# Patient Record
Sex: FEMALE | Race: WHITE | NOT HISPANIC OR LATINO
[De-identification: names, ages, dates, MRNs, and addresses within clinical notes are randomized per-mention and may not be internally consistent; named-entity substitution may affect disease eponyms.]

---

## 2017-04-11 ENCOUNTER — APPOINTMENT (OUTPATIENT)
Dept: FAMILY MEDICINE | Facility: CLINIC | Age: 52
End: 2017-04-11

## 2017-07-12 ENCOUNTER — APPOINTMENT (OUTPATIENT)
Dept: FAMILY MEDICINE | Facility: CLINIC | Age: 52
End: 2017-07-12

## 2017-07-12 VITALS
HEART RATE: 78 BPM | HEIGHT: 62 IN | RESPIRATION RATE: 15 BRPM | DIASTOLIC BLOOD PRESSURE: 80 MMHG | SYSTOLIC BLOOD PRESSURE: 120 MMHG | OXYGEN SATURATION: 98 % | WEIGHT: 118 LBS | BODY MASS INDEX: 21.71 KG/M2 | TEMPERATURE: 97.8 F

## 2017-07-12 DIAGNOSIS — S22.20XA UNSPECIFIED FRACTURE OF STERNUM, INITIAL ENCOUNTER FOR CLOSED FRACTURE: ICD-10-CM

## 2017-07-12 DIAGNOSIS — Z72.0 TOBACCO USE: ICD-10-CM

## 2017-07-12 DIAGNOSIS — Z87.81 PERSONAL HISTORY OF (HEALED) TRAUMATIC FRACTURE: ICD-10-CM

## 2017-07-12 DIAGNOSIS — G89.29 PAIN IN LEFT KNEE: ICD-10-CM

## 2017-07-12 DIAGNOSIS — M25.562 PAIN IN LEFT KNEE: ICD-10-CM

## 2017-07-12 DIAGNOSIS — Z87.898 PERSONAL HISTORY OF OTHER SPECIFIED CONDITIONS: ICD-10-CM

## 2017-07-12 DIAGNOSIS — Z87.828 PERSONAL HISTORY OF OTHER (HEALED) PHYSICAL INJURY AND TRAUMA: ICD-10-CM

## 2017-07-13 LAB
25(OH)D3 SERPL-MCNC: 14.9 NG/ML
ALBUMIN SERPL ELPH-MCNC: 4.4 G/DL
ALP BLD-CCNC: 61 U/L
ALT SERPL-CCNC: 13 U/L
ANION GAP SERPL CALC-SCNC: 12 MMOL/L
APPEARANCE: ABNORMAL
AST SERPL-CCNC: 12 U/L
BACTERIA: ABNORMAL
BASOPHILS # BLD AUTO: 0.02 K/UL
BASOPHILS NFR BLD AUTO: 0.2 %
BILIRUB DIRECT SERPL-MCNC: 0.2 MG/DL
BILIRUB INDIRECT SERPL-MCNC: 0.8 MG/DL
BILIRUB SERPL-MCNC: 1 MG/DL
BILIRUBIN URINE: NEGATIVE
BLOOD URINE: ABNORMAL
BUN SERPL-MCNC: 12 MG/DL
CALCIUM OXALATE CRYSTALS: ABNORMAL
CALCIUM SERPL-MCNC: 9.6 MG/DL
CHLORIDE SERPL-SCNC: 103 MMOL/L
CHOLEST SERPL-MCNC: 233 MG/DL
CHOLEST/HDLC SERPL: 3.3 RATIO
CO2 SERPL-SCNC: 21 MMOL/L
COLOR: YELLOW
CREAT SERPL-MCNC: 0.68 MG/DL
EOSINOPHIL # BLD AUTO: 0.06 K/UL
EOSINOPHIL NFR BLD AUTO: 0.7 %
FOLATE SERPL-MCNC: 18 NG/ML
GLUCOSE QUALITATIVE U: NORMAL MG/DL
GLUCOSE SERPL-MCNC: 100 MG/DL
HAV IGM SER QL: NONREACTIVE
HBA1C MFR BLD HPLC: 5.3 %
HBV CORE IGM SER QL: NONREACTIVE
HBV SURFACE AG SER QL: NONREACTIVE
HCT VFR BLD CALC: 39.7 %
HCV AB SER QL: NONREACTIVE
HCV S/CO RATIO: 0.12 S/CO
HDLC SERPL-MCNC: 71 MG/DL
HGB BLD-MCNC: 13 G/DL
HIV1+2 AB SPEC QL IA.RAPID: NONREACTIVE
HYALINE CASTS: 0 /LPF
IMM GRANULOCYTES NFR BLD AUTO: 0.1 %
KETONES URINE: NEGATIVE
LDLC SERPL CALC-MCNC: 131 MG/DL
LEUKOCYTE ESTERASE URINE: ABNORMAL
LYMPHOCYTES # BLD AUTO: 2.56 K/UL
LYMPHOCYTES NFR BLD AUTO: 29.7 %
MAN DIFF?: NORMAL
MCHC RBC-ENTMCNC: 29.3 PG
MCHC RBC-ENTMCNC: 32.7 GM/DL
MCV RBC AUTO: 89.4 FL
MICROSCOPIC-UA: NORMAL
MONOCYTES # BLD AUTO: 0.54 K/UL
MONOCYTES NFR BLD AUTO: 6.3 %
NEUTROPHILS # BLD AUTO: 5.42 K/UL
NEUTROPHILS NFR BLD AUTO: 63 %
NITRITE URINE: NEGATIVE
PH URINE: 5.5
PLATELET # BLD AUTO: 281 K/UL
POTASSIUM SERPL-SCNC: 3.8 MMOL/L
PROT SERPL-MCNC: 7.1 G/DL
PROTEIN URINE: NEGATIVE MG/DL
RBC # BLD: 4.44 M/UL
RBC # FLD: 13.5 %
RED BLOOD CELLS URINE: 3 /HPF
SODIUM SERPL-SCNC: 136 MMOL/L
SPECIFIC GRAVITY URINE: 1.01
SQUAMOUS EPITHELIAL CELLS: 9 /HPF
TRIGL SERPL-MCNC: 154 MG/DL
TSH SERPL-ACNC: 1.38 UIU/ML
URATE SERPL-MCNC: 5 MG/DL
URIC ACID CRYSTALS: ABNORMAL
URINE COMMENTS: NORMAL
UROBILINOGEN URINE: NORMAL MG/DL
VIT B12 SERPL-MCNC: 329 PG/ML
WBC # FLD AUTO: 8.61 K/UL
WHITE BLOOD CELLS URINE: 16 /HPF

## 2017-07-20 ENCOUNTER — FORM ENCOUNTER (OUTPATIENT)
Age: 52
End: 2017-07-20

## 2017-07-21 ENCOUNTER — APPOINTMENT (OUTPATIENT)
Dept: RADIOLOGY | Facility: HOSPITAL | Age: 52
End: 2017-07-21

## 2017-07-21 ENCOUNTER — OUTPATIENT (OUTPATIENT)
Dept: OUTPATIENT SERVICES | Facility: HOSPITAL | Age: 52
LOS: 1 days | End: 2017-07-21
Payer: COMMERCIAL

## 2017-07-21 PROCEDURE — 73562 X-RAY EXAM OF KNEE 3: CPT

## 2017-08-10 ENCOUNTER — FORM ENCOUNTER (OUTPATIENT)
Age: 52
End: 2017-08-10

## 2017-08-11 ENCOUNTER — RESULT REVIEW (OUTPATIENT)
Age: 52
End: 2017-08-11

## 2017-08-11 ENCOUNTER — APPOINTMENT (OUTPATIENT)
Dept: RADIOLOGY | Facility: HOSPITAL | Age: 52
End: 2017-08-11
Payer: COMMERCIAL

## 2017-08-11 ENCOUNTER — APPOINTMENT (OUTPATIENT)
Dept: MAMMOGRAPHY | Facility: HOSPITAL | Age: 52
End: 2017-08-11
Payer: COMMERCIAL

## 2017-08-11 ENCOUNTER — OUTPATIENT (OUTPATIENT)
Dept: OUTPATIENT SERVICES | Facility: HOSPITAL | Age: 52
LOS: 1 days | End: 2017-08-11
Payer: COMMERCIAL

## 2017-08-11 PROCEDURE — 77080 DXA BONE DENSITY AXIAL: CPT | Mod: 26

## 2017-08-11 PROCEDURE — G0202: CPT | Mod: 26

## 2017-08-11 PROCEDURE — 77063 BREAST TOMOSYNTHESIS BI: CPT

## 2017-08-11 PROCEDURE — 77063 BREAST TOMOSYNTHESIS BI: CPT | Mod: 26

## 2017-08-11 PROCEDURE — 77067 SCR MAMMO BI INCL CAD: CPT

## 2017-08-11 PROCEDURE — 77080 DXA BONE DENSITY AXIAL: CPT

## 2017-08-17 ENCOUNTER — FORM ENCOUNTER (OUTPATIENT)
Age: 52
End: 2017-08-17

## 2017-08-18 ENCOUNTER — APPOINTMENT (OUTPATIENT)
Dept: ULTRASOUND IMAGING | Facility: HOSPITAL | Age: 52
End: 2017-08-18
Payer: COMMERCIAL

## 2017-08-18 ENCOUNTER — APPOINTMENT (OUTPATIENT)
Dept: MAMMOGRAPHY | Facility: HOSPITAL | Age: 52
End: 2017-08-18
Payer: COMMERCIAL

## 2017-08-18 ENCOUNTER — OUTPATIENT (OUTPATIENT)
Dept: OUTPATIENT SERVICES | Facility: HOSPITAL | Age: 52
LOS: 1 days | End: 2017-08-18
Payer: COMMERCIAL

## 2017-08-18 PROCEDURE — 77065 DX MAMMO INCL CAD UNI: CPT

## 2017-08-18 PROCEDURE — G0279: CPT | Mod: 26

## 2017-08-18 PROCEDURE — 93971 EXTREMITY STUDY: CPT | Mod: 26,LT

## 2017-08-18 PROCEDURE — 76856 US EXAM PELVIC COMPLETE: CPT | Mod: 26

## 2017-08-18 PROCEDURE — 76642 ULTRASOUND BREAST LIMITED: CPT | Mod: 26,LT

## 2017-08-18 PROCEDURE — 76856 US EXAM PELVIC COMPLETE: CPT

## 2017-08-18 PROCEDURE — G0206: CPT | Mod: 26,LT

## 2017-08-18 PROCEDURE — 93971 EXTREMITY STUDY: CPT

## 2017-08-18 PROCEDURE — 76830 TRANSVAGINAL US NON-OB: CPT

## 2017-08-18 PROCEDURE — 76830 TRANSVAGINAL US NON-OB: CPT | Mod: 26

## 2017-08-18 PROCEDURE — 76642 ULTRASOUND BREAST LIMITED: CPT

## 2017-08-18 PROCEDURE — G0279: CPT

## 2018-05-08 ENCOUNTER — RX RENEWAL (OUTPATIENT)
Age: 53
End: 2018-05-08

## 2018-06-04 ENCOUNTER — FORM ENCOUNTER (OUTPATIENT)
Age: 53
End: 2018-06-04

## 2018-06-05 ENCOUNTER — APPOINTMENT (OUTPATIENT)
Dept: ULTRASOUND IMAGING | Facility: HOSPITAL | Age: 53
End: 2018-06-05
Payer: COMMERCIAL

## 2018-06-05 ENCOUNTER — APPOINTMENT (OUTPATIENT)
Dept: MAMMOGRAPHY | Facility: HOSPITAL | Age: 53
End: 2018-06-05
Payer: COMMERCIAL

## 2018-06-05 ENCOUNTER — OUTPATIENT (OUTPATIENT)
Dept: OUTPATIENT SERVICES | Facility: HOSPITAL | Age: 53
LOS: 1 days | End: 2018-06-05
Payer: COMMERCIAL

## 2018-06-05 DIAGNOSIS — Z00.8 ENCOUNTER FOR OTHER GENERAL EXAMINATION: ICD-10-CM

## 2018-06-05 PROCEDURE — 77066 DX MAMMO INCL CAD BI: CPT

## 2018-06-05 PROCEDURE — 76641 ULTRASOUND BREAST COMPLETE: CPT

## 2018-06-05 PROCEDURE — G0279: CPT | Mod: 26

## 2018-06-05 PROCEDURE — 76641 ULTRASOUND BREAST COMPLETE: CPT | Mod: 26

## 2018-06-05 PROCEDURE — 77066 DX MAMMO INCL CAD BI: CPT | Mod: 26

## 2018-06-05 PROCEDURE — G0279: CPT

## 2018-07-30 ENCOUNTER — APPOINTMENT (OUTPATIENT)
Dept: FAMILY MEDICINE | Facility: CLINIC | Age: 53
End: 2018-07-30
Payer: COMMERCIAL

## 2018-07-30 VITALS
OXYGEN SATURATION: 94 % | WEIGHT: 124 LBS | HEART RATE: 66 BPM | BODY MASS INDEX: 22.82 KG/M2 | HEIGHT: 62 IN | TEMPERATURE: 98.5 F | RESPIRATION RATE: 16 BRPM | DIASTOLIC BLOOD PRESSURE: 82 MMHG | SYSTOLIC BLOOD PRESSURE: 122 MMHG

## 2018-07-30 DIAGNOSIS — G47.00 INSOMNIA, UNSPECIFIED: ICD-10-CM

## 2018-07-30 PROCEDURE — 99214 OFFICE O/P EST MOD 30 MIN: CPT | Mod: 25

## 2018-07-30 PROCEDURE — 36415 COLL VENOUS BLD VENIPUNCTURE: CPT

## 2018-08-01 LAB
25(OH)D3 SERPL-MCNC: 18.8 NG/ML
ALBUMIN SERPL ELPH-MCNC: 4.7 G/DL
ALP BLD-CCNC: 64 U/L
ALT SERPL-CCNC: 16 U/L
ANION GAP SERPL CALC-SCNC: 15 MMOL/L
APPEARANCE: CLEAR
AST SERPL-CCNC: 17 U/L
BACTERIA: ABNORMAL
BASOPHILS # BLD AUTO: 0.02 K/UL
BASOPHILS NFR BLD AUTO: 0.2 %
BILIRUB SERPL-MCNC: 0.3 MG/DL
BILIRUBIN URINE: NEGATIVE
BLOOD URINE: NEGATIVE
BUN SERPL-MCNC: 13 MG/DL
CALCIUM SERPL-MCNC: 9.6 MG/DL
CHLORIDE SERPL-SCNC: 101 MMOL/L
CHOLEST SERPL-MCNC: 271 MG/DL
CHOLEST/HDLC SERPL: 5.4 RATIO
CO2 SERPL-SCNC: 24 MMOL/L
COLOR: YELLOW
CREAT SERPL-MCNC: 0.71 MG/DL
CREAT SPEC-SCNC: 150 MG/DL
EOSINOPHIL # BLD AUTO: 0.07 K/UL
EOSINOPHIL NFR BLD AUTO: 0.7 %
FOLATE SERPL-MCNC: 16.8 NG/ML
GLUCOSE QUALITATIVE U: NEGATIVE MG/DL
GLUCOSE SERPL-MCNC: 92 MG/DL
HBA1C MFR BLD HPLC: 5.5 %
HCT VFR BLD CALC: 40.5 %
HCV AB SER QL: NONREACTIVE
HCV S/CO RATIO: 0.26 S/CO
HDLC SERPL-MCNC: 50 MG/DL
HGB BLD-MCNC: 13.4 G/DL
HIV1+2 AB SPEC QL IA.RAPID: NONREACTIVE
HYALINE CASTS: 0 /LPF
IMM GRANULOCYTES NFR BLD AUTO: 0.3 %
KETONES URINE: NEGATIVE
LDLC SERPL CALC-MCNC: NORMAL
LEUKOCYTE ESTERASE URINE: NEGATIVE
LYMPHOCYTES # BLD AUTO: 2.47 K/UL
LYMPHOCYTES NFR BLD AUTO: 24.4 %
MAN DIFF?: NORMAL
MCHC RBC-ENTMCNC: 29.8 PG
MCHC RBC-ENTMCNC: 33.1 GM/DL
MCV RBC AUTO: 90 FL
MICROALBUMIN 24H UR DL<=1MG/L-MCNC: <1.2 MG/DL
MICROALBUMIN/CREAT 24H UR-RTO: NORMAL
MICROSCOPIC-UA: NORMAL
MONOCYTES # BLD AUTO: 0.78 K/UL
MONOCYTES NFR BLD AUTO: 7.7 %
NEUTROPHILS # BLD AUTO: 6.76 K/UL
NEUTROPHILS NFR BLD AUTO: 66.7 %
NITRITE URINE: NEGATIVE
PH URINE: 5
PLATELET # BLD AUTO: 264 K/UL
POTASSIUM SERPL-SCNC: 4 MMOL/L
PROT SERPL-MCNC: 7.1 G/DL
PROTEIN URINE: NEGATIVE MG/DL
RBC # BLD: 4.5 M/UL
RBC # FLD: 13.6 %
RED BLOOD CELLS URINE: 2 /HPF
SODIUM SERPL-SCNC: 140 MMOL/L
SPECIFIC GRAVITY URINE: 1.02
SQUAMOUS EPITHELIAL CELLS: 7 /HPF
TRIGL SERPL-MCNC: 537 MG/DL
TSH SERPL-ACNC: 2.66 UIU/ML
URATE SERPL-MCNC: 6.4 MG/DL
URIC ACID CRYSTALS: ABNORMAL
UROBILINOGEN URINE: NEGATIVE MG/DL
VIT B12 SERPL-MCNC: 297 PG/ML
WBC # FLD AUTO: 10.13 K/UL
WHITE BLOOD CELLS URINE: 5 /HPF

## 2018-08-12 NOTE — PHYSICAL EXAM
[No Acute Distress] : no acute distress [No Respiratory Distress] : no respiratory distress  [Clear to Auscultation] : lungs were clear to auscultation bilaterally [No Accessory Muscle Use] : no accessory muscle use [Normal Rate] : normal rate  [Regular Rhythm] : with a regular rhythm [Normal S1, S2] : normal S1 and S2 [No Edema] : there was no peripheral edema [Soft] : abdomen soft [Non-distended] : non-distended [No Masses] : no abdominal mass palpated [No HSM] : no HSM [Alert and Oriented x3] : oriented to person, place, and time

## 2018-08-12 NOTE — ASSESSMENT
[FreeTextEntry1] : Patient was advised to f/u low fat and sugar diet, Insomnia stable on current management , cont the same meds, Rx given. CPE recommended, f/u blood work

## 2018-09-07 ENCOUNTER — APPOINTMENT (OUTPATIENT)
Dept: FAMILY MEDICINE | Facility: CLINIC | Age: 53
End: 2018-09-07

## 2019-01-11 ENCOUNTER — APPOINTMENT (OUTPATIENT)
Dept: FAMILY MEDICINE | Facility: CLINIC | Age: 54
End: 2019-01-11
Payer: COMMERCIAL

## 2019-01-11 VITALS
HEIGHT: 62 IN | WEIGHT: 124 LBS | OXYGEN SATURATION: 98 % | DIASTOLIC BLOOD PRESSURE: 82 MMHG | HEART RATE: 69 BPM | TEMPERATURE: 98.5 F | SYSTOLIC BLOOD PRESSURE: 120 MMHG | BODY MASS INDEX: 22.82 KG/M2 | RESPIRATION RATE: 15 BRPM

## 2019-01-11 PROCEDURE — 99214 OFFICE O/P EST MOD 30 MIN: CPT

## 2019-01-11 RX ORDER — PANTOPRAZOLE 40 MG/1
40 TABLET, DELAYED RELEASE ORAL
Refills: 0 | Status: COMPLETED | COMMUNITY
End: 2019-01-11
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
## 2019-01-11 NOTE — HEALTH RISK ASSESSMENT
[No falls in past year] : Patient reported no falls in the past year [0] : 2) Feeling down, depressed, or hopeless: Not at all (0) [[id number]] : 0 [Independent] : managing finances [Discussed at today's visit] : Advance Directives Discussed at today's visit [Aggressive treatment] : aggressive treatment

## 2019-01-11 NOTE — PHYSICAL EXAM
[No Acute Distress] : no acute distress [No Respiratory Distress] : no respiratory distress  [Clear to Auscultation] : lungs were clear to auscultation bilaterally [No Accessory Muscle Use] : no accessory muscle use [Normal Rate] : normal rate  [Regular Rhythm] : with a regular rhythm [Normal S1, S2] : normal S1 and S2 [No Edema] : there was no peripheral edema [Soft] : abdomen soft [Non-distended] : non-distended [No Masses] : no abdominal mass palpated [No HSM] : no HSM [Alert and Oriented x3] : oriented to person, place, and time [de-identified] : right wrist + edema, no erythema, non tender to touch , full ROM ,  2nf right finger decrease Rdistal phalynx + edema, tender to touch

## 2019-01-11 NOTE — HISTORY OF PRESENT ILLNESS
[FreeTextEntry1] : cc: Insomnia , ulcer  [de-identified] : Patient came to f/u on her Insomnia - doing well with medication, need refills. Pt. denies CP,SOB,abd pain. SHe take PPI for her gastric ulcer. [Musculoskeletal Symptoms Arms] :  arm [___ Weeks ago] : [unfilled] weeks ago [Paroxysmal] : paroxysmal [Moderate] : moderate [Activity] : with activity [Stable] : stable

## 2019-01-15 ENCOUNTER — FORM ENCOUNTER (OUTPATIENT)
Age: 54
End: 2019-01-15

## 2019-01-16 ENCOUNTER — OUTPATIENT (OUTPATIENT)
Dept: OUTPATIENT SERVICES | Facility: HOSPITAL | Age: 54
LOS: 1 days | End: 2019-01-16
Payer: COMMERCIAL

## 2019-01-16 ENCOUNTER — APPOINTMENT (OUTPATIENT)
Dept: RADIOLOGY | Facility: HOSPITAL | Age: 54
End: 2019-01-16
Payer: COMMERCIAL

## 2019-01-16 DIAGNOSIS — M79.644 PAIN IN RIGHT FINGER(S): ICD-10-CM

## 2019-01-16 PROCEDURE — 73120 X-RAY EXAM OF HAND: CPT

## 2019-01-16 PROCEDURE — 73120 X-RAY EXAM OF HAND: CPT | Mod: 26,RT

## 2019-01-16 PROCEDURE — 73110 X-RAY EXAM OF WRIST: CPT | Mod: 26,RT

## 2019-01-16 PROCEDURE — 73110 X-RAY EXAM OF WRIST: CPT

## 2019-02-22 ENCOUNTER — APPOINTMENT (OUTPATIENT)
Dept: FAMILY MEDICINE | Facility: CLINIC | Age: 54
End: 2019-02-22
Payer: COMMERCIAL

## 2019-02-22 VITALS
BODY MASS INDEX: 22.82 KG/M2 | SYSTOLIC BLOOD PRESSURE: 120 MMHG | TEMPERATURE: 101 F | HEIGHT: 62 IN | OXYGEN SATURATION: 99 % | HEART RATE: 96 BPM | DIASTOLIC BLOOD PRESSURE: 80 MMHG | WEIGHT: 124 LBS

## 2019-02-22 DIAGNOSIS — Z87.09 PERSONAL HISTORY OF OTHER DISEASES OF THE RESPIRATORY SYSTEM: ICD-10-CM

## 2019-02-22 PROCEDURE — 99212 OFFICE O/P EST SF 10 MIN: CPT

## 2019-02-23 NOTE — PHYSICAL EXAM

## 2019-02-23 NOTE — ASSESSMENT
[FreeTextEntry1] : Due to her persistent fever and length of illness I will treat with Doxycycline. Possible double sickening syndrome. Discussed with patient that it could possibly be the flu but Tamiflu would not be indicated since she has had persistent symptoms for at least 4 days.

## 2019-02-23 NOTE — HISTORY OF PRESENT ILLNESS
[FreeTextEntry8] : Presents with productive cough with white discharge and muscle aches  and fever since the 11th.  Symptoms improved and then worsened about 5 days ago. She denies nasal congestion,  or sore throat. Symptoms are worse during the day and reports about one to two coughing spells a day where she coughs for a couple of minutes.\par \par \par \par Did not receive flu this year. No family sick contacts noted \par \par \par

## 2019-02-26 ENCOUNTER — OUTPATIENT (OUTPATIENT)
Dept: OUTPATIENT SERVICES | Facility: HOSPITAL | Age: 54
LOS: 1 days | End: 2019-02-26
Payer: COMMERCIAL

## 2019-02-26 ENCOUNTER — APPOINTMENT (OUTPATIENT)
Dept: RADIOLOGY | Facility: HOSPITAL | Age: 54
End: 2019-02-26
Payer: COMMERCIAL

## 2019-02-26 DIAGNOSIS — J20.9 ACUTE BRONCHITIS, UNSPECIFIED: ICD-10-CM

## 2019-02-26 PROCEDURE — 71046 X-RAY EXAM CHEST 2 VIEWS: CPT

## 2019-02-26 PROCEDURE — 71046 X-RAY EXAM CHEST 2 VIEWS: CPT | Mod: 26

## 2019-03-18 ENCOUNTER — RX RENEWAL (OUTPATIENT)
Age: 54
End: 2019-03-18

## 2019-10-31 ENCOUNTER — RESULT REVIEW (OUTPATIENT)
Age: 54
End: 2019-10-31

## 2020-01-31 ENCOUNTER — APPOINTMENT (OUTPATIENT)
Dept: ULTRASOUND IMAGING | Facility: HOSPITAL | Age: 55
End: 2020-01-31
Payer: COMMERCIAL

## 2020-01-31 ENCOUNTER — OUTPATIENT (OUTPATIENT)
Dept: OUTPATIENT SERVICES | Facility: HOSPITAL | Age: 55
LOS: 1 days | End: 2020-01-31
Payer: COMMERCIAL

## 2020-01-31 ENCOUNTER — APPOINTMENT (OUTPATIENT)
Dept: MAMMOGRAPHY | Facility: HOSPITAL | Age: 55
End: 2020-01-31
Payer: COMMERCIAL

## 2020-01-31 ENCOUNTER — APPOINTMENT (OUTPATIENT)
Dept: RADIOLOGY | Facility: HOSPITAL | Age: 55
End: 2020-01-31
Payer: COMMERCIAL

## 2020-01-31 DIAGNOSIS — Z00.8 ENCOUNTER FOR OTHER GENERAL EXAMINATION: ICD-10-CM

## 2020-01-31 PROCEDURE — 77063 BREAST TOMOSYNTHESIS BI: CPT

## 2020-01-31 PROCEDURE — 77067 SCR MAMMO BI INCL CAD: CPT | Mod: 26

## 2020-01-31 PROCEDURE — 76830 TRANSVAGINAL US NON-OB: CPT | Mod: 26

## 2020-01-31 PROCEDURE — 77063 BREAST TOMOSYNTHESIS BI: CPT | Mod: 26

## 2020-01-31 PROCEDURE — 77080 DXA BONE DENSITY AXIAL: CPT | Mod: 26

## 2020-01-31 PROCEDURE — 76641 ULTRASOUND BREAST COMPLETE: CPT | Mod: 26,50

## 2020-01-31 PROCEDURE — 76856 US EXAM PELVIC COMPLETE: CPT | Mod: 26

## 2020-01-31 PROCEDURE — 77067 SCR MAMMO BI INCL CAD: CPT

## 2020-01-31 PROCEDURE — 77080 DXA BONE DENSITY AXIAL: CPT

## 2020-01-31 PROCEDURE — 76641 ULTRASOUND BREAST COMPLETE: CPT

## 2020-01-31 PROCEDURE — 76830 TRANSVAGINAL US NON-OB: CPT

## 2020-01-31 PROCEDURE — 76856 US EXAM PELVIC COMPLETE: CPT

## 2020-12-21 PROBLEM — Z87.09 HISTORY OF ACUTE BRONCHITIS: Status: RESOLVED | Noted: 2019-02-22 | Resolved: 2020-12-21

## 2021-01-21 ENCOUNTER — APPOINTMENT (OUTPATIENT)
Dept: FAMILY MEDICINE | Facility: CLINIC | Age: 56
End: 2021-01-21
Payer: COMMERCIAL

## 2021-01-21 ENCOUNTER — NON-APPOINTMENT (OUTPATIENT)
Age: 56
End: 2021-01-21

## 2021-01-21 VITALS
OXYGEN SATURATION: 98 % | HEART RATE: 63 BPM | WEIGHT: 122 LBS | DIASTOLIC BLOOD PRESSURE: 80 MMHG | RESPIRATION RATE: 15 BRPM | HEIGHT: 62 IN | TEMPERATURE: 97.8 F | SYSTOLIC BLOOD PRESSURE: 140 MMHG | BODY MASS INDEX: 22.45 KG/M2

## 2021-01-21 DIAGNOSIS — R73.01 IMPAIRED FASTING GLUCOSE: ICD-10-CM

## 2021-01-21 PROCEDURE — 99406 BEHAV CHNG SMOKING 3-10 MIN: CPT

## 2021-01-21 PROCEDURE — 93000 ELECTROCARDIOGRAM COMPLETE: CPT

## 2021-01-21 PROCEDURE — 99396 PREV VISIT EST AGE 40-64: CPT | Mod: 25

## 2021-01-21 PROCEDURE — 99072 ADDL SUPL MATRL&STAF TM PHE: CPT

## 2021-01-21 PROCEDURE — 36415 COLL VENOUS BLD VENIPUNCTURE: CPT

## 2021-01-21 RX ORDER — MELOXICAM 15 MG/1
15 TABLET ORAL
Qty: 30 | Refills: 0 | Status: COMPLETED | COMMUNITY
Start: 2019-01-11 | End: 2021-01-21

## 2021-01-21 RX ORDER — BENZONATATE 100 MG/1
100 CAPSULE ORAL 3 TIMES DAILY
Qty: 15 | Refills: 0 | Status: COMPLETED | COMMUNITY
Start: 2019-02-22 | End: 2021-01-21

## 2021-01-21 RX ORDER — ATORVASTATIN CALCIUM 20 MG/1
20 TABLET, FILM COATED ORAL
Qty: 30 | Refills: 3 | Status: COMPLETED | COMMUNITY
Start: 2018-08-23 | End: 2021-01-21

## 2021-01-21 RX ORDER — ZOLPIDEM TARTRATE SUBLINGUAL 3.5 MG/1
3.5 TABLET SUBLINGUAL
Qty: 30 | Refills: 0 | Status: COMPLETED | COMMUNITY
Start: 2018-07-30 | End: 2021-01-21

## 2021-01-21 RX ORDER — DOXYCYCLINE HYCLATE 100 MG/1
100 TABLET ORAL
Qty: 20 | Refills: 0 | Status: COMPLETED | COMMUNITY
Start: 2019-02-22 | End: 2021-01-21

## 2021-01-21 NOTE — HEALTH RISK ASSESSMENT
[Very Good] : ~his/her~  mood as very good [] : Yes [21-25] : 21-25 [Yes] : Yes [2 - 4 times a month (2 pts)] : 2-4 times a month (2 points) [1 or 2 (0 pts)] : 1 or 2 (0 points) [Never (0 pts)] : Never (0 points) [0] : 2) Feeling down, depressed, or hopeless: Not at all (0) [Patient reported mammogram was normal] : Patient reported mammogram was normal [Patient reported PAP Smear was normal] : Patient reported PAP Smear was normal [Patient reported bone density results were abnormal] : Patient reported bone density results were abnormal [Patient reported colonoscopy was normal] : Patient reported colonoscopy was normal [HIV Test offered] : HIV Test offered [Hepatitis C test offered] : Hepatitis C test offered [None] : None [With Family] : lives with family [Employed] : employed [High School] : high school [] :  [# Of Children ___] : has [unfilled] children [Sexually Active] : sexually active [Smoke Detector] : smoke detector [Carbon Monoxide Detector] : carbon monoxide detector [Seat Belt] :  uses seat belt [With Patient/Caregiver] : With Patient/Caregiver [Aggressive treatment] : aggressive treatment [FreeTextEntry1] : none [de-identified] : No [de-identified] : none [de-identified] : 6 cig a day  [Audit-CScore] : 2 [de-identified] : marijuana  [de-identified] : walking  [de-identified] : healthy  [[id number]] : 0 [Change in mental status noted] : No change in mental status noted [Language] : denies difficulty with language [Reports changes in hearing] : Reports no changes in hearing [Reports changes in vision] : Reports no changes in vision [Reports changes in dental health] : Reports no changes in dental health [Sunscreen] : does not use sunscreen [MammogramDate] : 01/20 [PapSmearDate] : 01/20 [BoneDensityDate] : 01/20 [BoneDensityComments] : osteopenia [ColonoscopyDate] : 04/15 [AdvancecareDate] : 01/21

## 2021-01-21 NOTE — HISTORY OF PRESENT ILLNESS
[FreeTextEntry1] : cc: physical  [de-identified] : Patient came  for physical. Denies CP,SOB,Abd pain, no N,V,C,D.Pt smokes 25 years 6 cig a day, sister dgn with [facility]. smoking cessation completed, 5 min not ready yet. \par

## 2021-01-21 NOTE — HEALTH RISK ASSESSMENT
[Very Good] : ~his/her~  mood as very good [] : Yes [Yes] : Yes [21-25] : 21-25 [2 - 4 times a month (2 pts)] : 2-4 times a month (2 points) [1 or 2 (0 pts)] : 1 or 2 (0 points) [Never (0 pts)] : Never (0 points) [0] : 2) Feeling down, depressed, or hopeless: Not at all (0) [Patient reported mammogram was normal] : Patient reported mammogram was normal [Patient reported PAP Smear was normal] : Patient reported PAP Smear was normal [Patient reported bone density results were abnormal] : Patient reported bone density results were abnormal [Patient reported colonoscopy was normal] : Patient reported colonoscopy was normal [HIV Test offered] : HIV Test offered [Hepatitis C test offered] : Hepatitis C test offered [None] : None [With Family] : lives with family [Employed] : employed [High School] : high school [] :  [# Of Children ___] : has [unfilled] children [Sexually Active] : sexually active [Smoke Detector] : smoke detector [Carbon Monoxide Detector] : carbon monoxide detector [Seat Belt] :  uses seat belt [With Patient/Caregiver] : With Patient/Caregiver [Aggressive treatment] : aggressive treatment [FreeTextEntry1] : none [de-identified] : No [de-identified] : none [de-identified] : 6 cig a day  [Audit-CScore] : 2 [de-identified] : marijuana  [de-identified] : walking  [de-identified] : healthy  [[id number]] : 0 [Change in mental status noted] : No change in mental status noted [Language] : denies difficulty with language [Reports changes in hearing] : Reports no changes in hearing [Reports changes in vision] : Reports no changes in vision [Reports changes in dental health] : Reports no changes in dental health [Sunscreen] : does not use sunscreen [MammogramDate] : 01/20 [BoneDensityDate] : 01/20 [PapSmearDate] : 01/20 [BoneDensityComments] : osteopenia [ColonoscopyDate] : 04/15 [AdvancecareDate] : 01/21

## 2021-01-21 NOTE — COUNSELING
[Cessation strategies including cessation program discussed] : Cessation strategies including cessation program discussed [Use of nicotine replacement therapies and other medications discussed] : Use of nicotine replacement therapies and other medications discussed [Encouraged to pick a quit date and identify support needed to quit] : Encouraged to pick a quit date and identify support needed to quit [Willing to Quit Smoking] : Not willing to quit smoking [Tobacco Use Cessation Intermediate Greater Than 3 Minutes Up to 10 Minutes] : Tobacco Use Cessation Intermediate Greater Than 3 Minutes Up to 10 Minutes [FreeTextEntry1] : 5 [Good understanding] : Patient has a good understanding of lifestyle changes and steps needed to achieve self management goal

## 2021-01-21 NOTE — HISTORY OF PRESENT ILLNESS
[FreeTextEntry1] : cc: physical  [de-identified] : Patient came  for physical. Denies CP,SOB,Abd pain, no N,V,C,D.Pt smokes 25 years 6 cig a day, sister dgn with [facility]. smoking cessation completed, 5 min not ready yet. \par

## 2021-01-21 NOTE — PHYSICAL EXAM
[No Varicosities] : no varicosities [Normal Appearance] : normal in appearance [No Edema] : there was no peripheral edema [No Masses] : no palpable masses [No Nipple Discharge] : no nipple discharge [No Axillary Lymphadenopathy] : no axillary lymphadenopathy [Normal] : affect was normal and insight and judgment were intact

## 2021-01-21 NOTE — DATA REVIEWED
[FreeTextEntry1] : last blood work d/w the pt at length   \par EKG - NSR at 65 bpm, no acute changes

## 2021-01-21 NOTE — COUNSELING
[Cessation strategies including cessation program discussed] : Cessation strategies including cessation program discussed [Encouraged to pick a quit date and identify support needed to quit] : Encouraged to pick a quit date and identify support needed to quit [Use of nicotine replacement therapies and other medications discussed] : Use of nicotine replacement therapies and other medications discussed [Willing to Quit Smoking] : Not willing to quit smoking [Tobacco Use Cessation Intermediate Greater Than 3 Minutes Up to 10 Minutes] : Tobacco Use Cessation Intermediate Greater Than 3 Minutes Up to 10 Minutes [FreeTextEntry1] : 5 [Good understanding] : Patient has a good understanding of lifestyle changes and steps needed to achieve self management goal

## 2021-01-21 NOTE — PHYSICAL EXAM
[No Varicosities] : no varicosities [No Edema] : there was no peripheral edema [Normal Appearance] : normal in appearance [No Masses] : no palpable masses [No Nipple Discharge] : no nipple discharge [No Axillary Lymphadenopathy] : no axillary lymphadenopathy [Normal] : affect was normal and insight and judgment were intact

## 2021-01-24 LAB
25(OH)D3 SERPL-MCNC: 14.5 NG/ML
ALBUMIN SERPL ELPH-MCNC: 4.6 G/DL
ALP BLD-CCNC: 63 U/L
ALT SERPL-CCNC: 16 U/L
ANION GAP SERPL CALC-SCNC: 13 MMOL/L
APPEARANCE: ABNORMAL
AST SERPL-CCNC: 17 U/L
BASOPHILS # BLD AUTO: 0.04 K/UL
BASOPHILS NFR BLD AUTO: 0.4 %
BILIRUB SERPL-MCNC: 0.8 MG/DL
BILIRUBIN URINE: NEGATIVE
BLOOD URINE: NEGATIVE
BUN SERPL-MCNC: 16 MG/DL
CALCIUM SERPL-MCNC: 9.7 MG/DL
CHLORIDE SERPL-SCNC: 101 MMOL/L
CHOLEST SERPL-MCNC: 281 MG/DL
CO2 SERPL-SCNC: 24 MMOL/L
COLOR: YELLOW
CREAT SERPL-MCNC: 0.72 MG/DL
CREAT SPEC-SCNC: 171 MG/DL
EOSINOPHIL # BLD AUTO: 0.06 K/UL
EOSINOPHIL NFR BLD AUTO: 0.7 %
ESTIMATED AVERAGE GLUCOSE: 114 MG/DL
FOLATE SERPL-MCNC: 16.9 NG/ML
GLUCOSE QUALITATIVE U: NEGATIVE
GLUCOSE SERPL-MCNC: 93 MG/DL
HBA1C MFR BLD HPLC: 5.6 %
HCT VFR BLD CALC: 39.6 %
HDLC SERPL-MCNC: 64 MG/DL
HGB BLD-MCNC: 13 G/DL
IMM GRANULOCYTES NFR BLD AUTO: 0.2 %
KETONES URINE: NEGATIVE
LDLC SERPL CALC-MCNC: 161 MG/DL
LEUKOCYTE ESTERASE URINE: NEGATIVE
LYMPHOCYTES # BLD AUTO: 2.6 K/UL
LYMPHOCYTES NFR BLD AUTO: 29 %
MAN DIFF?: NORMAL
MCHC RBC-ENTMCNC: 30.1 PG
MCHC RBC-ENTMCNC: 32.8 GM/DL
MCV RBC AUTO: 91.7 FL
MICROALBUMIN 24H UR DL<=1MG/L-MCNC: <1.2 MG/DL
MICROALBUMIN/CREAT 24H UR-RTO: NORMAL MG/G
MONOCYTES # BLD AUTO: 0.64 K/UL
MONOCYTES NFR BLD AUTO: 7.1 %
NEUTROPHILS # BLD AUTO: 5.6 K/UL
NEUTROPHILS NFR BLD AUTO: 62.6 %
NITRITE URINE: NEGATIVE
NONHDLC SERPL-MCNC: 217 MG/DL
PH URINE: 5.5
PLATELET # BLD AUTO: 238 K/UL
POTASSIUM SERPL-SCNC: 4.1 MMOL/L
PROT SERPL-MCNC: 7.1 G/DL
PROTEIN URINE: NEGATIVE
RBC # BLD: 4.32 M/UL
RBC # FLD: 13.2 %
SARS-COV-2 IGG SERPL IA-ACNC: 0.07 INDEX
SARS-COV-2 IGG SERPL QL IA: NEGATIVE
SODIUM SERPL-SCNC: 139 MMOL/L
SPECIFIC GRAVITY URINE: 1.03
TRIGL SERPL-MCNC: 279 MG/DL
TSH SERPL-ACNC: 2.16 UIU/ML
URATE SERPL-MCNC: 5 MG/DL
UROBILINOGEN URINE: NORMAL
VIT B12 SERPL-MCNC: 390 PG/ML
WBC # FLD AUTO: 8.96 K/UL

## 2021-01-27 ENCOUNTER — TRANSCRIPTION ENCOUNTER (OUTPATIENT)
Age: 56
End: 2021-01-27

## 2021-04-27 ENCOUNTER — RESULT REVIEW (OUTPATIENT)
Age: 56
End: 2021-04-27

## 2021-07-06 ENCOUNTER — OUTPATIENT (OUTPATIENT)
Dept: OUTPATIENT SERVICES | Facility: HOSPITAL | Age: 56
LOS: 1 days | End: 2021-07-06
Payer: COMMERCIAL

## 2021-07-06 ENCOUNTER — APPOINTMENT (OUTPATIENT)
Dept: ULTRASOUND IMAGING | Facility: HOSPITAL | Age: 56
End: 2021-07-06

## 2021-07-06 ENCOUNTER — APPOINTMENT (OUTPATIENT)
Dept: MAMMOGRAPHY | Facility: HOSPITAL | Age: 56
End: 2021-07-06

## 2021-07-06 DIAGNOSIS — Z00.8 ENCOUNTER FOR OTHER GENERAL EXAMINATION: ICD-10-CM

## 2021-07-06 PROCEDURE — 77063 BREAST TOMOSYNTHESIS BI: CPT | Mod: 26

## 2021-07-06 PROCEDURE — 76641 ULTRASOUND BREAST COMPLETE: CPT

## 2021-07-06 PROCEDURE — 77067 SCR MAMMO BI INCL CAD: CPT

## 2021-07-06 PROCEDURE — 77063 BREAST TOMOSYNTHESIS BI: CPT

## 2021-07-06 PROCEDURE — 76641 ULTRASOUND BREAST COMPLETE: CPT | Mod: 26,50

## 2021-07-06 PROCEDURE — 77067 SCR MAMMO BI INCL CAD: CPT | Mod: 26

## 2021-07-13 ENCOUNTER — RX RENEWAL (OUTPATIENT)
Age: 56
End: 2021-07-13

## 2021-12-21 LAB — SARS-COV-2 N GENE NPH QL NAA+PROBE: NOT DETECTED

## 2022-01-27 ENCOUNTER — APPOINTMENT (OUTPATIENT)
Dept: FAMILY MEDICINE | Facility: CLINIC | Age: 57
End: 2022-01-27
Payer: COMMERCIAL

## 2022-01-27 VITALS
TEMPERATURE: 98.3 F | RESPIRATION RATE: 15 BRPM | HEART RATE: 60 BPM | WEIGHT: 122 LBS | DIASTOLIC BLOOD PRESSURE: 76 MMHG | HEIGHT: 62 IN | BODY MASS INDEX: 22.45 KG/M2 | SYSTOLIC BLOOD PRESSURE: 137 MMHG | OXYGEN SATURATION: 98 %

## 2022-01-27 PROCEDURE — 99213 OFFICE O/P EST LOW 20 MIN: CPT

## 2022-01-27 NOTE — PHYSICAL EXAM
[No Acute Distress] : no acute distress [Well Nourished] : well nourished [Well Developed] : well developed [Well-Appearing] : well-appearing [Normal Sclera/Conjunctiva] : normal sclera/conjunctiva [PERRL] : pupils equal round and reactive to light [EOMI] : extraocular movements intact [Normal Outer Ear/Nose] : the outer ears and nose were normal in appearance [Normal Oropharynx] : the oropharynx was normal [No JVD] : no jugular venous distention [No Lymphadenopathy] : no lymphadenopathy [Supple] : supple [Thyroid Normal, No Nodules] : the thyroid was normal and there were no nodules present [No Respiratory Distress] : no respiratory distress  [No Accessory Muscle Use] : no accessory muscle use [Clear to Auscultation] : lungs were clear to auscultation bilaterally [Normal Rate] : normal rate  [Regular Rhythm] : with a regular rhythm [Normal S1, S2] : normal S1 and S2 [No Murmur] : no murmur heard [No Carotid Bruits] : no carotid bruits [No Abdominal Bruit] : a ~M bruit was not heard ~T in the abdomen [No Varicosities] : no varicosities [Pedal Pulses Present] : the pedal pulses are present [No Edema] : there was no peripheral edema [No Palpable Aorta] : no palpable aorta [No Extremity Clubbing/Cyanosis] : no extremity clubbing/cyanosis [Soft] : abdomen soft [Non Tender] : non-tender [Non-distended] : non-distended [No Masses] : no abdominal mass palpated [No HSM] : no HSM [Normal Bowel Sounds] : normal bowel sounds [Normal Posterior Cervical Nodes] : no posterior cervical lymphadenopathy [Normal Anterior Cervical Nodes] : no anterior cervical lymphadenopathy [No CVA Tenderness] : no CVA  tenderness [No Spinal Tenderness] : no spinal tenderness [No Joint Swelling] : no joint swelling [Grossly Normal Strength/Tone] : grossly normal strength/tone [No Rash] : no rash [Coordination Grossly Intact] : coordination grossly intact [No Focal Deficits] : no focal deficits [Normal Gait] : normal gait [Deep Tendon Reflexes (DTR)] : deep tendon reflexes were 2+ and symmetric [Normal Affect] : the affect was normal [Normal Insight/Judgement] : insight and judgment were intact [de-identified] : tenderness over lateral aspect of left ribs.

## 2022-01-27 NOTE — HISTORY OF PRESENT ILLNESS
[FreeTextEntry8] : Constant localized left sided rib pain that has been has persistent since Monday. Worse with taking deep breath and sleeping on left side. \par Has taken naproxen and Motrin with minimal relief. Denies cough, sob, marie, fever, chills or wheezing. NO recent respiratory illness or trauma noted. NO rash noted either

## 2022-01-31 ENCOUNTER — OUTPATIENT (OUTPATIENT)
Dept: OUTPATIENT SERVICES | Facility: HOSPITAL | Age: 57
LOS: 1 days | End: 2022-01-31
Payer: COMMERCIAL

## 2022-01-31 ENCOUNTER — RESULT REVIEW (OUTPATIENT)
Age: 57
End: 2022-01-31

## 2022-01-31 ENCOUNTER — APPOINTMENT (OUTPATIENT)
Dept: RADIOLOGY | Facility: HOSPITAL | Age: 57
End: 2022-01-31
Payer: COMMERCIAL

## 2022-01-31 DIAGNOSIS — R07.9 CHEST PAIN, UNSPECIFIED: ICD-10-CM

## 2022-01-31 PROCEDURE — 71100 X-RAY EXAM RIBS UNI 2 VIEWS: CPT | Mod: 26,LT

## 2022-01-31 PROCEDURE — 71046 X-RAY EXAM CHEST 2 VIEWS: CPT

## 2022-01-31 PROCEDURE — 71046 X-RAY EXAM CHEST 2 VIEWS: CPT | Mod: 26

## 2022-01-31 PROCEDURE — 71100 X-RAY EXAM RIBS UNI 2 VIEWS: CPT

## 2022-02-03 ENCOUNTER — EMERGENCY (EMERGENCY)
Facility: HOSPITAL | Age: 57
LOS: 1 days | Discharge: HOME | End: 2022-02-03
Attending: EMERGENCY MEDICINE | Admitting: EMERGENCY MEDICINE
Payer: COMMERCIAL

## 2022-02-03 ENCOUNTER — APPOINTMENT (OUTPATIENT)
Dept: FAMILY MEDICINE | Facility: CLINIC | Age: 57
End: 2022-02-03
Payer: COMMERCIAL

## 2022-02-03 VITALS
HEART RATE: 60 BPM | RESPIRATION RATE: 15 BRPM | OXYGEN SATURATION: 99 % | BODY MASS INDEX: 22.26 KG/M2 | TEMPERATURE: 97.6 F | HEIGHT: 62 IN | DIASTOLIC BLOOD PRESSURE: 80 MMHG | SYSTOLIC BLOOD PRESSURE: 110 MMHG | WEIGHT: 121 LBS

## 2022-02-03 VITALS
SYSTOLIC BLOOD PRESSURE: 128 MMHG | DIASTOLIC BLOOD PRESSURE: 82 MMHG | HEIGHT: 61 IN | OXYGEN SATURATION: 100 % | WEIGHT: 123.02 LBS | RESPIRATION RATE: 18 BRPM | HEART RATE: 97 BPM | TEMPERATURE: 97 F

## 2022-02-03 VITALS — TEMPERATURE: 98 F

## 2022-02-03 DIAGNOSIS — G89.4 CHRONIC PAIN SYNDROME: ICD-10-CM

## 2022-02-03 LAB
ALBUMIN SERPL ELPH-MCNC: 3.8 G/DL — SIGNIFICANT CHANGE UP (ref 3.3–5)
ALP SERPL-CCNC: 68 U/L — SIGNIFICANT CHANGE UP (ref 40–120)
ALT FLD-CCNC: 25 U/L — SIGNIFICANT CHANGE UP (ref 10–45)
ANION GAP SERPL CALC-SCNC: 10 MMOL/L — SIGNIFICANT CHANGE UP (ref 5–17)
AST SERPL-CCNC: 15 U/L — SIGNIFICANT CHANGE UP (ref 10–40)
BASOPHILS # BLD AUTO: 0.06 K/UL — SIGNIFICANT CHANGE UP (ref 0–0.2)
BASOPHILS NFR BLD AUTO: 0.7 % — SIGNIFICANT CHANGE UP (ref 0–2)
BILIRUB SERPL-MCNC: 0.5 MG/DL — SIGNIFICANT CHANGE UP (ref 0.2–1.2)
BUN SERPL-MCNC: 16 MG/DL — SIGNIFICANT CHANGE UP (ref 7–23)
CALCIUM SERPL-MCNC: 9.3 MG/DL — SIGNIFICANT CHANGE UP (ref 8.4–10.5)
CHLORIDE SERPL-SCNC: 105 MMOL/L — SIGNIFICANT CHANGE UP (ref 96–108)
CO2 SERPL-SCNC: 27 MMOL/L — SIGNIFICANT CHANGE UP (ref 22–31)
CREAT SERPL-MCNC: 0.77 MG/DL — SIGNIFICANT CHANGE UP (ref 0.5–1.3)
EOSINOPHIL # BLD AUTO: 0.19 K/UL — SIGNIFICANT CHANGE UP (ref 0–0.5)
EOSINOPHIL NFR BLD AUTO: 2.2 % — SIGNIFICANT CHANGE UP (ref 0–6)
GLUCOSE SERPL-MCNC: 100 MG/DL — HIGH (ref 70–99)
HCT VFR BLD CALC: 42.8 % — SIGNIFICANT CHANGE UP (ref 34.5–45)
HGB BLD-MCNC: 14.1 G/DL — SIGNIFICANT CHANGE UP (ref 11.5–15.5)
IMM GRANULOCYTES NFR BLD AUTO: 0.2 % — SIGNIFICANT CHANGE UP (ref 0–1.5)
LYMPHOCYTES # BLD AUTO: 2.52 K/UL — SIGNIFICANT CHANGE UP (ref 1–3.3)
LYMPHOCYTES # BLD AUTO: 29.7 % — SIGNIFICANT CHANGE UP (ref 13–44)
MCHC RBC-ENTMCNC: 29.6 PG — SIGNIFICANT CHANGE UP (ref 27–34)
MCHC RBC-ENTMCNC: 32.9 GM/DL — SIGNIFICANT CHANGE UP (ref 32–36)
MCV RBC AUTO: 89.7 FL — SIGNIFICANT CHANGE UP (ref 80–100)
MONOCYTES # BLD AUTO: 0.58 K/UL — SIGNIFICANT CHANGE UP (ref 0–0.9)
MONOCYTES NFR BLD AUTO: 6.8 % — SIGNIFICANT CHANGE UP (ref 2–14)
NEUTROPHILS # BLD AUTO: 5.11 K/UL — SIGNIFICANT CHANGE UP (ref 1.8–7.4)
NEUTROPHILS NFR BLD AUTO: 60.4 % — SIGNIFICANT CHANGE UP (ref 43–77)
NRBC # BLD: 0 /100 WBCS — SIGNIFICANT CHANGE UP (ref 0–0)
PLATELET # BLD AUTO: 287 K/UL — SIGNIFICANT CHANGE UP (ref 150–400)
POTASSIUM SERPL-MCNC: 3.7 MMOL/L — SIGNIFICANT CHANGE UP (ref 3.5–5.3)
POTASSIUM SERPL-SCNC: 3.7 MMOL/L — SIGNIFICANT CHANGE UP (ref 3.5–5.3)
PROT SERPL-MCNC: 7 G/DL — SIGNIFICANT CHANGE UP (ref 6–8.3)
RBC # BLD: 4.77 M/UL — SIGNIFICANT CHANGE UP (ref 3.8–5.2)
RBC # FLD: 12.8 % — SIGNIFICANT CHANGE UP (ref 10.3–14.5)
SODIUM SERPL-SCNC: 142 MMOL/L — SIGNIFICANT CHANGE UP (ref 135–145)
TROPONIN I, HIGH SENSITIVITY RESULT: 4.2 NG/L — SIGNIFICANT CHANGE UP
WBC # BLD: 8.48 K/UL — SIGNIFICANT CHANGE UP (ref 3.8–10.5)
WBC # FLD AUTO: 8.48 K/UL — SIGNIFICANT CHANGE UP (ref 3.8–10.5)

## 2022-02-03 PROCEDURE — 93010 ELECTROCARDIOGRAM REPORT: CPT

## 2022-02-03 PROCEDURE — 80053 COMPREHEN METABOLIC PANEL: CPT

## 2022-02-03 PROCEDURE — 99214 OFFICE O/P EST MOD 30 MIN: CPT

## 2022-02-03 PROCEDURE — 71250 CT THORAX DX C-: CPT | Mod: 26,MA

## 2022-02-03 PROCEDURE — 96374 THER/PROPH/DIAG INJ IV PUSH: CPT

## 2022-02-03 PROCEDURE — 36415 COLL VENOUS BLD VENIPUNCTURE: CPT

## 2022-02-03 PROCEDURE — 71250 CT THORAX DX C-: CPT | Mod: MA

## 2022-02-03 PROCEDURE — 99285 EMERGENCY DEPT VISIT HI MDM: CPT

## 2022-02-03 PROCEDURE — 99285 EMERGENCY DEPT VISIT HI MDM: CPT | Mod: 25

## 2022-02-03 PROCEDURE — 93005 ELECTROCARDIOGRAM TRACING: CPT

## 2022-02-03 PROCEDURE — 96375 TX/PRO/DX INJ NEW DRUG ADDON: CPT

## 2022-02-03 PROCEDURE — 85025 COMPLETE CBC W/AUTO DIFF WBC: CPT

## 2022-02-03 PROCEDURE — 84484 ASSAY OF TROPONIN QUANT: CPT

## 2022-02-03 RX ORDER — OXYCODONE AND ACETAMINOPHEN 5; 325 MG/1; MG/1
1 TABLET ORAL ONCE
Refills: 0 | Status: DISCONTINUED | OUTPATIENT
Start: 2022-02-03 | End: 2022-02-03

## 2022-02-03 RX ORDER — MORPHINE SULFATE 50 MG/1
2 CAPSULE, EXTENDED RELEASE ORAL ONCE
Refills: 0 | Status: DISCONTINUED | OUTPATIENT
Start: 2022-02-03 | End: 2022-02-03

## 2022-02-03 RX ORDER — OXYCODONE AND ACETAMINOPHEN 5; 325 MG/1; MG/1
1 TABLET ORAL
Qty: 16 | Refills: 0
Start: 2022-02-03 | End: 2022-02-06

## 2022-02-03 RX ORDER — KETOROLAC TROMETHAMINE 30 MG/ML
15 SYRINGE (ML) INJECTION ONCE
Refills: 0 | Status: DISCONTINUED | OUTPATIENT
Start: 2022-02-03 | End: 2022-02-03

## 2022-02-03 RX ADMIN — MORPHINE SULFATE 2 MILLIGRAM(S): 50 CAPSULE, EXTENDED RELEASE ORAL at 05:14

## 2022-02-03 RX ADMIN — Medication 15 MILLIGRAM(S): at 05:00

## 2022-02-03 RX ADMIN — OXYCODONE AND ACETAMINOPHEN 1 TABLET(S): 5; 325 TABLET ORAL at 04:14

## 2022-02-03 RX ADMIN — OXYCODONE AND ACETAMINOPHEN 1 TABLET(S): 5; 325 TABLET ORAL at 04:37

## 2022-02-03 RX ADMIN — Medication 15 MILLIGRAM(S): at 04:45

## 2022-02-03 NOTE — ED PROVIDER NOTE - PATIENT PORTAL LINK FT
You can access the FollowMyHealth Patient Portal offered by [facility] by registering at the following website: [email or web address]. By joining [employer] [place]’s FollowMyHealth portal, you will also be able to view your health information using other applications (apps) compatible with our system.

## 2022-02-03 NOTE — ED ADULT NURSE REASSESSMENT NOTE - NS ED NURSE REASSESS COMMENT FT1
patient with worsening left sided rib pain since CT scan. EKG completed with unremarkable results. Plan is to medicate patient with toradol and wait to see pain change. Patient will then be medicated with morphine if IV toradol does not result in less pain.

## 2022-02-03 NOTE — ED ADULT NURSE NOTE - NSIMPLEMENTINTERV_GEN_ALL_ED
Implemented All Universal Safety Interventions:  [place] to call system. Call bell, personal items and telephone within reach. Instruct patient to call for assistance. Room bathroom lighting operational. Non-slip footwear when patient is off stretcher. Physically safe environment: no spills, clutter or unnecessary equipment. Stretcher in lowest position, wheels locked, appropriate side rails in place.
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
## 2022-02-03 NOTE — ED ADULT NURSE NOTE - OBJECTIVE STATEMENT
Patient ambulatory into the ED co left sided rib pain, onset ten days ago. Patient denies history of trauma and injury, states that the pain came upon her suddenly. Pain is not relieved by lidocaine patches or naproxen. Left side is tender upon palpation. No crepitus noted upon palpation. No bruising or erythema noted. Patient's breathing is even and unlabored. She denies chest pain however the patient does endorse worsening pain with inspiration. Patient was seen by PCP and had chest xray was done with no significant findings

## 2022-02-03 NOTE — ED PROVIDER NOTE - NSFOLLOWUPINSTRUCTIONS_ED_ALL_ED_FT
take percocet for pain as prescribed     follow up with your PMD with Ct scan report given to you and have pulmonary consult

## 2022-02-03 NOTE — ED PROVIDER NOTE - OBJECTIVE STATEMENT
57 y/o  F with no sig PMHx presents to ED c/o right  side post rib pain for past few days, pain is dull, constant and severe, no cough, no SOB , fever, no trauma , was taking OTC pain meds , but no relief,
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
## 2022-02-09 ENCOUNTER — APPOINTMENT (OUTPATIENT)
Dept: PULMONOLOGY | Facility: CLINIC | Age: 57
End: 2022-02-09
Payer: COMMERCIAL

## 2022-02-09 VITALS
OXYGEN SATURATION: 99 % | DIASTOLIC BLOOD PRESSURE: 68 MMHG | WEIGHT: 121 LBS | HEIGHT: 62 IN | HEART RATE: 64 BPM | SYSTOLIC BLOOD PRESSURE: 102 MMHG | BODY MASS INDEX: 22.26 KG/M2 | TEMPERATURE: 97.8 F

## 2022-02-09 DIAGNOSIS — F17.200 NICOTINE DEPENDENCE, UNSPECIFIED, UNCOMPLICATED: ICD-10-CM

## 2022-02-09 PROCEDURE — 99406 BEHAV CHNG SMOKING 3-10 MIN: CPT

## 2022-02-09 PROCEDURE — 99243 OFF/OP CNSLTJ NEW/EST LOW 30: CPT

## 2022-02-09 RX ORDER — ATORVASTATIN CALCIUM 20 MG/1
20 TABLET, FILM COATED ORAL
Qty: 90 | Refills: 0 | Status: DISCONTINUED | COMMUNITY
Start: 2021-02-26 | End: 2022-02-09

## 2022-02-09 RX ORDER — ERGOCALCIFEROL 1.25 MG/1
1.25 MG CAPSULE, LIQUID FILLED ORAL
Qty: 12 | Refills: 1 | Status: DISCONTINUED | COMMUNITY
Start: 2021-02-26 | End: 2022-02-09

## 2022-02-09 RX ORDER — ERGOCALCIFEROL 1.25 MG/1
1.25 MG CAPSULE, LIQUID FILLED ORAL
Qty: 12 | Refills: 0 | Status: DISCONTINUED | COMMUNITY
Start: 2018-08-23 | End: 2022-02-09

## 2022-02-09 NOTE — REASON FOR VISIT
[Initial] : an initial visit [Abnormal CXR/ Chest CT] : an abnormal CXR/ chest CT [Chest Pain] : chest pain

## 2022-02-09 NOTE — REASON FOR VISIT
negative... [Initial] : an initial visit [Abnormal CXR/ Chest CT] : an abnormal CXR/ chest CT [Chest Pain] : chest pain

## 2022-02-09 NOTE — COUNSELING

## 2022-02-09 NOTE — PHYSICAL EXAM
[No Acute Distress] : no acute distress [Normal Oropharynx] : normal oropharynx [Normal Appearance] : normal appearance [No Neck Mass] : no neck mass [Normal Rate/Rhythm] : normal rate/rhythm [Normal S1, S2] : normal s1, s2 [No Murmurs] : no murmurs [No Resp Distress] : no resp distress [Clear to Auscultation Bilaterally] : clear to auscultation bilaterally [No Abnormalities] : no abnormalities [Benign] : benign [Normal Gait] : normal gait [No Clubbing] : no clubbing [No Cyanosis] : no cyanosis [No Edema] : no edema [FROM] : FROM [Normal Color/ Pigmentation] : normal color/ pigmentation [No Focal Deficits] : no focal deficits [Oriented x3] : oriented x3 [Normal Affect] : normal affect [TextBox_68] : Some tenderness on palpation of right lateral chest wall

## 2022-02-09 NOTE — ASSESSMENT
[FreeTextEntry1] : Patient is a smoker with abnormal CT of chest which includes lung nodules.  Where is 1 nodule is approximately 1 cm will try and get PET/CT to better evaluate underlying lung parenchyma.  No obvious etiology of chest pain at this point in time.

## 2022-02-09 NOTE — COUNSELING
[Yes] : Risk of tobacco use and health benefits of smoking cessation discussed: Yes [Cessation strategies including cessation program discussed] : Cessation strategies including cessation program discussed [Use of nicotine replacement therapies and other medications discussed] : Use of nicotine replacement therapies and other medications discussed [Encouraged to pick a quit date and identify support needed to quit] : Encouraged to pick a quit date and identify support needed to quit [No] : Not willing to quit smoking [FreeTextEntry1] : 8

## 2022-02-09 NOTE — PHYSICAL EXAM
[Well Nourished] : well nourished [EOMI] : extraocular movements intact [Supple] : supple [Clear to Auscultation] : lungs were clear to auscultation bilaterally [Non Tender] : non-tender [de-identified] : left sided rib area tenderness on palpation and pain on deep inhalation [No Acute Distress] : no acute distress [Normal Oropharynx] : normal oropharynx [Normal Appearance] : normal appearance [No Neck Mass] : no neck mass [Normal Rate/Rhythm] : normal rate/rhythm [Normal S1, S2] : normal s1, s2 [No Murmurs] : no murmurs [No Resp Distress] : no resp distress [Clear to Auscultation Bilaterally] : clear to auscultation bilaterally [No Abnormalities] : no abnormalities [Normal Gait] : normal gait [No Clubbing] : no clubbing [No Cyanosis] : no cyanosis [No Edema] : no edema [FROM] : FROM [Normal Color/ Pigmentation] : normal color/ pigmentation [No Focal Deficits] : no focal deficits [Oriented x3] : oriented x3 [Normal Affect] : normal affect

## 2022-02-09 NOTE — REVIEW OF SYSTEMS
[Joint Pain] : joint pain [Muscle Pain] : muscle pain [Chest Tightness] : chest tightness [Negative] : Endocrine

## 2022-02-09 NOTE — PHYSICAL EXAM
[Well Nourished] : well nourished [EOMI] : extraocular movements intact [Supple] : supple [Clear to Auscultation] : lungs were clear to auscultation bilaterally [Non Tender] : non-tender [de-identified] : left sided rib area tenderness on palpation and pain on deep inhalation [No Acute Distress] : no acute distress [Normal Oropharynx] : normal oropharynx [Normal Appearance] : normal appearance [No Neck Mass] : no neck mass [Normal Rate/Rhythm] : normal rate/rhythm [Normal S1, S2] : normal s1, s2 [No Murmurs] : no murmurs [No Resp Distress] : no resp distress [Clear to Auscultation Bilaterally] : clear to auscultation bilaterally [No Abnormalities] : no abnormalities [Normal Gait] : normal gait [No Clubbing] : no clubbing [No Cyanosis] : no cyanosis [No Edema] : no edema [FROM] : FROM [Normal Color/ Pigmentation] : normal color/ pigmentation [No Focal Deficits] : no focal deficits [Oriented x3] : oriented x3 [Normal Affect] : normal affect

## 2022-02-15 ENCOUNTER — APPOINTMENT (OUTPATIENT)
Dept: FAMILY MEDICINE | Facility: CLINIC | Age: 57
End: 2022-02-15
Payer: COMMERCIAL

## 2022-02-15 VITALS
SYSTOLIC BLOOD PRESSURE: 132 MMHG | DIASTOLIC BLOOD PRESSURE: 84 MMHG | HEART RATE: 77 BPM | HEIGHT: 60.5 IN | RESPIRATION RATE: 17 BRPM | BODY MASS INDEX: 22.95 KG/M2 | WEIGHT: 120 LBS | TEMPERATURE: 100 F | OXYGEN SATURATION: 98 %

## 2022-02-15 DIAGNOSIS — F17.210 NICOTINE DEPENDENCE, CIGARETTES, UNCOMPLICATED: ICD-10-CM

## 2022-02-15 DIAGNOSIS — R31.9 HEMATURIA, UNSPECIFIED: ICD-10-CM

## 2022-02-15 LAB
25(OH)D3 SERPL-MCNC: 17.6 NG/ML
ALBUMIN SERPL ELPH-MCNC: 4.6 G/DL
ALP BLD-CCNC: 69 U/L
ALT SERPL-CCNC: 15 U/L
ANION GAP SERPL CALC-SCNC: 13 MMOL/L
APPEARANCE: CLEAR
AST SERPL-CCNC: 17 U/L
BASOPHILS # BLD AUTO: 0.04 K/UL
BASOPHILS NFR BLD AUTO: 0.5 %
BILIRUB SERPL-MCNC: 0.4 MG/DL
BILIRUBIN URINE: NEGATIVE
BLOOD URINE: ABNORMAL
BUN SERPL-MCNC: 15 MG/DL
CALCIUM SERPL-MCNC: 9.5 MG/DL
CHLORIDE SERPL-SCNC: 105 MMOL/L
CHOLEST SERPL-MCNC: 239 MG/DL
CO2 SERPL-SCNC: 23 MMOL/L
COLOR: NORMAL
CREAT SERPL-MCNC: 0.64 MG/DL
EOSINOPHIL # BLD AUTO: 0.07 K/UL
EOSINOPHIL NFR BLD AUTO: 0.9 %
GLUCOSE QUALITATIVE U: NEGATIVE
GLUCOSE SERPL-MCNC: 112 MG/DL
HCT VFR BLD CALC: 44 %
HDLC SERPL-MCNC: 61 MG/DL
HGB BLD-MCNC: 14.4 G/DL
IMM GRANULOCYTES NFR BLD AUTO: 0.4 %
KETONES URINE: NEGATIVE
LDLC SERPL CALC-MCNC: 145 MG/DL
LEUKOCYTE ESTERASE URINE: ABNORMAL
LYMPHOCYTES # BLD AUTO: 1.9 K/UL
LYMPHOCYTES NFR BLD AUTO: 23.5 %
MAN DIFF?: NORMAL
MCHC RBC-ENTMCNC: 29.3 PG
MCHC RBC-ENTMCNC: 32.7 GM/DL
MCV RBC AUTO: 89.6 FL
MONOCYTES # BLD AUTO: 0.44 K/UL
MONOCYTES NFR BLD AUTO: 5.4 %
NEUTROPHILS # BLD AUTO: 5.6 K/UL
NEUTROPHILS NFR BLD AUTO: 69.3 %
NITRITE URINE: NEGATIVE
NONHDLC SERPL-MCNC: 178 MG/DL
PH URINE: 6
PLATELET # BLD AUTO: 296 K/UL
POTASSIUM SERPL-SCNC: 4.1 MMOL/L
PROT SERPL-MCNC: 6.9 G/DL
PROTEIN URINE: NEGATIVE
RBC # BLD: 4.91 M/UL
RBC # FLD: 13.2 %
SODIUM SERPL-SCNC: 141 MMOL/L
SPECIFIC GRAVITY URINE: 1.03
TRIGL SERPL-MCNC: 166 MG/DL
TSH SERPL-ACNC: 0.65 UIU/ML
URATE SERPL-MCNC: 4.9 MG/DL
UROBILINOGEN URINE: NORMAL
WBC # FLD AUTO: 8.08 K/UL

## 2022-02-15 PROCEDURE — 99396 PREV VISIT EST AGE 40-64: CPT | Mod: 25

## 2022-02-15 PROCEDURE — 99406 BEHAV CHNG SMOKING 3-10 MIN: CPT

## 2022-02-15 PROCEDURE — G0444 DEPRESSION SCREEN ANNUAL: CPT | Mod: 59

## 2022-02-15 NOTE — HEALTH RISK ASSESSMENT
[Very Good] : ~his/her~  mood as very good [Yes] : Yes [2 - 4 times a month (2 pts)] : 2-4 times a month (2 points) [1 or 2 (0 pts)] : 1 or 2 (0 points) [Never (0 pts)] : Never (0 points) [0] : 2) Feeling down, depressed, or hopeless: Not at all (0) [Patient reported mammogram was normal] : Patient reported mammogram was normal [Patient reported PAP Smear was normal] : Patient reported PAP Smear was normal [Patient reported bone density results were abnormal] : Patient reported bone density results were abnormal [Patient reported colonoscopy was normal] : Patient reported colonoscopy was normal [None] : None [With Family] : lives with family [Employed] : employed [High School] : high school [] :  [# Of Children ___] : has [unfilled] children [Sexually Active] : sexually active [Smoke Detector] : smoke detector [Carbon Monoxide Detector] : carbon monoxide detector [Seat Belt] :  uses seat belt [With Patient/Caregiver] : , with patient/caregiver [Aggressive treatment] : aggressive treatment [PHQ-2 Negative - No further assessment needed] : PHQ-2 Negative - No further assessment needed [FreeTextEntry1] : none [de-identified] : No [de-identified] : Pulmon  [de-identified] : 6 cig a day  [Audit-CScore] : 2 [de-identified] : marijuana  [de-identified] : walking  [de-identified] : healthy  [HTO3Sooqd] : 0 [Change in mental status noted] : No change in mental status noted [Language] : denies difficulty with language [Reports changes in hearing] : Reports no changes in hearing [Reports changes in vision] : Reports no changes in vision [Reports changes in dental health] : Reports no changes in dental health [Sunscreen] : does not use sunscreen [MammogramDate] : 07/21 [PapSmearDate] : 07/21 [BoneDensityDate] : 01/20 [BoneDensityComments] : osteopenia [ColonoscopyDate] : 04/15 [HIVDate] : 07/18 [HepatitisCDate] : 07/18 [AdvancecareDate] : 02/22

## 2022-02-15 NOTE — COUNSELING
[Good understanding] : Patient has a good understanding of lifestyle changes and steps needed to achieve self management goal [Yes] : Risk of tobacco use and health benefits of smoking cessation discussed: Yes [Cessation strategies including cessation program discussed] : Cessation strategies including cessation program discussed [Use of nicotine replacement therapies and other medications discussed] : Use of nicotine replacement therapies and other medications discussed [Encouraged to pick a quit date and identify support needed to quit] : Encouraged to pick a quit date and identify support needed to quit [No] : Not willing to quit smoking [FreeTextEntry1] : 3

## 2022-02-15 NOTE — HISTORY OF PRESENT ILLNESS
[FreeTextEntry1] : cc: physical  [de-identified] : Patient presented  for physical. She denies CP,SOB,Abd pain, no N,V,C,D.Pt smokes 25 years 6 cig a day.smoking cessation completed. Pt recently developed spontaneous  left side rib cage pain - seen in ED, + rib Fx age undetermined, + lung nodule, seen by the Pulmon - PET scan pending, pt with claustrophobia ,needs sedation.

## 2022-02-15 NOTE — REVIEW OF SYSTEMS
[Negative] : Heme/Lymph [Joint Pain] : no joint pain [Joint Stiffness] : no joint stiffness [Joint Swelling] : no joint swelling [Muscle Weakness] : no muscle weakness [Muscle Pain] : no muscle pain [Back Pain] : no back pain [FreeTextEntry9] : left rib cage pain

## 2022-02-22 LAB
APPEARANCE: ABNORMAL
BACTERIA UR CULT: NORMAL
BACTERIA: ABNORMAL
BILIRUBIN URINE: NEGATIVE
BLOOD URINE: NORMAL
COLOR: ABNORMAL
GLUCOSE QUALITATIVE U: NEGATIVE
HYALINE CASTS: 0 /LPF
KETONES URINE: NEGATIVE
LEUKOCYTE ESTERASE URINE: NEGATIVE
MICROSCOPIC-UA: NORMAL
NITRITE URINE: NEGATIVE
PH URINE: 5.5
PROTEIN URINE: NEGATIVE
RED BLOOD CELLS URINE: 0 /HPF
SPECIFIC GRAVITY URINE: 1.02
SQUAMOUS EPITHELIAL CELLS: 2 /HPF
URINE COMMENTS: NORMAL
UROBILINOGEN URINE: NORMAL
WHITE BLOOD CELLS URINE: 1 /HPF

## 2022-03-25 VITALS — OXYGEN SATURATION: 98 %

## 2022-03-28 PROBLEM — Z23 ENCOUNTER FOR IMMUNIZATION: Status: RESOLVED | Noted: 2021-01-21 | Resolved: 2022-03-28

## 2022-03-28 PROBLEM — Z11.59 ENCOUNTER FOR SCREENING FOR OTHER VIRAL DISEASES: Status: RESOLVED | Noted: 2021-01-21 | Resolved: 2022-03-28

## 2022-03-28 PROBLEM — M79.644 PAIN OF FINGER OF RIGHT HAND: Status: RESOLVED | Noted: 2019-01-11 | Resolved: 2022-03-28

## 2022-03-28 PROBLEM — M79.605 PAIN OF LEFT LOWER EXTREMITY: Status: RESOLVED | Noted: 2017-07-12 | Resolved: 2022-03-28

## 2022-03-28 PROBLEM — Z00.00 ANNUAL PHYSICAL EXAM: Status: RESOLVED | Noted: 2021-01-21 | Resolved: 2022-03-28

## 2022-03-28 PROBLEM — Z87.898 HISTORY OF ABNORMAL MAMMOGRAM: Status: RESOLVED | Noted: 2019-01-11 | Resolved: 2022-03-28

## 2022-03-28 PROBLEM — Z87.11 HISTORY OF GASTRIC ULCER: Status: RESOLVED | Noted: 2018-07-30 | Resolved: 2022-03-28

## 2022-03-28 PROBLEM — M25.531 RIGHT WRIST PAIN: Status: RESOLVED | Noted: 2019-01-11 | Resolved: 2022-03-28

## 2022-03-29 ENCOUNTER — APPOINTMENT (OUTPATIENT)
Age: 57
End: 2022-03-29
Payer: COMMERCIAL

## 2022-03-29 DIAGNOSIS — Z87.11 PERSONAL HISTORY OF PEPTIC ULCER DISEASE: ICD-10-CM

## 2022-03-29 DIAGNOSIS — M25.531 PAIN IN RIGHT WRIST: ICD-10-CM

## 2022-03-29 DIAGNOSIS — Z23 ENCOUNTER FOR IMMUNIZATION: ICD-10-CM

## 2022-03-29 DIAGNOSIS — Z00.00 ENCOUNTER FOR GENERAL ADULT MEDICAL EXAMINATION W/OUT ABNORMAL FINDINGS: ICD-10-CM

## 2022-03-29 DIAGNOSIS — M79.644 PAIN IN RIGHT FINGER(S): ICD-10-CM

## 2022-03-29 DIAGNOSIS — M79.605 PAIN IN LEFT LEG: ICD-10-CM

## 2022-03-29 DIAGNOSIS — Z11.59 ENCOUNTER FOR SCREENING FOR OTHER VIRAL DISEASES: ICD-10-CM

## 2022-03-29 DIAGNOSIS — Z87.898 PERSONAL HISTORY OF OTHER SPECIFIED CONDITIONS: ICD-10-CM

## 2022-03-29 PROCEDURE — 99204 OFFICE O/P NEW MOD 45 MIN: CPT | Mod: 95

## 2022-05-09 ENCOUNTER — NON-APPOINTMENT (OUTPATIENT)
Age: 57
End: 2022-05-09

## 2022-05-10 ENCOUNTER — TRANSCRIPTION ENCOUNTER (OUTPATIENT)
Age: 57
End: 2022-05-10

## 2022-10-29 ENCOUNTER — NON-APPOINTMENT (OUTPATIENT)
Age: 57
End: 2022-10-29

## 2022-11-01 ENCOUNTER — APPOINTMENT (OUTPATIENT)
Dept: ORTHOPEDIC SURGERY | Facility: CLINIC | Age: 57
End: 2022-11-01
Payer: COMMERCIAL

## 2022-11-01 VITALS — BODY MASS INDEX: 22.66 KG/M2 | HEIGHT: 61 IN | WEIGHT: 120 LBS

## 2022-11-01 PROCEDURE — 99203 OFFICE O/P NEW LOW 30 MIN: CPT

## 2022-11-01 NOTE — END OF VISIT
[FreeTextEntry3] : All medical record entries made by the Scribe were at my,  Dr. Horace Becerra MD., direction and personally dictated by me on 11/01/2022. I have personally reviewed the chart and agree that the record accurately reflects my personal performance of the history, physical exam, assessment and plan.

## 2022-11-01 NOTE — ADDENDUM
[FreeTextEntry1] : I, Aurelia Reeves wrote this note acting as a scribe for Dr. Horace Becerra on Nov 01, 2022.

## 2022-11-01 NOTE — HISTORY OF PRESENT ILLNESS
[de-identified] : Patient is a 57 year old female who presents with complaints of left foot pain secondary to a fifth metatarsal fracture. She states that she slipped off the curb and inverted her left ankle when doing so. She felt pain immediately. She presented to the urgent care on 10/30/22 where xrays were obtained and revealed the fracture. She was placed in a splint and ace bandage. She was given crutches. She has been elevating and icing. The crutches were difficult to walk with so she has been using a knee scooter.  \par \par She smokes about a pack of cigarettes a day.

## 2022-11-01 NOTE — PHYSICAL EXAM
[de-identified] : Patient is WDWN, alert, and in no acute distress. Breathing is unlabored. She is grossly oriented to person, place, and time.\par \par Left Foot: edema and ecchymosis dorsally\par Tender over 5th metatarsal shft\par ROM 0-30\par Sensation is normal\par  [de-identified] : EXAM: FOOT MIN 3 VIEWS LEFT\par PROCEDURE DATE: 10/30/2022\par IMPRESSION: \par Oblique fracture of the midshaft of the fifth metacarpal with 3 mm of medial displacement.\par \par ROSALBA DAVILA DO; Attending Radiologist\par This document has been electronically signed. Oct 30 2022 2:32PM

## 2022-11-01 NOTE — DISCUSSION/SUMMARY
[de-identified] : The underlying pathophysiology was reviewed with the patient. XR films were reviewed with the patient. Discussed at length the nature of the patient’s condition. The left foot symptoms appear secondary to midshaft fifth metatarsal fracture.\par \par The patient is advised left leg NWB for at least 6 weeks.\par She was given the option of a short leg cast vs a cam boot.\par She prefers a boot because she is claustrophobic.\par \par The patient was strongly advised to stop smoking because smoking hinders bone healing .This increases the risk of a nonunion significantly.\par \par All questions answered, understanding verbalized. Patient in agreement with plan of care. Patient was placed in a CAM boot and   600mg Ibuprofen was ordered for her.\par \par Follow up in 2 weeks for repeat xrays.

## 2022-11-15 ENCOUNTER — APPOINTMENT (OUTPATIENT)
Dept: ORTHOPEDIC SURGERY | Facility: CLINIC | Age: 57
End: 2022-11-15
Payer: COMMERCIAL

## 2022-11-15 VITALS — WEIGHT: 120 LBS | BODY MASS INDEX: 22.66 KG/M2 | HEIGHT: 61 IN

## 2022-11-15 PROCEDURE — 99213 OFFICE O/P EST LOW 20 MIN: CPT

## 2022-11-15 PROCEDURE — 73630 X-RAY EXAM OF FOOT: CPT | Mod: LT

## 2022-11-15 NOTE — ADDENDUM
[FreeTextEntry1] : I, Rajat Perales, wrote this note acting as a scribe for Dr. Horace Becerra on Nov 15, 2022.

## 2022-11-15 NOTE — PHYSICAL EXAM
[de-identified] : Patient is WDWN, alert, and in no acute distress. Breathing is unlabored. She is grossly oriented to person, place, and time.\par \par She presents to the office immobilized in a CAM boot and ambulating NWB with a knee scooter.\par \par Left Foot: \par Improved edema and ecchymosis dorsally\par Tender over 5th metatarsal shft\par ROM 0-30\par Sensation is normal [de-identified] : AP, lateral and oblique views of the LEFT foot were obtained today and revealed an obliquely oriented fracture at the midshaft of the fifth metatarsal. The fracture is healing.\par \par ------------------------------------------------------------------------------------------------------------------------------------------------------------------------------------\par \par EXAM: FOOT MIN 3 VIEWS LEFT\par PROCEDURE DATE: 10/30/2022\par IMPRESSION: \par Oblique fracture of the midshaft of the fifth metatarsal with 3 mm of medial displacement.\par \par ROSALBA DAVILA DO; Attending Radiologist\par This document has been electronically signed. Oct 30 2022 2:32PM

## 2022-11-15 NOTE — DISCUSSION/SUMMARY
[de-identified] : The underlying pathophysiology was reviewed with the patient. XR films were reviewed with the patient. Discussed at length the nature of the patient’s condition. The left foot symptoms appear secondary to midshaft fifth metatarsal fracture.\par \par At this time, I recommend we continue with our present treatment, NWB in the CAM boot for another 4 weeks.\par We discussed that surgery would be required if the fracture displaces. \par Disability paperwork was filled out. The patient may return to work on Tuesday, Dec 13th, 2022.\par \par All questions answered, understanding verbalized. Patient in agreement with plan of care. Follow up in 4 weeks for repeat left foot x-rays.

## 2022-11-15 NOTE — END OF VISIT
[FreeTextEntry3] : All medical record entries made by the Scribe were at my, Dr. Horace Becerra MD., direction and personally dictated by me on 11/15/2022. I have personally reviewed the chart and agree that the record accurately reflects my personal performance of the history, physical exam, assessment and plan.

## 2022-11-15 NOTE — HISTORY OF PRESENT ILLNESS
[de-identified] : Patient is a 57 year old female who presents with complaints of left foot pain secondary to a fifth metatarsal fracture. She states that she slipped off the curb and inverted her left ankle when doing so. She felt pain immediately. She presented to the urgent care on 10/30/22 where xrays were obtained and revealed the fracture. She was placed in a splint and ace bandage. She was given crutches. She has been elevating and icing. The crutches were difficult to walk with so she has been using a knee scooter.  She was initially treated in the office on 11/1/22 at which time she opted for immobilization in a CAM boot. She returns for repeat xrays on 11/15/22 and reports improvement in her symptoms. She is sleeping better. She has not returned to work since the DOI 10/29/22.\par \par She smokes about a pack of cigarettes a day.

## 2022-12-13 ENCOUNTER — APPOINTMENT (OUTPATIENT)
Dept: FAMILY MEDICINE | Facility: CLINIC | Age: 57
End: 2022-12-13
Payer: COMMERCIAL

## 2022-12-13 ENCOUNTER — APPOINTMENT (OUTPATIENT)
Dept: ORTHOPEDIC SURGERY | Facility: CLINIC | Age: 57
End: 2022-12-13
Payer: COMMERCIAL

## 2022-12-13 VITALS
RESPIRATION RATE: 16 BRPM | DIASTOLIC BLOOD PRESSURE: 70 MMHG | HEART RATE: 80 BPM | TEMPERATURE: 98 F | SYSTOLIC BLOOD PRESSURE: 110 MMHG

## 2022-12-13 DIAGNOSIS — H66.92 OTITIS MEDIA, UNSPECIFIED, LEFT EAR: ICD-10-CM

## 2022-12-13 PROCEDURE — 99213 OFFICE O/P EST LOW 20 MIN: CPT

## 2022-12-13 PROCEDURE — 73630 X-RAY EXAM OF FOOT: CPT | Mod: LT

## 2022-12-13 NOTE — PHYSICAL EXAM
[de-identified] : Patient is WDWN, alert, and in no acute distress. Breathing is unlabored. She is grossly oriented to person, place, and time.\par \par She is accompanied by her daughter today.\par \par She presents to the office immobilized in a CAM boot and ambulating NWB with a knee scooter.\par \par Left Foot: \par Improved edema and ecchymosis dorsally\par Tender over 5th metatarsal shaft\par ROM 0-30\par Sensation is normal [de-identified] : AP, lateral and oblique views of the LEFT foot were obtained today and revealed an obliquely oriented fracture at the midshaft of the fifth metatarsal. The fracture is healing, there is evidence of callous formation.\par \par ------------------------------------------------------------------------------------------------------------------------------------------------------------------------------------\par \par EXAM: FOOT MIN 3 VIEWS LEFT\par PROCEDURE DATE: 10/30/2022\par IMPRESSION: \par Oblique fracture of the midshaft of the fifth metatarsal with 3 mm of medial displacement.\par \par ROSALBA DAVILA DO; Attending Radiologist\par This document has been electronically signed. Oct 30 2022 2:32PM

## 2022-12-13 NOTE — HISTORY OF PRESENT ILLNESS
[de-identified] : Patient is a 57 year old female who presents with complaints of left foot pain secondary to a fifth metatarsal fracture. She states that she slipped off the curb and inverted her left ankle when doing so. She felt pain immediately. She presented to the urgent care on 10/30/22 where xrays were obtained and revealed the fracture. She was placed in a splint and ace bandage. She was given crutches. She has been elevating and icing. The crutches were difficult to walk with so she has been using a knee scooter.  She was initially treated in the office on 11/1/22 at which time she opted for immobilization in a CAM boot. She returned for repeat xrays on 11/15/22 and reported improvement in her symptoms. She presents for repeat xrays on 12/13/22 and is improving overall. \par \par She smokes about a pack of cigarettes a day.

## 2022-12-13 NOTE — ADDENDUM
[FreeTextEntry1] : I, Aurelia Reeves wrote this note acting as a scribe for Dr. Horace Becerra on Dec 13, 2022.

## 2022-12-13 NOTE — END OF VISIT
[FreeTextEntry3] : All medical record entries made by the Scribe were at my,  Dr. Horace Becerra MD., direction and personally dictated by me on 12/13/2022. I have personally reviewed the chart and agree that the record accurately reflects my personal performance of the history, physical exam, assessment and plan.

## 2022-12-13 NOTE — DISCUSSION/SUMMARY
[de-identified] : The underlying pathophysiology was reviewed with the patient. XR films were reviewed with the patient. Discussed at length the nature of the patient’s condition. The left foot symptoms appear secondary to midshaft fifth metatarsal fracture.\par \par At this time, I told her that she may begin to WBAT but with use of the CAM boot. I advised her to let her symptoms guide her and did tell her to use some caution as the fracture is not yet fully healed. She may utilize a regular sneaker when in her home. She deferred a referral for physical therapy. I encouraged low impact activities such was walking, stationary bike and elliptical.\par \par All questions answered, understanding verbalized. Patient in agreement with plan of care. Follow up in 6 weeks for repeat xrays.

## 2022-12-14 PROBLEM — H66.92 OTITIS MEDIA, LEFT: Status: RESOLVED | Noted: 2022-12-13 | Resolved: 2023-01-12

## 2022-12-14 NOTE — PHYSICAL EXAM
[No Acute Distress] : no acute distress [Well Nourished] : well nourished [Well Developed] : well developed [Well-Appearing] : well-appearing [Normal Sclera/Conjunctiva] : normal sclera/conjunctiva [PERRL] : pupils equal round and reactive to light [EOMI] : extraocular movements intact [Normal Outer Ear/Nose] : the outer ears and nose were normal in appearance [Normal Oropharynx] : the oropharynx was normal [No JVD] : no jugular venous distention [No Lymphadenopathy] : no lymphadenopathy [Supple] : supple [Thyroid Normal, No Nodules] : the thyroid was normal and there were no nodules present [No Respiratory Distress] : no respiratory distress  [No Accessory Muscle Use] : no accessory muscle use [Clear to Auscultation] : lungs were clear to auscultation bilaterally [Normal Rate] : normal rate  [Regular Rhythm] : with a regular rhythm [Normal S1, S2] : normal S1 and S2 [No Murmur] : no murmur heard [No Carotid Bruits] : no carotid bruits [No Abdominal Bruit] : a ~M bruit was not heard ~T in the abdomen [No Varicosities] : no varicosities [Pedal Pulses Present] : the pedal pulses are present [No Edema] : there was no peripheral edema [No Palpable Aorta] : no palpable aorta [No Extremity Clubbing/Cyanosis] : no extremity clubbing/cyanosis [Soft] : abdomen soft [Non Tender] : non-tender [Non-distended] : non-distended [No Masses] : no abdominal mass palpated [No HSM] : no HSM [Normal Bowel Sounds] : normal bowel sounds [Normal Posterior Cervical Nodes] : no posterior cervical lymphadenopathy [Normal Anterior Cervical Nodes] : no anterior cervical lymphadenopathy [No CVA Tenderness] : no CVA  tenderness [No Spinal Tenderness] : no spinal tenderness [No Joint Swelling] : no joint swelling [Grossly Normal Strength/Tone] : grossly normal strength/tone [No Rash] : no rash [Coordination Grossly Intact] : coordination grossly intact [No Focal Deficits] : no focal deficits [Normal Gait] : normal gait [Deep Tendon Reflexes (DTR)] : deep tendon reflexes were 2+ and symmetric [Normal Affect] : the affect was normal [Normal Insight/Judgement] : insight and judgment were intact [de-identified] : Erythematous left TM.  Injected nasal turbinates

## 2022-12-14 NOTE — HISTORY OF PRESENT ILLNESS
[de-identified] : Worsening ear pressure, nasal congestion, runny nose, PND, and cough that has been worsening since this past Monday. \par Attempted ear drops with minimal relief. \par Does noted muffled hearing from left ear. \par \par \par

## 2022-12-23 ENCOUNTER — NON-APPOINTMENT (OUTPATIENT)
Age: 57
End: 2022-12-23

## 2022-12-23 DIAGNOSIS — Z98.890 OTHER SPECIFIED POSTPROCEDURAL STATES: ICD-10-CM

## 2022-12-23 DIAGNOSIS — Z92.89 PERSONAL HISTORY OF OTHER MEDICAL TREATMENT: ICD-10-CM

## 2022-12-23 DIAGNOSIS — R92.2 INCONCLUSIVE MAMMOGRAM: ICD-10-CM

## 2023-01-01 ENCOUNTER — APPOINTMENT (OUTPATIENT)
Dept: FAMILY MEDICINE | Facility: CLINIC | Age: 58
End: 2023-01-01

## 2023-01-01 ENCOUNTER — APPOINTMENT (OUTPATIENT)
Dept: FAMILY MEDICINE | Facility: CLINIC | Age: 58
End: 2023-01-01
Payer: COMMERCIAL

## 2023-01-01 ENCOUNTER — RESULT REVIEW (OUTPATIENT)
Age: 58
End: 2023-01-01

## 2023-01-01 ENCOUNTER — NON-APPOINTMENT (OUTPATIENT)
Age: 58
End: 2023-01-01

## 2023-01-01 ENCOUNTER — APPOINTMENT (OUTPATIENT)
Dept: PHYSICAL MEDICINE AND REHAB | Facility: CLINIC | Age: 58
End: 2023-01-01
Payer: COMMERCIAL

## 2023-01-01 ENCOUNTER — OUTPATIENT (OUTPATIENT)
Dept: OUTPATIENT SERVICES | Facility: HOSPITAL | Age: 58
LOS: 1 days | Discharge: HOME | End: 2023-01-01

## 2023-01-01 ENCOUNTER — EMERGENCY (EMERGENCY)
Facility: HOSPITAL | Age: 58
LOS: 1 days | Discharge: HOME | End: 2023-01-01
Attending: INTERNAL MEDICINE | Admitting: INTERNAL MEDICINE
Payer: COMMERCIAL

## 2023-01-01 ENCOUNTER — OUTPATIENT (OUTPATIENT)
Dept: OUTPATIENT SERVICES | Facility: HOSPITAL | Age: 58
LOS: 1 days | End: 2023-01-01
Payer: COMMERCIAL

## 2023-01-01 ENCOUNTER — APPOINTMENT (OUTPATIENT)
Dept: MRI IMAGING | Facility: HOSPITAL | Age: 58
End: 2023-01-01

## 2023-01-01 ENCOUNTER — APPOINTMENT (OUTPATIENT)
Dept: HEMATOLOGY ONCOLOGY | Facility: CLINIC | Age: 58
End: 2023-01-01
Payer: COMMERCIAL

## 2023-01-01 ENCOUNTER — APPOINTMENT (OUTPATIENT)
Dept: THORACIC SURGERY | Facility: CLINIC | Age: 58
End: 2023-01-01
Payer: COMMERCIAL

## 2023-01-01 ENCOUNTER — APPOINTMENT (OUTPATIENT)
Dept: ORTHOPEDIC SURGERY | Facility: CLINIC | Age: 58
End: 2023-01-01
Payer: COMMERCIAL

## 2023-01-01 ENCOUNTER — APPOINTMENT (OUTPATIENT)
Dept: PAIN MANAGEMENT | Facility: CLINIC | Age: 58
End: 2023-01-01
Payer: COMMERCIAL

## 2023-01-01 ENCOUNTER — OUTPATIENT (OUTPATIENT)
Dept: OUTPATIENT SERVICES | Facility: HOSPITAL | Age: 58
LOS: 1 days | End: 2023-01-01

## 2023-01-01 ENCOUNTER — APPOINTMENT (OUTPATIENT)
Dept: PULMONOLOGY | Facility: CLINIC | Age: 58
End: 2023-01-01
Payer: COMMERCIAL

## 2023-01-01 ENCOUNTER — EMERGENCY (EMERGENCY)
Facility: HOSPITAL | Age: 58
LOS: 1 days | Discharge: HOME | End: 2023-01-01
Attending: EMERGENCY MEDICINE | Admitting: EMERGENCY MEDICINE
Payer: COMMERCIAL

## 2023-01-01 ENCOUNTER — APPOINTMENT (OUTPATIENT)
Dept: INTERVENTIONAL RADIOLOGY/VASCULAR | Facility: CLINIC | Age: 58
End: 2023-01-01
Payer: COMMERCIAL

## 2023-01-01 VITALS — HEIGHT: 62 IN | WEIGHT: 111 LBS | BODY MASS INDEX: 20.43 KG/M2

## 2023-01-01 VITALS
DIASTOLIC BLOOD PRESSURE: 81 MMHG | WEIGHT: 114 LBS | OXYGEN SATURATION: 94 % | HEART RATE: 82 BPM | BODY MASS INDEX: 21.52 KG/M2 | SYSTOLIC BLOOD PRESSURE: 138 MMHG | HEIGHT: 61 IN | RESPIRATION RATE: 16 BRPM

## 2023-01-01 VITALS
RESPIRATION RATE: 16 BRPM | HEART RATE: 77 BPM | TEMPERATURE: 98 F | DIASTOLIC BLOOD PRESSURE: 80 MMHG | OXYGEN SATURATION: 99 % | BODY MASS INDEX: 20.23 KG/M2 | TEMPERATURE: 97.2 F | HEART RATE: 56 BPM | WEIGHT: 111.33 LBS | SYSTOLIC BLOOD PRESSURE: 125 MMHG | SYSTOLIC BLOOD PRESSURE: 124 MMHG | HEIGHT: 62.2 IN | WEIGHT: 110.01 LBS | HEIGHT: 61.5 IN | RESPIRATION RATE: 16 BRPM | OXYGEN SATURATION: 99 % | DIASTOLIC BLOOD PRESSURE: 74 MMHG

## 2023-01-01 VITALS
WEIGHT: 119 LBS | RESPIRATION RATE: 16 BRPM | HEART RATE: 86 BPM | TEMPERATURE: 97.2 F | DIASTOLIC BLOOD PRESSURE: 72 MMHG | HEIGHT: 61 IN | OXYGEN SATURATION: 95 % | BODY MASS INDEX: 22.47 KG/M2 | SYSTOLIC BLOOD PRESSURE: 102 MMHG

## 2023-01-01 VITALS
HEART RATE: 62 BPM | OXYGEN SATURATION: 100 % | DIASTOLIC BLOOD PRESSURE: 58 MMHG | RESPIRATION RATE: 16 BRPM | SYSTOLIC BLOOD PRESSURE: 106 MMHG

## 2023-01-01 VITALS
WEIGHT: 119.05 LBS | HEIGHT: 61 IN | HEART RATE: 100 BPM | TEMPERATURE: 98 F | DIASTOLIC BLOOD PRESSURE: 57 MMHG | RESPIRATION RATE: 18 BRPM | OXYGEN SATURATION: 98 % | SYSTOLIC BLOOD PRESSURE: 85 MMHG

## 2023-01-01 VITALS
DIASTOLIC BLOOD PRESSURE: 75 MMHG | RESPIRATION RATE: 16 BRPM | HEIGHT: 61 IN | WEIGHT: 120 LBS | TEMPERATURE: 98.7 F | BODY MASS INDEX: 22.66 KG/M2 | HEART RATE: 76 BPM | OXYGEN SATURATION: 95 % | SYSTOLIC BLOOD PRESSURE: 114 MMHG

## 2023-01-01 VITALS
RESPIRATION RATE: 17 BRPM | HEIGHT: 62 IN | DIASTOLIC BLOOD PRESSURE: 82 MMHG | HEART RATE: 83 BPM | WEIGHT: 111 LBS | TEMPERATURE: 98.8 F | BODY MASS INDEX: 20.43 KG/M2 | SYSTOLIC BLOOD PRESSURE: 136 MMHG | OXYGEN SATURATION: 98 %

## 2023-01-01 VITALS
WEIGHT: 119 LBS | HEART RATE: 72 BPM | SYSTOLIC BLOOD PRESSURE: 118 MMHG | HEIGHT: 61 IN | DIASTOLIC BLOOD PRESSURE: 86 MMHG | BODY MASS INDEX: 22.47 KG/M2 | OXYGEN SATURATION: 96 %

## 2023-01-01 VITALS
RESPIRATION RATE: 16 BRPM | OXYGEN SATURATION: 96 % | TEMPERATURE: 97.3 F | BODY MASS INDEX: 21.52 KG/M2 | HEART RATE: 98 BPM | WEIGHT: 114 LBS | SYSTOLIC BLOOD PRESSURE: 142 MMHG | HEIGHT: 61 IN | DIASTOLIC BLOOD PRESSURE: 80 MMHG

## 2023-01-01 VITALS
HEIGHT: 62.2 IN | SYSTOLIC BLOOD PRESSURE: 126 MMHG | BODY MASS INDEX: 20.17 KG/M2 | WEIGHT: 111 LBS | OXYGEN SATURATION: 99 % | TEMPERATURE: 98.2 F | HEART RATE: 76 BPM | DIASTOLIC BLOOD PRESSURE: 64 MMHG

## 2023-01-01 VITALS
RESPIRATION RATE: 16 BRPM | HEART RATE: 65 BPM | OXYGEN SATURATION: 99 % | SYSTOLIC BLOOD PRESSURE: 107 MMHG | TEMPERATURE: 99 F | DIASTOLIC BLOOD PRESSURE: 62 MMHG

## 2023-01-01 VITALS
WEIGHT: 119.93 LBS | HEART RATE: 78 BPM | OXYGEN SATURATION: 98 % | HEIGHT: 61 IN | DIASTOLIC BLOOD PRESSURE: 90 MMHG | TEMPERATURE: 98 F | SYSTOLIC BLOOD PRESSURE: 132 MMHG | RESPIRATION RATE: 16 BRPM

## 2023-01-01 VITALS
HEIGHT: 61 IN | BODY MASS INDEX: 22.66 KG/M2 | OXYGEN SATURATION: 99 % | DIASTOLIC BLOOD PRESSURE: 84 MMHG | RESPIRATION RATE: 16 BRPM | SYSTOLIC BLOOD PRESSURE: 122 MMHG | WEIGHT: 120 LBS | HEART RATE: 85 BPM | TEMPERATURE: 97.2 F

## 2023-01-01 VITALS
HEART RATE: 77 BPM | DIASTOLIC BLOOD PRESSURE: 89 MMHG | SYSTOLIC BLOOD PRESSURE: 131 MMHG | RESPIRATION RATE: 17 BRPM | OXYGEN SATURATION: 97 %

## 2023-01-01 VITALS
SYSTOLIC BLOOD PRESSURE: 105 MMHG | RESPIRATION RATE: 18 BRPM | DIASTOLIC BLOOD PRESSURE: 67 MMHG | HEART RATE: 89 BPM | OXYGEN SATURATION: 98 %

## 2023-01-01 DIAGNOSIS — F40.240 CLAUSTROPHOBIA: ICD-10-CM

## 2023-01-01 DIAGNOSIS — Z87.81 PERSONAL HISTORY OF (HEALED) TRAUMATIC FRACTURE: Chronic | ICD-10-CM

## 2023-01-01 DIAGNOSIS — R91.8 OTHER NONSPECIFIC ABNORMAL FINDING OF LUNG FIELD: ICD-10-CM

## 2023-01-01 DIAGNOSIS — R93.89 ABNORMAL FINDINGS ON DIAGNOSTIC IMAGING OF OTHER SPECIFIED BODY STRUCTURES: ICD-10-CM

## 2023-01-01 DIAGNOSIS — J06.9 ACUTE UPPER RESPIRATORY INFECTION, UNSPECIFIED: ICD-10-CM

## 2023-01-01 DIAGNOSIS — C34.90 MALIGNANT NEOPLASM OF UNSPECIFIED PART OF UNSPECIFIED BRONCHUS OR LUNG: ICD-10-CM

## 2023-01-01 DIAGNOSIS — Z98.890 OTHER SPECIFIED POSTPROCEDURAL STATES: Chronic | ICD-10-CM

## 2023-01-01 DIAGNOSIS — S81.012A LACERATION W/OUT FOREIGN BODY, LEFT KNEE, INITIAL ENCOUNTER: ICD-10-CM

## 2023-01-01 DIAGNOSIS — R91.1 SOLITARY PULMONARY NODULE: ICD-10-CM

## 2023-01-01 DIAGNOSIS — S81.019A LACERATION W/OUT FOREIGN BODY, UNSPECIFIED KNEE, INITIAL ENCOUNTER: ICD-10-CM

## 2023-01-01 DIAGNOSIS — F17.200 NICOTINE DEPENDENCE, UNSPECIFIED, UNCOMPLICATED: ICD-10-CM

## 2023-01-01 DIAGNOSIS — M25.562 PAIN IN LEFT KNEE: ICD-10-CM

## 2023-01-01 LAB
ALBUMIN SERPL ELPH-MCNC: 3.2 G/DL — LOW (ref 3.3–5)
ALBUMIN SERPL ELPH-MCNC: 3.2 G/DL — LOW (ref 3.3–5)
ALBUMIN SERPL ELPH-MCNC: 4.5 G/DL
ALP BLD-CCNC: 127 U/L
ALP SERPL-CCNC: 122 U/L — HIGH (ref 40–120)
ALP SERPL-CCNC: 122 U/L — HIGH (ref 40–120)
ALT FLD-CCNC: 15 U/L — SIGNIFICANT CHANGE UP (ref 10–45)
ALT FLD-CCNC: 15 U/L — SIGNIFICANT CHANGE UP (ref 10–45)
ALT SERPL-CCNC: 11 U/L
ANION GAP SERPL CALC-SCNC: 10 MMOL/L — SIGNIFICANT CHANGE UP (ref 5–17)
ANION GAP SERPL CALC-SCNC: 10 MMOL/L — SIGNIFICANT CHANGE UP (ref 5–17)
ANION GAP SERPL CALC-SCNC: 13 MMOL/L
APTT BLD: 33.5 SEC
AST SERPL-CCNC: 12 U/L
AST SERPL-CCNC: 15 U/L — SIGNIFICANT CHANGE UP (ref 10–40)
AST SERPL-CCNC: 15 U/L — SIGNIFICANT CHANGE UP (ref 10–40)
BASOPHILS # BLD AUTO: 0.03 K/UL — SIGNIFICANT CHANGE UP (ref 0–0.2)
BASOPHILS NFR BLD AUTO: 0.3 % — SIGNIFICANT CHANGE UP (ref 0–2)
BASOPHILS NFR BLD AUTO: 0.3 % — SIGNIFICANT CHANGE UP (ref 0–2)
BASOPHILS NFR BLD AUTO: 0.4 % — SIGNIFICANT CHANGE UP (ref 0–2)
BASOPHILS NFR BLD AUTO: 0.4 % — SIGNIFICANT CHANGE UP (ref 0–2)
BILIRUB SERPL-MCNC: 0.3 MG/DL
BILIRUB SERPL-MCNC: 0.4 MG/DL — SIGNIFICANT CHANGE UP (ref 0.2–1.2)
BILIRUB SERPL-MCNC: 0.4 MG/DL — SIGNIFICANT CHANGE UP (ref 0.2–1.2)
BUN SERPL-MCNC: 10 MG/DL — SIGNIFICANT CHANGE UP (ref 7–23)
BUN SERPL-MCNC: 10 MG/DL — SIGNIFICANT CHANGE UP (ref 7–23)
BUN SERPL-MCNC: 11 MG/DL
CALCIUM SERPL-MCNC: 10.4 MG/DL
CALCIUM SERPL-MCNC: 9.6 MG/DL — SIGNIFICANT CHANGE UP (ref 8.4–10.5)
CALCIUM SERPL-MCNC: 9.6 MG/DL — SIGNIFICANT CHANGE UP (ref 8.4–10.5)
CHLORIDE SERPL-SCNC: 102 MMOL/L — SIGNIFICANT CHANGE UP (ref 96–108)
CHLORIDE SERPL-SCNC: 102 MMOL/L — SIGNIFICANT CHANGE UP (ref 96–108)
CHLORIDE SERPL-SCNC: 99 MMOL/L
CO2 SERPL-SCNC: 25 MMOL/L
CO2 SERPL-SCNC: 27 MMOL/L — SIGNIFICANT CHANGE UP (ref 22–31)
CO2 SERPL-SCNC: 27 MMOL/L — SIGNIFICANT CHANGE UP (ref 22–31)
CREAT SERPL-MCNC: 0.58 MG/DL
CREAT SERPL-MCNC: 0.63 MG/DL — SIGNIFICANT CHANGE UP (ref 0.5–1.3)
CREAT SERPL-MCNC: 0.63 MG/DL — SIGNIFICANT CHANGE UP (ref 0.5–1.3)
EGFR: 103 ML/MIN/1.73M2 — SIGNIFICANT CHANGE UP
EGFR: 103 ML/MIN/1.73M2 — SIGNIFICANT CHANGE UP
EGFR: 105 ML/MIN/1.73M2
EOSINOPHIL # BLD AUTO: 0.03 K/UL — SIGNIFICANT CHANGE UP (ref 0–0.5)
EOSINOPHIL # BLD AUTO: 0.03 K/UL — SIGNIFICANT CHANGE UP (ref 0–0.5)
EOSINOPHIL # BLD AUTO: 0.04 K/UL — SIGNIFICANT CHANGE UP (ref 0–0.5)
EOSINOPHIL # BLD AUTO: 0.04 K/UL — SIGNIFICANT CHANGE UP (ref 0–0.5)
EOSINOPHIL NFR BLD AUTO: 0.3 % — SIGNIFICANT CHANGE UP (ref 0–6)
EOSINOPHIL NFR BLD AUTO: 0.3 % — SIGNIFICANT CHANGE UP (ref 0–6)
EOSINOPHIL NFR BLD AUTO: 0.5 % — SIGNIFICANT CHANGE UP (ref 0–6)
EOSINOPHIL NFR BLD AUTO: 0.5 % — SIGNIFICANT CHANGE UP (ref 0–6)
FERRITIN SERPL-MCNC: 293 NG/ML
GLUCOSE SERPL-MCNC: 102 MG/DL
GLUCOSE SERPL-MCNC: 112 MG/DL — HIGH (ref 70–99)
GLUCOSE SERPL-MCNC: 112 MG/DL — HIGH (ref 70–99)
HBV CORE IGG+IGM SER QL: NONREACTIVE
HBV SURFACE AB SER QL: NONREACTIVE
HBV SURFACE AG SER QL: NONREACTIVE
HCT VFR BLD CALC: 39.8 % — SIGNIFICANT CHANGE UP (ref 34.5–45)
HCT VFR BLD CALC: 39.8 % — SIGNIFICANT CHANGE UP (ref 34.5–45)
HCT VFR BLD CALC: 40.5 % — SIGNIFICANT CHANGE UP (ref 34.5–45)
HCT VFR BLD CALC: 40.5 % — SIGNIFICANT CHANGE UP (ref 34.5–45)
HCV AB SER QL: NONREACTIVE
HCV S/CO RATIO: 0.08 S/CO
HGB BLD-MCNC: 13 G/DL — SIGNIFICANT CHANGE UP (ref 11.5–15.5)
HGB BLD-MCNC: 13 G/DL — SIGNIFICANT CHANGE UP (ref 11.5–15.5)
HGB BLD-MCNC: 13.2 G/DL — SIGNIFICANT CHANGE UP (ref 11.5–15.5)
HGB BLD-MCNC: 13.2 G/DL — SIGNIFICANT CHANGE UP (ref 11.5–15.5)
IMM GRANULOCYTES NFR BLD AUTO: 0.3 % — SIGNIFICANT CHANGE UP (ref 0–0.9)
IMM GRANULOCYTES NFR BLD AUTO: 0.3 % — SIGNIFICANT CHANGE UP (ref 0–0.9)
IMM GRANULOCYTES NFR BLD AUTO: 0.4 % — SIGNIFICANT CHANGE UP (ref 0–0.9)
IMM GRANULOCYTES NFR BLD AUTO: 0.4 % — SIGNIFICANT CHANGE UP (ref 0–0.9)
INR PPP: 0.99 RATIO
LYMPHOCYTES # BLD AUTO: 1.58 K/UL — SIGNIFICANT CHANGE UP (ref 1–3.3)
LYMPHOCYTES # BLD AUTO: 1.58 K/UL — SIGNIFICANT CHANGE UP (ref 1–3.3)
LYMPHOCYTES # BLD AUTO: 1.83 K/UL — SIGNIFICANT CHANGE UP (ref 1–3.3)
LYMPHOCYTES # BLD AUTO: 1.83 K/UL — SIGNIFICANT CHANGE UP (ref 1–3.3)
LYMPHOCYTES # BLD AUTO: 17.3 % — SIGNIFICANT CHANGE UP (ref 13–44)
LYMPHOCYTES # BLD AUTO: 17.3 % — SIGNIFICANT CHANGE UP (ref 13–44)
LYMPHOCYTES # BLD AUTO: 22.1 % — SIGNIFICANT CHANGE UP (ref 13–44)
LYMPHOCYTES # BLD AUTO: 22.1 % — SIGNIFICANT CHANGE UP (ref 13–44)
MCHC RBC-ENTMCNC: 28 PG — SIGNIFICANT CHANGE UP (ref 27–34)
MCHC RBC-ENTMCNC: 28 PG — SIGNIFICANT CHANGE UP (ref 27–34)
MCHC RBC-ENTMCNC: 28.4 PG — SIGNIFICANT CHANGE UP (ref 27–34)
MCHC RBC-ENTMCNC: 28.4 PG — SIGNIFICANT CHANGE UP (ref 27–34)
MCHC RBC-ENTMCNC: 32.6 G/DL — SIGNIFICANT CHANGE UP (ref 32–36)
MCHC RBC-ENTMCNC: 32.6 G/DL — SIGNIFICANT CHANGE UP (ref 32–36)
MCHC RBC-ENTMCNC: 32.7 GM/DL — SIGNIFICANT CHANGE UP (ref 32–36)
MCHC RBC-ENTMCNC: 32.7 GM/DL — SIGNIFICANT CHANGE UP (ref 32–36)
MCV RBC AUTO: 85.8 FL — SIGNIFICANT CHANGE UP (ref 80–100)
MCV RBC AUTO: 85.8 FL — SIGNIFICANT CHANGE UP (ref 80–100)
MCV RBC AUTO: 86.9 FL — SIGNIFICANT CHANGE UP (ref 80–100)
MCV RBC AUTO: 86.9 FL — SIGNIFICANT CHANGE UP (ref 80–100)
MONOCYTES # BLD AUTO: 0.71 K/UL — SIGNIFICANT CHANGE UP (ref 0–0.9)
MONOCYTES # BLD AUTO: 0.71 K/UL — SIGNIFICANT CHANGE UP (ref 0–0.9)
MONOCYTES # BLD AUTO: 0.86 K/UL — SIGNIFICANT CHANGE UP (ref 0–0.9)
MONOCYTES # BLD AUTO: 0.86 K/UL — SIGNIFICANT CHANGE UP (ref 0–0.9)
MONOCYTES NFR BLD AUTO: 10.4 % — SIGNIFICANT CHANGE UP (ref 2–14)
MONOCYTES NFR BLD AUTO: 10.4 % — SIGNIFICANT CHANGE UP (ref 2–14)
MONOCYTES NFR BLD AUTO: 7.8 % — SIGNIFICANT CHANGE UP (ref 2–14)
MONOCYTES NFR BLD AUTO: 7.8 % — SIGNIFICANT CHANGE UP (ref 2–14)
NEUTROPHILS # BLD AUTO: 5.5 K/UL — SIGNIFICANT CHANGE UP (ref 1.8–7.4)
NEUTROPHILS # BLD AUTO: 5.5 K/UL — SIGNIFICANT CHANGE UP (ref 1.8–7.4)
NEUTROPHILS # BLD AUTO: 6.73 K/UL — SIGNIFICANT CHANGE UP (ref 1.8–7.4)
NEUTROPHILS # BLD AUTO: 6.73 K/UL — SIGNIFICANT CHANGE UP (ref 1.8–7.4)
NEUTROPHILS NFR BLD AUTO: 66.2 % — SIGNIFICANT CHANGE UP (ref 43–77)
NEUTROPHILS NFR BLD AUTO: 66.2 % — SIGNIFICANT CHANGE UP (ref 43–77)
NEUTROPHILS NFR BLD AUTO: 74 % — SIGNIFICANT CHANGE UP (ref 43–77)
NEUTROPHILS NFR BLD AUTO: 74 % — SIGNIFICANT CHANGE UP (ref 43–77)
NON-GYNECOLOGICAL CYTOLOGY STUDY: SIGNIFICANT CHANGE UP
NON-GYNECOLOGICAL CYTOLOGY STUDY: SIGNIFICANT CHANGE UP
NRBC # BLD: 0 /100 WBCS — SIGNIFICANT CHANGE UP (ref 0–0)
PLATELET # BLD AUTO: 484 K/UL — HIGH (ref 150–400)
PLATELET # BLD AUTO: 484 K/UL — HIGH (ref 150–400)
PLATELET # BLD AUTO: 488 K/UL — HIGH (ref 150–400)
PLATELET # BLD AUTO: 488 K/UL — HIGH (ref 150–400)
POTASSIUM SERPL-MCNC: 4.6 MMOL/L — SIGNIFICANT CHANGE UP (ref 3.5–5.3)
POTASSIUM SERPL-MCNC: 4.6 MMOL/L — SIGNIFICANT CHANGE UP (ref 3.5–5.3)
POTASSIUM SERPL-SCNC: 4.4 MMOL/L
POTASSIUM SERPL-SCNC: 4.6 MMOL/L — SIGNIFICANT CHANGE UP (ref 3.5–5.3)
POTASSIUM SERPL-SCNC: 4.6 MMOL/L — SIGNIFICANT CHANGE UP (ref 3.5–5.3)
PROT SERPL-MCNC: 7.6 G/DL — SIGNIFICANT CHANGE UP (ref 6–8.3)
PROT SERPL-MCNC: 7.6 G/DL — SIGNIFICANT CHANGE UP (ref 6–8.3)
PROT SERPL-MCNC: 8 G/DL
PT BLD: 11.3 SEC
RBC # BLD: 4.58 M/UL — SIGNIFICANT CHANGE UP (ref 3.8–5.2)
RBC # BLD: 4.58 M/UL — SIGNIFICANT CHANGE UP (ref 3.8–5.2)
RBC # BLD: 4.72 M/UL — SIGNIFICANT CHANGE UP (ref 3.8–5.2)
RBC # BLD: 4.72 M/UL — SIGNIFICANT CHANGE UP (ref 3.8–5.2)
RBC # FLD: 12.5 % — SIGNIFICANT CHANGE UP (ref 10.3–14.5)
RBC # FLD: 12.5 % — SIGNIFICANT CHANGE UP (ref 10.3–14.5)
RBC # FLD: 12.6 % — SIGNIFICANT CHANGE UP (ref 10.3–14.5)
RBC # FLD: 12.6 % — SIGNIFICANT CHANGE UP (ref 10.3–14.5)
SODIUM SERPL-SCNC: 137 MMOL/L
SODIUM SERPL-SCNC: 139 MMOL/L — SIGNIFICANT CHANGE UP (ref 135–145)
SODIUM SERPL-SCNC: 139 MMOL/L — SIGNIFICANT CHANGE UP (ref 135–145)
TROPONIN I, HIGH SENSITIVITY RESULT: <4 NG/L — SIGNIFICANT CHANGE UP
TROPONIN I, HIGH SENSITIVITY RESULT: <4 NG/L — SIGNIFICANT CHANGE UP
WBC # BLD: 8.29 K/UL — SIGNIFICANT CHANGE UP (ref 3.8–10.5)
WBC # BLD: 8.29 K/UL — SIGNIFICANT CHANGE UP (ref 3.8–10.5)
WBC # BLD: 9.11 K/UL — SIGNIFICANT CHANGE UP (ref 3.8–10.5)
WBC # BLD: 9.11 K/UL — SIGNIFICANT CHANGE UP (ref 3.8–10.5)
WBC # FLD AUTO: 8.29 K/UL — SIGNIFICANT CHANGE UP (ref 3.8–10.5)
WBC # FLD AUTO: 8.29 K/UL — SIGNIFICANT CHANGE UP (ref 3.8–10.5)
WBC # FLD AUTO: 9.11 K/UL — SIGNIFICANT CHANGE UP (ref 3.8–10.5)
WBC # FLD AUTO: 9.11 K/UL — SIGNIFICANT CHANGE UP (ref 3.8–10.5)

## 2023-01-01 PROCEDURE — 90471 IMMUNIZATION ADMIN: CPT

## 2023-01-01 PROCEDURE — 32408 CORE NDL BX LNG/MED PERQ: CPT

## 2023-01-01 PROCEDURE — 99215 OFFICE O/P EST HI 40 MIN: CPT

## 2023-01-01 PROCEDURE — 96374 THER/PROPH/DIAG INJ IV PUSH: CPT | Mod: XU

## 2023-01-01 PROCEDURE — 99214 OFFICE O/P EST MOD 30 MIN: CPT

## 2023-01-01 PROCEDURE — 85025 COMPLETE CBC W/AUTO DIFF WBC: CPT

## 2023-01-01 PROCEDURE — 99214 OFFICE O/P EST MOD 30 MIN: CPT | Mod: 25

## 2023-01-01 PROCEDURE — 71045 X-RAY EXAM CHEST 1 VIEW: CPT | Mod: 26

## 2023-01-01 PROCEDURE — 36415 COLL VENOUS BLD VENIPUNCTURE: CPT

## 2023-01-01 PROCEDURE — 99203 OFFICE O/P NEW LOW 30 MIN: CPT | Mod: 95

## 2023-01-01 PROCEDURE — 99284 EMERGENCY DEPT VISIT MOD MDM: CPT | Mod: 25

## 2023-01-01 PROCEDURE — 99204 OFFICE O/P NEW MOD 45 MIN: CPT

## 2023-01-01 PROCEDURE — 84484 ASSAY OF TROPONIN QUANT: CPT

## 2023-01-01 PROCEDURE — 99205 OFFICE O/P NEW HI 60 MIN: CPT

## 2023-01-01 PROCEDURE — 93005 ELECTROCARDIOGRAM TRACING: CPT

## 2023-01-01 PROCEDURE — 99203 OFFICE O/P NEW LOW 30 MIN: CPT

## 2023-01-01 PROCEDURE — 99285 EMERGENCY DEPT VISIT HI MDM: CPT | Mod: 25

## 2023-01-01 PROCEDURE — 99244 OFF/OP CNSLTJ NEW/EST MOD 40: CPT

## 2023-01-01 PROCEDURE — 99443: CPT

## 2023-01-01 PROCEDURE — 80053 COMPREHEN METABOLIC PANEL: CPT

## 2023-01-01 PROCEDURE — 99213 OFFICE O/P EST LOW 20 MIN: CPT

## 2023-01-01 PROCEDURE — 73562 X-RAY EXAM OF KNEE 3: CPT | Mod: 26,LT

## 2023-01-01 PROCEDURE — 99285 EMERGENCY DEPT VISIT HI MDM: CPT

## 2023-01-01 PROCEDURE — 93010 ELECTROCARDIOGRAM REPORT: CPT

## 2023-01-01 PROCEDURE — 99283 EMERGENCY DEPT VISIT LOW MDM: CPT | Mod: 25

## 2023-01-01 PROCEDURE — 73562 X-RAY EXAM OF KNEE 3: CPT

## 2023-01-01 PROCEDURE — 90715 TDAP VACCINE 7 YRS/> IM: CPT

## 2023-01-01 PROCEDURE — 71275 CT ANGIOGRAPHY CHEST: CPT | Mod: MA

## 2023-01-01 PROCEDURE — 71275 CT ANGIOGRAPHY CHEST: CPT | Mod: 26,MA

## 2023-01-01 PROCEDURE — 12001 RPR S/N/AX/GEN/TRNK 2.5CM/<: CPT

## 2023-01-01 PROCEDURE — 99406 BEHAV CHNG SMOKING 3-10 MIN: CPT

## 2023-01-01 RX ORDER — HYDROMORPHONE HYDROCHLORIDE 4 MG/1
4 TABLET ORAL
Qty: 42 | Refills: 0 | Status: DISCONTINUED | COMMUNITY
Start: 2023-01-01 | End: 2023-01-01

## 2023-01-01 RX ORDER — ACETAMINOPHEN 500 MG
975 TABLET ORAL ONCE
Refills: 0 | Status: COMPLETED | OUTPATIENT
Start: 2023-01-01 | End: 2023-01-01

## 2023-01-01 RX ORDER — NALOXONE HYDROCHLORIDE 4 MG/.1ML
4 SPRAY NASAL
Qty: 1 | Refills: 0 | Status: ACTIVE | COMMUNITY
Start: 2023-01-01 | End: 1900-01-01

## 2023-01-01 RX ORDER — OXYCODONE AND ACETAMINOPHEN 7.5; 325 MG/1; MG/1
7.5-325 TABLET ORAL
Qty: 9 | Refills: 0 | Status: DISCONTINUED | COMMUNITY
Start: 2023-01-01 | End: 2023-01-01

## 2023-01-01 RX ORDER — KETOROLAC TROMETHAMINE 30 MG/ML
15 SYRINGE (ML) INJECTION ONCE
Refills: 0 | Status: DISCONTINUED | OUTPATIENT
Start: 2023-01-01 | End: 2023-01-01

## 2023-01-01 RX ORDER — OXYCODONE AND ACETAMINOPHEN 5; 325 MG/1; MG/1
5-325 TABLET ORAL
Qty: 21 | Refills: 0 | Status: DISCONTINUED | COMMUNITY
Start: 2023-01-01 | End: 2023-01-01

## 2023-01-01 RX ORDER — LORATADINE 10 MG
17 TABLET,DISINTEGRATING ORAL
Qty: 1 | Refills: 3 | Status: ACTIVE | COMMUNITY
Start: 2023-01-01 | End: 1900-01-01

## 2023-01-01 RX ORDER — TETANUS TOXOID, REDUCED DIPHTHERIA TOXOID AND ACELLULAR PERTUSSIS VACCINE, ADSORBED 5; 2.5; 8; 8; 2.5 [IU]/.5ML; [IU]/.5ML; UG/.5ML; UG/.5ML; UG/.5ML
0.5 SUSPENSION INTRAMUSCULAR ONCE
Refills: 0 | Status: COMPLETED | OUTPATIENT
Start: 2023-01-01 | End: 2023-01-01

## 2023-01-01 RX ORDER — CHROMIUM 200 MCG
TABLET ORAL
Refills: 0 | Status: DISCONTINUED | COMMUNITY
End: 2023-01-01

## 2023-01-01 RX ORDER — OXYCODONE AND ACETAMINOPHEN 5; 325 MG/1; MG/1
5-325 TABLET ORAL
Refills: 0 | Status: DISCONTINUED | COMMUNITY
End: 2023-01-01

## 2023-01-01 RX ORDER — ERGOCALCIFEROL 1.25 MG/1
1.25 MG CAPSULE, LIQUID FILLED ORAL
Qty: 12 | Refills: 2 | Status: DISCONTINUED | COMMUNITY
Start: 2022-02-15 | End: 2023-01-01

## 2023-01-01 RX ORDER — CYCLOBENZAPRINE HYDROCHLORIDE 5 MG/1
5 TABLET, FILM COATED ORAL
Qty: 21 | Refills: 0 | Status: COMPLETED | COMMUNITY
Start: 2023-01-01 | End: 2023-01-01

## 2023-01-01 RX ORDER — AMOXICILLIN AND CLAVULANATE POTASSIUM 875; 125 MG/1; MG/1
875-125 TABLET, COATED ORAL
Qty: 14 | Refills: 0 | Status: DISCONTINUED | COMMUNITY
Start: 2022-12-13 | End: 2023-01-01

## 2023-01-01 RX ORDER — PROMETHAZINE HYDROCHLORIDE AND DEXTROMETHORPHAN HYDROBROMIDE ORAL SOLUTION 15; 6.25 MG/5ML; MG/5ML
6.25-15 SOLUTION ORAL
Qty: 1 | Refills: 1 | Status: DISCONTINUED | COMMUNITY
Start: 2023-01-01 | End: 2023-01-01

## 2023-01-01 RX ORDER — OXYCODONE AND ACETAMINOPHEN 5; 325 MG/1; MG/1
1 TABLET ORAL
Qty: 9 | Refills: 0
Start: 2023-01-01 | End: 2023-01-01

## 2023-01-01 RX ORDER — GABAPENTIN 100 MG/1
100 CAPSULE ORAL AT BEDTIME
Qty: 90 | Refills: 0 | Status: DISCONTINUED | COMMUNITY
Start: 2022-02-15 | End: 2023-01-01

## 2023-01-01 RX ORDER — IBUPROFEN 600 MG/1
600 TABLET, FILM COATED ORAL
Qty: 60 | Refills: 0 | Status: DISCONTINUED | COMMUNITY
Start: 2022-11-01 | End: 2023-01-01

## 2023-01-01 RX ORDER — ATORVASTATIN CALCIUM 20 MG/1
20 TABLET, FILM COATED ORAL DAILY
Refills: 0 | Status: DISCONTINUED | COMMUNITY
End: 2023-01-01

## 2023-01-01 RX ORDER — NAPROXEN 500 MG/1
500 TABLET ORAL
Qty: 20 | Refills: 0 | Status: COMPLETED | COMMUNITY
Start: 2023-01-01 | End: 2023-01-01

## 2023-01-01 RX ADMIN — Medication 975 MILLIGRAM(S): at 20:18

## 2023-01-01 RX ADMIN — Medication 975 MILLIGRAM(S): at 20:48

## 2023-01-01 RX ADMIN — TETANUS TOXOID, REDUCED DIPHTHERIA TOXOID AND ACELLULAR PERTUSSIS VACCINE, ADSORBED 0.5 MILLILITER(S): 5; 2.5; 8; 8; 2.5 SUSPENSION INTRAMUSCULAR at 21:06

## 2023-01-01 RX ADMIN — Medication 15 MILLIGRAM(S): at 06:46

## 2023-01-01 RX ADMIN — Medication 1 TABLET(S): at 21:54

## 2023-01-09 ENCOUNTER — NON-APPOINTMENT (OUTPATIENT)
Age: 58
End: 2023-01-09

## 2023-01-24 ENCOUNTER — APPOINTMENT (OUTPATIENT)
Dept: ORTHOPEDIC SURGERY | Facility: CLINIC | Age: 58
End: 2023-01-24
Payer: COMMERCIAL

## 2023-01-24 DIAGNOSIS — S92.352D DISPLACED FRACTURE OF FIFTH METATARSAL BONE, LEFT FOOT, SUBSEQUENT ENCOUNTER FOR FRACTURE WITH ROUTINE HEALING: ICD-10-CM

## 2023-01-24 PROCEDURE — 99213 OFFICE O/P EST LOW 20 MIN: CPT

## 2023-01-24 PROCEDURE — 73630 X-RAY EXAM OF FOOT: CPT | Mod: LT

## 2023-01-24 NOTE — END OF VISIT
[FreeTextEntry3] : All medical record entries made by the Scribe were at my,  Dr. Horace Becerra MD., direction and personally dictated by me on 01/24/2023. I have personally reviewed the chart and agree that the record accurately reflects my personal performance of the history, physical exam, assessment and plan.

## 2023-01-24 NOTE — PHYSICAL EXAM
[de-identified] : Patient is WDWN, alert, and in no acute distress. Breathing is unlabored. She is grossly oriented to person, place, and time.\par \par She is accompanied by her daughter today.\par \par She presents to the office with normal shoe wear\par \par Left Foot: \par Improved edema \par No tender over 5th metatarsal shaft\par ROM 0-30\par Sensation is normal [de-identified] : AP, lateral and oblique views of the LEFT foot were obtained today and revealed an obliquely oriented fracture at the midshaft of the fifth metatarsal. The fracture is healed.\par \par ------------------------------------------------------------------------------------------------------------------------------------------------------------------------------------\par \par EXAM: FOOT MIN 3 VIEWS LEFT\par PROCEDURE DATE: 10/30/2022\par IMPRESSION: \par Oblique fracture of the midshaft of the fifth metatarsal with 3 mm of medial displacement.\par \par ROSALBA DAVILA DO; Attending Radiologist\par This document has been electronically signed. Oct 30 2022 2:32PM

## 2023-01-24 NOTE — HISTORY OF PRESENT ILLNESS
[de-identified] : Patient is a 57 year old female who presents with complaints of left foot pain secondary to a fifth metatarsal fracture. She states that she slipped off the curb and inverted her left ankle when doing so. She felt pain immediately. She presented to the urgent care on 10/30/22 where xrays were obtained and revealed the fracture. She was placed in a splint and ace bandage. She was given crutches. She has been elevating and icing. The crutches were difficult to walk with so she has been using a knee scooter.  She was initially treated in the office on 11/1/22 at which time she opted for immobilization in a CAM boot. She returned for repeat xrays on 11/15/22 and reported improvement in her symptoms. She presents for repeat xrays on 12/13/22 and is improving overall. She returns once again for repeat xrays on 1/24/23. She reports improvement in her pain level.\par \par She smokes about a pack of cigarettes a day.

## 2023-01-24 NOTE — ADDENDUM
[FreeTextEntry1] : I, Aurelia Reeves wrote this note acting as a scribe for Dr. Horace Becerra on Jan 24, 2023.

## 2023-01-24 NOTE — DISCUSSION/SUMMARY
[de-identified] : The underlying pathophysiology was reviewed with the patient. XR films were reviewed with the patient. Discussed at length the nature of the patient’s condition. The left foot symptoms appear secondary to midshaft fifth metatarsal fracture.\par \par At this time, I told her that she may begin to WBAT but with use of the CAM boot. I advised her to let her symptoms guide her and did tell her to use some caution as the fracture is not yet fully healed. She may utilize a regular sneaker when in her home. She deferred a referral for physical therapy. I encouraged low impact activities such was walking, stationary bike and elliptical.\par \par All questions answered, understanding verbalized. Patient in agreement with plan of care. Follow up in

## 2023-05-10 NOTE — ED ADULT TRIAGE NOTE - ESI TRIAGE ACUITY LEVEL, MLM
Addended by: Chloe Hopkins on: 5/9/2023 12:19 PM     Modules accepted: Orders
Addended by: Sweta Caemjo on: 5/10/2023 04:13 PM     Modules accepted: Orders
3

## 2023-08-23 NOTE — PHYSICAL EXAM
[No Acute Distress] : no acute distress [Well Nourished] : well nourished [Well Developed] : well developed [Well-Appearing] : well-appearing [Normal Sclera/Conjunctiva] : normal sclera/conjunctiva [PERRL] : pupils equal round and reactive to light [EOMI] : extraocular movements intact [Normal Outer Ear/Nose] : the outer ears and nose were normal in appearance [Normal Oropharynx] : the oropharynx was normal [No JVD] : no jugular venous distention [No Lymphadenopathy] : no lymphadenopathy [Supple] : supple [Thyroid Normal, No Nodules] : the thyroid was normal and there were no nodules present [No Respiratory Distress] : no respiratory distress  [No Accessory Muscle Use] : no accessory muscle use [Clear to Auscultation] : lungs were clear to auscultation bilaterally [Normal Rate] : normal rate  [Regular Rhythm] : with a regular rhythm [Normal S1, S2] : normal S1 and S2 [No Murmur] : no murmur heard [No Carotid Bruits] : no carotid bruits [No Abdominal Bruit] : a ~M bruit was not heard ~T in the abdomen [No Varicosities] : no varicosities [Pedal Pulses Present] : the pedal pulses are present [No Edema] : there was no peripheral edema [No Palpable Aorta] : no palpable aorta [No Extremity Clubbing/Cyanosis] : no extremity clubbing/cyanosis [Soft] : abdomen soft [Non Tender] : non-tender [Non-distended] : non-distended [No Masses] : no abdominal mass palpated [No HSM] : no HSM [Normal Bowel Sounds] : normal bowel sounds [Normal Posterior Cervical Nodes] : no posterior cervical lymphadenopathy [Normal Anterior Cervical Nodes] : no anterior cervical lymphadenopathy [No CVA Tenderness] : no CVA  tenderness [No Spinal Tenderness] : no spinal tenderness [No Joint Swelling] : no joint swelling [Grossly Normal Strength/Tone] : grossly normal strength/tone [No Rash] : no rash [Coordination Grossly Intact] : coordination grossly intact [No Focal Deficits] : no focal deficits [Normal Gait] : normal gait [Deep Tendon Reflexes (DTR)] : deep tendon reflexes were 2+ and symmetric [Normal Affect] : the affect was normal [Normal Insight/Judgement] : insight and judgment were intact [de-identified] : Injected nasal turbinates and posterior pharynx

## 2023-08-23 NOTE — HISTORY OF PRESENT ILLNESS
[FreeTextEntry8] : Intermittent productive cough, runny nose, postnasal drip, sore throat, low grade fever and fatigue that has been persistent since Saturday. No sick contacts. Has not tested for covid.  Has taken taken tyleniol with mild reliedf.

## 2023-08-23 NOTE — PLAN
[FreeTextEntry1] : Reassurance given. Likely self limiting URI Discussed symptomatic relief for URI INcrease liquids warm liquids tea and honey Discussed COVID testing if symptoms persist or wrsen  Flonase, Claritin, Mucinex DM RTO if symptoms worsen or persist.

## 2023-09-02 NOTE — ED ADULT TRIAGE NOTE - CHIEF COMPLAINT QUOTE
Pt c/o left knee pain with abrasion s/p trip and fall. Pt also has abrasion to left elbow and nose. Denies LOC or blood thinners.

## 2023-09-02 NOTE — ED ADULT NURSE NOTE - NSFALLRISKINTERV_ED_ALL_ED

## 2023-09-02 NOTE — ED PROVIDER NOTE - OBJECTIVE STATEMENT
58-year-old woman with no PMH, no aspirin or blood thinner use, presenting post a trip and fall at around 7 PM.  Patient states that she was running after her grandson when she lost her footing and fell forwards mostly onto her left side.  Has a mild abrasion over the nasal bridge but denies cranial impact or LOC, denies preceding chest pain, dizziness, shortness of breath, syncope.  Most of her pain localized over the left knee. Daughter and  at bedside mostly concerned about knee laceration and patient appearing pale post fall. Able to ambulate on her own post fall.

## 2023-09-02 NOTE — ED PROVIDER NOTE - NS ED ROS FT
GENERAL: No fever, no chills  EYES: No change in vision  HEENT: No trouble swallowing or speaking  CARDIAC: No chest pain  PULMONARY: No cough, no SOB  GI: No abdominal pain, no nausea, no vomiting, no diarrhea, no constipation  : No changes in urination  SKIN: No rashes  NEURO: No headache, no numbness  MSK: +left knee pain  Otherwise as HPI or negative.

## 2023-09-02 NOTE — ED PROVIDER NOTE - ATTENDING CONTRIBUTION TO CARE
58-year-old woman with no PMH presenting post trip and fall with left knee pain and laceration, abrasion over the left elbow.  Patient denies head trauma, has very mild abrasion over the nasal bridge with no tenderness to palpation, history and exam not consistent with nasal fracture.  Patient denies cranial trauma or LOC, no blood thinner use, due to daughter and 's concern patient was offered a CT head but refused.  On triage patient BP 85/57, however MAP 66.  Will encourage fluid intake, patient has no symptoms of dehydration or hypovolemia.  X-ray of knee, lack repair, reassess.  Foreseeable discharge with follow-up with PCP. *The above represents an initial assessment/impression. Please refer to progress notes for potential changes in patient clinical course*  Dr. Jacob:  I have reviewed and discussed with the PA/ resident the case specifics, including the history, physical assessment, evaluation, conclusion, laboratory results, and medical plan. I agree with the contents, and conclusions. I have personally examined, and interviewed the patient.

## 2023-09-02 NOTE — ED PROVIDER NOTE - NSPTACCESSSVCSAPPT_ED_ALL_ED
CM NOTE: Per QFR---failed swallow eval & family undecided about PEG placement. Refusing tele monitor & O2. Given Haldol last pm for combative behavior. NP, CM & SW will meet with family today regarding further TX. Specialty Care (Routine)...

## 2023-09-02 NOTE — ED PROVIDER NOTE - CARE PROVIDER_API CALL
Franko Ma  Orthopaedic Surgery  [address], [address]  [place] [address]  Phone: [phone]  Fax: [phone]  Follow Up Time:

## 2023-09-02 NOTE — ED ADULT NURSE NOTE - OBJECTIVE STATEMENT
pt s/p trip & fall in driveway. presents with abrasions on L knee, L elbow, and nose. c/o limited mobility and pain in L knee. denies LOC or blood thinners.

## 2023-09-02 NOTE — ED PROVIDER NOTE - PROGRESS NOTE DETAILS
Sixto, PGY3 - lac repaired with 3 stitches, understands she needs to get them removed in 10-14 days. Patient stable for discharge. Understands the Emergency Room work-up and discharge precautions. Will follow-up with pcp and ortho for avulsion on xray

## 2023-09-02 NOTE — ED PROVIDER NOTE - PHYSICAL EXAMINATION
Gen: NAD, AOx3, able to make needs known, non-toxic  HEENT: EOMI, oral mucosa moist, normal conjunctiva. No cranial trauma visualized, no cervical spine tenderness. very mild and superficial abrasion over the nasal bridge, nontender with palpation, not bleeding.   CV: RRR, pulses bilaterally, no sternal tenderness, no clavicle tenderness   Lung: CTAB, no respiratory distress, no wheezes/rhonchi/rales B/L, speaking in full sentences  Abd: soft, NTND, no guarding, no CVA tenderness   MSK: no visible bony deformities, no spinal tenderness, no tenderness with palpation of the bilateral UE, no tenderness over the RLE, only tenderness of the LLE over the left knee. no hip or pelvis tenderness.  Neuro: No focal sensory or motor deficits  Skin: Warm, well perfused, no rash. 1.5cm diameter abrasion over the left elbow, 2.5cm laceration over the left knee with surrounding abrasion   Psych: normal affect Gen: NAD, AOx3, able to make needs known, non-toxic  HEENT: EOMI, oral mucosa moist, normal conjunctiva. No cranial trauma visualized, no cervical spine tenderness. very mild and superficial abrasion over the nasal bridge, nontender with palpation, not bleeding.   CV: RRR, pulses bilaterally, no sternal tenderness, no clavicle tenderness   Lung: CTAB, no respiratory distress, no wheezes/rhonchi/rales B/L, speaking in full sentences  Abd: soft, NTND, no guarding, no CVA tenderness   MSK: no visible bony deformities, no spinal tenderness, no tenderness with palpation of the bilateral UE, no tenderness over the RLE, only tenderness of the LLE over the left knee. no hip or pelvis tenderness.  Neuro: No focal sensory or motor deficits  Skin: Warm, well perfused, no rash. 1cm diameter superficial abrasion over the left elbow. 2.5cm laceration, 2mm deep, over the left knee with surrounding abrasion   Psych: normal affect

## 2023-09-02 NOTE — ED PROVIDER NOTE - CLINICAL SUMMARY MEDICAL DECISION MAKING FREE TEXT BOX
Sixto, PGY3 - 58-year-old woman with no PMH presenting post trip and fall with left knee pain and laceration, abrasion over the left elbow.  Patient denies head trauma, has very mild abrasion over the nasal bridge with no tenderness to palpation, history and exam not consistent with nasal fracture.  Patient denies cranial trauma or LOC, no blood thinner use, due to daughter and 's concern patient was offered a CT head but refused.  On triage patient BP 85/57, however MAP 66.  Will encourage fluid intake, patient has no symptoms of dehydration or hypovolemia.  X-ray of knee, lack repair, reassess.  Foreseeable discharge with follow-up with PCP. *The above represents an initial assessment/impression. Please refer to progress notes for potential changes in patient clinical course*

## 2023-09-02 NOTE — ED PROVIDER NOTE - NS ED ATTENDING STATEMENT MOD
I have seen and examined this patient and fully participated in the care of this patient as the teaching attending.  The service was shared with the DEVEN.  I reviewed and verified the documentation and independently performed the documented:

## 2023-09-02 NOTE — ED PROVIDER NOTE - NSFOLLOWUPINSTRUCTIONS_ED_ALL_ED_FT
You were seen in the Emergency Room today because of a fall resulting in a left knee laceration.     3 stitches were placed on your left knee. Get them removed in 10-14 days.     You can take Tylenol and/or Motrin as directed for pain.   Please follow-up with your Primary Care Physician within the week.     We also recommend you following up with an Orthopedist for further management and workup. The contact information for Dr. Ma is included in your discharge paperwork. Please make an appointment within 5 days.     We have let the [facility] Team know of your case. They will be in contact with you to try and set up an appointment. You can also call [phone] and ask for an appointment at a convenient location.     We have sent medication to your Pharmacy. It is called Augmentin. This is an antibiotic.     WHAT YOU NEED TO KNOW:  Stitches, or sutures, are used to close cuts and wounds on the skin. Stitches need to be removed after your wound has healed.    DISCHARGE INSTRUCTIONS:  Seek care immediately if:   •Your stitches come apart.  •Blood soaks through your bandages.  •You suddenly cannot move your injured joint.  •You have sudden numbness around your wound.  •You see red streaks coming from your wound.    Contact your healthcare provider if:   •You have a fever and chills.  •Your wound is red, warm, swollen, or leaking pus.  •There is a bad smell coming from your wound.  •You have increased pain in the wound area.  •You have questions or concerns about your condition or care.    Care for your stitches:   •Protect the stitches. You may need to cover your stitches with a bandage for 24 to 48 hours, or as directed. Do not bump or hit the suture area. This could open the wound. Do not trim or shorten the ends of your stitches. If they rub on your clothing, put a gauze bandage between the stitches and your clothes.  •Clean the area as directed. Carefully wash your wound with soap and water. For mouth and lip wounds, rinse your mouth after meals and at bedtime. Ask your healthcare provider what to use to rinse your mouth. If you have a scalp wound, you may gently wash your hair every 2 days with mild shampoo. Do not use hair products, such as hair spray. Check your wound for signs of infection when you clean it. Signs include redness, swelling, and pus.  •Keep the area dry as directed. Wait 12 to 24 hours after you receive your stitches before you take a shower. Take showers instead of baths. Do not take a bath or swim. Your healthcare provider will give you instructions for bathing with your stitches.    Help your wound heal:   •Elevate your wound. Keep your wound above the level of your heart as often as you can. This will help decrease swelling and pain. Prop the area on pillows or blankets, if possible, to keep it elevated comfortably.  •Limit activity. Do not stretch the skin around your wound. This will help prevent bleeding and swelling.

## 2023-09-02 NOTE — ED PROVIDER NOTE - PATIENT PORTAL LINK FT
You can access the FollowMyHealth Patient Portal offered by [facility] by registering at the following website: [email or web address]. By joining [employer]’s FollowMyHealth portal, you will also be able to view your health information using other applications (apps) compatible with our system.

## 2023-09-11 PROBLEM — S81.012A KNEE LACERATION, LEFT, INITIAL ENCOUNTER: Status: ACTIVE | Noted: 2023-01-01

## 2023-09-15 PROBLEM — S81.019A KNEE LACERATION: Status: ACTIVE | Noted: 2023-01-01

## 2023-11-08 PROBLEM — F17.200 TOBACCO DEPENDENCE: Status: ACTIVE | Noted: 2021-01-21

## 2023-11-08 PROBLEM — F40.240 CLAUSTROPHOBIA: Status: ACTIVE | Noted: 2022-02-15

## 2023-11-09 NOTE — ED PROVIDER NOTE - TOBACCO USE
Patient requesting refill of buspar.  RX in chart, was not sent to pharmacy and no quantity listed.  
Never smoker

## 2023-11-28 NOTE — ED PROVIDER NOTE - CLINICAL SUMMARY MEDICAL DECISION MAKING FREE TEXT BOX
pt with R upper back pain pt with R upper back pain  CT angio angio of the chest consistent with a 7 cm lung mass likely bronchogenic carcinoma patient's primary care doctor Dr. Ferro has been informed

## 2023-11-28 NOTE — ED ADULT TRIAGE NOTE - CHIEF COMPLAINT QUOTE
Pt arrives with back pain x30 days; referred for CT scan and unable to get approval with insurance. XR reveals "nodule." No relief with prescription oxycodone or tylenol. Ambulatory in triage.

## 2023-11-28 NOTE — ED PROVIDER NOTE - OBJECTIVE STATEMENT
pt states that for the last 30 days has been having R upper back pain, initially was episodic and now it has been fairly constant for the past 1 week, worse with taking a deep breath, radiates around to the anterior chest. pt states that she went to urgent care when this first started and had an xray that showed a nodule in the R upper lung and then went to her primary to get a CT scan ordered but still has not been approved through the insurance. pt denies any fevers or chills. is everyday smoker.

## 2023-11-28 NOTE — ED PROVIDER NOTE - CARE PLAN
Principal Discharge DX:	Upper back pain   1 Principal Discharge DX:	Upper back pain  Secondary Diagnosis:	Mass of lung

## 2023-11-28 NOTE — ED PROVIDER NOTE - NSFOLLOWUPINSTRUCTIONS_ED_ALL_ED_FT
Follow up with your PMD within 1-2 days.  Rest, increase your fluids, advance your activity as tolerated.   Take all of your other medications as previously prescribed.   Worsening, continued or ANY new concerning symptoms return to the emergency department.  Follow-up with Dr. Ferro, follow-up with Dr. Travis guerrier pulmonary

## 2023-11-28 NOTE — ED PROVIDER NOTE - PATIENT PORTAL LINK FT
You can access the FollowMyHealth Patient Portal offered by [facility] by registering at the following website: [email or web address]. By joining [employer]’s FollowMyHealth portal, you will also be able to view your health information using other applications (apps) compatible with our system.

## 2023-11-28 NOTE — ED PROVIDER NOTE - PHYSICAL EXAMINATION
Gen: alert, NAD  HEENT:  NC/AT, PERR  CV:  well perfused  Pulm:  normal RR, breathing comfortably  Abd: s/nt/nd  MSK: moving all extremities, focal point tenderness to the R upper back just medial to the R scapula.  Neuro:  non-focal  Skin:  visualized areas intact  Psych: AOx3

## 2023-12-05 PROBLEM — R91.8 MASS OF RIGHT LUNG: Status: ACTIVE | Noted: 2022-03-29

## 2023-12-07 NOTE — H&P PST ADULT - NSICDXPASTMEDICALHX_GEN_ALL_CORE_FT
PAST MEDICAL HISTORY:  Chronic pain syndrome     COPD (chronic obstructive pulmonary disease)     HLD (hyperlipidemia)     Mass of right lung     Melanoma in situ     Smoker

## 2023-12-07 NOTE — H&P PST ADULT - ASSESSMENT
57 y/o female presents to [facility] preop for CT guided right lung mass biopsy. Pt presented to [facility] in late November reporting right upper back pain. A CTA revealed RUL lung mass.  57 y/o female presents to [facility] preop for CT guided right lung mass biopsy. Pt presented to [facility] in late November reporting right upper back pain. A CTA revealed RUL lung mass.

## 2023-12-07 NOTE — H&P PST ADULT - NSANTHOSAYNRD_GEN_A_CORE
No. JOURDAN screening performed.  STOP BANG Legend: 0-2 = LOW Risk; 3-4 = INTERMEDIATE Risk; 5-8 = HIGH Risk

## 2023-12-07 NOTE — H&P PST ADULT - PROBLEM SELECTOR PLAN 1
preop for CT guided right lung mass biopsy on 12/13/23  preop instructions given, pt verbalized understanding  GI prophylaxis and chlorhexidine wash provided  all lab results in chart

## 2023-12-07 NOTE — H&P PST ADULT - FUNCTIONAL STATUS
Activity: independent with ADLS, can walks 1-2 blocks , can climb a flight of stairs   Symptoms: None  Mets: 7.37 using DASI calculator Activity: independent with ADLs, can walk 1-2 blocks, can climb a flight of stairs   Symptoms: None  Mets: 7.37 using DASI calculator

## 2023-12-12 PROBLEM — J44.9 CHRONIC OBSTRUCTIVE PULMONARY DISEASE, UNSPECIFIED: Chronic | Status: ACTIVE | Noted: 2023-01-01

## 2023-12-12 PROBLEM — F17.200 NICOTINE DEPENDENCE, UNSPECIFIED, UNCOMPLICATED: Chronic | Status: ACTIVE | Noted: 2023-01-01

## 2023-12-12 PROBLEM — D03.9 MELANOMA IN SITU, UNSPECIFIED: Chronic | Status: ACTIVE | Noted: 2023-01-01

## 2023-12-12 PROBLEM — E78.5 HYPERLIPIDEMIA, UNSPECIFIED: Chronic | Status: ACTIVE | Noted: 2023-01-01

## 2023-12-12 PROBLEM — G89.4 CHRONIC PAIN SYNDROME: Chronic | Status: ACTIVE | Noted: 2023-01-01

## 2023-12-12 PROBLEM — R91.8 OTHER NONSPECIFIC ABNORMAL FINDING OF LUNG FIELD: Chronic | Status: ACTIVE | Noted: 2023-01-01

## 2023-12-12 PROBLEM — R91.1 PULMONARY NODULE: Status: ACTIVE | Noted: 2022-02-03

## 2023-12-13 NOTE — PRE PROCEDURE NOTE - PRE PROCEDURE EVALUATION
------------------------------------------------------------  Interventional Radiology Pre-Procedure Note  ------------------------------------------------------------  Procedure:     Indication: 58y Female with right lung mass concerning for malignancy    Past Medical History:  No pertinent past medical history    Mass of right lung    COPD (chronic obstructive pulmonary disease)    Smoker    Chronic pain syndrome    HLD (hyperlipidemia)    Melanoma in situ    Allergies: No Known Allergies      Medications: reviewed    Labs: reviewed    Imaging: reviewed    Consent: Risks, benefits, and alternatives were discussed and informed consent obtained.    Procedure Plan:   Biopsy of right lung mass  The patient is a candidate for the procedure.      Anil Houser MD  Interventional Radiology    -Available on Microsoft TEAMS for all non-urgent questions  -Emergent issues: [facility]-p.[phone]; [facility]-p.[phone] ([phone])  -Non-emergent consults: Please place a Poteet order "IR Consult" with an appropriate callback number  -Scheduling questions: [facility]: [phone]; [facility]: [phone]  -Clinic/Outpatient booking: [facility]: [phone]; [facility]: [phone] ------------------------------------------------------------  Interventional Radiology Pre-Procedure Note  ------------------------------------------------------------  Procedure:     Indication: 58y Female with right lung mass concerning for malignancy    Past Medical History:  No pertinent past medical history    Mass of right lung    COPD (chronic obstructive pulmonary disease)    Smoker    Chronic pain syndrome    HLD (hyperlipidemia)    Melanoma in situ    Allergies: No Known Allergies      Medications: reviewed    Labs: reviewed    Imaging: reviewed    Consent: Risks, benefits, and alternatives were discussed and informed consent obtained.    Procedure Plan:   Biopsy of right lung mass  The patient is a candidate for the procedure.      Anil Houser MD  Interventional Radiology    -Available on Microsoft TEAMS for all non-urgent questions  -Emergent issues: [facility]-p.[phone]; [facility]-p.[phone] ([phone])  -Non-emergent consults: Please place a Nina order "IR Consult" with an appropriate callback number  -Scheduling questions: [facility]: [phone]; [facility]: [phone]  -Clinic/Outpatient booking: [facility]: [phone]; [facility]: [phone]

## 2023-12-19 PROBLEM — R93.89 ABNORMAL FINDING ON IMAGING: Status: ACTIVE | Noted: 2022-03-28

## 2023-12-20 NOTE — REASON FOR VISIT
[Initial Visit] : an initial pain management visit [Family Member] : family member [FreeTextEntry2] : Initial visit for evaluation of right upper back pain

## 2023-12-20 NOTE — ASSESSMENT
[FreeTextEntry1] : 58 year old female with history of right shoulder pain that has progressed to right upper back pain that radiates to right anterior chest wall. Recent CT scan revealed a mass in the RUL of the lung. She experiences partial relief of a 5/10 with her current pain regimen. Plan:  1. Given that patient was prescribed gabapentin 100 mg at HS and did not appreciate relief we recommend Gabapentin 100 mg TID. 2. Thoracic epidural injection discussed, and patient was interested. Patient is pending a thoracic spine MRI on 12/26. Pending results, we will move forward in scheduling the procedure.  3. Patient will continue Dilaudid and Meloxicam as previously ordered. IStop ref #: [id number].

## 2023-12-20 NOTE — DATA REVIEWED
[FreeTextEntry1] : CT Scan 11/28 revealed a new lobulated irregular mass in the posterior RUL measuring 7.7x4.8x6.5 cm.  CTA on 11/28/2023:There is a lobulated irregular mass in the posterior right upper lobe measuring 7.7 x 4.8 x 6.5 cm corresponding to 8 mm pulmonary nodule seen on CT of February 3, 2023.There is lysis of the right posterior third rib. Redemonstration of mild to moderate centrilobular emphysema. Redemonstration of a 5 mm spiculated ground glass nodule in the lateral left upper lobe. In the right lower outer breast, there is a 9 mm nodule for which correlation with breast examination and/or recent mammography is recommended.

## 2023-12-20 NOTE — HISTORY OF PRESENT ILLNESS
[FreeTextEntry1] : Slime Moreno is a 58 year old female her today for an initial evaluation of pain at her right upper back that radiates to her right anterior chest wall. Patient reports she went to urgent care for right shoulder pain in November which she believed to be musculoskeletal.  At that time an Xray was done, and urgent care advised patient to see PCP for follow-up scans. Patient was awaiting insurance approval and pain became so excruciating that she went to [facility] where she had a CT scan done. This CT scan revealed a right upper lobe lung mass. PMH includes, HLD, smoker, COPD (recently diagnosed), right upper lobe nodule.   Pain starts in the right posterior thoracic area, T3-T5 respectively, and radiates to her right anterior chest wall and breastbone. Patient reports the pain sometimes radiates to her right ribs and right arm but does not go past her elbow. Denies UE weakness or numbness and tingling. The pain is described as constant, sharp and shooting.  At worst the pain is a 10/10, at best it is a 5/10. Patient's current pain level is a 10/10. Patient experiences partial relief with current pain regimen: Dilaudid 4 mg 1-2 pills every four hours as needed and Meloxicam 15 mg daily, denies significant side effects. Patient was using Gabapentin 100 mg but did not appreciate any relief and stopped the medication. The pain interferes with patient's ADLs and her sleep. Patient also reports she has decreased appetite related to the pain. She is interested in obtaining better pain relief.   Patient is following with Dr. Lira (CT surgery) and Dr. Seymour (oncology) in [facility]. She is currently pending a PET scan for staging and an MRI of the brain and thoracic spine.

## 2023-12-20 NOTE — ADDENDUM
[FreeTextEntry1] : I interviewed and examined the patient with the NP.  I agree with the findings and the plan of care as in the note above. Francisco J Billings MD, MA

## 2023-12-20 NOTE — REVIEW OF SYSTEMS
[Back Pain] : back pain [Radiating Pain] : radiating pain [Sleep Disturbances] : ~T sleep disturbances [Negative] : Constitutional [Discharge] : no discharge [Nasal Discharge] : no nasal discharge [SOB at rest] : no shortness of breath at rest [Cough] : no cough [Lower Ext Edema] : no lower extremity edema [Abdominal Pain] : no abdominal pain [Dizziness] : no dizziness

## 2023-12-20 NOTE — PHYSICAL EXAM
[Normal muscle bulk without asymmetry] : normal muscle bulk without asymmetry [Normal Spine curvature] : normal spine curvature [] : Motor: [UE] : 5/5 motor strength in bilateral upper extremities [Normal] : Normal affect [de-identified] : No evidence of jaundice. [de-identified] : no b/l lowe extremity edema or cyanosis [de-identified] : Right upper back pain radiating to anterior chest wall [de-identified] : Cranial nerves grossly intact

## 2024-01-01 ENCOUNTER — APPOINTMENT (OUTPATIENT)
Dept: INFUSION THERAPY | Facility: HOSPITAL | Age: 59
End: 2024-01-01

## 2024-01-01 ENCOUNTER — TRANSCRIPTION ENCOUNTER (OUTPATIENT)
Age: 59
End: 2024-01-01

## 2024-01-01 ENCOUNTER — APPOINTMENT (OUTPATIENT)
Dept: HEMATOLOGY ONCOLOGY | Facility: CLINIC | Age: 59
End: 2024-01-01
Payer: COMMERCIAL

## 2024-01-01 ENCOUNTER — APPOINTMENT (OUTPATIENT)
Dept: MRI IMAGING | Facility: CLINIC | Age: 59
End: 2024-01-01
Payer: COMMERCIAL

## 2024-01-01 ENCOUNTER — APPOINTMENT (OUTPATIENT)
Dept: THORACIC SURGERY | Facility: CLINIC | Age: 59
End: 2024-01-01
Payer: COMMERCIAL

## 2024-01-01 ENCOUNTER — APPOINTMENT (OUTPATIENT)
Dept: HEMATOLOGY ONCOLOGY | Facility: CLINIC | Age: 59
End: 2024-01-01

## 2024-01-01 ENCOUNTER — APPOINTMENT (OUTPATIENT)
Dept: PAIN MANAGEMENT | Facility: CLINIC | Age: 59
End: 2024-01-01

## 2024-01-01 ENCOUNTER — EMERGENCY (EMERGENCY)
Facility: HOSPITAL | Age: 59
LOS: 1 days | Discharge: ANOTHER HOSPITAL | End: 2024-01-01
Attending: EMERGENCY MEDICINE | Admitting: EMERGENCY MEDICINE
Payer: COMMERCIAL

## 2024-01-01 ENCOUNTER — RESULT REVIEW (OUTPATIENT)
Age: 59
End: 2024-01-01

## 2024-01-01 ENCOUNTER — OUTPATIENT (OUTPATIENT)
Dept: OUTPATIENT SERVICES | Facility: HOSPITAL | Age: 59
LOS: 1 days | Discharge: HOME | End: 2024-01-01
Payer: COMMERCIAL

## 2024-01-01 ENCOUNTER — APPOINTMENT (OUTPATIENT)
Dept: NEUROSURGERY | Facility: CLINIC | Age: 59
End: 2024-01-01
Payer: COMMERCIAL

## 2024-01-01 ENCOUNTER — APPOINTMENT (OUTPATIENT)
Dept: RADIATION ONCOLOGY | Facility: CLINIC | Age: 59
End: 2024-01-01

## 2024-01-01 ENCOUNTER — NON-APPOINTMENT (OUTPATIENT)
Age: 59
End: 2024-01-01

## 2024-01-01 ENCOUNTER — LABORATORY RESULT (OUTPATIENT)
Age: 59
End: 2024-01-01

## 2024-01-01 ENCOUNTER — APPOINTMENT (OUTPATIENT)
Dept: MRI IMAGING | Facility: HOSPITAL | Age: 59
End: 2024-01-01

## 2024-01-01 ENCOUNTER — RX RENEWAL (OUTPATIENT)
Age: 59
End: 2024-01-01

## 2024-01-01 ENCOUNTER — APPOINTMENT (OUTPATIENT)
Dept: NEUROSURGERY | Facility: CLINIC | Age: 59
End: 2024-01-01

## 2024-01-01 ENCOUNTER — EMERGENCY (EMERGENCY)
Facility: HOSPITAL | Age: 59
LOS: 1 days | Discharge: HOME | End: 2024-01-01
Attending: EMERGENCY MEDICINE | Admitting: EMERGENCY MEDICINE
Payer: COMMERCIAL

## 2024-01-01 ENCOUNTER — APPOINTMENT (OUTPATIENT)
Dept: PAIN MANAGEMENT | Facility: AMBULATORY SURGERY CENTER | Age: 59
End: 2024-01-01

## 2024-01-01 ENCOUNTER — APPOINTMENT (OUTPATIENT)
Dept: FAMILY MEDICINE | Facility: CLINIC | Age: 59
End: 2024-01-01
Payer: COMMERCIAL

## 2024-01-01 ENCOUNTER — APPOINTMENT (OUTPATIENT)
Dept: RADIATION ONCOLOGY | Facility: CLINIC | Age: 59
End: 2024-01-01
Payer: COMMERCIAL

## 2024-01-01 ENCOUNTER — APPOINTMENT (OUTPATIENT)
Dept: FAMILY MEDICINE | Facility: CLINIC | Age: 59
End: 2024-01-01

## 2024-01-01 ENCOUNTER — APPOINTMENT (OUTPATIENT)
Dept: MRI IMAGING | Facility: IMAGING CENTER | Age: 59
End: 2024-01-01

## 2024-01-01 ENCOUNTER — OUTPATIENT (OUTPATIENT)
Dept: OUTPATIENT SERVICES | Facility: HOSPITAL | Age: 59
LOS: 1 days | End: 2024-01-01
Payer: COMMERCIAL

## 2024-01-01 ENCOUNTER — APPOINTMENT (OUTPATIENT)
Dept: PAIN MANAGEMENT | Facility: CLINIC | Age: 59
End: 2024-01-01
Payer: COMMERCIAL

## 2024-01-01 ENCOUNTER — APPOINTMENT (OUTPATIENT)
Dept: THORACIC SURGERY | Facility: CLINIC | Age: 59
End: 2024-01-01

## 2024-01-01 ENCOUNTER — APPOINTMENT (OUTPATIENT)
Dept: NUCLEAR MEDICINE | Facility: CLINIC | Age: 59
End: 2024-01-01
Payer: COMMERCIAL

## 2024-01-01 ENCOUNTER — EMERGENCY (EMERGENCY)
Facility: HOSPITAL | Age: 59
LOS: 1 days | Discharge: HOME | End: 2024-01-01
Attending: STUDENT IN AN ORGANIZED HEALTH CARE EDUCATION/TRAINING PROGRAM | Admitting: EMERGENCY MEDICINE
Payer: COMMERCIAL

## 2024-01-01 ENCOUNTER — OUTPATIENT (OUTPATIENT)
Dept: OUTPATIENT SERVICES | Facility: HOSPITAL | Age: 59
LOS: 1 days | End: 2024-01-01

## 2024-01-01 ENCOUNTER — APPOINTMENT (OUTPATIENT)
Dept: RADIATION ONCOLOGY | Facility: CLINIC | Age: 59
End: 2024-01-01
Payer: SELF-PAY

## 2024-01-01 ENCOUNTER — OUTPATIENT (OUTPATIENT)
Dept: OUTPATIENT SERVICES | Facility: HOSPITAL | Age: 59
LOS: 1 days | Discharge: HOME | End: 2024-01-01

## 2024-01-01 ENCOUNTER — INPATIENT (INPATIENT)
Facility: HOSPITAL | Age: 59
LOS: 7 days | End: 2024-05-17
Attending: HOSPITALIST | Admitting: HOSPITALIST
Payer: COMMERCIAL

## 2024-01-01 ENCOUNTER — INPATIENT (INPATIENT)
Facility: HOSPITAL | Age: 59
LOS: 2 days | Discharge: HOME | DRG: 175 | End: 2024-03-19
Attending: INTERNAL MEDICINE | Admitting: INTERNAL MEDICINE
Payer: COMMERCIAL

## 2024-01-01 ENCOUNTER — INPATIENT (INPATIENT)
Facility: HOSPITAL | Age: 59
LOS: 6 days | Discharge: HOME | DRG: 948 | End: 2024-04-20
Attending: NEUROLOGICAL SURGERY | Admitting: NEUROLOGICAL SURGERY
Payer: COMMERCIAL

## 2024-01-01 ENCOUNTER — INPATIENT (INPATIENT)
Facility: HOSPITAL | Age: 59
LOS: 3 days | Discharge: HOME | DRG: 70 | End: 2024-01-26
Attending: FAMILY MEDICINE | Admitting: INTERNAL MEDICINE
Payer: COMMERCIAL

## 2024-01-01 VITALS
RESPIRATION RATE: 18 BRPM | HEIGHT: 61.5 IN | DIASTOLIC BLOOD PRESSURE: 74 MMHG | TEMPERATURE: 98 F | WEIGHT: 110.01 LBS | OXYGEN SATURATION: 98 % | HEART RATE: 89 BPM | SYSTOLIC BLOOD PRESSURE: 108 MMHG

## 2024-01-01 VITALS
HEART RATE: 66 BPM | DIASTOLIC BLOOD PRESSURE: 78 MMHG | RESPIRATION RATE: 18 BRPM | SYSTOLIC BLOOD PRESSURE: 113 MMHG | TEMPERATURE: 98 F | OXYGEN SATURATION: 100 %

## 2024-01-01 VITALS
SYSTOLIC BLOOD PRESSURE: 99 MMHG | SYSTOLIC BLOOD PRESSURE: 111 MMHG | RESPIRATION RATE: 16 BRPM | RESPIRATION RATE: 16 BRPM | HEART RATE: 76 BPM | HEART RATE: 77 BPM | TEMPERATURE: 99 F | OXYGEN SATURATION: 98 % | DIASTOLIC BLOOD PRESSURE: 78 MMHG | WEIGHT: 91.05 LBS | HEIGHT: 61 IN | DIASTOLIC BLOOD PRESSURE: 68 MMHG | HEIGHT: 61 IN | WEIGHT: 105 LBS | BODY MASS INDEX: 19.83 KG/M2 | OXYGEN SATURATION: 99 %

## 2024-01-01 VITALS
TEMPERATURE: 97.4 F | HEIGHT: 62 IN | RESPIRATION RATE: 16 BRPM | WEIGHT: 97 LBS | HEART RATE: 70 BPM | OXYGEN SATURATION: 97 % | BODY MASS INDEX: 17.85 KG/M2 | DIASTOLIC BLOOD PRESSURE: 57 MMHG | SYSTOLIC BLOOD PRESSURE: 86 MMHG

## 2024-01-01 VITALS
HEART RATE: 66 BPM | SYSTOLIC BLOOD PRESSURE: 103 MMHG | RESPIRATION RATE: 15 BRPM | DIASTOLIC BLOOD PRESSURE: 71 MMHG | OXYGEN SATURATION: 100 %

## 2024-01-01 VITALS
WEIGHT: 97 LBS | RESPIRATION RATE: 17 BRPM | DIASTOLIC BLOOD PRESSURE: 72 MMHG | BODY MASS INDEX: 17.85 KG/M2 | TEMPERATURE: 97.9 F | HEIGHT: 62 IN | OXYGEN SATURATION: 96 % | SYSTOLIC BLOOD PRESSURE: 109 MMHG | HEART RATE: 93 BPM

## 2024-01-01 VITALS
HEIGHT: 62 IN | WEIGHT: 97 LBS | HEART RATE: 83 BPM | SYSTOLIC BLOOD PRESSURE: 100 MMHG | TEMPERATURE: 99.2 F | RESPIRATION RATE: 16 BRPM | OXYGEN SATURATION: 95 % | DIASTOLIC BLOOD PRESSURE: 62 MMHG | BODY MASS INDEX: 17.85 KG/M2

## 2024-01-01 VITALS
DIASTOLIC BLOOD PRESSURE: 95 MMHG | HEART RATE: 105 BPM | OXYGEN SATURATION: 99 % | RESPIRATION RATE: 18 BRPM | WEIGHT: 119.93 LBS | HEIGHT: 61 IN | SYSTOLIC BLOOD PRESSURE: 176 MMHG | TEMPERATURE: 98 F

## 2024-01-01 VITALS
HEIGHT: 61 IN | SYSTOLIC BLOOD PRESSURE: 103 MMHG | TEMPERATURE: 98.5 F | DIASTOLIC BLOOD PRESSURE: 70 MMHG | HEART RATE: 58 BPM | WEIGHT: 92 LBS | OXYGEN SATURATION: 99 % | RESPIRATION RATE: 16 BRPM | BODY MASS INDEX: 17.37 KG/M2

## 2024-01-01 VITALS
HEIGHT: 61 IN | HEART RATE: 128 BPM | DIASTOLIC BLOOD PRESSURE: 95 MMHG | RESPIRATION RATE: 26 BRPM | OXYGEN SATURATION: 99 % | TEMPERATURE: 104 F | SYSTOLIC BLOOD PRESSURE: 165 MMHG | WEIGHT: 100.09 LBS

## 2024-01-01 VITALS
TEMPERATURE: 98 F | HEART RATE: 85 BPM | DIASTOLIC BLOOD PRESSURE: 80 MMHG | OXYGEN SATURATION: 100 % | SYSTOLIC BLOOD PRESSURE: 129 MMHG | RESPIRATION RATE: 15 BRPM

## 2024-01-01 VITALS
RESPIRATION RATE: 60 BRPM | DIASTOLIC BLOOD PRESSURE: 95 MMHG | TEMPERATURE: 99 F | OXYGEN SATURATION: 100 % | SYSTOLIC BLOOD PRESSURE: 155 MMHG | HEART RATE: 145 BPM

## 2024-01-01 VITALS — WEIGHT: 109.79 LBS

## 2024-01-01 VITALS
HEIGHT: 62 IN | WEIGHT: 96.12 LBS | TEMPERATURE: 99 F | HEART RATE: 91 BPM | DIASTOLIC BLOOD PRESSURE: 62 MMHG | OXYGEN SATURATION: 98 % | BODY MASS INDEX: 17.69 KG/M2 | RESPIRATION RATE: 16 BRPM | SYSTOLIC BLOOD PRESSURE: 109 MMHG

## 2024-01-01 VITALS
SYSTOLIC BLOOD PRESSURE: 143 MMHG | HEART RATE: 77 BPM | RESPIRATION RATE: 18 BRPM | HEIGHT: 61 IN | DIASTOLIC BLOOD PRESSURE: 88 MMHG | OXYGEN SATURATION: 99 % | TEMPERATURE: 98 F

## 2024-01-01 VITALS
SYSTOLIC BLOOD PRESSURE: 98 MMHG | BODY MASS INDEX: 20.56 KG/M2 | TEMPERATURE: 98.4 F | WEIGHT: 112.41 LBS | OXYGEN SATURATION: 96 % | HEART RATE: 74 BPM | RESPIRATION RATE: 16 BRPM | DIASTOLIC BLOOD PRESSURE: 64 MMHG

## 2024-01-01 VITALS
DIASTOLIC BLOOD PRESSURE: 65 MMHG | TEMPERATURE: 98 F | OXYGEN SATURATION: 98 % | HEART RATE: 89 BPM | SYSTOLIC BLOOD PRESSURE: 98 MMHG | RESPIRATION RATE: 18 BRPM

## 2024-01-01 VITALS
TEMPERATURE: 98 F | HEART RATE: 72 BPM | DIASTOLIC BLOOD PRESSURE: 73 MMHG | WEIGHT: 97 LBS | OXYGEN SATURATION: 100 % | RESPIRATION RATE: 18 BRPM | SYSTOLIC BLOOD PRESSURE: 108 MMHG | HEIGHT: 61 IN

## 2024-01-01 VITALS
DIASTOLIC BLOOD PRESSURE: 76 MMHG | RESPIRATION RATE: 16 BRPM | BODY MASS INDEX: 19.07 KG/M2 | WEIGHT: 101 LBS | TEMPERATURE: 97.5 F | HEART RATE: 82 BPM | HEIGHT: 61 IN | OXYGEN SATURATION: 96 % | SYSTOLIC BLOOD PRESSURE: 126 MMHG

## 2024-01-01 VITALS
WEIGHT: 111.99 LBS | TEMPERATURE: 97 F | HEIGHT: 61.5 IN | HEART RATE: 93 BPM | DIASTOLIC BLOOD PRESSURE: 70 MMHG | SYSTOLIC BLOOD PRESSURE: 131 MMHG | RESPIRATION RATE: 18 BRPM | OXYGEN SATURATION: 100 %

## 2024-01-01 VITALS
HEIGHT: 61 IN | TEMPERATURE: 98 F | DIASTOLIC BLOOD PRESSURE: 86 MMHG | OXYGEN SATURATION: 100 % | RESPIRATION RATE: 16 BRPM | HEART RATE: 181 BPM | WEIGHT: 100.09 LBS | SYSTOLIC BLOOD PRESSURE: 107 MMHG

## 2024-01-01 VITALS
OXYGEN SATURATION: 95 % | BODY MASS INDEX: 17.85 KG/M2 | WEIGHT: 97 LBS | HEART RATE: 83 BPM | RESPIRATION RATE: 15 BRPM | DIASTOLIC BLOOD PRESSURE: 56 MMHG | TEMPERATURE: 97.3 F | SYSTOLIC BLOOD PRESSURE: 86 MMHG | HEIGHT: 62 IN

## 2024-01-01 VITALS
TEMPERATURE: 99 F | DIASTOLIC BLOOD PRESSURE: 75 MMHG | WEIGHT: 93.92 LBS | OXYGEN SATURATION: 100 % | SYSTOLIC BLOOD PRESSURE: 178 MMHG | HEIGHT: 61 IN | HEART RATE: 51 BPM | RESPIRATION RATE: 18 BRPM

## 2024-01-01 VITALS
RESPIRATION RATE: 18 BRPM | HEART RATE: 86 BPM | DIASTOLIC BLOOD PRESSURE: 84 MMHG | OXYGEN SATURATION: 99 % | SYSTOLIC BLOOD PRESSURE: 122 MMHG

## 2024-01-01 VITALS — RESPIRATION RATE: 16 BRPM

## 2024-01-01 DIAGNOSIS — R11.2 NAUSEA WITH VOMITING, UNSPECIFIED: ICD-10-CM

## 2024-01-01 DIAGNOSIS — E87.1 HYPO-OSMOLALITY AND HYPONATREMIA: ICD-10-CM

## 2024-01-01 DIAGNOSIS — J44.9 CHRONIC OBSTRUCTIVE PULMONARY DISEASE, UNSPECIFIED: ICD-10-CM

## 2024-01-01 DIAGNOSIS — Z87.81 PERSONAL HISTORY OF (HEALED) TRAUMATIC FRACTURE: Chronic | ICD-10-CM

## 2024-01-01 DIAGNOSIS — Z51.11 ENCOUNTER FOR ANTINEOPLASTIC CHEMOTHERAPY: ICD-10-CM

## 2024-01-01 DIAGNOSIS — F41.8 OTHER SPECIFIED ANXIETY DISORDERS: ICD-10-CM

## 2024-01-01 DIAGNOSIS — Z98.890 OTHER SPECIFIED POSTPROCEDURAL STATES: Chronic | ICD-10-CM

## 2024-01-01 DIAGNOSIS — I26.99 OTHER PULMONARY EMBOLISM WITHOUT ACUTE COR PULMONALE: ICD-10-CM

## 2024-01-01 DIAGNOSIS — G93.5 COMPRESSION OF BRAIN: ICD-10-CM

## 2024-01-01 DIAGNOSIS — R91.8 OTHER NONSPECIFIC ABNORMAL FINDING OF LUNG FIELD: ICD-10-CM

## 2024-01-01 DIAGNOSIS — C34.12 MALIGNANT NEOPLASM OF UPPER LOBE, LEFT BRONCHUS OR LUNG: ICD-10-CM

## 2024-01-01 DIAGNOSIS — G93.6 CEREBRAL EDEMA: ICD-10-CM

## 2024-01-01 DIAGNOSIS — C34.90 MALIGNANT NEOPLASM OF UNSPECIFIED PART OF UNSPECIFIED BRONCHUS OR LUNG: ICD-10-CM

## 2024-01-01 DIAGNOSIS — D69.6 THROMBOCYTOPENIA, UNSPECIFIED: ICD-10-CM

## 2024-01-01 DIAGNOSIS — R41.82 ALTERED MENTAL STATUS, UNSPECIFIED: ICD-10-CM

## 2024-01-01 DIAGNOSIS — C34.91 MALIGNANT NEOPLASM OF UNSPECIFIED PART OF RIGHT BRONCHUS OR LUNG: ICD-10-CM

## 2024-01-01 DIAGNOSIS — Z29.9 ENCOUNTER FOR PROPHYLACTIC MEASURES, UNSPECIFIED: ICD-10-CM

## 2024-01-01 DIAGNOSIS — C79.31 SECONDARY MALIGNANT NEOPLASM OF BRAIN: ICD-10-CM

## 2024-01-01 DIAGNOSIS — Z09 ENCOUNTER FOR FOLLOW-UP EXAMINATION AFTER COMPLETED TREATMENT FOR CONDITIONS OTHER THAN MALIGNANT NEOPLASM: ICD-10-CM

## 2024-01-01 DIAGNOSIS — Z87.898 PERSONAL HISTORY OF OTHER SPECIFIED CONDITIONS: ICD-10-CM

## 2024-01-01 DIAGNOSIS — G47.00 INSOMNIA, UNSPECIFIED: ICD-10-CM

## 2024-01-01 DIAGNOSIS — M54.6 PAIN IN THORACIC SPINE: ICD-10-CM

## 2024-01-01 DIAGNOSIS — G89.3 NEOPLASM RELATED PAIN (ACUTE) (CHRONIC): ICD-10-CM

## 2024-01-01 DIAGNOSIS — D72.829 ELEVATED WHITE BLOOD CELL COUNT, UNSPECIFIED: ICD-10-CM

## 2024-01-01 DIAGNOSIS — S22.029A: ICD-10-CM

## 2024-01-01 DIAGNOSIS — D03.9 MELANOMA IN SITU, UNSPECIFIED: ICD-10-CM

## 2024-01-01 DIAGNOSIS — Z00.00 ENCOUNTER FOR GENERAL ADULT MEDICAL EXAMINATION W/OUT ABNORMAL FINDINGS: ICD-10-CM

## 2024-01-01 DIAGNOSIS — G93.40 ENCEPHALOPATHY, UNSPECIFIED: ICD-10-CM

## 2024-01-01 DIAGNOSIS — R07.9 CHEST PAIN, UNSPECIFIED: ICD-10-CM

## 2024-01-01 DIAGNOSIS — I10 ESSENTIAL (PRIMARY) HYPERTENSION: ICD-10-CM

## 2024-01-01 DIAGNOSIS — Z51.5 ENCOUNTER FOR PALLIATIVE CARE: ICD-10-CM

## 2024-01-01 DIAGNOSIS — A41.9 SEPSIS, UNSPECIFIED ORGANISM: ICD-10-CM

## 2024-01-01 DIAGNOSIS — R33.8 OTHER RETENTION OF URINE: ICD-10-CM

## 2024-01-01 DIAGNOSIS — Z00.8 ENCOUNTER FOR OTHER GENERAL EXAMINATION: ICD-10-CM

## 2024-01-01 DIAGNOSIS — R50.9 FEVER, UNSPECIFIED: ICD-10-CM

## 2024-01-01 DIAGNOSIS — I26.99 OTHER PULMONARY EMBOLISM W/OUT ACUTE COR PULMONALE: ICD-10-CM

## 2024-01-01 DIAGNOSIS — G93.41 METABOLIC ENCEPHALOPATHY: ICD-10-CM

## 2024-01-01 DIAGNOSIS — R91.1 SOLITARY PULMONARY NODULE: ICD-10-CM

## 2024-01-01 DIAGNOSIS — G56.92 UNSPECIFIED MONONEUROPATHY OF LEFT UPPER LIMB: ICD-10-CM

## 2024-01-01 DIAGNOSIS — Z51.89 ENCOUNTER FOR OTHER SPECIFIED AFTERCARE: ICD-10-CM

## 2024-01-01 DIAGNOSIS — R93.89 ABNORMAL FINDINGS ON DIAGNOSTIC IMAGING OF OTHER SPECIFIED BODY STRUCTURES: ICD-10-CM

## 2024-01-01 DIAGNOSIS — G93.89 OTHER SPECIFIED DISORDERS OF BRAIN: ICD-10-CM

## 2024-01-01 DIAGNOSIS — R07.81 PLEURODYNIA: ICD-10-CM

## 2024-01-01 DIAGNOSIS — G40.909 EPILEPSY, UNSPECIFIED, NOT INTRACTABLE, W/OUT STATUS EPILEPTICUS: ICD-10-CM

## 2024-01-01 DIAGNOSIS — D49.6 NEOPLASM OF UNSPECIFIED BEHAVIOR OF BRAIN: ICD-10-CM

## 2024-01-01 DIAGNOSIS — K21.9 GASTRO-ESOPHAGEAL REFLUX DISEASE WITHOUT ESOPHAGITIS: ICD-10-CM

## 2024-01-01 DIAGNOSIS — D50.9 IRON DEFICIENCY ANEMIA, UNSPECIFIED: ICD-10-CM

## 2024-01-01 DIAGNOSIS — M25.511 PAIN IN RIGHT SHOULDER: ICD-10-CM

## 2024-01-01 DIAGNOSIS — E55.9 VITAMIN D DEFICIENCY, UNSPECIFIED: ICD-10-CM

## 2024-01-01 DIAGNOSIS — E78.5 HYPERLIPIDEMIA, UNSPECIFIED: ICD-10-CM

## 2024-01-01 DIAGNOSIS — R53.1 WEAKNESS: ICD-10-CM

## 2024-01-01 LAB
24R-OH-CALCIDIOL SERPL-MCNC: 16.6 NG/ML — LOW (ref 30–80)
24R-OH-CALCIDIOL SERPL-MCNC: 25.3 NG/ML — LOW (ref 30–80)
A1C WITH ESTIMATED AVERAGE GLUCOSE RESULT: 5.4 % — SIGNIFICANT CHANGE UP (ref 4–5.6)
A1C WITH ESTIMATED AVERAGE GLUCOSE RESULT: 5.8 % — HIGH (ref 4–5.6)
ADD ON TEST-SPECIMEN IN LAB: SIGNIFICANT CHANGE UP
ALBUMIN SERPL ELPH-MCNC: 2.1 G/DL — LOW (ref 3.3–5)
ALBUMIN SERPL ELPH-MCNC: 2.3 G/DL — LOW (ref 3.3–5)
ALBUMIN SERPL ELPH-MCNC: 2.3 G/DL — LOW (ref 3.3–5)
ALBUMIN SERPL ELPH-MCNC: 2.4 G/DL — LOW (ref 3.3–5)
ALBUMIN SERPL ELPH-MCNC: 3 G/DL — LOW (ref 3.3–5)
ALBUMIN SERPL ELPH-MCNC: 3.1 G/DL — LOW (ref 3.3–5)
ALBUMIN SERPL ELPH-MCNC: 3.2 G/DL — LOW (ref 3.3–5)
ALBUMIN SERPL ELPH-MCNC: 3.3 G/DL — SIGNIFICANT CHANGE UP (ref 3.3–5)
ALBUMIN SERPL ELPH-MCNC: 3.3 G/DL — SIGNIFICANT CHANGE UP (ref 3.3–5)
ALBUMIN SERPL ELPH-MCNC: 3.5 G/DL — SIGNIFICANT CHANGE UP (ref 3.3–5)
ALBUMIN SERPL ELPH-MCNC: 3.5 G/DL — SIGNIFICANT CHANGE UP (ref 3.3–5)
ALBUMIN SERPL ELPH-MCNC: 3.6 G/DL — SIGNIFICANT CHANGE UP (ref 3.3–5)
ALBUMIN SERPL ELPH-MCNC: 3.6 G/DL — SIGNIFICANT CHANGE UP (ref 3.3–5)
ALBUMIN SERPL ELPH-MCNC: 3.7 G/DL — SIGNIFICANT CHANGE UP (ref 3.3–5)
ALBUMIN SERPL ELPH-MCNC: 3.9 G/DL — SIGNIFICANT CHANGE UP (ref 3.3–5)
ALBUMIN SERPL ELPH-MCNC: 4 G/DL — SIGNIFICANT CHANGE UP (ref 3.3–5)
ALBUMIN SERPL ELPH-MCNC: 4 G/DL — SIGNIFICANT CHANGE UP (ref 3.3–5)
ALBUMIN SERPL ELPH-MCNC: 4.2 G/DL — SIGNIFICANT CHANGE UP (ref 3.3–5)
ALBUMIN SERPL ELPH-MCNC: 4.3 G/DL — SIGNIFICANT CHANGE UP (ref 3.3–5)
ALBUMIN SERPL ELPH-MCNC: 4.6 G/DL — SIGNIFICANT CHANGE UP (ref 3.3–5)
ALP SERPL-CCNC: 100 U/L — SIGNIFICANT CHANGE UP (ref 40–120)
ALP SERPL-CCNC: 110 U/L — SIGNIFICANT CHANGE UP (ref 40–120)
ALP SERPL-CCNC: 110 U/L — SIGNIFICANT CHANGE UP (ref 40–120)
ALP SERPL-CCNC: 114 U/L — SIGNIFICANT CHANGE UP (ref 40–120)
ALP SERPL-CCNC: 116 U/L — SIGNIFICANT CHANGE UP (ref 40–120)
ALP SERPL-CCNC: 119 U/L — SIGNIFICANT CHANGE UP (ref 40–120)
ALP SERPL-CCNC: 143 U/L — HIGH (ref 40–120)
ALP SERPL-CCNC: 144 U/L — HIGH (ref 40–120)
ALP SERPL-CCNC: 166 U/L — HIGH (ref 40–120)
ALP SERPL-CCNC: 290 U/L — HIGH (ref 40–120)
ALP SERPL-CCNC: 74 U/L — SIGNIFICANT CHANGE UP (ref 40–120)
ALP SERPL-CCNC: 76 U/L — SIGNIFICANT CHANGE UP (ref 40–120)
ALP SERPL-CCNC: 79 U/L — SIGNIFICANT CHANGE UP (ref 40–120)
ALP SERPL-CCNC: 81 U/L — SIGNIFICANT CHANGE UP (ref 40–120)
ALP SERPL-CCNC: 83 U/L — SIGNIFICANT CHANGE UP (ref 40–120)
ALP SERPL-CCNC: 88 U/L — SIGNIFICANT CHANGE UP (ref 40–120)
ALP SERPL-CCNC: 88 U/L — SIGNIFICANT CHANGE UP (ref 40–120)
ALP SERPL-CCNC: 91 U/L — SIGNIFICANT CHANGE UP (ref 40–120)
ALP SERPL-CCNC: 96 U/L — SIGNIFICANT CHANGE UP (ref 40–120)
ALP SERPL-CCNC: 97 U/L — SIGNIFICANT CHANGE UP (ref 40–120)
ALT FLD-CCNC: 12 U/L — SIGNIFICANT CHANGE UP (ref 10–45)
ALT FLD-CCNC: 13 U/L — SIGNIFICANT CHANGE UP (ref 10–45)
ALT FLD-CCNC: 13 U/L — SIGNIFICANT CHANGE UP (ref 10–45)
ALT FLD-CCNC: 15 U/L — SIGNIFICANT CHANGE UP (ref 10–45)
ALT FLD-CCNC: 15 U/L — SIGNIFICANT CHANGE UP (ref 10–45)
ALT FLD-CCNC: 16 U/L — SIGNIFICANT CHANGE UP (ref 10–45)
ALT FLD-CCNC: 17 U/L — SIGNIFICANT CHANGE UP (ref 10–45)
ALT FLD-CCNC: 20 U/L — SIGNIFICANT CHANGE UP (ref 4–33)
ALT FLD-CCNC: 21 U/L — SIGNIFICANT CHANGE UP (ref 10–45)
ALT FLD-CCNC: 21 U/L — SIGNIFICANT CHANGE UP (ref 10–45)
ALT FLD-CCNC: 24 U/L — SIGNIFICANT CHANGE UP (ref 4–33)
ALT FLD-CCNC: 26 U/L — SIGNIFICANT CHANGE UP (ref 10–45)
ALT FLD-CCNC: 26 U/L — SIGNIFICANT CHANGE UP (ref 4–33)
ALT FLD-CCNC: 27 U/L — SIGNIFICANT CHANGE UP (ref 10–45)
ALT FLD-CCNC: 27 U/L — SIGNIFICANT CHANGE UP (ref 4–33)
ALT FLD-CCNC: 27 U/L — SIGNIFICANT CHANGE UP (ref 4–33)
ALT FLD-CCNC: 30 U/L — SIGNIFICANT CHANGE UP (ref 4–33)
ALT FLD-CCNC: 37 U/L — SIGNIFICANT CHANGE UP (ref 10–45)
ALT FLD-CCNC: 8 U/L — LOW (ref 10–45)
ALT FLD-CCNC: <5 U/L — LOW (ref 10–45)
AMMONIA BLD-MCNC: 17 UMOL/L — SIGNIFICANT CHANGE UP (ref 11–55)
AMMONIA BLD-MCNC: <10 UMOL/L — LOW (ref 11–55)
AMPHET UR-MCNC: NEGATIVE — SIGNIFICANT CHANGE UP
AMYLASE P1 CFR SERPL: 16 U/L — LOW (ref 25–125)
ANION GAP SERPL CALC-SCNC: 10 MMOL/L — SIGNIFICANT CHANGE UP (ref 5–17)
ANION GAP SERPL CALC-SCNC: 11 MMOL/L — SIGNIFICANT CHANGE UP (ref 5–17)
ANION GAP SERPL CALC-SCNC: 11 MMOL/L — SIGNIFICANT CHANGE UP (ref 5–17)
ANION GAP SERPL CALC-SCNC: 11 MMOL/L — SIGNIFICANT CHANGE UP (ref 7–14)
ANION GAP SERPL CALC-SCNC: 12 MMOL/L — SIGNIFICANT CHANGE UP (ref 5–17)
ANION GAP SERPL CALC-SCNC: 13 MMOL/L — SIGNIFICANT CHANGE UP (ref 5–17)
ANION GAP SERPL CALC-SCNC: 14 MMOL/L — SIGNIFICANT CHANGE UP (ref 5–17)
ANION GAP SERPL CALC-SCNC: 14 MMOL/L — SIGNIFICANT CHANGE UP (ref 7–14)
ANION GAP SERPL CALC-SCNC: 14 MMOL/L — SIGNIFICANT CHANGE UP (ref 7–14)
ANION GAP SERPL CALC-SCNC: 15 MMOL/L — HIGH (ref 7–14)
ANION GAP SERPL CALC-SCNC: 15 MMOL/L — HIGH (ref 7–14)
ANION GAP SERPL CALC-SCNC: 15 MMOL/L — SIGNIFICANT CHANGE UP (ref 5–17)
ANION GAP SERPL CALC-SCNC: 16 MMOL/L — HIGH (ref 7–14)
ANION GAP SERPL CALC-SCNC: 16 MMOL/L — SIGNIFICANT CHANGE UP (ref 5–17)
ANION GAP SERPL CALC-SCNC: 16 MMOL/L — SIGNIFICANT CHANGE UP (ref 5–17)
ANION GAP SERPL CALC-SCNC: 17 MMOL/L — HIGH (ref 7–14)
ANION GAP SERPL CALC-SCNC: 17 MMOL/L — SIGNIFICANT CHANGE UP (ref 5–17)
ANION GAP SERPL CALC-SCNC: 18 MMOL/L — HIGH (ref 5–17)
ANION GAP SERPL CALC-SCNC: 18 MMOL/L — HIGH (ref 5–17)
ANION GAP SERPL CALC-SCNC: 18 MMOL/L — HIGH (ref 7–14)
ANION GAP SERPL CALC-SCNC: 18 MMOL/L — HIGH (ref 7–14)
ANION GAP SERPL CALC-SCNC: 19 MMOL/L — HIGH (ref 5–17)
ANION GAP SERPL CALC-SCNC: 22 MMOL/L — HIGH (ref 7–14)
ANION GAP SERPL CALC-SCNC: 24 MMOL/L — HIGH (ref 5–17)
ANION GAP SERPL CALC-SCNC: 33 MMOL/L — HIGH (ref 7–14)
ANION GAP SERPL CALC-SCNC: 9 MMOL/L — SIGNIFICANT CHANGE UP (ref 5–17)
ANISOCYTOSIS BLD QL: SLIGHT — SIGNIFICANT CHANGE UP
APPEARANCE CSF: CLEAR — SIGNIFICANT CHANGE UP
APPEARANCE UR: CLEAR — SIGNIFICANT CHANGE UP
APTT BLD: 22.4 SEC — LOW (ref 24.5–35.6)
APTT BLD: 22.6 SEC — LOW (ref 24.5–35.6)
APTT BLD: 22.8 SEC — LOW (ref 24.5–35.6)
APTT BLD: 23.4 SEC — LOW (ref 24.5–35.6)
APTT BLD: 25 SEC — SIGNIFICANT CHANGE UP (ref 24.5–35.6)
APTT BLD: 25.7 SEC — SIGNIFICANT CHANGE UP (ref 24.5–35.6)
APTT BLD: 26.1 SEC — SIGNIFICANT CHANGE UP (ref 24.5–35.6)
APTT BLD: 27.2 SEC — SIGNIFICANT CHANGE UP (ref 24.5–35.6)
APTT BLD: 30.9 SEC — SIGNIFICANT CHANGE UP (ref 24.5–35.6)
APTT BLD: 32.9 SEC — SIGNIFICANT CHANGE UP (ref 24.5–35.6)
APTT BLD: 38.8 SEC — HIGH (ref 24.5–35.6)
AST SERPL-CCNC: 14 U/L — SIGNIFICANT CHANGE UP (ref 10–40)
AST SERPL-CCNC: 15 U/L — SIGNIFICANT CHANGE UP (ref 4–32)
AST SERPL-CCNC: 17 U/L — SIGNIFICANT CHANGE UP (ref 10–40)
AST SERPL-CCNC: 18 U/L — SIGNIFICANT CHANGE UP (ref 10–40)
AST SERPL-CCNC: 18 U/L — SIGNIFICANT CHANGE UP (ref 10–40)
AST SERPL-CCNC: 20 U/L — SIGNIFICANT CHANGE UP (ref 10–40)
AST SERPL-CCNC: 20 U/L — SIGNIFICANT CHANGE UP (ref 4–32)
AST SERPL-CCNC: 22 U/L — SIGNIFICANT CHANGE UP (ref 10–40)
AST SERPL-CCNC: 22 U/L — SIGNIFICANT CHANGE UP (ref 10–40)
AST SERPL-CCNC: 22 U/L — SIGNIFICANT CHANGE UP (ref 4–32)
AST SERPL-CCNC: 24 U/L — SIGNIFICANT CHANGE UP (ref 10–40)
AST SERPL-CCNC: 24 U/L — SIGNIFICANT CHANGE UP (ref 4–32)
AST SERPL-CCNC: 26 U/L — SIGNIFICANT CHANGE UP (ref 10–40)
AST SERPL-CCNC: 27 U/L — SIGNIFICANT CHANGE UP (ref 10–40)
AST SERPL-CCNC: 27 U/L — SIGNIFICANT CHANGE UP (ref 4–32)
AST SERPL-CCNC: 29 U/L — SIGNIFICANT CHANGE UP (ref 10–40)
AST SERPL-CCNC: 30 U/L — SIGNIFICANT CHANGE UP (ref 4–32)
AST SERPL-CCNC: 39 U/L — SIGNIFICANT CHANGE UP (ref 10–40)
B PERT DNA SPEC QL NAA+PROBE: SIGNIFICANT CHANGE UP
B-OH-BUTYR SERPL-SCNC: 0.1 MMOL/L — SIGNIFICANT CHANGE UP
B-OH-BUTYR SERPL-SCNC: 0.9 MMOL/L — HIGH
B-OH-BUTYR SERPL-SCNC: 1 MMOL/L — HIGH
B2 MICROGLOB SERPL-MCNC: 1.7 MG/L — SIGNIFICANT CHANGE UP (ref 0.8–2.2)
BACTERIA # UR AUTO: ABNORMAL /HPF
BACTERIA # UR AUTO: NEGATIVE /HPF — SIGNIFICANT CHANGE UP
BARBITURATES UR SCN-MCNC: NEGATIVE — SIGNIFICANT CHANGE UP
BASE EXCESS BLDV CALC-SCNC: -3.5 MMOL/L — LOW (ref -2–3)
BASE EXCESS BLDV CALC-SCNC: -3.5 MMOL/L — LOW (ref -2–3)
BASE EXCESS BLDV CALC-SCNC: 6.4 MMOL/L — HIGH (ref -2–3)
BASOPHILS # BLD AUTO: 0 K/UL — SIGNIFICANT CHANGE UP (ref 0–0.2)
BASOPHILS # BLD AUTO: 0.01 K/UL — SIGNIFICANT CHANGE UP (ref 0–0.2)
BASOPHILS # BLD AUTO: 0.02 K/UL — SIGNIFICANT CHANGE UP (ref 0–0.2)
BASOPHILS # BLD AUTO: 0.03 K/UL — SIGNIFICANT CHANGE UP (ref 0–0.2)
BASOPHILS # BLD AUTO: 0.04 K/UL — SIGNIFICANT CHANGE UP (ref 0–0.2)
BASOPHILS NFR BLD AUTO: 0 % — SIGNIFICANT CHANGE UP (ref 0–2)
BASOPHILS NFR BLD AUTO: 0.1 % — SIGNIFICANT CHANGE UP (ref 0–2)
BASOPHILS NFR BLD AUTO: 0.2 % — SIGNIFICANT CHANGE UP (ref 0–2)
BASOPHILS NFR BLD AUTO: 0.3 % — SIGNIFICANT CHANGE UP (ref 0–2)
BASOPHILS NFR BLD AUTO: 0.4 % — SIGNIFICANT CHANGE UP (ref 0–2)
BASOPHILS NFR BLD AUTO: 0.4 % — SIGNIFICANT CHANGE UP (ref 0–2)
BASOPHILS NFR BLD AUTO: 0.5 % — SIGNIFICANT CHANGE UP (ref 0–2)
BASOPHILS NFR BLD AUTO: 0.6 % — SIGNIFICANT CHANGE UP (ref 0–2)
BENZODIAZ UR-MCNC: NEGATIVE — SIGNIFICANT CHANGE UP
BILIRUB DIRECT SERPL-MCNC: 0.2 MG/DL — SIGNIFICANT CHANGE UP (ref 0–0.3)
BILIRUB DIRECT SERPL-MCNC: 0.2 MG/DL — SIGNIFICANT CHANGE UP (ref 0–0.3)
BILIRUB INDIRECT FLD-MCNC: 0.5 MG/DL — SIGNIFICANT CHANGE UP (ref 0.2–1)
BILIRUB INDIRECT FLD-MCNC: 0.7 MG/DL — SIGNIFICANT CHANGE UP (ref 0.2–1)
BILIRUB SERPL-MCNC: 0.3 MG/DL — SIGNIFICANT CHANGE UP (ref 0.2–1.2)
BILIRUB SERPL-MCNC: 0.4 MG/DL — SIGNIFICANT CHANGE UP (ref 0.2–1.2)
BILIRUB SERPL-MCNC: 0.4 MG/DL — SIGNIFICANT CHANGE UP (ref 0.2–1.2)
BILIRUB SERPL-MCNC: 0.6 MG/DL — SIGNIFICANT CHANGE UP (ref 0.2–1.2)
BILIRUB SERPL-MCNC: 0.6 MG/DL — SIGNIFICANT CHANGE UP (ref 0.2–1.2)
BILIRUB SERPL-MCNC: 0.7 MG/DL — SIGNIFICANT CHANGE UP (ref 0.2–1.2)
BILIRUB SERPL-MCNC: 0.7 MG/DL — SIGNIFICANT CHANGE UP (ref 0.2–1.2)
BILIRUB SERPL-MCNC: 0.8 MG/DL — SIGNIFICANT CHANGE UP (ref 0.2–1.2)
BILIRUB SERPL-MCNC: 0.9 MG/DL — SIGNIFICANT CHANGE UP (ref 0.2–1.2)
BILIRUB SERPL-MCNC: 0.9 MG/DL — SIGNIFICANT CHANGE UP (ref 0.2–1.2)
BILIRUB SERPL-MCNC: 1 MG/DL — SIGNIFICANT CHANGE UP (ref 0.2–1.2)
BILIRUB SERPL-MCNC: 1 MG/DL — SIGNIFICANT CHANGE UP (ref 0.2–1.2)
BILIRUB SERPL-MCNC: 1.1 MG/DL — SIGNIFICANT CHANGE UP (ref 0.2–1.2)
BILIRUB SERPL-MCNC: 1.1 MG/DL — SIGNIFICANT CHANGE UP (ref 0.2–1.2)
BILIRUB SERPL-MCNC: 1.2 MG/DL — SIGNIFICANT CHANGE UP (ref 0.2–1.2)
BILIRUB SERPL-MCNC: 1.3 MG/DL — HIGH (ref 0.2–1.2)
BILIRUB UR-MCNC: NEGATIVE — SIGNIFICANT CHANGE UP
BLD GP AB SCN SERPL QL: NEGATIVE — SIGNIFICANT CHANGE UP
BLOOD GAS VENOUS COMPREHENSIVE RESULT: SIGNIFICANT CHANGE UP
BUN SERPL-MCNC: 10 MG/DL — SIGNIFICANT CHANGE UP (ref 7–23)
BUN SERPL-MCNC: 11 MG/DL — SIGNIFICANT CHANGE UP (ref 7–23)
BUN SERPL-MCNC: 12 MG/DL — SIGNIFICANT CHANGE UP (ref 7–23)
BUN SERPL-MCNC: 12 MG/DL — SIGNIFICANT CHANGE UP (ref 7–23)
BUN SERPL-MCNC: 13 MG/DL — SIGNIFICANT CHANGE UP (ref 7–23)
BUN SERPL-MCNC: 13 MG/DL — SIGNIFICANT CHANGE UP (ref 7–23)
BUN SERPL-MCNC: 14 MG/DL — SIGNIFICANT CHANGE UP (ref 7–23)
BUN SERPL-MCNC: 15 MG/DL — SIGNIFICANT CHANGE UP (ref 7–23)
BUN SERPL-MCNC: 16 MG/DL — SIGNIFICANT CHANGE UP (ref 7–23)
BUN SERPL-MCNC: 17 MG/DL — SIGNIFICANT CHANGE UP (ref 7–23)
BUN SERPL-MCNC: 18 MG/DL — SIGNIFICANT CHANGE UP (ref 7–23)
BUN SERPL-MCNC: 20 MG/DL — SIGNIFICANT CHANGE UP (ref 7–23)
BUN SERPL-MCNC: 21 MG/DL — SIGNIFICANT CHANGE UP (ref 7–23)
BUN SERPL-MCNC: 21 MG/DL — SIGNIFICANT CHANGE UP (ref 7–23)
BUN SERPL-MCNC: 22 MG/DL — SIGNIFICANT CHANGE UP (ref 7–23)
BUN SERPL-MCNC: 29 MG/DL — HIGH (ref 7–23)
BUN SERPL-MCNC: 7 MG/DL — SIGNIFICANT CHANGE UP (ref 7–23)
BUN SERPL-MCNC: 8 MG/DL — SIGNIFICANT CHANGE UP (ref 7–23)
BUN SERPL-MCNC: 9 MG/DL — SIGNIFICANT CHANGE UP (ref 7–23)
BUN SERPL-MCNC: 9 MG/DL — SIGNIFICANT CHANGE UP (ref 7–23)
C NEOFORM RRNA SPEC NAA+PROBE-ACNC: SIGNIFICANT CHANGE UP
C PNEUM DNA SPEC QL NAA+PROBE: SIGNIFICANT CHANGE UP
CA-I SERPL-SCNC: 1.11 MMOL/L — LOW (ref 1.15–1.33)
CA-I SERPL-SCNC: 1.12 MMOL/L — LOW (ref 1.15–1.33)
CALCIUM SERPL-MCNC: 10.2 MG/DL — SIGNIFICANT CHANGE UP (ref 8.4–10.5)
CALCIUM SERPL-MCNC: 10.4 MG/DL — SIGNIFICANT CHANGE UP (ref 8.4–10.5)
CALCIUM SERPL-MCNC: 10.6 MG/DL — HIGH (ref 8.4–10.5)
CALCIUM SERPL-MCNC: 11.2 MG/DL — HIGH (ref 8.4–10.5)
CALCIUM SERPL-MCNC: 8 MG/DL — LOW (ref 8.4–10.5)
CALCIUM SERPL-MCNC: 8.2 MG/DL — LOW (ref 8.4–10.5)
CALCIUM SERPL-MCNC: 8.2 MG/DL — LOW (ref 8.4–10.5)
CALCIUM SERPL-MCNC: 8.3 MG/DL — LOW (ref 8.4–10.5)
CALCIUM SERPL-MCNC: 8.4 MG/DL — SIGNIFICANT CHANGE UP (ref 8.4–10.5)
CALCIUM SERPL-MCNC: 8.5 MG/DL — SIGNIFICANT CHANGE UP (ref 8.4–10.5)
CALCIUM SERPL-MCNC: 8.7 MG/DL — SIGNIFICANT CHANGE UP (ref 8.4–10.5)
CALCIUM SERPL-MCNC: 8.9 MG/DL — SIGNIFICANT CHANGE UP (ref 8.4–10.5)
CALCIUM SERPL-MCNC: 9 MG/DL — SIGNIFICANT CHANGE UP (ref 8.4–10.5)
CALCIUM SERPL-MCNC: 9.1 MG/DL — SIGNIFICANT CHANGE UP (ref 8.4–10.5)
CALCIUM SERPL-MCNC: 9.2 MG/DL — SIGNIFICANT CHANGE UP (ref 8.4–10.5)
CALCIUM SERPL-MCNC: 9.3 MG/DL — SIGNIFICANT CHANGE UP (ref 8.4–10.5)
CALCIUM SERPL-MCNC: 9.4 MG/DL — SIGNIFICANT CHANGE UP (ref 8.4–10.5)
CALCIUM SERPL-MCNC: 9.5 MG/DL — SIGNIFICANT CHANGE UP (ref 8.4–10.5)
CALCIUM SERPL-MCNC: 9.6 MG/DL — SIGNIFICANT CHANGE UP (ref 8.4–10.5)
CALCIUM SERPL-MCNC: 9.7 MG/DL — SIGNIFICANT CHANGE UP (ref 8.4–10.5)
CALCIUM SERPL-MCNC: 9.7 MG/DL — SIGNIFICANT CHANGE UP (ref 8.4–10.5)
CALCIUM SERPL-MCNC: 9.8 MG/DL — SIGNIFICANT CHANGE UP (ref 8.4–10.5)
CALCIUM SERPL-MCNC: 9.9 MG/DL — SIGNIFICANT CHANGE UP (ref 8.4–10.5)
CALCIUM SERPL-MCNC: 9.9 MG/DL — SIGNIFICANT CHANGE UP (ref 8.4–10.5)
CAST: 0 /LPF — SIGNIFICANT CHANGE UP (ref 0–4)
CAST: 0 /LPF — SIGNIFICANT CHANGE UP (ref 0–4)
CHLORIDE BLDV-SCNC: 100 MMOL/L — SIGNIFICANT CHANGE UP (ref 96–108)
CHLORIDE BLDV-SCNC: 90 MMOL/L — LOW (ref 96–108)
CHLORIDE BLDV-SCNC: 98 MMOL/L — SIGNIFICANT CHANGE UP (ref 96–108)
CHLORIDE SERPL-SCNC: 101 MMOL/L — SIGNIFICANT CHANGE UP (ref 96–108)
CHLORIDE SERPL-SCNC: 101 MMOL/L — SIGNIFICANT CHANGE UP (ref 96–108)
CHLORIDE SERPL-SCNC: 103 MMOL/L — SIGNIFICANT CHANGE UP (ref 96–108)
CHLORIDE SERPL-SCNC: 104 MMOL/L — SIGNIFICANT CHANGE UP (ref 96–108)
CHLORIDE SERPL-SCNC: 109 MMOL/L — HIGH (ref 96–108)
CHLORIDE SERPL-SCNC: 110 MMOL/L — HIGH (ref 96–108)
CHLORIDE SERPL-SCNC: 112 MMOL/L — HIGH (ref 96–108)
CHLORIDE SERPL-SCNC: 74 MMOL/L — LOW (ref 98–107)
CHLORIDE SERPL-SCNC: 87 MMOL/L — LOW (ref 98–107)
CHLORIDE SERPL-SCNC: 88 MMOL/L — LOW (ref 98–107)
CHLORIDE SERPL-SCNC: 89 MMOL/L — LOW (ref 98–107)
CHLORIDE SERPL-SCNC: 92 MMOL/L — LOW (ref 96–108)
CHLORIDE SERPL-SCNC: 92 MMOL/L — LOW (ref 98–107)
CHLORIDE SERPL-SCNC: 92 MMOL/L — LOW (ref 98–107)
CHLORIDE SERPL-SCNC: 93 MMOL/L — LOW (ref 96–108)
CHLORIDE SERPL-SCNC: 93 MMOL/L — LOW (ref 98–107)
CHLORIDE SERPL-SCNC: 94 MMOL/L — LOW (ref 96–108)
CHLORIDE SERPL-SCNC: 94 MMOL/L — LOW (ref 96–108)
CHLORIDE SERPL-SCNC: 94 MMOL/L — LOW (ref 98–107)
CHLORIDE SERPL-SCNC: 94 MMOL/L — LOW (ref 98–107)
CHLORIDE SERPL-SCNC: 95 MMOL/L — LOW (ref 96–108)
CHLORIDE SERPL-SCNC: 95 MMOL/L — LOW (ref 96–108)
CHLORIDE SERPL-SCNC: 95 MMOL/L — LOW (ref 98–107)
CHLORIDE SERPL-SCNC: 96 MMOL/L — LOW (ref 98–107)
CHLORIDE SERPL-SCNC: 96 MMOL/L — SIGNIFICANT CHANGE UP (ref 96–108)
CHLORIDE SERPL-SCNC: 97 MMOL/L — LOW (ref 98–107)
CHLORIDE SERPL-SCNC: 97 MMOL/L — LOW (ref 98–107)
CHLORIDE SERPL-SCNC: 97 MMOL/L — SIGNIFICANT CHANGE UP (ref 96–108)
CHLORIDE SERPL-SCNC: 98 MMOL/L — SIGNIFICANT CHANGE UP (ref 96–108)
CHLORIDE SERPL-SCNC: 99 MMOL/L — SIGNIFICANT CHANGE UP (ref 96–108)
CHOLEST SERPL-MCNC: 211 MG/DL — HIGH
CK MB BLD-MCNC: 16.2 % — HIGH (ref 0–2.5)
CK MB BLD-MCNC: 17.8 % — HIGH (ref 0–2.5)
CK MB CFR SERPL CALC: 11.9 NG/ML — HIGH
CK MB CFR SERPL CALC: 12.6 NG/ML — HIGH
CK SERPL-CCNC: 67 U/L — SIGNIFICANT CHANGE UP (ref 25–170)
CK SERPL-CCNC: 78 U/L — SIGNIFICANT CHANGE UP (ref 25–170)
CLOSURE TME COLL+EPINEP BLD: 103 K/UL — LOW (ref 150–400)
CLOSURE TME COLL+EPINEP BLD: 120 K/UL — LOW (ref 150–400)
CLOSURE TME COLL+EPINEP BLD: 15 K/UL — CRITICAL LOW (ref 150–400)
CLOSURE TME COLL+EPINEP BLD: 40 K/UL — LOW (ref 150–400)
CLOSURE TME COLL+EPINEP BLD: 96 K/UL — LOW (ref 150–400)
CMV DNA CSF QL NAA+PROBE: SIGNIFICANT CHANGE UP
CO2 BLDV-SCNC: 20 MMOL/L — LOW (ref 22–26)
CO2 BLDV-SCNC: 21 MMOL/L — LOW (ref 22–26)
CO2 BLDV-SCNC: 31.8 MMOL/L — HIGH (ref 22–26)
CO2 SERPL-SCNC: 14 MMOL/L — LOW (ref 22–31)
CO2 SERPL-SCNC: 15 MMOL/L — LOW (ref 22–31)
CO2 SERPL-SCNC: 16 MMOL/L — LOW (ref 22–31)
CO2 SERPL-SCNC: 17 MMOL/L — LOW (ref 22–31)
CO2 SERPL-SCNC: 18 MMOL/L — LOW (ref 22–31)
CO2 SERPL-SCNC: 19 MMOL/L — LOW (ref 22–31)
CO2 SERPL-SCNC: 19 MMOL/L — LOW (ref 22–31)
CO2 SERPL-SCNC: 20 MMOL/L — LOW (ref 22–31)
CO2 SERPL-SCNC: 21 MMOL/L — LOW (ref 22–31)
CO2 SERPL-SCNC: 22 MMOL/L — SIGNIFICANT CHANGE UP (ref 22–31)
CO2 SERPL-SCNC: 23 MMOL/L — SIGNIFICANT CHANGE UP (ref 22–31)
CO2 SERPL-SCNC: 24 MMOL/L — SIGNIFICANT CHANGE UP (ref 22–31)
CO2 SERPL-SCNC: 25 MMOL/L — SIGNIFICANT CHANGE UP (ref 22–31)
CO2 SERPL-SCNC: 25 MMOL/L — SIGNIFICANT CHANGE UP (ref 22–31)
CO2 SERPL-SCNC: 26 MMOL/L — SIGNIFICANT CHANGE UP (ref 22–31)
CO2 SERPL-SCNC: 26 MMOL/L — SIGNIFICANT CHANGE UP (ref 22–31)
CO2 SERPL-SCNC: 27 MMOL/L — SIGNIFICANT CHANGE UP (ref 22–31)
CO2 SERPL-SCNC: 28 MMOL/L — SIGNIFICANT CHANGE UP (ref 22–31)
CO2 SERPL-SCNC: 28 MMOL/L — SIGNIFICANT CHANGE UP (ref 22–31)
CO2 SERPL-SCNC: 29 MMOL/L — SIGNIFICANT CHANGE UP (ref 22–31)
CO2 SERPL-SCNC: 29 MMOL/L — SIGNIFICANT CHANGE UP (ref 22–31)
COCAINE METAB.OTHER UR-MCNC: NEGATIVE — SIGNIFICANT CHANGE UP
COLOR CSF: SIGNIFICANT CHANGE UP
COLOR SPEC: YELLOW — SIGNIFICANT CHANGE UP
CREAT ?TM UR-MCNC: 51 MG/DL — SIGNIFICANT CHANGE UP
CREAT ?TM UR-MCNC: 77 MG/DL — SIGNIFICANT CHANGE UP
CREAT SERPL-MCNC: 0.25 MG/DL — LOW (ref 0.5–1.3)
CREAT SERPL-MCNC: 0.25 MG/DL — LOW (ref 0.5–1.3)
CREAT SERPL-MCNC: 0.26 MG/DL — LOW (ref 0.5–1.3)
CREAT SERPL-MCNC: 0.27 MG/DL — LOW (ref 0.5–1.3)
CREAT SERPL-MCNC: 0.27 MG/DL — LOW (ref 0.5–1.3)
CREAT SERPL-MCNC: 0.31 MG/DL — LOW (ref 0.5–1.3)
CREAT SERPL-MCNC: 0.32 MG/DL — LOW (ref 0.5–1.3)
CREAT SERPL-MCNC: 0.32 MG/DL — LOW (ref 0.5–1.3)
CREAT SERPL-MCNC: 0.33 MG/DL — LOW (ref 0.5–1.3)
CREAT SERPL-MCNC: 0.34 MG/DL — LOW (ref 0.5–1.3)
CREAT SERPL-MCNC: 0.34 MG/DL — LOW (ref 0.5–1.3)
CREAT SERPL-MCNC: 0.35 MG/DL — LOW (ref 0.5–1.3)
CREAT SERPL-MCNC: 0.35 MG/DL — LOW (ref 0.5–1.3)
CREAT SERPL-MCNC: 0.36 MG/DL — LOW (ref 0.5–1.3)
CREAT SERPL-MCNC: 0.37 MG/DL — LOW (ref 0.5–1.3)
CREAT SERPL-MCNC: 0.39 MG/DL — LOW (ref 0.5–1.3)
CREAT SERPL-MCNC: 0.42 MG/DL — LOW (ref 0.5–1.3)
CREAT SERPL-MCNC: 0.44 MG/DL — LOW (ref 0.5–1.3)
CREAT SERPL-MCNC: 0.44 MG/DL — LOW (ref 0.5–1.3)
CREAT SERPL-MCNC: 0.45 MG/DL — LOW (ref 0.5–1.3)
CREAT SERPL-MCNC: 0.45 MG/DL — LOW (ref 0.5–1.3)
CREAT SERPL-MCNC: 0.46 MG/DL — LOW (ref 0.5–1.3)
CREAT SERPL-MCNC: 0.48 MG/DL — LOW (ref 0.5–1.3)
CREAT SERPL-MCNC: 0.48 MG/DL — LOW (ref 0.5–1.3)
CREAT SERPL-MCNC: 0.49 MG/DL — LOW (ref 0.5–1.3)
CREAT SERPL-MCNC: 0.5 MG/DL — SIGNIFICANT CHANGE UP (ref 0.5–1.3)
CREAT SERPL-MCNC: 0.51 MG/DL — SIGNIFICANT CHANGE UP (ref 0.5–1.3)
CREAT SERPL-MCNC: 0.51 MG/DL — SIGNIFICANT CHANGE UP (ref 0.5–1.3)
CREAT SERPL-MCNC: 0.52 MG/DL — SIGNIFICANT CHANGE UP (ref 0.5–1.3)
CREAT SERPL-MCNC: 0.57 MG/DL — SIGNIFICANT CHANGE UP (ref 0.5–1.3)
CREAT SERPL-MCNC: 0.58 MG/DL — SIGNIFICANT CHANGE UP (ref 0.5–1.3)
CREAT SERPL-MCNC: 0.59 MG/DL — SIGNIFICANT CHANGE UP (ref 0.5–1.3)
CREAT SERPL-MCNC: 0.6 MG/DL — SIGNIFICANT CHANGE UP (ref 0.5–1.3)
CREAT SERPL-MCNC: 0.61 MG/DL — SIGNIFICANT CHANGE UP (ref 0.5–1.3)
CREAT SERPL-MCNC: 0.65 MG/DL — SIGNIFICANT CHANGE UP (ref 0.5–1.3)
CREAT SERPL-MCNC: 0.68 MG/DL — SIGNIFICANT CHANGE UP (ref 0.5–1.3)
CREAT SERPL-MCNC: 0.76 MG/DL — SIGNIFICANT CHANGE UP (ref 0.5–1.3)
CREAT SERPL-MCNC: 0.78 MG/DL — SIGNIFICANT CHANGE UP (ref 0.5–1.3)
CREAT SERPL-MCNC: 0.9 MG/DL — SIGNIFICANT CHANGE UP (ref 0.5–1.3)
CRP SERPL-MCNC: 140 MG/L — HIGH
CSF COMMENTS: SIGNIFICANT CHANGE UP
CSF PCR RESULT: SIGNIFICANT CHANGE UP
CULTURE RESULTS: NO GROWTH — SIGNIFICANT CHANGE UP
CULTURE RESULTS: SIGNIFICANT CHANGE UP
D DIMER BLD IA.RAPID-MCNC: 820 NG/ML DDU — HIGH
DIFF PNL FLD: ABNORMAL
DIFF PNL FLD: ABNORMAL
DIFF PNL FLD: NEGATIVE — SIGNIFICANT CHANGE UP
E COLI K1 DNA CSF QL NAA+NON-PROBE: SIGNIFICANT CHANGE UP
EGFR: 101 ML/MIN/1.73M2 — SIGNIFICANT CHANGE UP
EGFR: 102 ML/MIN/1.73M2 — SIGNIFICANT CHANGE UP
EGFR: 104 ML/MIN/1.73M2 — SIGNIFICANT CHANGE UP
EGFR: 105 ML/MIN/1.73M2 — SIGNIFICANT CHANGE UP
EGFR: 105 ML/MIN/1.73M2 — SIGNIFICANT CHANGE UP
EGFR: 108 ML/MIN/1.73M2 — SIGNIFICANT CHANGE UP
EGFR: 109 ML/MIN/1.73M2 — SIGNIFICANT CHANGE UP
EGFR: 110 ML/MIN/1.73M2 — SIGNIFICANT CHANGE UP
EGFR: 110 ML/MIN/1.73M2 — SIGNIFICANT CHANGE UP
EGFR: 111 ML/MIN/1.73M2 — SIGNIFICANT CHANGE UP
EGFR: 112 ML/MIN/1.73M2 — SIGNIFICANT CHANGE UP
EGFR: 113 ML/MIN/1.73M2 — SIGNIFICANT CHANGE UP
EGFR: 115 ML/MIN/1.73M2 — SIGNIFICANT CHANGE UP
EGFR: 117 ML/MIN/1.73M2 — SIGNIFICANT CHANGE UP
EGFR: 117 ML/MIN/1.73M2 — SIGNIFICANT CHANGE UP
EGFR: 118 ML/MIN/1.73M2 — SIGNIFICANT CHANGE UP
EGFR: 119 ML/MIN/1.73M2 — SIGNIFICANT CHANGE UP
EGFR: 120 ML/MIN/1.73M2 — SIGNIFICANT CHANGE UP
EGFR: 120 ML/MIN/1.73M2 — SIGNIFICANT CHANGE UP
EGFR: 121 ML/MIN/1.73M2 — SIGNIFICANT CHANGE UP
EGFR: 125 ML/MIN/1.73M2 — SIGNIFICANT CHANGE UP
EGFR: 125 ML/MIN/1.73M2 — SIGNIFICANT CHANGE UP
EGFR: 126 ML/MIN/1.73M2 — SIGNIFICANT CHANGE UP
EGFR: 128 ML/MIN/1.73M2 — SIGNIFICANT CHANGE UP
EGFR: 128 ML/MIN/1.73M2 — SIGNIFICANT CHANGE UP
EGFR: 74 ML/MIN/1.73M2 — SIGNIFICANT CHANGE UP
EGFR: 88 ML/MIN/1.73M2 — SIGNIFICANT CHANGE UP
EGFR: 91 ML/MIN/1.73M2 — SIGNIFICANT CHANGE UP
EOSINOPHIL # BLD AUTO: 0 K/UL — SIGNIFICANT CHANGE UP (ref 0–0.5)
EOSINOPHIL # BLD AUTO: 0.01 K/UL — SIGNIFICANT CHANGE UP (ref 0–0.5)
EOSINOPHIL # BLD AUTO: 0.03 K/UL — SIGNIFICANT CHANGE UP (ref 0–0.5)
EOSINOPHIL # BLD AUTO: 0.03 K/UL — SIGNIFICANT CHANGE UP (ref 0–0.5)
EOSINOPHIL # BLD AUTO: 0.04 K/UL — SIGNIFICANT CHANGE UP (ref 0–0.5)
EOSINOPHIL # BLD AUTO: 0.07 K/UL — SIGNIFICANT CHANGE UP (ref 0–0.5)
EOSINOPHIL # BLD AUTO: 0.08 K/UL — SIGNIFICANT CHANGE UP (ref 0–0.5)
EOSINOPHIL # BLD AUTO: 0.09 K/UL — SIGNIFICANT CHANGE UP (ref 0–0.5)
EOSINOPHIL # BLD AUTO: 0.1 K/UL — SIGNIFICANT CHANGE UP (ref 0–0.5)
EOSINOPHIL # BLD AUTO: 0.15 K/UL — SIGNIFICANT CHANGE UP (ref 0–0.5)
EOSINOPHIL NFR BLD AUTO: 0 % — SIGNIFICANT CHANGE UP (ref 0–6)
EOSINOPHIL NFR BLD AUTO: 0.1 % — SIGNIFICANT CHANGE UP (ref 0–6)
EOSINOPHIL NFR BLD AUTO: 0.3 % — SIGNIFICANT CHANGE UP (ref 0–6)
EOSINOPHIL NFR BLD AUTO: 0.3 % — SIGNIFICANT CHANGE UP (ref 0–6)
EOSINOPHIL NFR BLD AUTO: 0.6 % — SIGNIFICANT CHANGE UP (ref 0–6)
EOSINOPHIL NFR BLD AUTO: 0.7 % — SIGNIFICANT CHANGE UP (ref 0–6)
EOSINOPHIL NFR BLD AUTO: 0.8 % — SIGNIFICANT CHANGE UP (ref 0–6)
EOSINOPHIL NFR BLD AUTO: 1 % — SIGNIFICANT CHANGE UP (ref 0–6)
EOSINOPHIL NFR BLD AUTO: 2.1 % — SIGNIFICANT CHANGE UP (ref 0–6)
EOSINOPHIL NFR BLD AUTO: 4.4 % — SIGNIFICANT CHANGE UP (ref 0–6)
EPI CELLS # UR: 1 — SIGNIFICANT CHANGE UP
ESCHERICHIA COLI K1: SIGNIFICANT CHANGE UP
ESTIMATED AVERAGE GLUCOSE: 108 MG/DL — SIGNIFICANT CHANGE UP (ref 68–114)
ESTIMATED AVERAGE GLUCOSE: 120 MG/DL — HIGH (ref 68–114)
ETHANOL SERPL-MCNC: <3 MG/DL — SIGNIFICANT CHANGE UP (ref 0–3)
EV RNA CSF QL NAA+PROBE: SIGNIFICANT CHANGE UP
FIBRINOGEN PPP-MCNC: 163 MG/DL — LOW (ref 200–445)
FLUAV AG NPH QL: SIGNIFICANT CHANGE UP
FLUAV AG NPH QL: SIGNIFICANT CHANGE UP
FLUAV H1 2009 PAND RNA SPEC QL NAA+PROBE: SIGNIFICANT CHANGE UP
FLUAV H1 RNA SPEC QL NAA+PROBE: SIGNIFICANT CHANGE UP
FLUAV H3 RNA SPEC QL NAA+PROBE: SIGNIFICANT CHANGE UP
FLUAV SUBTYP SPEC NAA+PROBE: SIGNIFICANT CHANGE UP
FLUBV AG NPH QL: SIGNIFICANT CHANGE UP
FLUBV AG NPH QL: SIGNIFICANT CHANGE UP
FLUBV RNA SPEC QL NAA+PROBE: SIGNIFICANT CHANGE UP
FOLATE SERPL-MCNC: >20 NG/ML — HIGH (ref 3.1–17.5)
GAS PNL BLDA: SIGNIFICANT CHANGE UP
GAS PNL BLDV: 132 MMOL/L — LOW (ref 136–145)
GAS PNL BLDV: 132 MMOL/L — LOW (ref 136–145)
GAS PNL BLDV: 136 MMOL/L — SIGNIFICANT CHANGE UP (ref 136–145)
GAS PNL BLDV: SIGNIFICANT CHANGE UP
GLUCOSE BLDC GLUCOMTR-MCNC: 105 MG/DL — HIGH (ref 70–99)
GLUCOSE BLDC GLUCOMTR-MCNC: 105 MG/DL — HIGH (ref 70–99)
GLUCOSE BLDC GLUCOMTR-MCNC: 108 MG/DL — HIGH (ref 70–99)
GLUCOSE BLDC GLUCOMTR-MCNC: 108 MG/DL — HIGH (ref 70–99)
GLUCOSE BLDC GLUCOMTR-MCNC: 110 MG/DL — HIGH (ref 70–99)
GLUCOSE BLDC GLUCOMTR-MCNC: 116 MG/DL — HIGH (ref 70–99)
GLUCOSE BLDC GLUCOMTR-MCNC: 120 MG/DL — HIGH (ref 70–99)
GLUCOSE BLDC GLUCOMTR-MCNC: 124 MG/DL — HIGH (ref 70–99)
GLUCOSE BLDC GLUCOMTR-MCNC: 125 MG/DL — HIGH (ref 70–99)
GLUCOSE BLDC GLUCOMTR-MCNC: 130 MG/DL — HIGH (ref 70–99)
GLUCOSE BLDC GLUCOMTR-MCNC: 138 MG/DL — HIGH (ref 70–99)
GLUCOSE BLDC GLUCOMTR-MCNC: 144 MG/DL — HIGH (ref 70–99)
GLUCOSE BLDC GLUCOMTR-MCNC: 151 MG/DL — HIGH (ref 70–99)
GLUCOSE BLDC GLUCOMTR-MCNC: 155 MG/DL — HIGH (ref 70–99)
GLUCOSE BLDC GLUCOMTR-MCNC: 162 MG/DL — HIGH (ref 70–99)
GLUCOSE BLDC GLUCOMTR-MCNC: 170 MG/DL — HIGH (ref 70–99)
GLUCOSE BLDC GLUCOMTR-MCNC: 93 MG/DL — SIGNIFICANT CHANGE UP (ref 70–99)
GLUCOSE BLDC GLUCOMTR-MCNC: 95 MG/DL — SIGNIFICANT CHANGE UP (ref 70–99)
GLUCOSE BLDV-MCNC: 194 MG/DL — HIGH (ref 70–99)
GLUCOSE BLDV-MCNC: 244 MG/DL — HIGH (ref 70–99)
GLUCOSE BLDV-MCNC: 291 MG/DL — HIGH (ref 70–99)
GLUCOSE CSF-MCNC: 72 MG/DL — HIGH (ref 40–70)
GLUCOSE SERPL-MCNC: 100 MG/DL — HIGH (ref 70–99)
GLUCOSE SERPL-MCNC: 101 MG/DL — HIGH (ref 70–99)
GLUCOSE SERPL-MCNC: 103 MG/DL — HIGH (ref 70–99)
GLUCOSE SERPL-MCNC: 103 MG/DL — HIGH (ref 70–99)
GLUCOSE SERPL-MCNC: 107 MG/DL — HIGH (ref 70–99)
GLUCOSE SERPL-MCNC: 107 MG/DL — HIGH (ref 70–99)
GLUCOSE SERPL-MCNC: 109 MG/DL — HIGH (ref 70–99)
GLUCOSE SERPL-MCNC: 110 MG/DL — HIGH (ref 70–99)
GLUCOSE SERPL-MCNC: 110 MG/DL — HIGH (ref 70–99)
GLUCOSE SERPL-MCNC: 112 MG/DL — HIGH (ref 70–99)
GLUCOSE SERPL-MCNC: 112 MG/DL — HIGH (ref 70–99)
GLUCOSE SERPL-MCNC: 113 MG/DL — HIGH (ref 70–99)
GLUCOSE SERPL-MCNC: 113 MG/DL — HIGH (ref 70–99)
GLUCOSE SERPL-MCNC: 114 MG/DL — HIGH (ref 70–99)
GLUCOSE SERPL-MCNC: 115 MG/DL — HIGH (ref 70–99)
GLUCOSE SERPL-MCNC: 116 MG/DL — HIGH (ref 70–99)
GLUCOSE SERPL-MCNC: 116 MG/DL — HIGH (ref 70–99)
GLUCOSE SERPL-MCNC: 117 MG/DL — HIGH (ref 70–99)
GLUCOSE SERPL-MCNC: 118 MG/DL — HIGH (ref 70–99)
GLUCOSE SERPL-MCNC: 120 MG/DL — HIGH (ref 70–99)
GLUCOSE SERPL-MCNC: 122 MG/DL — HIGH (ref 70–99)
GLUCOSE SERPL-MCNC: 124 MG/DL — HIGH (ref 70–99)
GLUCOSE SERPL-MCNC: 126 MG/DL — HIGH (ref 70–99)
GLUCOSE SERPL-MCNC: 128 MG/DL — HIGH (ref 70–99)
GLUCOSE SERPL-MCNC: 129 MG/DL — HIGH (ref 70–99)
GLUCOSE SERPL-MCNC: 129 MG/DL — HIGH (ref 70–99)
GLUCOSE SERPL-MCNC: 136 MG/DL — HIGH (ref 70–99)
GLUCOSE SERPL-MCNC: 138 MG/DL — HIGH (ref 70–99)
GLUCOSE SERPL-MCNC: 138 MG/DL — HIGH (ref 70–99)
GLUCOSE SERPL-MCNC: 144 MG/DL — HIGH (ref 70–99)
GLUCOSE SERPL-MCNC: 148 MG/DL — HIGH (ref 70–99)
GLUCOSE SERPL-MCNC: 159 MG/DL — HIGH (ref 70–99)
GLUCOSE SERPL-MCNC: 161 MG/DL — HIGH (ref 70–99)
GLUCOSE SERPL-MCNC: 170 MG/DL — HIGH (ref 70–99)
GLUCOSE SERPL-MCNC: 179 MG/DL — HIGH (ref 70–99)
GLUCOSE SERPL-MCNC: 185 MG/DL — HIGH (ref 70–99)
GLUCOSE SERPL-MCNC: 189 MG/DL — HIGH (ref 70–99)
GLUCOSE SERPL-MCNC: 209 MG/DL — HIGH (ref 70–99)
GLUCOSE SERPL-MCNC: 238 MG/DL — HIGH (ref 70–99)
GLUCOSE SERPL-MCNC: 264 MG/DL — HIGH (ref 70–99)
GLUCOSE SERPL-MCNC: 270 MG/DL — HIGH (ref 70–99)
GLUCOSE SERPL-MCNC: 733 MG/DL — CRITICAL HIGH (ref 70–99)
GLUCOSE SERPL-MCNC: 85 MG/DL — SIGNIFICANT CHANGE UP (ref 70–99)
GLUCOSE SERPL-MCNC: 87 MG/DL — SIGNIFICANT CHANGE UP (ref 70–99)
GLUCOSE SERPL-MCNC: 89 MG/DL — SIGNIFICANT CHANGE UP (ref 70–99)
GLUCOSE SERPL-MCNC: 95 MG/DL — SIGNIFICANT CHANGE UP (ref 70–99)
GLUCOSE SERPL-MCNC: 98 MG/DL — SIGNIFICANT CHANGE UP (ref 70–99)
GLUCOSE UR QL: 100 MG/DL
GLUCOSE UR QL: 500 MG/DL
GLUCOSE UR QL: NEGATIVE MG/DL — SIGNIFICANT CHANGE UP
GP B STREP DNA SPEC QL NAA+PROBE: SIGNIFICANT CHANGE UP
GRAM STN FLD: SIGNIFICANT CHANGE UP
GRAM STN FLD: SIGNIFICANT CHANGE UP
HADV DNA SPEC QL NAA+PROBE: SIGNIFICANT CHANGE UP
HAEM INFLU DNA SPEC QL NAA+PROBE: SIGNIFICANT CHANGE UP
HAPTOGLOB SERPL-MCNC: 198 MG/DL — SIGNIFICANT CHANGE UP (ref 34–200)
HCG SERPL-ACNC: 1.2 MIU/ML — SIGNIFICANT CHANGE UP
HCO3 BLDV-SCNC: 19 MMOL/L — LOW (ref 22–29)
HCO3 BLDV-SCNC: 20 MMOL/L — LOW (ref 22–29)
HCO3 BLDV-SCNC: 30 MMOL/L — HIGH (ref 22–29)
HCOV PNL SPEC NAA+PROBE: SIGNIFICANT CHANGE UP
HCT VFR BLD CALC: 22.1 % — LOW (ref 34.5–45)
HCT VFR BLD CALC: 26.2 % — LOW (ref 34.5–45)
HCT VFR BLD CALC: 26.4 % — LOW (ref 34.5–45)
HCT VFR BLD CALC: 27.8 % — LOW (ref 34.5–45)
HCT VFR BLD CALC: 29.2 % — LOW (ref 34.5–45)
HCT VFR BLD CALC: 29.2 % — LOW (ref 34.5–45)
HCT VFR BLD CALC: 29.3 % — LOW (ref 34.5–45)
HCT VFR BLD CALC: 29.7 % — LOW (ref 34.5–45)
HCT VFR BLD CALC: 30 % — LOW (ref 34.5–45)
HCT VFR BLD CALC: 30.3 % — LOW (ref 34.5–45)
HCT VFR BLD CALC: 31.1 % — LOW (ref 34.5–45)
HCT VFR BLD CALC: 31.1 % — LOW (ref 34.5–45)
HCT VFR BLD CALC: 31.8 % — LOW (ref 34.5–45)
HCT VFR BLD CALC: 32.1 % — LOW (ref 34.5–45)
HCT VFR BLD CALC: 32.5 % — LOW (ref 34.5–45)
HCT VFR BLD CALC: 33.4 % — LOW (ref 34.5–45)
HCT VFR BLD CALC: 33.6 % — LOW (ref 34.5–45)
HCT VFR BLD CALC: 33.7 % — LOW (ref 34.5–45)
HCT VFR BLD CALC: 33.8 % — LOW (ref 34.5–45)
HCT VFR BLD CALC: 34 % — LOW (ref 34.5–45)
HCT VFR BLD CALC: 34 % — LOW (ref 34.5–45)
HCT VFR BLD CALC: 36.1 % — SIGNIFICANT CHANGE UP (ref 34.5–45)
HCT VFR BLD CALC: 36.1 % — SIGNIFICANT CHANGE UP (ref 34.5–45)
HCT VFR BLD CALC: 36.3 % — SIGNIFICANT CHANGE UP (ref 34.5–45)
HCT VFR BLD CALC: 36.4 % — SIGNIFICANT CHANGE UP (ref 34.5–45)
HCT VFR BLD CALC: 36.9 % — SIGNIFICANT CHANGE UP (ref 34.5–45)
HCT VFR BLD CALC: 36.9 % — SIGNIFICANT CHANGE UP (ref 34.5–45)
HCT VFR BLD CALC: 37.6 % — SIGNIFICANT CHANGE UP (ref 34.5–45)
HCT VFR BLD CALC: 37.9 % — SIGNIFICANT CHANGE UP (ref 34.5–45)
HCT VFR BLD CALC: 38.8 % — SIGNIFICANT CHANGE UP (ref 34.5–45)
HCT VFR BLD CALC: 38.9 % — SIGNIFICANT CHANGE UP (ref 34.5–45)
HCT VFR BLD CALC: 38.9 % — SIGNIFICANT CHANGE UP (ref 34.5–45)
HCT VFR BLD CALC: 39.3 % — SIGNIFICANT CHANGE UP (ref 34.5–45)
HCT VFR BLD CALC: 39.5 % — SIGNIFICANT CHANGE UP (ref 34.5–45)
HCT VFR BLD CALC: 39.7 % — SIGNIFICANT CHANGE UP (ref 34.5–45)
HCT VFR BLD CALC: 47.3 % — HIGH (ref 34.5–45)
HCT VFR BLDA CALC: 34 % — LOW (ref 34.5–46.5)
HCT VFR BLDA CALC: 35 % — SIGNIFICANT CHANGE UP (ref 34.5–46.5)
HCT VFR BLDA CALC: 40 % — SIGNIFICANT CHANGE UP (ref 34.5–46.5)
HCV AB S/CO SERPL IA: 0.11 S/CO — SIGNIFICANT CHANGE UP (ref 0–0.99)
HCV AB SERPL-IMP: SIGNIFICANT CHANGE UP
HDLC SERPL-MCNC: 52 MG/DL — SIGNIFICANT CHANGE UP
HGB BLD CALC-MCNC: 11.4 G/DL — LOW (ref 11.7–16.1)
HGB BLD CALC-MCNC: 11.6 G/DL — LOW (ref 11.7–16.1)
HGB BLD CALC-MCNC: 13.3 G/DL — SIGNIFICANT CHANGE UP (ref 11.7–16.1)
HGB BLD-MCNC: 10.1 G/DL — LOW (ref 11.5–15.5)
HGB BLD-MCNC: 10.2 G/DL — LOW (ref 11.5–15.5)
HGB BLD-MCNC: 10.5 G/DL — LOW (ref 11.5–15.5)
HGB BLD-MCNC: 10.6 G/DL — LOW (ref 11.5–15.5)
HGB BLD-MCNC: 10.7 G/DL — LOW (ref 11.5–15.5)
HGB BLD-MCNC: 10.8 G/DL — LOW (ref 11.5–15.5)
HGB BLD-MCNC: 10.9 G/DL — LOW (ref 11.5–15.5)
HGB BLD-MCNC: 11.1 G/DL — LOW (ref 11.5–15.5)
HGB BLD-MCNC: 11.3 G/DL — LOW (ref 11.5–15.5)
HGB BLD-MCNC: 11.4 G/DL — LOW (ref 11.5–15.5)
HGB BLD-MCNC: 11.5 G/DL — SIGNIFICANT CHANGE UP (ref 11.5–15.5)
HGB BLD-MCNC: 11.6 G/DL — SIGNIFICANT CHANGE UP (ref 11.5–15.5)
HGB BLD-MCNC: 12.2 G/DL — SIGNIFICANT CHANGE UP (ref 11.5–15.5)
HGB BLD-MCNC: 12.4 G/DL — SIGNIFICANT CHANGE UP (ref 11.5–15.5)
HGB BLD-MCNC: 12.4 G/DL — SIGNIFICANT CHANGE UP (ref 11.5–15.5)
HGB BLD-MCNC: 12.8 G/DL — SIGNIFICANT CHANGE UP (ref 11.5–15.5)
HGB BLD-MCNC: 13 G/DL — SIGNIFICANT CHANGE UP (ref 11.5–15.5)
HGB BLD-MCNC: 13.1 G/DL — SIGNIFICANT CHANGE UP (ref 11.5–15.5)
HGB BLD-MCNC: 13.1 G/DL — SIGNIFICANT CHANGE UP (ref 11.5–15.5)
HGB BLD-MCNC: 13.2 G/DL — SIGNIFICANT CHANGE UP (ref 11.5–15.5)
HGB BLD-MCNC: 13.2 G/DL — SIGNIFICANT CHANGE UP (ref 11.5–15.5)
HGB BLD-MCNC: 13.3 G/DL — SIGNIFICANT CHANGE UP (ref 11.5–15.5)
HGB BLD-MCNC: 13.3 G/DL — SIGNIFICANT CHANGE UP (ref 11.5–15.5)
HGB BLD-MCNC: 13.4 G/DL — SIGNIFICANT CHANGE UP (ref 11.5–15.5)
HGB BLD-MCNC: 13.6 G/DL — SIGNIFICANT CHANGE UP (ref 11.5–15.5)
HGB BLD-MCNC: 13.9 G/DL — SIGNIFICANT CHANGE UP (ref 11.5–15.5)
HGB BLD-MCNC: 15.5 G/DL — SIGNIFICANT CHANGE UP (ref 11.5–15.5)
HGB BLD-MCNC: 6.9 G/DL — CRITICAL LOW (ref 11.5–15.5)
HGB BLD-MCNC: 8.3 G/DL — LOW (ref 11.5–15.5)
HGB BLD-MCNC: 8.6 G/DL — LOW (ref 11.5–15.5)
HGB BLD-MCNC: 8.9 G/DL — LOW (ref 11.5–15.5)
HGB BLD-MCNC: 9.2 G/DL — LOW (ref 11.5–15.5)
HGB BLD-MCNC: 9.4 G/DL — LOW (ref 11.5–15.5)
HGB BLD-MCNC: 9.4 G/DL — LOW (ref 11.5–15.5)
HGB BLD-MCNC: 9.5 G/DL — LOW (ref 11.5–15.5)
HGB BLD-MCNC: 9.6 G/DL — LOW (ref 11.5–15.5)
HGB BLD-MCNC: 9.8 G/DL — LOW (ref 11.5–15.5)
HGB BLD-MCNC: 9.8 G/DL — LOW (ref 11.5–15.5)
HHV6 DNA CSF QL NAA+PROBE: SIGNIFICANT CHANGE UP
HMPV RNA SPEC QL NAA+PROBE: SIGNIFICANT CHANGE UP
HPIV1 RNA SPEC QL NAA+PROBE: SIGNIFICANT CHANGE UP
HPIV2 RNA SPEC QL NAA+PROBE: SIGNIFICANT CHANGE UP
HPIV3 RNA SPEC QL NAA+PROBE: SIGNIFICANT CHANGE UP
HPIV4 RNA SPEC QL NAA+PROBE: SIGNIFICANT CHANGE UP
HSV1 DNA CSF QL NAA+PROBE: SIGNIFICANT CHANGE UP
HSV2 DNA CSF QL NAA+PROBE: SIGNIFICANT CHANGE UP
HYPOCHROMIA BLD QL: SLIGHT — SIGNIFICANT CHANGE UP
IANC: 10.12 K/UL — HIGH (ref 1.8–7.4)
IANC: 11.26 K/UL — HIGH (ref 1.8–7.4)
IANC: 7.06 K/UL — SIGNIFICANT CHANGE UP (ref 1.8–7.4)
IANC: 8.4 K/UL — HIGH (ref 1.8–7.4)
IANC: 8.57 K/UL — HIGH (ref 1.8–7.4)
IANC: 8.9 K/UL — HIGH (ref 1.8–7.4)
IANC: 9.14 K/UL — HIGH (ref 1.8–7.4)
IMM GRANULOCYTES NFR BLD AUTO: 0.3 % — SIGNIFICANT CHANGE UP (ref 0–0.9)
IMM GRANULOCYTES NFR BLD AUTO: 0.5 % — SIGNIFICANT CHANGE UP (ref 0–0.9)
IMM GRANULOCYTES NFR BLD AUTO: 0.6 % — SIGNIFICANT CHANGE UP (ref 0–0.9)
IMM GRANULOCYTES NFR BLD AUTO: 0.7 % — SIGNIFICANT CHANGE UP (ref 0–0.9)
IMM GRANULOCYTES NFR BLD AUTO: 0.7 % — SIGNIFICANT CHANGE UP (ref 0–0.9)
IMM GRANULOCYTES NFR BLD AUTO: 0.9 % — SIGNIFICANT CHANGE UP (ref 0–0.9)
IMM GRANULOCYTES NFR BLD AUTO: 1 % — HIGH (ref 0–0.9)
IMM GRANULOCYTES NFR BLD AUTO: 1.1 % — HIGH (ref 0–0.9)
IMM GRANULOCYTES NFR BLD AUTO: 1.4 % — HIGH (ref 0–0.9)
IMM GRANULOCYTES NFR BLD AUTO: 1.4 % — HIGH (ref 0–0.9)
IMM GRANULOCYTES NFR BLD AUTO: 1.6 % — HIGH (ref 0–0.9)
INR BLD: 1.01 RATIO — SIGNIFICANT CHANGE UP (ref 0.85–1.18)
INR BLD: 1.02 RATIO — SIGNIFICANT CHANGE UP (ref 0.85–1.18)
INR BLD: 1.03 RATIO — SIGNIFICANT CHANGE UP (ref 0.85–1.18)
INR BLD: 1.04 RATIO — SIGNIFICANT CHANGE UP (ref 0.85–1.18)
INR BLD: 1.12 RATIO — SIGNIFICANT CHANGE UP (ref 0.85–1.18)
INR BLD: 1.13 RATIO — SIGNIFICANT CHANGE UP (ref 0.85–1.18)
INR BLD: 1.17 RATIO — SIGNIFICANT CHANGE UP (ref 0.85–1.18)
INR BLD: 1.4 RATIO — HIGH (ref 0.85–1.18)
INR BLD: 1.42 RATIO — HIGH (ref 0.85–1.18)
INR BLD: 1.45 RATIO — HIGH (ref 0.85–1.18)
KETONES UR-MCNC: 40 MG/DL
KETONES UR-MCNC: 40 MG/DL
KETONES UR-MCNC: NEGATIVE MG/DL — SIGNIFICANT CHANGE UP
L MONOCYTOG DNA SPEC QL NAA+PROBE: SIGNIFICANT CHANGE UP
LACTATE BLDV-MCNC: 1.2 MMOL/L — SIGNIFICANT CHANGE UP (ref 0.5–2)
LACTATE BLDV-MCNC: 2.1 MMOL/L — HIGH (ref 0.5–2)
LACTATE BLDV-MCNC: 4.8 MMOL/L — CRITICAL HIGH (ref 0.5–2)
LACTATE BLDV-MCNC: 5.7 MMOL/L — CRITICAL HIGH (ref 0.5–2)
LACTATE CSF-MCNC: 4 MMOL/L — HIGH (ref 1.1–2.4)
LACTATE SERPL-SCNC: 1.3 MMOL/L — SIGNIFICANT CHANGE UP (ref 0.7–2)
LACTATE SERPL-SCNC: 2.9 MMOL/L — HIGH (ref 0.5–2)
LACTATE SERPL-SCNC: 3.7 MMOL/L — HIGH (ref 0.5–2)
LACTATE SERPL-SCNC: 5.1 MMOL/L — CRITICAL HIGH (ref 0.7–2)
LDH CSF L TO P-CCNC: 93 U/L — SIGNIFICANT CHANGE UP
LDH FLD-CCNC: 93 U/L — SIGNIFICANT CHANGE UP
LDH SERPL L TO P-CCNC: 187 U/L — SIGNIFICANT CHANGE UP (ref 50–242)
LDH SERPL L TO P-CCNC: 346 U/L — HIGH (ref 50–242)
LEUKOCYTE ESTERASE UR-ACNC: NEGATIVE — SIGNIFICANT CHANGE UP
LIDOCAIN IGE QN: 27 U/L — SIGNIFICANT CHANGE UP (ref 16–77)
LIDOCAIN IGE QN: 42 U/L — SIGNIFICANT CHANGE UP (ref 7–60)
LIPID PNL WITH DIRECT LDL SERPL: 137 MG/DL — HIGH
LYMPHOCYTES # BLD AUTO: 0.23 K/UL — LOW (ref 1–3.3)
LYMPHOCYTES # BLD AUTO: 0.36 K/UL — LOW (ref 1–3.3)
LYMPHOCYTES # BLD AUTO: 0.47 K/UL — LOW (ref 1–3.3)
LYMPHOCYTES # BLD AUTO: 0.5 K/UL — LOW (ref 1–3.3)
LYMPHOCYTES # BLD AUTO: 0.53 K/UL — LOW (ref 1–3.3)
LYMPHOCYTES # BLD AUTO: 0.58 K/UL — LOW (ref 1–3.3)
LYMPHOCYTES # BLD AUTO: 0.59 K/UL — LOW (ref 1–3.3)
LYMPHOCYTES # BLD AUTO: 0.61 K/UL — LOW (ref 1–3.3)
LYMPHOCYTES # BLD AUTO: 0.68 K/UL — LOW (ref 1–3.3)
LYMPHOCYTES # BLD AUTO: 0.69 K/UL — LOW (ref 1–3.3)
LYMPHOCYTES # BLD AUTO: 0.72 K/UL — LOW (ref 1–3.3)
LYMPHOCYTES # BLD AUTO: 0.76 K/UL — LOW (ref 1–3.3)
LYMPHOCYTES # BLD AUTO: 0.83 K/UL — LOW (ref 1–3.3)
LYMPHOCYTES # BLD AUTO: 0.85 K/UL — LOW (ref 1–3.3)
LYMPHOCYTES # BLD AUTO: 0.91 K/UL — LOW (ref 1–3.3)
LYMPHOCYTES # BLD AUTO: 0.97 K/UL — LOW (ref 1–3.3)
LYMPHOCYTES # BLD AUTO: 0.99 K/UL — LOW (ref 1–3.3)
LYMPHOCYTES # BLD AUTO: 1.04 K/UL — SIGNIFICANT CHANGE UP (ref 1–3.3)
LYMPHOCYTES # BLD AUTO: 1.11 K/UL — SIGNIFICANT CHANGE UP (ref 1–3.3)
LYMPHOCYTES # BLD AUTO: 1.16 K/UL — SIGNIFICANT CHANGE UP (ref 1–3.3)
LYMPHOCYTES # BLD AUTO: 10 % — LOW (ref 13–44)
LYMPHOCYTES # BLD AUTO: 10.6 % — LOW (ref 13–44)
LYMPHOCYTES # BLD AUTO: 11.8 % — LOW (ref 13–44)
LYMPHOCYTES # BLD AUTO: 20.1 % — SIGNIFICANT CHANGE UP (ref 13–44)
LYMPHOCYTES # BLD AUTO: 22.2 % — SIGNIFICANT CHANGE UP (ref 13–44)
LYMPHOCYTES # BLD AUTO: 26.5 % — SIGNIFICANT CHANGE UP (ref 13–44)
LYMPHOCYTES # BLD AUTO: 3 % — LOW (ref 13–44)
LYMPHOCYTES # BLD AUTO: 5 % — LOW (ref 13–44)
LYMPHOCYTES # BLD AUTO: 5.1 % — LOW (ref 13–44)
LYMPHOCYTES # BLD AUTO: 5.8 % — LOW (ref 13–44)
LYMPHOCYTES # BLD AUTO: 6 % — LOW (ref 13–44)
LYMPHOCYTES # BLD AUTO: 6 % — LOW (ref 13–44)
LYMPHOCYTES # BLD AUTO: 6.6 % — LOW (ref 13–44)
LYMPHOCYTES # BLD AUTO: 6.9 % — LOW (ref 13–44)
LYMPHOCYTES # BLD AUTO: 7 % — LOW (ref 13–44)
LYMPHOCYTES # BLD AUTO: 7 % — LOW (ref 13–44)
LYMPHOCYTES # BLD AUTO: 7.3 % — LOW (ref 13–44)
LYMPHOCYTES # BLD AUTO: 7.8 % — LOW (ref 13–44)
LYMPHOCYTES # BLD AUTO: 8.2 % — LOW (ref 13–44)
LYMPHOCYTES # BLD AUTO: 8.3 % — LOW (ref 13–44)
LYMPHOCYTES # BLD AUTO: 8.6 % — LOW (ref 13–44)
LYMPHOCYTES # BLD AUTO: 9.7 % — LOW (ref 13–44)
LYMPHOCYTES # CSF: 82 % — HIGH (ref 40–80)
MACROCYTES BLD QL: SLIGHT — SIGNIFICANT CHANGE UP
MACROCYTES BLD QL: SLIGHT — SIGNIFICANT CHANGE UP
MAGNESIUM SERPL-MCNC: 1.2 MG/DL — LOW (ref 1.6–2.6)
MAGNESIUM SERPL-MCNC: 1.3 MG/DL — LOW (ref 1.6–2.6)
MAGNESIUM SERPL-MCNC: 1.5 MG/DL — LOW (ref 1.6–2.6)
MAGNESIUM SERPL-MCNC: 1.6 MG/DL — SIGNIFICANT CHANGE UP (ref 1.6–2.6)
MAGNESIUM SERPL-MCNC: 1.7 MG/DL — SIGNIFICANT CHANGE UP (ref 1.6–2.6)
MAGNESIUM SERPL-MCNC: 1.8 MG/DL — SIGNIFICANT CHANGE UP (ref 1.6–2.6)
MAGNESIUM SERPL-MCNC: 1.9 MG/DL — SIGNIFICANT CHANGE UP (ref 1.6–2.6)
MAGNESIUM SERPL-MCNC: 2 MG/DL — SIGNIFICANT CHANGE UP (ref 1.6–2.6)
MAGNESIUM SERPL-MCNC: 2.1 MG/DL — SIGNIFICANT CHANGE UP (ref 1.6–2.6)
MAGNESIUM SERPL-MCNC: 2.2 MG/DL — SIGNIFICANT CHANGE UP (ref 1.6–2.6)
MAGNESIUM SERPL-MCNC: 2.2 MG/DL — SIGNIFICANT CHANGE UP (ref 1.6–2.6)
MAGNESIUM SERPL-MCNC: 2.3 MG/DL — SIGNIFICANT CHANGE UP (ref 1.6–2.6)
MAGNESIUM SERPL-MCNC: 2.4 MG/DL — SIGNIFICANT CHANGE UP (ref 1.6–2.6)
MAGNESIUM SERPL-MCNC: 3.4 MG/DL — HIGH (ref 1.6–2.6)
MANUAL SMEAR VERIFICATION: SIGNIFICANT CHANGE UP
MCHC RBC-ENTMCNC: 25 PG — LOW (ref 27–34)
MCHC RBC-ENTMCNC: 25 PG — LOW (ref 27–34)
MCHC RBC-ENTMCNC: 25.2 PG — LOW (ref 27–34)
MCHC RBC-ENTMCNC: 25.2 PG — LOW (ref 27–34)
MCHC RBC-ENTMCNC: 25.3 PG — LOW (ref 27–34)
MCHC RBC-ENTMCNC: 25.3 PG — LOW (ref 27–34)
MCHC RBC-ENTMCNC: 25.4 PG — LOW (ref 27–34)
MCHC RBC-ENTMCNC: 25.6 PG — LOW (ref 27–34)
MCHC RBC-ENTMCNC: 26.1 PG — LOW (ref 27–34)
MCHC RBC-ENTMCNC: 26.3 PG — LOW (ref 27–34)
MCHC RBC-ENTMCNC: 26.3 PG — LOW (ref 27–34)
MCHC RBC-ENTMCNC: 26.4 PG — LOW (ref 27–34)
MCHC RBC-ENTMCNC: 28 PG — SIGNIFICANT CHANGE UP (ref 27–34)
MCHC RBC-ENTMCNC: 28.7 PG — SIGNIFICANT CHANGE UP (ref 27–34)
MCHC RBC-ENTMCNC: 28.9 PG — SIGNIFICANT CHANGE UP (ref 27–34)
MCHC RBC-ENTMCNC: 28.9 PG — SIGNIFICANT CHANGE UP (ref 27–34)
MCHC RBC-ENTMCNC: 29 PG — SIGNIFICANT CHANGE UP (ref 27–34)
MCHC RBC-ENTMCNC: 29.2 PG — SIGNIFICANT CHANGE UP (ref 27–34)
MCHC RBC-ENTMCNC: 29.4 PG — SIGNIFICANT CHANGE UP (ref 27–34)
MCHC RBC-ENTMCNC: 29.4 PG — SIGNIFICANT CHANGE UP (ref 27–34)
MCHC RBC-ENTMCNC: 29.8 PG — SIGNIFICANT CHANGE UP (ref 27–34)
MCHC RBC-ENTMCNC: 29.8 PG — SIGNIFICANT CHANGE UP (ref 27–34)
MCHC RBC-ENTMCNC: 30.5 GM/DL — LOW (ref 32–36)
MCHC RBC-ENTMCNC: 30.8 PG — SIGNIFICANT CHANGE UP (ref 27–34)
MCHC RBC-ENTMCNC: 31.1 GM/DL — LOW (ref 32–36)
MCHC RBC-ENTMCNC: 31.2 G/DL — LOW (ref 32–36)
MCHC RBC-ENTMCNC: 31.4 G/DL — LOW (ref 32–36)
MCHC RBC-ENTMCNC: 31.5 GM/DL — LOW (ref 32–36)
MCHC RBC-ENTMCNC: 31.6 GM/DL — LOW (ref 32–36)
MCHC RBC-ENTMCNC: 31.6 PG — SIGNIFICANT CHANGE UP (ref 27–34)
MCHC RBC-ENTMCNC: 31.6 PG — SIGNIFICANT CHANGE UP (ref 27–34)
MCHC RBC-ENTMCNC: 31.7 GM/DL — LOW (ref 32–36)
MCHC RBC-ENTMCNC: 31.8 PG — SIGNIFICANT CHANGE UP (ref 27–34)
MCHC RBC-ENTMCNC: 31.9 PG — SIGNIFICANT CHANGE UP (ref 27–34)
MCHC RBC-ENTMCNC: 31.9 PG — SIGNIFICANT CHANGE UP (ref 27–34)
MCHC RBC-ENTMCNC: 32 G/DL — SIGNIFICANT CHANGE UP (ref 32–36)
MCHC RBC-ENTMCNC: 32 GM/DL — SIGNIFICANT CHANGE UP (ref 32–36)
MCHC RBC-ENTMCNC: 32 GM/DL — SIGNIFICANT CHANGE UP (ref 32–36)
MCHC RBC-ENTMCNC: 32.1 PG — SIGNIFICANT CHANGE UP (ref 27–34)
MCHC RBC-ENTMCNC: 32.2 GM/DL — SIGNIFICANT CHANGE UP (ref 32–36)
MCHC RBC-ENTMCNC: 32.2 PG — SIGNIFICANT CHANGE UP (ref 27–34)
MCHC RBC-ENTMCNC: 32.3 PG — SIGNIFICANT CHANGE UP (ref 27–34)
MCHC RBC-ENTMCNC: 32.6 PG — SIGNIFICANT CHANGE UP (ref 27–34)
MCHC RBC-ENTMCNC: 32.7 PG — SIGNIFICANT CHANGE UP (ref 27–34)
MCHC RBC-ENTMCNC: 32.8 GM/DL — SIGNIFICANT CHANGE UP (ref 32–36)
MCHC RBC-ENTMCNC: 33 GM/DL — SIGNIFICANT CHANGE UP (ref 32–36)
MCHC RBC-ENTMCNC: 33.2 GM/DL — SIGNIFICANT CHANGE UP (ref 32–36)
MCHC RBC-ENTMCNC: 33.5 GM/DL — SIGNIFICANT CHANGE UP (ref 32–36)
MCHC RBC-ENTMCNC: 33.5 GM/DL — SIGNIFICANT CHANGE UP (ref 32–36)
MCHC RBC-ENTMCNC: 33.6 GM/DL — SIGNIFICANT CHANGE UP (ref 32–36)
MCHC RBC-ENTMCNC: 33.7 GM/DL — SIGNIFICANT CHANGE UP (ref 32–36)
MCHC RBC-ENTMCNC: 33.8 GM/DL — SIGNIFICANT CHANGE UP (ref 32–36)
MCHC RBC-ENTMCNC: 33.9 GM/DL — SIGNIFICANT CHANGE UP (ref 32–36)
MCHC RBC-ENTMCNC: 34.1 GM/DL — SIGNIFICANT CHANGE UP (ref 32–36)
MCHC RBC-ENTMCNC: 34.3 GM/DL — SIGNIFICANT CHANGE UP (ref 32–36)
MCHC RBC-ENTMCNC: 34.3 GM/DL — SIGNIFICANT CHANGE UP (ref 32–36)
MCHC RBC-ENTMCNC: 34.4 GM/DL — SIGNIFICANT CHANGE UP (ref 32–36)
MCHC RBC-ENTMCNC: 34.5 GM/DL — SIGNIFICANT CHANGE UP (ref 32–36)
MCHC RBC-ENTMCNC: 34.7 GM/DL — SIGNIFICANT CHANGE UP (ref 32–36)
MCHC RBC-ENTMCNC: 35 GM/DL — SIGNIFICANT CHANGE UP (ref 32–36)
MCHC RBC-ENTMCNC: 35.2 GM/DL — SIGNIFICANT CHANGE UP (ref 32–36)
MCHC RBC-ENTMCNC: 35.5 G/DL — SIGNIFICANT CHANGE UP (ref 32–36)
MCHC RBC-ENTMCNC: 36.1 GM/DL — HIGH (ref 32–36)
MCHC RBC-ENTMCNC: 36.6 GM/DL — HIGH (ref 32–36)
MCV RBC AUTO: 78.3 FL — LOW (ref 80–100)
MCV RBC AUTO: 78.7 FL — LOW (ref 80–100)
MCV RBC AUTO: 79.2 FL — LOW (ref 80–100)
MCV RBC AUTO: 79.7 FL — LOW (ref 80–100)
MCV RBC AUTO: 80.2 FL — SIGNIFICANT CHANGE UP (ref 80–100)
MCV RBC AUTO: 80.4 FL — SIGNIFICANT CHANGE UP (ref 80–100)
MCV RBC AUTO: 80.6 FL — SIGNIFICANT CHANGE UP (ref 80–100)
MCV RBC AUTO: 81 FL — SIGNIFICANT CHANGE UP (ref 80–100)
MCV RBC AUTO: 81.6 FL — SIGNIFICANT CHANGE UP (ref 80–100)
MCV RBC AUTO: 81.9 FL — SIGNIFICANT CHANGE UP (ref 80–100)
MCV RBC AUTO: 83.2 FL — SIGNIFICANT CHANGE UP (ref 80–100)
MCV RBC AUTO: 83.5 FL — SIGNIFICANT CHANGE UP (ref 80–100)
MCV RBC AUTO: 83.7 FL — SIGNIFICANT CHANGE UP (ref 80–100)
MCV RBC AUTO: 84 FL — SIGNIFICANT CHANGE UP (ref 80–100)
MCV RBC AUTO: 85.4 FL — SIGNIFICANT CHANGE UP (ref 80–100)
MCV RBC AUTO: 85.5 FL — SIGNIFICANT CHANGE UP (ref 80–100)
MCV RBC AUTO: 85.7 FL — SIGNIFICANT CHANGE UP (ref 80–100)
MCV RBC AUTO: 87.4 FL — SIGNIFICANT CHANGE UP (ref 80–100)
MCV RBC AUTO: 87.8 FL — SIGNIFICANT CHANGE UP (ref 80–100)
MCV RBC AUTO: 88.8 FL — SIGNIFICANT CHANGE UP (ref 80–100)
MCV RBC AUTO: 89.1 FL — SIGNIFICANT CHANGE UP (ref 80–100)
MCV RBC AUTO: 89.3 FL — SIGNIFICANT CHANGE UP (ref 80–100)
MCV RBC AUTO: 89.4 FL — SIGNIFICANT CHANGE UP (ref 80–100)
MCV RBC AUTO: 89.7 FL — SIGNIFICANT CHANGE UP (ref 80–100)
MCV RBC AUTO: 89.7 FL — SIGNIFICANT CHANGE UP (ref 80–100)
MCV RBC AUTO: 89.9 FL — SIGNIFICANT CHANGE UP (ref 80–100)
MCV RBC AUTO: 90.5 FL — SIGNIFICANT CHANGE UP (ref 80–100)
MCV RBC AUTO: 90.8 FL — SIGNIFICANT CHANGE UP (ref 80–100)
MCV RBC AUTO: 91.3 FL — SIGNIFICANT CHANGE UP (ref 80–100)
MCV RBC AUTO: 91.7 FL — SIGNIFICANT CHANGE UP (ref 80–100)
MCV RBC AUTO: 91.8 FL — SIGNIFICANT CHANGE UP (ref 80–100)
MCV RBC AUTO: 92.1 FL — SIGNIFICANT CHANGE UP (ref 80–100)
MCV RBC AUTO: 92.2 FL — SIGNIFICANT CHANGE UP (ref 80–100)
MCV RBC AUTO: 92.5 FL — SIGNIFICANT CHANGE UP (ref 80–100)
MCV RBC AUTO: 93.4 FL — SIGNIFICANT CHANGE UP (ref 80–100)
MCV RBC AUTO: 93.7 FL — SIGNIFICANT CHANGE UP (ref 80–100)
MCV RBC AUTO: 95 FL — SIGNIFICANT CHANGE UP (ref 80–100)
MCV RBC AUTO: 99.4 FL — SIGNIFICANT CHANGE UP (ref 80–100)
METAMYELOCYTES # FLD: 1 % — HIGH (ref 0–0)
METHADONE UR-MCNC: NEGATIVE — SIGNIFICANT CHANGE UP
MICROCYTES BLD QL: SLIGHT — SIGNIFICANT CHANGE UP
MONOCYTES # BLD AUTO: 0 K/UL — SIGNIFICANT CHANGE UP (ref 0–0.9)
MONOCYTES # BLD AUTO: 0.11 K/UL — SIGNIFICANT CHANGE UP (ref 0–0.9)
MONOCYTES # BLD AUTO: 0.14 K/UL — SIGNIFICANT CHANGE UP (ref 0–0.9)
MONOCYTES # BLD AUTO: 0.51 K/UL — SIGNIFICANT CHANGE UP (ref 0–0.9)
MONOCYTES # BLD AUTO: 0.54 K/UL — SIGNIFICANT CHANGE UP (ref 0–0.9)
MONOCYTES # BLD AUTO: 0.55 K/UL — SIGNIFICANT CHANGE UP (ref 0–0.9)
MONOCYTES # BLD AUTO: 0.59 K/UL — SIGNIFICANT CHANGE UP (ref 0–0.9)
MONOCYTES # BLD AUTO: 0.61 K/UL — SIGNIFICANT CHANGE UP (ref 0–0.9)
MONOCYTES # BLD AUTO: 0.64 K/UL — SIGNIFICANT CHANGE UP (ref 0–0.9)
MONOCYTES # BLD AUTO: 0.68 K/UL — SIGNIFICANT CHANGE UP (ref 0–0.9)
MONOCYTES # BLD AUTO: 0.7 K/UL — SIGNIFICANT CHANGE UP (ref 0–0.9)
MONOCYTES # BLD AUTO: 0.73 K/UL — SIGNIFICANT CHANGE UP (ref 0–0.9)
MONOCYTES # BLD AUTO: 0.79 K/UL — SIGNIFICANT CHANGE UP (ref 0–0.9)
MONOCYTES # BLD AUTO: 0.79 K/UL — SIGNIFICANT CHANGE UP (ref 0–0.9)
MONOCYTES # BLD AUTO: 0.8 K/UL — SIGNIFICANT CHANGE UP (ref 0–0.9)
MONOCYTES # BLD AUTO: 0.86 K/UL — SIGNIFICANT CHANGE UP (ref 0–0.9)
MONOCYTES # BLD AUTO: 0.89 K/UL — SIGNIFICANT CHANGE UP (ref 0–0.9)
MONOCYTES # BLD AUTO: 0.89 K/UL — SIGNIFICANT CHANGE UP (ref 0–0.9)
MONOCYTES # BLD AUTO: 0.97 K/UL — HIGH (ref 0–0.9)
MONOCYTES # BLD AUTO: 1 K/UL — HIGH (ref 0–0.9)
MONOCYTES # BLD AUTO: 1.05 K/UL — HIGH (ref 0–0.9)
MONOCYTES # BLD AUTO: 1.22 K/UL — HIGH (ref 0–0.9)
MONOCYTES NFR BLD AUTO: 0 % — LOW (ref 2–14)
MONOCYTES NFR BLD AUTO: 0.8 % — LOW (ref 2–14)
MONOCYTES NFR BLD AUTO: 1.5 % — LOW (ref 2–14)
MONOCYTES NFR BLD AUTO: 11 % — SIGNIFICANT CHANGE UP (ref 2–14)
MONOCYTES NFR BLD AUTO: 11.2 % — SIGNIFICANT CHANGE UP (ref 2–14)
MONOCYTES NFR BLD AUTO: 11.5 % — SIGNIFICANT CHANGE UP (ref 2–14)
MONOCYTES NFR BLD AUTO: 12.2 % — SIGNIFICANT CHANGE UP (ref 2–14)
MONOCYTES NFR BLD AUTO: 13.1 % — SIGNIFICANT CHANGE UP (ref 2–14)
MONOCYTES NFR BLD AUTO: 13.9 % — SIGNIFICANT CHANGE UP (ref 2–14)
MONOCYTES NFR BLD AUTO: 16 % — HIGH (ref 2–14)
MONOCYTES NFR BLD AUTO: 20.4 % — HIGH (ref 2–14)
MONOCYTES NFR BLD AUTO: 4.9 % — SIGNIFICANT CHANGE UP (ref 2–14)
MONOCYTES NFR BLD AUTO: 5.2 % — SIGNIFICANT CHANGE UP (ref 2–14)
MONOCYTES NFR BLD AUTO: 6 % — SIGNIFICANT CHANGE UP (ref 2–14)
MONOCYTES NFR BLD AUTO: 6.4 % — SIGNIFICANT CHANGE UP (ref 2–14)
MONOCYTES NFR BLD AUTO: 6.7 % — SIGNIFICANT CHANGE UP (ref 2–14)
MONOCYTES NFR BLD AUTO: 6.8 % — SIGNIFICANT CHANGE UP (ref 2–14)
MONOCYTES NFR BLD AUTO: 6.8 % — SIGNIFICANT CHANGE UP (ref 2–14)
MONOCYTES NFR BLD AUTO: 7.1 % — SIGNIFICANT CHANGE UP (ref 2–14)
MONOCYTES NFR BLD AUTO: 8.6 % — SIGNIFICANT CHANGE UP (ref 2–14)
MONOCYTES NFR BLD AUTO: 8.7 % — SIGNIFICANT CHANGE UP (ref 2–14)
MONOCYTES NFR BLD AUTO: 9.5 % — SIGNIFICANT CHANGE UP (ref 2–14)
MONOS+MACROS NFR CSF: 5 % — LOW (ref 15–45)
MRSA PCR RESULT.: SIGNIFICANT CHANGE UP
MRSA PCR RESULT.: SIGNIFICANT CHANGE UP
N MEN DNA SPEC QL NAA+PROBE: SIGNIFICANT CHANGE UP
NEUTROPHILS # BLD AUTO: 1.86 K/UL — SIGNIFICANT CHANGE UP (ref 1.8–7.4)
NEUTROPHILS # BLD AUTO: 10.12 K/UL — HIGH (ref 1.8–7.4)
NEUTROPHILS # BLD AUTO: 10.46 K/UL — HIGH (ref 1.8–7.4)
NEUTROPHILS # BLD AUTO: 11.26 K/UL — HIGH (ref 1.8–7.4)
NEUTROPHILS # BLD AUTO: 12.64 K/UL — HIGH (ref 1.8–7.4)
NEUTROPHILS # BLD AUTO: 2.44 K/UL — SIGNIFICANT CHANGE UP (ref 1.8–7.4)
NEUTROPHILS # BLD AUTO: 2.96 K/UL — SIGNIFICANT CHANGE UP (ref 1.8–7.4)
NEUTROPHILS # BLD AUTO: 4.8 K/UL — SIGNIFICANT CHANGE UP (ref 1.8–7.4)
NEUTROPHILS # BLD AUTO: 5.75 K/UL — SIGNIFICANT CHANGE UP (ref 1.8–7.4)
NEUTROPHILS # BLD AUTO: 5.79 K/UL — SIGNIFICANT CHANGE UP (ref 1.8–7.4)
NEUTROPHILS # BLD AUTO: 6.14 K/UL — SIGNIFICANT CHANGE UP (ref 1.8–7.4)
NEUTROPHILS # BLD AUTO: 6.64 K/UL — SIGNIFICANT CHANGE UP (ref 1.8–7.4)
NEUTROPHILS # BLD AUTO: 6.93 K/UL — SIGNIFICANT CHANGE UP (ref 1.8–7.4)
NEUTROPHILS # BLD AUTO: 7.06 K/UL — SIGNIFICANT CHANGE UP (ref 1.8–7.4)
NEUTROPHILS # BLD AUTO: 7.15 K/UL — SIGNIFICANT CHANGE UP (ref 1.8–7.4)
NEUTROPHILS # BLD AUTO: 7.21 K/UL — SIGNIFICANT CHANGE UP (ref 1.8–7.4)
NEUTROPHILS # BLD AUTO: 8.4 K/UL — HIGH (ref 1.8–7.4)
NEUTROPHILS # BLD AUTO: 8.57 K/UL — HIGH (ref 1.8–7.4)
NEUTROPHILS # BLD AUTO: 8.66 K/UL — HIGH (ref 1.8–7.4)
NEUTROPHILS # BLD AUTO: 8.82 K/UL — HIGH (ref 1.8–7.4)
NEUTROPHILS # BLD AUTO: 8.9 K/UL — HIGH (ref 1.8–7.4)
NEUTROPHILS # BLD AUTO: 9.14 K/UL — HIGH (ref 1.8–7.4)
NEUTROPHILS # CSF: 7 % — HIGH (ref 0–6)
NEUTROPHILS NFR BLD AUTO: 54.2 % — SIGNIFICANT CHANGE UP (ref 43–77)
NEUTROPHILS NFR BLD AUTO: 55.6 % — SIGNIFICANT CHANGE UP (ref 43–77)
NEUTROPHILS NFR BLD AUTO: 59.2 % — SIGNIFICANT CHANGE UP (ref 43–77)
NEUTROPHILS NFR BLD AUTO: 74.9 % — SIGNIFICANT CHANGE UP (ref 43–77)
NEUTROPHILS NFR BLD AUTO: 76.7 % — SIGNIFICANT CHANGE UP (ref 43–77)
NEUTROPHILS NFR BLD AUTO: 78 % — HIGH (ref 43–77)
NEUTROPHILS NFR BLD AUTO: 79.5 % — HIGH (ref 43–77)
NEUTROPHILS NFR BLD AUTO: 82.8 % — HIGH (ref 43–77)
NEUTROPHILS NFR BLD AUTO: 82.9 % — HIGH (ref 43–77)
NEUTROPHILS NFR BLD AUTO: 83.7 % — HIGH (ref 43–77)
NEUTROPHILS NFR BLD AUTO: 83.8 % — HIGH (ref 43–77)
NEUTROPHILS NFR BLD AUTO: 83.9 % — HIGH (ref 43–77)
NEUTROPHILS NFR BLD AUTO: 83.9 % — HIGH (ref 43–77)
NEUTROPHILS NFR BLD AUTO: 84.3 % — HIGH (ref 43–77)
NEUTROPHILS NFR BLD AUTO: 84.3 % — HIGH (ref 43–77)
NEUTROPHILS NFR BLD AUTO: 85 % — HIGH (ref 43–77)
NEUTROPHILS NFR BLD AUTO: 86.2 % — HIGH (ref 43–77)
NEUTROPHILS NFR BLD AUTO: 87 % — HIGH (ref 43–77)
NEUTROPHILS NFR BLD AUTO: 87.7 % — HIGH (ref 43–77)
NEUTROPHILS NFR BLD AUTO: 89.2 % — HIGH (ref 43–77)
NEUTROPHILS NFR BLD AUTO: 89.8 % — HIGH (ref 43–77)
NEUTROPHILS NFR BLD AUTO: 91.4 % — HIGH (ref 43–77)
NEUTS BAND # BLD: 6 % — SIGNIFICANT CHANGE UP (ref 0–8)
NITRITE UR-MCNC: NEGATIVE — SIGNIFICANT CHANGE UP
NON HDL CHOLESTEROL: 159 MG/DL — HIGH
NRBC # BLD: 0 /100 WBCS — SIGNIFICANT CHANGE UP (ref 0–0)
NRBC # FLD: 0 K/UL — SIGNIFICANT CHANGE UP (ref 0–0)
NRBC NFR CSF: 32 /UL — HIGH (ref 0–5)
NT-PROBNP SERPL-SCNC: 219 PG/ML — SIGNIFICANT CHANGE UP (ref 0–300)
NT-PROBNP SERPL-SCNC: 2632 PG/ML — HIGH (ref 0–300)
OPIATES UR-MCNC: POSITIVE
OSMOLALITY SERPL: 271 MOSMOL/KG — LOW (ref 275–300)
OSMOLALITY SERPL: 277 MOSM/KG — SIGNIFICANT CHANGE UP (ref 275–295)
OSMOLALITY SERPL: 280 MOSMOL/KG — SIGNIFICANT CHANGE UP (ref 275–300)
OSMOLALITY SERPL: 282 MOSM/KG — SIGNIFICANT CHANGE UP (ref 275–295)
OSMOLALITY UR: 1098 MOSM/KG — SIGNIFICANT CHANGE UP (ref 50–1200)
OSMOLALITY UR: 369 MOS/KG — SIGNIFICANT CHANGE UP (ref 300–900)
OSMOLALITY UR: 399 MOS/KG — SIGNIFICANT CHANGE UP (ref 300–900)
OSMOLALITY UR: 452 MOSM/KG — SIGNIFICANT CHANGE UP (ref 50–1200)
OTHER CELLS CSF MANUAL: 6 % — HIGH (ref 0–0)
OVALOCYTES BLD QL SMEAR: SLIGHT — SIGNIFICANT CHANGE UP
PARECHOVIRUS A RNA SPEC QL NAA+PROBE: SIGNIFICANT CHANGE UP
PCO2 BLDV: 27 MMHG — LOW (ref 39–42)
PCO2 BLDV: 29 MMHG — LOW (ref 39–42)
PCO2 BLDV: 41 MMHG — SIGNIFICANT CHANGE UP (ref 39–52)
PCP SPEC-MCNC: SIGNIFICANT CHANGE UP
PCP UR-MCNC: NEGATIVE — SIGNIFICANT CHANGE UP
PH BLDV: 7.44 — HIGH (ref 7.32–7.43)
PH BLDV: 7.46 — HIGH (ref 7.32–7.43)
PH BLDV: 7.48 — HIGH (ref 7.32–7.43)
PH UR: 6 — SIGNIFICANT CHANGE UP (ref 5–8)
PH UR: 6 — SIGNIFICANT CHANGE UP (ref 5–8)
PH UR: 6.5 — SIGNIFICANT CHANGE UP (ref 5–8)
PH UR: 8 — SIGNIFICANT CHANGE UP (ref 5–8)
PH UR: 8.5 (ref 5–8)
PHOSPHATE SERPL-MCNC: 0.7 MG/DL — CRITICAL LOW (ref 2.5–4.5)
PHOSPHATE SERPL-MCNC: 2.2 MG/DL — LOW (ref 2.5–4.5)
PHOSPHATE SERPL-MCNC: 2.3 MG/DL — LOW (ref 2.5–4.5)
PHOSPHATE SERPL-MCNC: 2.3 MG/DL — LOW (ref 2.5–4.5)
PHOSPHATE SERPL-MCNC: 2.6 MG/DL — SIGNIFICANT CHANGE UP (ref 2.5–4.5)
PHOSPHATE SERPL-MCNC: 2.7 MG/DL — SIGNIFICANT CHANGE UP (ref 2.5–4.5)
PHOSPHATE SERPL-MCNC: 2.8 MG/DL — SIGNIFICANT CHANGE UP (ref 2.5–4.5)
PHOSPHATE SERPL-MCNC: 2.9 MG/DL — SIGNIFICANT CHANGE UP (ref 2.5–4.5)
PHOSPHATE SERPL-MCNC: 3 MG/DL — SIGNIFICANT CHANGE UP (ref 2.5–4.5)
PHOSPHATE SERPL-MCNC: 3.1 MG/DL — SIGNIFICANT CHANGE UP (ref 2.5–4.5)
PHOSPHATE SERPL-MCNC: 3.4 MG/DL — SIGNIFICANT CHANGE UP (ref 2.5–4.5)
PHOSPHATE SERPL-MCNC: 3.5 MG/DL — SIGNIFICANT CHANGE UP (ref 2.5–4.5)
PHOSPHATE SERPL-MCNC: 3.7 MG/DL — SIGNIFICANT CHANGE UP (ref 2.5–4.5)
PHOSPHATE SERPL-MCNC: 34.6 MG/DL — HIGH (ref 2.5–4.5)
PHOSPHATE SERPL-MCNC: 4 MG/DL — SIGNIFICANT CHANGE UP (ref 2.5–4.5)
PHOSPHATE SERPL-MCNC: 4.8 MG/DL — HIGH (ref 2.5–4.5)
PLAT MORPH BLD: NORMAL — SIGNIFICANT CHANGE UP
PLAT MORPH BLD: NORMAL — SIGNIFICANT CHANGE UP
PLAT MORPH BLD: SIGNIFICANT CHANGE UP
PLATELET # BLD AUTO: 108 K/UL — LOW (ref 150–400)
PLATELET # BLD AUTO: 109 K/UL — LOW (ref 150–400)
PLATELET # BLD AUTO: 111 K/UL — LOW (ref 150–400)
PLATELET # BLD AUTO: 112 K/UL — LOW (ref 150–400)
PLATELET # BLD AUTO: 114 K/UL — LOW (ref 150–400)
PLATELET # BLD AUTO: 119 K/UL — LOW (ref 150–400)
PLATELET # BLD AUTO: 122 K/UL — LOW (ref 150–400)
PLATELET # BLD AUTO: 123 K/UL — LOW (ref 150–400)
PLATELET # BLD AUTO: 151 K/UL — SIGNIFICANT CHANGE UP (ref 150–400)
PLATELET # BLD AUTO: 153 K/UL — SIGNIFICANT CHANGE UP (ref 150–400)
PLATELET # BLD AUTO: 156 K/UL — SIGNIFICANT CHANGE UP (ref 150–400)
PLATELET # BLD AUTO: 174 K/UL — SIGNIFICANT CHANGE UP (ref 150–400)
PLATELET # BLD AUTO: 183 K/UL — SIGNIFICANT CHANGE UP (ref 150–400)
PLATELET # BLD AUTO: 184 K/UL — SIGNIFICANT CHANGE UP (ref 150–400)
PLATELET # BLD AUTO: 196 K/UL — SIGNIFICANT CHANGE UP (ref 150–400)
PLATELET # BLD AUTO: 212 K/UL — SIGNIFICANT CHANGE UP (ref 150–400)
PLATELET # BLD AUTO: 216 K/UL — SIGNIFICANT CHANGE UP (ref 150–400)
PLATELET # BLD AUTO: 250 K/UL — SIGNIFICANT CHANGE UP (ref 150–400)
PLATELET # BLD AUTO: 253 K/UL — SIGNIFICANT CHANGE UP (ref 150–400)
PLATELET # BLD AUTO: 308 K/UL — SIGNIFICANT CHANGE UP (ref 150–400)
PLATELET # BLD AUTO: 324 K/UL — SIGNIFICANT CHANGE UP (ref 150–400)
PLATELET # BLD AUTO: 366 K/UL — SIGNIFICANT CHANGE UP (ref 150–400)
PLATELET # BLD AUTO: 397 K/UL — SIGNIFICANT CHANGE UP (ref 150–400)
PLATELET # BLD AUTO: 435 K/UL — HIGH (ref 150–400)
PLATELET # BLD AUTO: 468 K/UL — HIGH (ref 150–400)
PLATELET # BLD AUTO: 549 K/UL — HIGH (ref 150–400)
PLATELET # BLD AUTO: 68 K/UL — LOW (ref 150–400)
PLATELET # BLD AUTO: 79 K/UL — LOW (ref 150–400)
PLATELET # BLD AUTO: 80 K/UL — LOW (ref 150–400)
PLATELET # BLD AUTO: 81 K/UL — LOW (ref 150–400)
PLATELET # BLD AUTO: 85 K/UL — LOW (ref 150–400)
PLATELET # BLD AUTO: 87 K/UL — LOW (ref 150–400)
PLATELET # BLD AUTO: 87 K/UL — LOW (ref 150–400)
PLATELET # BLD AUTO: 88 K/UL — LOW (ref 150–400)
PLATELET # BLD AUTO: 89 K/UL — LOW (ref 150–400)
PLATELET # BLD AUTO: 92 K/UL — LOW (ref 150–400)
PLATELET # BLD AUTO: 94 K/UL — LOW (ref 150–400)
PLATELET # BLD AUTO: 94 K/UL — LOW (ref 150–400)
PLATELET COUNT - ESTIMATE: ABNORMAL
PO2 BLDV: 42 MMHG — SIGNIFICANT CHANGE UP (ref 25–45)
PO2 BLDV: 45 MMHG — SIGNIFICANT CHANGE UP (ref 25–45)
PO2 BLDV: 48 MMHG — HIGH (ref 25–45)
POTASSIUM BLDV-SCNC: 2.6 MMOL/L — CRITICAL LOW (ref 3.5–5.1)
POTASSIUM BLDV-SCNC: 3 MMOL/L — LOW (ref 3.5–5.1)
POTASSIUM BLDV-SCNC: 3.4 MMOL/L — LOW (ref 3.5–5.1)
POTASSIUM SERPL-MCNC: 2.7 MMOL/L — CRITICAL LOW (ref 3.5–5.3)
POTASSIUM SERPL-MCNC: 2.8 MMOL/L — CRITICAL LOW (ref 3.5–5.3)
POTASSIUM SERPL-MCNC: 2.8 MMOL/L — CRITICAL LOW (ref 3.5–5.3)
POTASSIUM SERPL-MCNC: 2.9 MMOL/L — CRITICAL LOW (ref 3.5–5.3)
POTASSIUM SERPL-MCNC: 2.9 MMOL/L — CRITICAL LOW (ref 3.5–5.3)
POTASSIUM SERPL-MCNC: 3 MMOL/L — LOW (ref 3.5–5.3)
POTASSIUM SERPL-MCNC: 3.2 MMOL/L — LOW (ref 3.5–5.3)
POTASSIUM SERPL-MCNC: 3.2 MMOL/L — LOW (ref 3.5–5.3)
POTASSIUM SERPL-MCNC: 3.3 MMOL/L — LOW (ref 3.5–5.3)
POTASSIUM SERPL-MCNC: 3.4 MMOL/L — LOW (ref 3.5–5.3)
POTASSIUM SERPL-MCNC: 3.4 MMOL/L — LOW (ref 3.5–5.3)
POTASSIUM SERPL-MCNC: 3.5 MMOL/L — SIGNIFICANT CHANGE UP (ref 3.5–5.3)
POTASSIUM SERPL-MCNC: 3.6 MMOL/L — SIGNIFICANT CHANGE UP (ref 3.5–5.3)
POTASSIUM SERPL-MCNC: 3.7 MMOL/L — SIGNIFICANT CHANGE UP (ref 3.5–5.3)
POTASSIUM SERPL-MCNC: 3.8 MMOL/L — SIGNIFICANT CHANGE UP (ref 3.5–5.3)
POTASSIUM SERPL-MCNC: 3.9 MMOL/L — SIGNIFICANT CHANGE UP (ref 3.5–5.3)
POTASSIUM SERPL-MCNC: 4.1 MMOL/L — SIGNIFICANT CHANGE UP (ref 3.5–5.3)
POTASSIUM SERPL-MCNC: 4.2 MMOL/L — SIGNIFICANT CHANGE UP (ref 3.5–5.3)
POTASSIUM SERPL-MCNC: 4.4 MMOL/L — SIGNIFICANT CHANGE UP (ref 3.5–5.3)
POTASSIUM SERPL-MCNC: 4.4 MMOL/L — SIGNIFICANT CHANGE UP (ref 3.5–5.3)
POTASSIUM SERPL-MCNC: 4.5 MMOL/L — SIGNIFICANT CHANGE UP (ref 3.5–5.3)
POTASSIUM SERPL-MCNC: 4.6 MMOL/L — SIGNIFICANT CHANGE UP (ref 3.5–5.3)
POTASSIUM SERPL-MCNC: 4.7 MMOL/L — SIGNIFICANT CHANGE UP (ref 3.5–5.3)
POTASSIUM SERPL-MCNC: 4.8 MMOL/L — SIGNIFICANT CHANGE UP (ref 3.5–5.3)
POTASSIUM SERPL-MCNC: 4.8 MMOL/L — SIGNIFICANT CHANGE UP (ref 3.5–5.3)
POTASSIUM SERPL-SCNC: 2.7 MMOL/L — CRITICAL LOW (ref 3.5–5.3)
POTASSIUM SERPL-SCNC: 2.8 MMOL/L — CRITICAL LOW (ref 3.5–5.3)
POTASSIUM SERPL-SCNC: 2.8 MMOL/L — CRITICAL LOW (ref 3.5–5.3)
POTASSIUM SERPL-SCNC: 2.9 MMOL/L — CRITICAL LOW (ref 3.5–5.3)
POTASSIUM SERPL-SCNC: 2.9 MMOL/L — CRITICAL LOW (ref 3.5–5.3)
POTASSIUM SERPL-SCNC: 3 MMOL/L — LOW (ref 3.5–5.3)
POTASSIUM SERPL-SCNC: 3.2 MMOL/L — LOW (ref 3.5–5.3)
POTASSIUM SERPL-SCNC: 3.2 MMOL/L — LOW (ref 3.5–5.3)
POTASSIUM SERPL-SCNC: 3.3 MMOL/L — LOW (ref 3.5–5.3)
POTASSIUM SERPL-SCNC: 3.4 MMOL/L — LOW (ref 3.5–5.3)
POTASSIUM SERPL-SCNC: 3.4 MMOL/L — LOW (ref 3.5–5.3)
POTASSIUM SERPL-SCNC: 3.5 MMOL/L — SIGNIFICANT CHANGE UP (ref 3.5–5.3)
POTASSIUM SERPL-SCNC: 3.6 MMOL/L — SIGNIFICANT CHANGE UP (ref 3.5–5.3)
POTASSIUM SERPL-SCNC: 3.7 MMOL/L — SIGNIFICANT CHANGE UP (ref 3.5–5.3)
POTASSIUM SERPL-SCNC: 3.8 MMOL/L — SIGNIFICANT CHANGE UP (ref 3.5–5.3)
POTASSIUM SERPL-SCNC: 3.9 MMOL/L — SIGNIFICANT CHANGE UP (ref 3.5–5.3)
POTASSIUM SERPL-SCNC: 4.1 MMOL/L — SIGNIFICANT CHANGE UP (ref 3.5–5.3)
POTASSIUM SERPL-SCNC: 4.2 MMOL/L — SIGNIFICANT CHANGE UP (ref 3.5–5.3)
POTASSIUM SERPL-SCNC: 4.4 MMOL/L — SIGNIFICANT CHANGE UP (ref 3.5–5.3)
POTASSIUM SERPL-SCNC: 4.4 MMOL/L — SIGNIFICANT CHANGE UP (ref 3.5–5.3)
POTASSIUM SERPL-SCNC: 4.5 MMOL/L — SIGNIFICANT CHANGE UP (ref 3.5–5.3)
POTASSIUM SERPL-SCNC: 4.6 MMOL/L — SIGNIFICANT CHANGE UP (ref 3.5–5.3)
POTASSIUM SERPL-SCNC: 4.7 MMOL/L — SIGNIFICANT CHANGE UP (ref 3.5–5.3)
POTASSIUM SERPL-SCNC: 4.8 MMOL/L — SIGNIFICANT CHANGE UP (ref 3.5–5.3)
POTASSIUM SERPL-SCNC: 4.8 MMOL/L — SIGNIFICANT CHANGE UP (ref 3.5–5.3)
POTASSIUM UR-SCNC: 18 MMOL/L — SIGNIFICANT CHANGE UP
PROCALCITONIN SERPL-MCNC: 0.67 NG/ML — HIGH (ref 0.02–0.1)
PROCALCITONIN SERPL-MCNC: 3.22 NG/ML — HIGH
PROT CSF-MCNC: 85 MG/DL — HIGH (ref 15–45)
PROT SERPL-MCNC: 5.9 G/DL — LOW (ref 6–8.3)
PROT SERPL-MCNC: 6.3 G/DL — SIGNIFICANT CHANGE UP (ref 6–8.3)
PROT SERPL-MCNC: 6.4 G/DL — SIGNIFICANT CHANGE UP (ref 6–8.3)
PROT SERPL-MCNC: 6.7 G/DL — SIGNIFICANT CHANGE UP (ref 6–8.3)
PROT SERPL-MCNC: 6.8 G/DL — SIGNIFICANT CHANGE UP (ref 6–8.3)
PROT SERPL-MCNC: 6.8 G/DL — SIGNIFICANT CHANGE UP (ref 6–8.3)
PROT SERPL-MCNC: 6.9 G/DL — SIGNIFICANT CHANGE UP (ref 6–8.3)
PROT SERPL-MCNC: 6.9 G/DL — SIGNIFICANT CHANGE UP (ref 6–8.3)
PROT SERPL-MCNC: 7 G/DL — SIGNIFICANT CHANGE UP (ref 6–8.3)
PROT SERPL-MCNC: 7.1 G/DL — SIGNIFICANT CHANGE UP (ref 6–8.3)
PROT SERPL-MCNC: 7.1 G/DL — SIGNIFICANT CHANGE UP (ref 6–8.3)
PROT SERPL-MCNC: 7.3 G/DL — SIGNIFICANT CHANGE UP (ref 6–8.3)
PROT SERPL-MCNC: 7.3 G/DL — SIGNIFICANT CHANGE UP (ref 6–8.3)
PROT SERPL-MCNC: 7.4 G/DL — SIGNIFICANT CHANGE UP (ref 6–8.3)
PROT SERPL-MCNC: 7.7 G/DL — SIGNIFICANT CHANGE UP (ref 6–8.3)
PROT SERPL-MCNC: 8 G/DL — SIGNIFICANT CHANGE UP (ref 6–8.3)
PROT SERPL-MCNC: 8.1 G/DL — SIGNIFICANT CHANGE UP (ref 6–8.3)
PROT SERPL-MCNC: 9.3 G/DL — HIGH (ref 6–8.3)
PROT UR-MCNC: 30 MG/DL
PROT UR-MCNC: 30 MG/DL
PROT UR-MCNC: NEGATIVE MG/DL — SIGNIFICANT CHANGE UP
PROTHROM AB SERPL-ACNC: 11.1 SEC — SIGNIFICANT CHANGE UP (ref 9.5–13)
PROTHROM AB SERPL-ACNC: 11.4 SEC — SIGNIFICANT CHANGE UP (ref 9.5–13)
PROTHROM AB SERPL-ACNC: 11.6 SEC — SIGNIFICANT CHANGE UP (ref 9.5–13)
PROTHROM AB SERPL-ACNC: 11.6 SEC — SIGNIFICANT CHANGE UP (ref 9.5–13)
PROTHROM AB SERPL-ACNC: 12.4 SEC — SIGNIFICANT CHANGE UP (ref 9.5–13)
PROTHROM AB SERPL-ACNC: 12.5 SEC — SIGNIFICANT CHANGE UP (ref 9.5–13)
PROTHROM AB SERPL-ACNC: 13.6 SEC — HIGH (ref 9.5–13)
PROTHROM AB SERPL-ACNC: 15.3 SEC — HIGH (ref 9.5–13)
PROTHROM AB SERPL-ACNC: 15.5 SEC — HIGH (ref 9.5–13)
PROTHROM AB SERPL-ACNC: 15.8 SEC — HIGH (ref 9.5–13)
PTH RELATED PROT SERPL-MCNC: <2 PMOL/L — SIGNIFICANT CHANGE UP
PTH-INTACT FLD-MCNC: 18 PG/ML — SIGNIFICANT CHANGE UP (ref 15–65)
RAPID RVP RESULT: SIGNIFICANT CHANGE UP
RBC # BLD: 2.73 M/UL — LOW (ref 3.8–5.2)
RBC # BLD: 2.93 M/UL — LOW (ref 3.8–5.2)
RBC # BLD: 3.16 M/UL — LOW (ref 3.8–5.2)
RBC # BLD: 3.18 M/UL — LOW (ref 3.8–5.2)
RBC # BLD: 3.27 M/UL — LOW (ref 3.8–5.2)
RBC # BLD: 3.27 M/UL — LOW (ref 3.8–5.2)
RBC # BLD: 3.29 M/UL — LOW (ref 3.8–5.2)
RBC # BLD: 3.49 M/UL — LOW (ref 3.8–5.2)
RBC # BLD: 3.59 M/UL — LOW (ref 3.8–5.2)
RBC # BLD: 3.61 M/UL — LOW (ref 3.8–5.2)
RBC # BLD: 3.63 M/UL — LOW (ref 3.8–5.2)
RBC # BLD: 3.64 M/UL — LOW (ref 3.8–5.2)
RBC # BLD: 3.77 M/UL — LOW (ref 3.8–5.2)
RBC # BLD: 3.79 M/UL — LOW (ref 3.8–5.2)
RBC # BLD: 3.8 M/UL — SIGNIFICANT CHANGE UP (ref 3.8–5.2)
RBC # BLD: 3.81 M/UL — SIGNIFICANT CHANGE UP (ref 3.8–5.2)
RBC # BLD: 3.83 M/UL — SIGNIFICANT CHANGE UP (ref 3.8–5.2)
RBC # BLD: 3.92 M/UL — SIGNIFICANT CHANGE UP (ref 3.8–5.2)
RBC # BLD: 3.92 M/UL — SIGNIFICANT CHANGE UP (ref 3.8–5.2)
RBC # BLD: 4.01 M/UL — SIGNIFICANT CHANGE UP (ref 3.8–5.2)
RBC # BLD: 4.01 M/UL — SIGNIFICANT CHANGE UP (ref 3.8–5.2)
RBC # BLD: 4.02 M/UL — SIGNIFICANT CHANGE UP (ref 3.8–5.2)
RBC # BLD: 4.04 M/UL — SIGNIFICANT CHANGE UP (ref 3.8–5.2)
RBC # BLD: 4.11 M/UL — SIGNIFICANT CHANGE UP (ref 3.8–5.2)
RBC # BLD: 4.14 M/UL — SIGNIFICANT CHANGE UP (ref 3.8–5.2)
RBC # BLD: 4.15 M/UL — SIGNIFICANT CHANGE UP (ref 3.8–5.2)
RBC # BLD: 4.15 M/UL — SIGNIFICANT CHANGE UP (ref 3.8–5.2)
RBC # BLD: 4.17 M/UL — SIGNIFICANT CHANGE UP (ref 3.8–5.2)
RBC # BLD: 4.21 M/UL — SIGNIFICANT CHANGE UP (ref 3.8–5.2)
RBC # BLD: 4.23 M/UL — SIGNIFICANT CHANGE UP (ref 3.8–5.2)
RBC # BLD: 4.24 M/UL — SIGNIFICANT CHANGE UP (ref 3.8–5.2)
RBC # BLD: 4.27 M/UL — SIGNIFICANT CHANGE UP (ref 3.8–5.2)
RBC # BLD: 4.33 M/UL — SIGNIFICANT CHANGE UP (ref 3.8–5.2)
RBC # BLD: 4.34 M/UL — SIGNIFICANT CHANGE UP (ref 3.8–5.2)
RBC # BLD: 4.55 M/UL — SIGNIFICANT CHANGE UP (ref 3.8–5.2)
RBC # BLD: 4.6 M/UL — SIGNIFICANT CHANGE UP (ref 3.8–5.2)
RBC # BLD: 4.69 M/UL — SIGNIFICANT CHANGE UP (ref 3.8–5.2)
RBC # BLD: 4.76 M/UL — SIGNIFICANT CHANGE UP (ref 3.8–5.2)
RBC # CSF: 80 /UL — HIGH (ref 0–0)
RBC # FLD: 13.6 % — SIGNIFICANT CHANGE UP (ref 10.3–14.5)
RBC # FLD: 13.8 % — SIGNIFICANT CHANGE UP (ref 10.3–14.5)
RBC # FLD: 13.9 % — SIGNIFICANT CHANGE UP (ref 10.3–14.5)
RBC # FLD: 13.9 % — SIGNIFICANT CHANGE UP (ref 10.3–14.5)
RBC # FLD: 14.1 % — SIGNIFICANT CHANGE UP (ref 10.3–14.5)
RBC # FLD: 14.4 % — SIGNIFICANT CHANGE UP (ref 10.3–14.5)
RBC # FLD: 14.5 % — SIGNIFICANT CHANGE UP (ref 10.3–14.5)
RBC # FLD: 15 % — HIGH (ref 10.3–14.5)
RBC # FLD: 15.3 % — HIGH (ref 10.3–14.5)
RBC # FLD: 15.4 % — HIGH (ref 10.3–14.5)
RBC # FLD: 15.5 % — HIGH (ref 10.3–14.5)
RBC # FLD: 15.5 % — HIGH (ref 10.3–14.5)
RBC # FLD: 15.6 % — HIGH (ref 10.3–14.5)
RBC # FLD: 15.7 % — HIGH (ref 10.3–14.5)
RBC # FLD: 15.9 % — HIGH (ref 10.3–14.5)
RBC # FLD: 16.3 % — HIGH (ref 10.3–14.5)
RBC # FLD: 16.5 % — HIGH (ref 10.3–14.5)
RBC # FLD: 16.7 % — HIGH (ref 10.3–14.5)
RBC # FLD: 17 % — HIGH (ref 10.3–14.5)
RBC # FLD: 17.1 % — HIGH (ref 10.3–14.5)
RBC # FLD: 17.1 % — HIGH (ref 10.3–14.5)
RBC # FLD: 17.5 % — HIGH (ref 10.3–14.5)
RBC # FLD: 20.3 % — HIGH (ref 10.3–14.5)
RBC # FLD: 20.3 % — HIGH (ref 10.3–14.5)
RBC # FLD: 20.4 % — HIGH (ref 10.3–14.5)
RBC # FLD: 20.5 % — HIGH (ref 10.3–14.5)
RBC # FLD: 20.7 % — HIGH (ref 10.3–14.5)
RBC # FLD: 20.7 % — HIGH (ref 10.3–14.5)
RBC # FLD: 20.8 % — HIGH (ref 10.3–14.5)
RBC # FLD: 20.8 % — HIGH (ref 10.3–14.5)
RBC # FLD: 21 % — HIGH (ref 10.3–14.5)
RBC # FLD: 21.1 % — HIGH (ref 10.3–14.5)
RBC # FLD: 21.1 % — HIGH (ref 10.3–14.5)
RBC # FLD: 21.2 % — HIGH (ref 10.3–14.5)
RBC # FLD: 21.9 % — HIGH (ref 10.3–14.5)
RBC # FLD: 23.5 % — HIGH (ref 10.3–14.5)
RBC BLD AUTO: ABNORMAL
RBC BLD AUTO: ABNORMAL
RBC BLD AUTO: NORMAL — SIGNIFICANT CHANGE UP
RBC CASTS # UR COMP ASSIST: 0 /HPF — SIGNIFICANT CHANGE UP (ref 0–4)
RBC CASTS # UR COMP ASSIST: 1 /HPF — SIGNIFICANT CHANGE UP (ref 0–4)
RBC CASTS # UR COMP ASSIST: 2 /HPF — SIGNIFICANT CHANGE UP (ref 0–4)
RH IG SCN BLD-IMP: POSITIVE — SIGNIFICANT CHANGE UP
RSV RNA NPH QL NAA+NON-PROBE: SIGNIFICANT CHANGE UP
RSV RNA NPH QL NAA+NON-PROBE: SIGNIFICANT CHANGE UP
RSV RNA SPEC QL NAA+PROBE: SIGNIFICANT CHANGE UP
RV+EV RNA SPEC QL NAA+PROBE: SIGNIFICANT CHANGE UP
S AUREUS DNA NOSE QL NAA+PROBE: SIGNIFICANT CHANGE UP
S AUREUS DNA NOSE QL NAA+PROBE: SIGNIFICANT CHANGE UP
S PNEUM DNA SPEC QL NAA+PROBE: SIGNIFICANT CHANGE UP
SAO2 % BLDV: 69.5 % — SIGNIFICANT CHANGE UP (ref 67–88)
SAO2 % BLDV: 74 % — SIGNIFICANT CHANGE UP (ref 67–88)
SAO2 % BLDV: 79.6 % — SIGNIFICANT CHANGE UP (ref 67–88)
SARS-COV-2 RNA SPEC QL NAA+PROBE: SIGNIFICANT CHANGE UP
SODIUM SERPL-SCNC: 123 MMOL/L — LOW (ref 135–145)
SODIUM SERPL-SCNC: 125 MMOL/L — LOW (ref 135–145)
SODIUM SERPL-SCNC: 126 MMOL/L — LOW (ref 135–145)
SODIUM SERPL-SCNC: 128 MMOL/L — LOW (ref 135–145)
SODIUM SERPL-SCNC: 129 MMOL/L — LOW (ref 135–145)
SODIUM SERPL-SCNC: 129 MMOL/L — LOW (ref 135–145)
SODIUM SERPL-SCNC: 130 MMOL/L — LOW (ref 135–145)
SODIUM SERPL-SCNC: 131 MMOL/L — LOW (ref 135–145)
SODIUM SERPL-SCNC: 132 MMOL/L — LOW (ref 135–145)
SODIUM SERPL-SCNC: 132 MMOL/L — LOW (ref 135–145)
SODIUM SERPL-SCNC: 133 MMOL/L — LOW (ref 135–145)
SODIUM SERPL-SCNC: 134 MMOL/L — LOW (ref 135–145)
SODIUM SERPL-SCNC: 135 MMOL/L — SIGNIFICANT CHANGE UP (ref 135–145)
SODIUM SERPL-SCNC: 136 MMOL/L — SIGNIFICANT CHANGE UP (ref 135–145)
SODIUM SERPL-SCNC: 136 MMOL/L — SIGNIFICANT CHANGE UP (ref 135–145)
SODIUM SERPL-SCNC: 137 MMOL/L — SIGNIFICANT CHANGE UP (ref 135–145)
SODIUM SERPL-SCNC: 138 MMOL/L — SIGNIFICANT CHANGE UP (ref 135–145)
SODIUM SERPL-SCNC: 139 MMOL/L — SIGNIFICANT CHANGE UP (ref 135–145)
SODIUM SERPL-SCNC: 142 MMOL/L — SIGNIFICANT CHANGE UP (ref 135–145)
SODIUM UR-SCNC: 101 MMOL/L — SIGNIFICANT CHANGE UP
SODIUM UR-SCNC: 155 MMOL/L — SIGNIFICANT CHANGE UP
SODIUM UR-SCNC: 176 MMOL/L — SIGNIFICANT CHANGE UP
SP GR SPEC: 1.01 — SIGNIFICANT CHANGE UP (ref 1–1.03)
SP GR SPEC: 1.02 — SIGNIFICANT CHANGE UP (ref 1–1.03)
SP GR SPEC: 1.04 — HIGH (ref 1–1.03)
SPECIMEN SOURCE: SIGNIFICANT CHANGE UP
SQUAMOUS # UR AUTO: 0 /HPF — SIGNIFICANT CHANGE UP (ref 0–5)
SQUAMOUS # UR AUTO: 1 /HPF — SIGNIFICANT CHANGE UP (ref 0–5)
T3FREE SERPL-MCNC: 0.98 PG/ML — LOW (ref 2–4.4)
T4 AB SER-ACNC: 5.52 UG/DL — SIGNIFICANT CHANGE UP (ref 5.1–13)
T4 FREE SERPL-MCNC: 1.1 NG/DL — SIGNIFICANT CHANGE UP (ref 0.9–1.8)
THC UR QL: POSITIVE
TRIGL SERPL-MCNC: 127 MG/DL — SIGNIFICANT CHANGE UP
TROPONIN I, HIGH SENSITIVITY RESULT: 14.7 NG/L — SIGNIFICANT CHANGE UP
TROPONIN I, HIGH SENSITIVITY RESULT: 6 NG/L — SIGNIFICANT CHANGE UP
TROPONIN I, HIGH SENSITIVITY RESULT: 6.7 NG/L — SIGNIFICANT CHANGE UP
TROPONIN T, HIGH SENSITIVITY RESULT: 109 NG/L — CRITICAL HIGH
TROPONIN T, HIGH SENSITIVITY RESULT: 117 NG/L — CRITICAL HIGH
TROPONIN T, HIGH SENSITIVITY RESULT: 144 NG/L — CRITICAL HIGH
TROPONIN T, HIGH SENSITIVITY RESULT: 35 NG/L — SIGNIFICANT CHANGE UP (ref 0–51)
TROPONIN T, HIGH SENSITIVITY RESULT: 37 NG/L — SIGNIFICANT CHANGE UP (ref 0–51)
TSH SERPL-ACNC: 1.97 UIU/ML
TSH SERPL-MCNC: 0.22 UIU/ML — LOW (ref 0.27–4.2)
TSH SERPL-MCNC: 0.53 UIU/ML — SIGNIFICANT CHANGE UP (ref 0.27–4.2)
TSH SERPL-MCNC: 0.72 UIU/ML — SIGNIFICANT CHANGE UP (ref 0.36–3.74)
TSH SERPL-MCNC: 0.92 UIU/ML — SIGNIFICANT CHANGE UP (ref 0.27–4.2)
TSH SERPL-MCNC: 2.11 UIU/ML — SIGNIFICANT CHANGE UP (ref 0.27–4.2)
TSH SERPL-MCNC: 2.43 UIU/ML — SIGNIFICANT CHANGE UP (ref 0.27–4.2)
TUBE TYPE: SIGNIFICANT CHANGE UP
UROBILINOGEN FLD QL: 0.2 MG/DL — SIGNIFICANT CHANGE UP (ref 0.2–1)
UROBILINOGEN FLD QL: 1 MG/DL — SIGNIFICANT CHANGE UP (ref 0.2–1)
VIT B12 SERPL-MCNC: 1205 PG/ML — HIGH (ref 200–900)
VIT D25+D1,25 OH+D1,25 PNL SERPL-MCNC: 36.4 PG/ML — SIGNIFICANT CHANGE UP (ref 19.9–79.3)
VZV DNA CSF QL NAA+PROBE: SIGNIFICANT CHANGE UP
WBC # BLD: 10.01 K/UL — SIGNIFICANT CHANGE UP (ref 3.8–10.5)
WBC # BLD: 10.54 K/UL — HIGH (ref 3.8–10.5)
WBC # BLD: 10.63 K/UL — HIGH (ref 3.8–10.5)
WBC # BLD: 10.64 K/UL — HIGH (ref 3.8–10.5)
WBC # BLD: 10.73 K/UL — HIGH (ref 3.8–10.5)
WBC # BLD: 10.85 K/UL — HIGH (ref 3.8–10.5)
WBC # BLD: 10.98 K/UL — HIGH (ref 3.8–10.5)
WBC # BLD: 11.34 K/UL — HIGH (ref 3.8–10.5)
WBC # BLD: 12.48 K/UL — HIGH (ref 3.8–10.5)
WBC # BLD: 13.06 K/UL — HIGH (ref 3.8–10.5)
WBC # BLD: 13.83 K/UL — HIGH (ref 3.8–10.5)
WBC # BLD: 2.6 K/UL — LOW (ref 3.8–10.5)
WBC # BLD: 3.43 K/UL — LOW (ref 3.8–10.5)
WBC # BLD: 3.6 K/UL — LOW (ref 3.8–10.5)
WBC # BLD: 4.38 K/UL — SIGNIFICANT CHANGE UP (ref 3.8–10.5)
WBC # BLD: 5 K/UL — SIGNIFICANT CHANGE UP (ref 3.8–10.5)
WBC # BLD: 5 K/UL — SIGNIFICANT CHANGE UP (ref 3.8–10.5)
WBC # BLD: 5.1 K/UL — SIGNIFICANT CHANGE UP (ref 3.8–10.5)
WBC # BLD: 5.16 K/UL — SIGNIFICANT CHANGE UP (ref 3.8–10.5)
WBC # BLD: 6.07 K/UL — SIGNIFICANT CHANGE UP (ref 3.8–10.5)
WBC # BLD: 6.85 K/UL — SIGNIFICANT CHANGE UP (ref 3.8–10.5)
WBC # BLD: 7.1 K/UL — SIGNIFICANT CHANGE UP (ref 3.8–10.5)
WBC # BLD: 7.34 K/UL — SIGNIFICANT CHANGE UP (ref 3.8–10.5)
WBC # BLD: 7.42 K/UL — SIGNIFICANT CHANGE UP (ref 3.8–10.5)
WBC # BLD: 7.62 K/UL — SIGNIFICANT CHANGE UP (ref 3.8–10.5)
WBC # BLD: 7.81 K/UL — SIGNIFICANT CHANGE UP (ref 3.8–10.5)
WBC # BLD: 8.2 K/UL — SIGNIFICANT CHANGE UP (ref 3.8–10.5)
WBC # BLD: 8.33 K/UL — SIGNIFICANT CHANGE UP (ref 3.8–10.5)
WBC # BLD: 8.38 K/UL — SIGNIFICANT CHANGE UP (ref 3.8–10.5)
WBC # BLD: 8.65 K/UL — SIGNIFICANT CHANGE UP (ref 3.8–10.5)
WBC # BLD: 9.08 K/UL — SIGNIFICANT CHANGE UP (ref 3.8–10.5)
WBC # BLD: 9.15 K/UL — SIGNIFICANT CHANGE UP (ref 3.8–10.5)
WBC # BLD: 9.21 K/UL — SIGNIFICANT CHANGE UP (ref 3.8–10.5)
WBC # BLD: 9.34 K/UL — SIGNIFICANT CHANGE UP (ref 3.8–10.5)
WBC # BLD: 9.62 K/UL — SIGNIFICANT CHANGE UP (ref 3.8–10.5)
WBC # BLD: 9.65 K/UL — SIGNIFICANT CHANGE UP (ref 3.8–10.5)
WBC # BLD: 9.78 K/UL — SIGNIFICANT CHANGE UP (ref 3.8–10.5)
WBC # BLD: 9.95 K/UL — SIGNIFICANT CHANGE UP (ref 3.8–10.5)
WBC # FLD AUTO: 10.01 K/UL — SIGNIFICANT CHANGE UP (ref 3.8–10.5)
WBC # FLD AUTO: 10.54 K/UL — HIGH (ref 3.8–10.5)
WBC # FLD AUTO: 10.63 K/UL — HIGH (ref 3.8–10.5)
WBC # FLD AUTO: 10.64 K/UL — HIGH (ref 3.8–10.5)
WBC # FLD AUTO: 10.73 K/UL — HIGH (ref 3.8–10.5)
WBC # FLD AUTO: 10.85 K/UL — HIGH (ref 3.8–10.5)
WBC # FLD AUTO: 10.98 K/UL — HIGH (ref 3.8–10.5)
WBC # FLD AUTO: 11.34 K/UL — HIGH (ref 3.8–10.5)
WBC # FLD AUTO: 12.48 K/UL — HIGH (ref 3.8–10.5)
WBC # FLD AUTO: 13.06 K/UL — HIGH (ref 3.8–10.5)
WBC # FLD AUTO: 13.83 K/UL — HIGH (ref 3.8–10.5)
WBC # FLD AUTO: 2.6 K/UL — LOW (ref 3.8–10.5)
WBC # FLD AUTO: 3.43 K/UL — LOW (ref 3.8–10.5)
WBC # FLD AUTO: 3.6 K/UL — LOW (ref 3.8–10.5)
WBC # FLD AUTO: 4.38 K/UL — SIGNIFICANT CHANGE UP (ref 3.8–10.5)
WBC # FLD AUTO: 5 K/UL — SIGNIFICANT CHANGE UP (ref 3.8–10.5)
WBC # FLD AUTO: 5 K/UL — SIGNIFICANT CHANGE UP (ref 3.8–10.5)
WBC # FLD AUTO: 5.1 K/UL — SIGNIFICANT CHANGE UP (ref 3.8–10.5)
WBC # FLD AUTO: 5.16 K/UL — SIGNIFICANT CHANGE UP (ref 3.8–10.5)
WBC # FLD AUTO: 6.07 K/UL — SIGNIFICANT CHANGE UP (ref 3.8–10.5)
WBC # FLD AUTO: 6.85 K/UL — SIGNIFICANT CHANGE UP (ref 3.8–10.5)
WBC # FLD AUTO: 7.1 K/UL — SIGNIFICANT CHANGE UP (ref 3.8–10.5)
WBC # FLD AUTO: 7.34 K/UL — SIGNIFICANT CHANGE UP (ref 3.8–10.5)
WBC # FLD AUTO: 7.42 K/UL — SIGNIFICANT CHANGE UP (ref 3.8–10.5)
WBC # FLD AUTO: 7.62 K/UL — SIGNIFICANT CHANGE UP (ref 3.8–10.5)
WBC # FLD AUTO: 7.81 K/UL — SIGNIFICANT CHANGE UP (ref 3.8–10.5)
WBC # FLD AUTO: 8.2 K/UL — SIGNIFICANT CHANGE UP (ref 3.8–10.5)
WBC # FLD AUTO: 8.33 K/UL — SIGNIFICANT CHANGE UP (ref 3.8–10.5)
WBC # FLD AUTO: 8.38 K/UL — SIGNIFICANT CHANGE UP (ref 3.8–10.5)
WBC # FLD AUTO: 8.65 K/UL — SIGNIFICANT CHANGE UP (ref 3.8–10.5)
WBC # FLD AUTO: 9.08 K/UL — SIGNIFICANT CHANGE UP (ref 3.8–10.5)
WBC # FLD AUTO: 9.15 K/UL — SIGNIFICANT CHANGE UP (ref 3.8–10.5)
WBC # FLD AUTO: 9.21 K/UL — SIGNIFICANT CHANGE UP (ref 3.8–10.5)
WBC # FLD AUTO: 9.34 K/UL — SIGNIFICANT CHANGE UP (ref 3.8–10.5)
WBC # FLD AUTO: 9.62 K/UL — SIGNIFICANT CHANGE UP (ref 3.8–10.5)
WBC # FLD AUTO: 9.65 K/UL — SIGNIFICANT CHANGE UP (ref 3.8–10.5)
WBC # FLD AUTO: 9.78 K/UL — SIGNIFICANT CHANGE UP (ref 3.8–10.5)
WBC # FLD AUTO: 9.95 K/UL — SIGNIFICANT CHANGE UP (ref 3.8–10.5)
WBC UR QL: 0 /HPF — SIGNIFICANT CHANGE UP (ref 0–5)
WBC UR QL: 0 /HPF — SIGNIFICANT CHANGE UP (ref 0–5)
WBC UR QL: 1 /HPF — SIGNIFICANT CHANGE UP (ref 0–5)

## 2024-01-01 PROCEDURE — 82010 KETONE BODYS QUAN: CPT

## 2024-01-01 PROCEDURE — C9254: CPT

## 2024-01-01 PROCEDURE — 99291 CRITICAL CARE FIRST HOUR: CPT

## 2024-01-01 PROCEDURE — 84300 ASSAY OF URINE SODIUM: CPT

## 2024-01-01 PROCEDURE — 71275 CT ANGIOGRAPHY CHEST: CPT | Mod: 26,MA

## 2024-01-01 PROCEDURE — 99233 SBSQ HOSP IP/OBS HIGH 50: CPT

## 2024-01-01 PROCEDURE — 95718 EEG PHYS/QHP 2-12 HR W/VEEG: CPT

## 2024-01-01 PROCEDURE — 83690 ASSAY OF LIPASE: CPT

## 2024-01-01 PROCEDURE — P9037: CPT

## 2024-01-01 PROCEDURE — 80061 LIPID PANEL: CPT

## 2024-01-01 PROCEDURE — 99233 SBSQ HOSP IP/OBS HIGH 50: CPT | Mod: GC

## 2024-01-01 PROCEDURE — 96365 THER/PROPH/DIAG IV INF INIT: CPT

## 2024-01-01 PROCEDURE — 84157 ASSAY OF PROTEIN OTHER: CPT

## 2024-01-01 PROCEDURE — 77427 RADIATION TX MANAGEMENT X5: CPT

## 2024-01-01 PROCEDURE — 36415 COLL VENOUS BLD VENIPUNCTURE: CPT

## 2024-01-01 PROCEDURE — 95720 EEG PHY/QHP EA INCR W/VEEG: CPT

## 2024-01-01 PROCEDURE — 72157 MRI CHEST SPINE W/O & W/DYE: CPT

## 2024-01-01 PROCEDURE — P9073: CPT

## 2024-01-01 PROCEDURE — 93005 ELECTROCARDIOGRAM TRACING: CPT

## 2024-01-01 PROCEDURE — 77290 THER RAD SIMULAJ FIELD CPLX: CPT | Mod: 26

## 2024-01-01 PROCEDURE — G6002: CPT | Mod: 26

## 2024-01-01 PROCEDURE — 82330 ASSAY OF CALCIUM: CPT

## 2024-01-01 PROCEDURE — 70470 CT HEAD/BRAIN W/O & W/DYE: CPT | Mod: MC

## 2024-01-01 PROCEDURE — 84100 ASSAY OF PHOSPHORUS: CPT

## 2024-01-01 PROCEDURE — 99232 SBSQ HOSP IP/OBS MODERATE 35: CPT

## 2024-01-01 PROCEDURE — 82310 ASSAY OF CALCIUM: CPT

## 2024-01-01 PROCEDURE — 88108 CYTOPATH CONCENTRATE TECH: CPT | Mod: 26

## 2024-01-01 PROCEDURE — 85730 THROMBOPLASTIN TIME PARTIAL: CPT

## 2024-01-01 PROCEDURE — 97166 OT EVAL MOD COMPLEX 45 MIN: CPT

## 2024-01-01 PROCEDURE — 86922 COMPATIBILITY TEST ANTIGLOB: CPT

## 2024-01-01 PROCEDURE — 99285 EMERGENCY DEPT VISIT HI MDM: CPT

## 2024-01-01 PROCEDURE — 97130 THER IVNTJ EA ADDL 15 MIN: CPT

## 2024-01-01 PROCEDURE — 74177 CT ABD & PELVIS W/CONTRAST: CPT | Mod: 26

## 2024-01-01 PROCEDURE — 84439 ASSAY OF FREE THYROXINE: CPT

## 2024-01-01 PROCEDURE — 82947 ASSAY GLUCOSE BLOOD QUANT: CPT

## 2024-01-01 PROCEDURE — 86900 BLOOD TYPING SEROLOGIC ABO: CPT

## 2024-01-01 PROCEDURE — 84484 ASSAY OF TROPONIN QUANT: CPT

## 2024-01-01 PROCEDURE — 96374 THER/PROPH/DIAG INJ IV PUSH: CPT | Mod: XU

## 2024-01-01 PROCEDURE — 86870 RBC ANTIBODY IDENTIFICATION: CPT

## 2024-01-01 PROCEDURE — 72157 MRI CHEST SPINE W/O & W/DYE: CPT | Mod: MC

## 2024-01-01 PROCEDURE — 99284 EMERGENCY DEPT VISIT MOD MDM: CPT | Mod: 25

## 2024-01-01 PROCEDURE — 99285 EMERGENCY DEPT VISIT HI MDM: CPT | Mod: 25

## 2024-01-01 PROCEDURE — 85025 COMPLETE CBC W/AUTO DIFF WBC: CPT

## 2024-01-01 PROCEDURE — 80048 BASIC METABOLIC PNL TOTAL CA: CPT

## 2024-01-01 PROCEDURE — 96374 THER/PROPH/DIAG INJ IV PUSH: CPT

## 2024-01-01 PROCEDURE — 99204 OFFICE O/P NEW MOD 45 MIN: CPT | Mod: GC

## 2024-01-01 PROCEDURE — 61797 SRS CRAN LES SIMPLE ADDL: CPT

## 2024-01-01 PROCEDURE — 87070 CULTURE OTHR SPECIMN AEROBIC: CPT

## 2024-01-01 PROCEDURE — 70498 CT ANGIOGRAPHY NECK: CPT | Mod: 26

## 2024-01-01 PROCEDURE — 71275 CT ANGIOGRAPHY CHEST: CPT | Mod: 26,MC

## 2024-01-01 PROCEDURE — 99215 OFFICE O/P EST HI 40 MIN: CPT

## 2024-01-01 PROCEDURE — 93306 TTE W/DOPPLER COMPLETE: CPT

## 2024-01-01 PROCEDURE — 83735 ASSAY OF MAGNESIUM: CPT

## 2024-01-01 PROCEDURE — 61796 SRS CRANIAL LESION SIMPLE: CPT

## 2024-01-01 PROCEDURE — 93306 TTE W/DOPPLER COMPLETE: CPT | Mod: 26

## 2024-01-01 PROCEDURE — 80053 COMPREHEN METABOLIC PANEL: CPT

## 2024-01-01 PROCEDURE — 96375 TX/PRO/DX INJ NEW DRUG ADDON: CPT

## 2024-01-01 PROCEDURE — 99223 1ST HOSP IP/OBS HIGH 75: CPT

## 2024-01-01 PROCEDURE — 96376 TX/PRO/DX INJ SAME DRUG ADON: CPT

## 2024-01-01 PROCEDURE — P9100: CPT

## 2024-01-01 PROCEDURE — 82232 ASSAY OF BETA-2 PROTEIN: CPT

## 2024-01-01 PROCEDURE — 87637 SARSCOV2&INF A&B&RSV AMP PRB: CPT

## 2024-01-01 PROCEDURE — 70450 CT HEAD/BRAIN W/O DYE: CPT | Mod: 26,ME

## 2024-01-01 PROCEDURE — 83519 RIA NONANTIBODY: CPT

## 2024-01-01 PROCEDURE — 70450 CT HEAD/BRAIN W/O DYE: CPT | Mod: MC

## 2024-01-01 PROCEDURE — 71045 X-RAY EXAM CHEST 1 VIEW: CPT | Mod: 26

## 2024-01-01 PROCEDURE — 99024 POSTOP FOLLOW-UP VISIT: CPT

## 2024-01-01 PROCEDURE — 77295 3-D RADIOTHERAPY PLAN: CPT | Mod: 26

## 2024-01-01 PROCEDURE — 83605 ASSAY OF LACTIC ACID: CPT

## 2024-01-01 PROCEDURE — 70496 CT ANGIOGRAPHY HEAD: CPT | Mod: 26

## 2024-01-01 PROCEDURE — 72156 MRI NECK SPINE W/O & W/DYE: CPT | Mod: MC

## 2024-01-01 PROCEDURE — 70450 CT HEAD/BRAIN W/O DYE: CPT | Mod: 26,MC

## 2024-01-01 PROCEDURE — 70470 CT HEAD/BRAIN W/O & W/DYE: CPT | Mod: 26

## 2024-01-01 PROCEDURE — 85018 HEMOGLOBIN: CPT

## 2024-01-01 PROCEDURE — 78815 PET IMAGE W/CT SKULL-THIGH: CPT | Mod: 26,PI

## 2024-01-01 PROCEDURE — 83615 LACTATE (LD) (LDH) ENZYME: CPT

## 2024-01-01 PROCEDURE — 93010 ELECTROCARDIOGRAM REPORT: CPT

## 2024-01-01 PROCEDURE — 62321 NJX INTERLAMINAR CRV/THRC: CPT

## 2024-01-01 PROCEDURE — 99239 HOSP IP/OBS DSCHRG MGMT >30: CPT | Mod: GC

## 2024-01-01 PROCEDURE — 99222 1ST HOSP IP/OBS MODERATE 55: CPT

## 2024-01-01 PROCEDURE — 36430 TRANSFUSION BLD/BLD COMPNT: CPT

## 2024-01-01 PROCEDURE — 85610 PROTHROMBIN TIME: CPT

## 2024-01-01 PROCEDURE — 96361 HYDRATE IV INFUSION ADD-ON: CPT

## 2024-01-01 PROCEDURE — 95700 EEG CONT REC W/VID EEG TECH: CPT

## 2024-01-01 PROCEDURE — 87040 BLOOD CULTURE FOR BACTERIA: CPT

## 2024-01-01 PROCEDURE — 81001 URINALYSIS AUTO W/SCOPE: CPT

## 2024-01-01 PROCEDURE — 77300 RADIATION THERAPY DOSE PLAN: CPT | Mod: 26

## 2024-01-01 PROCEDURE — 99496 TRANSJ CARE MGMT HIGH F2F 7D: CPT

## 2024-01-01 PROCEDURE — 77334 RADIATION TREATMENT AID(S): CPT | Mod: 26

## 2024-01-01 PROCEDURE — 71275 CT ANGIOGRAPHY CHEST: CPT | Mod: MC

## 2024-01-01 PROCEDURE — G2211 COMPLEX E/M VISIT ADD ON: CPT | Mod: NC,1L

## 2024-01-01 PROCEDURE — 84443 ASSAY THYROID STIM HORMONE: CPT

## 2024-01-01 PROCEDURE — 84145 PROCALCITONIN (PCT): CPT

## 2024-01-01 PROCEDURE — 86077 PHYS BLOOD BANK SERV XMATCH: CPT

## 2024-01-01 PROCEDURE — 83880 ASSAY OF NATRIURETIC PEPTIDE: CPT

## 2024-01-01 PROCEDURE — 86803 HEPATITIS C AB TEST: CPT

## 2024-01-01 PROCEDURE — 97110 THERAPEUTIC EXERCISES: CPT

## 2024-01-01 PROCEDURE — 85379 FIBRIN DEGRADATION QUANT: CPT

## 2024-01-01 PROCEDURE — 83970 ASSAY OF PARATHORMONE: CPT

## 2024-01-01 PROCEDURE — 83036 HEMOGLOBIN GLYCOSYLATED A1C: CPT

## 2024-01-01 PROCEDURE — 82248 BILIRUBIN DIRECT: CPT

## 2024-01-01 PROCEDURE — 74177 CT ABD & PELVIS W/CONTRAST: CPT | Mod: MC

## 2024-01-01 PROCEDURE — 83935 ASSAY OF URINE OSMOLALITY: CPT

## 2024-01-01 PROCEDURE — 70450 CT HEAD/BRAIN W/O DYE: CPT | Mod: 26,MC,77

## 2024-01-01 PROCEDURE — 82247 BILIRUBIN TOTAL: CPT

## 2024-01-01 PROCEDURE — 99443: CPT

## 2024-01-01 PROCEDURE — 86850 RBC ANTIBODY SCREEN: CPT

## 2024-01-01 PROCEDURE — 71045 X-RAY EXAM CHEST 1 VIEW: CPT

## 2024-01-01 PROCEDURE — 95714 VEEG EA 12-26 HR UNMNTR: CPT

## 2024-01-01 PROCEDURE — 71275 CT ANGIOGRAPHY CHEST: CPT | Mod: 26

## 2024-01-01 PROCEDURE — 80307 DRUG TEST PRSMV CHEM ANLYZR: CPT

## 2024-01-01 PROCEDURE — 82306 VITAMIN D 25 HYDROXY: CPT

## 2024-01-01 PROCEDURE — 82962 GLUCOSE BLOOD TEST: CPT

## 2024-01-01 PROCEDURE — 84295 ASSAY OF SERUM SODIUM: CPT

## 2024-01-01 PROCEDURE — 70450 CT HEAD/BRAIN W/O DYE: CPT | Mod: 26

## 2024-01-01 PROCEDURE — 82565 ASSAY OF CREATININE: CPT

## 2024-01-01 PROCEDURE — 71275 CT ANGIOGRAPHY CHEST: CPT | Mod: MA

## 2024-01-01 PROCEDURE — 85027 COMPLETE CBC AUTOMATED: CPT

## 2024-01-01 PROCEDURE — 94002 VENT MGMT INPAT INIT DAY: CPT

## 2024-01-01 PROCEDURE — 78815 PET IMAGE W/CT SKULL-THIGH: CPT

## 2024-01-01 PROCEDURE — 89051 BODY FLUID CELL COUNT: CPT

## 2024-01-01 PROCEDURE — 82140 ASSAY OF AMMONIA: CPT

## 2024-01-01 PROCEDURE — 84481 FREE ASSAY (FT-3): CPT

## 2024-01-01 PROCEDURE — 70553 MRI BRAIN STEM W/O & W/DYE: CPT

## 2024-01-01 PROCEDURE — 77263 THER RADIOLOGY TX PLNG CPLX: CPT

## 2024-01-01 PROCEDURE — 82150 ASSAY OF AMYLASE: CPT

## 2024-01-01 PROCEDURE — 96368 THER/DIAG CONCURRENT INF: CPT

## 2024-01-01 PROCEDURE — 72157 MRI CHEST SPINE W/O & W/DYE: CPT | Mod: 26

## 2024-01-01 PROCEDURE — 99214 OFFICE O/P EST MOD 30 MIN: CPT

## 2024-01-01 PROCEDURE — 77280 THER RAD SIMULAJ FIELD SMPL: CPT | Mod: 26

## 2024-01-01 PROCEDURE — 84132 ASSAY OF SERUM POTASSIUM: CPT

## 2024-01-01 PROCEDURE — 72158 MRI LUMBAR SPINE W/O & W/DYE: CPT | Mod: 26

## 2024-01-01 PROCEDURE — 70553 MRI BRAIN STEM W/O & W/DYE: CPT | Mod: 26

## 2024-01-01 PROCEDURE — G1004: CPT

## 2024-01-01 PROCEDURE — 0225U NFCT DS DNA&RNA 21 SARSCOV2: CPT

## 2024-01-01 PROCEDURE — 84133 ASSAY OF URINE POTASSIUM: CPT

## 2024-01-01 PROCEDURE — 62272 THER SPI PNXR DRG CSF: CPT

## 2024-01-01 PROCEDURE — 87205 SMEAR GRAM STAIN: CPT

## 2024-01-01 PROCEDURE — 86140 C-REACTIVE PROTEIN: CPT

## 2024-01-01 PROCEDURE — 85049 AUTOMATED PLATELET COUNT: CPT

## 2024-01-01 PROCEDURE — 97162 PT EVAL MOD COMPLEX 30 MIN: CPT

## 2024-01-01 PROCEDURE — 95711 VEEG 2-12 HR UNMONITORED: CPT

## 2024-01-01 PROCEDURE — 99202 OFFICE O/P NEW SF 15 MIN: CPT

## 2024-01-01 PROCEDURE — 97129 THER IVNTJ 1ST 15 MIN: CPT

## 2024-01-01 PROCEDURE — 83010 ASSAY OF HAPTOGLOBIN QUANT: CPT

## 2024-01-01 PROCEDURE — 93970 EXTREMITY STUDY: CPT | Mod: 26

## 2024-01-01 PROCEDURE — 72158 MRI LUMBAR SPINE W/O & W/DYE: CPT | Mod: MC

## 2024-01-01 PROCEDURE — 97116 GAIT TRAINING THERAPY: CPT

## 2024-01-01 PROCEDURE — 87086 URINE CULTURE/COLONY COUNT: CPT

## 2024-01-01 PROCEDURE — 99292 CRITICAL CARE ADDL 30 MIN: CPT

## 2024-01-01 PROCEDURE — 87483 CNS DNA AMP PROBE TYPE 12-25: CPT

## 2024-01-01 PROCEDURE — 85014 HEMATOCRIT: CPT

## 2024-01-01 PROCEDURE — 87640 STAPH A DNA AMP PROBE: CPT

## 2024-01-01 PROCEDURE — 82803 BLOOD GASES ANY COMBINATION: CPT

## 2024-01-01 PROCEDURE — A9579: CPT

## 2024-01-01 PROCEDURE — 83930 ASSAY OF BLOOD OSMOLALITY: CPT

## 2024-01-01 PROCEDURE — 85384 FIBRINOGEN ACTIVITY: CPT

## 2024-01-01 PROCEDURE — 77432 STEREOTACTIC RADIATION TRMT: CPT

## 2024-01-01 PROCEDURE — 80076 HEPATIC FUNCTION PANEL: CPT

## 2024-01-01 PROCEDURE — 71045 X-RAY EXAM CHEST 1 VIEW: CPT | Mod: 26,77

## 2024-01-01 PROCEDURE — 94003 VENT MGMT INPAT SUBQ DAY: CPT

## 2024-01-01 PROCEDURE — 82945 GLUCOSE OTHER FLUID: CPT

## 2024-01-01 PROCEDURE — 97161 PT EVAL LOW COMPLEX 20 MIN: CPT

## 2024-01-01 PROCEDURE — 70553 MRI BRAIN STEM W/O & W/DYE: CPT | Mod: MC

## 2024-01-01 PROCEDURE — 82435 ASSAY OF BLOOD CHLORIDE: CPT

## 2024-01-01 PROCEDURE — 86901 BLOOD TYPING SEROLOGIC RH(D): CPT

## 2024-01-01 PROCEDURE — 99205 OFFICE O/P NEW HI 60 MIN: CPT | Mod: 25

## 2024-01-01 PROCEDURE — 93970 EXTREMITY STUDY: CPT

## 2024-01-01 PROCEDURE — 70450 CT HEAD/BRAIN W/O DYE: CPT | Mod: ME

## 2024-01-01 PROCEDURE — 86880 COOMBS TEST DIRECT: CPT

## 2024-01-01 PROCEDURE — 93010 ELECTROCARDIOGRAM REPORT: CPT | Mod: 76,77

## 2024-01-01 PROCEDURE — 90792 PSYCH DIAG EVAL W/MED SRVCS: CPT

## 2024-01-01 PROCEDURE — A9552: CPT

## 2024-01-01 PROCEDURE — 72156 MRI NECK SPINE W/O & W/DYE: CPT | Mod: 26

## 2024-01-01 PROCEDURE — 70450 CT HEAD/BRAIN W/O DYE: CPT | Mod: 26,59

## 2024-01-01 PROCEDURE — 82570 ASSAY OF URINE CREATININE: CPT

## 2024-01-01 PROCEDURE — A9585: CPT

## 2024-01-01 PROCEDURE — 99239 HOSP IP/OBS DSCHRG MGMT >30: CPT

## 2024-01-01 PROCEDURE — 87641 MR-STAPH DNA AMP PROBE: CPT

## 2024-01-01 RX ORDER — FAMOTIDINE 10 MG/ML
1 INJECTION INTRAVENOUS
Qty: 30 | Refills: 0
Start: 2024-01-01 | End: 2024-05-19

## 2024-01-01 RX ORDER — SODIUM CHLORIDE 9 MG/ML
1000 INJECTION, SOLUTION INTRAVENOUS
Refills: 0 | Status: DISCONTINUED | OUTPATIENT
Start: 2024-01-01 | End: 2024-01-01

## 2024-01-01 RX ORDER — HYDRALAZINE HCL 50 MG
5 TABLET ORAL ONCE
Refills: 0 | Status: DISCONTINUED | OUTPATIENT
Start: 2024-01-01 | End: 2024-01-01

## 2024-01-01 RX ORDER — DEXTROSE 50 % IN WATER 50 %
15 SYRINGE (ML) INTRAVENOUS ONCE
Refills: 0 | Status: DISCONTINUED | OUTPATIENT
Start: 2024-01-01 | End: 2024-01-01

## 2024-01-01 RX ORDER — OXYCODONE HYDROCHLORIDE 5 MG/1
5 TABLET ORAL EVERY 4 HOURS
Refills: 0 | Status: DISCONTINUED | OUTPATIENT
Start: 2024-01-01 | End: 2024-01-01

## 2024-01-01 RX ORDER — POTASSIUM CHLORIDE 20 MEQ
10 PACKET (EA) ORAL
Refills: 0 | Status: DISCONTINUED | OUTPATIENT
Start: 2024-01-01 | End: 2024-01-01

## 2024-01-01 RX ORDER — LACOSAMIDE 50 MG/1
150 TABLET ORAL
Refills: 0 | Status: DISCONTINUED | OUTPATIENT
Start: 2024-01-01 | End: 2024-01-01

## 2024-01-01 RX ORDER — INSULIN LISPRO 100/ML
VIAL (ML) SUBCUTANEOUS
Refills: 0 | Status: DISCONTINUED | OUTPATIENT
Start: 2024-01-01 | End: 2024-01-01

## 2024-01-01 RX ORDER — MORPHINE SULFATE 15 MG/1
15 TABLET ORAL TWICE DAILY
Refills: 0 | Status: ACTIVE | COMMUNITY
Start: 2024-01-01

## 2024-01-01 RX ORDER — HYDRALAZINE HCL 50 MG
5 TABLET ORAL ONCE
Refills: 0 | Status: COMPLETED | OUTPATIENT
Start: 2024-01-01 | End: 2024-01-01

## 2024-01-01 RX ORDER — NALOXONE HYDROCHLORIDE 4 MG/.1ML
4 SPRAY NASAL
Qty: 1 | Refills: 0
Start: 2024-01-01 | End: 2024-01-01

## 2024-01-01 RX ORDER — INSULIN HUMAN 100 [IU]/ML
2 INJECTION, SOLUTION SUBCUTANEOUS
Qty: 100 | Refills: 0 | Status: DISCONTINUED | OUTPATIENT
Start: 2024-01-01 | End: 2024-01-01

## 2024-01-01 RX ORDER — HYDROMORPHONE HYDROCHLORIDE 2 MG/ML
4 INJECTION INTRAMUSCULAR; INTRAVENOUS; SUBCUTANEOUS EVERY 4 HOURS
Refills: 0 | Status: DISCONTINUED | OUTPATIENT
Start: 2024-01-01 | End: 2024-01-01

## 2024-01-01 RX ORDER — HEPARIN SODIUM 5000 [USP'U]/ML
1500 INJECTION INTRAVENOUS; SUBCUTANEOUS EVERY 6 HOURS
Refills: 0 | Status: DISCONTINUED | OUTPATIENT
Start: 2024-01-01 | End: 2024-01-01

## 2024-01-01 RX ORDER — PROPOFOL 10 MG/ML
10 INJECTION, EMULSION INTRAVENOUS
Qty: 500 | Refills: 0 | Status: DISCONTINUED | OUTPATIENT
Start: 2024-01-01 | End: 2024-01-01

## 2024-01-01 RX ORDER — DEXAMETHASONE 0.5 MG/5ML
2 ELIXIR ORAL EVERY 12 HOURS
Refills: 0 | Status: DISCONTINUED | OUTPATIENT
Start: 2024-01-01 | End: 2024-01-01

## 2024-01-01 RX ORDER — LEVETIRACETAM 250 MG/1
1000 TABLET, FILM COATED ORAL EVERY 12 HOURS
Refills: 0 | Status: DISCONTINUED | OUTPATIENT
Start: 2024-01-01 | End: 2024-01-01

## 2024-01-01 RX ORDER — PROPOFOL 10 MG/ML
50 INJECTION, EMULSION INTRAVENOUS
Qty: 500 | Refills: 0 | Status: DISCONTINUED | OUTPATIENT
Start: 2024-01-01 | End: 2024-01-01

## 2024-01-01 RX ORDER — OLANZAPINE 15 MG/1
2.5 TABLET, FILM COATED ORAL EVERY 8 HOURS
Refills: 0 | Status: DISCONTINUED | OUTPATIENT
Start: 2024-01-01 | End: 2024-01-01

## 2024-01-01 RX ORDER — SODIUM CHLORIDE 9 MG/ML
1000 INJECTION INTRAMUSCULAR; INTRAVENOUS; SUBCUTANEOUS ONCE
Refills: 0 | Status: COMPLETED | OUTPATIENT
Start: 2024-01-01 | End: 2024-01-01

## 2024-01-01 RX ORDER — DEXTROSE 50 % IN WATER 50 %
25 SYRINGE (ML) INTRAVENOUS ONCE
Refills: 0 | Status: DISCONTINUED | OUTPATIENT
Start: 2024-01-01 | End: 2024-01-01

## 2024-01-01 RX ORDER — CHLORHEXIDINE GLUCONATE 4 %
5 LIQUID (ML) TOPICAL AT BEDTIME
Refills: 0 | Status: ACTIVE | COMMUNITY
Start: 2024-01-01

## 2024-01-01 RX ORDER — GABAPENTIN 400 MG/1
1 CAPSULE ORAL
Qty: 90 | Refills: 0
Start: 2024-01-01 | End: 2024-01-01

## 2024-01-01 RX ORDER — GABAPENTIN 400 MG/1
1 CAPSULE ORAL
Qty: 0 | Refills: 0 | DISCHARGE
Start: 2024-01-01

## 2024-01-01 RX ORDER — MELOXICAM 15 MG/1
1 TABLET ORAL
Refills: 0 | DISCHARGE

## 2024-01-01 RX ORDER — OXYCODONE HYDROCHLORIDE 5 MG/1
15 TABLET ORAL EVERY 12 HOURS
Refills: 0 | Status: DISCONTINUED | OUTPATIENT
Start: 2024-01-01 | End: 2024-01-01

## 2024-01-01 RX ORDER — HYDROMORPHONE HYDROCHLORIDE 2 MG/ML
1 INJECTION INTRAMUSCULAR; INTRAVENOUS; SUBCUTANEOUS ONCE
Refills: 0 | Status: DISCONTINUED | OUTPATIENT
Start: 2024-01-01 | End: 2024-01-01

## 2024-01-01 RX ORDER — MAGNESIUM SULFATE 500 MG/ML
2 VIAL (ML) INJECTION ONCE
Refills: 0 | Status: DISCONTINUED | OUTPATIENT
Start: 2024-01-01 | End: 2024-01-01

## 2024-01-01 RX ORDER — GLUCAGON INJECTION, SOLUTION 0.5 MG/.1ML
1 INJECTION, SOLUTION SUBCUTANEOUS ONCE
Refills: 0 | Status: DISCONTINUED | OUTPATIENT
Start: 2024-01-01 | End: 2024-01-01

## 2024-01-01 RX ORDER — VANCOMYCIN HCL 1 G
1000 VIAL (EA) INTRAVENOUS EVERY 12 HOURS
Refills: 0 | Status: DISCONTINUED | OUTPATIENT
Start: 2024-01-01 | End: 2024-01-01

## 2024-01-01 RX ORDER — HYDROMORPHONE HYDROCHLORIDE 2 MG/ML
0.5 INJECTION INTRAMUSCULAR; INTRAVENOUS; SUBCUTANEOUS ONCE
Refills: 0 | Status: DISCONTINUED | OUTPATIENT
Start: 2024-01-01 | End: 2024-01-01

## 2024-01-01 RX ORDER — CLOPIDOGREL BISULFATE 75 MG/1
300 TABLET, FILM COATED ORAL ONCE
Refills: 0 | Status: COMPLETED | OUTPATIENT
Start: 2024-01-01 | End: 2024-01-01

## 2024-01-01 RX ORDER — SENNA PLUS 8.6 MG/1
2 TABLET ORAL AT BEDTIME
Refills: 0 | Status: DISCONTINUED | OUTPATIENT
Start: 2024-01-01 | End: 2024-01-01

## 2024-01-01 RX ORDER — POLYETHYLENE GLYCOL 3350 17 G/17G
17 POWDER, FOR SOLUTION ORAL DAILY
Refills: 0 | Status: DISCONTINUED | OUTPATIENT
Start: 2024-01-01 | End: 2024-01-01

## 2024-01-01 RX ORDER — POTASSIUM CHLORIDE 20 MEQ
20 PACKET (EA) ORAL EVERY 4 HOURS
Refills: 0 | Status: COMPLETED | OUTPATIENT
Start: 2024-01-01 | End: 2024-01-01

## 2024-01-01 RX ORDER — APIXABAN 2.5 MG/1
10 TABLET, FILM COATED ORAL
Qty: 10 | Refills: 0
Start: 2024-01-01

## 2024-01-01 RX ORDER — DIPHENHYDRAMINE HCL 50 MG
25 CAPSULE ORAL ONCE
Refills: 0 | Status: COMPLETED | OUTPATIENT
Start: 2024-01-01 | End: 2024-01-01

## 2024-01-01 RX ORDER — POTASSIUM CHLORIDE 20 MEQ
40 PACKET (EA) ORAL ONCE
Refills: 0 | Status: COMPLETED | OUTPATIENT
Start: 2024-01-01 | End: 2024-01-01

## 2024-01-01 RX ORDER — ACETAMINOPHEN 500 MG
1000 TABLET ORAL ONCE
Refills: 0 | Status: COMPLETED | OUTPATIENT
Start: 2024-01-01 | End: 2024-01-01

## 2024-01-01 RX ORDER — IPRATROPIUM/ALBUTEROL SULFATE 18-103MCG
3 AEROSOL WITH ADAPTER (GRAM) INHALATION EVERY 6 HOURS
Refills: 0 | Status: DISCONTINUED | OUTPATIENT
Start: 2024-01-01 | End: 2024-01-01

## 2024-01-01 RX ORDER — MAGNESIUM SULFATE 500 MG/ML
2 VIAL (ML) INJECTION
Refills: 0 | Status: COMPLETED | OUTPATIENT
Start: 2024-01-01 | End: 2024-01-01

## 2024-01-01 RX ORDER — HYDRALAZINE HCL 50 MG
10 TABLET ORAL ONCE
Refills: 0 | Status: COMPLETED | OUTPATIENT
Start: 2024-01-01 | End: 2024-01-01

## 2024-01-01 RX ORDER — LOSARTAN POTASSIUM 50 MG/1
50 TABLET, FILM COATED ORAL
Qty: 90 | Refills: 3 | Status: ACTIVE | COMMUNITY
Start: 2024-01-01

## 2024-01-01 RX ORDER — ONDANSETRON 8 MG/1
8 TABLET, ORALLY DISINTEGRATING ORAL EVERY 8 HOURS
Qty: 24 | Refills: 2 | Status: ACTIVE | COMMUNITY
Start: 2024-01-01 | End: 1900-01-01

## 2024-01-01 RX ORDER — LANOLIN ALCOHOL/MO/W.PET/CERES
3 CREAM (GRAM) TOPICAL AT BEDTIME
Refills: 0 | Status: DISCONTINUED | OUTPATIENT
Start: 2024-01-01 | End: 2024-01-01

## 2024-01-01 RX ORDER — MAGNESIUM SULFATE 500 MG/ML
2 VIAL (ML) INJECTION ONCE
Refills: 0 | Status: COMPLETED | OUTPATIENT
Start: 2024-01-01 | End: 2024-01-01

## 2024-01-01 RX ORDER — FAMOTIDINE 20 MG/1
20 TABLET, FILM COATED ORAL
Refills: 0 | Status: ACTIVE | COMMUNITY
Start: 2024-01-01

## 2024-01-01 RX ORDER — SENNA PLUS 8.6 MG/1
1 TABLET ORAL AT BEDTIME
Refills: 0 | Status: DISCONTINUED | OUTPATIENT
Start: 2024-01-01 | End: 2024-01-01

## 2024-01-01 RX ORDER — GABAPENTIN 400 MG/1
100 CAPSULE ORAL THREE TIMES A DAY
Refills: 0 | Status: DISCONTINUED | OUTPATIENT
Start: 2024-01-01 | End: 2024-01-01

## 2024-01-01 RX ORDER — ACETAMINOPHEN 500 MG
750 TABLET ORAL ONCE
Refills: 0 | Status: COMPLETED | OUTPATIENT
Start: 2024-01-01 | End: 2024-01-01

## 2024-01-01 RX ORDER — HYDROMORPHONE HYDROCHLORIDE 2 MG/ML
2 INJECTION INTRAMUSCULAR; INTRAVENOUS; SUBCUTANEOUS
Refills: 0 | DISCHARGE

## 2024-01-01 RX ORDER — MORPHINE SULFATE 50 MG/1
15 CAPSULE, EXTENDED RELEASE ORAL
Refills: 0 | Status: DISCONTINUED | OUTPATIENT
Start: 2024-01-01 | End: 2024-01-01

## 2024-01-01 RX ORDER — DEXAMETHASONE 0.5 MG/5ML
10 ELIXIR ORAL ONCE
Refills: 0 | Status: COMPLETED | OUTPATIENT
Start: 2024-01-01 | End: 2024-01-01

## 2024-01-01 RX ORDER — POTASSIUM CHLORIDE 20 MEQ
40 PACKET (EA) ORAL EVERY 4 HOURS
Refills: 0 | Status: COMPLETED | OUTPATIENT
Start: 2024-01-01 | End: 2024-01-01

## 2024-01-01 RX ORDER — MELOXICAM 15 MG/1
1 TABLET ORAL
Qty: 30 | Refills: 0
Start: 2024-01-01 | End: 2024-01-01

## 2024-01-01 RX ORDER — GABAPENTIN 400 MG/1
1 CAPSULE ORAL
Refills: 0 | DISCHARGE

## 2024-01-01 RX ORDER — APIXABAN 2.5 MG/1
10 TABLET, FILM COATED ORAL EVERY 12 HOURS
Refills: 0 | Status: DISCONTINUED | OUTPATIENT
Start: 2024-01-01 | End: 2024-01-01

## 2024-01-01 RX ORDER — OXYCODONE HYDROCHLORIDE 5 MG/1
10 TABLET ORAL EVERY 4 HOURS
Refills: 0 | Status: DISCONTINUED | OUTPATIENT
Start: 2024-01-01 | End: 2024-01-01

## 2024-01-01 RX ORDER — HYDROMORPHONE HYDROCHLORIDE 2 MG/ML
1 INJECTION INTRAMUSCULAR; INTRAVENOUS; SUBCUTANEOUS EVERY 4 HOURS
Refills: 0 | Status: DISCONTINUED | OUTPATIENT
Start: 2024-01-01 | End: 2024-01-01

## 2024-01-01 RX ORDER — DEXAMETHASONE 0.5 MG/5ML
4 ELIXIR ORAL EVERY 12 HOURS
Refills: 0 | Status: DISCONTINUED | OUTPATIENT
Start: 2024-01-01 | End: 2024-01-01

## 2024-01-01 RX ORDER — CALCIUM GLUCONATE 100 MG/ML
2 VIAL (ML) INTRAVENOUS ONCE
Refills: 0 | Status: COMPLETED | OUTPATIENT
Start: 2024-01-01 | End: 2024-01-01

## 2024-01-01 RX ORDER — MORPHINE SULFATE 15 MG/1
15 TABLET, FILM COATED, EXTENDED RELEASE ORAL
Qty: 90 | Refills: 0 | Status: COMPLETED | COMMUNITY
Start: 2024-01-01 | End: 2024-01-01

## 2024-01-01 RX ORDER — CLOPIDOGREL BISULFATE 75 MG/1
300 TABLET, FILM COATED ORAL ONCE
Refills: 0 | Status: DISCONTINUED | OUTPATIENT
Start: 2024-01-01 | End: 2024-01-01

## 2024-01-01 RX ORDER — METOCLOPRAMIDE HCL 10 MG
5 TABLET ORAL ONCE
Refills: 0 | Status: COMPLETED | OUTPATIENT
Start: 2024-01-01 | End: 2024-01-01

## 2024-01-01 RX ORDER — CHLORHEXIDINE GLUCONATE 213 G/1000ML
1 SOLUTION TOPICAL
Refills: 0 | Status: DISCONTINUED | OUTPATIENT
Start: 2024-01-01 | End: 2024-01-01

## 2024-01-01 RX ORDER — PIPERACILLIN AND TAZOBACTAM 4; .5 G/20ML; G/20ML
3.38 INJECTION, POWDER, LYOPHILIZED, FOR SOLUTION INTRAVENOUS ONCE
Refills: 0 | Status: COMPLETED | OUTPATIENT
Start: 2024-01-01 | End: 2024-01-01

## 2024-01-01 RX ORDER — POLYETHYLENE GLYCOL 3350 17 G/17G
17 POWDER, FOR SOLUTION ORAL DAILY
Refills: 0 | Status: COMPLETED | COMMUNITY
Start: 2024-01-01 | End: 2024-01-01

## 2024-01-01 RX ORDER — DIAZEPAM 5 MG
5 TABLET ORAL ONCE
Refills: 0 | Status: DISCONTINUED | OUTPATIENT
Start: 2024-01-01 | End: 2024-01-01

## 2024-01-01 RX ORDER — ACETAMINOPHEN 500 MG
2 TABLET ORAL
Refills: 0 | DISCHARGE

## 2024-01-01 RX ORDER — ALPRAZOLAM 0.25 MG
1 TABLET ORAL
Refills: 0 | DISCHARGE

## 2024-01-01 RX ORDER — HYDROMORPHONE HYDROCHLORIDE 2 MG/ML
1 INJECTION INTRAMUSCULAR; INTRAVENOUS; SUBCUTANEOUS
Refills: 0 | DISCHARGE

## 2024-01-01 RX ORDER — GABAPENTIN 300 MG/1
300 CAPSULE ORAL
Qty: 90 | Refills: 1 | Status: COMPLETED | COMMUNITY
Start: 2024-01-01 | End: 2024-01-01

## 2024-01-01 RX ORDER — MAGNESIUM SULFATE 500 MG/ML
1 VIAL (ML) INJECTION ONCE
Refills: 0 | Status: COMPLETED | OUTPATIENT
Start: 2024-01-01 | End: 2024-01-01

## 2024-01-01 RX ORDER — LANOLIN ALCOHOL/MO/W.PET/CERES
1 CREAM (GRAM) TOPICAL
Qty: 0 | Refills: 0 | DISCHARGE
Start: 2024-01-01

## 2024-01-01 RX ORDER — MEROPENEM 1 G/30ML
1000 INJECTION INTRAVENOUS EVERY 8 HOURS
Refills: 0 | Status: DISCONTINUED | OUTPATIENT
Start: 2024-01-01 | End: 2024-01-01

## 2024-01-01 RX ORDER — HYDROMORPHONE HYDROCHLORIDE 2 MG/ML
0.2 INJECTION INTRAMUSCULAR; INTRAVENOUS; SUBCUTANEOUS EVERY 4 HOURS
Refills: 0 | Status: DISCONTINUED | OUTPATIENT
Start: 2024-01-01 | End: 2024-01-01

## 2024-01-01 RX ORDER — NOREPINEPHRINE BITARTRATE/D5W 8 MG/250ML
0.05 PLASTIC BAG, INJECTION (ML) INTRAVENOUS
Qty: 8 | Refills: 0 | Status: DISCONTINUED | OUTPATIENT
Start: 2024-01-01 | End: 2024-01-01

## 2024-01-01 RX ORDER — LANOLIN ALCOHOL/MO/W.PET/CERES
5 CREAM (GRAM) TOPICAL AT BEDTIME
Refills: 0 | Status: DISCONTINUED | OUTPATIENT
Start: 2024-01-01 | End: 2024-01-01

## 2024-01-01 RX ORDER — SODIUM CHLORIDE 9 MG/ML
10 INJECTION INTRAMUSCULAR; INTRAVENOUS; SUBCUTANEOUS
Refills: 0 | Status: DISCONTINUED | OUTPATIENT
Start: 2024-01-01 | End: 2024-01-01

## 2024-01-01 RX ORDER — CEFTRIAXONE 500 MG/1
1000 INJECTION, POWDER, FOR SOLUTION INTRAMUSCULAR; INTRAVENOUS ONCE
Refills: 0 | Status: COMPLETED | OUTPATIENT
Start: 2024-01-01 | End: 2024-01-01

## 2024-01-01 RX ORDER — LOSARTAN POTASSIUM 100 MG/1
1 TABLET, FILM COATED ORAL
Qty: 0 | Refills: 0 | DISCHARGE
Start: 2024-01-01

## 2024-01-01 RX ORDER — ACETAMINOPHEN 500 MG
650 TABLET ORAL EVERY 6 HOURS
Refills: 0 | Status: DISCONTINUED | OUTPATIENT
Start: 2024-01-01 | End: 2024-01-01

## 2024-01-01 RX ORDER — ACETAMINOPHEN 500 MG
550 TABLET ORAL ONCE
Refills: 0 | Status: COMPLETED | OUTPATIENT
Start: 2024-01-01 | End: 2024-01-01

## 2024-01-01 RX ORDER — ASPIRIN/CALCIUM CARB/MAGNESIUM 324 MG
324 TABLET ORAL ONCE
Refills: 0 | Status: COMPLETED | OUTPATIENT
Start: 2024-01-01 | End: 2024-01-01

## 2024-01-01 RX ORDER — GABAPENTIN 400 MG/1
600 CAPSULE ORAL THREE TIMES A DAY
Refills: 0 | Status: DISCONTINUED | OUTPATIENT
Start: 2024-01-01 | End: 2024-01-01

## 2024-01-01 RX ORDER — PHENYLEPHRINE HYDROCHLORIDE 10 MG/ML
0.1 INJECTION INTRAVENOUS
Qty: 40 | Refills: 0 | Status: DISCONTINUED | OUTPATIENT
Start: 2024-01-01 | End: 2024-01-01

## 2024-01-01 RX ORDER — LACOSAMIDE 150 MG/1
150 TABLET, FILM COATED ORAL
Refills: 0 | Status: ACTIVE | COMMUNITY
Start: 2024-01-01

## 2024-01-01 RX ORDER — ACETAMINOPHEN 500 MG
1 TABLET ORAL
Qty: 0 | Refills: 0 | DISCHARGE

## 2024-01-01 RX ORDER — POLYETHYLENE GLYCOL 3350 17 G/17G
17 POWDER, FOR SOLUTION ORAL
Qty: 0 | Refills: 0 | DISCHARGE
Start: 2024-01-01

## 2024-01-01 RX ORDER — METOCLOPRAMIDE HCL 10 MG
10 TABLET ORAL ONCE
Refills: 0 | Status: COMPLETED | OUTPATIENT
Start: 2024-01-01 | End: 2024-01-01

## 2024-01-01 RX ORDER — OLANZAPINE 15 MG/1
5 TABLET, FILM COATED ORAL EVERY 6 HOURS
Refills: 0 | Status: DISCONTINUED | OUTPATIENT
Start: 2024-01-01 | End: 2024-01-01

## 2024-01-01 RX ORDER — HEPARIN SODIUM 5000 [USP'U]/ML
300 INJECTION INTRAVENOUS; SUBCUTANEOUS
Qty: 25000 | Refills: 0 | Status: DISCONTINUED | OUTPATIENT
Start: 2024-01-01 | End: 2024-01-01

## 2024-01-01 RX ORDER — ACETAMINOPHEN 500 MG
650 TABLET ORAL ONCE
Refills: 0 | Status: COMPLETED | OUTPATIENT
Start: 2024-01-01 | End: 2024-01-01

## 2024-01-01 RX ORDER — SODIUM CHLORIDE 5 G/100ML
1000 INJECTION, SOLUTION INTRAVENOUS
Refills: 0 | Status: DISCONTINUED | OUTPATIENT
Start: 2024-01-01 | End: 2024-01-01

## 2024-01-01 RX ORDER — DEXAMETHASONE 0.5 MG/5ML
1 ELIXIR ORAL
Qty: 0 | Refills: 0 | DISCHARGE
Start: 2024-01-01

## 2024-01-01 RX ORDER — FOLIC ACID 0.8 MG
1 TABLET ORAL
Refills: 0 | DISCHARGE

## 2024-01-01 RX ORDER — DEXAMETHASONE 0.5 MG/5ML
1 ELIXIR ORAL
Qty: 60 | Refills: 0
Start: 2024-01-01 | End: 2024-05-19

## 2024-01-01 RX ORDER — LABETALOL HCL 100 MG
10 TABLET ORAL ONCE
Refills: 0 | Status: COMPLETED | OUTPATIENT
Start: 2024-01-01 | End: 2024-01-01

## 2024-01-01 RX ORDER — HYDRALAZINE HCL 50 MG
10 TABLET ORAL EVERY 6 HOURS
Refills: 0 | Status: DISCONTINUED | OUTPATIENT
Start: 2024-01-01 | End: 2024-01-01

## 2024-01-01 RX ORDER — PROPOFOL 10 MG/ML
10 INJECTION, EMULSION INTRAVENOUS
Qty: 1000 | Refills: 0 | Status: DISCONTINUED | OUTPATIENT
Start: 2024-01-01 | End: 2024-01-01

## 2024-01-01 RX ORDER — ALPRAZOLAM 0.25 MG/1
0.25 TABLET ORAL 3 TIMES DAILY
Qty: 30 | Refills: 0 | Status: COMPLETED | COMMUNITY
Start: 2024-01-01 | End: 2024-01-01

## 2024-01-01 RX ORDER — LEVETIRACETAM 250 MG/1
1000 TABLET, FILM COATED ORAL ONCE
Refills: 0 | Status: COMPLETED | OUTPATIENT
Start: 2024-01-01 | End: 2024-01-01

## 2024-01-01 RX ORDER — FENTANYL CITRATE 50 UG/ML
25 INJECTION INTRAVENOUS
Refills: 0 | Status: DISCONTINUED | OUTPATIENT
Start: 2024-01-01 | End: 2024-01-01

## 2024-01-01 RX ORDER — POLYETHYLENE GLYCOL 3350 17 G/17G
17 POWDER, FOR SOLUTION ORAL
Refills: 0 | Status: DISCONTINUED | OUTPATIENT
Start: 2024-01-01 | End: 2024-01-01

## 2024-01-01 RX ORDER — PROPOFOL 10 MG/ML
70 INJECTION, EMULSION INTRAVENOUS ONCE
Refills: 0 | Status: COMPLETED | OUTPATIENT
Start: 2024-01-01 | End: 2024-01-01

## 2024-01-01 RX ORDER — PHYTONADIONE (VIT K1) 5 MG
10 TABLET ORAL ONCE
Refills: 0 | Status: COMPLETED | OUTPATIENT
Start: 2024-01-01 | End: 2024-01-01

## 2024-01-01 RX ORDER — LACOSAMIDE 50 MG/1
1 TABLET ORAL
Qty: 60 | Refills: 0
Start: 2024-01-01 | End: 2024-05-18

## 2024-01-01 RX ORDER — APIXABAN 5 MG/1
5 TABLET, FILM COATED ORAL
Qty: 180 | Refills: 0 | Status: COMPLETED | COMMUNITY
Start: 2024-01-01 | End: 2024-01-01

## 2024-01-01 RX ORDER — MORPHINE SULFATE 50 MG/1
1 CAPSULE, EXTENDED RELEASE ORAL
Refills: 0 | DISCHARGE

## 2024-01-01 RX ORDER — ALPRAZOLAM 0.5 MG/1
0.5 TABLET ORAL DAILY
Refills: 0 | Status: ACTIVE | COMMUNITY
Start: 2022-02-15

## 2024-01-01 RX ORDER — SODIUM,POTASSIUM PHOSPHATES 278-250MG
2 POWDER IN PACKET (EA) ORAL ONCE
Refills: 0 | Status: COMPLETED | OUTPATIENT
Start: 2024-01-01 | End: 2024-01-01

## 2024-01-01 RX ORDER — ALPRAZOLAM 0.25 MG
0.5 TABLET ORAL DAILY
Refills: 0 | Status: DISCONTINUED | OUTPATIENT
Start: 2024-01-01 | End: 2024-01-01

## 2024-01-01 RX ORDER — SODIUM CHLORIDE 5 G/100ML
500 INJECTION, SOLUTION INTRAVENOUS
Refills: 0 | Status: DISCONTINUED | OUTPATIENT
Start: 2024-01-01 | End: 2024-01-01

## 2024-01-01 RX ORDER — CEFEPIME 1 G/1
INJECTION, POWDER, FOR SOLUTION INTRAMUSCULAR; INTRAVENOUS
Refills: 0 | Status: ACTIVE | OUTPATIENT
Start: 2024-01-01

## 2024-01-01 RX ORDER — NALOXONE HYDROCHLORIDE 4 MG/.1ML
0.4 SPRAY NASAL ONCE
Refills: 0 | Status: COMPLETED | OUTPATIENT
Start: 2024-01-01 | End: 2024-01-01

## 2024-01-01 RX ORDER — SODIUM CHLORIDE 9 MG/ML
3 INJECTION INTRAMUSCULAR; INTRAVENOUS; SUBCUTANEOUS THREE TIMES A DAY
Refills: 0 | Status: DISCONTINUED | OUTPATIENT
Start: 2024-01-01 | End: 2024-01-01

## 2024-01-01 RX ORDER — SODIUM CHLORIDE 9 MG/ML
2 INJECTION INTRAMUSCULAR; INTRAVENOUS; SUBCUTANEOUS EVERY 6 HOURS
Refills: 0 | Status: DISCONTINUED | OUTPATIENT
Start: 2024-01-01 | End: 2024-01-01

## 2024-01-01 RX ORDER — POTASSIUM PHOSPHATE, MONOBASIC POTASSIUM PHOSPHATE, DIBASIC 236; 224 MG/ML; MG/ML
30 INJECTION, SOLUTION INTRAVENOUS ONCE
Refills: 0 | Status: COMPLETED | OUTPATIENT
Start: 2024-01-01 | End: 2024-01-01

## 2024-01-01 RX ORDER — LOSARTAN POTASSIUM 100 MG/1
50 TABLET, FILM COATED ORAL DAILY
Refills: 0 | Status: DISCONTINUED | OUTPATIENT
Start: 2024-01-01 | End: 2024-01-01

## 2024-01-01 RX ORDER — ACETAMINOPHEN 500 MG
975 TABLET ORAL EVERY 6 HOURS
Refills: 0 | Status: DISCONTINUED | OUTPATIENT
Start: 2024-01-01 | End: 2024-01-01

## 2024-01-01 RX ORDER — MORPHINE SULFATE 50 MG/1
1 CAPSULE, EXTENDED RELEASE ORAL
Qty: 0 | Refills: 0 | DISCHARGE
Start: 2024-01-01

## 2024-01-01 RX ORDER — TRAMADOL HYDROCHLORIDE 50 MG/1
25 TABLET ORAL EVERY 6 HOURS
Refills: 0 | Status: DISCONTINUED | OUTPATIENT
Start: 2024-01-01 | End: 2024-01-01

## 2024-01-01 RX ORDER — POTASSIUM CHLORIDE 20 MEQ
10 PACKET (EA) ORAL
Refills: 0 | Status: COMPLETED | OUTPATIENT
Start: 2024-01-01 | End: 2024-01-01

## 2024-01-01 RX ORDER — MIDAZOLAM HYDROCHLORIDE 1 MG/ML
2 INJECTION, SOLUTION INTRAMUSCULAR; INTRAVENOUS ONCE
Refills: 0 | Status: DISCONTINUED | OUTPATIENT
Start: 2024-01-01 | End: 2024-01-01

## 2024-01-01 RX ORDER — DEXTROSE 50 % IN WATER 50 %
12.5 SYRINGE (ML) INTRAVENOUS ONCE
Refills: 0 | Status: DISCONTINUED | OUTPATIENT
Start: 2024-01-01 | End: 2024-01-01

## 2024-01-01 RX ORDER — ONDANSETRON 8 MG/1
4 TABLET, FILM COATED ORAL ONCE
Refills: 0 | Status: COMPLETED | OUTPATIENT
Start: 2024-01-01 | End: 2024-01-01

## 2024-01-01 RX ORDER — APIXABAN 2.5 MG/1
1 TABLET, FILM COATED ORAL
Qty: 60 | Refills: 0
Start: 2024-01-01

## 2024-01-01 RX ORDER — POTASSIUM CHLORIDE 20 MEQ
20 PACKET (EA) ORAL
Refills: 0 | Status: DISCONTINUED | OUTPATIENT
Start: 2024-01-01 | End: 2024-01-01

## 2024-01-01 RX ORDER — LIDOCAINE 4 G/100G
1 CREAM TOPICAL DAILY
Refills: 0 | Status: DISCONTINUED | OUTPATIENT
Start: 2024-01-01 | End: 2024-01-01

## 2024-01-01 RX ORDER — LACOSAMIDE 50 MG/1
150 TABLET ORAL EVERY 12 HOURS
Refills: 0 | Status: DISCONTINUED | OUTPATIENT
Start: 2024-01-01 | End: 2024-01-01

## 2024-01-01 RX ORDER — LOSARTAN POTASSIUM 100 MG/1
50 TABLET, FILM COATED ORAL ONCE
Refills: 0 | Status: COMPLETED | OUTPATIENT
Start: 2024-01-01 | End: 2024-01-01

## 2024-01-01 RX ORDER — MELOXICAM 15 MG/1
15 TABLET ORAL DAILY
Qty: 30 | Refills: 0 | Status: COMPLETED | COMMUNITY
Start: 2023-01-01 | End: 2024-01-01

## 2024-01-01 RX ORDER — SODIUM,POTASSIUM PHOSPHATES 278-250MG
2 POWDER IN PACKET (EA) ORAL EVERY 4 HOURS
Refills: 0 | Status: COMPLETED | OUTPATIENT
Start: 2024-01-01 | End: 2024-01-01

## 2024-01-01 RX ORDER — CLOTRIMAZOLE 10 MG/1
10 LOZENGE ORAL DAILY
Qty: 50 | Refills: 2 | Status: ACTIVE | COMMUNITY
Start: 2024-01-01 | End: 1900-01-01

## 2024-01-01 RX ORDER — DEXAMETHASONE 4 MG/1
4 TABLET ORAL AS DIRECTED
Qty: 45 | Refills: 0 | Status: ACTIVE | COMMUNITY
Start: 2024-01-01 | End: 1900-01-01

## 2024-01-01 RX ORDER — DEXMEDETOMIDINE HYDROCHLORIDE IN 0.9% SODIUM CHLORIDE 4 UG/ML
0.2 INJECTION INTRAVENOUS
Qty: 200 | Refills: 0 | Status: DISCONTINUED | OUTPATIENT
Start: 2024-01-01 | End: 2024-01-01

## 2024-01-01 RX ORDER — FAMOTIDINE 10 MG/ML
20 INJECTION INTRAVENOUS DAILY
Refills: 0 | Status: DISCONTINUED | OUTPATIENT
Start: 2024-01-01 | End: 2024-01-01

## 2024-01-01 RX ORDER — POLYETHYLENE GLYCOL 3350 17 G/17G
17 POWDER, FOR SOLUTION ORAL
Qty: 1 | Refills: 0
Start: 2024-01-01 | End: 2024-01-01

## 2024-01-01 RX ORDER — PHENYLEPHRINE HYDROCHLORIDE 10 MG/ML
0.2 INJECTION INTRAVENOUS
Qty: 40 | Refills: 0 | Status: DISCONTINUED | OUTPATIENT
Start: 2024-01-01 | End: 2024-01-01

## 2024-01-01 RX ORDER — FENTANYL CITRATE 50 UG/ML
25 INJECTION INTRAVENOUS ONCE
Refills: 0 | Status: DISCONTINUED | OUTPATIENT
Start: 2024-01-01 | End: 2024-01-01

## 2024-01-01 RX ORDER — DEXAMETHASONE 0.5 MG/5ML
1 ELIXIR ORAL
Refills: 0 | DISCHARGE
Start: 2024-01-01

## 2024-01-01 RX ORDER — INSULIN LISPRO 100/ML
VIAL (ML) SUBCUTANEOUS EVERY 6 HOURS
Refills: 0 | Status: DISCONTINUED | OUTPATIENT
Start: 2024-01-01 | End: 2024-01-01

## 2024-01-01 RX ORDER — MORPHINE SULFATE 30 MG/1
30 TABLET ORAL
Refills: 0 | Status: COMPLETED | COMMUNITY
Start: 2024-01-01 | End: 2024-01-01

## 2024-01-01 RX ORDER — SODIUM CHLORIDE 9 MG/ML
1 INJECTION INTRAMUSCULAR; INTRAVENOUS; SUBCUTANEOUS THREE TIMES A DAY
Refills: 0 | Status: DISCONTINUED | OUTPATIENT
Start: 2024-01-01 | End: 2024-01-01

## 2024-01-01 RX ORDER — DEXAMETHASONE 0.5 MG/5ML
4 ELIXIR ORAL EVERY 8 HOURS
Refills: 0 | Status: DISCONTINUED | OUTPATIENT
Start: 2024-01-01 | End: 2024-01-01

## 2024-01-01 RX ORDER — MORPHINE SULFATE 15 MG/1
15 TABLET, FILM COATED, EXTENDED RELEASE ORAL
Qty: 14 | Refills: 0 | Status: ACTIVE | COMMUNITY
Start: 2024-01-01 | End: 1900-01-01

## 2024-01-01 RX ORDER — FOLIC ACID 1 MG/1
1 TABLET ORAL DAILY
Qty: 30 | Refills: 5 | Status: ACTIVE | COMMUNITY
Start: 2024-01-01 | End: 1900-01-01

## 2024-01-01 RX ORDER — PANTOPRAZOLE SODIUM 20 MG/1
40 TABLET, DELAYED RELEASE ORAL DAILY
Refills: 0 | Status: DISCONTINUED | OUTPATIENT
Start: 2024-01-01 | End: 2024-01-01

## 2024-01-01 RX ORDER — POTASSIUM CHLORIDE 20 MEQ
40 PACKET (EA) ORAL ONCE
Refills: 0 | Status: DISCONTINUED | OUTPATIENT
Start: 2024-01-01 | End: 2024-01-01

## 2024-01-01 RX ORDER — DEXAMETHASONE 2 MG/1
2 TABLET ORAL
Refills: 0 | Status: ACTIVE | COMMUNITY
Start: 2024-01-01

## 2024-01-01 RX ORDER — LEVETIRACETAM 250 MG/1
750 TABLET, FILM COATED ORAL EVERY 12 HOURS
Refills: 0 | Status: DISCONTINUED | OUTPATIENT
Start: 2024-01-01 | End: 2024-01-01

## 2024-01-01 RX ORDER — HEPARIN SODIUM 5000 [USP'U]/ML
2700 INJECTION INTRAVENOUS; SUBCUTANEOUS EVERY 6 HOURS
Refills: 0 | Status: DISCONTINUED | OUTPATIENT
Start: 2024-01-01 | End: 2024-01-01

## 2024-01-01 RX ORDER — CHLORHEXIDINE GLUCONATE 213 G/1000ML
1 SOLUTION TOPICAL DAILY
Refills: 0 | Status: DISCONTINUED | OUTPATIENT
Start: 2024-01-01 | End: 2024-01-01

## 2024-01-01 RX ORDER — HYDROMORPHONE HYDROCHLORIDE 4 MG/1
4 TABLET ORAL
Qty: 56 | Refills: 0 | Status: ACTIVE | COMMUNITY
Start: 2023-01-01 | End: 1900-01-01

## 2024-01-01 RX ORDER — SODIUM CHLORIDE 9 MG/ML
500 INJECTION INTRAMUSCULAR; INTRAVENOUS; SUBCUTANEOUS ONCE
Refills: 0 | Status: COMPLETED | OUTPATIENT
Start: 2024-01-01 | End: 2024-01-01

## 2024-01-01 RX ORDER — SODIUM CHLORIDE 9 MG/ML
1000 INJECTION INTRAMUSCULAR; INTRAVENOUS; SUBCUTANEOUS
Refills: 0 | Status: DISCONTINUED | OUTPATIENT
Start: 2024-01-01 | End: 2024-01-01

## 2024-01-01 RX ORDER — SODIUM CHLORIDE 9 MG/ML
2 INJECTION INTRAMUSCULAR; INTRAVENOUS; SUBCUTANEOUS THREE TIMES A DAY
Refills: 0 | Status: DISCONTINUED | OUTPATIENT
Start: 2024-01-01 | End: 2024-01-01

## 2024-01-01 RX ORDER — ACETAMINOPHEN 500 MG
1000 TABLET ORAL EVERY 6 HOURS
Refills: 0 | Status: DISCONTINUED | OUTPATIENT
Start: 2024-01-01 | End: 2024-01-01

## 2024-01-01 RX ORDER — FENTANYL CITRATE 50 UG/ML
50 INJECTION INTRAVENOUS ONCE
Refills: 0 | Status: DISCONTINUED | OUTPATIENT
Start: 2024-01-01 | End: 2024-01-01

## 2024-01-01 RX ORDER — CALCIUM GLUCONATE 100 MG/ML
1 VIAL (ML) INTRAVENOUS ONCE
Refills: 0 | Status: COMPLETED | OUTPATIENT
Start: 2024-01-01 | End: 2024-01-01

## 2024-01-01 RX ORDER — ACETAMINOPHEN 500 MG
675 TABLET ORAL ONCE
Refills: 0 | Status: DISCONTINUED | OUTPATIENT
Start: 2024-01-01 | End: 2024-01-01

## 2024-01-01 RX ORDER — IPRATROPIUM BROMIDE AND ALBUTEROL 20; 100 UG/1; UG/1
20-100 SPRAY, METERED RESPIRATORY (INHALATION) 4 TIMES DAILY
Qty: 1 | Refills: 2 | Status: COMPLETED | COMMUNITY
Start: 2023-01-01 | End: 2024-01-01

## 2024-01-01 RX ORDER — POTASSIUM CHLORIDE 20 MEQ
40 PACKET (EA) ORAL EVERY 4 HOURS
Refills: 0 | Status: DISCONTINUED | OUTPATIENT
Start: 2024-01-01 | End: 2024-01-01

## 2024-01-01 RX ORDER — SENNA PLUS 8.6 MG/1
2 TABLET ORAL
Qty: 0 | Refills: 0 | DISCHARGE
Start: 2024-01-01

## 2024-01-01 RX ORDER — HYDROMORPHONE HYDROCHLORIDE 2 MG/ML
0.5 INJECTION INTRAMUSCULAR; INTRAVENOUS; SUBCUTANEOUS EVERY 4 HOURS
Refills: 0 | Status: DISCONTINUED | OUTPATIENT
Start: 2024-01-01 | End: 2024-01-01

## 2024-01-01 RX ORDER — PROTHROMBIN COMPLEX CONCENTRATE (HUMAN) 25.5; 16.5; 24; 22; 22; 26 [IU]/ML; [IU]/ML; [IU]/ML; [IU]/ML; [IU]/ML; [IU]/ML
2500 POWDER, FOR SOLUTION INTRAVENOUS ONCE
Refills: 0 | Status: DISCONTINUED | OUTPATIENT
Start: 2024-01-01 | End: 2024-01-01

## 2024-01-01 RX ORDER — HYDROMORPHONE HYDROCHLORIDE 2 MG/ML
4 INJECTION INTRAMUSCULAR; INTRAVENOUS; SUBCUTANEOUS EVERY 12 HOURS
Refills: 0 | Status: DISCONTINUED | OUTPATIENT
Start: 2024-01-01 | End: 2024-01-01

## 2024-01-01 RX ORDER — MORPHINE SULFATE 50 MG/1
4 CAPSULE, EXTENDED RELEASE ORAL ONCE
Refills: 0 | Status: DISCONTINUED | OUTPATIENT
Start: 2024-01-01 | End: 2024-01-01

## 2024-01-01 RX ORDER — POTASSIUM CHLORIDE 20 MEQ
10 PACKET (EA) ORAL ONCE
Refills: 0 | Status: COMPLETED | OUTPATIENT
Start: 2024-01-01 | End: 2024-01-01

## 2024-01-01 RX ORDER — LEVETIRACETAM 250 MG/1
750 TABLET, FILM COATED ORAL
Refills: 0 | Status: DISCONTINUED | OUTPATIENT
Start: 2024-01-01 | End: 2024-01-01

## 2024-01-01 RX ORDER — CEFEPIME 1 G/1
2000 INJECTION, POWDER, FOR SOLUTION INTRAMUSCULAR; INTRAVENOUS ONCE
Refills: 0 | Status: COMPLETED | OUTPATIENT
Start: 2024-01-01 | End: 2024-01-01

## 2024-01-01 RX ORDER — HEPARIN SODIUM 5000 [USP'U]/ML
INJECTION INTRAVENOUS; SUBCUTANEOUS
Qty: 25000 | Refills: 0 | Status: DISCONTINUED | OUTPATIENT
Start: 2024-01-01 | End: 2024-01-01

## 2024-01-01 RX ORDER — OXYCODONE HYDROCHLORIDE 5 MG/1
5 TABLET ORAL EVERY 6 HOURS
Refills: 0 | Status: DISCONTINUED | OUTPATIENT
Start: 2024-01-01 | End: 2024-01-01

## 2024-01-01 RX ORDER — SODIUM CHLORIDE 9 MG/ML
1700 INJECTION INTRAMUSCULAR; INTRAVENOUS; SUBCUTANEOUS ONCE
Refills: 0 | Status: COMPLETED | OUTPATIENT
Start: 2024-01-01 | End: 2024-01-01

## 2024-01-01 RX ORDER — PANTOPRAZOLE SODIUM 20 MG/1
40 TABLET, DELAYED RELEASE ORAL
Refills: 0 | Status: DISCONTINUED | OUTPATIENT
Start: 2024-01-01 | End: 2024-01-01

## 2024-01-01 RX ORDER — ENOXAPARIN SODIUM 100 MG/ML
30 INJECTION SUBCUTANEOUS EVERY 24 HOURS
Refills: 0 | Status: DISCONTINUED | OUTPATIENT
Start: 2024-01-01 | End: 2024-01-01

## 2024-01-01 RX ORDER — CEFEPIME 1 G/1
2000 INJECTION, POWDER, FOR SOLUTION INTRAMUSCULAR; INTRAVENOUS EVERY 12 HOURS
Refills: 0 | Status: ACTIVE | OUTPATIENT
Start: 2024-01-01

## 2024-01-01 RX ORDER — SENNA PLUS 8.6 MG/1
1 TABLET ORAL
Qty: 30 | Refills: 0
Start: 2024-01-01 | End: 2024-01-01

## 2024-01-01 RX ORDER — CHLORHEXIDINE GLUCONATE 213 G/1000ML
15 SOLUTION TOPICAL EVERY 12 HOURS
Refills: 0 | Status: DISCONTINUED | OUTPATIENT
Start: 2024-01-01 | End: 2024-01-01

## 2024-01-01 RX ORDER — DEXAMETHASONE 0.5 MG/5ML
1 ELIXIR ORAL
Qty: 62 | Refills: 0
Start: 2024-01-01 | End: 2024-05-19

## 2024-01-01 RX ORDER — VASOPRESSIN 20 [USP'U]/ML
0.01 INJECTION INTRAVENOUS
Qty: 40 | Refills: 0 | Status: DISCONTINUED | OUTPATIENT
Start: 2024-01-01 | End: 2024-01-01

## 2024-01-01 RX ORDER — CELECOXIB 200 MG/1
200 CAPSULE ORAL
Refills: 0 | Status: DISCONTINUED | OUTPATIENT
Start: 2024-01-01 | End: 2024-01-01

## 2024-01-01 RX ORDER — LOSARTAN POTASSIUM 100 MG/1
1 TABLET, FILM COATED ORAL
Qty: 30 | Refills: 0
Start: 2024-01-01 | End: 2024-05-20

## 2024-01-01 RX ORDER — PIPERACILLIN AND TAZOBACTAM 4; .5 G/20ML; G/20ML
3.38 INJECTION, POWDER, LYOPHILIZED, FOR SOLUTION INTRAVENOUS EVERY 8 HOURS
Refills: 0 | Status: DISCONTINUED | OUTPATIENT
Start: 2024-01-01 | End: 2024-01-01

## 2024-01-01 RX ORDER — PROCHLORPERAZINE MALEATE 5 MG
10 TABLET ORAL ONCE
Refills: 0 | Status: COMPLETED | OUTPATIENT
Start: 2024-01-01 | End: 2024-01-01

## 2024-01-01 RX ORDER — HYDROMORPHONE HYDROCHLORIDE 8 MG/1
8 TABLET ORAL
Qty: 120 | Refills: 0 | Status: DISCONTINUED | COMMUNITY
Start: 2024-01-01 | End: 2024-01-01

## 2024-01-01 RX ORDER — NALOXONE HYDROCHLORIDE 4 MG/.1ML
4 SPRAY NASAL ONCE
Refills: 0 | Status: DISCONTINUED | OUTPATIENT
Start: 2024-01-01 | End: 2024-01-01

## 2024-01-01 RX ORDER — PROCHLORPERAZINE MALEATE 10 MG/1
10 TABLET ORAL EVERY 6 HOURS
Qty: 24 | Refills: 2 | Status: ACTIVE | COMMUNITY
Start: 2024-01-01 | End: 1900-01-01

## 2024-01-01 RX ORDER — HEPARIN SODIUM 5000 [USP'U]/ML
3000 INJECTION INTRAVENOUS; SUBCUTANEOUS EVERY 6 HOURS
Refills: 0 | Status: DISCONTINUED | OUTPATIENT
Start: 2024-01-01 | End: 2024-01-01

## 2024-01-01 RX ORDER — GABAPENTIN 600 MG/1
600 TABLET, COATED ORAL
Qty: 90 | Refills: 0 | Status: ACTIVE | COMMUNITY
Start: 2024-01-01 | End: 1900-01-01

## 2024-01-01 RX ORDER — MORPHINE SULFATE 15 MG/1
15 TABLET ORAL
Qty: 60 | Refills: 0 | Status: COMPLETED | COMMUNITY
Start: 2024-01-01 | End: 2024-01-01

## 2024-01-01 RX ORDER — ONDANSETRON 8 MG/1
4 TABLET, FILM COATED ORAL EVERY 6 HOURS
Refills: 0 | Status: DISCONTINUED | OUTPATIENT
Start: 2024-01-01 | End: 2024-01-01

## 2024-01-01 RX ORDER — MAGNESIUM HYDROXIDE 400 MG/1
30 TABLET, CHEWABLE ORAL ONCE
Refills: 0 | Status: COMPLETED | OUTPATIENT
Start: 2024-01-01 | End: 2024-01-01

## 2024-01-01 RX ORDER — MELOXICAM 15 MG/1
15 TABLET ORAL
Qty: 14 | Refills: 1 | Status: COMPLETED | COMMUNITY
Start: 2023-01-01 | End: 2024-01-01

## 2024-01-01 RX ORDER — HEPARIN SODIUM 5000 [USP'U]/ML
3000 INJECTION INTRAVENOUS; SUBCUTANEOUS ONCE
Refills: 0 | Status: DISCONTINUED | OUTPATIENT
Start: 2024-01-01 | End: 2024-01-01

## 2024-01-01 RX ORDER — ENOXAPARIN SODIUM 100 MG/ML
40 INJECTION SUBCUTANEOUS EVERY 12 HOURS
Refills: 0 | Status: DISCONTINUED | OUTPATIENT
Start: 2024-01-01 | End: 2024-01-01

## 2024-01-01 RX ORDER — DEXAMETHASONE 0.5 MG/5ML
4 ELIXIR ORAL EVERY 6 HOURS
Refills: 0 | Status: DISCONTINUED | OUTPATIENT
Start: 2024-01-01 | End: 2024-01-01

## 2024-01-01 RX ORDER — ENOXAPARIN SODIUM 100 MG/ML
40 INJECTION SUBCUTANEOUS ONCE
Refills: 0 | Status: COMPLETED | OUTPATIENT
Start: 2024-01-01 | End: 2024-01-01

## 2024-01-01 RX ORDER — ENOXAPARIN SODIUM 100 MG/ML
40 INJECTION SUBCUTANEOUS EVERY 24 HOURS
Refills: 0 | Status: DISCONTINUED | OUTPATIENT
Start: 2024-01-01 | End: 2024-01-01

## 2024-01-01 RX ORDER — NALOXONE HYDROCHLORIDE 4 MG/.1ML
0.4 SPRAY NASAL ONCE
Refills: 0 | Status: DISCONTINUED | OUTPATIENT
Start: 2024-01-01 | End: 2024-01-01

## 2024-01-01 RX ORDER — INSULIN LISPRO 100/ML
VIAL (ML) SUBCUTANEOUS AT BEDTIME
Refills: 0 | Status: DISCONTINUED | OUTPATIENT
Start: 2024-01-01 | End: 2024-01-01

## 2024-01-01 RX ORDER — ACETAMINOPHEN 500 MG
750 TABLET ORAL EVERY 6 HOURS
Refills: 0 | Status: DISCONTINUED | OUTPATIENT
Start: 2024-01-01 | End: 2024-01-01

## 2024-01-01 RX ORDER — LACOSAMIDE 50 MG/1
1 TABLET ORAL
Qty: 0 | Refills: 0 | DISCHARGE
Start: 2024-01-01

## 2024-01-01 RX ORDER — DEXMEDETOMIDINE HYDROCHLORIDE IN 0.9% SODIUM CHLORIDE 4 UG/ML
0.3 INJECTION INTRAVENOUS
Qty: 400 | Refills: 0 | Status: DISCONTINUED | OUTPATIENT
Start: 2024-01-01 | End: 2024-01-01

## 2024-01-01 RX ORDER — TRAMADOL HYDROCHLORIDE 50 MG/1
50 TABLET ORAL EVERY 6 HOURS
Refills: 0 | Status: DISCONTINUED | OUTPATIENT
Start: 2024-01-01 | End: 2024-01-01

## 2024-01-01 RX ORDER — SODIUM CHLORIDE 9 MG/ML
1000 INJECTION, SOLUTION INTRAVENOUS ONCE
Refills: 0 | Status: COMPLETED | OUTPATIENT
Start: 2024-01-01 | End: 2024-01-01

## 2024-01-01 RX ORDER — GABAPENTIN 100 MG/1
100 CAPSULE ORAL 3 TIMES DAILY
Qty: 90 | Refills: 0 | Status: COMPLETED | COMMUNITY
Start: 2023-01-01 | End: 2024-01-01

## 2024-01-01 RX ORDER — DEXAMETHASONE 0.5 MG/5ML
2 ELIXIR ORAL EVERY 8 HOURS
Refills: 0 | Status: DISCONTINUED | OUTPATIENT
Start: 2024-01-01 | End: 2024-01-01

## 2024-01-01 RX ORDER — HYDROMORPHONE HYDROCHLORIDE 2 MG/ML
2 INJECTION INTRAMUSCULAR; INTRAVENOUS; SUBCUTANEOUS EVERY 4 HOURS
Refills: 0 | Status: DISCONTINUED | OUTPATIENT
Start: 2024-01-01 | End: 2024-01-01

## 2024-01-01 RX ORDER — ACETAMINOPHEN 500 MG
2 TABLET ORAL
Qty: 0 | Refills: 0 | DISCHARGE
Start: 2024-01-01

## 2024-01-01 RX ORDER — APIXABAN 2.5 MG/1
10 TABLET, FILM COATED ORAL ONCE
Refills: 0 | Status: DISCONTINUED | OUTPATIENT
Start: 2024-01-01 | End: 2024-01-01

## 2024-01-01 RX ORDER — ONDANSETRON 8 MG/1
4 TABLET, FILM COATED ORAL EVERY 8 HOURS
Refills: 0 | Status: DISCONTINUED | OUTPATIENT
Start: 2024-01-01 | End: 2024-01-01

## 2024-01-01 RX ORDER — MORPHINE SULFATE 50 MG/1
1 CAPSULE, EXTENDED RELEASE ORAL
Qty: 14 | Refills: 0
Start: 2024-01-01 | End: 2024-01-01

## 2024-01-01 RX ORDER — HYDROMORPHONE HYDROCHLORIDE 2 MG/ML
8 INJECTION INTRAMUSCULAR; INTRAVENOUS; SUBCUTANEOUS EVERY 4 HOURS
Refills: 0 | Status: DISCONTINUED | OUTPATIENT
Start: 2024-01-01 | End: 2024-01-01

## 2024-01-01 RX ORDER — POTASSIUM CHLORIDE 20 MEQ
20 PACKET (EA) ORAL
Refills: 0 | Status: COMPLETED | OUTPATIENT
Start: 2024-01-01 | End: 2024-01-01

## 2024-01-01 RX ORDER — OLANZAPINE 15 MG/1
5 TABLET, FILM COATED ORAL ONCE
Refills: 0 | Status: COMPLETED | OUTPATIENT
Start: 2024-01-01 | End: 2024-01-01

## 2024-01-01 RX ORDER — DEXAMETHASONE 0.5 MG/5ML
4 ELIXIR ORAL ONCE
Refills: 0 | Status: COMPLETED | OUTPATIENT
Start: 2024-01-01 | End: 2024-01-01

## 2024-01-01 RX ADMIN — CELECOXIB 200 MILLIGRAM(S): 200 CAPSULE ORAL at 18:44

## 2024-01-01 RX ADMIN — SENNA PLUS 2 TABLET(S): 8.6 TABLET ORAL at 22:13

## 2024-01-01 RX ADMIN — CLOPIDOGREL BISULFATE 300 MILLIGRAM(S): 75 TABLET, FILM COATED ORAL at 00:03

## 2024-01-01 RX ADMIN — CHLORHEXIDINE GLUCONATE 1 APPLICATION(S): 213 SOLUTION TOPICAL at 05:35

## 2024-01-01 RX ADMIN — Medication 2 PACKET(S): at 22:02

## 2024-01-01 RX ADMIN — LEVETIRACETAM 400 MILLIGRAM(S): 250 TABLET, FILM COATED ORAL at 17:42

## 2024-01-01 RX ADMIN — LOSARTAN POTASSIUM 50 MILLIGRAM(S): 100 TABLET, FILM COATED ORAL at 05:43

## 2024-01-01 RX ADMIN — HYDROMORPHONE HYDROCHLORIDE 1 MILLIGRAM(S): 2 INJECTION INTRAMUSCULAR; INTRAVENOUS; SUBCUTANEOUS at 03:58

## 2024-01-01 RX ADMIN — SODIUM CHLORIDE 60 MILLILITER(S): 9 INJECTION, SOLUTION INTRAVENOUS at 16:45

## 2024-01-01 RX ADMIN — MORPHINE SULFATE 15 MILLIGRAM(S): 50 CAPSULE, EXTENDED RELEASE ORAL at 09:49

## 2024-01-01 RX ADMIN — LOSARTAN POTASSIUM 50 MILLIGRAM(S): 100 TABLET, FILM COATED ORAL at 05:19

## 2024-01-01 RX ADMIN — SENNA PLUS 1 TABLET(S): 8.6 TABLET ORAL at 22:15

## 2024-01-01 RX ADMIN — Medication 102 GRAM(S): at 23:34

## 2024-01-01 RX ADMIN — HYDROMORPHONE HYDROCHLORIDE 0.5 MILLIGRAM(S): 2 INJECTION INTRAMUSCULAR; INTRAVENOUS; SUBCUTANEOUS at 05:47

## 2024-01-01 RX ADMIN — GABAPENTIN 600 MILLIGRAM(S): 400 CAPSULE ORAL at 13:08

## 2024-01-01 RX ADMIN — Medication 5 MILLIGRAM(S): at 15:34

## 2024-01-01 RX ADMIN — HYDROMORPHONE HYDROCHLORIDE 0.5 MILLIGRAM(S): 2 INJECTION INTRAMUSCULAR; INTRAVENOUS; SUBCUTANEOUS at 15:44

## 2024-01-01 RX ADMIN — GABAPENTIN 100 MILLIGRAM(S): 400 CAPSULE ORAL at 06:05

## 2024-01-01 RX ADMIN — LIDOCAINE 1 PATCH: 4 CREAM TOPICAL at 20:15

## 2024-01-01 RX ADMIN — Medication 100 MILLIEQUIVALENT(S): at 14:54

## 2024-01-01 RX ADMIN — SODIUM CHLORIDE 2 GRAM(S): 9 INJECTION INTRAMUSCULAR; INTRAVENOUS; SUBCUTANEOUS at 23:33

## 2024-01-01 RX ADMIN — Medication 0.5 MILLIGRAM(S): at 15:07

## 2024-01-01 RX ADMIN — HYDROMORPHONE HYDROCHLORIDE 0.5 MILLIGRAM(S): 2 INJECTION INTRAMUSCULAR; INTRAVENOUS; SUBCUTANEOUS at 03:24

## 2024-01-01 RX ADMIN — HYDROMORPHONE HYDROCHLORIDE 8 MILLIGRAM(S): 2 INJECTION INTRAMUSCULAR; INTRAVENOUS; SUBCUTANEOUS at 22:13

## 2024-01-01 RX ADMIN — LEVETIRACETAM 400 MILLIGRAM(S): 250 TABLET, FILM COATED ORAL at 06:15

## 2024-01-01 RX ADMIN — CHLORHEXIDINE GLUCONATE 1 APPLICATION(S): 213 SOLUTION TOPICAL at 05:50

## 2024-01-01 RX ADMIN — HYDROMORPHONE HYDROCHLORIDE 4 MILLIGRAM(S): 2 INJECTION INTRAMUSCULAR; INTRAVENOUS; SUBCUTANEOUS at 06:54

## 2024-01-01 RX ADMIN — Medication 4 MILLIGRAM(S): at 06:40

## 2024-01-01 RX ADMIN — GABAPENTIN 100 MILLIGRAM(S): 400 CAPSULE ORAL at 13:02

## 2024-01-01 RX ADMIN — GABAPENTIN 600 MILLIGRAM(S): 400 CAPSULE ORAL at 05:17

## 2024-01-01 RX ADMIN — PIPERACILLIN AND TAZOBACTAM 25 GRAM(S): 4; .5 INJECTION, POWDER, LYOPHILIZED, FOR SOLUTION INTRAVENOUS at 20:00

## 2024-01-01 RX ADMIN — Medication 4 MILLIGRAM(S): at 12:33

## 2024-01-01 RX ADMIN — SODIUM CHLORIDE 3 GRAM(S): 9 INJECTION INTRAMUSCULAR; INTRAVENOUS; SUBCUTANEOUS at 23:52

## 2024-01-01 RX ADMIN — Medication 25 GRAM(S): at 16:45

## 2024-01-01 RX ADMIN — PIPERACILLIN AND TAZOBACTAM 25 GRAM(S): 4; .5 INJECTION, POWDER, LYOPHILIZED, FOR SOLUTION INTRAVENOUS at 22:03

## 2024-01-01 RX ADMIN — FAMOTIDINE 20 MILLIGRAM(S): 10 INJECTION INTRAVENOUS at 12:25

## 2024-01-01 RX ADMIN — MORPHINE SULFATE 15 MILLIGRAM(S): 50 CAPSULE, EXTENDED RELEASE ORAL at 10:02

## 2024-01-01 RX ADMIN — Medication 3 MILLIGRAM(S): at 21:45

## 2024-01-01 RX ADMIN — CELECOXIB 200 MILLIGRAM(S): 200 CAPSULE ORAL at 19:40

## 2024-01-01 RX ADMIN — SENNA PLUS 2 TABLET(S): 8.6 TABLET ORAL at 21:29

## 2024-01-01 RX ADMIN — ENOXAPARIN SODIUM 40 MILLIGRAM(S): 100 INJECTION SUBCUTANEOUS at 11:40

## 2024-01-01 RX ADMIN — HYDROMORPHONE HYDROCHLORIDE 0.5 MILLIGRAM(S): 2 INJECTION INTRAMUSCULAR; INTRAVENOUS; SUBCUTANEOUS at 17:30

## 2024-01-01 RX ADMIN — Medication 300 MILLIGRAM(S): at 15:30

## 2024-01-01 RX ADMIN — HYDROMORPHONE HYDROCHLORIDE 1 MILLIGRAM(S): 2 INJECTION INTRAMUSCULAR; INTRAVENOUS; SUBCUTANEOUS at 14:11

## 2024-01-01 RX ADMIN — HYDROMORPHONE HYDROCHLORIDE 0.5 MILLIGRAM(S): 2 INJECTION INTRAMUSCULAR; INTRAVENOUS; SUBCUTANEOUS at 08:30

## 2024-01-01 RX ADMIN — Medication 550 MILLIGRAM(S): at 12:17

## 2024-01-01 RX ADMIN — Medication 100 GRAM(S): at 03:12

## 2024-01-01 RX ADMIN — FENTANYL CITRATE 25 MICROGRAM(S): 50 INJECTION INTRAVENOUS at 16:00

## 2024-01-01 RX ADMIN — Medication 2 MILLIGRAM(S): at 17:24

## 2024-01-01 RX ADMIN — HYDROMORPHONE HYDROCHLORIDE 1 MILLIGRAM(S): 2 INJECTION INTRAMUSCULAR; INTRAVENOUS; SUBCUTANEOUS at 05:55

## 2024-01-01 RX ADMIN — FENTANYL CITRATE 25 MICROGRAM(S): 50 INJECTION INTRAVENOUS at 16:30

## 2024-01-01 RX ADMIN — SENNA PLUS 2 TABLET(S): 8.6 TABLET ORAL at 21:03

## 2024-01-01 RX ADMIN — Medication 250 MILLIGRAM(S): at 15:31

## 2024-01-01 RX ADMIN — ENOXAPARIN SODIUM 40 MILLIGRAM(S): 100 INJECTION SUBCUTANEOUS at 05:27

## 2024-01-01 RX ADMIN — LOSARTAN POTASSIUM 50 MILLIGRAM(S): 100 TABLET, FILM COATED ORAL at 05:31

## 2024-01-01 RX ADMIN — Medication 100 MILLIEQUIVALENT(S): at 18:14

## 2024-01-01 RX ADMIN — SODIUM CHLORIDE 2 GRAM(S): 9 INJECTION INTRAMUSCULAR; INTRAVENOUS; SUBCUTANEOUS at 21:03

## 2024-01-01 RX ADMIN — CHLORHEXIDINE GLUCONATE 1 APPLICATION(S): 213 SOLUTION TOPICAL at 06:04

## 2024-01-01 RX ADMIN — HYDROMORPHONE HYDROCHLORIDE 1 MILLIGRAM(S): 2 INJECTION INTRAMUSCULAR; INTRAVENOUS; SUBCUTANEOUS at 11:18

## 2024-01-01 RX ADMIN — ONDANSETRON 4 MILLIGRAM(S): 8 TABLET, FILM COATED ORAL at 07:00

## 2024-01-01 RX ADMIN — HYDROMORPHONE HYDROCHLORIDE 1 MILLIGRAM(S): 2 INJECTION INTRAMUSCULAR; INTRAVENOUS; SUBCUTANEOUS at 09:37

## 2024-01-01 RX ADMIN — Medication 25 MILLIGRAM(S): at 11:18

## 2024-01-01 RX ADMIN — SENNA PLUS 2 TABLET(S): 8.6 TABLET ORAL at 22:03

## 2024-01-01 RX ADMIN — HYDROMORPHONE HYDROCHLORIDE 1 MILLIGRAM(S): 2 INJECTION INTRAMUSCULAR; INTRAVENOUS; SUBCUTANEOUS at 06:05

## 2024-01-01 RX ADMIN — Medication 260 MILLIGRAM(S): at 16:30

## 2024-01-01 RX ADMIN — MORPHINE SULFATE 15 MILLIGRAM(S): 50 CAPSULE, EXTENDED RELEASE ORAL at 10:06

## 2024-01-01 RX ADMIN — MORPHINE SULFATE 15 MILLIGRAM(S): 50 CAPSULE, EXTENDED RELEASE ORAL at 17:22

## 2024-01-01 RX ADMIN — SODIUM CHLORIDE 2 GRAM(S): 9 INJECTION INTRAMUSCULAR; INTRAVENOUS; SUBCUTANEOUS at 12:34

## 2024-01-01 RX ADMIN — MORPHINE SULFATE 15 MILLIGRAM(S): 50 CAPSULE, EXTENDED RELEASE ORAL at 10:36

## 2024-01-01 RX ADMIN — Medication 2: at 19:37

## 2024-01-01 RX ADMIN — FAMOTIDINE 20 MILLIGRAM(S): 10 INJECTION INTRAVENOUS at 11:18

## 2024-01-01 RX ADMIN — HYDROMORPHONE HYDROCHLORIDE 0.5 MILLIGRAM(S): 2 INJECTION INTRAMUSCULAR; INTRAVENOUS; SUBCUTANEOUS at 08:05

## 2024-01-01 RX ADMIN — PANTOPRAZOLE SODIUM 40 MILLIGRAM(S): 20 TABLET, DELAYED RELEASE ORAL at 12:50

## 2024-01-01 RX ADMIN — SODIUM CHLORIDE 2 GRAM(S): 9 INJECTION INTRAMUSCULAR; INTRAVENOUS; SUBCUTANEOUS at 05:37

## 2024-01-01 RX ADMIN — LACOSAMIDE 150 MILLIGRAM(S): 50 TABLET ORAL at 17:07

## 2024-01-01 RX ADMIN — Medication 100 MILLIEQUIVALENT(S): at 18:49

## 2024-01-01 RX ADMIN — LACOSAMIDE 150 MILLIGRAM(S): 50 TABLET ORAL at 18:39

## 2024-01-01 RX ADMIN — CHLORHEXIDINE GLUCONATE 1 APPLICATION(S): 213 SOLUTION TOPICAL at 06:31

## 2024-01-01 RX ADMIN — Medication 4 MILLIGRAM(S): at 00:55

## 2024-01-01 RX ADMIN — SODIUM CHLORIDE 60 MILLILITER(S): 9 INJECTION, SOLUTION INTRAVENOUS at 21:32

## 2024-01-01 RX ADMIN — SODIUM CHLORIDE 3 GRAM(S): 9 INJECTION INTRAMUSCULAR; INTRAVENOUS; SUBCUTANEOUS at 05:30

## 2024-01-01 RX ADMIN — HYDROMORPHONE HYDROCHLORIDE 4 MILLIGRAM(S): 2 INJECTION INTRAMUSCULAR; INTRAVENOUS; SUBCUTANEOUS at 18:30

## 2024-01-01 RX ADMIN — ENOXAPARIN SODIUM 40 MILLIGRAM(S): 100 INJECTION SUBCUTANEOUS at 06:20

## 2024-01-01 RX ADMIN — SODIUM CHLORIDE 2 GRAM(S): 9 INJECTION INTRAMUSCULAR; INTRAVENOUS; SUBCUTANEOUS at 13:11

## 2024-01-01 RX ADMIN — PROPOFOL 12.8 MICROGRAM(S)/KG/MIN: 10 INJECTION, EMULSION INTRAVENOUS at 23:55

## 2024-01-01 RX ADMIN — PANTOPRAZOLE SODIUM 40 MILLIGRAM(S): 20 TABLET, DELAYED RELEASE ORAL at 05:28

## 2024-01-01 RX ADMIN — CEFEPIME 100 MILLIGRAM(S): 1 INJECTION, POWDER, FOR SOLUTION INTRAMUSCULAR; INTRAVENOUS at 05:16

## 2024-01-01 RX ADMIN — OXYCODONE HYDROCHLORIDE 15 MILLIGRAM(S): 5 TABLET ORAL at 06:39

## 2024-01-01 RX ADMIN — HYDROMORPHONE HYDROCHLORIDE 4 MILLIGRAM(S): 2 INJECTION INTRAMUSCULAR; INTRAVENOUS; SUBCUTANEOUS at 10:40

## 2024-01-01 RX ADMIN — GABAPENTIN 100 MILLIGRAM(S): 400 CAPSULE ORAL at 21:43

## 2024-01-01 RX ADMIN — POLYETHYLENE GLYCOL 3350 17 GRAM(S): 17 POWDER, FOR SOLUTION ORAL at 11:48

## 2024-01-01 RX ADMIN — HYDROMORPHONE HYDROCHLORIDE 8 MILLIGRAM(S): 2 INJECTION INTRAMUSCULAR; INTRAVENOUS; SUBCUTANEOUS at 14:02

## 2024-01-01 RX ADMIN — Medication 324 MILLIGRAM(S): at 20:58

## 2024-01-01 RX ADMIN — Medication 25 GRAM(S): at 23:15

## 2024-01-01 RX ADMIN — GABAPENTIN 600 MILLIGRAM(S): 400 CAPSULE ORAL at 22:16

## 2024-01-01 RX ADMIN — Medication 100 MILLIEQUIVALENT(S): at 13:44

## 2024-01-01 RX ADMIN — CEFTRIAXONE 100 MILLIGRAM(S): 500 INJECTION, POWDER, FOR SOLUTION INTRAMUSCULAR; INTRAVENOUS at 12:36

## 2024-01-01 RX ADMIN — PROPOFOL 2.55 MICROGRAM(S)/KG/MIN: 10 INJECTION, EMULSION INTRAVENOUS at 18:37

## 2024-01-01 RX ADMIN — Medication 5 MILLIGRAM(S): at 19:58

## 2024-01-01 RX ADMIN — PANTOPRAZOLE SODIUM 40 MILLIGRAM(S): 20 TABLET, DELAYED RELEASE ORAL at 06:57

## 2024-01-01 RX ADMIN — OXYCODONE HYDROCHLORIDE 15 MILLIGRAM(S): 5 TABLET ORAL at 05:05

## 2024-01-01 RX ADMIN — Medication 1000 MILLIGRAM(S): at 11:00

## 2024-01-01 RX ADMIN — HYDROMORPHONE HYDROCHLORIDE 4 MILLIGRAM(S): 2 INJECTION INTRAMUSCULAR; INTRAVENOUS; SUBCUTANEOUS at 23:48

## 2024-01-01 RX ADMIN — MORPHINE SULFATE 15 MILLIGRAM(S): 50 CAPSULE, EXTENDED RELEASE ORAL at 11:38

## 2024-01-01 RX ADMIN — Medication 650 MILLIGRAM(S): at 11:29

## 2024-01-01 RX ADMIN — SODIUM CHLORIDE 500 MILLILITER(S): 9 INJECTION INTRAMUSCULAR; INTRAVENOUS; SUBCUTANEOUS at 20:35

## 2024-01-01 RX ADMIN — OXYCODONE HYDROCHLORIDE 15 MILLIGRAM(S): 5 TABLET ORAL at 17:40

## 2024-01-01 RX ADMIN — Medication 4 MILLIGRAM(S): at 23:34

## 2024-01-01 RX ADMIN — Medication 100 MILLIEQUIVALENT(S): at 15:31

## 2024-01-01 RX ADMIN — SODIUM CHLORIDE 2 GRAM(S): 9 INJECTION INTRAMUSCULAR; INTRAVENOUS; SUBCUTANEOUS at 17:26

## 2024-01-01 RX ADMIN — GABAPENTIN 600 MILLIGRAM(S): 400 CAPSULE ORAL at 05:20

## 2024-01-01 RX ADMIN — HYDROMORPHONE HYDROCHLORIDE 0.5 MILLIGRAM(S): 2 INJECTION INTRAMUSCULAR; INTRAVENOUS; SUBCUTANEOUS at 11:15

## 2024-01-01 RX ADMIN — GABAPENTIN 600 MILLIGRAM(S): 400 CAPSULE ORAL at 21:29

## 2024-01-01 RX ADMIN — MORPHINE SULFATE 15 MILLIGRAM(S): 50 CAPSULE, EXTENDED RELEASE ORAL at 10:38

## 2024-01-01 RX ADMIN — Medication 2 MILLIGRAM(S): at 17:21

## 2024-01-01 RX ADMIN — POLYETHYLENE GLYCOL 3350 17 GRAM(S): 17 POWDER, FOR SOLUTION ORAL at 13:52

## 2024-01-01 RX ADMIN — Medication 102 MILLIGRAM(S): at 13:40

## 2024-01-01 RX ADMIN — HYDROMORPHONE HYDROCHLORIDE 0.5 MILLIGRAM(S): 2 INJECTION INTRAMUSCULAR; INTRAVENOUS; SUBCUTANEOUS at 06:20

## 2024-01-01 RX ADMIN — GABAPENTIN 600 MILLIGRAM(S): 400 CAPSULE ORAL at 13:12

## 2024-01-01 RX ADMIN — DEXMEDETOMIDINE HYDROCHLORIDE IN 0.9% SODIUM CHLORIDE 2.13 MICROGRAM(S)/KG/HR: 4 INJECTION INTRAVENOUS at 15:08

## 2024-01-01 RX ADMIN — Medication 4 MILLIGRAM(S): at 17:08

## 2024-01-01 RX ADMIN — Medication 27 GRAM(S): at 20:46

## 2024-01-01 RX ADMIN — POLYETHYLENE GLYCOL 3350 17 GRAM(S): 17 POWDER, FOR SOLUTION ORAL at 17:21

## 2024-01-01 RX ADMIN — MORPHINE SULFATE 15 MILLIGRAM(S): 50 CAPSULE, EXTENDED RELEASE ORAL at 10:49

## 2024-01-01 RX ADMIN — SODIUM CHLORIDE 3 GRAM(S): 9 INJECTION INTRAMUSCULAR; INTRAVENOUS; SUBCUTANEOUS at 06:04

## 2024-01-01 RX ADMIN — LACOSAMIDE 150 MILLIGRAM(S): 50 TABLET ORAL at 05:24

## 2024-01-01 RX ADMIN — Medication 40 MILLIEQUIVALENT(S): at 23:02

## 2024-01-01 RX ADMIN — GABAPENTIN 100 MILLIGRAM(S): 400 CAPSULE ORAL at 21:03

## 2024-01-01 RX ADMIN — ONDANSETRON 4 MILLIGRAM(S): 8 TABLET, FILM COATED ORAL at 16:30

## 2024-01-01 RX ADMIN — PIPERACILLIN AND TAZOBACTAM 25 GRAM(S): 4; .5 INJECTION, POWDER, LYOPHILIZED, FOR SOLUTION INTRAVENOUS at 05:38

## 2024-01-01 RX ADMIN — Medication 3 MILLIGRAM(S): at 00:03

## 2024-01-01 RX ADMIN — SODIUM CHLORIDE 2 GRAM(S): 9 INJECTION INTRAMUSCULAR; INTRAVENOUS; SUBCUTANEOUS at 05:24

## 2024-01-01 RX ADMIN — FENTANYL CITRATE 25 MICROGRAM(S): 50 INJECTION INTRAVENOUS at 02:47

## 2024-01-01 RX ADMIN — OXYCODONE HYDROCHLORIDE 5 MILLIGRAM(S): 5 TABLET ORAL at 05:38

## 2024-01-01 RX ADMIN — POLYETHYLENE GLYCOL 3350 17 GRAM(S): 17 POWDER, FOR SOLUTION ORAL at 11:39

## 2024-01-01 RX ADMIN — PIPERACILLIN AND TAZOBACTAM 200 GRAM(S): 4; .5 INJECTION, POWDER, LYOPHILIZED, FOR SOLUTION INTRAVENOUS at 15:56

## 2024-01-01 RX ADMIN — Medication 0.5 MILLIGRAM(S): at 10:06

## 2024-01-01 RX ADMIN — Medication 4 MILLIGRAM(S): at 23:03

## 2024-01-01 RX ADMIN — HYDROMORPHONE HYDROCHLORIDE 8 MILLIGRAM(S): 2 INJECTION INTRAMUSCULAR; INTRAVENOUS; SUBCUTANEOUS at 16:33

## 2024-01-01 RX ADMIN — GABAPENTIN 100 MILLIGRAM(S): 400 CAPSULE ORAL at 05:26

## 2024-01-01 RX ADMIN — Medication 5 MILLIGRAM(S): at 12:51

## 2024-01-01 RX ADMIN — CEFTRIAXONE 1000 MILLIGRAM(S): 500 INJECTION, POWDER, FOR SOLUTION INTRAMUSCULAR; INTRAVENOUS at 14:41

## 2024-01-01 RX ADMIN — Medication 3 MILLIGRAM(S): at 21:03

## 2024-01-01 RX ADMIN — PANTOPRAZOLE SODIUM 40 MILLIGRAM(S): 20 TABLET, DELAYED RELEASE ORAL at 05:37

## 2024-01-01 RX ADMIN — FAMOTIDINE 20 MILLIGRAM(S): 10 INJECTION INTRAVENOUS at 13:52

## 2024-01-01 RX ADMIN — SODIUM CHLORIDE 75 MILLILITER(S): 9 INJECTION INTRAMUSCULAR; INTRAVENOUS; SUBCUTANEOUS at 19:15

## 2024-01-01 RX ADMIN — LOSARTAN POTASSIUM 50 MILLIGRAM(S): 100 TABLET, FILM COATED ORAL at 06:40

## 2024-01-01 RX ADMIN — MORPHINE SULFATE 15 MILLIGRAM(S): 50 CAPSULE, EXTENDED RELEASE ORAL at 18:12

## 2024-01-01 RX ADMIN — LACOSAMIDE 150 MILLIGRAM(S): 50 TABLET ORAL at 17:21

## 2024-01-01 RX ADMIN — HYDROMORPHONE HYDROCHLORIDE 1 MILLIGRAM(S): 2 INJECTION INTRAMUSCULAR; INTRAVENOUS; SUBCUTANEOUS at 22:43

## 2024-01-01 RX ADMIN — HYDROMORPHONE HYDROCHLORIDE 0.5 MILLIGRAM(S): 2 INJECTION INTRAMUSCULAR; INTRAVENOUS; SUBCUTANEOUS at 11:45

## 2024-01-01 RX ADMIN — MORPHINE SULFATE 15 MILLIGRAM(S): 50 CAPSULE, EXTENDED RELEASE ORAL at 21:01

## 2024-01-01 RX ADMIN — SODIUM CHLORIDE 30 MILLILITER(S): 5 INJECTION, SOLUTION INTRAVENOUS at 05:00

## 2024-01-01 RX ADMIN — CEFEPIME 100 MILLIGRAM(S): 1 INJECTION, POWDER, FOR SOLUTION INTRAMUSCULAR; INTRAVENOUS at 00:05

## 2024-01-01 RX ADMIN — MORPHINE SULFATE 15 MILLIGRAM(S): 50 CAPSULE, EXTENDED RELEASE ORAL at 21:29

## 2024-01-01 RX ADMIN — HYDROMORPHONE HYDROCHLORIDE 0.5 MILLIGRAM(S): 2 INJECTION INTRAMUSCULAR; INTRAVENOUS; SUBCUTANEOUS at 17:37

## 2024-01-01 RX ADMIN — HYDROMORPHONE HYDROCHLORIDE 0.5 MILLIGRAM(S): 2 INJECTION INTRAMUSCULAR; INTRAVENOUS; SUBCUTANEOUS at 15:30

## 2024-01-01 RX ADMIN — APIXABAN 10 MILLIGRAM(S): 2.5 TABLET, FILM COATED ORAL at 17:10

## 2024-01-01 RX ADMIN — LACOSAMIDE 130 MILLIGRAM(S): 50 TABLET ORAL at 17:21

## 2024-01-01 RX ADMIN — Medication 300 MILLIGRAM(S): at 20:41

## 2024-01-01 RX ADMIN — Medication 4 MILLIGRAM(S): at 11:33

## 2024-01-01 RX ADMIN — Medication 40 MILLIEQUIVALENT(S): at 21:54

## 2024-01-01 RX ADMIN — Medication 650 MILLIGRAM(S): at 16:31

## 2024-01-01 RX ADMIN — POLYETHYLENE GLYCOL 3350 17 GRAM(S): 17 POWDER, FOR SOLUTION ORAL at 10:42

## 2024-01-01 RX ADMIN — HYDROMORPHONE HYDROCHLORIDE 1 MILLIGRAM(S): 2 INJECTION INTRAMUSCULAR; INTRAVENOUS; SUBCUTANEOUS at 06:00

## 2024-01-01 RX ADMIN — HYDROMORPHONE HYDROCHLORIDE 0.5 MILLIGRAM(S): 2 INJECTION INTRAMUSCULAR; INTRAVENOUS; SUBCUTANEOUS at 16:44

## 2024-01-01 RX ADMIN — Medication 85 MILLIMOLE(S): at 00:03

## 2024-01-01 RX ADMIN — POLYETHYLENE GLYCOL 3350 17 GRAM(S): 17 POWDER, FOR SOLUTION ORAL at 12:34

## 2024-01-01 RX ADMIN — ENOXAPARIN SODIUM 40 MILLIGRAM(S): 100 INJECTION SUBCUTANEOUS at 17:12

## 2024-01-01 RX ADMIN — OXYCODONE HYDROCHLORIDE 5 MILLIGRAM(S): 5 TABLET ORAL at 23:35

## 2024-01-01 RX ADMIN — LEVETIRACETAM 750 MILLIGRAM(S): 250 TABLET, FILM COATED ORAL at 05:19

## 2024-01-01 RX ADMIN — GABAPENTIN 600 MILLIGRAM(S): 400 CAPSULE ORAL at 14:17

## 2024-01-01 RX ADMIN — GABAPENTIN 100 MILLIGRAM(S): 400 CAPSULE ORAL at 05:27

## 2024-01-01 RX ADMIN — POTASSIUM PHOSPHATE, MONOBASIC POTASSIUM PHOSPHATE, DIBASIC 62.5 MILLIMOLE(S): 236; 224 INJECTION, SOLUTION INTRAVENOUS at 13:44

## 2024-01-01 RX ADMIN — MORPHINE SULFATE 15 MILLIGRAM(S): 50 CAPSULE, EXTENDED RELEASE ORAL at 22:15

## 2024-01-01 RX ADMIN — Medication 4 MILLIGRAM(S): at 21:36

## 2024-01-01 RX ADMIN — SODIUM CHLORIDE 75 MILLILITER(S): 9 INJECTION INTRAMUSCULAR; INTRAVENOUS; SUBCUTANEOUS at 19:16

## 2024-01-01 RX ADMIN — Medication 4 MILLIGRAM(S): at 06:11

## 2024-01-01 RX ADMIN — OXYCODONE HYDROCHLORIDE 15 MILLIGRAM(S): 5 TABLET ORAL at 17:53

## 2024-01-01 RX ADMIN — Medication 650 MILLIGRAM(S): at 15:31

## 2024-01-01 RX ADMIN — MORPHINE SULFATE 15 MILLIGRAM(S): 50 CAPSULE, EXTENDED RELEASE ORAL at 11:43

## 2024-01-01 RX ADMIN — Medication 40 MILLIEQUIVALENT(S): at 16:26

## 2024-01-01 RX ADMIN — HYDROMORPHONE HYDROCHLORIDE 8 MILLIGRAM(S): 2 INJECTION INTRAMUSCULAR; INTRAVENOUS; SUBCUTANEOUS at 18:44

## 2024-01-01 RX ADMIN — PIPERACILLIN AND TAZOBACTAM 25 GRAM(S): 4; .5 INJECTION, POWDER, LYOPHILIZED, FOR SOLUTION INTRAVENOUS at 14:17

## 2024-01-01 RX ADMIN — SODIUM CHLORIDE 2 GRAM(S): 9 INJECTION INTRAMUSCULAR; INTRAVENOUS; SUBCUTANEOUS at 17:53

## 2024-01-01 RX ADMIN — Medication 4 MILLIGRAM(S): at 05:19

## 2024-01-01 RX ADMIN — Medication 4 MILLIGRAM(S): at 13:06

## 2024-01-01 RX ADMIN — Medication 650 MILLIGRAM(S): at 17:30

## 2024-01-01 RX ADMIN — MORPHINE SULFATE 15 MILLIGRAM(S): 50 CAPSULE, EXTENDED RELEASE ORAL at 21:03

## 2024-01-01 RX ADMIN — FENTANYL CITRATE 50 MICROGRAM(S): 50 INJECTION INTRAVENOUS at 23:55

## 2024-01-01 RX ADMIN — Medication 650 MILLIGRAM(S): at 05:50

## 2024-01-01 RX ADMIN — MORPHINE SULFATE 15 MILLIGRAM(S): 50 CAPSULE, EXTENDED RELEASE ORAL at 11:06

## 2024-01-01 RX ADMIN — POLYETHYLENE GLYCOL 3350 17 GRAM(S): 17 POWDER, FOR SOLUTION ORAL at 17:07

## 2024-01-01 RX ADMIN — Medication 40 MILLIEQUIVALENT(S): at 09:28

## 2024-01-01 RX ADMIN — CELECOXIB 200 MILLIGRAM(S): 200 CAPSULE ORAL at 18:12

## 2024-01-01 RX ADMIN — HYDROMORPHONE HYDROCHLORIDE 0.5 MILLIGRAM(S): 2 INJECTION INTRAMUSCULAR; INTRAVENOUS; SUBCUTANEOUS at 06:38

## 2024-01-01 RX ADMIN — Medication 50 MILLIEQUIVALENT(S): at 05:30

## 2024-01-01 RX ADMIN — APIXABAN 10 MILLIGRAM(S): 2.5 TABLET, FILM COATED ORAL at 17:52

## 2024-01-01 RX ADMIN — ONDANSETRON 4 MILLIGRAM(S): 8 TABLET, FILM COATED ORAL at 09:32

## 2024-01-01 RX ADMIN — FENTANYL CITRATE 25 MICROGRAM(S): 50 INJECTION INTRAVENOUS at 13:34

## 2024-01-01 RX ADMIN — ENOXAPARIN SODIUM 40 MILLIGRAM(S): 100 INJECTION SUBCUTANEOUS at 06:04

## 2024-01-01 RX ADMIN — MORPHINE SULFATE 15 MILLIGRAM(S): 50 CAPSULE, EXTENDED RELEASE ORAL at 22:29

## 2024-01-01 RX ADMIN — Medication 40 MILLIEQUIVALENT(S): at 21:32

## 2024-01-01 RX ADMIN — PIPERACILLIN AND TAZOBACTAM 25 GRAM(S): 4; .5 INJECTION, POWDER, LYOPHILIZED, FOR SOLUTION INTRAVENOUS at 07:00

## 2024-01-01 RX ADMIN — LOSARTAN POTASSIUM 50 MILLIGRAM(S): 100 TABLET, FILM COATED ORAL at 06:02

## 2024-01-01 RX ADMIN — GABAPENTIN 600 MILLIGRAM(S): 400 CAPSULE ORAL at 22:13

## 2024-01-01 RX ADMIN — OXYCODONE HYDROCHLORIDE 5 MILLIGRAM(S): 5 TABLET ORAL at 15:20

## 2024-01-01 RX ADMIN — FENTANYL CITRATE 25 MICROGRAM(S): 50 INJECTION INTRAVENOUS at 11:30

## 2024-01-01 RX ADMIN — FAMOTIDINE 20 MILLIGRAM(S): 10 INJECTION INTRAVENOUS at 11:58

## 2024-01-01 RX ADMIN — HYDROMORPHONE HYDROCHLORIDE 8 MILLIGRAM(S): 2 INJECTION INTRAMUSCULAR; INTRAVENOUS; SUBCUTANEOUS at 09:03

## 2024-01-01 RX ADMIN — FAMOTIDINE 20 MILLIGRAM(S): 10 INJECTION INTRAVENOUS at 11:48

## 2024-01-01 RX ADMIN — HYDROMORPHONE HYDROCHLORIDE 0.5 MILLIGRAM(S): 2 INJECTION INTRAMUSCULAR; INTRAVENOUS; SUBCUTANEOUS at 02:30

## 2024-01-01 RX ADMIN — PIPERACILLIN AND TAZOBACTAM 3.38 GRAM(S): 4; .5 INJECTION, POWDER, LYOPHILIZED, FOR SOLUTION INTRAVENOUS at 00:00

## 2024-01-01 RX ADMIN — SODIUM CHLORIDE 2 GRAM(S): 9 INJECTION INTRAMUSCULAR; INTRAVENOUS; SUBCUTANEOUS at 06:40

## 2024-01-01 RX ADMIN — Medication 100 GRAM(S): at 13:54

## 2024-01-01 RX ADMIN — LACOSAMIDE 130 MILLIGRAM(S): 50 TABLET ORAL at 06:39

## 2024-01-01 RX ADMIN — GABAPENTIN 100 MILLIGRAM(S): 400 CAPSULE ORAL at 05:37

## 2024-01-01 RX ADMIN — Medication 2 MILLIGRAM(S): at 17:46

## 2024-01-01 RX ADMIN — Medication 100 GRAM(S): at 19:35

## 2024-01-01 RX ADMIN — GABAPENTIN 600 MILLIGRAM(S): 400 CAPSULE ORAL at 14:38

## 2024-01-01 RX ADMIN — Medication 200 GRAM(S): at 15:30

## 2024-01-01 RX ADMIN — Medication 650 MILLIGRAM(S): at 14:41

## 2024-01-01 RX ADMIN — Medication 25 GRAM(S): at 23:24

## 2024-01-01 RX ADMIN — Medication 4 MILLIGRAM(S): at 17:40

## 2024-01-01 RX ADMIN — HYDROMORPHONE HYDROCHLORIDE 1 MILLIGRAM(S): 2 INJECTION INTRAMUSCULAR; INTRAVENOUS; SUBCUTANEOUS at 13:11

## 2024-01-01 RX ADMIN — MORPHINE SULFATE 15 MILLIGRAM(S): 50 CAPSULE, EXTENDED RELEASE ORAL at 18:22

## 2024-01-01 RX ADMIN — Medication 50 MILLIEQUIVALENT(S): at 23:00

## 2024-01-01 RX ADMIN — LEVETIRACETAM 400 MILLIGRAM(S): 250 TABLET, FILM COATED ORAL at 05:51

## 2024-01-01 RX ADMIN — HYDROMORPHONE HYDROCHLORIDE 0.5 MILLIGRAM(S): 2 INJECTION INTRAMUSCULAR; INTRAVENOUS; SUBCUTANEOUS at 21:28

## 2024-01-01 RX ADMIN — FENTANYL CITRATE 25 MICROGRAM(S): 50 INJECTION INTRAVENOUS at 03:15

## 2024-01-01 RX ADMIN — GABAPENTIN 600 MILLIGRAM(S): 400 CAPSULE ORAL at 21:41

## 2024-01-01 RX ADMIN — GABAPENTIN 600 MILLIGRAM(S): 400 CAPSULE ORAL at 21:36

## 2024-01-01 RX ADMIN — HYDROMORPHONE HYDROCHLORIDE 1 MILLIGRAM(S): 2 INJECTION INTRAMUSCULAR; INTRAVENOUS; SUBCUTANEOUS at 07:00

## 2024-01-01 RX ADMIN — GABAPENTIN 600 MILLIGRAM(S): 400 CAPSULE ORAL at 05:55

## 2024-01-01 RX ADMIN — Medication 20 MILLIEQUIVALENT(S): at 19:37

## 2024-01-01 RX ADMIN — Medication 40 MILLIEQUIVALENT(S): at 11:47

## 2024-01-01 RX ADMIN — OXYCODONE HYDROCHLORIDE 15 MILLIGRAM(S): 5 TABLET ORAL at 17:54

## 2024-01-01 RX ADMIN — HYDROMORPHONE HYDROCHLORIDE 0.5 MILLIGRAM(S): 2 INJECTION INTRAMUSCULAR; INTRAVENOUS; SUBCUTANEOUS at 00:30

## 2024-01-01 RX ADMIN — HYDROMORPHONE HYDROCHLORIDE 1 MILLIGRAM(S): 2 INJECTION INTRAMUSCULAR; INTRAVENOUS; SUBCUTANEOUS at 01:08

## 2024-01-01 RX ADMIN — Medication 25 GRAM(S): at 13:33

## 2024-01-01 RX ADMIN — GABAPENTIN 600 MILLIGRAM(S): 400 CAPSULE ORAL at 14:23

## 2024-01-01 RX ADMIN — GABAPENTIN 600 MILLIGRAM(S): 400 CAPSULE ORAL at 22:02

## 2024-01-01 RX ADMIN — HYDROMORPHONE HYDROCHLORIDE 1 MILLIGRAM(S): 2 INJECTION INTRAMUSCULAR; INTRAVENOUS; SUBCUTANEOUS at 05:28

## 2024-01-01 RX ADMIN — LOSARTAN POTASSIUM 50 MILLIGRAM(S): 100 TABLET, FILM COATED ORAL at 05:23

## 2024-01-01 RX ADMIN — SODIUM CHLORIDE 1000 MILLILITER(S): 9 INJECTION INTRAMUSCULAR; INTRAVENOUS; SUBCUTANEOUS at 20:15

## 2024-01-01 RX ADMIN — LOSARTAN POTASSIUM 50 MILLIGRAM(S): 100 TABLET, FILM COATED ORAL at 06:27

## 2024-01-01 RX ADMIN — Medication 50 MILLIEQUIVALENT(S): at 01:09

## 2024-01-01 RX ADMIN — Medication 2 MILLIGRAM(S): at 16:06

## 2024-01-01 RX ADMIN — Medication 4 MILLIGRAM(S): at 13:16

## 2024-01-01 RX ADMIN — HYDROMORPHONE HYDROCHLORIDE 1 MILLIGRAM(S): 2 INJECTION INTRAMUSCULAR; INTRAVENOUS; SUBCUTANEOUS at 17:30

## 2024-01-01 RX ADMIN — Medication 0.5 MILLIGRAM(S): at 23:02

## 2024-01-01 RX ADMIN — LOSARTAN POTASSIUM 50 MILLIGRAM(S): 100 TABLET, FILM COATED ORAL at 06:43

## 2024-01-01 RX ADMIN — Medication 5 MILLIGRAM(S): at 21:41

## 2024-01-01 RX ADMIN — FENTANYL CITRATE 50 MICROGRAM(S): 50 INJECTION INTRAVENOUS at 00:10

## 2024-01-01 RX ADMIN — OXYCODONE HYDROCHLORIDE 15 MILLIGRAM(S): 5 TABLET ORAL at 05:22

## 2024-01-01 RX ADMIN — HYDROMORPHONE HYDROCHLORIDE 0.5 MILLIGRAM(S): 2 INJECTION INTRAMUSCULAR; INTRAVENOUS; SUBCUTANEOUS at 00:59

## 2024-01-01 RX ADMIN — HYDROMORPHONE HYDROCHLORIDE 4 MILLIGRAM(S): 2 INJECTION INTRAMUSCULAR; INTRAVENOUS; SUBCUTANEOUS at 18:39

## 2024-01-01 RX ADMIN — OXYCODONE HYDROCHLORIDE 5 MILLIGRAM(S): 5 TABLET ORAL at 21:25

## 2024-01-01 RX ADMIN — Medication 5 MILLIGRAM(S): at 11:18

## 2024-01-01 RX ADMIN — GABAPENTIN 600 MILLIGRAM(S): 400 CAPSULE ORAL at 05:48

## 2024-01-01 RX ADMIN — HYDROMORPHONE HYDROCHLORIDE 4 MILLIGRAM(S): 2 INJECTION INTRAMUSCULAR; INTRAVENOUS; SUBCUTANEOUS at 07:54

## 2024-01-01 RX ADMIN — GABAPENTIN 600 MILLIGRAM(S): 400 CAPSULE ORAL at 22:11

## 2024-01-01 RX ADMIN — Medication 10 MILLIGRAM(S): at 19:25

## 2024-01-01 RX ADMIN — Medication 2 MILLIGRAM(S): at 01:00

## 2024-01-01 RX ADMIN — CELECOXIB 200 MILLIGRAM(S): 200 CAPSULE ORAL at 06:07

## 2024-01-01 RX ADMIN — Medication 10 MILLIGRAM(S): at 11:59

## 2024-01-01 RX ADMIN — MORPHINE SULFATE 15 MILLIGRAM(S): 50 CAPSULE, EXTENDED RELEASE ORAL at 21:28

## 2024-01-01 RX ADMIN — SODIUM CHLORIDE 1 GRAM(S): 9 INJECTION INTRAMUSCULAR; INTRAVENOUS; SUBCUTANEOUS at 11:48

## 2024-01-01 RX ADMIN — GABAPENTIN 600 MILLIGRAM(S): 400 CAPSULE ORAL at 06:12

## 2024-01-01 RX ADMIN — PIPERACILLIN AND TAZOBACTAM 25 GRAM(S): 4; .5 INJECTION, POWDER, LYOPHILIZED, FOR SOLUTION INTRAVENOUS at 05:04

## 2024-01-01 RX ADMIN — CEFEPIME 100 MILLIGRAM(S): 1 INJECTION, POWDER, FOR SOLUTION INTRAMUSCULAR; INTRAVENOUS at 05:27

## 2024-01-01 RX ADMIN — Medication 4 MILLIGRAM(S): at 17:14

## 2024-01-01 RX ADMIN — Medication 4 MILLIGRAM(S): at 05:17

## 2024-01-01 RX ADMIN — Medication 750 MILLIGRAM(S): at 21:38

## 2024-01-01 RX ADMIN — POLYETHYLENE GLYCOL 3350 17 GRAM(S): 17 POWDER, FOR SOLUTION ORAL at 11:38

## 2024-01-01 RX ADMIN — SODIUM CHLORIDE 500 MILLILITER(S): 9 INJECTION INTRAMUSCULAR; INTRAVENOUS; SUBCUTANEOUS at 12:33

## 2024-01-01 RX ADMIN — LACOSAMIDE 150 MILLIGRAM(S): 50 TABLET ORAL at 17:46

## 2024-01-01 RX ADMIN — Medication 40 MILLIEQUIVALENT(S): at 01:09

## 2024-01-01 RX ADMIN — Medication 300 MILLIGRAM(S): at 14:30

## 2024-01-01 RX ADMIN — POLYETHYLENE GLYCOL 3350 17 GRAM(S): 17 POWDER, FOR SOLUTION ORAL at 12:00

## 2024-01-01 RX ADMIN — CELECOXIB 200 MILLIGRAM(S): 200 CAPSULE ORAL at 05:58

## 2024-01-01 RX ADMIN — MORPHINE SULFATE 15 MILLIGRAM(S): 50 CAPSULE, EXTENDED RELEASE ORAL at 11:34

## 2024-01-01 RX ADMIN — SODIUM CHLORIDE 2 GRAM(S): 9 INJECTION INTRAMUSCULAR; INTRAVENOUS; SUBCUTANEOUS at 11:34

## 2024-01-01 RX ADMIN — PANTOPRAZOLE SODIUM 40 MILLIGRAM(S): 20 TABLET, DELAYED RELEASE ORAL at 11:34

## 2024-01-01 RX ADMIN — HYDROMORPHONE HYDROCHLORIDE 1 MILLIGRAM(S): 2 INJECTION INTRAMUSCULAR; INTRAVENOUS; SUBCUTANEOUS at 07:26

## 2024-01-01 RX ADMIN — Medication 4 MILLIGRAM(S): at 22:16

## 2024-01-01 RX ADMIN — Medication 1 MILLIGRAM(S): at 10:37

## 2024-01-01 RX ADMIN — HYDROMORPHONE HYDROCHLORIDE 1 MILLIGRAM(S): 2 INJECTION INTRAMUSCULAR; INTRAVENOUS; SUBCUTANEOUS at 01:50

## 2024-01-01 RX ADMIN — GABAPENTIN 100 MILLIGRAM(S): 400 CAPSULE ORAL at 14:20

## 2024-01-01 RX ADMIN — SODIUM CHLORIDE 3 GRAM(S): 9 INJECTION INTRAMUSCULAR; INTRAVENOUS; SUBCUTANEOUS at 21:03

## 2024-01-01 RX ADMIN — PIPERACILLIN AND TAZOBACTAM 25 GRAM(S): 4; .5 INJECTION, POWDER, LYOPHILIZED, FOR SOLUTION INTRAVENOUS at 05:48

## 2024-01-01 RX ADMIN — Medication 3.98 MICROGRAM(S)/KG/MIN: at 03:45

## 2024-01-01 RX ADMIN — MORPHINE SULFATE 15 MILLIGRAM(S): 50 CAPSULE, EXTENDED RELEASE ORAL at 10:43

## 2024-01-01 RX ADMIN — HYDROMORPHONE HYDROCHLORIDE 0.5 MILLIGRAM(S): 2 INJECTION INTRAMUSCULAR; INTRAVENOUS; SUBCUTANEOUS at 15:45

## 2024-01-01 RX ADMIN — HYDROMORPHONE HYDROCHLORIDE 0.5 MILLIGRAM(S): 2 INJECTION INTRAMUSCULAR; INTRAVENOUS; SUBCUTANEOUS at 16:58

## 2024-01-01 RX ADMIN — OXYCODONE HYDROCHLORIDE 15 MILLIGRAM(S): 5 TABLET ORAL at 06:12

## 2024-01-01 RX ADMIN — GABAPENTIN 600 MILLIGRAM(S): 400 CAPSULE ORAL at 06:19

## 2024-01-01 RX ADMIN — DEXMEDETOMIDINE HYDROCHLORIDE IN 0.9% SODIUM CHLORIDE 2.13 MICROGRAM(S)/KG/HR: 4 INJECTION INTRAVENOUS at 16:00

## 2024-01-01 RX ADMIN — ENOXAPARIN SODIUM 40 MILLIGRAM(S): 100 INJECTION SUBCUTANEOUS at 05:37

## 2024-01-01 RX ADMIN — HYDROMORPHONE HYDROCHLORIDE 1 MILLIGRAM(S): 2 INJECTION INTRAMUSCULAR; INTRAVENOUS; SUBCUTANEOUS at 07:10

## 2024-01-01 RX ADMIN — LACOSAMIDE 130 MILLIGRAM(S): 50 TABLET ORAL at 12:24

## 2024-01-01 RX ADMIN — SODIUM CHLORIDE 2 GRAM(S): 9 INJECTION INTRAMUSCULAR; INTRAVENOUS; SUBCUTANEOUS at 14:40

## 2024-01-01 RX ADMIN — HYDROMORPHONE HYDROCHLORIDE 1 MILLIGRAM(S): 2 INJECTION INTRAMUSCULAR; INTRAVENOUS; SUBCUTANEOUS at 15:03

## 2024-01-01 RX ADMIN — Medication 2: at 12:28

## 2024-01-01 RX ADMIN — PANTOPRAZOLE SODIUM 40 MILLIGRAM(S): 20 TABLET, DELAYED RELEASE ORAL at 06:05

## 2024-01-01 RX ADMIN — PIPERACILLIN AND TAZOBACTAM 25 GRAM(S): 4; .5 INJECTION, POWDER, LYOPHILIZED, FOR SOLUTION INTRAVENOUS at 13:08

## 2024-01-01 RX ADMIN — SODIUM CHLORIDE 1000 MILLILITER(S): 9 INJECTION INTRAMUSCULAR; INTRAVENOUS; SUBCUTANEOUS at 14:42

## 2024-01-01 RX ADMIN — HYDROMORPHONE HYDROCHLORIDE 0.5 MILLIGRAM(S): 2 INJECTION INTRAMUSCULAR; INTRAVENOUS; SUBCUTANEOUS at 16:00

## 2024-01-01 RX ADMIN — MORPHINE SULFATE 15 MILLIGRAM(S): 50 CAPSULE, EXTENDED RELEASE ORAL at 10:34

## 2024-01-01 RX ADMIN — PANTOPRAZOLE SODIUM 40 MILLIGRAM(S): 20 TABLET, DELAYED RELEASE ORAL at 06:25

## 2024-01-01 RX ADMIN — HYDROMORPHONE HYDROCHLORIDE 0.5 MILLIGRAM(S): 2 INJECTION INTRAMUSCULAR; INTRAVENOUS; SUBCUTANEOUS at 21:58

## 2024-01-01 RX ADMIN — Medication 4 MILLIGRAM(S): at 17:30

## 2024-01-01 RX ADMIN — Medication 4 MILLIGRAM(S): at 05:48

## 2024-01-01 RX ADMIN — HYDROMORPHONE HYDROCHLORIDE 1 MILLIGRAM(S): 2 INJECTION INTRAMUSCULAR; INTRAVENOUS; SUBCUTANEOUS at 01:10

## 2024-01-01 RX ADMIN — SODIUM CHLORIDE 100 MILLILITER(S): 9 INJECTION, SOLUTION INTRAVENOUS at 23:07

## 2024-01-01 RX ADMIN — LEVETIRACETAM 1000 MILLIGRAM(S): 250 TABLET, FILM COATED ORAL at 11:05

## 2024-01-01 RX ADMIN — Medication 220 MILLIGRAM(S): at 11:17

## 2024-01-01 RX ADMIN — Medication 750 MILLIGRAM(S): at 19:00

## 2024-01-01 RX ADMIN — HYDROMORPHONE HYDROCHLORIDE 4 MILLIGRAM(S): 2 INJECTION INTRAMUSCULAR; INTRAVENOUS; SUBCUTANEOUS at 00:48

## 2024-01-01 RX ADMIN — POLYETHYLENE GLYCOL 3350 17 GRAM(S): 17 POWDER, FOR SOLUTION ORAL at 06:03

## 2024-01-01 RX ADMIN — SODIUM CHLORIDE 75 MILLILITER(S): 9 INJECTION, SOLUTION INTRAVENOUS at 19:19

## 2024-01-01 RX ADMIN — HYDROMORPHONE HYDROCHLORIDE 1 MILLIGRAM(S): 2 INJECTION INTRAMUSCULAR; INTRAVENOUS; SUBCUTANEOUS at 15:16

## 2024-01-01 RX ADMIN — LACOSAMIDE 150 MILLIGRAM(S): 50 TABLET ORAL at 17:51

## 2024-01-01 RX ADMIN — CHLORHEXIDINE GLUCONATE 1 APPLICATION(S): 213 SOLUTION TOPICAL at 05:49

## 2024-01-01 RX ADMIN — LACOSAMIDE 130 MILLIGRAM(S): 50 TABLET ORAL at 06:12

## 2024-01-01 RX ADMIN — HYDROMORPHONE HYDROCHLORIDE 1 MILLIGRAM(S): 2 INJECTION INTRAMUSCULAR; INTRAVENOUS; SUBCUTANEOUS at 06:10

## 2024-01-01 RX ADMIN — FAMOTIDINE 20 MILLIGRAM(S): 10 INJECTION INTRAVENOUS at 13:47

## 2024-01-01 RX ADMIN — SODIUM CHLORIDE 2 GRAM(S): 9 INJECTION INTRAMUSCULAR; INTRAVENOUS; SUBCUTANEOUS at 18:00

## 2024-01-01 RX ADMIN — HYDROMORPHONE HYDROCHLORIDE 8 MILLIGRAM(S): 2 INJECTION INTRAMUSCULAR; INTRAVENOUS; SUBCUTANEOUS at 12:39

## 2024-01-01 RX ADMIN — Medication 102 MILLIGRAM(S): at 19:30

## 2024-01-01 RX ADMIN — Medication 2 PACKET(S): at 21:04

## 2024-01-01 RX ADMIN — SODIUM CHLORIDE 2 GRAM(S): 9 INJECTION INTRAMUSCULAR; INTRAVENOUS; SUBCUTANEOUS at 13:16

## 2024-01-01 RX ADMIN — SODIUM CHLORIDE 1700 MILLILITER(S): 9 INJECTION INTRAMUSCULAR; INTRAVENOUS; SUBCUTANEOUS at 09:36

## 2024-01-01 RX ADMIN — ENOXAPARIN SODIUM 30 MILLIGRAM(S): 100 INJECTION SUBCUTANEOUS at 19:21

## 2024-01-01 RX ADMIN — HYDROMORPHONE HYDROCHLORIDE 1 MILLIGRAM(S): 2 INJECTION INTRAMUSCULAR; INTRAVENOUS; SUBCUTANEOUS at 00:49

## 2024-01-01 RX ADMIN — Medication 10 MILLIGRAM(S): at 20:45

## 2024-01-01 RX ADMIN — LEVETIRACETAM 750 MILLIGRAM(S): 250 TABLET, FILM COATED ORAL at 17:54

## 2024-01-01 RX ADMIN — LEVETIRACETAM 750 MILLIGRAM(S): 250 TABLET, FILM COATED ORAL at 05:05

## 2024-01-01 RX ADMIN — HYDROMORPHONE HYDROCHLORIDE 0.5 MILLIGRAM(S): 2 INJECTION INTRAMUSCULAR; INTRAVENOUS; SUBCUTANEOUS at 03:52

## 2024-01-01 RX ADMIN — GABAPENTIN 600 MILLIGRAM(S): 400 CAPSULE ORAL at 13:19

## 2024-01-01 RX ADMIN — POLYETHYLENE GLYCOL 3350 17 GRAM(S): 17 POWDER, FOR SOLUTION ORAL at 19:49

## 2024-01-01 RX ADMIN — HYDROMORPHONE HYDROCHLORIDE 1 MILLIGRAM(S): 2 INJECTION INTRAMUSCULAR; INTRAVENOUS; SUBCUTANEOUS at 17:21

## 2024-01-01 RX ADMIN — CELECOXIB 200 MILLIGRAM(S): 200 CAPSULE ORAL at 07:10

## 2024-01-01 RX ADMIN — POLYETHYLENE GLYCOL 3350 17 GRAM(S): 17 POWDER, FOR SOLUTION ORAL at 05:24

## 2024-01-01 RX ADMIN — HYDROMORPHONE HYDROCHLORIDE 1 MILLIGRAM(S): 2 INJECTION INTRAMUSCULAR; INTRAVENOUS; SUBCUTANEOUS at 00:40

## 2024-01-01 RX ADMIN — HYDROMORPHONE HYDROCHLORIDE 1 MILLIGRAM(S): 2 INJECTION INTRAMUSCULAR; INTRAVENOUS; SUBCUTANEOUS at 05:47

## 2024-01-01 RX ADMIN — SODIUM CHLORIDE 2 GRAM(S): 9 INJECTION INTRAMUSCULAR; INTRAVENOUS; SUBCUTANEOUS at 05:55

## 2024-01-01 RX ADMIN — GABAPENTIN 600 MILLIGRAM(S): 400 CAPSULE ORAL at 21:26

## 2024-01-01 RX ADMIN — PANTOPRAZOLE SODIUM 40 MILLIGRAM(S): 20 TABLET, DELAYED RELEASE ORAL at 12:33

## 2024-01-01 RX ADMIN — ENOXAPARIN SODIUM 30 MILLIGRAM(S): 100 INJECTION SUBCUTANEOUS at 19:16

## 2024-01-01 RX ADMIN — GABAPENTIN 600 MILLIGRAM(S): 400 CAPSULE ORAL at 13:07

## 2024-01-01 RX ADMIN — HYDROMORPHONE HYDROCHLORIDE 8 MILLIGRAM(S): 2 INJECTION INTRAMUSCULAR; INTRAVENOUS; SUBCUTANEOUS at 11:39

## 2024-01-01 RX ADMIN — CHLORHEXIDINE GLUCONATE 15 MILLILITER(S): 213 SOLUTION TOPICAL at 06:49

## 2024-01-01 RX ADMIN — Medication 650 MILLIGRAM(S): at 05:19

## 2024-01-01 RX ADMIN — SODIUM CHLORIDE 3 GRAM(S): 9 INJECTION INTRAMUSCULAR; INTRAVENOUS; SUBCUTANEOUS at 14:59

## 2024-01-01 RX ADMIN — Medication 50 MILLIEQUIVALENT(S): at 03:15

## 2024-01-01 RX ADMIN — CHLORHEXIDINE GLUCONATE 1 APPLICATION(S): 213 SOLUTION TOPICAL at 21:00

## 2024-01-01 RX ADMIN — HYDROMORPHONE HYDROCHLORIDE 0.5 MILLIGRAM(S): 2 INJECTION INTRAMUSCULAR; INTRAVENOUS; SUBCUTANEOUS at 19:40

## 2024-01-01 RX ADMIN — Medication 2 MILLIGRAM(S): at 06:03

## 2024-01-01 RX ADMIN — LEVETIRACETAM 400 MILLIGRAM(S): 250 TABLET, FILM COATED ORAL at 18:00

## 2024-01-01 RX ADMIN — Medication 2 MILLIGRAM(S): at 05:31

## 2024-01-01 RX ADMIN — HYDROMORPHONE HYDROCHLORIDE 4 MILLIGRAM(S): 2 INJECTION INTRAMUSCULAR; INTRAVENOUS; SUBCUTANEOUS at 14:00

## 2024-01-01 RX ADMIN — OXYCODONE HYDROCHLORIDE 15 MILLIGRAM(S): 5 TABLET ORAL at 05:48

## 2024-01-01 RX ADMIN — Medication 2 MILLIGRAM(S): at 05:24

## 2024-01-01 RX ADMIN — PIPERACILLIN AND TAZOBACTAM 25 GRAM(S): 4; .5 INJECTION, POWDER, LYOPHILIZED, FOR SOLUTION INTRAVENOUS at 15:19

## 2024-01-01 RX ADMIN — SODIUM CHLORIDE 2 GRAM(S): 9 INJECTION INTRAMUSCULAR; INTRAVENOUS; SUBCUTANEOUS at 13:06

## 2024-01-01 RX ADMIN — HYDROMORPHONE HYDROCHLORIDE 0.5 MILLIGRAM(S): 2 INJECTION INTRAMUSCULAR; INTRAVENOUS; SUBCUTANEOUS at 01:55

## 2024-01-01 RX ADMIN — SODIUM CHLORIDE 1000 MILLILITER(S): 9 INJECTION INTRAMUSCULAR; INTRAVENOUS; SUBCUTANEOUS at 06:33

## 2024-01-01 RX ADMIN — SODIUM CHLORIDE 2 GRAM(S): 9 INJECTION INTRAMUSCULAR; INTRAVENOUS; SUBCUTANEOUS at 23:34

## 2024-01-01 RX ADMIN — SODIUM CHLORIDE 3 GRAM(S): 9 INJECTION INTRAMUSCULAR; INTRAVENOUS; SUBCUTANEOUS at 14:28

## 2024-01-01 RX ADMIN — Medication 100 MILLIEQUIVALENT(S): at 16:52

## 2024-01-01 RX ADMIN — HYDROMORPHONE HYDROCHLORIDE 8 MILLIGRAM(S): 2 INJECTION INTRAMUSCULAR; INTRAVENOUS; SUBCUTANEOUS at 15:33

## 2024-01-01 RX ADMIN — Medication 975 MILLIGRAM(S): at 13:03

## 2024-01-01 RX ADMIN — HYDROMORPHONE HYDROCHLORIDE 1 MILLIGRAM(S): 2 INJECTION INTRAMUSCULAR; INTRAVENOUS; SUBCUTANEOUS at 22:15

## 2024-01-01 RX ADMIN — Medication 975 MILLIGRAM(S): at 12:09

## 2024-01-01 RX ADMIN — Medication 40 MILLIEQUIVALENT(S): at 14:50

## 2024-01-01 RX ADMIN — LACOSAMIDE 130 MILLIGRAM(S): 50 TABLET ORAL at 17:25

## 2024-01-01 RX ADMIN — OXYCODONE HYDROCHLORIDE 5 MILLIGRAM(S): 5 TABLET ORAL at 14:39

## 2024-01-01 RX ADMIN — PIPERACILLIN AND TAZOBACTAM 3.38 GRAM(S): 4; .5 INJECTION, POWDER, LYOPHILIZED, FOR SOLUTION INTRAVENOUS at 05:15

## 2024-01-01 RX ADMIN — LACOSAMIDE 130 MILLIGRAM(S): 50 TABLET ORAL at 05:30

## 2024-01-01 RX ADMIN — CHLORHEXIDINE GLUCONATE 1 APPLICATION(S): 213 SOLUTION TOPICAL at 14:41

## 2024-01-01 RX ADMIN — Medication 2 MILLIGRAM(S): at 10:09

## 2024-01-01 RX ADMIN — Medication 250 MILLIGRAM(S): at 05:55

## 2024-01-01 RX ADMIN — SODIUM CHLORIDE 75 MILLILITER(S): 9 INJECTION INTRAMUSCULAR; INTRAVENOUS; SUBCUTANEOUS at 13:53

## 2024-01-01 RX ADMIN — SODIUM CHLORIDE 50 MILLILITER(S): 9 INJECTION, SOLUTION INTRAVENOUS at 16:49

## 2024-01-01 RX ADMIN — CELECOXIB 200 MILLIGRAM(S): 200 CAPSULE ORAL at 17:44

## 2024-01-01 RX ADMIN — Medication 40 MILLIEQUIVALENT(S): at 13:12

## 2024-01-01 RX ADMIN — Medication 10 MILLIGRAM(S): at 17:15

## 2024-01-01 RX ADMIN — CELECOXIB 200 MILLIGRAM(S): 200 CAPSULE ORAL at 05:29

## 2024-01-01 RX ADMIN — SODIUM CHLORIDE 2 GRAM(S): 9 INJECTION INTRAMUSCULAR; INTRAVENOUS; SUBCUTANEOUS at 01:06

## 2024-01-01 RX ADMIN — PIPERACILLIN AND TAZOBACTAM 25 GRAM(S): 4; .5 INJECTION, POWDER, LYOPHILIZED, FOR SOLUTION INTRAVENOUS at 21:25

## 2024-01-01 RX ADMIN — Medication 2 MILLIGRAM(S): at 12:25

## 2024-01-01 RX ADMIN — GABAPENTIN 600 MILLIGRAM(S): 400 CAPSULE ORAL at 13:16

## 2024-01-01 RX ADMIN — Medication 40 MILLIEQUIVALENT(S): at 05:55

## 2024-01-01 RX ADMIN — FAMOTIDINE 20 MILLIGRAM(S): 10 INJECTION INTRAVENOUS at 15:22

## 2024-01-01 RX ADMIN — PIPERACILLIN AND TAZOBACTAM 25 GRAM(S): 4; .5 INJECTION, POWDER, LYOPHILIZED, FOR SOLUTION INTRAVENOUS at 22:13

## 2024-01-01 RX ADMIN — CHLORHEXIDINE GLUCONATE 15 MILLILITER(S): 213 SOLUTION TOPICAL at 17:30

## 2024-01-01 RX ADMIN — ONDANSETRON 4 MILLIGRAM(S): 8 TABLET, FILM COATED ORAL at 22:39

## 2024-01-01 RX ADMIN — Medication 1000 MILLIGRAM(S): at 10:45

## 2024-01-01 RX ADMIN — PROPOFOL 70 MILLIGRAM(S): 10 INJECTION, EMULSION INTRAVENOUS at 00:04

## 2024-01-01 RX ADMIN — SODIUM CHLORIDE 2 GRAM(S): 9 INJECTION INTRAMUSCULAR; INTRAVENOUS; SUBCUTANEOUS at 05:48

## 2024-01-01 RX ADMIN — HYDROMORPHONE HYDROCHLORIDE 4 MILLIGRAM(S): 2 INJECTION INTRAMUSCULAR; INTRAVENOUS; SUBCUTANEOUS at 09:40

## 2024-01-01 RX ADMIN — LACOSAMIDE 150 MILLIGRAM(S): 50 TABLET ORAL at 05:44

## 2024-01-01 RX ADMIN — Medication 650 MILLIGRAM(S): at 21:36

## 2024-01-01 RX ADMIN — ENOXAPARIN SODIUM 40 MILLIGRAM(S): 100 INJECTION SUBCUTANEOUS at 05:17

## 2024-01-01 RX ADMIN — OXYCODONE HYDROCHLORIDE 5 MILLIGRAM(S): 5 TABLET ORAL at 21:55

## 2024-01-01 RX ADMIN — ENOXAPARIN SODIUM 40 MILLIGRAM(S): 100 INJECTION SUBCUTANEOUS at 05:16

## 2024-01-01 RX ADMIN — SODIUM CHLORIDE 75 MILLILITER(S): 9 INJECTION, SOLUTION INTRAVENOUS at 12:45

## 2024-01-01 RX ADMIN — Medication 4 MILLIGRAM(S): at 23:37

## 2024-01-01 RX ADMIN — HEPARIN SODIUM 3 UNIT(S)/HR: 5000 INJECTION INTRAVENOUS; SUBCUTANEOUS at 00:43

## 2024-01-01 RX ADMIN — Medication 4 MILLIGRAM(S): at 05:37

## 2024-01-01 RX ADMIN — Medication 3.39 MICROGRAM(S)/KG/MIN: at 12:45

## 2024-01-01 RX ADMIN — ENOXAPARIN SODIUM 30 MILLIGRAM(S): 100 INJECTION SUBCUTANEOUS at 19:49

## 2024-01-01 RX ADMIN — SENNA PLUS 1 TABLET(S): 8.6 TABLET ORAL at 21:43

## 2024-01-01 RX ADMIN — DEXMEDETOMIDINE HYDROCHLORIDE IN 0.9% SODIUM CHLORIDE 2.13 MICROGRAM(S)/KG/HR: 4 INJECTION INTRAVENOUS at 10:35

## 2024-01-01 RX ADMIN — FENTANYL CITRATE 25 MICROGRAM(S): 50 INJECTION INTRAVENOUS at 10:34

## 2024-01-01 RX ADMIN — Medication 5 MILLIGRAM(S): at 13:11

## 2024-01-01 RX ADMIN — GABAPENTIN 100 MILLIGRAM(S): 400 CAPSULE ORAL at 22:15

## 2024-01-01 RX ADMIN — Medication 975 MILLIGRAM(S): at 11:09

## 2024-01-01 RX ADMIN — SODIUM CHLORIDE 2000 MILLILITER(S): 9 INJECTION INTRAMUSCULAR; INTRAVENOUS; SUBCUTANEOUS at 09:37

## 2024-01-01 RX ADMIN — HYDROMORPHONE HYDROCHLORIDE 1 MILLIGRAM(S): 2 INJECTION INTRAMUSCULAR; INTRAVENOUS; SUBCUTANEOUS at 01:17

## 2024-01-01 RX ADMIN — CHLORHEXIDINE GLUCONATE 1 APPLICATION(S): 213 SOLUTION TOPICAL at 05:56

## 2024-01-01 RX ADMIN — SODIUM CHLORIDE 1000 MILLILITER(S): 9 INJECTION INTRAMUSCULAR; INTRAVENOUS; SUBCUTANEOUS at 19:16

## 2024-01-01 RX ADMIN — HYDROMORPHONE HYDROCHLORIDE 1 MILLIGRAM(S): 2 INJECTION INTRAMUSCULAR; INTRAVENOUS; SUBCUTANEOUS at 00:18

## 2024-01-01 RX ADMIN — VASOPRESSIN 1.5 UNIT(S)/MIN: 20 INJECTION INTRAVENOUS at 12:45

## 2024-01-01 RX ADMIN — Medication 650 MILLIGRAM(S): at 12:30

## 2024-01-01 RX ADMIN — SODIUM CHLORIDE 50 MILLILITER(S): 9 INJECTION, SOLUTION INTRAVENOUS at 20:41

## 2024-01-01 RX ADMIN — SODIUM CHLORIDE 2 GRAM(S): 9 INJECTION INTRAMUSCULAR; INTRAVENOUS; SUBCUTANEOUS at 05:39

## 2024-01-01 RX ADMIN — LACOSAMIDE 130 MILLIGRAM(S): 50 TABLET ORAL at 06:27

## 2024-01-01 RX ADMIN — Medication 4 MILLIGRAM(S): at 14:59

## 2024-01-01 RX ADMIN — GABAPENTIN 600 MILLIGRAM(S): 400 CAPSULE ORAL at 15:07

## 2024-01-01 RX ADMIN — DEXMEDETOMIDINE HYDROCHLORIDE IN 0.9% SODIUM CHLORIDE 2.13 MICROGRAM(S)/KG/HR: 4 INJECTION INTRAVENOUS at 20:35

## 2024-01-01 RX ADMIN — SODIUM CHLORIDE 2 GRAM(S): 9 INJECTION INTRAMUSCULAR; INTRAVENOUS; SUBCUTANEOUS at 06:11

## 2024-01-01 RX ADMIN — MIDAZOLAM HYDROCHLORIDE 2 MILLIGRAM(S): 1 INJECTION, SOLUTION INTRAMUSCULAR; INTRAVENOUS at 03:30

## 2024-01-01 RX ADMIN — SODIUM CHLORIDE 1000 MILLILITER(S): 9 INJECTION INTRAMUSCULAR; INTRAVENOUS; SUBCUTANEOUS at 12:36

## 2024-01-01 RX ADMIN — HYDROMORPHONE HYDROCHLORIDE 1 MILLIGRAM(S): 2 INJECTION INTRAMUSCULAR; INTRAVENOUS; SUBCUTANEOUS at 06:55

## 2024-01-01 RX ADMIN — Medication 40 MILLIEQUIVALENT(S): at 18:21

## 2024-01-01 RX ADMIN — Medication 100 MILLIEQUIVALENT(S): at 19:45

## 2024-01-01 RX ADMIN — CEFEPIME 100 MILLIGRAM(S): 1 INJECTION, POWDER, FOR SOLUTION INTRAMUSCULAR; INTRAVENOUS at 17:44

## 2024-01-01 RX ADMIN — PANTOPRAZOLE SODIUM 40 MILLIGRAM(S): 20 TABLET, DELAYED RELEASE ORAL at 14:40

## 2024-01-01 RX ADMIN — PANTOPRAZOLE SODIUM 40 MILLIGRAM(S): 20 TABLET, DELAYED RELEASE ORAL at 13:26

## 2024-01-01 RX ADMIN — HYDROMORPHONE HYDROCHLORIDE 1 MILLIGRAM(S): 2 INJECTION INTRAMUSCULAR; INTRAVENOUS; SUBCUTANEOUS at 08:26

## 2024-01-01 RX ADMIN — HYDROMORPHONE HYDROCHLORIDE 1 MILLIGRAM(S): 2 INJECTION INTRAMUSCULAR; INTRAVENOUS; SUBCUTANEOUS at 18:21

## 2024-01-01 RX ADMIN — SODIUM CHLORIDE 2 GRAM(S): 9 INJECTION INTRAMUSCULAR; INTRAVENOUS; SUBCUTANEOUS at 12:02

## 2024-01-01 RX ADMIN — HYDROMORPHONE HYDROCHLORIDE 0.5 MILLIGRAM(S): 2 INJECTION INTRAMUSCULAR; INTRAVENOUS; SUBCUTANEOUS at 06:05

## 2024-01-01 RX ADMIN — HYDROMORPHONE HYDROCHLORIDE 4 MILLIGRAM(S): 2 INJECTION INTRAMUSCULAR; INTRAVENOUS; SUBCUTANEOUS at 18:00

## 2024-01-01 RX ADMIN — MORPHINE SULFATE 15 MILLIGRAM(S): 50 CAPSULE, EXTENDED RELEASE ORAL at 11:02

## 2024-01-01 RX ADMIN — Medication 2: at 17:25

## 2024-01-01 RX ADMIN — CELECOXIB 200 MILLIGRAM(S): 200 CAPSULE ORAL at 05:27

## 2024-01-01 RX ADMIN — Medication 2 MILLIGRAM(S): at 06:30

## 2024-01-01 RX ADMIN — MORPHINE SULFATE 15 MILLIGRAM(S): 50 CAPSULE, EXTENDED RELEASE ORAL at 18:42

## 2024-01-01 RX ADMIN — SODIUM CHLORIDE 2 GRAM(S): 9 INJECTION INTRAMUSCULAR; INTRAVENOUS; SUBCUTANEOUS at 00:04

## 2024-01-01 RX ADMIN — SODIUM CHLORIDE 1000 MILLILITER(S): 9 INJECTION INTRAMUSCULAR; INTRAVENOUS; SUBCUTANEOUS at 13:06

## 2024-01-01 RX ADMIN — LEVETIRACETAM 750 MILLIGRAM(S): 250 TABLET, FILM COATED ORAL at 05:48

## 2024-01-01 RX ADMIN — GABAPENTIN 600 MILLIGRAM(S): 400 CAPSULE ORAL at 06:39

## 2024-01-01 RX ADMIN — HYDROMORPHONE HYDROCHLORIDE 8 MILLIGRAM(S): 2 INJECTION INTRAMUSCULAR; INTRAVENOUS; SUBCUTANEOUS at 13:03

## 2024-01-01 RX ADMIN — SODIUM CHLORIDE 2 GRAM(S): 9 INJECTION INTRAMUSCULAR; INTRAVENOUS; SUBCUTANEOUS at 00:55

## 2024-01-01 RX ADMIN — SODIUM CHLORIDE 30 MILLILITER(S): 5 INJECTION, SOLUTION INTRAVENOUS at 09:40

## 2024-01-01 RX ADMIN — CELECOXIB 200 MILLIGRAM(S): 200 CAPSULE ORAL at 18:42

## 2024-01-01 RX ADMIN — SODIUM CHLORIDE 2000 MILLILITER(S): 9 INJECTION INTRAMUSCULAR; INTRAVENOUS; SUBCUTANEOUS at 12:37

## 2024-01-01 RX ADMIN — LEVETIRACETAM 400 MILLIGRAM(S): 250 TABLET, FILM COATED ORAL at 17:08

## 2024-01-01 RX ADMIN — HYDROMORPHONE HYDROCHLORIDE 1 MILLIGRAM(S): 2 INJECTION INTRAMUSCULAR; INTRAVENOUS; SUBCUTANEOUS at 23:00

## 2024-01-01 RX ADMIN — SODIUM CHLORIDE 75 MILLILITER(S): 9 INJECTION, SOLUTION INTRAVENOUS at 07:10

## 2024-01-01 RX ADMIN — HYDROMORPHONE HYDROCHLORIDE 0.5 MILLIGRAM(S): 2 INJECTION INTRAMUSCULAR; INTRAVENOUS; SUBCUTANEOUS at 06:54

## 2024-01-01 RX ADMIN — Medication 40 MILLIEQUIVALENT(S): at 01:49

## 2024-01-01 RX ADMIN — HYDROMORPHONE HYDROCHLORIDE 0.5 MILLIGRAM(S): 2 INJECTION INTRAMUSCULAR; INTRAVENOUS; SUBCUTANEOUS at 01:58

## 2024-01-01 RX ADMIN — Medication 400 MILLIGRAM(S): at 19:25

## 2024-01-01 RX ADMIN — Medication 2 PACKET(S): at 18:39

## 2024-01-01 RX ADMIN — ENOXAPARIN SODIUM 40 MILLIGRAM(S): 100 INJECTION SUBCUTANEOUS at 18:20

## 2024-01-01 RX ADMIN — APIXABAN 10 MILLIGRAM(S): 2.5 TABLET, FILM COATED ORAL at 06:24

## 2024-01-01 RX ADMIN — FAMOTIDINE 20 MILLIGRAM(S): 10 INJECTION INTRAVENOUS at 13:13

## 2024-01-01 RX ADMIN — SODIUM CHLORIDE 3 GRAM(S): 9 INJECTION INTRAMUSCULAR; INTRAVENOUS; SUBCUTANEOUS at 14:47

## 2024-01-01 RX ADMIN — SENNA PLUS 2 TABLET(S): 8.6 TABLET ORAL at 21:41

## 2024-01-01 RX ADMIN — CELECOXIB 200 MILLIGRAM(S): 200 CAPSULE ORAL at 06:04

## 2024-01-01 RX ADMIN — MAGNESIUM HYDROXIDE 30 MILLILITER(S): 400 TABLET, CHEWABLE ORAL at 11:47

## 2024-01-01 RX ADMIN — HEPARIN SODIUM 3 UNIT(S)/HR: 5000 INJECTION INTRAVENOUS; SUBCUTANEOUS at 12:46

## 2024-01-01 RX ADMIN — GABAPENTIN 600 MILLIGRAM(S): 400 CAPSULE ORAL at 21:32

## 2024-01-01 RX ADMIN — POLYETHYLENE GLYCOL 3350 17 GRAM(S): 17 POWDER, FOR SOLUTION ORAL at 21:25

## 2024-01-01 RX ADMIN — Medication 2 MILLIGRAM(S): at 17:07

## 2024-01-01 RX ADMIN — POLYETHYLENE GLYCOL 3350 17 GRAM(S): 17 POWDER, FOR SOLUTION ORAL at 16:40

## 2024-01-01 RX ADMIN — LACOSAMIDE 130 MILLIGRAM(S): 50 TABLET ORAL at 17:24

## 2024-01-01 RX ADMIN — Medication 975 MILLIGRAM(S): at 14:03

## 2024-01-01 RX ADMIN — Medication 40 MILLIEQUIVALENT(S): at 13:46

## 2024-01-01 RX ADMIN — SODIUM CHLORIDE 2 GRAM(S): 9 INJECTION INTRAMUSCULAR; INTRAVENOUS; SUBCUTANEOUS at 00:05

## 2024-01-01 RX ADMIN — Medication 25 GRAM(S): at 18:20

## 2024-01-01 RX ADMIN — POLYETHYLENE GLYCOL 3350 17 GRAM(S): 17 POWDER, FOR SOLUTION ORAL at 11:08

## 2024-01-01 RX ADMIN — HYDROMORPHONE HYDROCHLORIDE 1 MILLIGRAM(S): 2 INJECTION INTRAMUSCULAR; INTRAVENOUS; SUBCUTANEOUS at 10:07

## 2024-01-01 RX ADMIN — HYDROMORPHONE HYDROCHLORIDE 4 MILLIGRAM(S): 2 INJECTION INTRAMUSCULAR; INTRAVENOUS; SUBCUTANEOUS at 14:30

## 2024-01-01 RX ADMIN — ONDANSETRON 4 MILLIGRAM(S): 8 TABLET, FILM COATED ORAL at 11:17

## 2024-01-01 RX ADMIN — HYDROMORPHONE HYDROCHLORIDE 8 MILLIGRAM(S): 2 INJECTION INTRAMUSCULAR; INTRAVENOUS; SUBCUTANEOUS at 17:44

## 2024-01-01 RX ADMIN — HYDROMORPHONE HYDROCHLORIDE 1 MILLIGRAM(S): 2 INJECTION INTRAMUSCULAR; INTRAVENOUS; SUBCUTANEOUS at 05:32

## 2024-01-01 RX ADMIN — MORPHINE SULFATE 15 MILLIGRAM(S): 50 CAPSULE, EXTENDED RELEASE ORAL at 21:06

## 2024-01-01 RX ADMIN — CHLORHEXIDINE GLUCONATE 1 APPLICATION(S): 213 SOLUTION TOPICAL at 05:27

## 2024-01-01 RX ADMIN — Medication 650 MILLIGRAM(S): at 15:12

## 2024-01-01 RX ADMIN — SODIUM CHLORIDE 1000 MILLILITER(S): 9 INJECTION INTRAMUSCULAR; INTRAVENOUS; SUBCUTANEOUS at 11:16

## 2024-01-01 RX ADMIN — Medication 650 MILLIGRAM(S): at 12:20

## 2024-01-01 RX ADMIN — HYDROMORPHONE HYDROCHLORIDE 1 MILLIGRAM(S): 2 INJECTION INTRAMUSCULAR; INTRAVENOUS; SUBCUTANEOUS at 03:40

## 2024-01-01 RX ADMIN — PHENYLEPHRINE HYDROCHLORIDE 3.19 MICROGRAM(S)/KG/MIN: 10 INJECTION INTRAVENOUS at 22:00

## 2024-01-01 RX ADMIN — LEVETIRACETAM 750 MILLIGRAM(S): 250 TABLET, FILM COATED ORAL at 17:40

## 2024-01-01 RX ADMIN — HYDROMORPHONE HYDROCHLORIDE 8 MILLIGRAM(S): 2 INJECTION INTRAMUSCULAR; INTRAVENOUS; SUBCUTANEOUS at 10:03

## 2024-01-01 RX ADMIN — Medication 40 MILLIEQUIVALENT(S): at 17:50

## 2024-01-01 RX ADMIN — SENNA PLUS 2 TABLET(S): 8.6 TABLET ORAL at 21:45

## 2024-01-01 RX ADMIN — HYDROMORPHONE HYDROCHLORIDE 1 MILLIGRAM(S): 2 INJECTION INTRAMUSCULAR; INTRAVENOUS; SUBCUTANEOUS at 12:56

## 2024-01-01 RX ADMIN — FENTANYL CITRATE 25 MICROGRAM(S): 50 INJECTION INTRAVENOUS at 13:49

## 2024-01-01 RX ADMIN — Medication 4 MILLIGRAM(S): at 14:40

## 2024-01-01 RX ADMIN — Medication 5 MILLIGRAM(S): at 06:30

## 2024-01-01 RX ADMIN — Medication 2.5 MILLIGRAM(S): at 09:08

## 2024-01-01 RX ADMIN — CELECOXIB 200 MILLIGRAM(S): 200 CAPSULE ORAL at 06:52

## 2024-01-01 RX ADMIN — Medication 100 MILLIEQUIVALENT(S): at 15:50

## 2024-01-01 RX ADMIN — FAMOTIDINE 20 MILLIGRAM(S): 10 INJECTION INTRAVENOUS at 11:43

## 2024-01-01 RX ADMIN — LACOSAMIDE 150 MILLIGRAM(S): 50 TABLET ORAL at 06:06

## 2024-01-01 RX ADMIN — CEFEPIME 100 MILLIGRAM(S): 1 INJECTION, POWDER, FOR SOLUTION INTRAMUSCULAR; INTRAVENOUS at 18:58

## 2024-01-01 RX ADMIN — HYDROMORPHONE HYDROCHLORIDE 0.5 MILLIGRAM(S): 2 INJECTION INTRAMUSCULAR; INTRAVENOUS; SUBCUTANEOUS at 07:16

## 2024-01-01 RX ADMIN — GABAPENTIN 600 MILLIGRAM(S): 400 CAPSULE ORAL at 06:52

## 2024-01-01 RX ADMIN — HYDROMORPHONE HYDROCHLORIDE 1 MILLIGRAM(S): 2 INJECTION INTRAMUSCULAR; INTRAVENOUS; SUBCUTANEOUS at 21:51

## 2024-01-01 RX ADMIN — Medication 0.5 MILLIGRAM(S): at 10:08

## 2024-01-01 RX ADMIN — SODIUM CHLORIDE 75 MILLILITER(S): 9 INJECTION, SOLUTION INTRAVENOUS at 09:51

## 2024-01-01 RX ADMIN — OLANZAPINE 5 MILLIGRAM(S): 15 TABLET, FILM COATED ORAL at 14:41

## 2024-01-01 RX ADMIN — Medication 400 MILLIGRAM(S): at 10:18

## 2024-01-01 RX ADMIN — HYDROMORPHONE HYDROCHLORIDE 0.5 MILLIGRAM(S): 2 INJECTION INTRAMUSCULAR; INTRAVENOUS; SUBCUTANEOUS at 19:39

## 2024-01-01 RX ADMIN — FENTANYL CITRATE 25 MICROGRAM(S): 50 INJECTION INTRAVENOUS at 11:45

## 2024-01-01 RX ADMIN — Medication 20 MILLIEQUIVALENT(S): at 13:52

## 2024-01-01 RX ADMIN — FENTANYL CITRATE 50 MICROGRAM(S): 50 INJECTION INTRAVENOUS at 03:50

## 2024-01-01 RX ADMIN — FAMOTIDINE 20 MILLIGRAM(S): 10 INJECTION INTRAVENOUS at 13:07

## 2024-01-01 RX ADMIN — LIDOCAINE 1 PATCH: 4 CREAM TOPICAL at 11:48

## 2024-01-01 RX ADMIN — SODIUM CHLORIDE 1700 MILLILITER(S): 9 INJECTION INTRAMUSCULAR; INTRAVENOUS; SUBCUTANEOUS at 07:00

## 2024-01-01 RX ADMIN — Medication 100 MILLIEQUIVALENT(S): at 13:52

## 2024-01-01 RX ADMIN — GABAPENTIN 100 MILLIGRAM(S): 400 CAPSULE ORAL at 13:52

## 2024-01-01 RX ADMIN — CELECOXIB 200 MILLIGRAM(S): 200 CAPSULE ORAL at 05:37

## 2024-01-01 RX ADMIN — PIPERACILLIN AND TAZOBACTAM 25 GRAM(S): 4; .5 INJECTION, POWDER, LYOPHILIZED, FOR SOLUTION INTRAVENOUS at 01:11

## 2024-01-01 RX ADMIN — SODIUM CHLORIDE 1000 MILLILITER(S): 9 INJECTION INTRAMUSCULAR; INTRAVENOUS; SUBCUTANEOUS at 15:31

## 2024-01-01 RX ADMIN — CHLORHEXIDINE GLUCONATE 1 APPLICATION(S): 213 SOLUTION TOPICAL at 05:45

## 2024-01-01 RX ADMIN — LEVETIRACETAM 400 MILLIGRAM(S): 250 TABLET, FILM COATED ORAL at 10:09

## 2024-01-01 RX ADMIN — Medication 650 MILLIGRAM(S): at 07:27

## 2024-01-01 RX ADMIN — SODIUM CHLORIDE 75 MILLILITER(S): 9 INJECTION, SOLUTION INTRAVENOUS at 15:08

## 2024-01-01 RX ADMIN — POLYETHYLENE GLYCOL 3350 17 GRAM(S): 17 POWDER, FOR SOLUTION ORAL at 20:55

## 2024-01-01 RX ADMIN — CEFEPIME 100 MILLIGRAM(S): 1 INJECTION, POWDER, FOR SOLUTION INTRAMUSCULAR; INTRAVENOUS at 06:05

## 2024-01-01 RX ADMIN — Medication 4 MILLIGRAM(S): at 05:55

## 2024-01-01 RX ADMIN — SODIUM CHLORIDE 500 MILLILITER(S): 9 INJECTION, SOLUTION INTRAVENOUS at 17:14

## 2024-01-01 RX ADMIN — LOSARTAN POTASSIUM 50 MILLIGRAM(S): 100 TABLET, FILM COATED ORAL at 06:11

## 2024-01-01 RX ADMIN — CELECOXIB 200 MILLIGRAM(S): 200 CAPSULE ORAL at 18:58

## 2024-01-01 RX ADMIN — CHLORHEXIDINE GLUCONATE 15 MILLILITER(S): 213 SOLUTION TOPICAL at 05:55

## 2024-01-01 RX ADMIN — OXYCODONE HYDROCHLORIDE 15 MILLIGRAM(S): 5 TABLET ORAL at 17:24

## 2024-01-01 RX ADMIN — MORPHINE SULFATE 15 MILLIGRAM(S): 50 CAPSULE, EXTENDED RELEASE ORAL at 21:45

## 2024-01-01 RX ADMIN — OLANZAPINE 5 MILLIGRAM(S): 15 TABLET, FILM COATED ORAL at 18:37

## 2024-01-01 RX ADMIN — Medication 4 MILLIGRAM(S): at 13:17

## 2024-01-01 RX ADMIN — GABAPENTIN 600 MILLIGRAM(S): 400 CAPSULE ORAL at 06:24

## 2024-01-01 RX ADMIN — Medication 2 MILLIGRAM(S): at 18:39

## 2024-01-01 RX ADMIN — SODIUM CHLORIDE 2 GRAM(S): 9 INJECTION INTRAMUSCULAR; INTRAVENOUS; SUBCUTANEOUS at 17:40

## 2024-01-01 RX ADMIN — Medication 100 GRAM(S): at 12:00

## 2024-01-01 RX ADMIN — Medication 2: at 23:34

## 2024-01-01 RX ADMIN — HYDROMORPHONE HYDROCHLORIDE 8 MILLIGRAM(S): 2 INJECTION INTRAMUSCULAR; INTRAVENOUS; SUBCUTANEOUS at 21:43

## 2024-01-01 RX ADMIN — CHLORHEXIDINE GLUCONATE 1 APPLICATION(S): 213 SOLUTION TOPICAL at 12:34

## 2024-01-01 RX ADMIN — FENTANYL CITRATE 25 MICROGRAM(S): 50 INJECTION INTRAVENOUS at 10:42

## 2024-01-01 RX ADMIN — GABAPENTIN 600 MILLIGRAM(S): 400 CAPSULE ORAL at 14:31

## 2024-01-01 RX ADMIN — HYDROMORPHONE HYDROCHLORIDE 0.5 MILLIGRAM(S): 2 INJECTION INTRAMUSCULAR; INTRAVENOUS; SUBCUTANEOUS at 18:57

## 2024-01-01 RX ADMIN — SODIUM CHLORIDE 2 GRAM(S): 9 INJECTION INTRAMUSCULAR; INTRAVENOUS; SUBCUTANEOUS at 05:06

## 2024-01-01 RX ADMIN — FENTANYL CITRATE 25 MICROGRAM(S): 50 INJECTION INTRAVENOUS at 03:00

## 2024-01-01 RX ADMIN — Medication 2 MILLIGRAM(S): at 07:21

## 2024-01-01 RX ADMIN — PROPOFOL 2.55 MICROGRAM(S)/KG/MIN: 10 INJECTION, EMULSION INTRAVENOUS at 08:35

## 2024-01-01 RX ADMIN — Medication 100 MILLIEQUIVALENT(S): at 11:40

## 2024-01-06 NOTE — ASSESSMENT
[FreeTextEntry1] : Ms. VALENTE 's questions were answered to her satisfaction.  She  expressed her  understanding and willingness to comply with the above recommendations, and  will return to the office in  2 weeks.

## 2024-01-06 NOTE — HISTORY OF PRESENT ILLNESS
[de-identified] : 58F, postmenopausal, current smoker, PMHx HLD, melanoma, COPD, referred for medical oncology consultation of right lung cancer.  CASE SYNOPSIS: 2/03/2022 CT chest  : - 9 mm right upper lobe nodule (2, 22). 4 mm right upper lobe nodule (2, 51). 3 mm left upper lobe nodule (2, 29). 2 mm left lower lobe nodule (2, 87). Scattered small groundglass nodules, measuring up to 1.1 cm in the right upper lobe (2, 22). - No displaced fracture. No suspicious osseous lesion. Minimal angulation at the anterior aspects of the left second and third ribs, could reflect age-indeterminate nondisplaced fractures (example 2, 34 and 2, 45). 3/23/2022 PET/CT ([facility]): There is FDG avid right apical pulmonary nodule.  Findings may be inflammatory vs malignant.   CT follow-up versus biopsy recommended as clinically indicated.  There is mildly FDG avid left rib fracture.  03/23/2022 PET/CT  ([facility]) : FDG avid posterior aspect right upper lobe pulmonary nodule (SUV 1.5, 1.0 cm, series 3 image 62). Malignancy cannot be excluded. Left posterior medial eighth rib fracture (SUV 2.9, series 3 image 105). Consulted by IR (Dr. Campos) for CT guided biopsy. "When compared to 2/2015 chest CT the right apical nodule is unchanged in size & shape &  therefore is benign, biopsy is not indicated at this time. Given emphysematous changes identified on 3/2022 PET/CT, I have recommended consistent follow ups & serial imaging surveillance with Dr Robles".  11/28/2023-presents to ED at [facility] with a right upper back pain for right upper back pain 11/28/2023: CT angiogram There is a lobulated irregular mass in the posterior right upper lobe measuring 7.7 x 4.8 x 6.5 cm corresponding to 8 cm pulmonary nodule seen on CT of February 3, 2022. There is lysis of the right posterior third rib. Redemonstration of mild to moderate centrilobular emphysema. Redemonstration of a 5 mm spiculated groundglass nodule in the lateral left upper lobe. Right lower outer breast 9 mm nodule for which correlation with breast examination and/or recent mammography is recommended.  12/13/2023 [facility] right upper lobe lung CT-guided core biopsy: Poorly differentiated adenocarcinoma, PD-L1 TPS 20%  [FreeTextEntry1] : pending histology [de-identified] : Last seen on December 5th, prior to an established diagnosis of lung cancer.  Her pain is under better control since she was evaluated by pain management; present pain regimen includes gabapentin, hydromorphone and meloxicam.  On 12/13/2023 patient underwent RUL lung biopsy; pathology consistent with poorly differentiated adenocarcinoma, PD-L1 TPS 20%.  Patient made aware today of diagnosis of malignancy and the need to complete staging workup, currently delayed due to change in insurance.  Patient is also contemplating temporary disability.  Here accompanied by her .

## 2024-01-08 NOTE — ASSESSMENT
[FreeTextEntry1] : Ms. KAELYN VALENTE, 58 year old female, former smoker (quit in 11/2023), w/ hx of COPD, Chronic pain syndrome, HLD, Melanoma in situ, who presented to  ED on 11/28/2023 for right upper back pain, CTA showed 7cm RUL mass and lysis of the right posterior third rib. Referred by Dr. Mathieu Hodge (PCP)  CT chest on 02/03/2022: - 9 mm right upper lobe nodule (2, 22). 4 mm right upper lobe nodule (2, 51). 3 mm left upper lobe nodule (2, 29). 2 mm left lower lobe nodule (2, 87). Scattered small groundglass nodules, measuring up to 1.1 cm in the right upper lobe (2, 22). - No displaced fracture. No suspicious osseous lesion. Minimal angulation at the anterior aspects of the left second and third ribs, could reflect age-indeterminate nondisplaced fractures (example 2, 34 and 2, 45).   PET/CT on 03/23/2022: ([facility]) Ordered by Dr. Chema Robles (Pulm) - There is an FDG avid posterior aspect right upper lobe pulmonary nodule (SUV 1.5, 1.0 cm, series 3 image 62). Malignancy cannot be excluded. - There is a left posterior medial eighth rib fracture (SUV 2.9, series 3 image 105).  Patient was consulted with Dr. Campos (IR) for CT guided biopsy. (When compared to 2/2015 chest ct, the right apical nodule is unchanged in size & shape & is therefore is benign, biopsy is not indicated at this time). Given emphysematous changes identified on 3/2022 PET/CT, I have recommended consistent follow ups & serial imaging surveillance with Dr Robles  CTA on 11/28/2023:  - There is a lobulated irregular mass in the posterior right upper lobe measuring 7.7 x 4.8 x 6.5 cm corresponding to 8 mm pulmonary nodule seen on CT of February 3, 2023.  - There is lysis of the right posterior third rib. Redemonstration of mild to moderate centrilobular emphysema. Redemonstration of a 5 mm spiculated groundglass nodule in the lateral left upper lobe. - In the right lower outer breast, there is a 9 mm nodule for which correlation with breast examination and/or recent mammography is recommended.  Patient is s/p CT guided biopsy of RUL nodule on 12/13/2023. Path revealed positive for malignant cell. Poorly differentiated adenocarcinoma. Cytology slides reveal crowded 3-dimensional groups and single lying malignant cells with enlarged nuclei and prominent nucleoli, in a necrotic background. Core biopsies contains fragments of adenocarcinoma and necrosis. P40 immunostudy is negative.   TTF1 immunostain is inconclusive (rare cells, weak reactivity). Molecular studies in progress; addendum to follow. PD-L1 20%.   Patient f/u with Dr. Seymour on 01/05/2024, pending MRI brain and PET/CT on 01/16/2024.   I have reviewed the patient's medical records and diagnostic images at time of this office consultation and have made the following recommendation: 1. Path reviewed and explained to patient, lung adenocarcinoma, pending staging workup, discussed with patient to RTC via TEB after PET/CT to discuss plan of care.  2. F/u with Dr. Seymour.   I, JJ Reaves, personally performed the evaluation and management (E/M) services for this established patient who follow up today with an existing condition.  That E/M includes conducting the examination, assessing all new/exacerbated/existing conditions, and establishing a plan of care.  Today, my ACP, JOANA Spencer, was here to observe my evaluation and management services for this existing condition to be followed going forward.

## 2024-01-08 NOTE — HISTORY OF PRESENT ILLNESS
[FreeTextEntry1] : Ms. KAELYN VALENTE, 58 year old female, former smoker (quit in 11/2023), w/ hx of COPD, Chronic pain syndrome, HLD, Melanoma in situ, who presented to  ED on 11/28/2023 for right upper back pain, CTA showed 7cm RUL mass and lysis of the right posterior third rib. Referred by Dr. Mathieu Hodge (PCP)  CT chest on 02/03/2022: - 9 mm right upper lobe nodule (2, 22). 4 mm right upper lobe nodule (2, 51). 3 mm left upper lobe nodule (2, 29). 2 mm left lower lobe nodule (2, 87). Scattered small groundglass nodules, measuring up to 1.1 cm in the right upper lobe (2, 22). - No displaced fracture. No suspicious osseous lesion. Minimal angulation at the anterior aspects of the left second and third ribs, could reflect age-indeterminate nondisplaced fractures (example 2, 34 and 2, 45).   PET/CT on 03/23/2022: (ZP) Ordered by Dr. Chema Robles (Pulm) - There is an FDG avid posterior aspect right upper lobe pulmonary nodule (SUV 1.5, 1.0 cm, series 3 image 62). Malignancy cannot be excluded. - There is a left posterior medial eighth rib fracture (SUV 2.9, series 3 image 105).  Patient was consulted with Dr. Campos (IR) for CT guided biopsy. (When compared to 2/2015 chest ct, the right apical nodule is unchanged in size & shape & is therefore is benign, biopsy is not indicated at this time). Given emphysematous changes identified on 3/2022 PET/CT, I have recommended consistent follow ups & serial imaging surveillance with Dr Robles  CTA on 11/28/2023:  - There is a lobulated irregular mass in the posterior right upper lobe measuring 7.7 x 4.8 x 6.5 cm corresponding to 8 mm pulmonary nodule seen on CT of February 3, 2023.  - There is lysis of the right posterior third rib. Redemonstration of mild to moderate centrilobular emphysema. Redemonstration of a 5 mm spiculated groundglass nodule in the lateral left upper lobe. - In the right lower outer breast, there is a 9 mm nodule for which correlation with breast examination and/or recent mammography is recommended.  Patient is s/p CT guided biopsy of RUL nodule on 12/13/2023. Path revealed positive for malignant cell. Poorly differentiated adenocarcinoma. Cytology slides reveal crowded 3-dimensional groups and single lying malignant cells with enlarged nuclei and prominent nucleoli, in a necrotic background. Core biopsies contains fragments of adenocarcinoma and necrosis. P40 immunostudy is negative.   TTF1 immunostain is inconclusive (rare cells, weak reactivity). Molecular studies in progress; addendum to follow. PD-L1 20%.   Patient f/u with Dr. Seymour on 01/05/2024, pending MRI brain and PET/CT on 01/16/2024.   Patient is followed today via Telehealth visit.

## 2024-01-08 NOTE — REASON FOR VISIT
[Home] : at home, [unfilled] , at the time of the visit. [Medical Office: ([facility])___] : at the medical office located in  [This encounter was initiated by telehealth (audio with video) and converted to telephone (audio only) due to technical difficulties.] : This encounter was initiated by telehealth (audio with video) and converted to telephone (audio only) due to technical difficulties.

## 2024-01-22 NOTE — PATIENT PROFILE ADULT - FALL HARM RISK - HARM RISK INTERVENTIONS
Assistance with ambulation/Assistance OOB with selected safe patient handling equipment/Communicate Risk of Fall with Harm to all staff/Discuss with provider need for PT consult/Monitor gait and stability/Provide patient with walking aids - walker, cane, crutches/Reinforce activity limits and safety measures with patient and family/Tailored Fall Risk Interventions/Use of alarms - bed, chair and/or voice tab/Visual Cue: Yellow wristband and red socks/Bed in lowest position, wheels locked, appropriate side rails in place/Call bell, personal items and telephone in reach/Instruct patient to call for assistance before getting out of bed or chair/Non-slip footwear when patient is out of bed/[place] to call system/Physically safe environment - no spills, clutter or unnecessary equipment/Purposeful Proactive Rounding/Room/bathroom lighting operational, light cord in reach

## 2024-01-22 NOTE — ED PROVIDER NOTE - COVID-19 RESULT DATE/TIME
January 26, 2023      [facility]  [address] [facility] [address]  Phone: [phone]  Fax: [phone]       Patient: Blaise Bai   YOB: 2019  Date of Visit: 01/26/2023    To Whom It May Concern:    Rashawn Bai  was at [facility] on 01/26/2023. The mother, Shannan Rodrigez, may return to work on 1/31/2023 with no restrictions. If you have any questions or concerns, or if I can be of further assistance, please do not hesitate to contact me.      Sincerely,      Anton Ahuja MD      29-Dec-2023 15:59

## 2024-01-22 NOTE — ED PROVIDER NOTE - PATIENT PORTAL LINK FT
You can access the FollowMyHealth Patient Portal offered by [facility] by registering at the following website: [email or web address]. By joining [employer]’s FollowMyHealth portal, you will also be able to view your health information using other applications (apps) compatible with our system.

## 2024-01-22 NOTE — ED ADULT NURSE NOTE - CHIEF COMPLAINT
Partially impaired: cannot see medication labels or newsprint, but can see obstacles in path, and the surrounding layout; can count fingers at arm's length
The patient is a 58y Female complaining of pain, shoulder.

## 2024-01-22 NOTE — H&P ADULT - NSHPPHYSICALEXAM_GEN_ALL_CORE
Vital Signs Last 24 Hrs  T(C): 37 (22 Jan 2024 20:40), Max: 38.9 (22 Jan 2024 12:21)  T(F): 98.6 (22 Jan 2024 20:40), Max: 102.1 (22 Jan 2024 12:21)  HR: 85 (22 Jan 2024 20:40) (85 - 93)  BP: 119/74 (22 Jan 2024 20:40) (90/47 - 131/70)  BP(mean): --  RR: 16 (22 Jan 2024 20:40) (15 - 18)  SpO2: 98% (22 Jan 2024 20:40) (94% - 100%)    Parameters below as of 22 Jan 2024 20:40  Patient On (Oxygen Delivery Method): room air    GENERAL: Not in distress. drowsy, with intermittent agitation and restlessness, looks like trying to find comfortable position.   HEENT: AT/NC. clear conjuctiva, MM dry.   no pallor or icterus  CARDIOVASCULAR: RRR S1, S2. No murmur/rubs/gallop  LUNGS: BLAE+, no rales, no wheezing, no rhonchi.    ABDOMEN: ND. Soft,  NT, no guarding / rebound / rigidity. BS normoactive. No CVA tenderness.    BACK: No spine tenderness.  EXTREMITIES: no edema. no leg or calf TP.  SKIN: no rash. .  NEUROLOGIC: non communicating, not following commands, moving limbs. sensation intact,   PSYCHIATRIC: intermittent restlessness, agitation with lethargy once medicated.

## 2024-01-22 NOTE — ED PROVIDER NOTE - OBJECTIVE STATEMENT
58-year-old female past medical history of recently diagnosed right-sided small cell lung carcinoma with first chemo this past Thursday presenting to the emergency department with severe right upper chest pain.  It is worse with any kind of movement.  She has had similar pains the entire duration of her cancer course however it is much more severe tonight.  It is associated with some mild shortness of breath.  Her home hydromorphone did not help alleviate her pain.  Per her report there is demonstration of erosion of her third right-sided rib.

## 2024-01-22 NOTE — H&P ADULT - ASSESSMENT
Patient is a 58-year-old female with past medical history of recently diagnosed right-sided small cell lung carcinoma underwent first chemo and radiation this past Thursday.  She was recently seen in the ED last night for right-sided chest pain attributed to malignancy.  Cardiac workup was negative.  As per daughter at bedside, patient has become confused, disoriented, not making any sense since she received 1 mg of IV morphine last night.  Patient takes Dilaudid at home without issues.  Patient is agitated on exam, unable to communicate. Daughter reports there was some blood in patient's bed, unclear if from fall or scratching. in ER, initial VS was normal. however later temp 102. wbc 13. RVP neg. CXR, UA not done. CT head negative. CT angio shoed no PE. lung ca+. medical admission was called for further management,     Acute metabolic encephalopathy  Fever  - DDX: drug induced, infection. malignancy  - admit to tele  - CTA: No pulmonary embolism. Locally invasive right apical mass into the adjacent bone and mediastinum with pleural metastasis unchanged from the recent PET scan 1/16/2024 but again progressed since 11/28/2023. Stable small right pleural effusion. Emphysema. Stable 4 mm left upper lobe pulmonary nodule.  - CT head: No acute intracranial hemorrhage, midline shift or mass effect.  - s/p Rocephin before sending BC, UA , UC  - c/w wit cefepime empirically  - f/u UA, UC, BC  - monitor cbc, temp. check procal, crp, LDH  - pulm eval [ informed Dr. Teresa],   - hemeonc eval [ informed Kwabena Marie]    for other comorbidities;   c/w home meds      DVT-P: lovenox  GI: protonix           Patient is a 58-year-old female, diagnosed right-sided small cell lung carcinoma on november, underwent first chemo and radiation this past Thursday.  She was recently seen in the ED last night for right-sided chest pain attributed to malignancy.  Cardiac workup was negative.  As per daughter at bedside, patient has become confused, disoriented, not making any sense since she received 1 mg of IV morphine last night.  Patient takes Dilaudid at home without issues.  Patient was agitated on exam, unable to communicate. Daughter reports there was some blood in patient's bed, reported could be related ot scratching. not aware of fall as she on on bed all the time. in ER, initial VS was normal in am. however later temp 102. wbc 13. RVP neg. CXR, UA not done. CT head negative. CT angio showed no PE. lung ca+. medical admission was called for further management, daughters and  at bedside. patient was trying to get out of bed. confused. as per family, patient was seen by pain management, plan is to do MRI of head and spine and nerve block to control pain if needed.       Acute metabolic encephalopathy  Fever  - DDX: cancer pain, drug induced, infection. malignancy  - admit to tele with continuous pulse ox  - CTA: No pulmonary embolism. Locally invasive right apical mass into the adjacent bone and mediastinum with pleural metastasis unchanged from the recent PET scan 1/16/2024 but again progressed since 11/28/2023. Stable small right pleural effusion. Emphysema. Stable 4 mm left upper lobe pulmonary nodule.  - CT head: No acute intracranial hemorrhage, midline shift or mass effect.  - s/p Rocephin before sending BC, UA , UC  - c/w wit cefepime empirically  - c/w home  hydromorphone 4 mg Q4hrs RN for moderate pain, tylenol for mild pain and fever and Inj dilaudid for severe pain, mike TID with holding for lethargy  - f/u UA, UC, BC  - monitor cbc, temp. check procal, crp, LDH  - dysphagia screen. full liquid if passed. IVF. advance diet when MS is better  - supervised meals.   - pulm eval [ informed Dr. Teresa],   - hemeonc eval [ informed Kwabena Marie]  - psych eval  - palliative eval. will help with pain control as well.     Agitation  - s/p ativan and zyprexa  - c/w zyprexa PRN  - 1:1 for safety  - psych eval      DVT-P: lovenox  GI: protonix    updated  johana, daughter Araceli at bedside           Patient is a 58-year-old female, diagnosed right-sided small cell lung carcinoma on november, underwent first chemo and radiation this past Thursday.  She was recently seen in the ED last night for right-sided chest pain attributed to malignancy.  Cardiac workup was negative.  As per daughter at bedside, patient has become confused, disoriented, not making any sense since she received 1 mg of IV morphine last night.  Patient takes Dilaudid at home without issues.  Patient was agitated on exam, unable to communicate. Daughter reports there was some blood in patient's bed, reported could be related ot scratching. not aware of fall as she on on bed all the time. in ER, initial VS was normal in am. however later temp 102. wbc 13. RVP neg. CXR, UA not done. CT head negative. CT angio showed no PE. lung ca+. medical admission was called for further management, daughters and  at bedside. patient was trying to get out of bed. confused. as per family, patient was seen by pain management, plan is to do MRI of head and spine and nerve block to control pain if needed.       Acute metabolic encephalopathy/delirium  Fever with leucocytosis  - DDX: cancer pain, drug induced, infection. malignancy  - per family is confused at home post- dc from ER this am. she got IV morphine one dose in ER at around 5 am today.. unclear whether confusion is related to pain or IV morphine  - no clear source of infection.  - admit to tele with continuous pulse ox  - CTA: No pulmonary embolism. Locally invasive right apical mass into the adjacent bone and mediastinum with pleural metastasis unchanged from the recent PET scan 1/16/2024 but again progressed since 11/28/2023. Stable small right pleural effusion. Emphysema. Stable 4 mm left upper lobe pulmonary nodule.  - CT head: No acute intracranial hemorrhage, midline shift or mass effect.  - s/p Rocephin before sending BC, UA , UC  - c/w with cefepime empirically  - c/w home  hydromorphone 4 mg Q4hrs RN for moderate pain, tylenol for mild pain and fever and Inj dilaudid for severe pain, mike TID with holding for lethargy  - neg UA,   - f/u UC, BC  - monitor cbc, temp. check procal, crp, LDH  - dysphagia screen. full liquid if passed. IVF. advance diet when MS is better  - supervised meals.   - pulm eval [ informed Dr. Teresa],   - hemeonc eval [ informed Kwabena Marie]  - palliative eval. will help with pain control as well.     Agitation  - s/p ativan and zyprexa  - c/w zyprexa PRN  - 1:1 for safety  - psych eval      DVT-P: lovenox  GI: protonix    updated  johana, daughter Araceli at bedside           Patient is a 58-year-old female, diagnosed right-sided small cell lung carcinoma on november, underwent first chemo and radiation this past Thursday.  She was recently seen in the ED last night for right-sided chest pain attributed to malignancy.  Cardiac workup was negative.  As per daughter at bedside, patient has become confused, disoriented, not making any sense since she received 1 mg of IV morphine last night.  Patient takes Dilaudid at home without issues.  Patient was agitated on exam, unable to communicate. Daughter reports there was some blood in patient's bed, reported could be related ot scratching. not aware of fall as she on on bed all the time. in ER, initial VS was normal in am. however later temp 102. wbc 13. RVP neg. CXR, UA not done. CT head negative. CT angio showed no PE. lung ca+. medical admission was called for further management, daughters and  at bedside. patient was trying to get out of bed. confused. as per family, patient was seen by pain management, plan is to do MRI of head and spine and nerve block to control pain if needed.       Acute metabolic encephalopathy/delirium  Fever with leucocytosis  - DDX: cancer pain, drug induced, infection. malignancy  - per family is confused at home post- dc from ER this am. she got IV morphine one dose in ER at around 5 am today.. unclear whether confusion is related to pain or IV morphine  - no clear source of infection.  - admit to tele with continuous pulse ox  - CTA: No pulmonary embolism. Locally invasive right apical mass into the adjacent bone and mediastinum with pleural metastasis unchanged from the recent PET scan 1/16/2024 but again progressed since 11/28/2023. Stable small right pleural effusion. Emphysema. Stable 4 mm left upper lobe pulmonary nodule.  - CT head: No acute intracranial hemorrhage, midline shift or mass effect.  - s/p Rocephin before sending BC, UA , UC  - c/w with cefepime empirically  - pain control with tylenol, Inj. hydromorphone. on hydromorphone 4mg 1-2 tab Q4hrs PRN at home. per  mostly take 2 tab Q4hrs. may need fentanyl patch for pain control  - neg UA,   - f/u UC, BC  - monitor cbc, temp. check procal, crp, LDH  - dysphagia screen. full liquid if passed. IVF. advance diet when MS is better  - supervised meals.   - pulm eval [ informed Dr. Teresa],   - hemeonc eval [ informed Kwabena Marie]  - palliative eval. will help with pain control as well.     Agitation  - s/p ativan and zyprexa  - c/w zyprexa PRN  - 1:1 for safety  - psych eval      DVT-P: lovenox  GI: protonix    updated  johana, daughter Araceli at bedside

## 2024-01-22 NOTE — ED ADULT NURSE NOTE - OBJECTIVE STATEMENT
Patient A&Ox4 came in c/o chest pain radiating to the back and SOB. Pt started chemo on Thursday. Denies dizziness/ N/V. Hx Lung CA/ HLD.

## 2024-01-22 NOTE — ED PROVIDER NOTE - PHYSICAL EXAMINATION
Vitals: I have reviewed the patients vital signs  General: nontoxic appearing  HEENT: Atraumatic, normocephalic, airway patent  Eyes: EOMI, tracking appropriately  Neck: no tracheal deviation  Chest/Lungs: no trauma, symmetric chest rise, speaking in complete sentences,  no resp distress lungs Bilaterally  Heart: skin and extremities well perfused, regular rate and rhythm  Neuro: A+Ox3, appears non focal  MSK: no deformities  Skin: no cyanosis, no jaundice   Psych:  Normal mood and affect

## 2024-01-22 NOTE — ED ADULT TRIAGE NOTE - CHIEF COMPLAINT QUOTE
Patient with new diagnosis of NSCLC, first chemo Thursday. Now with pain to R scapula radiating to chest with associated SOB.

## 2024-01-22 NOTE — ED PROVIDER NOTE - NS ED ATTENDING STATEMENT MOD
I have seen and examined this patient and fully participated in the care of this patient as the teaching attending.  The service was shared with the DEVEN.  I reviewed and verified the documentation.

## 2024-01-22 NOTE — H&P ADULT - NSHPLABSRESULTS_GEN_ALL_CORE
10.8   13.83 )-----------( 549      ( 22 Jan 2024 05:15 )             33.8       01-22    134<L>  |  93<L>  |  8   ----------------------------<  110<H>  3.5   |  28  |  0.50    Ca    9.9      22 Jan 2024 05:15    TPro  8.0  /  Alb  2.4<L>  /  TBili  0.8  /  DBili  x   /  AST  17  /  ALT  16  /  AlkPhos  119  01-22    < from: CT Head No Cont (01.22.24 @ 10:53) >    IMPRESSION:    No acute intracranial hemorrhage, midline shift or mass effect.    < end of copied text >    < from: CT Angio Chest PE Protocol w/ IV Cont (01.22.24 @ 06:34) >    IMPRESSION:  No pulmonary embolism.    Locally invasive right apical mass into the adjacent bone and mediastinum   with pleural metastasis unchanged from the recent PET scan 1/16/2024 but   again progressed since 11/28/2023. Stable small right pleural effusion.   Emphysema.    Stable 4 mm left upper lobe pulmonary nodule.    < end of copied text >

## 2024-01-22 NOTE — ED ADULT NURSE NOTE - NSFALLUNIVINTERV_ED_ALL_ED
Bed/Stretcher in lowest position, wheels locked, appropriate side rails in place/Call bell, personal items and telephone in reach/Instruct patient to call for assistance before getting out of bed/chair/stretcher/Non-slip footwear applied when patient is off stretcher/[place] to call system/Physically safe environment - no spills, clutter or unnecessary equipment/Purposeful proactive rounding/Room/bathroom lighting operational, light cord in reach

## 2024-01-22 NOTE — ED PROVIDER NOTE - PROGRESS NOTE DETAILS
CT of the head with no evidence of acute findings.  Patient has a rectal temperature of 102.4 with a white count of 13.  Will order a dose of ceftriaxone, IV fluids per sepsis protocol, and admit.  Discussed with Dr. Rodriguez  for admission.

## 2024-01-22 NOTE — ED PROVIDER NOTE - PROGRESS NOTE DETAILS
Patient did get significant pain relief after receiving IV Dilaudid.  The CAT scan demonstrated no acute new abnormalities.  It redemonstrated the mass with lytic lesions as well as the pleural effusion.  Long counseling was held with the patient with regards to her options from here.  She is going to reach out to her nurse cancer coordinator to work on getting into a pain management clinic to explore other options.  She remains hemodynamic dynamically stable here in the emergency departments and can be managed outpatient at this time

## 2024-01-22 NOTE — ED ADULT NURSE NOTE - PAIN RATING/NUMBER SCALE (0-10): ACTIVITY
Pt was called and given lab result, reports that she did not hold coumadin-2/01, reports usual bruising, verified coumadin: 2.5mg daily except 5mg-Tues., no other changes reported   0 (no pain/absence of nonverbal indicators of pain)

## 2024-01-22 NOTE — ED PROVIDER NOTE - ATTENDING CONTRIBUTION TO CARE
AMS and fever, unlikely bacterial meningitis, concern for sepsis, protocol initiated.  Patient presents with agitation,. Patient maintained their airway. Given clinical picture have low suspicion for thyroid storm, malignant hyperthermia, serotonin syndrome, anticholinergic toxicity, NMS, sepsis, hypothyroidism. Symptoms treated with ativan. Sepsis - undifferentiated  1) Early goal directed therapy  2) IV Access/IVF per protocol/LABS/UA/Cultures/ Antibiotics  3) CXR  4) Admit

## 2024-01-22 NOTE — ED PROVIDER NOTE - CLINICAL SUMMARY MEDICAL DECISION MAKING FREE TEXT BOX
.58-year-old female with recently diagnosed right-sided lung mass present with acute altered mental status after being discharged from the ED last night. Will order CAT scan of the head.

## 2024-01-22 NOTE — ED ADULT NURSE REASSESSMENT NOTE - NS ED NURSE REASSESS COMMENT FT1
pt combative since arrival, pulling at iv and taking off gown. not able to orient in verbal conversation and patient not making any sense verbally

## 2024-01-22 NOTE — ED ADULT TRIAGE NOTE - CHIEF COMPLAINT QUOTE
Pt BIB EMS for confusion ever since she had morphine 1mg at 5am.  Pt was discharged and went home. Pt also has abrasion on right side of face, unclear of origin,.  Daughter is providing history.

## 2024-01-22 NOTE — ED PROVIDER NOTE - CLINICAL SUMMARY MEDICAL DECISION MAKING FREE TEXT BOX
58-year-old female past medical history of right-sided small cell lung carcinoma, only being treated with chemotherapy presenting with severe right-sided chest pain.  Differential includes but is not limited to pulmonary embolism, cancer related pain, erosion of the rib, rib fracture.  Will get laboratory studies including a CTA to help assess.  Aggressive pain control.  Will reassess once workup is complete

## 2024-01-22 NOTE — H&P ADULT - HISTORY OF PRESENT ILLNESS
Patient is a 58-year-old female with past medical history of recently diagnosed right-sided small cell lung carcinoma underwent first chemo and radiation this past Thursday.  She was recently seen in the ED last night for right-sided chest pain attributed to malignancy.  Cardiac workup was negative.  As per daughter at bedside, patient has become confused, disoriented, not making any sense since she received 1 mg of IV morphine last night.  Patient takes Dilaudid at home without issues.  Patient is agitated on exam, unable to communicate. Daughter reports there was some blood in patient's bed, unclear if from fall or scratching. in ER, initial VS was normal. however later temp 102. wbc 13. RVP neg. CXR, UA not done. CT head negative. CT angio shoed no PE. lung ca+. medical admission was called for further management,  Patient is a 58-year-old female, diagnosed right-sided small cell lung carcinoma on november, underwent first chemo and radiation this past Thursday.  She was recently seen in the ED last night for right-sided chest pain attributed to malignancy.  Cardiac workup was negative.  As per daughter at bedside, patient has become confused, disoriented, not making any sense since she received 1 mg of IV morphine last night.  Patient takes Dilaudid at home without issues.  Patient was agitated on exam, unable to communicate. Daughter reports there was some blood in patient's bed, reported could be related ot scratching. not aware of fall as she on on bed all the time. in ER, initial VS was normal in am. however later temp 102. wbc 13. RVP neg. CXR, UA not done. CT head negative. CT angio showed no PE. lung ca+. medical admission was called for further management, daughters and  at bedside. patient was trying to get out of bed. confused. as per family, patient was seen by pain management, plan is to do MRI of head and spine and nerve block to control pain if needed.

## 2024-01-22 NOTE — ED ADULT NURSE NOTE - OBJECTIVE STATEMENT
pt returns to ED with change in mental status-seen earlier for pain related to lung tumor as per daughters. VS noted. pt remains agitated when touched or moved, pulls at ED equipment, iv line.  in no apparent distress at present time

## 2024-01-22 NOTE — ED ADULT TRIAGE NOTE - HEIGHT IN INCHES
1.5 Niacinamide Counseling: I recommended taking niacin or niacinamide, also know as vitamin B3, twice daily. Recent evidence suggests that taking vitamin B3 (500 mg twice daily) can reduce the risk of actinic keratoses and non-melanoma skin cancers. Side effects of vitamin B3 include flushing and headache.

## 2024-01-22 NOTE — ED ADULT NURSE NOTE - NSFALLHARMRISKINTERV_ED_ALL_ED

## 2024-01-22 NOTE — ED PROVIDER NOTE - OBJECTIVE STATEMENT
Patient is a 58-year-old female with past medical history of recently diagnosed right-sided small cell lung carcinoma underwent first chemo and radiation this past Thursday.  She was recently seen in the ED last night for right-sided chest pain attributed to malignancy.  Cardiac workup was negative.  As per daughter at bedside, patient has become confused, disoriented, not making any sense since she received 1 mg of IV morphine last night.  Patient takes Dilaudid at home without issues.  Patient is agitated on exam, unable to communicate. Daughter reports there was some blood in patient's bed, unclear if from fall or scratching.

## 2024-01-22 NOTE — ED PROVIDER NOTE - PHYSICAL EXAMINATION
ATTENDING PHYSICAL EXAM DR. KANG ***GEN - NAD; disheveled, appearing; responds to name but not following commands, incomprehensible mumbling ***HEAD - NC/AT ***EYES/NOSE - PERRL, EOMI, mucous membranes dry, no discharge ***THROAT: Oral cavity and pharynx normal. No inflammation, swelling, exudate, or lesions.  ***NECK: Neck supple, non-tender without lymphadenopathy, no masses, no thyromegaly.   ***PULMONARY - CTA b/l, symmetric breath sounds. ***CARDIAC -s1s2, RRR, no M,G,R  ***ABDOMEN - +BS, ND, NT, soft, no guarding, no rebound, no masses   ***BACK - no CVA tenderness, Normal  spine ***EXTREMITIES - symmetric pulses, 2+ dp, capillary refill < 2 seconds, no clubbing, no cyanosis, no edema ***SKIN - no rash or bruising   ***NEUROLOGIC - alert, moving all ext ***PSYCH - not able to assess

## 2024-01-23 NOTE — BH CONSULTATION LIAISON ASSESSMENT NOTE - NSBHCONSULTFOLLOWAFTERCARE_PSY_A_CORE FT
No outpatient psychiatric care warranted at this time, will continue to follow patient during hospitalization.

## 2024-01-23 NOTE — BH CONSULTATION LIAISON ASSESSMENT NOTE - NSBHCHARTREVIEWVS_PSY_A_CORE FT
Vital Signs Last 24 Hrs  T(C): 36.7 (23 Jan 2024 12:35), Max: 38.7 (22 Jan 2024 16:38)  T(F): 98 (23 Jan 2024 12:35), Max: 101.6 (22 Jan 2024 16:38)  HR: 80 (23 Jan 2024 12:35) (80 - 87)  BP: 118/72 (23 Jan 2024 12:35) (110/66 - 119/74)  BP(mean): --  RR: 15 (23 Jan 2024 12:35) (15 - 16)  SpO2: 96% (23 Jan 2024 12:35) (94% - 98%)    Parameters below as of 23 Jan 2024 12:35  Patient On (Oxygen Delivery Method): room air

## 2024-01-23 NOTE — BH CONSULTATION LIAISON ASSESSMENT NOTE - NSBHCONSULTRECOMMENDOTHER_PSY_A_CORE FT
- Patient with significantly improved delirium. Can consider one time dose of Seroquel if patient experiences sundowning and becomes agitated.   - Patient believes mood symptoms are correlated to level of pain control; recommend consulting with pain management.  - Patient not interested in any psychopharmacological agents at this time; is open to talking with therapist. Recommend neuropsychology consult.

## 2024-01-23 NOTE — BH CONSULTATION LIAISON ASSESSMENT NOTE - HPI (INCLUDE ILLNESS QUALITY, SEVERITY, DURATION, TIMING, CONTEXT, MODIFYING FACTORS, ASSOCIATED SIGNS AND SYMPTOMS)
HPI:  Patient is a 58-year-old female, diagnosed right-sided small cell lung carcinoma on november, underwent first chemo and radiation this past Thursday.  She was recently seen in the ED last night for right-sided chest pain attributed to malignancy.  Cardiac workup was negative.  As per daughter at bedside, patient has become confused, disoriented, not making any sense since she received 1 mg of IV morphine last night.  Patient takes Dilaudid at home without issues.  Patient was agitated on exam, unable to communicate. Daughter reports there was some blood in patient's bed, reported could be related ot scratching. not aware of fall as she on on bed all the time. in ER, initial VS was normal in am. however later temp 102. wbc 13. RVP neg. CXR, UA not done. CT head negative. CT angio showed no PE. lung ca+. medical admission was called for further management, daughters and  at bedside. patient was trying to get out of bed. confused. as per family, patient was seen by pain management, plan is to do MRI of head and spine and nerve block to control pain if needed.     C/L Psychiatry:  Chart reviewed and pt evaluated. Upon evaluation,  Patient is a 58-year-old female, diagnosed right-sided small cell lung carcinoma on november, underwent first chemo and radiation this past Thursday.  She was recently seen in the ED last night for right-sided chest pain attributed to malignancy.  Cardiac workup was negative.  As per daughter at bedside, patient has become confused, disoriented, not making any sense since she received 1 mg of IV morphine last night.  Patient takes Dilaudid at home without issues.  Patient was agitated on exam, unable to communicate. Daughter reports there was some blood in patient's bed, reported could be related ot scratching. not aware of fall as she on on bed all the time. Temp 102. wbc 13. RVP neg. CXR, UA not done. CT head negative. CT angio showed no PE. lung ca+. Medical admission was called for further management. Patient was trying to get out of bed and confused. Psychiatry consulted for AMS.     C/L Psychiatry:  Chart reviewed and pt evaluated. Per nursing, patient with significantly improved mental status when compared to yesterday. Initially, patient presented with profound disorientation, confusion, and intermittent agitation. This was corroborated by family outside of the room. On evaluation, patient presents AAOx4 with no observable waxing/waning changes to sensorium during interview. Patient states that she last remembers events prior to hospitalization where she was "yelling at people" but otherwise has no other recollection of events. Patient denies ever experiencing this period of altered mental status in the past. Reports that she is on chemotherapy, has chronic pain due to location of mass near nerves, and is on frequent opioid medications. Patient states that due to the ongoing pain, she has some periods of low mood. Reports that half of the days are good days and the other half are bad days. Endorses that her energy and concentration are okay. States that due to the pain, her appetite and sleep are poor. Reports weight loss of about 12 lbs over the last 3 months and sleeps about 3-4 hours a night. Patient does endorse some anxiety but feels it is well controlled. Denies any physical symptoms or panic attacks. Denies any delusions, paranoia, or A/V hallucinations. Denies any suicidal/homicidal ideation, intent, or plan.

## 2024-01-23 NOTE — CONSULT NOTE ADULT - SUBJECTIVE AND OBJECTIVE BOX
HPI:   Patient is a 58y female with PMH significant for HLD, melanoma, chronic pain syndrome, COPD, quite lifelong smoking on 11/28/23, after a new dx of right sided lung CA. She has had right sided chest pain that led to the dx of cancer. Started on chemo & RT on 1/18/24. Brought in for evaluation of ongoing chest & worsening bodyaches. She was given 1 mg of Morphine but became confused & disoriented.     In the ER, she developed a fever to Tmax of 102.1F with a WBC of 13.83K. Procal elevated to 3.22. CCRP 140. UA neg. CXR without PNA. Urine tox + for THC & opiates. CT revealed locally invasive right apical mass into the adjacent bone and mediastinum with pleural metastasis unchanged from the recent PET scan 1/16/2024 but again progressed since 11/28/2023. Stable small right pleural effusion. Emphysema.     REVIEW OF SYSTEMS:  All other review of systems negative (Comprehensive ROS)    PAST MEDICAL & SURGICAL HISTORY:  Mass of right lung  COPD (chronic obstructive pulmonary disease)  Smoker  Chronic pain syndrome  HLD (hyperlipidemia)  Melanoma in situ  H/O reduction of orbital fracture  S/P wrist surgery    Allergies  No Known Allergies      Antimicrobials Day #  : 2  cefepime   IVPB 2000 milliGRAM(s) IV Intermittent every 12 hours  cefepime   IVPB        Other Medications:  acetaminophen     Tablet .. 650 milliGRAM(s) Oral every 6 hours PRN  albuterol/ipratropium for Nebulization 3 milliLiter(s) Nebulizer every 6 hours  celecoxib 200 milliGRAM(s) Oral two times a day  enoxaparin Injectable 40 milliGRAM(s) SubCutaneous every 24 hours  gabapentin 100 milliGRAM(s) Oral three times a day  HYDROmorphone  Injectable 0.5 milliGRAM(s) IV Push every 4 hours PRN  HYDROmorphone  Injectable 0.5 milliGRAM(s) IV Push once  HYDROmorphone  Injectable 1 milliGRAM(s) IV Push every 4 hours PRN  lactated ringers. 1000 milliLiter(s) IV Continuous <Continuous>  pantoprazole    Tablet 40 milliGRAM(s) Oral before breakfast  potassium chloride    Tablet ER 20 milliEquivalent(s) Oral every 4 hours  potassium chloride  10 mEq/100 mL IVPB 10 milliEquivalent(s) IV Intermittent every 1 hour      FAMILY HISTORY:  FH: heart attack (Father, Mother)      SOCIAL HISTORY:  Smoking:     ETOH:     Drug Use:     T(F): 98 (01-23-24 @ 12:35), Max: 101.6 (01-22-24 @ 16:38)  HR: 80 (01-23-24 @ 12:35)  BP: 118/72 (01-23-24 @ 12:35)  RR: 15 (01-23-24 @ 12:35)  SpO2: 96% (01-23-24 @ 12:35)  Wt(kg): --    PHYSICAL EXAM:  General: alert, no acute distress, frail but non toxic  Eyes:  anicteric, no conjunctival injection, no discharge  Oropharynx: no lesions or injection 	  Neck: supple, without adenopathy  Lungs: clear to auscultation  Heart: regular rate and rhythm; no murmurs  Abdomen: soft, nondistended, nontender, without mass or organomegaly  Skin: no lesions  Extremities: + early clubbing. No cyanosis, or edema  Neurologic: alert, oriented, moves all extremities    LAB RESULTS:                        9.6    9.65  )-----------( 435      ( 23 Jan 2024 06:30 )             30.0     01-23    133<L>  |  95<L>  |  7   ----------------------------<  109<H>  3.0<L>   |  23  |  0.52    Ca    9.4      23 Jan 2024 06:30  Mg     1.6     01-23    TPro  7.3  /  Alb  2.1<L>  /  TBili  0.6  /  DBili  x   /  AST  17  /  ALT  15  /  AlkPhos  100  01-23    LIVER FUNCTIONS - ( 23 Jan 2024 06:30 )  Alb: 2.1 g/dL / Pro: 7.3 g/dL / ALK PHOS: 100 U/L / ALT: 15 U/L / AST: 17 U/L / GGT: x           Urinalysis Basic - ( 23 Jan 2024 06:30 )    Color: x / Appearance: x / SG: x / pH: x  Gluc: 109 mg/dL / Ketone: x  / Bili: x / Urobili: x   Blood: x / Protein: x / Nitrite: x   Leuk Esterase: x / RBC: x / WBC x   Sq Epi: x / Non Sq Epi: x / Bacteria: x        MICROBIOLOGY:  RECENT CULTURES:        RADIOLOGY REVIEWED:

## 2024-01-23 NOTE — BH CONSULTATION LIAISON ASSESSMENT NOTE - RISK ASSESSMENT
Carefully weighing risk vs protective factors, patient is a low risk for all dangerous behaviors at this time.

## 2024-01-23 NOTE — PROGRESS NOTE ADULT - ASSESSMENT
59yo female with history of right-sided small cell lung carcinoma (11/23, s/p first chemo last thursday). She was recently seen in the ED last night for right-sided chest pain attributed to malignancy and confusion. Admitted for acute metabolic encephalopathy and agitation.    #acute metabolic encephalopathy   #sepsis with unclear source  -Likely multifactorial pain induced vs medication induced vs malignancy vs infectious  -Dx small cell lung carcinoma   -Brain and thoracic spine MRI (r/o metastasis)  -F/up sepsis work up  -Elevated CRP and procalc  -Continuos pulse ox  -Cont Abx      #Small cell lung carcinoma  #neuropathic pain  -Dr Seymour outpatient primary oncologist  -s/p first cycle Alimta/ Carboplatin/Keytruda systemic therapy 1/18/24  -CT: stable 4mm BARI nodule  -Cont pain control w/dilaudid   -Nerve block to control pain if needed (r/o infection)  -Heme-Onc reccs appreciated  -Palliative consult  -F/up hypoalbuminemia  -F/up microcytic anemia  -CT stable small right pleural effusion, stable 4mm nodule   -Repeat CXR:    #Microcytic anemia  -s/p recent malignancy dx NSCLC  -F/up HH, MCV    #Delirium  -Setting of malignancy s/p first chemo last thursday  -s/p ativan and zyprexa  -Cont zyprexa PRN    #dvt ppx  -Lovenox 40mg 124gh

## 2024-01-23 NOTE — CONSULT NOTE ADULT - SUBJECTIVE AND OBJECTIVE BOX
KAELYN VALENTE  58y  Female  Admitting: BRIAN Rodriguez    HPI:  Patient is a 58-year-old female, diagnosed right-sided non-small cell lung carcinoma 11/2023, underwent first chemo/immunotherapy 1/18/24.  She was recently seen in the ED for right-sided chest pain attributed to malignancy.  Cardiac workup was negative.  Patient became confused post IV morphine, and was admitted with fever. CT head negative. CT angio showed no PE. Patient was seen by pain management, plan is to do MRI of head and spine and nerve block to control pain if needed.    Oncology consulted for lung cancer history.    PAST MEDICAL & SURGICAL HISTORY:  Mass of right lung      COPD (chronic obstructive pulmonary disease)      Smoker      Chronic pain syndrome      HLD (hyperlipidemia)      Melanoma in situ      H/O reduction of orbital fracture      S/P wrist surgery      MEDICATIONS  (STANDING):  albuterol/ipratropium for Nebulization 3 milliLiter(s) Nebulizer every 6 hours  cefepime   IVPB      cefepime   IVPB 2000 milliGRAM(s) IV Intermittent every 12 hours  celecoxib 200 milliGRAM(s) Oral two times a day  enoxaparin Injectable 40 milliGRAM(s) SubCutaneous every 24 hours  gabapentin 100 milliGRAM(s) Oral three times a day  HYDROmorphone  Injectable 0.5 milliGRAM(s) IV Push once  lactated ringers. 1000 milliLiter(s) (50 mL/Hr) IV Continuous <Continuous>  pantoprazole    Tablet 40 milliGRAM(s) Oral before breakfast    MEDICATIONS  (PRN):  acetaminophen     Tablet .. 650 milliGRAM(s) Oral every 6 hours PRN Temp greater or equal to 38C (100.4F), Mild Pain (1 - 3)  HYDROmorphone  Injectable 0.5 milliGRAM(s) IV Push every 4 hours PRN Moderate Pain (4 - 6)  HYDROmorphone  Injectable 1 milliGRAM(s) IV Push every 4 hours PRN Severe Pain (7 - 10)    Allergies    No Known Allergies    FAMILY HISTORY:  FH: heart attack (Father, Mother)    SOCIAL HISTORY: No EtOH, former tobacco    REVIEW OF SYSTEMS:    CONSTITUTIONAL: No chills  EYES/ENT: No visual changes;  No vertigo or throat pain   NECK: No pain or stiffness  RESPIRATORY: No hemoptysis; No shortness of breath  CARDIOVASCULAR: No palpitations  GASTROINTESTINAL: No hematemesis; No melena or hematochezia.  GENITOURINARY: No hematuria  NEUROLOGICAL: No numbness   SKIN: No itching   All other review of systems is negative unless indicated above.    Height (cm): 156.2 (01-22 @ 09:51)  Weight (kg): 50.8 (01-22 @ 09:51)  BMI (kg/m2): 20.8 (01-22 @ 09:51)  BSA (m2): 1.49 (01-22 @ 09:51)    T(F): 98.4 (01-23-24 @ 05:26), Max: 102.1 (01-22-24 @ 12:21)  HR: 82 (01-23-24 @ 05:26)  BP: 114/72 (01-23-24 @ 05:26)  RR: 15 (01-23-24 @ 05:26)  SpO2: 96% (01-23-24 @ 05:26)      GENERAL: NAD, well-developed  HEAD:  Atraumatic, Normocephalic  EYES: EOMI, PERRLA, conjunctiva and sclera clear  NECK: Supple, No JVD  CHEST/LUNG: decreased BS ant.  HEART: Regular rate and rhythm; No murmurs, rubs, or gallops  ABDOMEN: Soft, Nontender, Nondistended; Bowel sounds present  EXTREMITIES:  no calf tenderness  NEUROLOGY: awake, alert  SKIN: No rashes       Labs:             9.6    9.65  )-----------( 435      ( 01-23 @ 06:30 )             30.0                10.8   13.83 )-----------( 549      ( 01-22 @ 05:15 )             33.8       01-23    133<L>  |  95<L>  |  7   ----------------------------<  109<H>  3.0<L>   |  23  |  0.52    Ca    9.4      23 Jan 2024 06:30  Mg     1.6     01-23    TPro  7.3  /  Alb  2.1<L>  /  TBili  0.6  /  DBili  x   /  AST  17  /  ALT  15  /  AlkPhos  100  01-23    Magnesium: 1.6 mg/dL [1.6 - 2.6] (01-23 @ 06:30)  Magnesium: 1.7 mg/dL [1.6 - 2.6] (01-22 @ 12:12)      Consultant notes reviewed : YES [ x] ; NO [ ]      Radiology and additional tests:  < from: CT Angio Chest PE Protocol w/ IV Cont (01.22.24 @ 06:34) >  IMPRESSION:  No pulmonary embolism.    Locally invasive right apical mass into the adjacent bone and mediastinum   with pleural metastasis unchanged from the recent PET scan 1/16/2024 but   again progressed since 11/28/2023. Stable small right pleural effusion.   Emphysema.  Stable 4 mm left upper lobe pulmonary nodule.    < end of copied text >    < from: CT Head No Cont (01.22.24 @ 10:53) >  IMPRESSION:    No acute intracranial hemorrhage, midline shift or mass effect.    < end of copied text >

## 2024-01-23 NOTE — BH CONSULTATION LIAISON ASSESSMENT NOTE - NSBHCHARTREVIEWINVESTIGATE_PSY_A_CORE FT
< from: 12 Lead ECG (01.22.24 @ 04:51) >    Ventricular Rate 95 BPM    Atrial Rate 95 BPM    P-R Interval 114 ms    QRS Duration 138 ms    Q-T Interval 376 ms    QTC Calculation(Bazett) 472 ms    P Axis 70 degrees    R Axis 65 degrees    T Axis 55 degrees    Diagnosis Line Normal sinus rhythm  Nonspecific intraventricular block  Left ventricular hypertrophy  Abnormal ECG  When compared with ECG of 28-NOV-2023 06:49,  Left ventricular hypertrophy is now present  Nonspecific intraventricular conduction delay is now present  Confirmed by Art Grimes ([id number]) on 1/22/2024 4:55:54 PM    < end of copied text >    < from: CT Head No Cont (01.22.24 @ 10:53) >    ACC: [id number] EXAM:  CT BRAIN   ORDERED BY: IVY MADRID     PROCEDURE DATE:  01/22/2024          INTERPRETATION:  CLINICAL INFORMATION: Delirium    COMPARISON: None.      TECHNIQUE:  Serial axial images were obtained from the skull base to the  vertex using multi-slice helical technique. Sagittal and coronal   reformats were obtained.    FINDINGS:    VENTRICLES AND SULCI: Mild prompt cortical sulci fissures and ventricles   related to involutional changes.  INTRA-AXIAL: No intracranial mass, acute hemorrhage, or midline shift.  EXTRA-AXIAL: No mass or fluid collection. Basal cisterns are normal in   appearance.    VISUALIZED SINUSES:  Mild paranasal sinus mucosal disease. The nasal   septum is deviated leftward bony spurring.  TYMPANOMASTOID CAVITIES:  Clear.  VISUALIZED ORBITS: Normal.  CALVARIUM: Intact.        IMPRESSION:    No acute intracranial hemorrhage, midline shift or mass effect.        --- End of Report ---            KARI MUDROCK MD; Attending Radiologist  This document has been electronically signed. Jan 22 2024 11:20AM    < end of copied text >    < from: CT Angio Chest PE Protocol w/ IV Cont (01.22.24 @ 06:34) >      ACC: [id number] EXAM:  CT ANGIO CHEST PULM ART WAWIC   ORDERED BY: NICOLASA RICK     PROCEDURE DATE:  01/22/2024          INTERPRETATION:  CLINICAL INFORMATION: Right-sided chest pain. Shortness   of breath. Right lung mass.    COMPARISON: CT chest 11/28/2023. PET scan 1/16/2024    CONTRAST/COMPLICATIONS:  IV Contrast: Omnipaque 350  70 cc administered   30 cc discarded  Oral Contrast: NONE  Complications: None reported at time of study completion    PROCEDURE:  CT Angiography of the Chest.  Sagittal and coronal reformats were performed as well as 3D (MIP)   reconstructions.    FINDINGS:    LUNGS AND AIRWAYS: Patent central airways.  Right apical mass with marked   interval growth since the prior chest CT of 11/28/2023 but similar in   appearance to the recent PET scan. Invasion into the posterior third rib   and adjacent T2 and T3 vertebral bodies on the right as well as   advancement into the mediastinum has also advanced since prior chest CT   but similar to the recent PET scan. Tumor in the mediastinum again   deviates the esophagus to the left. End is seen posterior to the trachea   abutting the thoracic arch. There is of low-attenuation in the mass,   particularly posteriorly, suggesting necrosis. Tumor appears to extend   into the right foramen of T2/T3 and T3/T4. Emphysema. Stable 4 mm left   upper lobe pulmonary nodule (2-32). Partial compressive right lower lobe   atelectasis.  PLEURA: Small right pleural effusion. Pleural-based soft tissue mass   along the lateral right upper lobe in between the third and fourth ribs,   unchanged.  MEDIASTINUM AND MATHEW: No lymphadenopathy.  VESSELS: Adequate opacification of the pulmonary vasculature. No filling   defect present to suggest acute pulmonary embolism.  HEART: Heart size is normal. No pericardial effusion.  CHEST WALL AND LOWER NECK: Tumoral extension into the upper back soft   tissues similar. 0.9 cm right breast nodule.  VISUALIZED UPPER ABDOMEN: Within normal limits.  BONES: Within tumor invasion and erosion of the posterior right third   rib, unchanged. Partial erosion/tumor evaluation the posterior lateral   right fourth rib and right-sided T2 and T3 vertebral bodies, unchanged.   No pathologic fracture identified.    IMPRESSION:  No pulmonary embolism.    Locally invasive right apical mass into the adjacent bone and mediastinum   with pleural metastasis unchanged from the recent PET scan 1/16/2024 but   again progressed since 11/28/2023. Stable small right pleural effusion.   Emphysema.    Stable 4 mm left upper lobe pulmonary nodule.    --- End of Report ---            KEARA CLEMONS MD; Attending Radiologist  This document has been electronically signed. Jan 22 2024  6:47AM    < end of copied text >

## 2024-01-23 NOTE — CONSULT NOTE ADULT - ASSESSMENT
58y female with PMH significant for HLD, melanoma, chronic pain syndrome, COPD, quite lifelong smoking on 11/28/23, after a new dx of right sided lung CA. She has had right sided chest pain that led to the dx of cancer. Started on chemo & RT on 1/18/24.   Brought in for evaluation of ongoing chest & worsening bodyaches. She was given 1 mg of Morphine but became confused & disoriented.     In the ER, she developed a fever to Tmax of 102.1F with a WBC of 13.83K. Procal elevated to 3.22. .   UA neg.   CXR without PNA.   Urine tox + for THC & opiates.  CT revealed locally invasive right apical mass into the adjacent bone and mediastinum with pleural metastasis unchanged from the recent PET scan 1/16/2024 but again progressed since 11/28/2023. Stable small right pleural effusion. Emphysema.     She is frail but strikingly non toxic, not confused or agitated - that was likely the effect of pain meds.   She is afebrile now & leukocytosis has resolved, urine & blood cx are in process.    PLAN:  Continue empiric Cefepime - she is subjectively not aware of having a fevers & feels that her only complaint remains the pain.   Follow temp curve - duration of antibiotics to be decided   Follow cx.

## 2024-01-23 NOTE — CONSULT NOTE ADULT - ASSESSMENT
Patient is a 58-year-old female, diagnosed right-sided non-small cell lung carcinoma 11/2023, underwent first chemo/immunotherapy 1/18/24.  She was recently seen in the ED for right-sided chest pain attributed to malignancy.  Cardiac workup was negative.  Patient became confused post IV morphine, and was admitted with fever. CT head negative. CT angio showed no PE. Patient was seen by pain management, plan is to do MRI of head and spine and nerve block to control pain if needed.    Oncology consulted for lung cancer history.

## 2024-01-23 NOTE — BH CONSULTATION LIAISON ASSESSMENT NOTE - DESCRIPTION
Pt born and raised in [place], [place]. Highest level of education is high school; graduated from [employer]. Pt has been happily  to her  for 27 years. Together they raised 3 girls, youngest is 24, and enjoy their only grandchild who is 3 years old. 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

## 2024-01-23 NOTE — CONSULT NOTE ADULT - ASSESSMENT
A/P Patient is a 58-year-old female, diagnosed right-sided small cell lung carcinoma on november, underwent first chemo and radiation this past Thursday.  She was recently seen in the ED last night for right-sided chest pain attributed to malignancy.  Cardiac workup was negative.  As per daughter at bedside, in the ED,  patient has become confused, disoriented, not making any sense since she received 1 mg of IV morphine last night.  Patient takes Dilaudid at home without issues.  Patient was agitated on exam, unable to communicate in ED.     A medical admission was called for further management,  patient was trying to get out of bed. confused. as per family, patient was seen by pain management, at [facility] ,  plan is to do MRI of head and spine and nerve block to control pain if needed.          A/P Patient is a 58-year-old female, diagnosed right-sided small cell lung carcinoma on november, underwent first chemo and radiation this past Thursday.  She was recently seen in the ED last night for right-sided chest pain attributed to malignancy.  Cardiac workup was negative.  As per daughter at bedside, in the ED,  patient has become confused, disoriented, not making any sense since she received 1 mg of IV morphine last night.  Patient takes Dilaudid at home without issues.  Patient was agitated on exam, unable to communicate in ED.     A medical admission was called for acute metabolic encephalopathy and agitation.patient was trying to get out of bed. confused.   As per family, patient was seen by pain management, at [facility] ,  plan is to do MRI of head and spine and nerve block to control pain if needed.         acute metabolic encephalopathy   sepsis with unclear source  -Likely multifactorial pain induced vs medication induced vs malignancy vs infectious  -Dx small cell lung carcinoma   -Brain and thoracic spine MRI (r/o metastasis)  - consider neuro eval     Small cell lung carcinoma  neuropathic pain  -Dr Seymour outpatient primary oncologist  -s/p first cycle Alimta/ Carboplatin/Keytruda systemic therapy 1/18/24  -CT: stable 4mm BARI nodule  -Cont pain control w/ ivp dilaudid  PRN   - also on celebrex and neurontin scheduled    -Nerve block to control pain if needed (r/o infection)  - Heme/Onc consult     Delirium  -Setting of malignancy s/p first chemo last thursday  - s/p IV MS in ED   -s/p ativan and zyprexa  -Cont zyprexa PRN    - on 1:1         Palliative :  asked for support, pain , chart reviewed, 59 yo fairly new dx small cell lung ca , just started treatment last week .  Came to ER w/ R chest pains.  Had episodes confusion at home, s/p being given Ivp MS  for pain.  Per chart pt normally takes dilaudid at home for her pain.  Also being f/b pain management at [facility],   was planning for MRI head/spine, and possible nerve block if needed . D/w Med team today  Dr Blanchard who will be reaching out to that team today , and will consider transfer   to [facility] if needed.          Saw pt bedside this morning, was awake, alert, in bed, conversant, was in no distress,  had no sob , asked to rate pain 1-10, and pt reported no pain at that time .  Pt reports she wants to go home as soon as possible, and she has support of family ,  and children .  Presently,  has only used 2 doses of the prn IVP dilaudid today,  1 AM  and 6 AM . Pt reported this as effective for her at this time.      Cont w/ PRN  Ivp dilaudid as ordered , can  consider putting back on oral dilaudid - home dose   monitor bowel function , can add prn laxative - avoid constipation .  MRI pending , as d/w med team today may need transfer to [facility]   Care as per med  teams.

## 2024-01-23 NOTE — BH CONSULTATION LIAISON ASSESSMENT NOTE - NSSUICPROTFACT_PSY_ALL_CORE
Responsibility to children, family, or others/Identifies reasons for living/Supportive social network of family or friends Responsibility to children, family, or others/Identifies reasons for living/Supportive social network of family or friends/Positive therapeutic relationships

## 2024-01-23 NOTE — BH CONSULTATION LIAISON ASSESSMENT NOTE - NSBHCHARTREVIEWLAB_PSY_A_CORE FT
Basic Metabolic Panel in AM (01.23.24 @ 06:30)   Sodium: 135 mmol/L  Potassium: 2.9 mmol/L  Chloride: 96 mmol/L  Carbon Dioxide: 27 mmol/L  Anion Gap: 12 mmol/L  Blood Urea Nitrogen: 7 mg/dL  Creatinine: 0.52 mg/dL  Glucose: 112 mg/dL  Calcium: 9.5 mg/dL  eGFR: 108    Complete Blood Count + Automated Diff in AM (01.23.24 @ 06:30)   WBC Count: 9.65 K/uL  RBC Count: 3.81 M/uL  Hemoglobin: 9.6 g/dL  Hematocrit: 30.0 %  Mean Cell Volume: 78.7 fl  Mean Cell Hemoglobin: 25.2 pg  Mean Cell Hemoglobin Conc: 32.0 gm/dL  Red Cell Distrib Width: 13.9 %  Platelet Count - Automated: 435 K/uL  Auto Neutrophil #: 8.66 K/uL  Auto Lymphocyte #: 0.68 K/uL  Auto Monocyte #: 0.14 K/uL  Auto Eosinophil #: 0.10 K/uL  Auto Basophil #: 0.02 K/uL  Auto Neutrophil %: 89.8: Differential percentages must be correlated with absolute numbers for   clinical significance. %  Auto Lymphocyte %: 7.0 %  Auto Monocyte %: 1.5 %  Auto Eosinophil %: 1.0 %  Auto Basophil %: 0.2 %  Auto Immature Granulocyte %: 0.5: (Includes meta, myelo and promyelocytes). Mild elevations in immature   granulocytes may be seen with many inflammatory processes and pregnancy;   clinical correlation suggested. %  Nucleated RBC: 0 /100 WBCs

## 2024-01-23 NOTE — BH CONSULTATION LIAISON ASSESSMENT NOTE - SUMMARY
Patient is a 58-year-old female, diagnosed right-sided small cell lung carcinoma on november, underwent first chemo and radiation this past Thursday.  She was recently seen in the ED last night for right-sided chest pain attributed to malignancy.  As per daughter at bedside, patient has become confused, disoriented, not making any sense since she received 1 mg of IV morphine last night.  Patient takes Dilaudid at home without issues.  Patient was agitated on exam, unable to communicate. Temp 102. wbc 13. RVP neg. Medical admission was called for further management. Patient was trying to get out of bed and confused. Psychiatry consulted for AMS.     C/L Psychiatry:  Patient presents with significantly improved mental status, patient is AAOx4 with no observable signs of waxing/waning sensorium. Denies any feelings of confusion but unable to recollect on what transpired over the last 24 hours. Reports some feelings of depression in the context of pain. States that when pain is controlled, her mood significantly improves. Also endorses some anxiety, but clarifies it is well controlled. Endorses poor sleep (3-4 hours a night) and poor appetite (12 lb weight loss over 3 months). Denies any suicidality.

## 2024-01-23 NOTE — CONSULT NOTE ADULT - SUBJECTIVE AND OBJECTIVE BOX
HPI:  58-year-old female, diagnosed right-sided small cell lung carcinoma on november, underwent first chemo and radiation this past Thursday.  She was recently seen in the ED last night for right-sided chest pain attributed to malignancy.  Cardiac workup was negative.  As per daughter , patient has become confused, disoriented, not making any sense since she received 1 mg of IV morphine last night.  Patient takes Dilaudid at home without issues.  Patient was agitated on exam, in ED unable to communicate. Daughter reports there was some blood in patient's bed, reported could be related ot scratching. not aware of fall as she on on bed all the time. in ER, initial VS was normal in am. however later temp 102. wbc 13. RVP neg. CXR, UA not done.     CT head negative. CT angio showed no PE. lung ca+. medical admission was called for further management .  Patient was trying to get out of bed. confused. as per family, patient was seen by pain management, at [facility] plan is to do MRI of head and spine and nerve block to control pain if needed.  (22 Jan 2024 13:03)      PAST MEDICAL & SURGICAL HISTORY:  Mass of right lung       COPD (chronic obstructive pulmonary disease)      Smoker      Chronic pain syndrome      HLD (hyperlipidemia)      Melanoma in situ      H/O reduction of orbital fracture      S/P wrist surgery          SOCIAL HISTORY:    Admitted from:  home    Substance abuse history:              Tobacco hx:                  Alcohol hx:              Home Opioid hx:  Sikhism:                                    Preferred Language:    Surrogate/HCP/:   Francisco J      [phone] or [phone]    daughter Araceli [phone]    FAMILY HISTORY:  FH: heart attack (Father, Mother)      Baseline ADLs (prior to admission):    Allergies    No Known Allergies    Intolerances      ROS/ Present Symptoms:   Dyspnea: no  Nausea/Vomiting: no  Anxiety:  Depressed   Fatigue:  Loss of appetite:  yes  Pain:         not now                          MEDICATIONS  (STANDING):  albuterol/ipratropium for Nebulization 3 milliLiter(s) Nebulizer every 6 hours  cefepime   IVPB      cefepime   IVPB 2000 milliGRAM(s) IV Intermittent every 12 hours  celecoxib 200 milliGRAM(s) Oral two times a day  enoxaparin Injectable 40 milliGRAM(s) SubCutaneous every 24 hours  gabapentin 100 milliGRAM(s) Oral three times a day  HYDROmorphone  Injectable 0.5 milliGRAM(s) IV Push once  lactated ringers. 1000 milliLiter(s) (50 mL/Hr) IV Continuous <Continuous>  pantoprazole    Tablet 40 milliGRAM(s) Oral before breakfast    MEDICATIONS  (PRN):  acetaminophen     Tablet .. 650 milliGRAM(s) Oral every 6 hours PRN Temp greater or equal to 38C (100.4F), Mild Pain (1 - 3)  HYDROmorphone  Injectable 0.5 milliGRAM(s) IV Push every 4 hours PRN Moderate Pain (4 - 6)  HYDROmorphone  Injectable 1 milliGRAM(s) IV Push every 4 hours PRN Severe Pain (7 - 10)      PHYSICAL EXAM:    Vital Signs Last 24 Hrs  T(C): 36.9 (23 Jan 2024 05:26), Max: 38.9 (22 Jan 2024 12:21)  T(F): 98.4 (23 Jan 2024 05:26), Max: 102.1 (22 Jan 2024 12:21)  HR: 82 (23 Jan 2024 05:26) (82 - 92)  BP: 114/72 (23 Jan 2024 05:26) (90/47 - 119/74)  BP(mean): --  RR: 15 (23 Jan 2024 05:26) (15 - 17)  SpO2: 96% (23 Jan 2024 05:26) (94% - 98%)    Parameters below as of 23 Jan 2024 05:26  Patient On (Oxygen Delivery Method): room air        General: alert , oriented to person , family, was verbal and not in any distress    Karnofsky Performance Score/Palliative Performance Status Version2:   50  %  PPSV: 50%  HEENT: normal  dry mouth   Lungs: ess clear dim bases, resp non labored , no cough   CV: normal rate   GI: flat, normal     : normal    Musculoskeletal: normal w/ weakness no  edema , ambulatory  w/ assist   Skin: w/d   Neuro:  awake, alert, verbal, oriented to person, family names ,place  Oral intake ability:  moderate full capability   Diet: full liquids presently         LABS:                        9.6    9.65  )-----------( 435      ( 23 Jan 2024 06:30 )             30.0     01-23    133<L>  |  95<L>  |  7   ----------------------------<  109<H>  3.0<L>   |  23  |  0.52    Ca    9.4      23 Jan 2024 06:30  Mg     1.6     01-23    TPro  7.3  /  Alb  2.1<L>  /  TBili  0.6  /  DBili  x   /  AST  17  /  ALT  15  /  AlkPhos  100  01-23    Urinalysis Basic - ( 23 Jan 2024 06:30 )    Color: x / Appearance: x / SG: x / pH: x  Gluc: 109 mg/dL / Ketone: x  / Bili: x / Urobili: x   Blood: x / Protein: x / Nitrite: x   Leuk Esterase: x / RBC: x / WBC x   Sq Epi: x / Non Sq Epi: x / Bacteria: x        RADIOLOGY & ADDITIONAL STUDIES: < from: CT Head No Cont (01.22.24 @ 10:53) >  ACC: [id number] EXAM:  CT BRAIN   ORDERED BY: IVY MADRID     PROCEDURE DATE:  01/22/2024          INTERPRETATION:  CLINICAL INFORMATION: Delirium    COMPARISON: None.      TECHNIQUE:  Serial axial images were obtained from the skull base to the  vertex using multi-slice helical technique. Sagittal and coronal   reformats were obtained.    FINDINGS:    VENTRICLES AND SULCI: Mild prompt cortical sulci fissures and ventricles   related to involutional changes.  INTRA-AXIAL: No intracranial mass, acute hemorrhage, or midline shift.  EXTRA-AXIAL: No mass or fluid collection. Basal cisterns are normal in   appearance.    VISUALIZED SINUSES:  Mild paranasal sinus mucosal disease. The nasal   septum is deviated leftward bony spurring.  TYMPANOMASTOID CAVITIES:  Clear.  VISUALIZED ORBITS: Normal.  CALVARIUM: Intact.        IMPRESSION:    No acute intracranial hemorrhage, midline shift or mass effect.            < from: CT Angio Chest PE Protocol w/ IV Cont (01.22.24 @ 06:34) >  IMPRESSION:  No pulmonary embolism.    Locally invasive right apical mass into the adjacent bone and mediastinum   with pleural metastasis unchanged from the recent PET scan 1/16/2024 but   again progressed since 11/28/2023. Stable small right pleural effusion.   Emphysema.    Stable 4 mm left upper lobe pulmonary nodule.          < from: Xray Chest 1 View- PORTABLE-Urgent (01.22.24 @ 05:24) >  ACC: [id number] EXAM:  XR CHEST PORTABLE URGENT 1V   ORDERED BY: NICOLASA RICK     PROCEDURE DATE:  01/22/2024          INTERPRETATION:  AP chest on January 22, 2024 at 5:06 AM. Patient has   chest pain and is short of breath. There is history of right lung   squamous cell cancer.    Heart size normal.    Extensive mass in the right apex is again noted similar to December 13, 2023.    Present film shows a mild to moderate right base effusion.    IMPRESSION: Mild to moderate right base effusion is new. Large mass right   apex again noted.          ADVANCE DIRECTIVES: none known   Advanced Care Planning discussion total time spent:   HPI:  58-year-old female, diagnosed right-sided small cell lung carcinoma on november, underwent first chemo and radiation this past Thursday.  She was recently seen in the ED last night for right-sided chest pain attributed to malignancy.  Cardiac workup was negative.  As per daughter , patient has become confused, disoriented, not making any sense since she received 1 mg of IV morphine last night.  Patient takes Dilaudid at home without issues.  Patient was agitated on exam, in ED unable to communicate. Daughter reports there was some blood in patient's bed, reported could be related ot scratching. not aware of fall as she on on bed all the time. in ER, initial VS was normal in am. however later temp 102. wbc 13. RVP neg. CXR, UA not done.     CT head negative. CT angio showed no PE. lung ca+. medical admission was called for further management .  Patient was trying to get out of bed. confused. as per family, patient was seen by pain management, at [facility] plan is to do MRI of head and spine and nerve block to control pain if needed.  (22 Jan 2024 13:03)      PAST MEDICAL & SURGICAL HISTORY:  Mass of right lung       COPD (chronic obstructive pulmonary disease)      Smoker      Chronic pain syndrome      HLD (hyperlipidemia)      Melanoma in situ      H/O reduction of orbital fracture      S/P wrist surgery          SOCIAL HISTORY:    Admitted from:  home    Substance abuse history:              Tobacco hx:                  Alcohol hx:              Home Opioid hx:  Zoroastrian:                                    Preferred Language:    Surrogate/HCP/:   Francisco J      [phone] or [phone]    daughter Araceli [phone]    FAMILY HISTORY:  FH: heart attack (Father, Mother)      Baseline ADLs (prior to admission):    Allergies    No Known Allergies    Intolerances      ROS/ Present Symptoms:   Dyspnea: no  Nausea/Vomiting: no  Anxiety:  Depressed   Fatigue:  Loss of appetite:  yes  Pain:         "not now "                         MEDICATIONS  (STANDING):  albuterol/ipratropium for Nebulization 3 milliLiter(s) Nebulizer every 6 hours  cefepime   IVPB      cefepime   IVPB 2000 milliGRAM(s) IV Intermittent every 12 hours  celecoxib 200 milliGRAM(s) Oral two times a day  enoxaparin Injectable 40 milliGRAM(s) SubCutaneous every 24 hours  gabapentin 100 milliGRAM(s) Oral three times a day  HYDROmorphone  Injectable 0.5 milliGRAM(s) IV Push once  lactated ringers. 1000 milliLiter(s) (50 mL/Hr) IV Continuous <Continuous>  pantoprazole    Tablet 40 milliGRAM(s) Oral before breakfast    MEDICATIONS  (PRN):  acetaminophen     Tablet .. 650 milliGRAM(s) Oral every 6 hours PRN Temp greater or equal to 38C (100.4F), Mild Pain (1 - 3)  HYDROmorphone  Injectable 0.5 milliGRAM(s) IV Push every 4 hours PRN Moderate Pain (4 - 6)  HYDROmorphone  Injectable 1 milliGRAM(s) IV Push every 4 hours PRN Severe Pain (7 - 10)      PHYSICAL EXAM:    Vital Signs Last 24 Hrs  T(C): 36.9 (23 Jan 2024 05:26), Max: 38.9 (22 Jan 2024 12:21)  T(F): 98.4 (23 Jan 2024 05:26), Max: 102.1 (22 Jan 2024 12:21)  HR: 82 (23 Jan 2024 05:26) (82 - 92)  BP: 114/72 (23 Jan 2024 05:26) (90/47 - 119/74)  BP(mean): --  RR: 15 (23 Jan 2024 05:26) (15 - 17)  SpO2: 96% (23 Jan 2024 05:26) (94% - 98%)    Parameters below as of 23 Jan 2024 05:26  Patient On (Oxygen Delivery Method): room air        General: alert , oriented to person , family, was verbal , and not in any distress    Karnofsky Performance Score/Palliative Performance Status Version2:   50  %  PPSV: 50%  HEENT: normal  dry mouth   Lungs: ess clear dim bases, resp non labored , no cough   CV: normal rate   GI: flat, normal     : normal    Musculoskeletal: normal w/ weakness no  edema , ambulatory  w/ assist   Skin: w/d   Neuro:  awake, alert, verbal, oriented to person, family names ,place  Oral intake ability:  moderate full capability   Diet: full liquids presently         LABS:                        9.6    9.65  )-----------( 435      ( 23 Jan 2024 06:30 )             30.0     01-23    133<L>  |  95<L>  |  7   ----------------------------<  109<H>  3.0<L>   |  23  |  0.52    Ca    9.4      23 Jan 2024 06:30  Mg     1.6     01-23    TPro  7.3  /  Alb  2.1<L>  /  TBili  0.6  /  DBili  x   /  AST  17  /  ALT  15  /  AlkPhos  100  01-23    Urinalysis Basic - ( 23 Jan 2024 06:30 )    Color: x / Appearance: x / SG: x / pH: x  Gluc: 109 mg/dL / Ketone: x  / Bili: x / Urobili: x   Blood: x / Protein: x / Nitrite: x   Leuk Esterase: x / RBC: x / WBC x   Sq Epi: x / Non Sq Epi: x / Bacteria: x        RADIOLOGY & ADDITIONAL STUDIES: < from: CT Head No Cont (01.22.24 @ 10:53) >  ACC: [id number] EXAM:  CT BRAIN   ORDERED BY: IVY MADRID     PROCEDURE DATE:  01/22/2024          INTERPRETATION:  CLINICAL INFORMATION: Delirium    COMPARISON: None.      TECHNIQUE:  Serial axial images were obtained from the skull base to the  vertex using multi-slice helical technique. Sagittal and coronal   reformats were obtained.    FINDINGS:    VENTRICLES AND SULCI: Mild prompt cortical sulci fissures and ventricles   related to involutional changes.  INTRA-AXIAL: No intracranial mass, acute hemorrhage, or midline shift.  EXTRA-AXIAL: No mass or fluid collection. Basal cisterns are normal in   appearance.    VISUALIZED SINUSES:  Mild paranasal sinus mucosal disease. The nasal   septum is deviated leftward bony spurring.  TYMPANOMASTOID CAVITIES:  Clear.  VISUALIZED ORBITS: Normal.  CALVARIUM: Intact.        IMPRESSION:    No acute intracranial hemorrhage, midline shift or mass effect.            < from: CT Angio Chest PE Protocol w/ IV Cont (01.22.24 @ 06:34) >  IMPRESSION:  No pulmonary embolism.    Locally invasive right apical mass into the adjacent bone and mediastinum   with pleural metastasis unchanged from the recent PET scan 1/16/2024 but   again progressed since 11/28/2023. Stable small right pleural effusion.   Emphysema.    Stable 4 mm left upper lobe pulmonary nodule.          < from: Xray Chest 1 View- PORTABLE-Urgent (01.22.24 @ 05:24) >  ACC: [id number] EXAM:  XR CHEST PORTABLE URGENT 1V   ORDERED BY: NICOLASA RICK     PROCEDURE DATE:  01/22/2024          INTERPRETATION:  AP chest on January 22, 2024 at 5:06 AM. Patient has   chest pain and is short of breath. There is history of right lung   squamous cell cancer.    Heart size normal.    Extensive mass in the right apex is again noted similar to December 13, 2023.    Present film shows a mild to moderate right base effusion.    IMPRESSION: Mild to moderate right base effusion is new. Large mass right   apex again noted.          ADVANCE DIRECTIVES: none known   Advanced Care Planning discussion total time spent:

## 2024-01-23 NOTE — BH CONSULTATION LIAISON ASSESSMENT NOTE - NSCOMMENTSUICRISKFACT_PSY_ALL_CORE
Pt states that she is chronic pain due to her right-sided small cell lung carcinoma diagnosed in november.

## 2024-01-23 NOTE — CONSULT NOTE ADULT - PROBLEM SELECTOR RECOMMENDATION 9
NSCLC s/p first cycle of Alimta/Carboplatin/Keytruda systemic therapy 1/18/24. Afebrile this am. Not neutropenic with hemoglobin and platelet count adequate at present. Supportive oncology care while in hospital. DVT prophylaxis. Pain management f/u recommended. Dr. Seymour (primary oncologist) has been notified of patient's admission.

## 2024-01-23 NOTE — BH CONSULTATION LIAISON ASSESSMENT NOTE - CURRENT MEDICATION
MEDICATIONS  (STANDING):  albuterol/ipratropium for Nebulization 3 milliLiter(s) Nebulizer every 6 hours  cefepime   IVPB      cefepime   IVPB 2000 milliGRAM(s) IV Intermittent every 12 hours  celecoxib 200 milliGRAM(s) Oral two times a day  enoxaparin Injectable 40 milliGRAM(s) SubCutaneous every 24 hours  gabapentin 100 milliGRAM(s) Oral three times a day  HYDROmorphone  Injectable 0.5 milliGRAM(s) IV Push once  lactated ringers. 1000 milliLiter(s) (50 mL/Hr) IV Continuous <Continuous>  pantoprazole    Tablet 40 milliGRAM(s) Oral before breakfast  potassium chloride    Tablet ER 20 milliEquivalent(s) Oral every 4 hours    MEDICATIONS  (PRN):  acetaminophen     Tablet .. 650 milliGRAM(s) Oral every 6 hours PRN Temp greater or equal to 38C (100.4F), Mild Pain (1 - 3)  HYDROmorphone  Injectable 0.5 milliGRAM(s) IV Push every 4 hours PRN Moderate Pain (4 - 6)  HYDROmorphone  Injectable 1 milliGRAM(s) IV Push every 4 hours PRN Severe Pain (7 - 10)

## 2024-01-23 NOTE — CONSULT NOTE ADULT - SUBJECTIVE AND OBJECTIVE BOX
Time of visit:    CHIEF COMPLAINT: Patient is a 58y old  Female who presents with a chief complaint of AMS, fever (23 Jan 2024 14:07)      HPI: Ms Moreno is a pleasant 58 yr old woman  former smoker , diagnosed right-sided small cell lung carcinoma on november, underwent first chemo and radiation this past Thursday.  She was recently seen in the ED last night for right-sided chest pain attributed to malignancy.  Cardiac workup was negative.  As per daughter at bedside, patient has become confused, disoriented, not making any sense since she received 1 mg of IV morphine last night.  Patient takes Dilaudid at home without issues.  Patient was agitated on exam, unable to communicate. Daughter reports there was some blood in patient's bed, reported could be related ot scratching. not aware of fall as she on on bed all the time. in ER, initial VS was normal in am. however later temp 102. wbc 13. RVP neg. CXR, UA not done. CT head negative. CT angio showed no PE. lung ca+. medical admission was called for further management, daughters and  at bedside. patient was trying to get out of bed. confused. as per family, patient was seen by pain management, plan is to do MRI of head and spine and nerve block to control pain if needed.  (22 Jan 2024 13:03)   Patient seen and examined.     PAST MEDICAL & SURGICAL HISTORY:  Mass of right lung      COPD (chronic obstructive pulmonary disease)      Smoker      Chronic pain syndrome      HLD (hyperlipidemia)      Melanoma in situ      H/O reduction of orbital fracture      S/P wrist surgery          Allergies    No Known Allergies    Intolerances        MEDICATIONS  (STANDING):  albuterol/ipratropium for Nebulization 3 milliLiter(s) Nebulizer every 6 hours  cefepime   IVPB      cefepime   IVPB 2000 milliGRAM(s) IV Intermittent every 12 hours  celecoxib 200 milliGRAM(s) Oral two times a day  enoxaparin Injectable 40 milliGRAM(s) SubCutaneous every 24 hours  gabapentin 100 milliGRAM(s) Oral three times a day  HYDROmorphone  Injectable 0.5 milliGRAM(s) IV Push once  lactated ringers. 1000 milliLiter(s) (50 mL/Hr) IV Continuous <Continuous>  pantoprazole    Tablet 40 milliGRAM(s) Oral before breakfast  potassium chloride    Tablet ER 20 milliEquivalent(s) Oral every 4 hours      MEDICATIONS  (PRN):  acetaminophen     Tablet .. 650 milliGRAM(s) Oral every 6 hours PRN Temp greater or equal to 38C (100.4F), Mild Pain (1 - 3)  HYDROmorphone  Injectable 1 milliGRAM(s) IV Push every 4 hours PRN Severe Pain (7 - 10)  HYDROmorphone  Injectable 0.5 milliGRAM(s) IV Push every 4 hours PRN Moderate Pain (4 - 6)   Medications up to date at time of exam.    Medications up to date at time of exam.    FAMILY HISTORY:  FH: heart attack (Father, Mother)        SOCIAL HISTORY  Smoking History: [   ] smoking/smoke exposure, [ x  ] former smoker  Living Condition: [   ] apartment, [   ] private house  Work History:   for a market   Travel History: denies recent travel  Illicit Substance Use: denies  Alcohol Use: denies    REVIEW OF SYSTEMS: as per EMR     CONSTITUTIONAL:  denies fevers, chills, sweats, weight loss    HEENT:  denies diplopia or blurred vision, sore throat or runny nose.    CARDIOVASCULAR:  denies pressure, squeezing, tightness, or heaviness about the chest; no palpitations.    RESPIRATORY:  denies SOB, cough, ENNIS, wheezing.    GASTROINTESTINAL:  denies abdominal pain, nausea, vomiting or diarrhea.    GENITOURINARY: denies dysuria, frequency or urgency.    NEUROLOGIC:  denies numbness, tingling, seizures or weakness.    PSYCHIATRIC:  denies disorder of thought or mood.    MSK: denies swelling, redness      PHYSICAL EXAMINATION:    GENERAL: The patient is a well-developed, well-nourished, in no apparent distress.     Vital Signs Last 24 Hrs  T(C): 36.7 (23 Jan 2024 12:35), Max: 37 (22 Jan 2024 20:40)  T(F): 98 (23 Jan 2024 12:35), Max: 98.6 (22 Jan 2024 20:40)  HR: 80 (23 Jan 2024 12:35) (80 - 85)  BP: 118/72 (23 Jan 2024 12:35) (114/72 - 119/74)  BP(mean): --  RR: 15 (23 Jan 2024 12:35) (15 - 16)  SpO2: 96% (23 Jan 2024 12:35) (96% - 98%)    Parameters below as of 23 Jan 2024 12:35  Patient On (Oxygen Delivery Method): room air       (if applicable)    Chest Tube (if applicable)    HEENT: Head is normocephalic and atraumatic. Extraocular muscles are intact. Mucous membranes are moist.     NECK: Supple, no palpable adenopathy.    LUNGS: Clear to auscultation, no wheezing, rales, or rhonchi.    HEART: Regular rate and rhythm without murmur.    ABDOMEN: Soft, nontender, and nondistended.  No hepatosplenomegaly is noted.    RENAL: No difficulty voiding, no pelvic pain    EXTREMITIES: Without any cyanosis, clubbing, rash, lesions or edema.    NEUROLOGIC: Awake, alert, oriented, grossly intact    SKIN: Warm, dry, good turgor.      LABS:                        9.6    9.65  )-----------( 435      ( 23 Jan 2024 06:30 )             30.0     01-23    x   |  x   |  x   ----------------------------<  x   3.3<L>   |  x   |  x     Ca    9.4      23 Jan 2024 06:30  Mg     1.6     01-23    TPro  7.3  /  Alb  2.1<L>  /  TBili  0.6  /  DBili  x   /  AST  17  /  ALT  15  /  AlkPhos  100  01-23      Urinalysis Basic - ( 23 Jan 2024 06:30 )    Color: x / Appearance: x / SG: x / pH: x  Gluc: 109 mg/dL / Ketone: x  / Bili: x / Urobili: x   Blood: x / Protein: x / Nitrite: x   Leuk Esterase: x / RBC: x / WBC x   Sq Epi: x / Non Sq Epi: x / Bacteria: x    Procalcitonin, Serum: 3.22 ng/mL (01-22-24 @ 12:12)      MICROBIOLOGY: (if applicable)    RADIOLOGY & ADDITIONAL STUDIES:  EKG:   CXR: CT head   < from: CT Head No Cont (01.22.24 @ 10:53) >    ACC: [id number] EXAM:  CT BRAIN   ORDERED BY: IVY ALVA DATE:  01/22/2024          INTERPRETATION:  CLINICAL INFORMATION: [facility]    COMPARISON: None.      TECHNIQUE:  Serial axial images were obtained from the skull base to the  vertex using multi-slice helical technique. Sagittal and coronal   reformats were obtained.    FINDINGS:    VENTRICLES AND SULCI: Mild prompt cortical sulci fissures and ventricles   related to involutional changes.  INTRA-AXIAL: No intracranial mass, acute hemorrhage, or midline shift.  EXTRA-AXIAL: No mass or fluid collection. Basal cisterns are normal in   appearance.    VISUALIZED SINUSES:  Mild paranasal sinus mucosal disease. The nasal   septum is deviated leftward bony spurring.  TYMPANOMASTOID CAVITIES:  Clear.  VISUALIZED ORBITS: Normal.  CALVARIUM: Intact.        IMPRESSION:    No acute intracranial hemorrhage, midline shift or mass effect.        --- End of Report ---            KARI MURDOCK MD; Attending Radiologist  This document has been electronically signed. Jan 22 2024 11:20AM    < end of copied text >  < from: CT Angio Chest PE Protocol w/ IV Cont (01.22.24 @ 06:34) >    ACC: [id number] EXAM:  CT ANGIO CHEST PULM ART WAWI   ORDERED BY: NICOLASA RICK     PROCEDURE DATE:  01/22/2024          INTERPRETATION:  CLINICAL INFORMATION: Right-sided chest pain. Shortness   of breath. Right lung mass.    COMPARISON: CT chest 11/28/2023. PET scan 1/16/2024    CONTRAST/COMPLICATIONS:  IV Contrast: Omnipaque 350  70 cc administered   30 cc discarded  Oral Contrast: NONE  Complications: None reported at time of study completion    PROCEDURE:  CT Angiography of the Chest.  Sagittal and coronal reformats were performed as well as 3D (MIP)   reconstructions.    FINDINGS:    LUNGS AND AIRWAYS: Patent central airways.  Right apical mass with marked   interval growth since the prior chest CT of 11/28/2023 but similar in   appearance to the recent PET scan. Invasion into the posterior third rib   and adjacent T2 and T3 vertebral bodies on the right as well as   advancement into the mediastinum has also advanced since prior chest CT   but similar to the recent PET scan. Tumor in the mediastinum again   deviates the esophagus to the left. End is seen posterior to the trachea   abutting the thoracic arch. There is of low-attenuation in the mass,   particularly posteriorly, suggesting necrosis. Tumor appears to extend   into the right foramen of T2/T3 and T3/T4. Emphysema. Stable 4 mm left   upper lobe pulmonary nodule (2-32). Partial compressive right lower lobe   atelectasis.  PLEURA: Small right pleural effusion. Pleural-based soft tissue mass   along the lateral right upper lobe in between the third and fourth ribs,   unchanged.  MEDIASTINUM AND MATHEW: No lymphadenopathy.  VESSELS: Adequate opacification of the pulmonary vasculature. No filling   defect present to suggest acute pulmonary embolism.  HEART: Heart size is normal. No pericardial effusion.  CHEST WALL AND LOWER NECK: Tumoral extension into the upper back soft   tissues similar. 0.9 cm right breast nodule.  VISUALIZED UPPER ABDOMEN: Within normal limits.  BONES: Within tumor invasion and erosion of the posterior right third   rib, unchanged. Partial erosion/tumor evaluation the posterior lateral   right fourth rib and right-sided T2 and T3 vertebral bodies, unchanged.   No pathologic fracture identified.    IMPRESSION:  No pulmonary embolism.    Locally invasive right apical mass into the adjacent bone and mediastinum   with pleural metastasis unchanged from the recent PET scan 1/16/2024 but   again progressed since 11/28/2023. Stable small right pleural effusion.   Emphysema.    Stable 4 mm left upper lobe pulmonary nodule.    --- End of Report ---            KEARA CLEMONS MD; Attending Radiologist  This document has been electronically signed. Jan 22 2024  6:47AM    < end of copied text >  ECHO:    IMPRESSION: 58y Female PAST MEDICAL & SURGICAL HISTORY:  Mass of right lung      COPD (chronic obstructive pulmonary disease)      Smoker      Chronic pain syndrome      HLD (hyperlipidemia)      Melanoma in situ      H/O reduction of orbital fracture      S/P wrist surgery       p/w         IMP: Ms Moreno is a pleasant 58 yr old woman  former smoker , diagnosed right-sided small cell lung carcinoma on november, underwent first chemo and radiation this past Thursday.  She was recently seen in the ED last night for right-sided chest pain attributed to malignancy.  Cardiac workup was negative.  As per daughter at bedside, patient has become confused, disoriented, not making any sense since she received 1 mg of IV morphine last night. Mental status is back to normal  .. AAOx 3.    AMS? maybe related to meds?     PLan     - Tolerating ambient air   - Pain control   - Consider EEG   - MRI of brain with contrast to evaluate for mets   - Sepsis work up   - Antibx   - F/U culture  - Mental status is normal   - Fall precaution     spoke with daughter Araceli at bedside

## 2024-01-23 NOTE — PROGRESS NOTE ADULT - SUBJECTIVE AND OBJECTIVE BOX
Patient is a 58y old  Female who presents with a chief complaint of AMS, fever (23 Jan 2024 12:43)      INTERVAL HPI/OVERNIGHT EVENTS: Patient seen and examined at bedside. No overnight events.    MEDICATIONS  (STANDING):  albuterol/ipratropium for Nebulization 3 milliLiter(s) Nebulizer every 6 hours  cefepime   IVPB      cefepime   IVPB 2000 milliGRAM(s) IV Intermittent every 12 hours  celecoxib 200 milliGRAM(s) Oral two times a day  enoxaparin Injectable 40 milliGRAM(s) SubCutaneous every 24 hours  gabapentin 100 milliGRAM(s) Oral three times a day  HYDROmorphone  Injectable 0.5 milliGRAM(s) IV Push once  lactated ringers. 1000 milliLiter(s) (50 mL/Hr) IV Continuous <Continuous>  pantoprazole    Tablet 40 milliGRAM(s) Oral before breakfast  potassium chloride    Tablet ER 20 milliEquivalent(s) Oral every 4 hours  potassium chloride  10 mEq/100 mL IVPB 10 milliEquivalent(s) IV Intermittent every 1 hour    MEDICATIONS  (PRN):  acetaminophen     Tablet .. 650 milliGRAM(s) Oral every 6 hours PRN Temp greater or equal to 38C (100.4F), Mild Pain (1 - 3)  HYDROmorphone  Injectable 1 milliGRAM(s) IV Push every 4 hours PRN Severe Pain (7 - 10)  HYDROmorphone  Injectable 0.5 milliGRAM(s) IV Push every 4 hours PRN Moderate Pain (4 - 6)      Allergies    No Known Allergies    Intolerances        REVIEW OF SYSTEMS:  CONSTITUTIONAL: No fever or chills  HEENT:  No headache, no sore throat  RESPIRATORY: No cough, wheezing, or shortness of breath  CARDIOVASCULAR: No chest pain, palpitations  GASTROINTESTINAL: No abd pain, nausea, vomiting, or diarrhea  GENITOURINARY: No dysuria, frequency, or hematuria  NEUROLOGICAL: no focal weakness or dizziness  MUSCULOSKELETAL: no myalgias     Vital Signs Last 24 Hrs  T(C): 36.7 (23 Jan 2024 12:35), Max: 38.7 (22 Jan 2024 16:38)  T(F): 98 (23 Jan 2024 12:35), Max: 101.6 (22 Jan 2024 16:38)  HR: 80 (23 Jan 2024 12:35) (80 - 92)  BP: 118/72 (23 Jan 2024 12:35) (90/47 - 119/74)  BP(mean): --  RR: 15 (23 Jan 2024 12:35) (15 - 17)  SpO2: 96% (23 Jan 2024 12:35) (94% - 98%)    Parameters below as of 23 Jan 2024 12:35  Patient On (Oxygen Delivery Method): room air      I&O's Summary    22 Jan 2024 07:01  -  23 Jan 2024 07:00  --------------------------------------------------------  IN: 750 mL / OUT: 0 mL / NET: 750 mL      BMI (kg/m2): 20.8 (01-22-24 @ 09:51), 20.5 (01-22-24 @ 04:50)    PHYSICAL EXAM:  GENERAL: NAD  HEENT:  AT/NC, anicteric, moist mucous membranes, EOMI, PERRL, no lid-lag, conjunctiva and sclera clear  CHEST/LUNG:  CTA b/l, no rales, wheezes, or rhonchi,  normal respiratory effort, no intercostal retractions  HEART:  RRR, S1, S2, no murmurs; no pitting edema  ABDOMEN:  BS+, soft, nontender, nondistended; No HSM  MSK/EXTREMITIES: 2+ peripheral pulses, no clubbing or cyanosis  NERVOUS SYSTEM: answers questions and follows commands appropriately, A&Ox3 grossly moves all extremities   PSYCH: Appropriate affect, Alert & Awake; Good judgement      LABS: Personally reviewed  CBC                 9.6    9.65  )-----------( 435      ( 23 Jan 2024 06:30 )             30.0     CMP  01-23  133  |  95  |  7   ----------------------------<  109  3.0   |  23  |  0.52  Ca    9.4      23 Jan 2024 06:30  Mg     1.6     01-23  TPro  7.3  /  Alb  2.1  /  TBili  0.6  /  DBili  x   /  AST  17  /  ALT  15  /  AlkPhos  100  01-23    Lactate, Blood: 1.3 mmol/L (01-22 @ 12:12)  Procalcitonin, Serum: 3.22 ng/mL (01-22-24 @ 12:12)  CARDIAC MARKERS ( 22 Jan 2024 05:15 )  x     / 6.0 ng/L / x     / x     / x        TSH 0.723   TSH with FT4 reflex --  Total T3 --    Urinalysis Basic - ( 23 Jan 2024 06:30 )  Color: x / Appearance: x / SG: x / pH: x  Gluc: 109 mg/dL / Ketone: x  / Bili: x / Urobili: x   Blood: x / Protein: x / Nitrite: x   Leuk Esterase: x / RBC: x / WBC x   Sq Epi: x / Non Sq Epi: x / Bacteria: x        RADIOLOGY & ADDITIONAL TESTS: Personally reviewed.   Consultant(s) Notes Reviewed:  [x] YES  [ ] NO   Discussed with CAIT/FER, RN

## 2024-01-24 NOTE — DISCHARGE NOTE PROVIDER - NSDCCPCAREPLAN_GEN_ALL_CORE_FT
PRINCIPAL DISCHARGE DIAGNOSIS  Diagnosis: Acute metabolic encephalopathy  Assessment and Plan of Treatment: During this admission yur were managed for alterared sensorium. Work up revealed no source of infections, so it could have been due to multiple factors. You were started on antibiotics      SECONDARY DISCHARGE DIAGNOSES  Diagnosis: Non-small cell carcinoma of lung  Assessment and Plan of Treatment:      PRINCIPAL DISCHARGE DIAGNOSIS  Diagnosis: Acute metabolic encephalopathy  Assessment and Plan of Treatment: During this admission yur were managed for alterared sensorium. Work up revealed no source of infections, so it could have been due to multiple factors. You were started on antibiotics, work up to identifiy infection was negative and finished a course of 3 days.   Your baseline mental status was regained and your pain management fully resumed. You need to follow up with your PCP, pain management and oncologist after this admission      SECONDARY DISCHARGE DIAGNOSES  Diagnosis: Non-small cell carcinoma of lung  Assessment and Plan of Treatment: During this admission an MRI of your brain and thoracic spine wer performed. Your Brain MRI revealed lesions that have spread from your lung cancer. Our hematology team followed your throuhgt this hospital stay. It is advised to follow up within the next 1-2 weeks with your oncologist Dr Dawn chen follow up on this results.    Diagnosis: Cancer-related pain  Assessment and Plan of Treatment: During this admission your received pain management for your cancer-associated pain. You resumed your home regimen. It is important to maintain a scheudled regiumen for pain medications to decreased the risk of breakthrough pain. You need to follow up with your PCP and pain management for close follow up and further adjustments if needed.     PRINCIPAL DISCHARGE DIAGNOSIS  Diagnosis: Acute metabolic encephalopathy  Assessment and Plan of Treatment: During this admission yur were managed for alterared sensorium. Work up revealed no source of infections, so it could have been due to multiple factors. You were started on antibiotics, work up to identifiy infection was negative and finished a course of 3 days.   Your baseline mental status was regained and your pain management fully resumed. You need to follow up with your PCP, pain management and oncologist after this admission      SECONDARY DISCHARGE DIAGNOSES  Diagnosis: Non-small cell carcinoma of lung  Assessment and Plan of Treatment: During this admission an MRI of your brain and thoracic spine were performed. Your Brain MRI revealed lesions that have spread from your lung cancer. Our hematology team followed your throuhgt this hospital stay. It is advised to follow up within the next 1-2 weeks with your oncologist Dr Seymour for follow up on this results.    Diagnosis: Cancer-related pain  Assessment and Plan of Treatment: During this admission your received pain management for your cancer-associated pain. You resumed your home regimen. It is important to maintain a scheduled regimen for pain medications to decreased the risk of breakthrough pain. You need to follow up with your PCP and pain management for close follow up and further adjustments if needed.     PRINCIPAL DISCHARGE DIAGNOSIS  Diagnosis: Acute metabolic encephalopathy  Assessment and Plan of Treatment: During this admission yur were managed for alterared sensorium. Work up revealed no source of infections, so it could have been due to multiple factors. You were started on antibiotics, work up to identifiy infection was negative and finished a course of 3 days.   Your baseline mental status was regained and your pain management fully resumed. You need to follow up with your PCP, pain management and oncologist after this admission      SECONDARY DISCHARGE DIAGNOSES  Diagnosis: Non-small cell carcinoma of lung  Assessment and Plan of Treatment: During this admission an MRI of your brain and thoracic spine were performed. Your Brain MRI revealed lesions that have spread from your lung cancer. Our hematology team followed your throuhgt this hospital stay. It is advised to follow up within the next 1-2 weeks with your oncologist Dr Seymour for follow up on this results.    Diagnosis: Cancer-related pain  Assessment and Plan of Treatment: During this admission your received pain management for your cancer-associated pain. You resumed your home regimen. It is important to maintain a scheduled regimen for pain medications to decreased the risk of breakthrough pain. Assure to take your bowel regimen to avoid constipation from the opioids.  You need to follow up with your PCP and pain management for close follow up and further adjustments if needed.

## 2024-01-24 NOTE — PROGRESS NOTE ADULT - ASSESSMENT
58 yr old woman  former smoker , diagnosed right-sided small cell lung carcinoma on november, underwent first chemo and radiation this past Thursday.  She was recently seen in the ED last night for right-sided chest pain attributed to malignancy.  Cardiac workup was negative.      Assessment:  1. Small cell lung cancer     PLan   - Tolerating ambient air   - Pain control   - MRI brain suggestive of mets  - Management per oncology  - Mental status is normal   - Fall precaution   - No active respiratory symptoms

## 2024-01-24 NOTE — DISCHARGE NOTE PROVIDER - NSDCFUSCHEDAPPT_GEN_ALL_CORE_FT
Cooper Prasad  [facility]  [facility] [address]  Scheduled Appointment: 01/29/2024    Celestina Garcia  [facility]  [facility] [address]  Scheduled Appointment: 01/30/2024    [facility]  [facility]  Scheduled Appointment: 02/08/2024    [facility]  Scheduled Appointment: 02/08/2024    Servando Seymour  [facility]  [facility]  Scheduled Appointment: 02/08/2024    [facility]  [facility]  Scheduled Appointment: 02/29/2024    [facility]  [facility]  Scheduled Appointment: 02/29/2024    [facility]  [facility]  Scheduled Appointment: 03/21/2024    [facility]  [facility]  Scheduled Appointment: 03/21/2024    [facility]  [facility]  Scheduled Appointment: 04/11/2024    [facility]  [facility]  Scheduled Appointment: 04/11/2024

## 2024-01-24 NOTE — PROGRESS NOTE ADULT - SUBJECTIVE AND OBJECTIVE BOX
KAELYN VALENTE   58y   Female    Admitting: BRIAN Rodriguez  HPI:  Patient is a 58-year-old female, diagnosed right-sided non-small cell lung carcinoma 11/2023, underwent first chemo/immunotherapy 1/18/24.  She was recently seen in the ED for right-sided chest pain attributed to malignancy.  Cardiac workup was negative.  Patient became confused post IV morphine, and was admitted with fever. CT head negative. CT angio showed no PE. Patient was seen by pain management, plan is to do MRI of head and spine and nerve block to control pain if needed.    Oncology consulted for lung cancer history.      PAST MEDICAL & SURGICAL HISTORY:  Mass of right lung      COPD (chronic obstructive pulmonary disease)      Smoker      Chronic pain syndrome      HLD (hyperlipidemia)      Melanoma in situ      H/O reduction of orbital fracture      S/P wrist surgery        HEALTH ISSUES - PROBLEM Dx:  Non-small cell carcinoma of lung    History of delirium      MEDICATIONS  (STANDING):  albuterol/ipratropium for Nebulization 3 milliLiter(s) Nebulizer every 6 hours  cefepime   IVPB      cefepime   IVPB 2000 milliGRAM(s) IV Intermittent every 12 hours  celecoxib 200 milliGRAM(s) Oral two times a day  enoxaparin Injectable 40 milliGRAM(s) SubCutaneous every 24 hours  gabapentin 100 milliGRAM(s) Oral three times a day  lactated ringers. 1000 milliLiter(s) (50 mL/Hr) IV Continuous <Continuous>  LORazepam   Injectable 2.5 milliGRAM(s) IV Push once  pantoprazole    Tablet 40 milliGRAM(s) Oral before breakfast    MEDICATIONS  (PRN):  acetaminophen     Tablet .. 650 milliGRAM(s) Oral every 6 hours PRN Temp greater or equal to 38C (100.4F), Mild Pain (1 - 3)  HYDROmorphone  Injectable 0.5 milliGRAM(s) IV Push every 4 hours PRN Moderate Pain (4 - 6)  HYDROmorphone  Injectable 1 milliGRAM(s) IV Push every 4 hours PRN Severe Pain (7 - 10)    Allergies    No Known Allergies    INTERVAL HPI/OVERNIGHT EVENTS:  Patient S&E at bedside. Feels pain under better control currently.     VITAL SIGNS:  T(F): 98.2 (01-24-24 @ 05:13)  HR: 88 (01-24-24 @ 05:13)  BP: 115/71 (01-24-24 @ 05:13)  RR: 16 (01-24-24 @ 05:13)  SpO2: 99% (01-24-24 @ 05:13)      PHYSICAL EXAM:  Constitutional: NAD  Eyes: sclera non-icteric  Neck: no JVD  Respiratory: decreased BS ant.  Cardiovascular: RRR, no M/R/G  Gastrointestinal: soft, NTND, no masses palpable  Extremities: no calf tenderness  Neurological: Awake, alert.    Labs:             10.6   5.00  )-----------( 308      ( 01-24 @ 07:02 )             33.6                9.6    9.65  )-----------( 435      ( 01-23 @ 06:30 )             30.0                10.8   13.83 )-----------( 549      ( 01-22 @ 05:15 )             33.8       01-23    Ca    9.4      23 Jan 2024 06:30  Mg     1.6     01-23    TPro  7.3  /  Alb  2.1<L>  /  TBili  0.6  /  DBili  x   /  AST  17  /  ALT  15  /  AlkPhos  100  01-23    Culture - Blood (collected 22 Jan 2024 12:15)  Source: .Blood Blood  Preliminary Report (23 Jan 2024 19:02):    No growth at 24 hours    Culture - Blood (collected 22 Jan 2024 12:15)  Source: .Blood Blood  Preliminary Report (23 Jan 2024 19:02):    No growth at 24 hours      Consultant notes reviewed : YES [x ] ; NO [ ]

## 2024-01-24 NOTE — PROGRESS NOTE ADULT - SUBJECTIVE AND OBJECTIVE BOX
Patient is a 58y old  Female who presents with a chief complaint of AMS, fever (23 Jan 2024 12:43). Admitted for acute metabolic encephalopathy      INTERVAL HPI/OVERNIGHT EVENTS: Patient seen and examined at bedside. No overnight events.    MEDICATIONS  (STANDING):  albuterol/ipratropium for Nebulization 3 milliLiter(s) Nebulizer every 6 hours  cefepime   IVPB      cefepime   IVPB 2000 milliGRAM(s) IV Intermittent every 12 hours  celecoxib 200 milliGRAM(s) Oral two times a day  enoxaparin Injectable 40 milliGRAM(s) SubCutaneous every 24 hours  gabapentin 100 milliGRAM(s) Oral three times a day  HYDROmorphone  Injectable 0.5 milliGRAM(s) IV Push once  lactated ringers. 1000 milliLiter(s) (50 mL/Hr) IV Continuous <Continuous>  pantoprazole    Tablet 40 milliGRAM(s) Oral before breakfast  potassium chloride    Tablet ER 20 milliEquivalent(s) Oral every 4 hours  potassium chloride  10 mEq/100 mL IVPB 10 milliEquivalent(s) IV Intermittent every 1 hour    MEDICATIONS  (PRN):  acetaminophen     Tablet .. 650 milliGRAM(s) Oral every 6 hours PRN Temp greater or equal to 38C (100.4F), Mild Pain (1 - 3)  HYDROmorphone  Injectable 1 milliGRAM(s) IV Push every 4 hours PRN Severe Pain (7 - 10)  HYDROmorphone  Injectable 0.5 milliGRAM(s) IV Push every 4 hours PRN Moderate Pain (4 - 6)      Allergies    No Known Allergies    Intolerances        REVIEW OF SYSTEMS:  CONSTITUTIONAL: No fever or chills  HEENT:  No headache, no sore throat  RESPIRATORY: No cough, wheezing, or shortness of breath  CARDIOVASCULAR: No chest pain, palpitations  GASTROINTESTINAL: No abd pain, nausea, vomiting, or diarrhea  GENITOURINARY: No dysuria, frequency, or hematuria  NEUROLOGICAL: no focal weakness or dizziness  MUSCULOSKELETAL: no myalgias     Vital Signs Last 24 Hrs  T(C): 36.8 (24 Jan 2024 05:13), Max: 36.8 (24 Jan 2024 05:13)  T(F): 98.2 (24 Jan 2024 05:13), Max: 98.2 (24 Jan 2024 05:13)  HR: 91 (24 Jan 2024 08:58) (80 - 91)  BP: 111/71 (24 Jan 2024 08:58) (111/71 - 118/72)  BP(mean): --  RR: 16 (24 Jan 2024 05:13) (15 - 16)  SpO2: 97% (24 Jan 2024 08:58) (96% - 99%)    Parameters below as of 24 Jan 2024 08:58  Patient On (Oxygen Delivery Method): room air    PHYSICAL EXAM:  GENERAL: NAD  HEENT:  AT/NC, anicteric, moist mucous membranes, EOMI, no lid-lag, conjunctiva and sclera clear  CHEST/LUNG:  CTA b/l, no rales, wheezes, or rhonchi,  normal respiratory effort, no intercostal retractions  HEART:  RRR, S1, S2, no murmurs; no pitting edema  ABDOMEN:  BS+, soft, nontender, nondistended; No HSM  MSK/EXTREMITIES: 2+ peripheral pulses, no clubbing or cyanosis  NERVOUS SYSTEM: answers questions and follows commands appropriately, A&Ox3 grossly moves all extremities   PSYCH: Appropriate affect, Alert & Awake; Good judgement      LABS: Personally reviewed                        10.6   5.00  )-----------( 308      ( 24 Jan 2024 07:02 )             33.6       01-24    131<L>  |  94<L>  |  11  ----------------------------<  103<H>  3.8   |  25  |  0.57    Ca    9.8      24 Jan 2024 07:02  Mg     1.7     01-24  TPro  8.0  /  Alb  2.3<L>  /  TBili  0.6  /  DBili  x   /  AST  24  /  ALT  21  /  AlkPhos  110  01-24          Urinalysis Basic - ( 24 Jan 2024 07:02 )  Color: x / Appearance: x / SG: x / pH: x  Gluc: 103 mg/dL / Ketone: x  / Bili: x / Urobili: x   Blood: x / Protein: x / Nitrite: x   Leuk Esterase: x / RBC: x / WBC x   Sq Epi: x / Non Sq Epi: x / Bacteria: x          RADIOLOGY & ADDITIONAL TESTS: Personally reviewed.   Consultant(s) Notes Reviewed:  [x] YES  [ ] NO   Discussed with SW/FER, RN     Patient is a 58y old  Female who presents with a chief complaint of AMS, fever (23 Jan 2024 12:43). Admitted for acute metabolic encephalopathy      INTERVAL HPI/OVERNIGHT EVENTS: Patient seen and examined at bedside. No overnight events.    MEDICATIONS  (STANDING):  albuterol/ipratropium for Nebulization 3 milliLiter(s) Nebulizer every 6 hours  cefepime   IVPB      cefepime   IVPB 2000 milliGRAM(s) IV Intermittent every 12 hours  celecoxib 200 milliGRAM(s) Oral two times a day  enoxaparin Injectable 40 milliGRAM(s) SubCutaneous every 24 hours  gabapentin 100 milliGRAM(s) Oral three times a day  HYDROmorphone  Injectable 0.5 milliGRAM(s) IV Push once  lactated ringers. 1000 milliLiter(s) (50 mL/Hr) IV Continuous <Continuous>  pantoprazole    Tablet 40 milliGRAM(s) Oral before breakfast  potassium chloride    Tablet ER 20 milliEquivalent(s) Oral every 4 hours  potassium chloride  10 mEq/100 mL IVPB 10 milliEquivalent(s) IV Intermittent every 1 hour    MEDICATIONS  (PRN):  acetaminophen     Tablet .. 650 milliGRAM(s) Oral every 6 hours PRN Temp greater or equal to 38C (100.4F), Mild Pain (1 - 3)  HYDROmorphone  Injectable 1 milliGRAM(s) IV Push every 4 hours PRN Severe Pain (7 - 10)  HYDROmorphone  Injectable 0.5 milliGRAM(s) IV Push every 4 hours PRN Moderate Pain (4 - 6)      Allergies    No Known Allergies    Intolerances        REVIEW OF SYSTEMS:  CONSTITUTIONAL: No fever or chills  HEENT:  No headache, no sore throat  RESPIRATORY: No cough, wheezing, or shortness of breath  CARDIOVASCULAR: No chest pain, palpitations  GASTROINTESTINAL: No abd pain, nausea, vomiting, or diarrhea  GENITOURINARY: No dysuria, frequency, or hematuria  NEUROLOGICAL: no focal weakness or dizziness  MUSCULOSKELETAL: no myalgias       Vital Signs Last 24 Hrs  T(C): 36.7 (24 Jan 2024 12:58), Max: 36.8 (24 Jan 2024 05:13)  T(F): 98 (24 Jan 2024 12:58), Max: 98.2 (24 Jan 2024 05:13)  HR: 95 (24 Jan 2024 12:58) (88 - 95)  BP: 119/80 (24 Jan 2024 12:58) (111/71 - 119/80)  BP(mean): --  RR: 16 (24 Jan 2024 12:58) (16 - 16)  SpO2: 97% (24 Jan 2024 12:58) (97% - 99%)    Parameters below as of 24 Jan 2024 12:58  Patient On (Oxygen Delivery Method): room air        PHYSICAL EXAM:  GENERAL: NAD  HEENT:  AT/NC, anicteric, moist mucous membranes, EOMI, no lid-lag, conjunctiva and sclera clear  CHEST/LUNG:  CTA b/l, no rales, wheezes, or rhonchi,  normal respiratory effort, no intercostal retractions  HEART:  RRR, S1, S2, no murmurs; no pitting edema  ABDOMEN:  BS+, soft, nontender, nondistended; No HSM  MSK/EXTREMITIES: 2+ peripheral pulses, no clubbing or cyanosis  NERVOUS SYSTEM: answers questions and follows commands appropriately, A&Ox3 grossly moves all extremities   PSYCH: Appropriate affect, Alert & Awake; Good judgement      LABS: Personally reviewed                         10.6   5.00  )-----------( 308      ( 24 Jan 2024 07:02 )             33.6       01-24    131<L>  |  94<L>  |  11  ----------------------------<  103<H>  3.8   |  25  |  0.57    Ca    9.8      24 Jan 2024 07:02  Mg     1.7     01-24  TPro  8.0  /  Alb  2.3<L>  /  TBili  0.6  /  DBili  x   /  AST  24  /  ALT  21  /  AlkPhos  110  01-24          Urinalysis Basic - ( 24 Jan 2024 07:02 )  Color: x / Appearance: x / SG: x / pH: x  Gluc: 103 mg/dL / Ketone: x  / Bili: x / Urobili: x   Blood: x / Protein: x / Nitrite: x   Leuk Esterase: x / RBC: x / WBC x   Sq Epi: x / Non Sq Epi: x / Bacteria: x            RADIOLOGY & ADDITIONAL TESTS: Personally reviewed.   Consultant(s) Notes Reviewed:  [x] YES  [ ] NO   Discussed with SW/FER, RN

## 2024-01-24 NOTE — PROGRESS NOTE ADULT - ASSESSMENT
58y female with PMH significant for HLD, melanoma, chronic pain syndrome, COPD, quite lifelong smoking on 11/28/23, after a new dx of right sided lung CA. She has had right sided chest pain that led to the dx of cancer. Started on chemo & RT on 1/18/24.   Brought in for evaluation of ongoing chest & worsening bodyaches. She was given 1 mg of Morphine but became confused & disoriented.     In the ER, she developed a fever to Tmax of 102.1F with a WBC of 13.83K. Procal elevated to 3.22. .   UA neg.   CXR without PNA.   Urine tox + for THC & opiates.  CT revealed locally invasive right apical mass into the adjacent bone and mediastinum with pleural metastasis unchanged from the recent PET scan 1/16/2024 but again progressed since 11/28/2023. Stable small right pleural effusion. Emphysema.     She is frail but strikingly non toxic, not confused or agitated - that was likely the effect of pain meds.   She is afebrile now & leukocytosis has resolved, urine & blood cx are negative so far  Hence, while she had parameters to suggest infection, these have rapidly resolved..  No clear unifying infection  She is s/p MRI- resuilts are pending  PLAN:  1 Cefepime for now  2 Follow exam and fever curve,. monitor cultures  3 If all micro remains negative and we do not confirm an infection we can limit cefepime to 3-5 days for clinically suspected but not microbiologically confirmed septic event.

## 2024-01-24 NOTE — DISCHARGE NOTE PROVIDER - ATTENDING DISCHARGE PHYSICAL EXAMINATION:
GENERAL: NAD, lying in bed comfortably, anxious  EYES: EOMI, conjunctiva and sclera clear  ENT: Moist mucous membranes  RESP: Clear to auscultation bilaterally, good air entry bilaterally; No wheezing, rales, or rhonchi. Unlabored respirations  CARDIAC: Regular rate and rhythm. S1 and S2. No murmurs, rubs, or gallops  GI:  Soft, Nontender, Nondistended. Bowel sounds present x4 quadrants; No hepatomegaly. No splenomegaly.  EXTREMITIES:  2+ Peripheral Pulses. Capillary refill <2 seconds. No clubbing, cyanosis, or edema  NERVOUS SYSTEM:  Alert & Oriented X3, speech clear. No deficits   MSK: FROM all 4 extremities, moderate strength  SKIN: No rashes, bruises, or other lesions

## 2024-01-24 NOTE — DISCHARGE NOTE PROVIDER - HOSPITAL COURSE
57yo F with history of right-sided small cell lung carcinoma (on 11/23, underwent first chemo and radiation this past Thursday), recently seen in the ED last night for right-sided chest pain attributed to malignancy.  Cardiac workup was negative.  Patient presents confused, disoriented and “making no sense” since dose of 1mg IV morphine last night. In ED, initial VS was normal in am, however later temp 102, wbc 13, RVP neg. CT head negative. CT angiogram showed no PE. lung ca+. Admitted for acute metabolic encephalopathy, malignancy-induced pain management and sepsis work up for spike of fever. Placed on empiric antibiotics. MRI of head and thoracic spine requested (planned for nerve block to control pain if needed at [facility] with pain management provider). ID evaluation requested, sepsis workup negative, limiting antibiotic use for 3-5 days. Psychiatry evaluation with no contraindications for discharge. Mental status returned to baseline. Brain MRI results revealed brain metastasis.  Thoracic spine MRI showing 9.3 cm right lung apex mass with infiltration of the surrounding structures, slight extension into the spinal canal at T3 level with only minimal thecal sac narrowing. The cord is normal in size and signal.  Patient medically optimized for discharge.  Patient must follow up with PCP, oncologist Dr Seymour, and pain management within 7 days after discharge         59yo F with history of right-sided small cell lung carcinoma (on 11/23, underwent first chemo and radiation this past Thursday), recently seen in the ED last night for right-sided chest pain attributed to malignancy.  Cardiac workup was negative.  Patient presents confused, disoriented and “making no sense” since dose of 1mg IV morphine last night. In ED, initial VS was normal in am, however later temp 102, wbc 13, RVP neg. CT head negative. CT angiogram showed no PE. lung ca+. Admitted for acute metabolic encephalopathy, malignancy-induced pain management and sepsis work up for spike of fever. Placed on empiric antibiotics. MRI of head and thoracic spine requested (planned for nerve block to control pain if needed at [facility] with pain management provider). ID evaluation requested, sepsis workup negative, limiting antibiotic use for 3-5 days. Psychiatry evaluation with no contraindications for discharge. Mental status returned to baseline. Brain MRI results revealed brain metastasis.  Thoracic spine MRI showing 9.3 cm right lung apex mass with infiltration of the surrounding structures, slight extension into the spinal canal at T3 level with only minimal thecal sac narrowing. The cord is normal in size and signal. Pain management titration reviewed. Patient medically optimized for discharge. Patient must follow up with PCP, oncologist Dr Seymour, and pain management within 1-2 weeks after discharge.    Vital Signs Last 24 Hrs  T(C): 37.1 (25 Jan 2024 04:50), Max: 37.1 (25 Jan 2024 04:50)  T(F): 98.7 (25 Jan 2024 04:50), Max: 98.7 (25 Jan 2024 04:50)  HR: 97 (25 Jan 2024 04:50) (95 - 100)  BP: 127/80 (25 Jan 2024 10:18) (102/68 - 127/84)  BP(mean): --  RR: 16 (25 Jan 2024 04:50) (16 - 17)  SpO2: 96% (25 Jan 2024 04:50) (96% - 98%)    Parameters below as of 25 Jan 2024 04:50  Patient On (Oxygen Delivery Method): room air      PHYSICAL EXAM:  Vital Signs Last 24 Hrs  T(C): 37.1 (25 Jan 2024 04:50), Max: 37.1 (25 Jan 2024 04:50)  T(F): 98.7 (25 Jan 2024 04:50), Max: 98.7 (25 Jan 2024 04:50)  HR: 97 (25 Jan 2024 04:50) (95 - 100)  BP: 127/80 (25 Jan 2024 10:18) (102/68 - 127/84)  BP(mean): --  RR: 16 (25 Jan 2024 04:50) (16 - 17)  SpO2: 96% (25 Jan 2024 04:50) (96% - 98%)  Parameters below as of 25 Jan 2024 04:50  Patient On (Oxygen Delivery Method): room air      PHYSICAL EXAM:  GENERAL: NAD, lying in bed comfortably  EYES: EOMI, conjunctiva and sclera clear  ENT: Moist mucous membranes  RESP: Clear to auscultation bilaterally, good air entry bilaterally; No wheezing, rales, or rhonchi. Unlabored respirations  CARDIAC: Regular rate and rhythm. S1 and S2. No murmurs, rubs, or gallops  GI:  Soft, Nontender, Nondistended. Bowel sounds present x4 quadrants; No hepatomegaly. No splenomegaly.  EXTREMITIES:  2+ Peripheral Pulses. Capillary refill <2 seconds. No clubbing, cyanosis, or edema  NERVOUS SYSTEM:  Alert & Oriented X3, speech clear. No deficits   MSK: FROM all 4 extremities, full and equal strength  SKIN: No rashes, bruises, or other lesions     Lab:                   10.7   3.60  )-----------( 324      ( 25 Jan 2024 07:40 )             33.8     01-25  131<L>  |  93<L>  |  17  ----------------------------<  107<H>  4.1   |  26  |  0.68  Ca    10.4      25 Jan 2024 07:40  Mg     1.7     01-24  TPro  8.0  /  Alb  2.3<L>  /  TBili  0.6  /  DBili  x   /  AST  24  /  ALT  21  /  AlkPhos  110  01-24          Urinalysis Basic - ( 25 Jan 2024 07:40 )  Color: x / Appearance: x / SG: x / pH: x  Gluc: 107 mg/dL / Ketone: x  / Bili: x / Urobili: x   Blood: x / Protein: x / Nitrite: x   Leuk Esterase: x / RBC: x / WBC x   Sq Epi: x / Non Sq Epi: x / Bacteria: x                57yo F with history of right-sided small cell lung carcinoma (on 11/23, underwent first chemo and radiation this past Thursday), recently seen in the ED last night for right-sided chest pain attributed to malignancy.  Cardiac workup was negative.  Patient presents confused, disoriented and “making no sense” since dose of 1mg IV morphine last night. In ED, initial VS was normal in am, however later temp 102, wbc 13, RVP neg. CT head negative. CT angiogram showed no PE. lung ca+. Admitted for acute metabolic encephalopathy, malignancy-induced pain management and sepsis work up for spike of fever. Placed on empiric antibiotics. MRI of head and thoracic spine requested (planned for nerve block to control pain if needed at [facility] with pain management provider). ID evaluation requested, sepsis workup negative, limiting antibiotic use for 3-5 days. Psychiatry evaluation with no contraindications for discharge. Mental status returned to baseline. Brain MRI results revealed brain metastasis.  Thoracic spine MRI showing 9.3 cm right lung apex mass with infiltration of the surrounding structures, slight extension into the spinal canal at T3 level with only minimal thecal sac narrowing. The cord is normal in size and signal. Pain management titration reviewed. Patient medically optimized for discharge. Patient must follow up with PCP, oncologist Dr Seymour, and pain management within 1-2 weeks after discharge.      Vital Signs Last 24 Hrs  T(C): 36.9 (26 Jan 2024 05:44), Max: 36.9 (26 Jan 2024 05:44)  T(F): 98.4 (26 Jan 2024 05:44), Max: 98.4 (26 Jan 2024 05:44)  HR: 92 (26 Jan 2024 05:44) (81 - 93)  BP: 102/65 (26 Jan 2024 05:44) (101/68 - 116/80)  BP(mean): --  RR: 16 (26 Jan 2024 05:44) (16 - 16)  SpO2: 98% (26 Jan 2024 05:44) (96% - 98%)  Parameters below as of 26 Jan 2024 05:44  Patient On (Oxygen Delivery Method): room air      PHYSICAL EXAM:  GENERAL: NAD, lying in bed comfortably  EYES: EOMI, conjunctiva and sclera clear  ENT: Moist mucous membranes  RESP: Clear to auscultation bilaterally, good air entry bilaterally; No wheezing, rales, or rhonchi. Unlabored respirations  CARDIAC: Regular rate and rhythm. S1 and S2. No murmurs, rubs, or gallops  GI:  Soft, Nontender, Nondistended. Bowel sounds present x4 quadrants; No hepatomegaly. No splenomegaly.  EXTREMITIES:  2+ Peripheral Pulses. Capillary refill <2 seconds. No clubbing, cyanosis, or edema  NERVOUS SYSTEM:  Alert & Oriented X3, speech clear. No deficits   MSK: FROM all 4 extremities, full and equal strength  SKIN: No rashes, bruises, or other lesions     Lab:                                  10.1   3.43  )-----------( 253      ( 26 Jan 2024 05:59 )             32.5     01-26  134<L>  |  96  |  18  ----------------------------<  95  3.5   |  27  |  0.78  Ca    10.2      26 Jan 2024 05:59  TPro  7.7  /  Alb  2.3<L>  /  TBili  0.4  /  DBili  x   /  AST  26  /  ALT  27  /  AlkPhos  143<H>  01-26            Urinalysis Basic - ( 26 Jan 2024 05:59 )  Color: x / Appearance: x / SG: x / pH: x  Gluc: 95 mg/dL / Ketone: x  / Bili: x / Urobili: x   Blood: x / Protein: x / Nitrite: x   Leuk Esterase: x / RBC: x / WBC x   Sq Epi: x / Non Sq Epi: x / Bacteria: x         59yo F with history of right-sided small cell lung carcinoma (on 11/23, underwent first chemo and radiation this past Thursday), recently seen in the ED last night for right-sided chest pain attributed to malignancy.  Cardiac workup was negative.  Patient presents confused, disoriented and “making no sense” since dose of 1mg IV morphine last night. In ED, initial VS was normal in am, however later temp 102, wbc 13, RVP neg. CT head negative. CT angiogram showed no PE. lung ca+. Admitted for acute metabolic encephalopathy, malignancy-induced pain management and sepsis work up for spike of fever. Placed on empiric antibiotics. MRI of head and thoracic spine requested (planned for nerve block to control pain if needed at [facility] with pain management provider). ID evaluation requested, sepsis workup negative, limiting antibiotic use for 3-5 days. Psychiatry evaluation with no contraindications for discharge. Mental status returned to baseline. Brain MRI results revealed brain metastasis.  Thoracic spine MRI showing 9.3 cm right lung apex mass with infiltration of the surrounding structures, slight extension into the spinal canal at T3 level with only minimal thecal sac narrowing. The cord is normal in size and signal. Pain management titration reviewed and modified with guidance of outpatient med provider with notable relief from admission. Bowel regimen reinforced.  Patient medically optimized for discharge. Patient must follow up with PCP, oncologist Dr Seymour, and pain management within 1-2 weeks after discharge.      Vital Signs Last 24 Hrs  T(C): 36.9 (26 Jan 2024 05:44), Max: 36.9 (26 Jan 2024 05:44)  T(F): 98.4 (26 Jan 2024 05:44), Max: 98.4 (26 Jan 2024 05:44)  HR: 92 (26 Jan 2024 05:44) (81 - 93)  BP: 102/65 (26 Jan 2024 05:44) (101/68 - 116/80)  BP(mean): --  RR: 16 (26 Jan 2024 05:44) (16 - 16)  SpO2: 98% (26 Jan 2024 05:44) (96% - 98%)  Parameters below as of 26 Jan 2024 05:44  Patient On (Oxygen Delivery Method): room air      PHYSICAL EXAM:  GENERAL: NAD, lying in bed comfortably  EYES: EOMI, conjunctiva and sclera clear  ENT: Moist mucous membranes  RESP: Clear to auscultation bilaterally, good air entry bilaterally; No wheezing, rales, or rhonchi. Unlabored respirations  CARDIAC: Regular rate and rhythm. S1 and S2. No murmurs, rubs, or gallops  GI:  Soft, Nontender, Nondistended. Bowel sounds present x4 quadrants; No hepatomegaly. No splenomegaly.  EXTREMITIES:  2+ Peripheral Pulses. Capillary refill <2 seconds. No clubbing, cyanosis, or edema  NERVOUS SYSTEM:  Alert & Oriented X3, speech clear. No deficits   MSK: FROM all 4 extremities, full and equal strength  SKIN: No rashes, bruises, or other lesions     Lab:                                  10.1   3.43  )-----------( 253      ( 26 Jan 2024 05:59 )             32.5     01-26  134<L>  |  96  |  18  ----------------------------<  95  3.5   |  27  |  0.78  Ca    10.2      26 Jan 2024 05:59  TPro  7.7  /  Alb  2.3<L>  /  TBili  0.4  /  DBili  x   /  AST  26  /  ALT  27  /  AlkPhos  143<H>  01-26            Urinalysis Basic - ( 26 Jan 2024 05:59 )  Color: x / Appearance: x / SG: x / pH: x  Gluc: 95 mg/dL / Ketone: x  / Bili: x / Urobili: x   Blood: x / Protein: x / Nitrite: x   Leuk Esterase: x / RBC: x / WBC x   Sq Epi: x / Non Sq Epi: x / Bacteria: x

## 2024-01-24 NOTE — PROGRESS NOTE ADULT - ASSESSMENT
57yo female with history of right-sided small cell lung carcinoma (11/23, s/p first chemo last thursday). She was recently seen in the ED last night for right-sided chest pain attributed to malignancy and confusion. Admitted for acute metabolic encephalopathy and agitation.    #acute metabolic encephalopathy   #sepsis with unclear source  -Likely multifactorial pain induced vs medication induced vs malignancy vs infectious  -Dx small cell lung carcinoma   -Brain and thoracic spine MRI (r/o metastasis)  -F/up sepsis work up  -Elevated CRP and procalc  -Continuos pulse ox  -Cont Abx      #Small cell lung carcinoma  #neuropathic pain  -Dr Seymour outpatient primary oncologist  -s/p first cycle Alimta/ Carboplatin/Keytruda systemic therapy 1/18/24  -CT: stable 4mm BARI nodule  -Cont pain control w/dilaudid   -Nerve block to control pain if needed (r/o infection)  -Heme-Onc reccs appreciated  -Palliative consult  -F/up hypoalbuminemia  -F/up microcytic anemia  -CT stable small right pleural effusion, stable 4mm nodule   -Repeat CXR:    #Microcytic anemia  -s/p recent malignancy dx NSCLC  -F/up HH, MCV    #Delirium  -Setting of malignancy s/p first chemo last thursday  -s/p ativan and zyprexa  -Cont zyprexa PRN    #dvt ppx  -Lovenox 40mg 124gh               57yo female with history of right-sided small cell lung carcinoma (11/23, s/p first chemo last thursday). She was recently seen in the ED last night for right-sided chest pain attributed to malignancy and confusion. Admitted for acute metabolic encephalopathy and agitation.    #acute metabolic encephalopathy   #sepsis with unclear source  -Likely multifactorial pain induced vs medication induced vs malignancy vs infectious  -Dx small cell lung carcinoma   -Elevated CRP and procalc  -Continuos pulse ox  - UCx negative, BCx x 2 NGTD   - ID reccs appreciated, limit abx for 3-5d for clinically suspected septic event (if no microbiological event confirmed)  - f/u MRI brain w/ and w/o contrast and spine MRI (pt is due for a nerve block but this should not be completed if infection present and can be done be done outpatient)          #Small cell lung carcinoma  #neuropathic pain  -Dr Seymour outpatient primary oncologist  -s/p first cycle Alimta/ Carboplatin/Keytruda systemic therapy 1/18/24  -CT: stable 4mm BARI nodule  -Cont pain control w/dilaudid, will be switched to PO  -Nerve block to control pain if needed (r/o infection)  -Heme-Onc reccs appreciated  -Palliative consult  -CT stable small right pleural effusion, stable 4mm nodule       #Microcytic anemia  -s/p recent malignancy dx NSCLC  -F/up HH 10.6/33.6, up trending      #Delirium  -Setting of malignancy s/p first chemo last thursday  -AMS improved, defer neurology evaluation at this time  -Per pulmonary eeg suggested. Can be done outpatient if needed    #dvt ppx  -Lovenox 40mg 124gh

## 2024-01-24 NOTE — PROGRESS NOTE ADULT - PROBLEM SELECTOR PLAN 1
NSCLC s/p first cycle of Alimta/Carboplatin/Keytruda systemic therapy 1/18/24. WBC, hemoglobin and platelet count adequate at present. Supportive oncology care while in hospital. DVT prophylaxis. Pain management f/u recommended. If pain refractory, may consider palliative RT in ambulatory. Case discussed with patient's primary oncologist 1/23/24 (Dr. Seymour).

## 2024-01-24 NOTE — PROGRESS NOTE ADULT - SUBJECTIVE AND OBJECTIVE BOX
CC: f/u for fever and confusion    Patient reports: she is alert, is coherent, able to answer questions, does not recall yesterdays events    REVIEW OF SYSTEMS:  All other review of systems negative (Comprehensive ROS)    Antimicrobials Day #  :day 2  cefepime   IVPB      cefepime   IVPB 2000 milliGRAM(s) IV Intermittent every 12 hours    Other Medications Reviewed  MEDICATIONS  (STANDING):  albuterol/ipratropium for Nebulization 3 milliLiter(s) Nebulizer every 6 hours  cefepime   IVPB      cefepime   IVPB 2000 milliGRAM(s) IV Intermittent every 12 hours  celecoxib 200 milliGRAM(s) Oral two times a day  enoxaparin Injectable 40 milliGRAM(s) SubCutaneous every 24 hours  gabapentin 100 milliGRAM(s) Oral three times a day  lactated ringers. 1000 milliLiter(s) (50 mL/Hr) IV Continuous <Continuous>  pantoprazole    Tablet 40 milliGRAM(s) Oral before breakfast    T(F): 98 (01-24-24 @ 12:58), Max: 98.2 (01-24-24 @ 05:13)  HR: 95 (01-24-24 @ 12:58)  BP: 119/80 (01-24-24 @ 12:58)  RR: 16 (01-24-24 @ 12:58)  SpO2: 97% (01-24-24 @ 12:58)  Wt(kg): --    PHYSICAL EXAM:  General: alert, no acute distress  Eyes:  anicteric, no conjunctival injection, no discharge  Oropharynx: no lesions or injection 	  Neck: supple, without adenopathy  Lungs: clear to auscultation, diminished on rt side  Heart: regular rate and rhythm; no murmur, rubs or gallops  Abdomen: soft, nondistended, nontender, without mass or organomegaly  Skin: no lesions  Extremities: no clubbing, cyanosis, or edema  Neurologic: alert, oriented, moves all extremities    LAB RESULTS:                        10.6   5.00  )-----------( 308      ( 24 Jan 2024 07:02 )             33.6     01-24    131<L>  |  94<L>  |  11  ----------------------------<  103<H>  3.8   |  25  |  0.57    Ca    9.8      24 Jan 2024 07:02  Mg     1.7     01-24    TPro  8.0  /  Alb  2.3<L>  /  TBili  0.6  /  DBili  x   /  AST  24  /  ALT  21  /  AlkPhos  110  01-24    LIVER FUNCTIONS - ( 24 Jan 2024 07:02 )  Alb: 2.3 g/dL / Pro: 8.0 g/dL / ALK PHOS: 110 U/L / ALT: 21 U/L / AST: 24 U/L / GGT: x           Urinalysis Basic - ( 24 Jan 2024 07:02 )    Color: x / Appearance: x / SG: x / pH: x  Gluc: 103 mg/dL / Ketone: x  / Bili: x / Urobili: x   Blood: x / Protein: x / Nitrite: x   Leuk Esterase: x / RBC: x / WBC x   Sq Epi: x / Non Sq Epi: x / Bacteria: x      MICROBIOLOGY:  RECENT CULTURES:  01-22 @ 12:30 Clean Catch Clean Catch (Midstream)     <10,000 CFU/mL Normal Urogenital Sandy      01-22 @ 12:15 .Blood Blood     No growth at 24 hours          RADIOLOGY REVIEWED:    < from: CT Head No Cont (01.22.24 @ 10:53) >    IMPRESSION:    No acute intracranial hemorrhage, midline shift or mass effect.    < end of copied text >  < from: CT Angio Chest PE Protocol w/ IV Cont (01.22.24 @ 06:34) >  IMPRESSION:  No pulmonary embolism.    Locally invasive right apical mass into the adjacent bone and mediastinum   with pleural metastasis unchanged from the recent PET scan 1/16/2024 but   again progressed since 11/28/2023. Stable small right pleural effusion.   Emphysema.    Stable 4 mm left upper lobe pulmonary nodule.    --- End of Report ---        < end of copied text >

## 2024-01-24 NOTE — PROGRESS NOTE ADULT - SUBJECTIVE AND OBJECTIVE BOX
Follow-up Pulmonary Progress Note  Chief Complaint : Sepsis    Chronic shoulder pain, no shortness of breath      Allergies :No Known Allergies      PAST MEDICAL & SURGICAL HISTORY:  Mass of right lung    COPD (chronic obstructive pulmonary disease)    Smoker    Chronic pain syndrome    HLD (hyperlipidemia)    Melanoma in situ    H/O reduction of orbital fracture    S/P wrist surgery        Medications:  MEDICATIONS  (STANDING):  albuterol/ipratropium for Nebulization 3 milliLiter(s) Nebulizer every 6 hours  cefepime   IVPB      cefepime   IVPB 2000 milliGRAM(s) IV Intermittent every 12 hours  celecoxib 200 milliGRAM(s) Oral two times a day  enoxaparin Injectable 40 milliGRAM(s) SubCutaneous every 24 hours  gabapentin 100 milliGRAM(s) Oral three times a day  lactated ringers. 1000 milliLiter(s) (50 mL/Hr) IV Continuous <Continuous>  lidocaine   4% Patch 1 Patch Transdermal daily  pantoprazole    Tablet 40 milliGRAM(s) Oral before breakfast  polyethylene glycol 3350 17 Gram(s) Oral daily  senna 1 Tablet(s) Oral at bedtime    MEDICATIONS  (PRN):  acetaminophen     Tablet .. 975 milliGRAM(s) Oral every 6 hours PRN Moderate Pain (4 - 6)  HYDROmorphone   Tablet 4 milliGRAM(s) Oral every 4 hours PRN Moderate Pain (4 - 6)  HYDROmorphone   Tablet 8 milliGRAM(s) Oral every 4 hours PRN Severe Pain (7 - 10)  HYDROmorphone  Injectable 1 milliGRAM(s) IV Push every 4 hours PRN Severe Pain (7 - 10)      Antibiotics History  cefepime   IVPB 2000 milliGRAM(s) IV Intermittent every 12 hours, 01-23-24 @ 06:00  cefepime   IVPB    , 01-23-24 @ 00:13  cefepime   IVPB 2000 milliGRAM(s) IV Intermittent once, 01-22-24 @ 23:17  cefTRIAXone   IVPB 1000 milliGRAM(s) IV Intermittent once, 01-22-24 @ 11:51, Stop order after: 1 Doses      Heme Medications   enoxaparin Injectable 40 milliGRAM(s) SubCutaneous every 24 hours, 01-22-24 @ 23:04      GI Medications  pantoprazole    Tablet 40 milliGRAM(s) Oral before breakfast, 01-22-24 @ 14:25, Routine  polyethylene glycol 3350 17 Gram(s) Oral daily, 01-24-24 @ 18:55, Routine  senna 1 Tablet(s) Oral at bedtime, 01-24-24 @ 18:55, Routine        LABS:                        10.6   5.00  )-----------( 308      ( 24 Jan 2024 07:02 )             33.6     01-24    131<L>  |  94<L>  |  11  ----------------------------<  103<H>  3.8   |  25  |  0.57    Ca    9.8      24 Jan 2024 07:02  Mg     1.7     01-24    TPro  8.0  /  Alb  2.3<L>  /  TBili  0.6  /  DBili  x   /  AST  24  /  ALT  21  /  AlkPhos  110  01-24              Urinalysis Basic - ( 24 Jan 2024 07:02 )    Color: x / Appearance: x / SG: x / pH: x  Gluc: 103 mg/dL / Ketone: x  / Bili: x / Urobili: x   Blood: x / Protein: x / Nitrite: x   Leuk Esterase: x / RBC: x / WBC x   Sq Epi: x / Non Sq Epi: x / Bacteria: x      Procalcitonin, Serum: 3.22 ng/mL (01-22-24 @ 12:12)          CULTURES: (if applicable)    Culture - Urine (collected 01-22-24 @ 12:30)  Source: Clean Catch Clean Catch (Midstream)  Final Report (01-23-24 @ 14:38):    <10,000 CFU/mL Normal Urogenital Sandy    Culture - Blood (collected 01-22-24 @ 12:15)  Source: .Blood Blood  Preliminary Report (01-24-24 @ 19:01):    No growth at 48 Hours    Culture - Blood (collected 01-22-24 @ 12:15)  Source: .Blood Blood  Preliminary Report (01-24-24 @ 19:01):    No growth at 48 Hours            CAPILLARY BLOOD GLUCOSE          RADIOLOGY  CXR:      CT:    ECHO:      VITALS:  T(C): 36.7 (01-24-24 @ 12:58), Max: 36.8 (01-24-24 @ 05:13)  T(F): 98 (01-24-24 @ 12:58), Max: 98.2 (01-24-24 @ 05:13)  HR: 100 (01-24-24 @ 15:34) (88 - 100)  BP: 127/84 (01-24-24 @ 15:34) (111/71 - 127/84)  BP(mean): --  ABP: --  ABP(mean): --  RR: 16 (01-24-24 @ 12:58) (16 - 16)  SpO2: 98% (01-24-24 @ 15:34) (97% - 99%)  CVP(mm Hg): --  CVP(cm H2O): --    Ins and Outs     01-23-24 @ 07:01  -  01-24-24 @ 07:00  --------------------------------------------------------  IN: 50 mL / OUT: 2 mL / NET: 48 mL    01-24-24 @ 07:01  -  01-24-24 @ 19:02  --------------------------------------------------------  IN: 240 mL / OUT: 0 mL / NET: 240 mL        Height (cm): 156.2 (01-22-24 @ 09:51), 156.2 (01-22-24 @ 04:50)  Weight (kg): 50.8 (01-22-24 @ 09:51), 49.9 (01-22-24 @ 04:50)  BMI (kg/m2): 20.8 (01-22-24 @ 09:51), 20.5 (01-22-24 @ 04:50)        I&O's Detail    23 Jan 2024 07:01  -  24 Jan 2024 07:00  --------------------------------------------------------  IN:    IV PiggyBack: 50 mL  Total IN: 50 mL    OUT:    Voided (mL): 2 mL  Total OUT: 2 mL    Total NET: 48 mL      24 Jan 2024 07:01  -  24 Jan 2024 19:02  --------------------------------------------------------  IN:    Oral Fluid: 240 mL  Total IN: 240 mL    OUT:  Total OUT: 0 mL    Total NET: 240 mL          Physical Examination:  GENERAL:               Alert, Oriented, No acute distress.    HEENT:                    Pupils equal, reactive to light.  Symmetric. No JVD, Moist MM  PULM:                     Bilateral air entry, No Rales, No Rhonchi, No Wheezing  CVS:                         S1, S2,  No Murmur  ABD:                        Soft, nondistended, nontender, normoactive bowel sounds,   EXT:                         No edema, nontender, No Cyanosis or Clubbing   Vascular:                Warm Extremities, Normal Capillary refill, Normal Distal Pulses  SKIN:                       Warm and well perfused, no rashes noted.   NEURO:                  Alert, oriented, interactive, nonfocal, follows commands  PSYC:                      Calm, + Insight.

## 2024-01-24 NOTE — DISCHARGE NOTE PROVIDER - NSDCMRMEDTOKEN_GEN_ALL_CORE_FT
gabapentin 100 mg oral tablet: 1 tab(s) orally 3 times a day  HYDROmorphone 4 mg oral tablet: 2 tab(s) orally every 4 hours as needed for  severe pain  HYDROmorphone 4 mg oral tablet: 1 tab(s) orally every 4 hours as needed for  moderate pain as needed  meloxicam 15 mg oral tablet: 1 tab(s) orally once a day  Tylenol 500 mg oral tablet: 2 tab(s) orally 4 times a day as needed for  moderate pain   gabapentin 100 mg oral capsule: 1 cap(s) orally 3 times a day  HYDROmorphone 4 mg oral tablet: 2 tab(s) orally every 4 hours as needed for  severe pain  HYDROmorphone 4 mg oral tablet: 1 tab(s) orally every 4 hours as needed for  moderate pain as needed  meloxicam 15 mg oral tablet: 1 tab(s) orally once a day  morphine 15 mg/8 to 12 hr oral tablet, extended release: 1 tab(s) orally 2 times a day  Tylenol 500 mg oral tablet: 2 tab(s) orally 4 times a day as needed for  moderate pain   ClearLax oral powder for reconstitution: 17 gram(s) orally once a day  gabapentin 100 mg oral capsule: 1 cap(s) orally 3 times a day  HYDROmorphone 4 mg oral tablet: 2 tab(s) orally every 4 hours as needed for  severe pain  HYDROmorphone 4 mg oral tablet: 1 tab(s) orally every 4 hours as needed for  moderate pain as needed  Innerclean oral tablet: 1 tab(s) orally once a day (at bedtime)  meloxicam 15 mg oral tablet: 1 tab(s) orally once a day  morphine 15 mg/8 to 12 hr oral tablet, extended release: 1 tab(s) orally 2 times a day MDD: 2 tablets  Narcan 4 mg/0.1 mL nasal spray: 4 milligram(s) intranasally once a day  Tylenol 500 mg oral tablet: 2 tab(s) orally 4 times a day as needed for  moderate pain   ClearLax oral powder for reconstitution: 17 gram(s) orally once a day  gabapentin 100 mg oral capsule: 1 cap(s) orally 3 times a day  HYDROmorphone 4 mg oral tablet: 2 tab(s) orally every 4 hours as needed for  severe pain  HYDROmorphone 4 mg oral tablet: 1 tab(s) orally every 4 hours as needed for  moderate pain as needed  Innerclean oral tablet: 1 tab(s) orally once a day (at bedtime)  meloxicam 15 mg oral tablet: 1 tab(s) orally once a day  morphine 15 mg/8 to 12 hr oral tablet, extended release: 1 tab(s) orally 2 times a day MDD: 2 tablets  Narcan 4 mg/0.1 mL nasal spray: 4 milligram(s) intranasally once as needed for overdose  Tylenol 500 mg oral tablet: 2 tab(s) orally 4 times a day as needed for  moderate pain

## 2024-01-25 NOTE — PROGRESS NOTE ADULT - PROBLEM SELECTOR PLAN 2
#acute metabolic encephalopathy   #sepsis with unclear source  -Likely multifactorial pain induced vs medication induced vs malignancy vs infectious  -Dx small cell lung carcinoma   -Elevated CRP and procalc  -Continuos pulse ox  - UCx negative, BCx x 2 NGTD   - ID reccs appreciated, limit abx for 3-5d for clinically suspected septic event (if no microbiological event confirmed)  - f/u MRI brain w/ and w/o contrast and spine MRI (pt is due for a nerve block but this should not be completed if infection present and can be done be done outpatient) -Likely 2/2 uncontrolled pain at home  -Now resolved  -Pain control

## 2024-01-25 NOTE — PROGRESS NOTE ADULT - ASSESSMENT
{\rtf1\[id number]\ansi\ysdlfwu6549\ftnbj\uc1\deff0  {\fonttbl{\f0 \fnil Segoe UI;}{\f1 \fnil \fcharset0 Segoe UI;}{\f2 \fnil Times New Pasha;}}  {\colortbl ;\kje650\crqru826\xhgl270 ;\red0\green0\blue0 ;\red0\green0\blue0 ;}  {\stylesheet{\f0\fs20 Normal;}{\cs1 Default Paragraph Font;}{\cs2\f0\fs16 Line Number;}{\cs3\f2\fs24 Hyperlink;}}  {\*\revtbl{Unknown;}}  \bxyipq40655\sfevgt96034\gysfh6080\jwval1502\tyfht1050\wahhu8069\pjnwdef427\nbmxbtj340\nogrowautofit\odetsl860\formshade\nofeaturethrottle1\dntblnsbdb\fet4\aendnotes\aftnnrlc\pgbrdrhead\pgbrdrfoot  \sectd\mgycta12039\cmtbah61205\guttersxn0\ekelpuvl0656\pzxbqssj2040\sotmefir2337\upozxjpk2310\upqizdu259\fpkwxcd983\sbkpage\pgncont\pgndec  \plain\plain\f0\fs24\ql\plain\f0\fs24\plain\f0\fs20\ykgg9419\hich\f0\dbch\f0\loch\f0\fs20 A/P \plain\f1\fs16\gbib5671\hich\f1\dbch\f1\loch\f1\cf2\fs16  Patient is a 58-year-old female, diagnosed right-sided small cell lung carcinoma on november, underwent   first chemo and radiation this past Thursday.  She was recently seen in the ED last night for right-sided chest pain attributed to malignancy.  Cardiac workup was negative.  As per daughter at bedside, in the ED,  patient has become confused, disoriented,   not making any sense since she received 1 mg of IV morphine last night.  Patient takes Dilaudid at home without issues. A medical admission was called for acute metabolic encephalopathy and agitation.   \par  \par  \par  \plain\f1\fs16\drzf7553\hich\f1\dbch\f1\loch\f1\cf2\fs16\b As per family, patient was seen by pain management, at [facility] ,  plan is to do MRI of head and spine and nerve block to control pain if needed.\par  \par  \par  Assessment/Plans: \par  \par   Small cell lung carcinoma\par  neuropathic pain\plain\f1\fs16\kxff3330\hich\f1\dbch\f1\loch\f1\cf2\fs16\par  -Dr Seymour outpatient primary oncologist\par  -s/p first cycle Alimta/ Carboplatin/Keytruda systemic therapy 1/18/24\par  -CT: stable 4mm BARI nodule\par  - pain control -  switched today to LA MS  Contin today w/ PRN for breakthrough \par  - also on celebrex and neurontin as scheduled  \par  -Nerve block to control pain planed \par  - Heme/Onc consult \par      -Brain MRI with metastasis\plain\f1\fs16\ccjq1072\hich\f1\dbch\f1\loch\f1\cf2\fs16\strike\plain\f1\fs16\vaui2935\hich\f1\dbch\f1\loch\f1\cf2\fs16         patient is aware  \par  \par  \par  {\*\bkmkstart [id number]}{\*\bkmkend [id number]}Acute metabolic encephalopathy. \par  {\*\bkmkstart [id number]}{\*\bkmkend qe38137837185}{\*\bkmkstart [id number]}{\*\bkmkend [id number]}-Likely 2/2 uncontrolled pain at home\par  -Now resolved\par  \par  \par  {\*\bkmkstart [id number]}{\*\bkmkend [id number]} {\*\bkmkstart [id number]}{\*\bkmkend hp95370492931}Cancer-related pain \par   follows w/ {\*\bkmkstart [id number]}{\*\bkmkend [id number]}{\*\bkmkstart [id number]}{\*\bkmkend [id number]}Pain management at [facility]\par  - meds adjusted today  to LA form BID \par  -Nerve block scheduled and pending\par  - pt has f/u appt Tuesday 1/30 w/ her pain management team \par  \par  \par  \plain\f1\fs16\gbed0081\hich\f1\dbch\f1\loch\f1\cf2\fs16\b Palliative\plain\f1\fs16\dmen2235\hich\f1\dbch\f1\loch\f1\cf2\fs16  :   as follow up, case d/w med team today , reviewed pts pain meds with them . They have been in contact w/ pts out pt pain   management team and recs were given . \par  Pt to be switched to a LA form of MS   w/ SA prn for  breakthrough pain. I saw pt bedside this afternoon, she was resting, looked comfortable in bed. Said she was in "some pain" now and has been for most of day today .\par  She is aware of the new regimen for her pain, now in oral form,  she said she d/w med team today, and is in agreement with the change. \par    She tells me she is going home tomorrow and has follow up appts w/ her oncology team, Dr Rao,  and her pain management team next week .\par  \par  \par  \par        \par  She was infor  \plain\f0\fs20\baum3654\hich\f0\dbch\f0\loch\f0\fs20\par  }

## 2024-01-25 NOTE — PROGRESS NOTE ADULT - SUBJECTIVE AND OBJECTIVE BOX
Patient is a 58y old  Female who presents with a chief complaint of AMS, fever (23 Jan 2024 12:43). Admitted for acute metabolic encephalopathy      INTERVAL HPI/OVERNIGHT EVENTS: Patient seen and examined at bedside. No overnight events.    MEDICATIONS  (STANDING):  albuterol/ipratropium for Nebulization 3 milliLiter(s) Nebulizer every 6 hours  cefepime   IVPB      cefepime   IVPB 2000 milliGRAM(s) IV Intermittent every 12 hours  celecoxib 200 milliGRAM(s) Oral two times a day  enoxaparin Injectable 40 milliGRAM(s) SubCutaneous every 24 hours  gabapentin 100 milliGRAM(s) Oral three times a day  HYDROmorphone  Injectable 0.5 milliGRAM(s) IV Push once  lactated ringers. 1000 milliLiter(s) (50 mL/Hr) IV Continuous <Continuous>  pantoprazole    Tablet 40 milliGRAM(s) Oral before breakfast  potassium chloride    Tablet ER 20 milliEquivalent(s) Oral every 4 hours  potassium chloride  10 mEq/100 mL IVPB 10 milliEquivalent(s) IV Intermittent every 1 hour    MEDICATIONS  (PRN):  acetaminophen     Tablet .. 650 milliGRAM(s) Oral every 6 hours PRN Temp greater or equal to 38C (100.4F), Mild Pain (1 - 3)  HYDROmorphone  Injectable 1 milliGRAM(s) IV Push every 4 hours PRN Severe Pain (7 - 10)  HYDROmorphone  Injectable 0.5 milliGRAM(s) IV Push every 4 hours PRN Moderate Pain (4 - 6)      Allergies    No Known Allergies    Intolerances        REVIEW OF SYSTEMS:  CONSTITUTIONAL: No fever or chills  HEENT:  No headache, no sore throat  RESPIRATORY: No cough, wheezing, or shortness of breath  CARDIOVASCULAR: No chest pain, palpitations  GASTROINTESTINAL: No abd pain, nausea, vomiting, or diarrhea  GENITOURINARY: No dysuria, frequency, or hematuria  NEUROLOGICAL: no focal weakness or dizziness  MUSCULOSKELETAL: no myalgias       Vital Signs Last 24 Hrs  Vital Signs Last 24 Hrs  T(C): 37.1 (25 Jan 2024 04:50), Max: 37.1 (25 Jan 2024 04:50)  T(F): 98.7 (25 Jan 2024 04:50), Max: 98.7 (25 Jan 2024 04:50)  HR: 97 (25 Jan 2024 04:50) (95 - 100)  BP: 127/80 (25 Jan 2024 10:18) (102/68 - 127/84)  BP(mean): --  RR: 16 (25 Jan 2024 04:50) (16 - 17)  SpO2: 96% (25 Jan 2024 04:50) (96% - 98%)    Parameters below as of 25 Jan 2024 04:50  Patient On (Oxygen Delivery Method): room air                              10.7   3.60  )-----------( 324      ( 25 Jan 2024 07:40 )             33.8       01-25    131<L>  |  93<L>  |  17  ----------------------------<  107<H>  4.1   |  26  |  0.68    Ca    10.4      25 Jan 2024 07:40  Mg     1.7     01-24    TPro  8.0  /  Alb  2.3<L>  /  TBili  0.6  /  DBili  x   /  AST  24  /  ALT  21  /  AlkPhos  110  01-24              Urinalysis Basic - ( 25 Jan 2024 07:40 )    Color: x / Appearance: x / SG: x / pH: x  Gluc: 107 mg/dL / Ketone: x  / Bili: x / Urobili: x   Blood: x / Protein: x / Nitrite: x   Leuk Esterase: x / RBC: x / WBC x   Sq Epi: x / Non Sq Epi: x / Bacteria: x                  CAPILLARY BLOOD GLUCOSE                  PHYSICAL EXAM:  GENERAL: NAD  HEENT:  AT/NC, anicteric, moist mucous membranes, EOMI, no lid-lag, conjunctiva and sclera clear  CHEST/LUNG:  CTA b/l, no rales, wheezes, or rhonchi,  normal respiratory effort, no intercostal retractions  HEART:  RRR, S1, S2, no murmurs; no pitting edema  ABDOMEN:  BS+, soft, nontender, nondistended; No HSM  MSK/EXTREMITIES: 2+ peripheral pulses, no clubbing or cyanosis  NERVOUS SYSTEM: answers questions and follows commands appropriately, A&Ox3 grossly moves all extremities   PSYCH: Appropriate affect, Alert & Awake; Good judgement      LABS: Personally reviewed                                   RADIOLOGY & ADDITIONAL TESTS: Personally reviewed.   Consultant(s) Notes Reviewed:  [x] YES  [ ] NO   Discussed with SW/FER, RN

## 2024-01-25 NOTE — PROGRESS NOTE ADULT - ASSESSMENT
58y female with PMH significant for HLD, melanoma, chronic pain syndrome, COPD, quite lifelong smoking on 11/28/23, after a new dx of right sided lung CA. She has had right sided chest pain that led to the dx of cancer. Started on chemo & RT on 1/18/24.   Brought in for evaluation of ongoing chest & worsening bodyaches. She was given 1 mg of Morphine but became confused & disoriented.     In the ER, she developed a fever to Tmax of 102.1F with a WBC of 13.83K. Procal elevated to 3.22. .   UA neg.   CXR without PNA.   Urine tox + for THC & opiates.  CT revealed locally invasive right apical mass into the adjacent bone and mediastinum with pleural metastasis unchanged from the recent PET scan 1/16/2024 but again progressed since 11/28/2023. Stable small right pleural effusion. Emphysema.     She is frail but strikingly non toxic, not confused or agitated - that was likely the effect of pain meds.   She is afebrile now & leukocytosis has resolved, urine & blood cx are negative so far  Hence, while she had parameters to suggest infection, these have rapidly resolved..  No clear unifying infection  MRI reports noted  Blood and urine cultures negative  cefepime stopped earlier  PLAN:  1 Monitor off antibiotics  2 Pain control per medicine  3 Chemo/RT as per Oncology  4 With no documented infection and all micro negative I endorse stopping of antibiotics     With no additional ID w/u planned we will stop actively following. Please call if ID issues arise

## 2024-01-25 NOTE — PROGRESS NOTE ADULT - ASSESSMENT
59yo female with history of right-sided small cell lung carcinoma (11/23, s/p first chemo last thursday). She was recently seen in the ED last night for right-sided chest pain attributed to malignancy and confusion. Admitted for acute metabolic encephalopathy and agitation.

## 2024-01-25 NOTE — PROGRESS NOTE ADULT - PROBLEM SELECTOR PLAN 3
-Pain management at [facility]  -Nerve block scheduled and pending  -Pain management discussed, stressed importance of around the clock meds to avoid breakthrough pain  -Back on home regimen -Pain management at [facility]  -Nerve block scheduled and pending  -Switched to home regimen pain control yesterday afternoon without good relief, patient still in significant pain, current dosages of dilaudid po don't help.   -104 MMEs over last 24 hours  -Will reach out to patient's pain management Dr. Billings to discuss possible uptitration  -DC planning pending optimal pain control

## 2024-01-25 NOTE — PROGRESS NOTE ADULT - SUBJECTIVE AND OBJECTIVE BOX
CC: f/u for fever    Patient reports: she c/o baseline back pain. No headaches    REVIEW OF SYSTEMS:  All other review of systems negative (Comprehensive ROS)    Antimicrobials Day #  :    Other Medications Reviewed  MEDICATIONS  (STANDING):  albuterol/ipratropium for Nebulization 3 milliLiter(s) Nebulizer every 6 hours  celecoxib 200 milliGRAM(s) Oral two times a day  enoxaparin Injectable 40 milliGRAM(s) SubCutaneous every 24 hours  gabapentin 100 milliGRAM(s) Oral three times a day  lactated ringers. 1000 milliLiter(s) (50 mL/Hr) IV Continuous <Continuous>  lidocaine   4% Patch 1 Patch Transdermal daily  morphine ER Tablet 15 milliGRAM(s) Oral two times a day  pantoprazole    Tablet 40 milliGRAM(s) Oral before breakfast  polyethylene glycol 3350 17 Gram(s) Oral daily  senna 1 Tablet(s) Oral at bedtime    T(F): 97.9 (01-25-24 @ 12:45), Max: 98.7 (01-25-24 @ 04:50)  HR: 93 (01-25-24 @ 12:45)  BP: 116/80 (01-25-24 @ 12:45)  RR: 16 (01-25-24 @ 12:45)  SpO2: 96% (01-25-24 @ 12:45)  Wt(kg): --    PHYSICAL EXAM:  General: alert, no acute distress  Eyes:  anicteric, no conjunctival injection, no discharge  Oropharynx: no lesions or injection 	  Neck: supple, without adenopathy  Lungs: decreased on rt side  Heart: regular rate and rhythm; no murmur, rubs or gallops  Abdomen: soft, nondistended, nontender, without mass or organomegaly  Skin: no lesions  Extremities: no clubbing, cyanosis, or edema  Neurologic: alert, oriented, moves all extremities    LAB RESULTS:                        10.7   3.60  )-----------( 324      ( 25 Jan 2024 07:40 )             33.8     01-25    131<L>  |  93<L>  |  17  ----------------------------<  107<H>  4.1   |  26  |  0.68    Ca    10.4      25 Jan 2024 07:40  Mg     1.7     01-24    TPro  8.0  /  Alb  2.3<L>  /  TBili  0.6  /  DBili  x   /  AST  24  /  ALT  21  /  AlkPhos  110  01-24    LIVER FUNCTIONS - ( 24 Jan 2024 07:02 )  Alb: 2.3 g/dL / Pro: 8.0 g/dL / ALK PHOS: 110 U/L / ALT: 21 U/L / AST: 24 U/L / GGT: x           Urinalysis Basic - ( 25 Jan 2024 07:40 )    Color: x / Appearance: x / SG: x / pH: x  Gluc: 107 mg/dL / Ketone: x  / Bili: x / Urobili: x   Blood: x / Protein: x / Nitrite: x   Leuk Esterase: x / RBC: x / WBC x   Sq Epi: x / Non Sq Epi: x / Bacteria: x      MICROBIOLOGY:  RECENT CULTURES:  01-22 @ 12:30 Clean Catch Clean Catch (Midstream)     <10,000 CFU/mL Normal Urogenital Sandy      01-22 @ 12:15 .Blood Blood     No growth at 48 Hours          RADIOLOGY REVIEWED:  < from: MR Thoracic Spine w/wo IV Cont (01.24.24 @ 11:14) >  IMPRESSION:    9.3 cm right lung apex mass with infiltration of the surrounding   structures. There is slight extension into the spinal canal at T3 level   with only minimal thecalsac narrowing. The cord is normal in size and   signal.    --- End of Report --    < end of copied text >  < from: MR Head w/wo IV Cont (01.24.24 @ 11:14) >  IMPRESSION:    Numerous enhancing intracranial lesions, largest in the left frontal   periventricular region measuring up to 1.3 cm, compatible with metastatic   disease.    --- End of Report ---    < end of copied text >

## 2024-01-25 NOTE — PROGRESS NOTE ADULT - SUBJECTIVE AND OBJECTIVE BOX
Progress:  as f/u pt was resting in bed , watching tv     Present Symptoms:   Dyspnea: no  Nausea/Vomiting: no  Anxiety:    Depressed Mood:   Fatigue:   Loss of appetite:   Pain:      yes             location: sternum area   MEDICATIONS  (STANDING):  albuterol/ipratropium for Nebulization 3 milliLiter(s) Nebulizer every 6 hours  celecoxib 200 milliGRAM(s) Oral two times a day  enoxaparin Injectable 40 milliGRAM(s) SubCutaneous every 24 hours  gabapentin 100 milliGRAM(s) Oral three times a day  lactated ringers. 1000 milliLiter(s) (50 mL/Hr) IV Continuous <Continuous>  lidocaine   4% Patch 1 Patch Transdermal daily  morphine ER Tablet 15 milliGRAM(s) Oral two times a day  pantoprazole    Tablet 40 milliGRAM(s) Oral before breakfast  polyethylene glycol 3350 17 Gram(s) Oral daily  senna 1 Tablet(s) Oral at bedtime    MEDICATIONS  (PRN):  acetaminophen     Tablet .. 975 milliGRAM(s) Oral every 6 hours PRN Moderate Pain (4 - 6)  HYDROmorphone   Tablet 8 milliGRAM(s) Oral every 4 hours PRN Severe Pain (7 - 10)  HYDROmorphone   Tablet 4 milliGRAM(s) Oral every 4 hours PRN Moderate Pain (4 - 6)      PHYSICAL EXAM:  Vital Signs Last 24 Hrs  T(C): 36.6 (25 Jan 2024 12:45), Max: 37.1 (25 Jan 2024 04:50)  T(F): 97.9 (25 Jan 2024 12:45), Max: 98.7 (25 Jan 2024 04:50)  HR: 93 (25 Jan 2024 12:45) (93 - 98)  BP: 116/80 (25 Jan 2024 12:45) (102/68 - 127/80)  BP(mean): --  RR: 16 (25 Jan 2024 12:45) (16 - 17)  SpO2: 96% (25 Jan 2024 12:45) (96% - 96%)    Parameters below as of 25 Jan 2024 12:45  Patient On (Oxygen Delivery Method): room air      General: alert  oriented x 3, pleasant, conversant       HEENT: n/c, a/t     Lungs: resp non labored ,  comfortable   CV: normal -tachy   GI: abd flat     Musculoskeletal: normal, mcmahon's, thin    Skin: w/d    Neuro: no deficits   Oral intake ability:  oral feeding-yes   Diet: reg as son      LABS:                          10.7   3.60  )-----------( 324      ( 25 Jan 2024 07:40 )             33.8     01-25    131<L>  |  93<L>  |  17  ----------------------------<  107<H>  4.1   |  26  |  0.68    Ca    10.4      25 Jan 2024 07:40  Mg     1.7     01-24    TPro  8.0  /  Alb  2.3<L>  /  TBili  0.6  /  DBili  x   /  AST  24  /  ALT  21  /  AlkPhos  110  01-24    Urinalysis Basic - ( 25 Jan 2024 07:40 )    Color: x / Appearance: x / SG: x / pH: x  Gluc: 107 mg/dL / Ketone: x  / Bili: x / Urobili: x   Blood: x / Protein: x / Nitrite: x   Leuk Esterase: x / RBC: x / WBC x   Sq Epi: x / Non Sq Epi: x / Bacteria: x        RADIOLOGY & ADDITIONAL STUDIES: < from: MR Head w/wo IV Cont (01.24.24 @ 11:14) >  IMPRESSION:    Numerous enhancing intracranial lesions, largest in the left frontal   periventricular region measuring up to 1.3 cm, compatible with metastatic   disease.          ADVANCE DIRECTIVES:  full code   Advanced Care Planning discussion total time spent:

## 2024-01-25 NOTE — PROGRESS NOTE ADULT - PROBLEM SELECTOR PLAN 1
#Small cell lung carcinoma  #neuropathic pain  -Dr Seymour outpatient primary oncologist  -s/p first cycle Alimta/ Carboplatin/Keytruda systemic therapy 1/18/24  -CT: stable 4mm BARI nodule  -Cont pain control w/dilaudid, will be switched to PO  -Nerve block to control pain if needed (r/o infection)  -Heme-Onc reccs appreciated, may need radio-oncology  -Palliative reccs appreciated  -CT stable small right pleural effusion, stable 4mm nodule   - Brain MRI with metastasis. Discussed with patient and family #Small cell lung carcinoma  #neuropathic pain  -Dr Seymour outpatient primary oncologist  -s/p first cycle Alimta/ Carboplatin/Keytruda systemic therapy 1/18/24  -CT: stable 4mm BARI nodule  -Cont pain control w/dilaudid, will be switched to PO  -Nerve block to control pain if needed (r/o infection)  -Heme-Onc reccs appreciated, may need radio-oncology  -Palliative reccs appreciated  -CT stable small right pleural effusion, stable 4mm nodule   -Brain MRI with metastasis. Discussed with patient and family

## 2024-01-25 NOTE — PROGRESS NOTE ADULT - PROBLEM SELECTOR PLAN 1
NSCLC s/p first cycle of Alimta/Carboplatin/Keytruda systemic therapy 1/18/24. WBC, hemoglobin and platelet count adequate at present. Found to have brain mets-patient aware-advise f/u with radiation oncology. Patient plans f/u with medical oncology and pain management team as well.

## 2024-01-25 NOTE — PROGRESS NOTE ADULT - SUBJECTIVE AND OBJECTIVE BOX
KAELYN VALENTE   58y   Female    Admitting: BRIAN Rodriguez  HPI:  Patient is a 58-year-old female, diagnosed right-sided non-small cell lung carcinoma 11/2023, underwent first chemo/immunotherapy 1/18/24.  She was recently seen in the ED for right-sided chest pain attributed to malignancy.  Cardiac workup was negative.  Patient became confused post IV morphine, and was admitted with fever. CT head negative. CT angio showed no PE. Patient was seen by pain management, plan is to do MRI of head and spine and nerve block to control pain if needed.    Oncology consulted for lung cancer history.    PAST MEDICAL & SURGICAL HISTORY:  Mass of right lung      COPD (chronic obstructive pulmonary disease)      Smoker      Chronic pain syndrome      HLD (hyperlipidemia)      Melanoma in situ      H/O reduction of orbital fracture      S/P wrist surgery        HEALTH ISSUES - PROBLEM Dx:  Non-small cell carcinoma of lung    History of delirium      MEDICATIONS  (STANDING):  albuterol/ipratropium for Nebulization 3 milliLiter(s) Nebulizer every 6 hours  cefepime   IVPB      cefepime   IVPB 2000 milliGRAM(s) IV Intermittent every 12 hours  celecoxib 200 milliGRAM(s) Oral two times a day  enoxaparin Injectable 40 milliGRAM(s) SubCutaneous every 24 hours  gabapentin 100 milliGRAM(s) Oral three times a day  lactated ringers. 1000 milliLiter(s) (50 mL/Hr) IV Continuous <Continuous>  lidocaine   4% Patch 1 Patch Transdermal daily  pantoprazole    Tablet 40 milliGRAM(s) Oral before breakfast  polyethylene glycol 3350 17 Gram(s) Oral daily  senna 1 Tablet(s) Oral at bedtime    MEDICATIONS  (PRN):  acetaminophen     Tablet .. 975 milliGRAM(s) Oral every 6 hours PRN Moderate Pain (4 - 6)  HYDROmorphone   Tablet 8 milliGRAM(s) Oral every 4 hours PRN Severe Pain (7 - 10)  HYDROmorphone   Tablet 4 milliGRAM(s) Oral every 4 hours PRN Moderate Pain (4 - 6)  HYDROmorphone  Injectable 1 milliGRAM(s) IV Push every 4 hours PRN Severe Pain (7 - 10)    Allergies    No Known Allergies    INTERVAL HPI/OVERNIGHT EVENTS:  Patient S&E at bedside. No c/o H/A. No c/o SOB. +Upper back pain.    VITAL SIGNS:  T(F): 98.7 (01-25-24 @ 04:50)  HR: 97 (01-25-24 @ 04:50)  BP: 115/74 (01-25-24 @ 04:50)  RR: 16 (01-25-24 @ 04:50)  SpO2: 96% (01-25-24 @ 04:50)      PHYSICAL EXAM:  Constitutional: NAD  Eyes: sclera non-icteric  Neck: no JVD  Respiratory: decreased BS ant.  Cardiovascular: RRR, no M/R/G  Gastrointestinal: soft, NTND, no masses palpable  Extremities: no calf tenderness  Neurological: Awake, alert.    Labs:             10.6   5.00  )-----------( 308      ( 01-24 @ 07:02 )             33.6                9.6    9.65  )-----------( 435      ( 01-23 @ 06:30 )             30.0       01-24    131<L>  |  94<L>  |  11  ----------------------------<  103<H>  3.8   |  25  |  0.57    Ca    9.8      24 Jan 2024 07:02  Mg     1.7     01-24    TPro  8.0  /  Alb  2.3<L>  /  TBili  0.6  /  DBili  x   /  AST  24  /  ALT  21  /  AlkPhos  110  01-24    Culture - Blood (collected 22 Jan 2024 12:15)  Source: .Blood Blood  Preliminary Report (24 Jan 2024 19:01):    No growth at 48 Hours    Culture - Blood (collected 22 Jan 2024 12:15)  Source: .Blood Blood  Preliminary Report (24 Jan 2024 19:01):    No growth at 48 Hours        RADIOLOGY & ADDITIONAL TESTS:  < from: MR Head w/wo IV Cont (01.24.24 @ 11:14) >  IMPRESSION:    Numerous enhancing intracranial lesions, largest in the left frontal   periventricular region measuring up to 1.3 cm, compatible with metastatic   disease.    < end of copied text >  < from: MR Thoracic Spine w/wo IV Cont (01.24.24 @ 11:14) >  IMPRESSION:    9.3 cm right lung apex mass with infiltration of the surrounding   structures. There is slight extension into the spinal canal at T3 level   with only minimal thecalsac narrowing. The cord is normal in size and   signal.    < end of copied text >        Consultant notes reviewed : YES [x ] ; NO [ ]

## 2024-01-25 NOTE — PROGRESS NOTE ADULT - ATTENDING COMMENTS
Agree with resident plan above, please see note for full details of the service      PE: A+Ox2 with waxing and waning confusion, no FND, lungs CTA b/l, no murmurs, abd NT/ND, no lower extremity swelling.      Assessment:   - Acute metabolic encephalopathy and agitation likely multifactorial pain induced vs. Medication induced vs. Infectious vs. Malignancy (r/o brain metastases)   - Sepsis with unclear source possible post-obstructive pneumonia vs. Bacteremia   - Microcytic anemia   - 4 mm BARI nodule  - Hypoalbuminemia   - Hx of right-sided small cell lung carcinoma in November, underwent first chemo and radiation this past Thursday and COPD/emphysema      Plan:   - f/u UCx, BCx x 2   - c/w Cefepime   - Not neutropenic  - c/w pain control  - Repeat dysphagia screen   - On 1:1 for agitation (if improved can d/c), PRN Xyprexa   - Heme/Onc recs appreciated  - Palliative care consult   - Pulmonology consult   - Psych consult   - Replete K+   - Will order MRI brain w/ and w/o contrast and spine MRI (reaching out to outpatient pain management physician to determine which MRI of the spine was to be ordered)   - If AMS does not improve, will consult neurology   - Medically active, anticipate 48-72 hours
Agree with resident plan above, please see note for full details of the service      PE: A+Ox3, no FND, lungs CTA b/l, no murmurs, abd NT/ND, no lower extremity swelling.      Assessment:   - Acute metabolic encephalopathy and agitation likely multifactorial pain induced vs. Medication induced vs. Infectious vs. Malignancy (r/o brain metastases)   - Sepsis with unclear source possible post-obstructive pneumonia vs. Bacteremia   - Microcytic anemia   - 4 mm BARI nodule  - Hypoalbuminemia   - Hx of right-sided small cell lung carcinoma in November, underwent first chemo and radiation this past Thursday and COPD/emphysema      Plan:   - UCx negative, BCx x 2 NGTD   - c/w Cefepime, will discuss with ID how long pt should be on abx   - Pain control - change IV dilaudid to PO   - Pulmonology - consider EEG? -> this can be completed outpatient (pt's AMS is now resolved likely secondary to pain/medication induced)   - f/u MRI brain w/ and w/o contrast and spine MRI (spoke to the pt's pain management physician yesterday who stated that pt is due for a nerve block but this should not be completed if infection present and can be done be done outpatient)    - AMS improved, defer neurology evaluation at this time    - Medically active, anticipate 24 hours until d/c
59yo female with history of right-sided small cell lung carcinoma (11/23, s/p first chemo last thursday). She was recently seen in the ED last night for right-sided chest pain attributed to malignancy and confusion. Admitted for acute metabolic encephalopathy and agitation. Plan: monitor clinical course, pain regimen adjusted to long acting po meds, dc iv meds and trial on po, f/u bm, likely dc tomorrow, apprec outpt pain doc recs

## 2024-01-26 NOTE — PROGRESS NOTE ADULT - PROVIDER SPECIALTY LIST ADULT
Infectious Disease
Pulmonology
Palliative Care
Family Medicine
Family Medicine
Infectious Disease
Family Medicine
Heme/Onc
Family Medicine

## 2024-01-26 NOTE — PROGRESS NOTE ADULT - REASON FOR ADMISSION
AMS, fever

## 2024-01-26 NOTE — PROGRESS NOTE ADULT - ASSESSMENT
57yo female with history of right-sided small cell lung carcinoma (11/23, s/p first chemo last thursday). She was recently seen in the ED last night for right-sided chest pain attributed to malignancy and confusion. Admitted for acute metabolic encephalopathy and agitation.

## 2024-01-26 NOTE — DISCHARGE NOTE NURSING/CASE MANAGEMENT/SOCIAL WORK - PATIENT PORTAL LINK FT
You can access the FollowMyHealth Patient Portal offered by [facility] by registering at the following website: [email or web address]. By joining [facility]’s FollowMyHealth portal, you will also be able to view your health information using other applications (apps) compatible with our system.

## 2024-01-26 NOTE — DIETITIAN INITIAL EVALUATION ADULT - PERTINENT MEDS FT
MEDICATIONS  (STANDING):  albuterol/ipratropium for Nebulization 3 milliLiter(s) Nebulizer every 6 hours  celecoxib 200 milliGRAM(s) Oral two times a day  enoxaparin Injectable 40 milliGRAM(s) SubCutaneous every 24 hours  gabapentin 100 milliGRAM(s) Oral three times a day  lactated ringers. 1000 milliLiter(s) (50 mL/Hr) IV Continuous <Continuous>  lidocaine   4% Patch 1 Patch Transdermal daily  morphine ER Tablet 15 milliGRAM(s) Oral two times a day  pantoprazole    Tablet 40 milliGRAM(s) Oral before breakfast  polyethylene glycol 3350 17 Gram(s) Oral daily  senna 1 Tablet(s) Oral at bedtime    MEDICATIONS  (PRN):  acetaminophen     Tablet .. 975 milliGRAM(s) Oral every 6 hours PRN Moderate Pain (4 - 6)  HYDROmorphone   Tablet 4 milliGRAM(s) Oral every 4 hours PRN Moderate Pain (4 - 6)  HYDROmorphone   Tablet 8 milliGRAM(s) Oral every 4 hours PRN Severe Pain (7 - 10)

## 2024-01-26 NOTE — DIETITIAN INITIAL EVALUATION ADULT - ORAL INTAKE PTA/DIET HISTORY
Pt endorses poor appetite PTA due to severe pain over the last 3 months. States that she has been able to complete </= 50% at most meals. Dislikes oral nutrition supplements. No chewing/swallowing issues. NKFA.

## 2024-01-26 NOTE — PROGRESS NOTE ADULT - PROBLEM SELECTOR PLAN 3
-Pain management at [facility]  -Nerve block scheduled and pending  -Switched to home regimen pain control yesterday afternoon without good relief, patient still in significant pain, current dosages of dilaudid po don't help.   -104 MMEs over last 24 hours  -Will reach out to patient's pain management Dr. Billings to discuss possible uptitration  -Started MS contin 15mg BID  -Cont current management iwht new addition MS contin q12h  -Upcoming pain management appointment 1/30/24

## 2024-01-26 NOTE — DIETITIAN INITIAL EVALUATION ADULT - OTHER INFO
57yo female with history of right-sided small cell lung carcinoma (11/23, s/p first chemo last Thursday). She was recently seen in the ED last night for right-sided chest pain attributed to malignancy and confusion. Admitted for acute metabolic encephalopathy and agitation.  Pt now reporting increased effort to eat more, pain better managed last night which allowed her to sleep better. UBW ~120lbs, indicates 17lbs weight loss x 3 months (14% weight change). NFPE with evidence of moderate muscle wasting/fat loss. Reviewed methods to increase protein-calorie intake in setting of low appetite/early satiety with pt and her daughter at bedside (small, frequent, protein-dense meals). Would also suggest MVI po daily to ensure 100% RDA met. +constipation, +ongoing bowel intervention. Encouraged increased oral hydration.

## 2024-01-26 NOTE — DIETITIAN NUTRITION RISK NOTIFICATION - TREATMENT: THE FOLLOWING DIET HAS BEEN RECOMMENDED
Diet, Regular:   Supplement Feeding Modality:  Oral  Ensure Plus High Protein Cans or Servings Per Day:  1       Frequency:  Daily (01-26-24 @ 14:48) [Pending Verification By Attending]  Diet, Regular (01-23-24 @ 18:36) [Active]

## 2024-01-26 NOTE — PROGRESS NOTE ADULT - PROBLEM SELECTOR PROBLEM 1
Non-small cell carcinoma of lung

## 2024-01-26 NOTE — PROGRESS NOTE ADULT - PROBLEM SELECTOR PLAN 5
-s/p recent malignancy dx NSCLC  -F/up HH 10.6/33.6, up trending
-s/p recent malignancy dx NSCLC  -F/up HH 10.6/33.6, up trending

## 2024-01-26 NOTE — PROGRESS NOTE ADULT - PROBLEM SELECTOR PLAN 1
#Small cell lung carcinoma  #neuropathic pain  -Dr Seymour outpatient primary oncologist  -s/p first cycle Alimta/ Carboplatin/Keytruda systemic therapy 1/18/24  -CT: stable 4mm BARI nodule  -Cont pain control w/dilaudid, will be switched to PO  -Nerve block to control pain if needed (r/o infection)  -Heme-Onc reccs appreciated, may need radio-oncology  -Palliative reccs appreciated  -CT stable small right pleural effusion, stable 4mm nodule   -Brain MRI with metastasis. Discussed with patient and family  -Pt scheduled for radiooncology outpatient

## 2024-01-26 NOTE — PROGRESS NOTE ADULT - PROBLEM SELECTOR PLAN 1
NSCLC s/p first cycle of Alimta/Carboplatin/Keytruda systemic therapy 1/18/24. Found to have brain mets-patient aware-advise f/u with radiation oncology. Patient has appt.'s for f/u with medical oncology, radiation oncology and pain management team post discharge.

## 2024-01-26 NOTE — PROGRESS NOTE ADULT - SUBJECTIVE AND OBJECTIVE BOX
Patient is a 58y old  Female who presents with a chief complaint of AMS, fever (23 Jan 2024 12:43). Admitted for acute metabolic encephalopathy      INTERVAL HPI/OVERNIGHT EVENTS: Patient seen and examined at bedside. No overnight events.    MEDICATIONS  (STANDING):  albuterol/ipratropium for Nebulization 3 milliLiter(s) Nebulizer every 6 hours  cefepime   IVPB      cefepime   IVPB 2000 milliGRAM(s) IV Intermittent every 12 hours  celecoxib 200 milliGRAM(s) Oral two times a day  enoxaparin Injectable 40 milliGRAM(s) SubCutaneous every 24 hours  gabapentin 100 milliGRAM(s) Oral three times a day  HYDROmorphone  Injectable 0.5 milliGRAM(s) IV Push once  lactated ringers. 1000 milliLiter(s) (50 mL/Hr) IV Continuous <Continuous>  pantoprazole    Tablet 40 milliGRAM(s) Oral before breakfast  potassium chloride    Tablet ER 20 milliEquivalent(s) Oral every 4 hours  potassium chloride  10 mEq/100 mL IVPB 10 milliEquivalent(s) IV Intermittent every 1 hour    MEDICATIONS  (PRN):  acetaminophen     Tablet .. 650 milliGRAM(s) Oral every 6 hours PRN Temp greater or equal to 38C (100.4F), Mild Pain (1 - 3)  HYDROmorphone  Injectable 1 milliGRAM(s) IV Push every 4 hours PRN Severe Pain (7 - 10)  HYDROmorphone  Injectable 0.5 milliGRAM(s) IV Push every 4 hours PRN Moderate Pain (4 - 6)      Allergies  No Known Allergies  Intolerances      REVIEW OF SYSTEMS:  CONSTITUTIONAL: No fever or chills  HEENT:  No headache, no sore throat  RESPIRATORY: No cough, wheezing, or shortness of breath  CARDIOVASCULAR: No chest pain, palpitations  GASTROINTESTINAL: No abd pain, nausea, vomiting, or diarrhea  GENITOURINARY: No dysuria, frequency, or hematuria  NEUROLOGICAL: no focal weakness or dizziness  MUSCULOSKELETAL: no myalgias       Vital Signs Last 24 Hrs  T(C): 36.9 (26 Jan 2024 05:44), Max: 36.9 (26 Jan 2024 05:44)  T(F): 98.4 (26 Jan 2024 05:44), Max: 98.4 (26 Jan 2024 05:44)  HR: 92 (26 Jan 2024 05:44) (81 - 93)  BP: 102/65 (26 Jan 2024 05:44) (101/68 - 116/80)  BP(mean): --  RR: 16 (26 Jan 2024 05:44) (16 - 16)  SpO2: 98% (26 Jan 2024 05:44) (96% - 98%)    Parameters below as of 26 Jan 2024 05:44  Patient On (Oxygen Delivery Method): room air        PHYSICAL EXAM:  GENERAL: NAD  HEENT:  AT/NC, anicteric, moist mucous membranes, EOMI, no lid-lag, conjunctiva and sclera clear  CHEST/LUNG:  CTA b/l, no rales, wheezes, or rhonchi,  normal respiratory effort, no intercostal retractions  HEART:  RRR, S1, S2, no murmurs; no pitting edema  ABDOMEN:  BS+, soft, nontender, nondistended; No HSM  MSK/EXTREMITIES: 2+ peripheral pulses, no clubbing or cyanosis  NERVOUS SYSTEM: answers questions and follows commands appropriately, A&Ox3 grossly moves all extremities   PSYCH: Appropriate affect, Alert & Awake; Good judgement      LABS: Personally reviewed  Vital Signs Last 24 Hrs  T(C): 36.9 (26 Jan 2024 05:44), Max: 36.9 (26 Jan 2024 05:44)  T(F): 98.4 (26 Jan 2024 05:44), Max: 98.4 (26 Jan 2024 05:44)  HR: 92 (26 Jan 2024 05:44) (81 - 93)  BP: 102/65 (26 Jan 2024 05:44) (101/68 - 116/80)  BP(mean): --  RR: 16 (26 Jan 2024 05:44) (16 - 16)  SpO2: 98% (26 Jan 2024 05:44) (96% - 98%)  Parameters below as of 26 Jan 2024 05:44  Patient On (Oxygen Delivery Method): room air                            RADIOLOGY & ADDITIONAL TESTS: Personally reviewed.   Consultant(s) Notes Reviewed:  [x] YES  [ ] NO   Discussed with CAIT/FER, RN

## 2024-01-26 NOTE — PROGRESS NOTE ADULT - PROBLEM SELECTOR PLAN 2
-Likely 2/2 uncontrolled pain at home  -Now resolved  -Pain control  -Started on MS contin q12h with good pain management  -F/up pain management 1/30/24 appointment

## 2024-01-26 NOTE — DISCHARGE NOTE NURSING/CASE MANAGEMENT/SOCIAL WORK - NSDCPEFALRISK_GEN_ALL_CORE
For information on Fall & Injury Prevention, visit: [email or web address] OR  [email or web address] OR  https://www.cdc.gov/steadi/patient.html

## 2024-01-26 NOTE — DIETITIAN INITIAL EVALUATION ADULT - PERTINENT LABORATORY DATA
01-26    134<L>  |  96  |  18  ----------------------------<  95  3.5   |  27  |  0.78    Ca    10.2      26 Jan 2024 05:59    TPro  7.7  /  Alb  2.3<L>  /  TBili  0.4  /  DBili  x   /  AST  26  /  ALT  27  /  AlkPhos  143<H>  01-26  A1C with Estimated Average Glucose Result: 5.8 % (01-23-24 @ 06:30)

## 2024-01-26 NOTE — PROGRESS NOTE ADULT - SUBJECTIVE AND OBJECTIVE BOX
KAELYN VALENTE   58y   Female    Admitting: BRIAN Rodriguez  HPI:  Patient is a 58-year-old female, diagnosed right-sided non-small cell lung carcinoma 11/2023, underwent first chemo/immunotherapy 1/18/24.  She was recently seen in the ED for right-sided chest pain attributed to malignancy.  Cardiac workup was negative.  Patient became confused post IV morphine, and was admitted with fever. CT head negative. CT angio showed no PE. Patient was seen by pain management, plan is to do MRI of head and spine and nerve block to control pain if needed.    Oncology consulted for lung cancer history.      PAST MEDICAL & SURGICAL HISTORY:  Mass of right lung      COPD (chronic obstructive pulmonary disease)      Smoker      Chronic pain syndrome      HLD (hyperlipidemia)      Melanoma in situ      H/O reduction of orbital fracture      S/P wrist surgery        HEALTH ISSUES - PROBLEM Dx:  Non-small cell carcinoma of lung    History of delirium    Acute metabolic encephalopathy    Cancer-related pain    Encounter for deep vein thrombosis (DVT) prophylaxis    Microcytic anemia      MEDICATIONS  (STANDING):  albuterol/ipratropium for Nebulization 3 milliLiter(s) Nebulizer every 6 hours  celecoxib 200 milliGRAM(s) Oral two times a day  enoxaparin Injectable 40 milliGRAM(s) SubCutaneous every 24 hours  gabapentin 100 milliGRAM(s) Oral three times a day  lactated ringers. 1000 milliLiter(s) (50 mL/Hr) IV Continuous <Continuous>  lidocaine   4% Patch 1 Patch Transdermal daily  morphine ER Tablet 15 milliGRAM(s) Oral two times a day  pantoprazole    Tablet 40 milliGRAM(s) Oral before breakfast  polyethylene glycol 3350 17 Gram(s) Oral daily  senna 1 Tablet(s) Oral at bedtime    MEDICATIONS  (PRN):  acetaminophen     Tablet .. 975 milliGRAM(s) Oral every 6 hours PRN Moderate Pain (4 - 6)  HYDROmorphone   Tablet 8 milliGRAM(s) Oral every 4 hours PRN Severe Pain (7 - 10)  HYDROmorphone   Tablet 4 milliGRAM(s) Oral every 4 hours PRN Moderate Pain (4 - 6)    Allergies    No Known Allergies    INTERVAL HPI/OVERNIGHT EVENTS:  Patient S&E at bedside. No current c/o CP or SOB, or H/A.    VITAL SIGNS:  T(F): 98.4 (01-26-24 @ 05:44)  HR: 92 (01-26-24 @ 05:44)  BP: 102/65 (01-26-24 @ 05:44)  RR: 16 (01-26-24 @ 05:44)  SpO2: 98% (01-26-24 @ 05:44)      PHYSICAL EXAM:  Constitutional: NAD  Eyes: sclera non-icteric  Neck: no JVD  Respiratory: decreased BS ant.  Cardiovascular: RRR, no M/R/G  Gastrointestinal: soft, NTND, no masses palpable  Extremities: no calf tenderness  Neurological: Awake, alert.    Labs:             10.1   3.43  )-----------( 253      ( 01-26 @ 05:59 )             32.5                10.7   3.60  )-----------( 324      ( 01-25 @ 07:40 )             33.8                10.6   5.00  )-----------( 308      ( 01-24 @ 07:02 )             33.6       01-26    134<L>  |  96  |  18  ----------------------------<  95  3.5   |  27  |  0.78    Ca    10.2      26 Jan 2024 05:59    TPro  7.7  /  Alb  2.3<L>  /  TBili  0.4  /  DBili  x   /  AST  26  /  ALT  27  /  AlkPhos  143<H>  01-26    Consultant notes reviewed : YES [x ] ; NO [ ]

## 2024-01-26 NOTE — DISCHARGE NOTE NURSING/CASE MANAGEMENT/SOCIAL WORK - NSDCVIVACCINE_GEN_ALL_CORE_FT
Tdap; 02-Sep-2023 21:06; Leticia Henry (RN); [employer]; [id number] (Exp. Date: 06-Jun-2025); IntraMuscular; Deltoid Left.; 0.5 milliLiter(s); VIS (VIS Published: 09-May-2013, VIS Presented: 02-Sep-2023);

## 2024-01-30 PROBLEM — R07.9 CHEST PAIN, UNSPECIFIED TYPE: Status: ACTIVE | Noted: 2023-01-01

## 2024-01-30 PROBLEM — R07.9 LEFT-SIDED CHEST PAIN: Status: ACTIVE | Noted: 2022-01-27

## 2024-01-30 PROBLEM — M25.511 RIGHT SHOULDER PAIN: Status: ACTIVE | Noted: 2023-01-01

## 2024-01-30 PROBLEM — R07.81 RIB PAIN ON LEFT SIDE: Status: ACTIVE | Noted: 2022-02-03

## 2024-01-30 PROBLEM — R91.1 APICAL LUNG NODULE: Status: ACTIVE | Noted: 2023-01-01

## 2024-01-30 PROBLEM — E55.9 VITAMIN D DEFICIENCY: Status: ACTIVE | Noted: 2018-08-23

## 2024-02-01 PROBLEM — D03.9 MELANOMA IN SITU: Status: ACTIVE | Noted: 2021-09-29

## 2024-02-01 PROBLEM — C79.31 MALIGNANT NEOPLASM METASTATIC TO BRAIN: Status: ACTIVE | Noted: 2024-01-01

## 2024-02-01 PROBLEM — J44.9 COPD, SEVERE: Status: ACTIVE | Noted: 2022-03-25

## 2024-02-01 PROBLEM — F41.8 SITUATIONAL ANXIETY: Status: ACTIVE | Noted: 2024-01-01

## 2024-02-01 NOTE — PHYSICAL EXAM
[Thin] : thin [Normal] : supple with no thyromegaly or masses appreciated [] : no respiratory distress [No Focal Deficits] : no focal deficits [Oriented To Time, Place, And Person] : oriented to person, place, and time

## 2024-02-01 NOTE — VITALS
[Maximal Pain Intensity: 7/10] : 7/10 [Pain Location: ___] : Pain Location: [unfilled] [80: Normal activity with effort; some signs or symptoms of disease.] : 80: Normal activity with effort; some signs or symptoms of disease.

## 2024-02-01 NOTE — HISTORY OF PRESENT ILLNESS
[Back Pain] : back pain [FreeTextEntry1] : 58F with right posterior thoracic mass causing right thoracic wall pain presenting for follow-up.  Since her last visit she was admitted to the [place] ED for acute metabolic encephalopathy. At the time she received an MRI which revealed T2-3 and T3-4 foraminal compression from her mass.  She will be starting 5 sessions of radiation for her cancer soon. She was to obtain a mapping today but she was claustrophobic and was not able to start her first session and her PCP has since prescribed her Xanax to allow her to proceed with the sessions.  She continues to have right thoracic mid back pain with no associated numbness or tingling. She is currently taking: MS Contin 15mg twice daily - given to her on discharge from the ED Hydromorphone 8mg q4h Meloxicam 15mg  Gabapentin 100mg TID  She is not taking blood thinners. She has been having some constipation but she states her pain has been preventing her from eating.  She is taking Dulcolax and MiraLAX for her constipation. She reports that she has always had issues with her sleep - and she continues to not sleep through the night. She tries not to take her hydromorphone during the night but she does continue to have pain at night for which she takes Tylenol. She does not feel that she is having excessive somnolence.

## 2024-02-01 NOTE — PHYSICAL EXAM
[No Acute Distress] : no acute distress [Normal] : normal gait, coordination grossly intact, no focal deficits and deep tendon reflexes were 2+ and symmetric [07262 - High Complexity requires an extensive number of possible diagnoses and/or the management options, extensive complexity of the medical data (tests, etc.) to be reviewed, and a high risk of significant complications, morbidity, and/or mortality as w] : High Complexity

## 2024-02-01 NOTE — ASSESSMENT
[FreeTextEntry1] : 58F with right posterior thoracic mass causing right thoracic wall pain presenting for followup. Since her last visit she received an MRI which revealed T2-3 and T3-4 spread and foraminal compression from her mass. She is scheduled to start chemo- and radiation therapy. She is not taking blood thinners at this time. She has some trouble sleeping and takes her hydromorphone around the clock.  - will present her case at tumor board to discuss presence of involvement of tumor dorsal to the spine and discuss a potential safe interlaminar approach for thoracic epidural injection  - continue meloxicam 15 mg daily - continue hydromorphone 4-8 mg q4h - increase MS contin to 15mg q8h - increase gabapentin to 300mg q8h - continue dulcolax and miralax for bowel prophylaxis

## 2024-02-01 NOTE — REASON FOR VISIT
[Follow-Up Visit] : a follow-up pain management visit [Family Member] : family member [FreeTextEntry2] : Thoracic pain.

## 2024-02-01 NOTE — REVIEW OF SYSTEMS
[Recent Change In Weight] : ~T recent weight change [Constipation] : constipation [Negative] : Endocrine [FreeTextEntry9] : RIGHT scapular pain

## 2024-02-01 NOTE — PHYSICAL EXAM
[Normal muscle bulk without asymmetry] : normal muscle bulk without asymmetry [Normal] : Normal affect [de-identified] : No evidence of jaundice. [de-identified] : Cranial nerves grossly intact.

## 2024-02-01 NOTE — HISTORY OF PRESENT ILLNESS
[Post-hospitalization from ___ Hospital] : Post-hospitalization from [unfilled] Hospital [Admitted on: ___] : The patient was admitted on [unfilled] [Discharged on ___] : discharged on [unfilled] [Patient Contacted By: ____] : and contacted by [unfilled] [Discharge Summary] : discharge summary [Radiology Findings] : radiology findings [Pertinent Labs] : pertinent labs [Discharge Med List] : discharge medication list [Med Reconciliation] : medication reconciliation has been completed [FreeTextEntry2] : Discharge Date 26-Jan-2024 Admission Date 22-Jan-2024 13:03 Reason for Admission AMS, fever  57yo F presented with her daughter for HFU with history of right-sided small cell lung carcinoma (on 11/23, underwent first chemo and radiation 01/18/24), recently seen in the ED last night for right-sided chest pain attributed to malignancy.  Cardiac workup was negative.  Patient presents confused, disoriented and "making no sense" since dose of 1mg IV morphine last night. In ED, initial VS was normal in am, however later temp 102, wbc 13, RVP neg. CT head negative. CT angiogram showed no PE. lung ca+. Admitted for acute metabolic encephalopathy, malignancy-induced pain management and sepsis work up for spike of fever. Placed on empiric antibiotics. MRI of head and thoracic spine requested (planned for nerve block to control pain if needed at [facility] with pain management provider). ID evaluation requested, sepsis workup negative, limiting antibiotic use for 3-5 days. Psychiatry evaluation with no contraindications for discharge. Mental status returned to baseline. Brain MRI results revealed brain metastasis.  Thoracic spine MRI showing 9.3 cm right lung apex mass with infiltration of the surrounding structures, slight extension into the spinal canal at T3 level with only minimal thecal sac narrowing. The cord is normal in size and signal. Pain management titration reviewed and modified with guidance of outpatient med provider with notable relief from admission. Bowel regimen reinforced. Today pt AAO x3 , pain is better controlled still not perfect - this afternoon Pain Managment eval, She deneis ,SOB,abd pain, She has all appt scheduled reL chemo, radiation, pt  feels very nervous reL her test - Xanax RX probided, Pt has strong Fx supoport

## 2024-02-01 NOTE — HEALTH RISK ASSESSMENT
[No] : No [No falls in past year] : Patient reported no falls in the past year [0] : 1) Little interest or pleasure doing things: Not at all (0) [1] : 2) Feeling down, depressed, or hopeless for several days (1) [PHQ-2 Negative - No further assessment needed] : PHQ-2 Negative - No further assessment needed [de-identified] : none due to pain  [de-identified] : regural [de-identified] : very Anxius  [[id number]] : 0 [Change in mental status noted] : No change in mental status noted [Language] : denies difficulty with language [With Family] : lives with family [] :  [MammogramDate] : 07/21 [ColonoscopyDate] : 02./15 [With Patient/Caregiver] : , with patient/caregiver [AdvancecareDate] : 01/24

## 2024-02-01 NOTE — ADDENDUM
[FreeTextEntry1] : I interviewed and examined the patient with the fellow.  I agree with the findings and the plan of care as in the note above. Francisco J Billings MD, MA

## 2024-02-01 NOTE — HISTORY OF PRESENT ILLNESS
[FreeTextEntry1] : Ms. KAELYN VALENTE, 58 year old female, former smoker (quit in 11/2023), w/ hx of COPD, chronic pain syndrome, HLD, Melanoma in situ, and recently diagnosed metastatic lung cancer with metastasis to the brain and 3rd rib, [id number], Stage IVB, PDL1 20%. Patient presents to discuss radiation therapy. OF note patient is extremely claustrophobic.  Brief Oncological History: There is a history of a CT chest on 02/03/2022 showing a 9 mm right upper lobe nodule (2, 22), 4 mm right upper lobe nodule (2, 51), 3 mm left upper lobe nodule (2, 29), 2 mm left lower lobe nodule (2, 87). Scattered small groundglass nodules, measuring up to 1.1 cm in the right upper lobe (2, 22).  Followup PET/CT on 03/23/2022 showed mild FDG avid posterior aspect right upper lobe pulmonary nodule (SUV 1.5, 1.0 cm, series 3 image 62). Malignancy cannot be excluded. There is a left posterior medial eighth rib fracture (SUV 2.9, series 3 image 105).  Patient was consulted with Dr. Campos (IR) for CT guided biopsy but the right apical nodule was noted to be unchanged in size & shape & is therefore is benign, biopsy is not indicated at this time compared to CT 2/2015. Was suggested followup surveillance.  Patient then presented to ED. CTA on 11/28/2023 showing a lobulated irregular mass in the posterior right upper lobe measuring 7.7 x 4.8 x 6.5 cm corresponding to 8 mm pulmonary nodule seen on CT of February 3, 2023. There is lysis of the right posterior third rib. Redemonstration of mild to moderate centrilobular emphysema. Redemonstration of a 5 mm spiculated groundglass nodule in the lateral left upper lobe. In the right lower outer breast, there is a 9 mm nodule for which correlation with breast examination and/or recent mammography is recommended.  CT guided biopsy of RUL nodule on 12/13/2023. Path revealed positive for malignant cell. Poorly differentiated adenocarcinoma. P40 immunostudy is negative. TTF1 immunostain is inconclusive (rare cells, weak reactivity). Molecular studies in progress; addendum to follow. PD-L1 20%.  Patient then had a PET CT intensely FDG-avid LEFT UPPER lobe lung mass invading centrally to the mediastinum and laterally displacing the esophagus. This is eroding into the posterior RIGHT apical chest wall with destruction of the posterior RIGHT third rib and medial portion of the T4 vertebral body and this is invading the RIGHT lung pleural surface and measures approximately 10.4 x 5.3 cm in largest contiguous axial dimension with SUV 45.0 (image 58). Max 10.6 cm in dimension in craniocaudal extent on coronal images. Lytic destructive RIGHT third rib and T4 vertebral changes.   Established care with Dr. Seymour started systemic therapy on 1/18/2024) chemoradiotherapy (pemetrexed and carboplatin AUC 5) + immunotherapy with pembrolizumab, followed by pembrolizumab maintenance) Reports taken to the ER  for "I was discombobulated" admits to utilizing prescribed opioid medication for pain in the right scapular area and thinks this may have been a possible side effect. MRI Brain showed: Numerous enhancing intracranial lesions, largest in the left frontal periventricular region measuring up to 1.3 cm, compatible with metastatic disease.  2/1/2024 Patient here for consultation to discuss the role of CNS radiation in lieu of recent finding on cranial images. Seen by Dr Prasad with recommendations for 20Gy/5 fractions for palliation of the R Lung and chest wall pain. Reports persistent pain right scapular which is non radiating.  Denies N/V, HA/unilateral extremity weakness/memory changes/gait disturbance/bowel/bladder dysfunction or other neurologic symptoms. No issues with speech or comprehension.

## 2024-02-06 NOTE — HISTORY OF PRESENT ILLNESS
[FreeTextEntry1] : Ms. KAELYN VALENTE, 58 year old female, former smoker (quit in 11/2023), w/ hx of COPD, xhronic pain syndrome, HLD, Melanoma in situ, and recently diagnosed metastatic lung cancer with metastasis to the brain and 3rd rib, [id number], Stage IVB, PDL1 20%. Patient presents to discuss radiation therapy.  Brief Oncological History: There is a history of a CT chest on 02/03/2022 showing a 9 mm right upper lobe nodule (2, 22), 4 mm right upper lobe nodule (2, 51), 3 mm left upper lobe nodule (2, 29), 2 mm left lower lobe nodule (2, 87). Scattered small groundglass nodules, measuring up to 1.1 cm in the right upper lobe (2, 22).  Followup PET/CT on 03/23/2022 showed mild FDG avid posterior aspect right upper lobe pulmonary nodule (SUV 1.5, 1.0 cm, series 3 image 62). Malignancy cannot be excluded. There is a left posterior medial eighth rib fracture (SUV 2.9, series 3 image 105).  Patient was consulted with Dr. Campos (IR) for CT guided biopsy but the right apical nodule was noted to be unchanged in size & shape & is therefore is benign, biopsy is not indicated at this time compared to CT 2/2015. Was suggested followup surveillance.  Patient then presented to ED. CTA on 11/28/2023 showing a lobulated irregular mass in the posterior right upper lobe measuring 7.7 x 4.8 x 6.5 cm corresponding to 8 mm pulmonary nodule seen on CT of February 3, 2023. There is lysis of the right posterior third rib. Redemonstration of mild to moderate centrilobular emphysema. Redemonstration of a 5 mm spiculated groundglass nodule in the lateral left upper lobe. In the right lower outer breast, there is a 9 mm nodule for which correlation with breast examination and/or recent mammography is recommended.  CT guided biopsy of RUL nodule on 12/13/2023. Path revealed positive for malignant cell. Poorly differentiated adenocarcinoma. P40 immunostudy is negative. TTF1 immunostain is inconclusive (rare cells, weak reactivity). Molecular studies in progress; addendum to follow. PD-L1 20%.  Patient then had a PET CT intensely FDG-avid LEFT UPPER lobe lung mass invading centrally to the mediastinum and laterally displacing the esophagus. This is eroding into the posterior RIGHT apical chest wall with destruction of the posterior RIGHT third rib and medial portion of the T4 vertebral body and this is invading the RIGHT lung pleural surface and measures approximately 10.4 x 5.3 cm in largest contiguous axial dimension with SUV 45.0 (image 58). Max 10.6 cm in dimension in craniocaudal extent on coronal images. Lytic destructive RIGHT third rib and T4 vertebral changes.   Patient started systemic therapy on 1/18/2023 with Dr. Seymour. Subequently next day oatient recently hospitalized for AMS, MRI Brain showed: Numerous enhancing intracranial lesions, largest in the left frontal periventricular region measuring up to 1.3 cm, compatible with metastatic disease.  1/29/24 - Patient here for consultation. Very anxious today. She is s/p hospitalization with Brain MRI showing multiple brain mets. She notes pain in the right upper back radiating across the chest.

## 2024-02-06 NOTE — VITALS
[FreeTextEntry7] : High anxiety but did not want social work referral for now.  Spironolactone Counseling: Patient advised regarding risks of diarrhea, abdominal pain, hyperkalemia, birth defects (for female patients), liver toxicity and renal toxicity. The patient may need blood work to monitor liver and kidney function and potassium levels while on therapy. The patient verbalized understanding of the proper use and possible adverse effects of spironolactone.  All of the patient's questions and concerns were addressed.

## 2024-02-07 NOTE — HISTORY OF PRESENT ILLNESS
[de-identified] : 58F, postmenopausal, current smoker, PMHx HLD, melanoma, COPD, referred for medical oncology consultation of right lung cancer.  CASE SYNOPSIS: 2/03/2022 CT chest  : - 9 mm right upper lobe nodule (2, 22). 4 mm right upper lobe nodule (2, 51). 3 mm left upper lobe nodule (2, 29). 2 mm left lower lobe nodule (2, 87). Scattered small groundglass nodules, measuring up to 1.1 cm in the right upper lobe (2, 22). - No displaced fracture. No suspicious osseous lesion. Minimal angulation at the anterior aspects of the left second and third ribs, could reflect age-indeterminate nondisplaced fractures (example 2, 34 and 2, 45). 3/23/2022 PET/CT ([facility]): There is FDG avid right apical pulmonary nodule.  Findings may be inflammatory vs malignant.   CT follow-up versus biopsy recommended as clinically indicated.  There is mildly FDG avid left rib fracture.  03/23/2022 PET/CT  ([facility]) : FDG avid posterior aspect right upper lobe pulmonary nodule (SUV 1.5, 1.0 cm, series 3 image 62). Malignancy cannot be excluded. Left posterior medial eighth rib fracture (SUV 2.9, series 3 image 105). Consulted by IR (Dr. Campos) for CT guided biopsy. "When compared to 2/2015 chest CT the right apical nodule is unchanged in size & shape &  therefore is benign, biopsy is not indicated at this time. Given emphysematous changes identified on 3/2022 PET/CT, I have recommended consistent follow ups & serial imaging surveillance with Dr Robles".  11/28/2023-presents to ED at [facility] with a right upper back pain for right upper back pain 11/28/2023: CT angiogram There is a lobulated irregular mass in the posterior right upper lobe measuring 7.7 x 4.8 x 6.5 cm corresponding to 8 cm pulmonary nodule seen on CT of February 3, 2022. There is lysis of the right posterior third rib. Redemonstration of mild to moderate centrilobular emphysema. Redemonstration of a 5 mm spiculated groundglass nodule in the lateral left upper lobe. Right lower outer breast 9 mm nodule for which correlation with breast examination and/or recent mammography is recommended.  12/13/2023 FNA right upper lobe lung CT-guided core biopsy: Poorly differentiated adenocarcinoma, PD-L1 TPS 20%  [FreeTextEntry1] : pending histology [de-identified] : Last seen on December 5th, prior to an established diagnosis of lung cancer.  Her pain is under better control since she was evaluated by pain management; present pain regimen includes gabapentin, hydromorphone and meloxicam.  On 12/13/2023 patient underwent RUL lung biopsy; pathology consistent with poorly differentiated adenocarcinoma, PD-L1 TPS 20%.  Patient made aware today of diagnosis of malignancy and the need to complete staging workup, currently delayed due to change in insurance.  Patient is also contemplating temporary disability.  Here accompanied by her .

## 2024-02-07 NOTE — ASSESSMENT
[FreeTextEntry1] : Patient with 6 metastatic lesions from NSCLC. Currently on immunotherapy for NSCLC. SCheduled for THoracic Epidural block. Planned for single fraction Gamma Knife radiosurgery next week.  Risk vs benefit of GK SRS discussed. She prefers to have mask based GK with oral sedation during the procedure.

## 2024-02-14 NOTE — REVIEW OF SYSTEMS
[Fatigue] : fatigue [Recent Change In Weight] : ~T recent weight change [Cough] : cough [SOB on Exertion] : shortness of breath during exertion [Diarrhea: Grade 0] : Diarrhea: Grade 0 [Negative] : Psychiatric

## 2024-02-15 NOTE — DISEASE MANAGEMENT
[TTNM] : x [NTNM] : x [MTNM] : 1 [de-identified] : 8473 [de-identified] : 2000 cGy [de-identified] : Right Lung- Palliative

## 2024-02-15 NOTE — HISTORY OF PRESENT ILLNESS
[de-identified] : 58F, postmenopausal, current smoker, PMHx HLD, melanoma, COPD, referred for medical oncology consultation of right lung cancer.  CASE SYNOPSIS: 2/03/2022 CT chest  : - 9 mm right upper lobe nodule (2, 22). 4 mm right upper lobe nodule (2, 51). 3 mm left upper lobe nodule (2, 29). 2 mm left lower lobe nodule (2, 87). Scattered small groundglass nodules, measuring up to 1.1 cm in the right upper lobe (2, 22). - No displaced fracture. No suspicious osseous lesion. Minimal angulation at the anterior aspects of the left second and third ribs, could reflect age-indeterminate nondisplaced fractures (example 2, 34 and 2, 45). 3/23/2022 PET/CT ([facility]): There is FDG avid right apical pulmonary nodule.  Findings may be inflammatory vs malignant.   CT follow-up versus biopsy recommended as clinically indicated.  There is mildly FDG avid left rib fracture.  03/23/2022 PET/CT  ([facility]) : FDG avid posterior aspect right upper lobe pulmonary nodule (SUV 1.5, 1.0 cm, series 3 image 62). Malignancy cannot be excluded. Left posterior medial eighth rib fracture (SUV 2.9, series 3 image 105). Consulted by IR (Dr. Campos) for CT guided biopsy. "When compared to 2/2015 chest CT the right apical nodule is unchanged in size & shape &  therefore is benign, biopsy is not indicated at this time. Given emphysematous changes identified on 3/2022 PET/CT, I have recommended consistent follow ups & serial imaging surveillance with Dr Robles".  11/28/2023-presents to ED at [facility] with a right upper back pain for right upper back pain 11/28/2023: CT angiogram There is a lobulated irregular mass in the posterior right upper lobe measuring 7.7 x 4.8 x 6.5 cm corresponding to 8 cm pulmonary nodule seen on CT of February 3, 2022. There is lysis of the right posterior third rib. Redemonstration of mild to moderate centrilobular emphysema. Redemonstration of a 5 mm spiculated groundglass nodule in the lateral left upper lobe. Right lower outer breast 9 mm nodule for which correlation with breast examination and/or recent mammography is recommended.  12/13/2023 FNA right upper lobe lung CT-guided core biopsy: Poorly differentiated adenocarcinoma, PD-L1 TPS 20%  1/16/2024 PET - IMPRESSION:  1.  Erosive locally invasive RIGHT apical pulmonary mass has enlarged since 11/2023 with increasing osseous destruction and invasion into the central mediastinum. Separate mild-moderate focus at the RIGHT hilum where additional malignancy is not excluded. The subcarinal region and LEFT hilum/lung fields are unremarkable.  2.  Worsening RIGHT lung pleural involvement, with uptake seen along the pleural surface and a pleural based mass posterolaterally.  3.  Increasing RIGHT pleural effusion.  4.  Diffuse, mild marrow uptake in the spine and pelvis. This is nonspecific. Consider MRI if there is suspicion for pathology.  5.  There is displacement of the esophagus to the LEFT; correlate for history of dysphasia localizing to the superior thoracic esophagus.   1/18/2024-pemetrexed/carboplatin/pembrolizumab cycle #1   1/22 - 1/26/2024 hospitalized at [facility] with altered mentation and fever.   1/22/2024 CT chest angiogram: No pulmonary embolism.  Locally invasive right apical mass into the adjacent bone and mediastinum with pleural metastases unchanged from PET 1/16/2024 but progressed since 11/28/2023.  Stable small right pleural effusion.  Emphysema.  Stable 4 mm left upper lobe pulmonary nodule.   1/24/2024 MRI brain: Numerous enhancing intracranial lesions, largest in the left frontal periventricular region measuring up to 1.3 cm compatible with metastatic disease.   1/31/2024 [facility]: No reportable alteration in the 7 disease relevant genes: ALK, BRAF, EGFR, ERBB2, MET, RET, ROS1.  Findings: KRAS G12C, MYC amplification.  [FreeTextEntry1] : pending histology [de-identified] : Patient returning for cycle #2 systemic chemotherapy; had first cycle of pemetrexed/carboplatin/pembrolizumab on 1/18/2024; was subsequently hospitalized at [facility] between 1/22 - 1/26/2024 with altered mentation right scapular pain, and fever. Prior to hospitalization, patient completed metastatic workup with a PET/CT on 1/16/2024 which showed progression of erosive, locally invasive right apical pulmonary mass and large since November 2023, with evident right lung pleural involvement and pleural effusion. CT chest angiogram from 1/22/2024 showed no pulmonary embolism.  Locally invasive right apical mass into the adjacent bone and mediastinum with pleural metastases unchanged from PET 1/16/2024 but progressed since 11/28/2023.  Stable small right pleural effusion.  Emphysema.  Stable 4 mm left upper lobe pulmonary nodule.  In interim patient was evaluated by pain management, who prescribed gabapentin, hydromorphone and morphine sulfate ER 15 mg BID.   On 1/24/2024 an MRI of the brain showed numerous enhancing intracranial lesions, largest in the left frontal periventricular region measuring up to 1.3 cm compatible with metastatic disease. States her pain is under better control. Gained a few pounds and is feeling a lot more energetic. Here accompanied by her daughter, Araceli.

## 2024-02-15 NOTE — HISTORY OF PRESENT ILLNESS
[FreeTextEntry1] : Ms. KAELYN VALENTE, 58 year old female, former smoker (quit in 11/2023), w/ hx of COPD, chronic pain syndrome, HLD, Melanoma in situ, and recently diagnosed metastatic lung cancer with metastasis to the brain and 3rd rib, [id number], Stage IVB, PDL1 20%.  Recommended 20Gy/5 fractions for palliation of the R Lung/T-spine and chest wall pain.  She has severe upper back pain radiating to her right posterior chest.  She started on systemic therapy on 1/18/24. Awaiting Brain mets treatment plan for frame based GK SRS with MRI under sedation.  2/16/2024 She presents for on treatment visit. Completed 4/5 fractions to the Right lung.

## 2024-02-22 NOTE — H&P PST ADULT - CARDIOVASCULAR COMMENTS
right posterior back pain radiates ti right anterior chest from tumor pain, reports occasional ankle swelling

## 2024-02-22 NOTE — H&P PST ADULT - NSICDXPASTMEDICALHX_GEN_ALL_CORE_FT
PAST MEDICAL HISTORY:  Chronic pain syndrome     COPD (chronic obstructive pulmonary disease)     History of blood transfusion     HLD (hyperlipidemia)     Malignant neoplasm of right bronchus     Mass of right lung     Melanoma in situ     Smoker

## 2024-02-22 NOTE — H&P PST ADULT - FUNCTIONAL STATUS
DASI score: 9.89 Denies chest pain, dyspnea, palpitations, syncope, orthopnea or claudication/4-10 METS

## 2024-02-22 NOTE — H&P PST ADULT - PROBLEM SELECTOR PLAN 1
Patient scheduled for surgery on: 2/23/24  Provided with verbal and written presurgical instructions  Verbalized understanding  with teach back on the following: Famotidine for GI prophylaxis and chlorhexidine wash    Lab specimen drawn at PST today: cbc, bmp, pt/ptt/inr (case discussed with Dr Ospina, labs indicated for procedure)

## 2024-02-22 NOTE — H&P PST ADULT - MUSCULOSKELETAL
reduced ROM upper ext due to pain/decreased ROM due to pain/normal gait/strength 5/5 bilateral upper extremities/strength 5/5 bilateral lower extremities details…

## 2024-02-22 NOTE — H&P PST ADULT - NEGATIVE GENERAL SYMPTOMS
no fever/no chills/no sweating Unna Boot Text: An Unna boot was placed to help immobilize the limb and facilitate more rapid healing.

## 2024-02-22 NOTE — H&P PST ADULT - HISTORY OF PRESENT ILLNESS
58 yr old female presents for pst evaluation for scheduled thoracic epidural, patient reports she was diagnosed with lung cancer 11/2023, s/p 2 cycles of chemo (last chemo 2/15/24) and radiation therapy (5 sessions, last session 2/21/24), reports lung tumor resting on rib and nerve, describes pain is between 6-8 , begins upper right back radiates to right anterior chest

## 2024-02-22 NOTE — H&P PST ADULT - NSICDXFAMILYHX_GEN_ALL_CORE_FT
FAMILY HISTORY:  Father  Still living? Unknown  FH: heart attack, Age at diagnosis: Age Unknown    Mother  Still living? Unknown  FH: heart attack, Age at diagnosis: Age Unknown    Sibling  Still living? Yes, Estimated age: Age Unknown  FH: lung cancer, Age at diagnosis: Age Unknown    Grandparent  Still living? No  FH: stomach cancer, Age at diagnosis: Age Unknown

## 2024-02-23 NOTE — PROCEDURE NOTE - ADDITIONAL PROCEDURE DETAILS
The patient was brought to the OR after written, informed consent was obtained.  She was placed prone on the OR table and timeout was completed.  Monitored anesthesia care was provided throughout the procedure by the anesthesia team.  Prior to instillation of several cc of 1% lidocaine for local anesthesia, the T3-4 interspace was identified by fluoroscopy and approached in a right paramedian fashion.  Using a loss of resistance technique, the epidural space accessed without difficulty by a right paramedian approach.  Correct placement of the 18-guage Tuohy needle was confirmed by an injection of Omnipaque dye in both the AP and lateral projections.  The dye was noted to spread several levels cephalad as confirmed by fluoroscopy.  After negative aspiration of any CSF, the patient was then given a total of 2.5 cc of a mixture of 3 cc of 0.25% bupivacaine plus 40 mg of depo-medrol to provide anatomic spread and coverage of the involved area.  The needle was flushed and removed without difficulty.    The patient tolerated the procedure well and was transported to the recovery area in stable condition.  She will follow up with us in clinic in several weeks' time to assess relief subsequent to the procedure.

## 2024-02-23 NOTE — ASU DISCHARGE PLAN (ADULT/PEDIATRIC) - NS MD DC FALL RISK RISK
For information on Fall & Injury Prevention, visit: [email or web address] OR  [email or web address] OR  https://www.cdc.gov/steadi/patient.html

## 2024-02-23 NOTE — PROCEDURE NOTE - NSICDXPROCEDURE_GEN_ALL_CORE_FT
PROCEDURES:  Injection, steroid, spine, thoracic, epidural 23-Feb-2024 13:17:56  Francisco J Billings

## 2024-02-23 NOTE — ASU PREOPERATIVE ASSESSMENT, ADULT ([facility] ONLY) - FALL HARM RISK - UNIVERSAL INTERVENTIONS
Bed in lowest position, wheels locked, appropriate side rails in place/Call bell, personal items and telephone in reach/Instruct patient to call for assistance before getting out of bed or chair/Non-slip footwear when patient is out of bed/[place] to call system/Physically safe environment - no spills, clutter or unnecessary equipment/Purposeful Proactive Rounding/Room/bathroom lighting operational, light cord in reach

## 2024-02-23 NOTE — ASU DISCHARGE PLAN (ADULT/PEDIATRIC) - CALL YOUR DOCTOR IF YOU HAVE ANY OF THE FOLLOWING:
Bleeding that does not stop/Swelling that gets worse/Fever greater than (need to indicate Fahrenheit or Celsius)/Wound/Surgical Site with redness, or foul smelling discharge or pus Bleeding that does not stop/Swelling that gets worse/Fever greater than (need to indicate Fahrenheit or Celsius)/Wound/Surgical Site with redness, or foul smelling discharge or pus/Inability to tolerate liquids or foods

## 2024-02-26 PROBLEM — C34.91 MALIGNANT NEOPLASM OF UNSPECIFIED PART OF RIGHT BRONCHUS OR LUNG: Chronic | Status: ACTIVE | Noted: 2024-01-01

## 2024-02-26 PROBLEM — Z92.89 PERSONAL HISTORY OF OTHER MEDICAL TREATMENT: Chronic | Status: ACTIVE | Noted: 2024-01-01

## 2024-02-29 NOTE — HISTORY OF PRESENT ILLNESS
[Family Member] : family member [FreeTextEntry1] : cc: f/u lung mass, pain management , Insomnia  [de-identified] : Patient came to f/u on her Insomnia , improved, She is Dng with lung Ca, on Rad. Her pain is managed better. She denies CP,SOB,abd pain

## 2024-02-29 NOTE — PHYSICAL EXAM
[No Acute Distress] : no acute distress [Clear to Auscultation] : lungs were clear to auscultation bilaterally [No Respiratory Distress] : no respiratory distress  [No Accessory Muscle Use] : no accessory muscle use [Regular Rhythm] : with a regular rhythm [Normal Rate] : normal rate  [No Edema] : there was no peripheral edema [Normal S1, S2] : normal S1 and S2 [Soft] : abdomen soft [No Masses] : no abdominal mass palpated [Non-distended] : non-distended [No HSM] : no HSM [No Joint Swelling] : no joint swelling [Alert and Oriented x3] : oriented to person, place, and time [Normal Gait] : normal gait [de-identified] : more comfortablr

## 2024-02-29 NOTE — REVIEW OF SYSTEMS
[Wheezing] : no wheezing [Shortness Of Breath] : no shortness of breath [Cough] : no cough [Dyspnea on Exertion] : no dyspnea on exertion [FreeTextEntry6] : chest wall discomfort  [Negative] : Psychiatric

## 2024-02-29 NOTE — HEALTH RISK ASSESSMENT
[No falls in past year] : Patient reported no falls in the past year [0] : 2) Feeling down, depressed, or hopeless: Not at all (0) [[id number]] : 0

## 2024-03-07 NOTE — HISTORY OF PRESENT ILLNESS
[de-identified] : 58F, postmenopausal, current smoker, PMHx HLD, melanoma, COPD, referred for medical oncology consultation of right lung cancer.  CASE SYNOPSIS: 2/03/2022 CT chest  : - 9 mm right upper lobe nodule (2, 22). 4 mm right upper lobe nodule (2, 51). 3 mm left upper lobe nodule (2, 29). 2 mm left lower lobe nodule (2, 87). Scattered small groundglass nodules, measuring up to 1.1 cm in the right upper lobe (2, 22). - No displaced fracture. No suspicious osseous lesion. Minimal angulation at the anterior aspects of the left second and third ribs, could reflect age-indeterminate nondisplaced fractures (example 2, 34 and 2, 45). 3/23/2022 PET/CT ([facility]): There is FDG avid right apical pulmonary nodule.  Findings may be inflammatory vs malignant.   CT follow-up versus biopsy recommended as clinically indicated.  There is mildly FDG avid left rib fracture.  03/23/2022 PET/CT  ([facility]) : FDG avid posterior aspect right upper lobe pulmonary nodule (SUV 1.5, 1.0 cm, series 3 image 62). Malignancy cannot be excluded. Left posterior medial eighth rib fracture (SUV 2.9, series 3 image 105). Consulted by IR (Dr. Campos) for CT guided biopsy. "When compared to 2/2015 chest CT the right apical nodule is unchanged in size & shape &  therefore is benign, biopsy is not indicated at this time. Given emphysematous changes identified on 3/2022 PET/CT, I have recommended consistent follow ups & serial imaging surveillance with Dr Robles".  11/28/2023-presents to ED at [facility] with a right upper back pain for right upper back pain 11/28/2023: CT angiogram There is a lobulated irregular mass in the posterior right upper lobe measuring 7.7 x 4.8 x 6.5 cm corresponding to 8 cm pulmonary nodule seen on CT of February 3, 2022. There is lysis of the right posterior third rib. Redemonstration of mild to moderate centrilobular emphysema. Redemonstration of a 5 mm spiculated groundglass nodule in the lateral left upper lobe. Right lower outer breast 9 mm nodule for which correlation with breast examination and/or recent mammography is recommended.  12/13/2023 [facility] right upper lobe lung CT-guided core biopsy: Poorly differentiated adenocarcinoma, PD-L1 TPS 20%  1/16/2024 PET - IMPRESSION:  1.  Erosive locally invasive RIGHT apical pulmonary mass has enlarged since 11/2023 with increasing osseous destruction and invasion into the central mediastinum. Separate mild-moderate focus at the RIGHT hilum where additional malignancy is not excluded. The subcarinal region and LEFT hilum/lung fields are unremarkable.  2.  Worsening RIGHT lung pleural involvement, with uptake seen along the pleural surface and a pleural based mass posterolaterally.  3.  Increasing RIGHT pleural effusion.  4.  Diffuse, mild marrow uptake in the spine and pelvis. This is nonspecific. Consider MRI if there is suspicion for pathology.  5.  There is displacement of the esophagus to the LEFT; correlate for history of dysphasia localizing to the superior thoracic esophagus.   1/18/2024-pemetrexed/carboplatin/pembrolizumab cycle #1   1/22 - 1/26/2024 hospitalized at [facility] with altered mentation and fever.   1/22/2024 CT chest angiogram: No pulmonary embolism.  Locally invasive right apical mass into the adjacent bone and mediastinum with pleural metastases unchanged from PET 1/16/2024 but progressed since 11/28/2023.  Stable small right pleural effusion.  Emphysema.  Stable 4 mm left upper lobe pulmonary nodule.   1/24/2024 MRI brain: Numerous enhancing intracranial lesions, largest in the left frontal periventricular region measuring up to 1.3 cm compatible with metastatic disease.   1/31/2024 [facility]: No reportable alteration in the 7 disease relevant genes: ALK, BRAF, EGFR, ERBB2, MET, RET, ROS1.  Findings: KRAS G12C, MYC amplification.   [de-identified] : Ms. VALENTE is here for cycle #3 of systemic chemotherapy.  Her pain is much improved; currently receiving PT to right upper extremity.  Denies new hospitalization.  Appetite has improved slightly.  Denies nausea or vomiting.  Has developed minor oral thrush.  No significant hair loss.  Here accompanied by her .  [FreeTextEntry1] : Palliative systemic chemotherapy

## 2024-03-08 NOTE — CONSULT LETTER
[Dear  ___] : Dear  [unfilled], [Consult Letter:] : I had the pleasure of evaluating your patient, [unfilled]. [Please see my note below.] : Please see my note below. [Consult Closing:] : Thank you very much for allowing me to participate in the care of this patient.  If you have any questions, please do not hesitate to contact me. [Sincerely,] : Sincerely, [FreeTextEntry2] : Dr. Mathieu Hodge (PCP/Ref) [FreeTextEntry3] : Nino Lira MD  Attending Surgeon  Division of Thoracic Surgery  , Cardiovascular and Thoracic Surgery  [employer] [facility]

## 2024-03-08 NOTE — HISTORY OF PRESENT ILLNESS
[FreeTextEntry1] : Ms. KAELYN VALENET, 58 year old female, former smoker (quit in 11/2023), w/ hx of COPD, Chronic pain syndrome, HLD, Melanoma in situ, who presented to  ED on 11/28/2023 for right upper back pain, CTA showed 7cm RUL mass and lysis of the right posterior third rib. Referred by Dr. Mathieu Hodge (PCP)  CT chest on 02/03/2022: - 9 mm right upper lobe nodule (2, 22). 4 mm right upper lobe nodule (2, 51). 3 mm left upper lobe nodule (2, 29). 2 mm left lower lobe nodule (2, 87). Scattered small groundglass nodules, measuring up to 1.1 cm in the right upper lobe (2, 22). - No displaced fracture. No suspicious osseous lesion. Minimal angulation at the anterior aspects of the left second and third ribs, could reflect age-indeterminate nondisplaced fractures (example 2, 34 and 2, 45).   PET/CT on 03/23/2022: ([facility]) Ordered by Dr. Chema Robles (Pulm) - There is an FDG avid posterior aspect right upper lobe pulmonary nodule (SUV 1.5, 1.0 cm, series 3 image 62). Malignancy cannot be excluded. - There is a left posterior medial eighth rib fracture (SUV 2.9, series 3 image 105).  Patient was consulted with Dr. Campos (IR) for CT guided biopsy. (When compared to 2/2015 chest ct, the right apical nodule is unchanged in size & shape & is therefore is benign, biopsy is not indicated at this time). Given emphysematous changes identified on 3/2022 PET/CT, I have recommended consistent follow ups & serial imaging surveillance with Dr Robles  CTA on 11/28/2023:  - There is a lobulated irregular mass in the posterior right upper lobe measuring 7.7 x 4.8 x 6.5 cm corresponding to 8 mm pulmonary nodule seen on CT of February 3, 2023.  - There is lysis of the right posterior third rib. Redemonstration of mild to moderate centrilobular emphysema. Redemonstration of a 5 mm spiculated groundglass nodule in the lateral left upper lobe. - In the right lower outer breast, there is a 9 mm nodule for which correlation with breast examination and/or recent mammography is recommended.  Patient is s/p CT guided biopsy of RUL nodule on 12/13/2023. Path revealed positive for malignant cell. Poorly differentiated adenocarcinoma. Cytology slides reveal crowded 3-dimensional groups and single lying malignant cells with enlarged nuclei and prominent nucleoli, in a necrotic background. Core biopsies contains fragments of adenocarcinoma and necrosis. P40 immunostudy is negative.   TTF1 immunostain is inconclusive (rare cells, weak reactivity). Molecular studies in progress; addendum to follow. PD-L1 20%.   Patient f/u with Dr. Seymour on 01/05/2024, pending MRI brain and PET/CT on 01/16/2024.   PET/CT on 01/16/2024: -   MRI on 01/16/2024:  Patient is followed today via Telehealth visit.

## 2024-03-12 PROBLEM — M54.6 RIGHT-SIDED THORACIC BACK PAIN: Status: ACTIVE | Noted: 2023-01-01

## 2024-03-12 PROBLEM — G56.92 NEUROPATHY OF LEFT UPPER EXTREMITY: Status: ACTIVE | Noted: 2024-01-01

## 2024-03-13 NOTE — HISTORY OF PRESENT ILLNESS
[FreeTextEntry1] : Slime Moreno is a 58 year old female with a thoracic tumor who presents two weeks post T3-T4 steroid epidural injection. The patient states she began to feel relief 3-5 days after the procedure. The patient states her right thoracic wall pain is a 2-3/10 at worst and her best and current pain score is a 0/10. The patient reports she was able to decrease the amount of pain medications she is using. She is currently using MS ER 15 mg BID, Dilaudid 8 mg ever 6-8 hours, Meloxicam 7.5 mg daily, Gabapentin 300 mg TID, and Tylenol 650 mg daily PRN. She denies upper extremity weakness. She states she now has decreased the amount of both long- and short-acting pain medications post-procedure. She denies numbness or tingling in her LUE. She competed radiation therapy 3 weeks ago and is currently undergoing chemotherapy every three weeks.

## 2024-03-13 NOTE — ADDENDUM
[FreeTextEntry1] : I evaluated and examined the patient with the NP and the fellow.  I agree with the findings and the plan of care as in the note above. Francisco J Billings MD, MA

## 2024-03-13 NOTE — PHYSICAL EXAM
[Normal muscle bulk without asymmetry] : normal muscle bulk without asymmetry [UE] : Sensory: Intact in bilateral upper extremities [] : Sensory: [Normal] : Normal affect [de-identified] : Lips, mucosa, and tongue normal [de-identified] : No SOB noted [de-identified] : Cranial nerves grossly intact  [de-identified] : No b/l LE edema [de-identified] : Skin color, texture, and turgor normal

## 2024-03-13 NOTE — REASON FOR VISIT
[Follow-Up Visit] : a follow-up pain management visit [Other: _____] : [unfilled] [FreeTextEntry2] : Follow-up visit post procedure for thoracic epidural injection

## 2024-03-13 NOTE — REVIEW OF SYSTEMS
[Negative] : Psychiatric [SOB at rest] : no shortness of breath at rest [Lower Ext Edema] : no lower extremity edema [SOB on Exertion] : no shortness of breath on exertion [Numbness] : no numbness [Constipation] : no constipation [FreeTextEntry9] : Intermittent right thoracic wall pain

## 2024-03-13 NOTE — ASSESSMENT
[FreeTextEntry1] : 58-year-old female who presents 2 weeks post T3-T4 epidural injection. She states she has experienced a great decrease in pain and has been able to use less opioid and OTC pain medications.   Plan: 1. All medications have been renewed and note that the patient is currently using them with decreased dosage. 2. Being that the patient is experiencing new neuropathy in her LUE, the Gabapentin has been increased to 600 mg TID.  3. The patient will follow-up in four weeks.

## 2024-03-16 NOTE — PATIENT PROFILE ADULT - FALL HARM RISK - HARM RISK INTERVENTIONS
Assistance with ambulation/Assistance OOB with selected safe patient handling equipment/Communicate Risk of Fall with Harm to all staff/Discuss with provider need for PT consult/Monitor gait and stability/Provide patient with walking aids - walker, cane, crutches/Reinforce activity limits and safety measures with patient and family/Tailored Fall Risk Interventions/Use of alarms - bed, chair and/or voice tab/Visual Cue: Yellow wristband and red socks/Bed in lowest position, wheels locked, appropriate side rails in place/Call bell, personal items and telephone in reach/Instruct patient to call for assistance before getting out of bed or chair/Non-slip footwear when patient is out of bed/[place] to call system/Physically safe environment - no spills, clutter or unnecessary equipment/Purposeful Proactive Rounding/Room/bathroom lighting operational, light cord in reach

## 2024-03-16 NOTE — ED PROVIDER NOTE - PHYSICAL EXAMINATION
Gen:  alert, awake, no acute distress  Head:  atraumatic, normocephalic  HEENT: PERRLA, EOMI, normal nose, dry oropharynx, no tonsillar edema, erythema, or exudate  CV:  rrr, nl S1, S2, no m/r/g, no chest wall ttp  Pulm:  lungs CTA b/l  Abd: soft, non distended  MSK:  moving all extremities, no back midline ttp, no stepoffs, no cva TTP  Neuro:  grossly intact, no focal deficits  Skin:  clear, dry, intact  Psych: AOx3, normal affect, no apparent risk to self or others

## 2024-03-16 NOTE — CONSULT NOTE ADULT - SUBJECTIVE AND OBJECTIVE BOX
GENERAL SURGERY CONSULT NOTE    Patient is a 58y old  Female who presents with a chief complaint of shortness of breath (16 Mar 2024 14:14) Orthopedic consulted for findings of T2, T3 fracture.      HPI:  Patient with history of lung cancer currently on chemotherapy, last chemotherapy on 3/7 , former smoker, had thoracic nerve block for pain which significantly helped her cancer rib pain.  Patient presents today has been having multiple episodes of nonbloody emesis since 530 this morning.  Also complains that she is having trouble breathing today which is new for her.  Finished radiation 2 weeks ago.  Patient typically tolerates chemo well.  Complains of constipation but had milk of magnesia yesterday and has had bowel movements.  Complains of chronic low back pain.  No chest pain but does note shortness of breath.  Denies leg pain or swelling.    Reviewed above with patient. endorsed same. currently reported feeling unwell with nausea. no further vomiting. SOB same. admits intermittent diffuse CP and epigastric pain. intermittent constipation. denied fever/chills/ palpitation/dizziness/headaches/ leg pain/ dysuria. all other ROS neg. no fall or injury    in ER: VS showed  HTN< bradycardia. temp and sat normal. CTA reported right subsegmental PE, new Lumbar fracture, possible acute pancreatitis. NS bolus 1 liter, Dilaudid, Zofran and Lovenox 40 SQ was given and medical admission was called (16 Mar 2024 12:06)      PAST MEDICAL & SURGICAL HISTORY:  Mass of right lung      COPD (chronic obstructive pulmonary disease)      Smoker      Chronic pain syndrome      HLD (hyperlipidemia)      Melanoma in situ      History of blood transfusion      Malignant neoplasm of right bronchus      H/O reduction of orbital fracture      S/P wrist surgery          Review of Systems:  Contained within HPI.    MEDICATIONS  (STANDING):  acetaminophen   IVPB .. 650 milliGRAM(s) IV Intermittent once  enoxaparin Injectable 40 milliGRAM(s) SubCutaneous every 12 hours  gabapentin 600 milliGRAM(s) Oral three times a day  lactated ringers 1000 milliLiter(s) (60 mL/Hr) IV Continuous <Continuous>  magnesium sulfate  IVPB 2 Gram(s) IV Intermittent every 2 hours  pantoprazole  Injectable 40 milliGRAM(s) IV Push daily  potassium chloride   Solution 40 milliEquivalent(s) Oral every 4 hours  potassium chloride  10 mEq/100 mL IVPB 10 milliEquivalent(s) IV Intermittent every 1 hour    MEDICATIONS  (PRN):  acetaminophen     Tablet .. 650 milliGRAM(s) Oral every 6 hours PRN Temp greater or equal to 38C (100.4F), Mild Pain (1 - 3)  ALPRAZolam 0.5 milliGRAM(s) Oral daily PRN severe anxiety  hydrALAZINE Injectable 10 milliGRAM(s) IV Push every 6 hours PRN BP>160 SBP or >100 DBP  HYDROmorphone  Injectable 0.2 milliGRAM(s) IV Push every 4 hours PRN Moderate Pain (4 - 6)  HYDROmorphone  Injectable 0.5 milliGRAM(s) IV Push every 4 hours PRN Severe Pain (7 - 10)  ondansetron Injectable 4 milliGRAM(s) IV Push every 6 hours PRN Nausea and/or Vomiting      Allergies    No Known Allergies    Intolerances    morphine (Sedation/Somnol)      SOCIAL HISTORY       FAMILY HISTORY:  FH: heart attack (Father, Mother)    FH: stomach cancer (Grandparent)    FH: lung cancer (Sibling)        Vital Signs Last 24 Hrs  T(C): 37.1 (16 Mar 2024 09:06), Max: 37.1 (16 Mar 2024 09:06)  T(F): 98.8 (16 Mar 2024 09:06), Max: 98.8 (16 Mar 2024 09:06)  HR: 52 (16 Mar 2024 10:00) (51 - 52)  BP: 170/79 (16 Mar 2024 10:00) (170/79 - 178/75)  BP(mean): --  RR: 25 (16 Mar 2024 10:00) (18 - 28)  SpO2: 100% (16 Mar 2024 10:00) (91% - 100%)    Parameters below as of 16 Mar 2024 10:00  Patient On (Oxygen Delivery Method): nasal cannula  O2 Flow (L/min): 2      Physical Exam:    GENERAL: Not in distress. Alert, looks unwell. cachectic female  LUNGS: Bilateral chest rise and fall   ABDOMEN: ND. Soft, mild Epigastric TP, no guarding / rebound / rigidity. BS normoactive. No CVA tenderness.    BACK: No spine tenderness.  EXTREMITIES: no edema. no leg or calf TP.  SKIN: no rash. very dry and warm skin  NEUROLOGIC: AAO*3.strength is symmetric, sensation intact, speech fluent.    PSYCHIATRIC: Calm.  No agitation.      LABS:                        11.4   5.16  )-----------( 174      ( 16 Mar 2024 09:35 )             34.0     03-16    133<L>  |  96  |  8   ----------------------------<  179<H>  3.3<L>   |  24  |  0.76    Ca    9.0      16 Mar 2024 14:13  Mg     1.2     03-16    TPro  8.1  /  Alb  3.6  /  TBili  0.7  /  DBili  x   /  AST  22  /  ALT  21  /  AlkPhos  116  03-16      Urinalysis Basic - ( 16 Mar 2024 14:13 )    Color: x / Appearance: x / SG: x / pH: x  Gluc: 179 mg/dL / Ketone: x  / Bili: x / Urobili: x   Blood: x / Protein: x / Nitrite: x   Leuk Esterase: x / RBC: x / WBC x   Sq Epi: x / Non Sq Epi: x / Bacteria: x        RADIOLOGY & ADDITIONAL STUDIES:    58F admitted for suspected acute pancreatitis, new PE ,Orthopedics consulted for New pathologic compression fractures of T2 and T3.    Plan:   GENERAL SURGERY CONSULT NOTE    Patient is a 58y old  Female who presents with a chief complaint of shortness of breath (16 Mar 2024 14:14) Orthopedic consulted for findings of T2, T3 fracture.      HPI:  Patient with history of lung cancer currently on chemotherapy, last chemotherapy on 3/7 , former smoker, had thoracic nerve block for pain which significantly helped her cancer rib pain.  Patient presents today has been having multiple episodes of nonbloody emesis since 530 this morning.  Also complains that she is having trouble breathing today which is new for her.  Finished radiation 2 weeks ago.  Patient typically tolerates chemo well.  Complains of constipation but had milk of magnesia yesterday and has had bowel movements.  Complains of chronic low back pain.  No chest pain but does note shortness of breath.  Denies leg pain or swelling.    Reviewed above with patient. endorsed same. currently reported feeling unwell with nausea. no further vomiting. SOB same. admits intermittent diffuse CP and epigastric pain. intermittent constipation. denied fever/chills/ palpitation/dizziness/headaches/ leg pain/ dysuria. all other ROS neg. no fall or injury    in ER: VS showed  HTN< bradycardia. temp and sat normal. CTA reported right subsegmental PE, new Lumbar fracture, possible acute pancreatitis. NS bolus 1 liter, Dilaudid, Zofran and Lovenox 40 SQ was given and medical admission was called (16 Mar 2024 12:06)      PAST MEDICAL & SURGICAL HISTORY:  Mass of right lung      COPD (chronic obstructive pulmonary disease)      Smoker      Chronic pain syndrome      HLD (hyperlipidemia)      Melanoma in situ      History of blood transfusion      Malignant neoplasm of right bronchus      H/O reduction of orbital fracture      S/P wrist surgery          Review of Systems:  Contained within HPI.    MEDICATIONS  (STANDING):  acetaminophen   IVPB .. 650 milliGRAM(s) IV Intermittent once  enoxaparin Injectable 40 milliGRAM(s) SubCutaneous every 12 hours  gabapentin 600 milliGRAM(s) Oral three times a day  lactated ringers 1000 milliLiter(s) (60 mL/Hr) IV Continuous <Continuous>  magnesium sulfate  IVPB 2 Gram(s) IV Intermittent every 2 hours  pantoprazole  Injectable 40 milliGRAM(s) IV Push daily  potassium chloride   Solution 40 milliEquivalent(s) Oral every 4 hours  potassium chloride  10 mEq/100 mL IVPB 10 milliEquivalent(s) IV Intermittent every 1 hour    MEDICATIONS  (PRN):  acetaminophen     Tablet .. 650 milliGRAM(s) Oral every 6 hours PRN Temp greater or equal to 38C (100.4F), Mild Pain (1 - 3)  ALPRAZolam 0.5 milliGRAM(s) Oral daily PRN severe anxiety  hydrALAZINE Injectable 10 milliGRAM(s) IV Push every 6 hours PRN BP>160 SBP or >100 DBP  HYDROmorphone  Injectable 0.2 milliGRAM(s) IV Push every 4 hours PRN Moderate Pain (4 - 6)  HYDROmorphone  Injectable 0.5 milliGRAM(s) IV Push every 4 hours PRN Severe Pain (7 - 10)  ondansetron Injectable 4 milliGRAM(s) IV Push every 6 hours PRN Nausea and/or Vomiting      Allergies    No Known Allergies    Intolerances    morphine (Sedation/Somnol)      SOCIAL HISTORY       FAMILY HISTORY:  FH: heart attack (Father, Mother)    FH: stomach cancer (Grandparent)    FH: lung cancer (Sibling)        Vital Signs Last 24 Hrs  T(C): 37.1 (16 Mar 2024 09:06), Max: 37.1 (16 Mar 2024 09:06)  T(F): 98.8 (16 Mar 2024 09:06), Max: 98.8 (16 Mar 2024 09:06)  HR: 52 (16 Mar 2024 10:00) (51 - 52)  BP: 170/79 (16 Mar 2024 10:00) (170/79 - 178/75)  BP(mean): --  RR: 25 (16 Mar 2024 10:00) (18 - 28)  SpO2: 100% (16 Mar 2024 10:00) (91% - 100%)    Parameters below as of 16 Mar 2024 10:00  Patient On (Oxygen Delivery Method): nasal cannula  O2 Flow (L/min): 2      Physical Exam:    GENERAL: Not in distress. Alert, looks unwell. cachectic female  LUNGS: Bilateral chest rise and fall   ABDOMEN: ND. Soft, mild Epigastric TP, no guarding / rebound / rigidity. BS normoactive. No CVA tenderness.    BACK: No spine tenderness.  EXTREMITIES: no edema. no leg or calf TP.  SKIN: no rash. very dry and warm skin  NEUROLOGIC: AAO*3.strength is symmetric, sensation intact, speech fluent.    PSYCHIATRIC: Calm.  No agitation.      LABS:                        11.4   5.16  )-----------( 174      ( 16 Mar 2024 09:35 )             34.0     03-16    133<L>  |  96  |  8   ----------------------------<  179<H>  3.3<L>   |  24  |  0.76    Ca    9.0      16 Mar 2024 14:13  Mg     1.2     03-16    TPro  8.1  /  Alb  3.6  /  TBili  0.7  /  DBili  x   /  AST  22  /  ALT  21  /  AlkPhos  116  03-16      Urinalysis Basic - ( 16 Mar 2024 14:13 )    Color: x / Appearance: x / SG: x / pH: x  Gluc: 179 mg/dL / Ketone: x  / Bili: x / Urobili: x   Blood: x / Protein: x / Nitrite: x   Leuk Esterase: x / RBC: x / WBC x   Sq Epi: x / Non Sq Epi: x / Bacteria: x        RADIOLOGY & ADDITIONAL STUDIES:    58F admitted for suspected acute pancreatitis, new PE ,Orthopedics consulted for New pathologic compression fractures of T2 and T3.    Plan:  Patient admitted to medical team  Brace to be ordered and to be used for comfort as needed  Please reconsult Orthopaedics as needed with any further questions or concerns

## 2024-03-16 NOTE — ED ADULT NURSE NOTE - OBJECTIVE STATEMENT
Pt, accompanied by family, presents to ED with c/o generalized weakness and body aches.  Hx of non-small cell lung CA, currently undergoing chemo.  Pt reports nausea and difficulty taking deep breaths. Pt, accompanied by family, presents to ED with c/o generalized weakness and body aches.  Hx of non-small cell lung CA, currently undergoing chemo.  Pt reports nausea with multiple episodes of nonbloody emesis and diarrhea.  Reports that her daughter gave her milk of magnesia yesterday after she was having difficulty having a bowel movement.  Since then, pt reports multiple episodes of diarrhea.  Additionally, pt reports difficulty taking deep breaths.

## 2024-03-16 NOTE — CONSULT NOTE ADULT - PROBLEM SELECTOR RECOMMENDATION 9
pt had CT chest with impression of New partially included peripancreatic fluid, possibly reflecting acute   pancreatitis. Correlate with pancreatic enzymes and/or further evaluation   with contrast-enhanced CT abdomen/pelvis.    Dedicated abdominal CT with contrast of the abdomen/pelvis ordered  Maintain NPO  Pain management  IVF  Monitor LTFs, lipase and amylase pt had CT chest with impression of New partially included peripancreatic fluid,  No sign of active pancreatitis Correlate with pancreatic enzymes and/or further evaluation   with contrast-enhanced CT abdomen/pelvis.    Dedicated abdominal CT with contrast of the abdomen/pelvis ordered  Maintain NPO  Pain management  IVF  Monitor LTFs, lipase and amylase

## 2024-03-16 NOTE — ED ADULT TRIAGE NOTE - CHIEF COMPLAINT QUOTE
Patient presents to ED from home complaining of generalized body aches, nausea, vomiting, and difficulty taking deep breaths since yesterday. Patient undergoing chemo for non-small cell lung CA, completed radiation.

## 2024-03-16 NOTE — ED PROVIDER NOTE - CLINICAL SUMMARY MEDICAL DECISION MAKING FREE TEXT BOX
Patient with history of lung cancer currently on chemotherapy, last chemotherapy on 3/7 , former smoker, had thoracic nerve block for pain which significantly helped her cancer rib pain.  Patient presents today has been having multiple episodes of nonbloody emesis since 530 this morning.  Also complains that she is having trouble breathing today which is new for her.  Finished radiation 2 weeks ago.  Patient typically tolerates chemo well.  Complains of constipation but had milk of magnesia yesterday and has had bowel movements.  Complains of chronic low back pain.  No chest pain but does note shortness of breath.  Denies leg pain or swelling.     CBC, CMP, lipase, CT angio evaluate for PE, normal fluids, antiemetics, analgesia ( patient has allergy to morphine but tolerates Dilaudid without any problems), reassess Patient with history of lung cancer currently on chemotherapy, last chemotherapy on 3/7 , former smoker, had thoracic nerve block for pain which significantly helped her cancer rib pain.  Patient presents today has been having multiple episodes of nonbloody emesis since 530 this morning.  Also complains that she is having trouble breathing today which is new for her.  Finished radiation 2 weeks ago.  Patient typically tolerates chemo well.  Complains of constipation but had milk of magnesia yesterday and has had bowel movements.  Complains of chronic low back pain.  No chest pain but does note shortness of breath.  Denies leg pain or swelling.     CBC, CMP, lipase, CT angio evaluate for PE, normal fluids, antiemetics, analgesia ( patient has allergy to morphine but tolerates Dilaudid without any problems), reassess    Case discussed with admitting hospitalist, Dr. Rodriguez will give Lovenox 1 mg/kg KG, Lovenox 40 mg ordered  Will admit patient, Dr. Rodriguez aware of pancreatic findings and will await lipase results results discussed with family as well

## 2024-03-16 NOTE — CONSULT NOTE ADULT - SUBJECTIVE AND OBJECTIVE BOX
INTERVAL HPI/OVERNIGHT EVENTS:  HPI: Patient with history of lung cancer currently on chemotherapy, last chemotherapy on 3/7 , former smoker, had thoracic nerve block for pain which significantly helped her cancer rib pain.  Patient presents today has been having multiple episodes of nonbloody emesis since 530 this morning.  Also complains that she is having trouble breathing today which is new for her.  Finished radiation 2 weeks ago.  Patient typically tolerates chemo well.  Complains of constipation but had milk of magnesia yesterday and has had bowel movements.  Complains of chronic low back pain.  No chest pain but does note shortness of breath.  Denies leg pain or swelling. Reviewed above with patient. endorsed same. currently reported feeling unwell with nausea. no further vomiting. SOB same. admits intermittent diffuse CP and epigastric pain. intermittent constipation. denied fever/chills/ palpitation/dizziness/headaches/ leg pain/ dysuria. all other ROS neg. no fall or injury in ER: VS showed  HTN< bradycardia. temp and sat normal. CTA reported right subsegmental PE, new Lumbar fracture, possible acute pancreatitis. NS bolus 1 liter, Dilaudid, Zofran and Lovenox 40 SQ was given and medical admission was called (16 Mar 2024 12:06)    GI consult Note: Patient seen and examined at the bedside. Family at the bedside. Pt denies any abdominal pain however complaint of lower back pain. N/V started earlier today around 5AM.  no hematemesis reported. Pt had BM after taking milk of magnesia, no melena reported. Denies BRBPR.   GI History: Pt states that she had colonoscopy in the past which was normal but cannot remember the date. Denies any alcohol use except socially in the past. Denies ever having EGD.       MEDICATIONS  (STANDING):  enoxaparin Injectable 40 milliGRAM(s) SubCutaneous every 12 hours  gabapentin 600 milliGRAM(s) Oral three times a day  lactated ringers. 1000 milliLiter(s) (80 mL/Hr) IV Continuous <Continuous>  pantoprazole  Injectable 40 milliGRAM(s) IV Push daily  potassium chloride  10 mEq/100 mL IVPB 10 milliEquivalent(s) IV Intermittent every 1 hour    MEDICATIONS  (PRN):  acetaminophen     Tablet .. 650 milliGRAM(s) Oral every 6 hours PRN Temp greater or equal to 38C (100.4F), Mild Pain (1 - 3)  ALPRAZolam 0.5 milliGRAM(s) Oral daily PRN severe anxiety  hydrALAZINE Injectable 10 milliGRAM(s) IV Push every 6 hours PRN BP>160 SBP or >100 DBP  HYDROmorphone  Injectable 0.5 milliGRAM(s) IV Push every 4 hours PRN Severe Pain (7 - 10)  HYDROmorphone  Injectable 0.2 milliGRAM(s) IV Push every 4 hours PRN Moderate Pain (4 - 6)  ondansetron Injectable 4 milliGRAM(s) IV Push every 6 hours PRN Nausea and/or Vomiting      Allergies    No Known Allergies    Intolerances    morphine (Sedation/Somnol)      PAST MEDICAL & SURGICAL HISTORY:  Mass of right lung      COPD (chronic obstructive pulmonary disease)      Smoker      Chronic pain syndrome      HLD (hyperlipidemia)      Melanoma in situ      History of blood transfusion      Malignant neoplasm of right bronchus      H/O reduction of orbital fracture      S/P wrist surgery          REVIEW OF SYSTEMS  Negative unless indicated in HPI.     PHYSICAL EXAM:   Vital Signs:  Vital Signs Last 24 Hrs  T(C): 37.1 (16 Mar 2024 09:06), Max: 37.1 (16 Mar 2024 09:06)  T(F): 98.8 (16 Mar 2024 09:06), Max: 98.8 (16 Mar 2024 09:06)  HR: 52 (16 Mar 2024 10:00) (51 - 52)  BP: 170/79 (16 Mar 2024 10:00) (170/79 - 178/75)  BP(mean): --  RR: 25 (16 Mar 2024 10:00) (18 - 28)  SpO2: 100% (16 Mar 2024 10:00) (91% - 100%)    Parameters below as of 16 Mar 2024 10:00  Patient On (Oxygen Delivery Method): nasal cannula  O2 Flow (L/min): 2    Daily Height in cm: 154.94 (16 Mar 2024 09:06)    Daily I&O's Summary      GENERAL:  Appears stated age, well-groomed, well-nourished, no distress  HEENT:  NC/AT,  conjunctivae clear and pink,   CHEST:  Full & symmetric excursion, no increased effort, breath sounds clear  HEART:  Regular rhythm  ABDOMEN:  Soft, non-tender, non-distended, normoactive bowel sounds,  no masses ,no hepato-splenomegaly, no signs of chronic liver disease  EXTEREMITIES:  no cyanosis,clubbing or edema  SKIN: Warm/dry  NEURO:  Alert, oriented, no asterixis, no tremor, no encephalopathy      LABS:                        11.4   5.16  )-----------( 174      ( 16 Mar 2024 09:35 )             34.0         134<L>  |  97  |  10  ----------------------------<  209<H>  2.8<LL>   |  23  |  0.61    Ca    9.7      16 Mar 2024 09:35  Mg     1.3         TPro  8.1  /  Alb  3.6  /  TBili  0.7  /  DBili  x   /  AST  22  /  ALT  21  /  AlkPhos  116        Urinalysis Basic - ( 16 Mar 2024 11:37 )    Color: Yellow / Appearance: Clear / S.021 / pH: x  Gluc: x / Ketone: Negative mg/dL  / Bili: Negative / Urobili: 0.2 mg/dL   Blood: x / Protein: Negative mg/dL / Nitrite: Negative   Leuk Esterase: Negative / RBC: x / WBC x   Sq Epi: x / Non Sq Epi: x / Bacteria: x      amylase   lipaseLipase: 27 U/L ( @ 09:35)    RADIOLOGY & ADDITIONAL TESTS:  < from: CT Angio Chest PE Protocol w/ IV Cont (24 @ 10:51) >  ACC: [id number] EXAM:  CT ANGIO CHEST PULM [facility]   ORDERED BY: CYNDEE BIRMINGHAM     PROCEDURE DATE:  2024          INTERPRETATION:  INDICATION: Shortness of breath    TECHNIQUE: Helical acquisition of the chest after the administration of   70 mL of Omnipaque 350. Maximum intensity projection images were   generated.    COMPARISON: CT chest 2024.    FINDINGS:    HEART/VASCULATURE: New filling defects within subsegmental branches of   the right upper lobe. Normal RV: LV ratio. No pericardial effusion.    LUNGS/AIRWAYS/PLEURA: Patent trachea and bronchi. Unchanged emphysema and   few sub-4 mm solid nodules in both lungs. Unchanged groundglass nodules   up to 1 cm in the superior segment of the right lower lobe (2-41) in the   apical segment of the right upper lobe (2-22). Decreased size of right   apical mass with broadbase along the pleura.    LYMPH NODES/MEDIASTINUM: No lymphadenopathy.    UPPER ABDOMEN: New partially included fluid surrounding the pancreatic   tail.    BONES/SOFT TISSUES: Decreased extension of right apical mass through the   right chest wall, eroding the right third rib and spine at the T2-T3   vertebral bodies. New pathologic compression fractures of T2 and T3.      IMPRESSION:    Right upper lobe subsegmental pulmonary thromboemboli. Findings were   discussed with Dr. Birmingham at 11:17 AM on 3/16/2024.    Decreased size of right apical mass extends through the chest wall and   spine.    New pathologic compression fractures of T2 and T3.    Unchanged small groundglass and solid nodules in both lungs.    New partially included peripancreatic fluid, possibly reflecting acute   pancreatitis. Correlate with pancreatic enzymes and/or further evaluation   with contrast-enhanced CT abdomen/pelvis.    --- End of Report ---            ARMANDO SPRING M.D., ATTENDING RADIOLOGIST  This document has been electronically signed. Mar 16 2024 11:18AM    < end of copied text >

## 2024-03-16 NOTE — ED PROVIDER NOTE - CRITICAL CARE ATTENDING CONTRIBUTION TO CARE
dr paul:  I spent 30 minutes evaluating patient, discussing radiology results with  radiology and discussing results with other physicians, as well as discussion with family and direct bedside care

## 2024-03-16 NOTE — ED PROVIDER NOTE - OBJECTIVE STATEMENT
Patient with history of lung cancer currently on chemotherapy, last chemotherapy on 3/7 , former smoker, had thoracic nerve block for pain which significantly helped her cancer rib pain.  Patient presents today has been having multiple episodes of nonbloody emesis since 530 this morning.  Also complains that she is having trouble breathing today which is new for her.  Finished radiation 2 weeks ago.  Patient typically tolerates chemo well.  Complains of constipation but had milk of magnesia yesterday and has had bowel movements.  Complains of chronic low back pain.  No chest pain but does note shortness of breath.  Denies leg pain or swelling.

## 2024-03-16 NOTE — CHART NOTE - NSCHARTNOTEFT_GEN_A_CORE
This report was requested by: Erica Rodriguez | Reference #: [id number]    Practitioner Count: 4  Pharmacy Count: 2  Current Opioid Prescriptions: 0  Current Benzodiazepine Prescriptions: 1  Current Stimulant Prescriptions: 0      Patient Demographic Information (PDI)       PDI	First Name	Last Name	Birth Date	Gender	Street Address	[facility] Code  MONALISA Moreno	1965	Female	[address]	[place] [facility]	[id number]    Prescription Information      PDI Filter:    PDI	My Rx	Current Rx	Drug Type	Rx Written	Rx Dispensed	Drug	Quantity	Days Supply	Prescriber Name	Prescriber KENY #	Payment Method	Dispenser  A	N	Y	B	02/28/2024	03/01/2024	alprazolam 0.5 mg tablet	30	30	Celestina Garcia)	[id number]	Insurance	[facility] #[id number]  A	N	N	B	02/12/2024	02/12/2024	alprazolam 0.25 mg tablet	30	10	Celestina Garcia)	[id number]	Insurance	[facility] #[id number]  A	N	N	O	01/30/2024	02/03/2024	morphine sulf er 15 mg tablet	90	30	Fitzgerald, Nick	[id number]	Insurance	[facility] #[id number]  A	N	N	O	01/31/2024	01/31/2024	hydromorphone 4 mg tablet	360	30	Fitzgerald, Nick	[id number]	Insurance	[facility] #[id number]  A	N	N	B	01/30/2024	01/30/2024	alprazolam 0.25 mg tablet	30	10	Celestina Garcia)	[id number]	Insurance	[facility] #[id number]  A	N	N	O	01/26/2024	01/26/2024	morphine sulf er 15 mg tablet	14	7	Chas Ibarra	[id number]	Insurance	[facility] #[id number]  A	N	N	B	01/12/2024	01/14/2024	alprazolam 0.5 mg tablet	2	28	Eula Whitehead R, PADontaeC	[id number]	Insurance	[facility] #[id number]  A	N	N	O	12/29/2023	12/29/2023	hydromorphone 4 mg tablet	264	22	Fitzgerald, Nick	[id number]	Insurance	[facility] #[id number]  A	N	N	O	12/19/2023	12/19/2023	hydromorphone 4 mg tablet	102	8	Fitzgerald, Nick	[id number]	Insurance	[facility] #[id number]  A	N	N	O	12/07/2023	12/07/2023	hydromorphone 4 mg tablet	60	8	Ramonita Ramos	[id number]	Insurance	[facility] #[id number]  A	N	N	O	12/01/2023	12/01/2023	hydromorphone 4 mg tablet	42	7	Mathieu Hodge	[id number]	Insurance	[facility] #[id number]  A	N	N	O	11/28/2023	11/28/2023	oxycodone-acetaminophen 7.5-325 mg tablet	9	3	Carlos Jacob K, DO	[id number]	Insurance	[facility] #[id number]  A	N	N	O	11/20/2023	11/20/2023	oxycodone-acetaminophen 5-325 mg tablet	21	7	Mathieu Hodge	[id number]	Insurance	[facility] #[id number]  A	N	N	B	11/08/2023	11/16/2023	alprazolam 0.5 mg tablet	7	7	Tiana Paz MD	[id number]	Insurance	[facility] #[id number]

## 2024-03-16 NOTE — ED ADULT NURSE NOTE - NSFALLHARMRISKINTERV_ED_ALL_ED

## 2024-03-16 NOTE — PATIENT PROFILE ADULT - PATIENT REPRESENTATIVE: ( YOU CAN CHOOSE ANY PERSON THAT CAN ASSIST YOU WITH YOUR HEALTH CARE PREFERENCES, DOES NOT HAVE TO BE A SPOUSE, IMMEDIATE FAMILY OR SIGNIFICANT OTHER/PARTNER)
Physical Therapy Daily Progress Note    Patient: Lluvia Archer   : 1966  Diagnosis/ICD-10 Code:  S/P total right hip arthroplasty [Z96.641]  Referring practitioner: No ref. provider found  Date of Initial Visit: Type: THERAPY  Noted: 2022  Today's Date: 2022  Patient seen for 3 sessions           Subjective Evaluation    History of Present Illness  Mechanism of injury: Pt reporting she is having more pain and swelling, she is concerned she has done something to her hip. She does have a follow up with her physician today.    Pain  Current pain ratin           Objective   See Exercise, Manual, and Modality Logs for complete treatment.       Assessment & Plan     Assessment    Assessment details: Pt tolerated today's session well, mainly performed passive range today. She is positive for calf pain with dorsiflexion and tenderness to palpation of the calf, but no signs of redness and tightness in the calf. Pt and her daughter are understanding to discuss her symptoms at the follow up today. Pt would benefit from continued skilled therapy to address deficits. Progress per plan of care.                 Manual Therapy:    15      mins  47819;  Therapeutic Exercise:    13     mins  81225;     Neuromuscular Fay:    0    mins  67792;    Therapeutic Activity:     0     mins  18868;     Gait Trainin     mins  21861;     Ultrasound:     0     mins  67914;    Electrical Stimulation:    0     mins  97392 ( );  Dry Needling     0     mins self-pay;  Aquatic Therapy    0     mins  80271;  Mechanical Traction    0     mins  73418  Moist Heat     0     mins  No charge    Timed Treatment:   28   mins   Total Treatment:     28   mins    Shane Babin PT  Physical Therapist    Electronically signed 2022    [place] License: PT - [id number]    same name as above

## 2024-03-16 NOTE — PATIENT PROFILE ADULT - FUNCTIONAL ASSESSMENT - BASIC MOBILITY 6.
2-calculated by average/Not able to assess (calculate score using [facility] averaging method)

## 2024-03-16 NOTE — H&P ADULT - NSHPLABSRESULTS_GEN_ALL_CORE
11.4   5.16  )-----------( 174      ( 16 Mar 2024 09:35 )             34.0       03-16    134<L>  |  97  |  10  ----------------------------<  209<H>  2.8<LL>   |  23  |  0.61    Ca    9.7      16 Mar 2024 09:35    TPro  8.1  /  Alb  3.6  /  TBili  0.7  /  DBili  x   /  AST  22  /  ALT  21  /  AlkPhos  116  03-16    < from: CT Angio Chest PE Protocol w/ IV Cont (03.16.24 @ 10:51) >    IMPRESSION:    Right upper lobe subsegmental pulmonary thromboemboli. Findings were   discussed with Dr. Birmingham at 11:17 AM on 3/16/2024.    Decreased size of right apical mass extends through the chest wall and   spine.    New pathologic compression fractures of T2 and T3.    Unchanged small groundglass and solid nodules in both lungs.    New partially included peripancreatic fluid, possibly reflecting acute   pancreatitis. Correlate with pancreatic enzymes and/or further evaluation   with contrast-enhanced CT abdomen/pelvis.    < end of copied text >

## 2024-03-16 NOTE — H&P ADULT - NSHPPHYSICALEXAM_GEN_ALL_CORE
Vital Signs Last 24 Hrs  T(C): 37.1 (16 Mar 2024 09:06), Max: 37.1 (16 Mar 2024 09:06)  T(F): 98.8 (16 Mar 2024 09:06), Max: 98.8 (16 Mar 2024 09:06)  HR: 52 (16 Mar 2024 10:00) (51 - 52)  BP: 170/79 (16 Mar 2024 10:00) (170/79 - 178/75)  BP(mean): --  RR: 25 (16 Mar 2024 10:00) (18 - 28)  SpO2: 100% (16 Mar 2024 10:00) (91% - 100%)    Parameters below as of 16 Mar 2024 10:00  Patient On (Oxygen Delivery Method): nasal cannula  O2 Flow (L/min): 2 Vital Signs Last 24 Hrs  T(C): 37.1 (16 Mar 2024 09:06), Max: 37.1 (16 Mar 2024 09:06)  T(F): 98.8 (16 Mar 2024 09:06), Max: 98.8 (16 Mar 2024 09:06)  HR: 52 (16 Mar 2024 10:00) (51 - 52)  BP: 170/79 (16 Mar 2024 10:00) (170/79 - 178/75)  BP(mean): --  RR: 25 (16 Mar 2024 10:00) (18 - 28)  SpO2: 100% (16 Mar 2024 10:00) (91% - 100%)    Parameters below as of 16 Mar 2024 10:00  Patient On (Oxygen Delivery Method): nasal cannula  O2 Flow (L/min): 2    GENERAL: Not in distress. Alert  . looks unwell. cachectic female  HEENT: AT/NC. clear conjuctiva, MM dry.   no pallor or icterus  CARDIOVASCULAR: RRR S1, S2. No murmur/rubs/gallop  LUNGS: BLAE+, no rales, no wheezing, no rhonchi.    ABDOMEN: ND. Soft, mild Epigastric TP, no guarding / rebound / rigidity. BS normoactive. No CVA tenderness.    BACK: No spine tenderness.  EXTREMITIES: no edema. no leg or calf TP.  SKIN: no rash. very dry and warm skin  NEUROLOGIC: AAO*3.strength is symmetric, sensation intact, speech fluent.    PSYCHIATRIC: Calm.  No agitation.

## 2024-03-16 NOTE — ED PROVIDER NOTE - NS ED ROS FT
Except as otherwise indicated in HPI:  CONSTITUTIONAL: Neg  HEENT: neg  CV: neg  Resp: +sob  GI:  +nausea, emesis  : Neg  MSK: Neg  SKIN: Neg  NEURO: Neg  PSYCHIATRIC: Neg  Heme/Onc: Neg

## 2024-03-16 NOTE — CHART NOTE - NSCHARTNOTEFT_GEN_A_CORE
Repeat BMP (ran by lab x2 w/ Na 128 mmol/L and Mg 3.4 mg/dL (hyporeflexia ~10mg/dL, resp dep/cardiac abnormalities ~14).     Patient seen and examined at bedside. Endorsing nausea but no emesis since arrival to the floor. Has not completed oral potassium due to nausea, couldn't tolerate K riders. Reports no change in dyspnea, continued fatigue.     Physical Exam:  GENERAL: NAD, lying in bed comfortably  HEAD:  Atraumatic, normocephalic  NECK: Supple, trachea midline  HEART: Regular rate and rhythm, no murmurs, rubs, or gallops  LUNGS: Unlabored respirations. CTAB.  ABDOMEN: Soft, nontender, nondistended, +BS  EXTREMITIES: 2+ peripheral pulses bilaterally. No clubbing, cyanosis, or edema  NERVOUS SYSTEM:  A&Ox3, moving all extremities, no focal deficits, triceps, brachioradialis, achilles reflexes 2+ b/l, patellar 1+ b/l.   SKIN: No rashes or lesions Repeat BMP (ran by lab x2 w/ Na 128 mmol/L and Mg 3.4 mg/dL (hyporeflexia ~10mg/dL, resp dep/cardiac abnormalities ~14). Calculated serum osm 268 mOsm/kg. Repeat BMP and urine lytes ordered.     Patient seen and examined at bedside. Endorsing nausea but no emesis since arrival to the floor. Has not completed oral potassium due to nausea, couldn't tolerate K riders. Reports no change in dyspnea, continued fatigue.     Physical Exam:  GENERAL: NAD, lying in bed comfortably  HEAD:  Atraumatic, normocephalic  NECK: Supple, trachea midline  HEART: Regular rate and rhythm, no murmurs, rubs, or gallops  LUNGS: Unlabored respirations. CTAB.  ABDOMEN: Soft, nontender, nondistended, +BS  EXTREMITIES: 2+ peripheral pulses bilaterally. No clubbing, cyanosis, or edema  NERVOUS SYSTEM:  A&Ox3, moving all extremities, no focal deficits, triceps, brachioradialis, achilles reflexes 2+ b/l, patellar 1+ b/l.   SKIN: No rashes or lesions Repeat BMP (ran by lab x2 w/ Na 128 mmol/L and Mg 3.4 mg/dL (hyporeflexia ~10mg/dL, resp dep/cardiac abnormalities ~14). Calculated serum osm 268 mOsm/kg. Repeat BMP and urine lytes ordered, RN Marilyn leyva.    Patient seen and examined at bedside. Endorsing nausea but no emesis since arrival to the floor. Has not completed oral potassium due to nausea, couldn't tolerate K riders. Reports no change in dyspnea, continued fatigue.     Physical Exam:  GENERAL: NAD, lying in bed comfortably  HEAD:  Atraumatic, normocephalic  NECK: Supple, trachea midline  HEART: Regular rate and rhythm, no murmurs, rubs, or gallops  LUNGS: Unlabored respirations. CTAB.  ABDOMEN: Soft, nontender, nondistended, +BS  EXTREMITIES: 2+ peripheral pulses bilaterally. No clubbing, cyanosis, or edema  NERVOUS SYSTEM:  A&Ox3, moving all extremities, no focal deficits, triceps, brachioradialis, achilles reflexes 2+ b/l, patellar 1+ b/l.   SKIN: No rashes or lesions Repeat BMP (ran by lab x2 w/ Na 128 mmol/L(hx of NSCLC, SIADH more common in SCLC > NSCLC) and Mg 3.4 mg/dL (hyporeflexia ~10mg/dL, resp dep/cardiac abnormalities ~14). Calculated serum osm 268 mOsm/kg. Repeat BMP and urine lytes ordered, YOSEF leyva.    Patient seen and examined at bedside. Endorsing nausea but no emesis since arrival to the floor. Has not completed oral potassium due to nausea, couldn't tolerate K riders. Reports no change in dyspnea, continued fatigue.     Physical Exam:  GENERAL: NAD, lying in bed comfortably  HEAD:  Atraumatic, normocephalic  NECK: Supple, trachea midline  HEART: Regular rate and rhythm, no murmurs, rubs, or gallops  LUNGS: Unlabored respirations. CTAB.  ABDOMEN: Soft, nontender, nondistended, +BS  EXTREMITIES: 2+ peripheral pulses bilaterally. No clubbing, cyanosis, or edema  NERVOUS SYSTEM:  A&Ox3, moving all extremities, no focal deficits, triceps, brachioradialis, achilles reflexes 2+ b/l, patellar 1+ b/l.   SKIN: No rashes or lesions

## 2024-03-16 NOTE — CHART NOTE - NSCHARTNOTEFT_GEN_A_CORE
03-16    128<L>  |  92<L>  |  7   ----------------------------<  170<H>  3.2<L>   |  23  |  0.59    Ca    9.1      16 Mar 2024 20:11  Mg     3.4     03-16    TPro  8.1  /  Alb  3.6  /  TBili  0.7  /  DBili  x   /  AST  22  /  ALT  21  /  AlkPhos  116  03-16    k still low. on krider 20 meq with LR@60 mls/hr. sodium is trending down. could be due to vomiting. however risk of SIADH in patient with lung cancer and brain mets. CT head/a/p not done yet.  ordered serum and urine osm, urine lytes, for now increased LR with KCL 20 meq from 60 mls/hr to 100 ml/hr. ordered one dose of KCL 40 meq. patient refusing KCL IV. repeat BMP ordered at 4 am. needs to limit IVF if any sign of SIADH. BS 170s. added diabetes order set. informed medical resident Dr. Frye about above and to discuss with  once all results are back and sodium continued to trending down. mg 3.7. no need further mg.

## 2024-03-16 NOTE — H&P ADULT - HISTORY OF PRESENT ILLNESS
Patient with history of lung cancer currently on chemotherapy, last chemotherapy on 3/7 , former smoker, had thoracic nerve block for pain which significantly helped her cancer rib pain.  Patient presents today has been having multiple episodes of nonbloody emesis since 530 this morning.  Also complains that she is having trouble breathing today which is new for her.  Finished radiation 2 weeks ago.  Patient typically tolerates chemo well.  Complains of constipation but had milk of magnesia yesterday and has had bowel movements.  Complains of chronic low back pain.  No chest pain but does note shortness of breath.  Denies leg pain or swelling. Patient with history of lung cancer currently on chemotherapy, last chemotherapy on 3/7 , former smoker, had thoracic nerve block for pain which significantly helped her cancer rib pain.  Patient presents today has been having multiple episodes of nonbloody emesis since 530 this morning.  Also complains that she is having trouble breathing today which is new for her.  Finished radiation 2 weeks ago.  Patient typically tolerates chemo well.  Complains of constipation but had milk of magnesia yesterday and has had bowel movements.  Complains of chronic low back pain.  No chest pain but does note shortness of breath.  Denies leg pain or swelling.    Reviewed above with patient. endorsed same. currently reported feeling unwell with nausea. no further vomiting. SOB same. admits intermittent diffuse CP and epigastric pain. intermittent constipation. denied fever/chills/ palpitation/dizziness/headaches/ leg pain/ dysuria. all other ROS neg. no fall or injury    in ER: VS showed  HTN< bradycardia. temp and sat normal. CTA reported right subsegmental PE, new Lumbar fracture, possible acute pancreatitis. NS bolus 1 liter, Dilaudid, Zofran and Lovenox 40 SQ was given and medical admission was called

## 2024-03-16 NOTE — H&P ADULT - ASSESSMENT
Patient with history of lung cancer currently on chemotherapy, last chemotherapy on 3/7 , former smoker, had thoracic nerve block for pain which significantly helped her cancer rib pain.  Patient presents today has been having multiple episodes of nonbloody emesis since 530 this morning.  Also complains that she is having trouble breathing today which is new for her.  Finished radiation 2 weeks ago.  Patient typically tolerates chemo well.  Complains of constipation but had milk of magnesia yesterday and has had bowel movements.  Complains of chronic low back pain.  No chest pain but does note shortness of breath.  Denies leg pain or swelling. Reviewed above with patient. endorsed same. currently reported feeling unwell with nausea. no further vomiting. SOB same. admits intermittent diffuse CP and epigastric pain. intermittent constipation. denied fever/chills/ palpitation/dizziness/headaches/ leg pain/ dysuria. all other ROS neg. no fall or injury. in ER: VS showed  HTN< bradycardia. temp and sat normal. CTA reported right subsegmental PE, new Lumbar fracture, possible acute pancreatitis. NS bolus 1 liter, Dilaudid, Zofran and Lovenox 40 SQ was given and medical admission was called    Right subsegmental PE  Chest pain, SOB: likely due to PE  h/o lung ca with Mets  - admit to tele  - tele, continue pulse ox for now. reassess in am  - CTA chest: : Right upper lobe subsegmental pulmonary thromboemboli. Decreased size of right apical mass extends through the chest wall and spine. New pathologic compression fractures of T2 and T3. Unchanged small groundglass and solid nodules in both lungs. New partially included peripancreatic fluid, possibly reflecting acute pancreatitis. Correlate with pancreatic enzymes and/or further evaluation with contrast-enhanced CT abdomen/pelvis.  - EKG: Sinus james@59 bpm, npn-specific conduction defect  - TTE  - s/p lovenox 40 mg stat SQ [ Wt 42 kg]. will c/w  Lovenox 40 mg SQ BID for now. will change to Eliquis in next couple of days. risk/benefit/alternative was discussed with patient and  Francisco J at bedside. verbalized understanding and agreed to continue  - monitor VS, h/h  - watch for bleeding  - Heme cx. [ will call service]    Abd pain/n/v:  suspected pancreatitis on CTA chest  - Lipase normal  - ordered abd usg. would avoid CT a/p for now as just got IC  - NPO except meds  - LR  - pain control: tylenol, dilaudid 02/0.5 IV Q4hrs PRN for now  - Protonix 40 IV daily  - zofran, Reglan PRN   - GI cx [ informed Dr. Juarez]   - monitor volume status and electrolytes    Hyponatremia  Hypokalemia  - likely due to vomiting  - s/p Krider 10 meq*2. will start kcl drip if continued vomiting  - repeat Mg and BMP at 2 pm  -  monitor on Tele    Lung ca with possible bone mets  New pathologic compression fractures of T2 and T3 on CT  - control pain  - home pain meds reconciled from pharmacy  - consider ortho eval    DVT-P: lovenox  GI: Protonix    updated  Francisco J at bedside       Patient with history of lung cancer currently on chemotherapy, last chemotherapy on 3/7 , former smoker, had thoracic nerve block for pain which significantly helped her cancer rib pain.  Patient presents today has been having multiple episodes of nonbloody emesis since 530 this morning.  Also complains that she is having trouble breathing today which is new for her.  Finished radiation 2 weeks ago.  Patient typically tolerates chemo well.  Complains of constipation but had milk of magnesia yesterday and has had bowel movements.  Complains of chronic low back pain.  No chest pain but does note shortness of breath.  Denies leg pain or swelling. Reviewed above with patient. endorsed same. currently reported feeling unwell with nausea. no further vomiting. SOB same. admits intermittent diffuse CP and epigastric pain. intermittent constipation. denied fever/chills/ palpitation/dizziness/headaches/ leg pain/ dysuria. all other ROS neg. no fall or injury. in ER: VS showed  HTN< bradycardia. temp and sat normal. CTA reported right subsegmental PE, new Lumbar fracture, possible acute pancreatitis. NS bolus 1 liter, Dilaudid, Zofran and Lovenox 40 SQ was given and medical admission was called    Right subsegmental PE  Chest pain, SOB: likely due to PE  h/o lung ca with Mets  - admit to tele  - tele, continue pulse ox for now. reassess in am  - CTA chest: : Right upper lobe subsegmental pulmonary thromboemboli. Decreased size of right apical mass extends through the chest wall and spine. New pathologic compression fractures of T2 and T3. Unchanged small groundglass and solid nodules in both lungs. New partially included peripancreatic fluid, possibly reflecting acute pancreatitis. Correlate with pancreatic enzymes and/or further evaluation with contrast-enhanced CT abdomen/pelvis.  - EKG: Sinus james@59 bpm, npn-specific conduction defect  - TTE  - s/p lovenox 40 mg stat SQ [ Wt 42 kg]. will c/w  Lovenox 40 mg SQ BID for now. will change to Eliquis in next couple of days. risk/benefit/alternative was discussed with patient and  Francisco J at bedside. verbalized understanding and agreed to continue  - monitor VS, h/h  - watch for bleeding  - Heme cx. [ will call service]    Abd pain/n/v:  suspected pancreatitis on CTA chest  - Lipase normal  - ordered abd usg. would avoid CT a/p for now as just got IC  - NPO except meds  - LR  - pain control: tylenol, dilaudid 02/0.5 IV Q4hrs PRN for now  - Protonix 40 IV daily  - zofran, Reglan PRN   - GI cx [ informed Dr. Juarez]   - monitor volume status and electrolytes    Hyponatremia  Hypokalemia  - likely due to vomiting  - s/p Krider 10 meq*2. will start kcl drip if continued vomiting  - repeat Mg and BMP at 2 pm  -  monitor on Tele    Elevated BP  - no h/o HTN  - BP elevated likely due to vomiting/pain  - control pain  - hydralazine IV PRN if BP >160/100    Lung ca with possible bone mets  New pathologic compression fractures of T2 and T3 on CT  - control pain  - home pain meds reconciled from pharmacy  - consider ortho eval    DVT-P: lovenox  GI: Protonix    updated  Francisco J at bedside       Patient with history of lung cancer currently on chemotherapy, last chemotherapy on 3/7 , former smoker, had thoracic nerve block for pain which significantly helped her cancer rib pain.  Patient presents today has been having multiple episodes of nonbloody emesis since 530 this morning.  Also complains that she is having trouble breathing today which is new for her.  Finished radiation 2 weeks ago.  Patient typically tolerates chemo well.  Complains of constipation but had milk of magnesia yesterday and has had bowel movements.  Complains of chronic low back pain.  No chest pain but does note shortness of breath.  Denies leg pain or swelling. Reviewed above with patient. endorsed same. currently reported feeling unwell with nausea. no further vomiting. SOB same. admits intermittent diffuse CP and epigastric pain. intermittent constipation. denied fever/chills/ palpitation/dizziness/headaches/ leg pain/ dysuria. all other ROS neg. no fall or injury. in ER: VS showed  HTN< bradycardia. temp and sat normal. CTA reported right subsegmental PE, new Lumbar fracture, possible acute pancreatitis. NS bolus 1 liter, Dilaudid, Zofran and Lovenox 40 SQ was given and medical admission was called    Right subsegmental PE  Chest pain, SOB: likely due to PE  h/o lung ca with Mets  - admit to tele  - tele, continue pulse ox for now. reassess in am  - CTA chest: : Right upper lobe subsegmental pulmonary thromboemboli. Decreased size of right apical mass extends through the chest wall and spine. New pathologic compression fractures of T2 and T3. Unchanged small groundglass and solid nodules in both lungs. New partially included peripancreatic fluid, possibly reflecting acute pancreatitis. Correlate with pancreatic enzymes and/or further evaluation with contrast-enhanced CT abdomen/pelvis.  - EKG: Sinus james@59 bpm, non-specific conduction defect  - TTE  - s/p lovenox 40 mg stat SQ [ Wt 42 kg]. will c/w  Lovenox 40 mg SQ BID for now. will change to Eliquis in next couple of days. risk/benefit/alternative was discussed with patient and  Francisco J at bedside. verbalized understanding and agreed to continue  - monitor VS, h/h  - watch for bleeding  - Heme cx. [ called service]    Abd pain/n/v:  suspected pancreatitis on CTA chest  - Lipase normal  - ordered abd usg. would avoid CT a/p for now as just got IC  - NPO except meds  - LR  - pain control: tylenol, dilaudid 02/0.5 IV Q4hrs PRN for now  - Protonix 40 IV daily  - zofran, Reglan PRN   - GI cx [ informed Dr. Juarez]   - monitor volume status and electrolytes    Hyponatremia  Hypokalemia  - likely due to vomiting  - s/p Krider 10 meq*2. will start kcl drip if continued vomiting  - repeat Mg and BMP at 2 pm  -  monitor on Tele    Elevated BP  - no h/o HTN  - BP elevated likely due to vomiting/pain  - control pain  - hydralazine IV PRN if BP >160/100    Lung ca with possible bone mets  New pathologic compression fractures of T2 and T3 on CTA  - control pain  - home pain meds reconciled from pharmacy  - ortho eval [ Informed , Texted on TEAMS]    DVT-P: lovenox  GI: Protonix    updated  Francisco J at bedside       Patient with history of lung cancer currently on chemotherapy, last chemotherapy on 3/7 , former smoker, had thoracic nerve block for pain which significantly helped her cancer rib pain.  Patient presents today has been having multiple episodes of nonbloody emesis since 530 this morning.  Also complains that she is having trouble breathing today which is new for her.  Finished radiation 2 weeks ago.  Patient typically tolerates chemo well.  Complains of constipation but had milk of magnesia yesterday and has had bowel movements.  Complains of chronic low back pain.  No chest pain but does note shortness of breath.  Denies leg pain or swelling. Reviewed above with patient. endorsed same. currently reported feeling unwell with nausea. no further vomiting. SOB same. admits intermittent diffuse CP and epigastric pain. intermittent constipation. denied fever/chills/ palpitation/dizziness/headaches/ leg pain/ dysuria. all other ROS neg. no fall or injury. in ER: VS showed  HTN< bradycardia. temp and sat normal. CTA reported right subsegmental PE, new Lumbar fracture, possible acute pancreatitis. NS bolus 1 liter, Dilaudid, Zofran and Lovenox 40 SQ was given and medical admission was called    Right subsegmental PE  Chest pain, SOB: likely due to PE  h/o lung ca with Mets  - admit to tele  - tele, continue pulse ox for now. reassess in am  - CTA chest: : Right upper lobe subsegmental pulmonary thromboemboli. Decreased size of right apical mass extends through the chest wall and spine. New pathologic compression fractures of T2 and T3. Unchanged small groundglass and solid nodules in both lungs. New partially included peripancreatic fluid, possibly reflecting acute pancreatitis. Correlate with pancreatic enzymes and/or further evaluation with contrast-enhanced CT abdomen/pelvis.  - EKG: Sinus james@59 bpm, non-specific conduction defect  - TTE  - s/p lovenox 40 mg stat SQ [ Wt 42 kg]. will c/w  Lovenox 40 mg SQ BID for now. will change to Eliquis in next couple of days. risk/benefit/alternative was discussed with patient and  Francisco J at bedside. verbalized understanding and agreed to continue  - monitor VS, h/h  - watch for bleeding  - Heme cx. [ called service, spoke to Dr. Casillas, routine consult. suggested to call the on call heme/onc on Monday. patient follows with Dr. Rao OP. informed her as well]    Abd pain/n/v:  suspected pancreatitis on CTA chest  - Lipase normal  - ordered abd usg. would avoid CT a/p for now as just got IC  - NPO except meds  - LR  - pain control: tylenol, dilaudid 02/0.5 IV Q4hrs PRN for now  - Protonix 40 IV daily  - zofran, Reglan PRN   - GI cx [ informed Dr. Juarez]   - monitor volume status and electrolytes    Hyponatremia  Hypokalemia  - likely due to vomiting  - s/p Krider 10 meq*2. will start kcl drip if continued vomiting  - repeat Mg and BMP at 2 pm  -  monitor on Tele    Elevated BP  - no h/o HTN  - BP elevated likely due to vomiting/pain  - control pain  - hydralazine IV PRN if BP >160/100    Lung ca with possible bone mets  New pathologic compression fractures of T2 and T3 on CTA  - control pain  - home pain meds reconciled from pharmacy  - ortho eval [ Informed , Texted on TEAMS]    DVT-P: lovenox  GI: Protonix    updated  Francisco J at bedside       Patient with history of lung cancer currently on chemotherapy, last chemotherapy on 3/7 , former smoker, had thoracic nerve block for pain which significantly helped her cancer rib pain.  Patient presents today has been having multiple episodes of nonbloody emesis since 530 this morning.  Also complains that she is having trouble breathing today which is new for her.  Finished radiation 2 weeks ago.  Patient typically tolerates chemo well.  Complains of constipation but had milk of magnesia yesterday and has had bowel movements.  Complains of chronic low back pain.  No chest pain but does note shortness of breath.  Denies leg pain or swelling. Reviewed above with patient. endorsed same. currently reported feeling unwell with nausea. no further vomiting. SOB same. admits intermittent diffuse CP and epigastric pain. intermittent constipation. denied fever/chills/ palpitation/dizziness/headaches/ leg pain/ dysuria. all other ROS neg. no fall or injury. in ER: VS showed  HTN< bradycardia. temp and sat normal. CTA reported right subsegmental PE, new Lumbar fracture, possible acute pancreatitis. NS bolus 1 liter, Dilaudid, Zofran and Lovenox 40 SQ was given and medical admission was called    Right subsegmental PE  Chest pain, SOB: likely due to PE  h/o lung ca with Mets  - admit to tele  - tele, continue pulse ox for now. reassess in am  - CTA chest: : Right upper lobe subsegmental pulmonary thromboemboli. Decreased size of right apical mass extends through the chest wall and spine. New pathologic compression fractures of T2 and T3. Unchanged small groundglass and solid nodules in both lungs. New partially included peripancreatic fluid, possibly reflecting acute pancreatitis. Correlate with pancreatic enzymes and/or further evaluation with contrast-enhanced CT abdomen/pelvis.  - EKG: Sinus james@59 bpm, non-specific conduction defect  - TTE  - s/p lovenox 40 mg stat SQ [ Wt 42 kg]. will c/w  Lovenox 40 mg SQ BID for now. will change to Eliquis in next couple of days. risk/benefit/alternative was discussed with patient and  Francisco J at bedside. verbalized understanding and agreed to continue  - monitor VS, h/h  - watch for bleeding  - Heme cx. [ called service, spoke to Dr. Casillas, routine consult. suggested to call the on call heme/onc on Monday. patient follows with Dr. Rao OP. informed her as well]    Abd pain/n/v:  suspected pancreatitis on CTA chest  - Lipase normal  - ordered abd usg. would avoid CT a/p for now as just got IC  - NPO except meds  - LR  - pain control: tylenol, dilaudid 02/0.5 IV Q4hrs PRN for now  - Protonix 40 IV daily  - zofran, Reglan PRN   - GI cx [ informed Dr. Juarez]   - monitor volume status and electrolytes    Hyponatremia  Hypokalemia  - likely due to vomiting  - s/p Krider 10 meq*2. will start kcl drip if continued vomiting  - repeat Mg and BMP at 2 pm  -  monitor on Tele    Elevated BP  - no h/o HTN  - BP elevated likely due to vomiting/pain  - control pain  - hydralazine IV PRN if BP >160/100    Lung ca with possible bone mets  New pathologic compression fractures of T2 and T3 on CTA  - control pain  - home pain meds reconciled from pharmacy  - ortho eval [ Informed , Texted on TEAMS]    Severe Malnutrition  - nutrition eval  - ensure    DVT-P: lovenox  GI: Protonix    updated  Francisco J at bedside       Patient with history of lung cancer currently on chemotherapy, last chemotherapy on 3/7 , former smoker, had thoracic nerve block for pain which significantly helped her cancer rib pain.  Patient presents today has been having multiple episodes of nonbloody emesis since 530 this morning.  Also complains that she is having trouble breathing today which is new for her.  Finished radiation 2 weeks ago.  Patient typically tolerates chemo well.  Complains of constipation but had milk of magnesia yesterday and has had bowel movements.  Complains of chronic low back pain.  No chest pain but does note shortness of breath.  Denies leg pain or swelling. Reviewed above with patient. endorsed same. currently reported feeling unwell with nausea. no further vomiting. SOB same. admits intermittent diffuse CP and epigastric pain. intermittent constipation. denied fever/chills/ palpitation/dizziness/headaches/ leg pain/ dysuria. all other ROS neg. no fall or injury. in ER: VS showed  HTN< bradycardia. temp and sat normal. CTA reported right subsegmental PE, new Lumbar fracture, possible acute pancreatitis. NS bolus 1 liter, Dilaudid, Zofran and Lovenox 40 SQ was given and medical admission was called    Right subsegmental PE  Chest pain, SOB: likely due to PE  h/o lung ca with Mets  - admit to tele  - tele, continue pulse ox for now. reassess in am  - CTA chest: : Right upper lobe subsegmental pulmonary thromboemboli. Decreased size of right apical mass extends through the chest wall and spine. New pathologic compression fractures of T2 and T3. Unchanged small groundglass and solid nodules in both lungs. New partially included peripancreatic fluid, possibly reflecting acute pancreatitis. Correlate with pancreatic enzymes and/or further evaluation with contrast-enhanced CT abdomen/pelvis.  - EKG: Sinus james@59 bpm, non-specific conduction defect  - TTE  - s/p lovenox 40 mg stat SQ [ Wt 42 kg]. will c/w  Lovenox 40 mg SQ BID for now. will change to Eliquis in next couple of days. risk/benefit/alternative was discussed with patient and  Francisco J at bedside. verbalized understanding and agreed to continue  - monitor VS, h/h  - watch for bleeding  - Heme cx. [ called service, spoke to Dr. Casillas, routine consult. suggested to call the on call heme/onc on Monday. patient follows with Dr. Rao OP. informed her as well]    Abd pain/n/v:  suspected pancreatitis on CTA chest  - Lipase normal  - ordered abd usg. would avoid CT a/p for now as just got IC  - NPO except meds  - LR  - pain control: tylenol, dilaudid 02/0.5 IV Q4hrs PRN for now  - Protonix 40 IV daily  - zofran, Reglan PRN   - GI cx [ informed Dr. Juarez]   - monitor volume status and electrolytes    Hyponatremia  Hypokalemia  - likely due to vomiting  - s/p Krider 10 meq*2. will start kcl drip if continued vomiting  - repeat Mg and BMP at 2 pm  -  monitor on Tele    Elevated BP  - no h/o HTN  - BP elevated likely due to vomiting/pain  - control pain  - hydralazine IV PRN if BP >160/100    Lung ca with possible bone mets  New pathologic compression fractures of T2 and T3 on CTA  - control pain  - home pain meds reconciled from pharmacy  - ortho eval [ Informed , Texted on TEAMS]    Severe Malnutrition  - nutrition eval  - ensure when PO is started    DVT-P: lovenox  GI: Protonix    updated  Francisco J at bedside       Patient with history of lung cancer currently on chemotherapy, last chemotherapy on 3/7 , former smoker, had thoracic nerve block for pain which significantly helped her cancer rib pain.  Patient presents today has been having multiple episodes of nonbloody emesis since 530 this morning.  Also complains that she is having trouble breathing today which is new for her.  Finished radiation 2 weeks ago.  Patient typically tolerates chemo well.  Complains of constipation but had milk of magnesia yesterday and has had bowel movements.  Complains of chronic low back pain.  No chest pain but does note shortness of breath.  Denies leg pain or swelling. Reviewed above with patient. endorsed same. currently reported feeling unwell with nausea. no further vomiting. SOB same. admits intermittent diffuse CP and epigastric pain. intermittent constipation. denied fever/chills/ palpitation/dizziness/headaches/ leg pain/ dysuria. all other ROS neg. no fall or injury. in ER: VS showed  HTN< bradycardia. temp and sat normal. CTA reported right subsegmental PE, new Lumbar fracture, possible acute pancreatitis. NS bolus 1 liter, Dilaudid, Zofran and Lovenox 40 SQ was given and medical admission was called    Right subsegmental PE  Chest pain, SOB: likely due to PE  h/o lung ca with Mets  - admit to tele  - tele, continue pulse ox for now. reassess in am  - CTA chest: : Right upper lobe subsegmental pulmonary thromboemboli. Decreased size of right apical mass extends through the chest wall and spine. New pathologic compression fractures of T2 and T3. Unchanged small groundglass and solid nodules in both lungs. New partially included peripancreatic fluid, possibly reflecting acute pancreatitis. Correlate with pancreatic enzymes and/or further evaluation with contrast-enhanced CT abdomen/pelvis.  - EKG: Sinus james@59 bpm, non-specific conduction defect  - TTE  - s/p lovenox 40 mg stat SQ [ Wt 42 kg]. will c/w  Lovenox 40 mg SQ BID for now. will change to Eliquis in next couple of days. risk/benefit/alternative was discussed with patient and  Francisco J at bedside. verbalized understanding and agreed to continue  - monitor VS, h/h  - watch for bleeding  - Heme cx. [ called service, spoke to Dr. Casillas, routine consult. suggested to call the on call heme/onc on Monday. patient follows with Dr. Rao OP. informed her as well]    Abd pain/n/v:  suspected pancreatitis on CTA chest  - Lipase normal  - ordered abd usg. would avoid CT a/p for now as just got IC  - NPO except meds  - LR  - pain control: tylenol, dilaudid 02/0.5 IV Q4hrs PRN for now  - Protonix 40 IV daily  - zofran, Reglan PRN   - GI cx [ informed Dr. Juarez]   - monitor volume status and electrolytes    Hyponatremia  Hypokalemia  - likely due to vomiting  - s/p Krider 10 meq*2. will start kcl drip if continued vomiting  - repeat Mg and BMP at 2 pm  -  monitor on Tele    Elevated BP  - no h/o HTN  - BP elevated likely due to vomiting/pain  - control pain  - hydralazine IV PRN if BP >160/100    Lung ca with possible bone mets  New pathologic compression fractures of T2 and T3 on CTA  - control pain  - home pain meds: MS Contin 15 mg Q12hrs PRN severe pain. [ last refilled2/3/24, 90 tab], Hydromorphone 4 mg : 1-2 tab Q6-8 hrs PRN for moderate pain [ last refilled 1/31, 360 tabs]  - ortho eval [ Informed , Texted on TEAMS]    Severe Malnutrition  - nutrition eval  - ensure when PO is started    DVT-P: lovenox  GI: Protonix    updated  Francisco J at bedside       Patient with history of lung cancer currently on chemotherapy, last chemotherapy on 3/7 , former smoker, had thoracic nerve block for pain which significantly helped her cancer rib pain.  Patient presents today has been having multiple episodes of nonbloody emesis since 530 this morning.  Also complains that she is having trouble breathing today which is new for her.  Finished radiation 2 weeks ago.  Patient typically tolerates chemo well.  Complains of constipation but had milk of magnesia yesterday and has had bowel movements.  Complains of chronic low back pain.  No chest pain but does note shortness of breath.  Denies leg pain or swelling. Reviewed above with patient. endorsed same. currently reported feeling unwell with nausea. no further vomiting. SOB same. admits intermittent diffuse CP and epigastric pain. intermittent constipation. denied fever/chills/ palpitation/dizziness/headaches/ leg pain/ dysuria. all other ROS neg. no fall or injury. in ER: VS showed  HTN< bradycardia. temp and sat normal. CTA reported right subsegmental PE, new Lumbar fracture, possible acute pancreatitis. NS bolus 1 liter, Dilaudid, Zofran and Lovenox 40 SQ was given and medical admission was called    Right subsegmental PE  Chest pain, SOB: likely due to PE  h/o lung ca with Mets  - admit to tele  - tele, continue pulse ox for now. reassess in am  - CTA chest: : Right upper lobe subsegmental pulmonary thromboemboli. Decreased size of right apical mass extends through the chest wall and spine. New pathologic compression fractures of T2 and T3. Unchanged small groundglass and solid nodules in both lungs. New partially included peripancreatic fluid, possibly reflecting acute pancreatitis. Correlate with pancreatic enzymes and/or further evaluation with contrast-enhanced CT abdomen/pelvis.  - EKG: Sinus james@59 bpm, non-specific conduction defect  - TTE  - s/p lovenox 40 mg stat SQ [ Wt 42 kg]. will c/w  Lovenox 40 mg SQ BID for now. will change to Eliquis in next couple of days. risk/benefit/alternative was discussed with patient and  Francisco J at bedside. verbalized understanding and agreed to continue  - monitor VS, h/h  - watch for bleeding  - Heme cx. [ called service, spoke to Dr. Casillas, routine consult. suggested to call the on call heme/onc on Monday. patient follows with Dr. Rao OP. informed her as well]    Abd pain/n/v:  suspected pancreatitis on CTA chest  - Lipase normal  - ordered abd usg. would avoid CT a/p for now as just got IC  - NPO except meds  - LR  - pain control: tylenol, dilaudid 02/0.5 IV Q4hrs PRN for now  - Protonix 40 IV daily  - zofran, Reglan PRN   - GI cx [ informed Dr. Juarez]   - monitor volume status and electrolytes    Hyponatremia  Hypokalemia  - likely due to vomiting  - s/p Krider 10 meq*2. will start kcl drip if continued vomiting  - repeat Mg and BMP at 2 pm  -  monitor on Tele    Elevated BP  - no h/o HTN  - BP elevated likely due to vomiting/pain  - control pain  - hydralazine IV PRN if BP >160/100    Lung ca with possible bone mets  New pathologic compression fractures of T2 and T3 on CTA  - control pain  - home pain meds: MS Contin 15 mg Q12hrs PRN severe pain. [ last refilled2/3/24, 90 tab], Hydromorphone 4 mg : 1-2 tab Q6-8 hrs PRN for moderate pain [ last refilled 1/31, 360 tabs]  - ortho eval [ Informed , Texted on TEAMS]    Anxiety  - home meds: Xanax 0.5 mg daily PRN. last refill 2/28, 30 tab]  - c/w xanax PRN    Severe Malnutrition  - nutrition eval  - ensure when PO is started    DVT-P: lovenox  GI: Protonix    updated  Francisco J at bedside       Patient with history of lung cancer currently on chemotherapy, last chemotherapy on 3/7 , former smoker, had thoracic nerve block for pain which significantly helped her cancer rib pain.  Patient presents today has been having multiple episodes of nonbloody emesis since 530 this morning.  Also complains that she is having trouble breathing today which is new for her.  Finished radiation 2 weeks ago.  Patient typically tolerates chemo well.  Complains of constipation but had milk of magnesia yesterday and has had bowel movements.  Complains of chronic low back pain.  No chest pain but does note shortness of breath.  Denies leg pain or swelling. Reviewed above with patient. endorsed same. currently reported feeling unwell with nausea. no further vomiting. SOB same. admits intermittent diffuse CP and epigastric pain. intermittent constipation. denied fever/chills/ palpitation/dizziness/headaches/ leg pain/ dysuria. all other ROS neg. no fall or injury. in ER: VS showed  HTN< bradycardia. temp and sat normal. CTA reported right subsegmental PE, new Lumbar fracture, possible acute pancreatitis. NS bolus 1 liter, Dilaudid, Zofran and Lovenox 40 SQ was given and medical admission was called    Right subsegmental PE  Chest pain, SOB: likely due to PE  h/o lung ca with Mets  - admit to tele  - tele, continue pulse ox for now. reassess in am  - CTA chest: : Right upper lobe subsegmental pulmonary thromboemboli. Decreased size of right apical mass extends through the chest wall and spine. New pathologic compression fractures of T2 and T3. Unchanged small groundglass and solid nodules in both lungs. New partially included peripancreatic fluid, possibly reflecting acute pancreatitis. Correlate with pancreatic enzymes and/or further evaluation with contrast-enhanced CT abdomen/pelvis.  - EKG: Sinus james@59 bpm, non-specific conduction defect  - TTE  - s/p lovenox 40 mg stat SQ [ Wt 42 kg]. will c/w  Lovenox 40 mg SQ BID for now. will change to Eliquis in next couple of days. risk/benefit/alternative was discussed with patient and  Francisco J at bedside. verbalized understanding and agreed to continue  - monitor VS, h/h  - watch for bleeding  - Heme cx. [ called service, spoke to Dr. Casillas, routine consult. suggested to call the on call heme/onc on Monday. patient follows with Dr. Rao OP. informed her as well]    Abd pain/n/v:  suspected pancreatitis on CTA chest  - Lipase normal  - ordered abd usg. would avoid CT a/p for now as just got IC  - NPO except meds  - LR  - pain control: tylenol, dilaudid 02/0.5 IV Q4hrs PRN for now  - Protonix 40 IV daily  - zofran, Reglan PRN   - GI cx [ informed Dr. Juarez]   - monitor volume status and electrolytes    Hyponatremia  Hypokalemia  - likely due to vomiting  - s/p Krider 10 meq*2. will start kcl drip if continued vomiting  - repeat Mg and BMP at 2 pm  -  monitor on Tele    Elevated BP  - no h/o HTN  - BP elevated likely due to vomiting/pain  - control pain  - hydralazine IV PRN if BP >160/100    Lung ca with possible bone mets  New pathologic compression fractures of T2 and T3 on CTA  - control pain  - home pain meds: MS Contin 15 mg Q12hrs PRN severe pain. [ last refilled2/3/24, 90 tab], Hydromorphone 4 mg : 1-2 tab Q6-8 hrs PRN for moderate pain [ last refilled 1/31, 360 tabs]  - ortho eval. discussed with , ortho will order Brace    Anxiety  - home meds: Xanax 0.5 mg daily PRN. last refill 2/28, 30 tab]  - c/w xanax PRN    Severe Malnutrition  - nutrition eval  - ensure when PO is started    DVT-P: lovenox  GI: Protonix    updated  Francisco J at bedside

## 2024-03-17 NOTE — PROGRESS NOTE ADULT - ASSESSMENT
T2 and T3 pathologic compression fractures.  Clinically asymptomatic at this time.  Advise a TLSO for protection of the spine  Brace has been ordered.  PT OOB as tolerated  F/u with oncology and spine surgery after discharge.

## 2024-03-17 NOTE — PROGRESS NOTE ADULT - SUBJECTIVE AND OBJECTIVE BOX
KAELYN VALENTE      58y Female with history of lung cancer currently on chemotherapy, last chemotherapy on 3/7 , former smoker, had thoracic nerve block for pain which significantly helped her cancer rib pain.  Patient presents today has been having multiple episodes of nonbloody emesis since 530 this morning.  Also complains that she is having trouble breathing today which is new for her.  Finished radiation 2 weeks ago.  Patient typically tolerates chemo well.  Complains of constipation but had milk of magnesia yesterday and has had bowel movements.  Complains of chronic low back pain.  No chest pain but does note shortness of breath.  Denies leg pain or swelling.    Reviewed above with patient. endorsed same. currently reported feeling unwell with nausea. no further vomiting. SOB same. admits intermittent diffuse CP and epigastric pain. intermittent constipation. denied fever/chills/ palpitation/dizziness/headaches/ leg pain/ dysuria. all other ROS neg. no fall or injury    Patient denies back pain but was found to have new pathologic fractures of T2 and T3 on CT scan.                                                                                                                                                         11.4   5.16  )-----------( 174      ( 16 Mar 2024 09:35 )             34.0                     03-17    131<L>  |  97  |  9   ----------------------------<  126<H>  4.2   |  24  |  0.58    Ca    9.0      17 Mar 2024 07:18  Mg     2.2     03-17    TPro  8.1  /  Alb  3.6  /  TBili  0.7  /  DBili  x   /  AST  22  /  ALT  21  /  AlkPhos  116  03-16      Physical Exam :    -   Back with skin C/D/I, no midline or paraspinal tenderness in the thoracic spine, no pain with movement.   -   B/L LE's Neurvascular status intact grossly.   -   Warm well perfused; capillary refill <3 seconds   -   (+)EHL/FHL 5/5  -   (+) Sensation to light touch  -   (-) Calf tenderness Bilaterally    CT reviewed which shows pathologic compression fractures of T2 and T3 Detail Level: None

## 2024-03-17 NOTE — DIETITIAN INITIAL EVALUATION ADULT - PERTINENT MEDS FT
MEDICATIONS  (STANDING):  dextrose 5%. 1000 milliLiter(s) (50 mL/Hr) IV Continuous <Continuous>  dextrose 5%. 1000 milliLiter(s) (100 mL/Hr) IV Continuous <Continuous>  enoxaparin Injectable 40 milliGRAM(s) SubCutaneous every 12 hours  gabapentin 600 milliGRAM(s) Oral three times a day  glucagon  Injectable 1 milliGRAM(s) IntraMuscular once    MEDICATIONS  (PRN):  acetaminophen     Tablet .. 650 milliGRAM(s) Oral every 6 hours PRN Temp greater or equal to 38C (100.4F), Mild Pain (1 - 3)  ALPRAZolam 0.5 milliGRAM(s) Oral daily PRN severe anxiety  hydrALAZINE Injectable 10 milliGRAM(s) IV Push every 6 hours PRN BP>160 SBP or >100 DBP  HYDROmorphone  Injectable 0.2 milliGRAM(s) IV Push every 4 hours PRN Moderate Pain (4 - 6)  HYDROmorphone  Injectable 0.5 milliGRAM(s) IV Push every 4 hours PRN Severe Pain (7 - 10)  ondansetron Injectable 4 milliGRAM(s) IV Push every 6 hours PRN Nausea and/or Vomiting

## 2024-03-17 NOTE — DIETITIAN INITIAL EVALUATION ADULT - PERTINENT LABORATORY DATA
03-17    131<L>  |  97  |  9   ----------------------------<  126<H>  4.2   |  24  |  0.58    Ca    9.0      17 Mar 2024 07:18  Mg     2.2     03-17    TPro  8.1  /  Alb  3.6  /  TBili  0.7  /  DBili  x   /  AST  22  /  ALT  21  /  AlkPhos  116  03-16  POCT Blood Glucose.: 170 mg/dL (03-16-24 @ 23:09)  A1C with Estimated Average Glucose Result: 5.8 % (01-23-24 @ 06:30)

## 2024-03-17 NOTE — DIETITIAN INITIAL EVALUATION ADULT - ORAL INTAKE PTA/DIET HISTORY
Pt endorses decreased appetite in setting of chemo tx with lung CA. Dislikes oral nutrition supplements; states that she drinks milk shakes & tries to eat 3 meals/day. No chewing/swallow issues. NKFA/intolerances.

## 2024-03-17 NOTE — PROGRESS NOTE ADULT - SUBJECTIVE AND OBJECTIVE BOX
Patient is a 58y old  Female who presents with a chief complaint of suspected acute pancreatitis, new PE (16 Mar 2024 16:25)    Reports was anxious overnight and did not sleep well  Denies N/V, abd pain  Tolerating clear liquid diet  Reports breathing is improved, denies chest pain    Patient seen and examined at bedside.    ALLERGIES:  morphine (Sedation/Somnol)  No Known Allergies    MEDICATIONS  (STANDING):  dextrose 5%. 1000 milliLiter(s) (100 mL/Hr) IV Continuous <Continuous>  dextrose 5%. 1000 milliLiter(s) (50 mL/Hr) IV Continuous <Continuous>  dextrose 50% Injectable 25 Gram(s) IV Push once  dextrose 50% Injectable 12.5 Gram(s) IV Push once  dextrose 50% Injectable 25 Gram(s) IV Push once  enoxaparin Injectable 40 milliGRAM(s) SubCutaneous every 12 hours  gabapentin 600 milliGRAM(s) Oral three times a day  glucagon  Injectable 1 milliGRAM(s) IntraMuscular once  insulin lispro (ADMELOG) corrective regimen sliding scale   SubCutaneous at bedtime  insulin lispro (ADMELOG) corrective regimen sliding scale   SubCutaneous three times a day before meals  LORazepam     Tablet 0.5 milliGRAM(s) Oral once  pantoprazole  Injectable 40 milliGRAM(s) IV Push daily    MEDICATIONS  (PRN):  acetaminophen     Tablet .. 650 milliGRAM(s) Oral every 6 hours PRN Temp greater or equal to 38C (100.4F), Mild Pain (1 - 3)  ALPRAZolam 0.5 milliGRAM(s) Oral daily PRN severe anxiety  dextrose Oral Gel 15 Gram(s) Oral once PRN Blood Glucose LESS THAN 70 milliGRAM(s)/deciliter  hydrALAZINE Injectable 10 milliGRAM(s) IV Push every 6 hours PRN BP>160 SBP or >100 DBP  HYDROmorphone  Injectable 0.5 milliGRAM(s) IV Push every 4 hours PRN Severe Pain (7 - 10)  HYDROmorphone  Injectable 0.2 milliGRAM(s) IV Push every 4 hours PRN Moderate Pain (4 - 6)  ondansetron Injectable 4 milliGRAM(s) IV Push every 6 hours PRN Nausea and/or Vomiting    Vital Signs Last 24 Hrs  T(F): 98.5 (17 Mar 2024 05:43), Max: 98.5 (17 Mar 2024 05:43)  HR: 89 (17 Mar 2024 05:43) (89 - 90)  BP: 139/89 (17 Mar 2024 05:43) (139/89 - 164/72)  RR: 18 (17 Mar 2024 05:43) (17 - 18)  SpO2: 98% (17 Mar 2024 05:43) (98% - 100%)  I&O's Summary    BMI (kg/m2): 17.8 (03-16-24 @ 09:06)  PHYSICAL EXAM:  General: NAD, A/O x 3, thin frail female, sleepy but arousable, speaking in full sentences, oxygen via NC  ENT: MMM, no scleral icterus  Neck: Supple, No JVD, no thyroidomegaly  Lungs: Clear to auscultation bilaterally, no wheezes, no rales, no rhonchi, good inspiratory effort  Cardio: RRR, S1/S2, No murmurs  Abdomen: Soft, Nontender, Nondistended; Bowel sounds present  Extremities: No calf tenderness, No pitting edema, no skin changes    LABS:                        11.4   5.16  )-----------( 174      ( 16 Mar 2024 09:35 )             34.0       03-17    131  |  97  |  9   ----------------------------<  126  4.2   |  24  |  0.58    Ca    9.0      17 Mar 2024 07:18  Mg     2.2     03-17    TPro  8.1  /  Alb  3.6  /  TBili  0.7  /  DBili  x   /  AST  22  /  ALT  21  /  AlkPhos  116  03-16     CARDIAC MARKERS ( 16 Mar 2024 09:35 )  x     / 14.7 ng/L / x     / x     / x          01-23 Chol 211 mg/dL LDL -- HDL 52 mg/dL Trig 127 mg/dL    OCT Blood Glucose.: 170 mg/dL (16 Mar 2024 23:09)    Urinalysis Basic - ( 17 Mar 2024 07:18 )    Color: x / Appearance: x / SG: x / pH: x  Gluc: 126 mg/dL / Ketone: x  / Bili: x / Urobili: x   Blood: x / Protein: x / Nitrite: x   Leuk Esterase: x / RBC: x / WBC x   Sq Epi: x / Non Sq Epi: x / Bacteria: x    RADIOLOGY    CT Angio Chest PE Protocol w/ IV Cont (03.16.24 @ 10:51) >    IMPRESSION:    Right upper lobe subsegmental pulmonary thromboemboli. Findings were   discussed with Dr. Birmingham at 11:17 AM on 3/16/2024.    Decreased size of right apical mass extends through the chest wall and   spine.    New pathologic compression fractures of T2 and T3.    Unchanged small groundglass and solid nodules in both lungs.    New partially included peripancreatic fluid, possibly reflecting acute   pancreatitis. Correlate with pancreatic enzymes and/or further evaluation   with contrast-enhanced CT abdomen/pelvis.

## 2024-03-17 NOTE — DIETITIAN INITIAL EVALUATION ADULT - OTHER INFO
Patient with history of lung cancer currently on chemotherapy, last chemotherapy on 3/7 , former smoker, had thoracic nerve block for pain which significantly helped her cancer rib pain.  Patient presents today has been having multiple episodes of nonbloody emesis since 530 this morning.  Also complains that she is having trouble breathing today which is new for her.  Finished radiation 2 weeks ago.  Patient typically tolerates chemo well.  Complains of constipation but had milk of magnesia yesterday and has had bowel movements.  Complains of chronic low back pain.  No chest pain but does note shortness of breath.  Denies leg pain or swelling. Suspected pancreatitis.    Pt tolerating clear liquid diet at present, reports feeling a little hungry. Reports 26lbs weight loss over the last 6 months. UBW was ~120lbs. CBW 94lbs. NFPE with evidence of severe muscle wasting/fat loss. No pressure ulcers or edema. Per hospitalist: CTAP done, waiting read- if no pancreatitis, plan to advance diet. Pt declines oral nutrition supplements. Receptive to Greek yogurt/ice cream for added protein & calories. Recommend regular diet as able + supplement Vitamin D. Reviewed ways to fortify diet at home to increase protein intake and maintain LBM.

## 2024-03-17 NOTE — PROGRESS NOTE ADULT - ASSESSMENT
Patient with history of lung cancer currently on chemotherapy, last chemotherapy on 3/7 , former smoker, had thoracic nerve block for pain which significantly helped her cancer rib pain.  Patient presents today has been having multiple episodes of nonbloody emesis since 530 this morning.  Also complains that she is having trouble breathing today which is new for her.  Finished radiation 2 weeks ago.  Patient typically tolerates chemo well.  Complains of constipation but had milk of magnesia yesterday and has had bowel movements.  Complains of chronic low back pain.  No chest pain but does note shortness of breath.  Denies leg pain or swelling. Reviewed above with patient. endorsed same. currently reported feeling unwell with nausea. no further vomiting. SOB same. admits intermittent diffuse CP and epigastric pain. intermittent constipation. denied fever/chills/ palpitation/dizziness/headaches/ leg pain/ dysuria. all other ROS neg. no fall or injury. in ER: VS showed  HTN< bradycardia. temp and sat normal. CTA reported right subsegmental PE, new Lumbar fracture, possible acute pancreatitis. NS bolus 1 liter, Dilaudid, Zofran and Lovenox 40 SQ was given and medical admission was called    Right subsegmental PE  Chest pain, SOB: likely due to PE  h/o lung ca with Mets  - admit to tele  - tele, continue pulse ox for now. reassess in am  - CTA chest: : Right upper lobe subsegmental pulmonary thromboemboli. Decreased size of right apical mass extends through the chest wall and spine. New pathologic compression fractures of T2 and T3. Unchanged small groundglass and solid nodules in both lungs. New partially included peripancreatic fluid, possibly reflecting acute pancreatitis. Correlate with pancreatic enzymes and/or further evaluation with contrast-enhanced CT abdomen/pelvis.  - EKG: Sinus james@59 bpm, non-specific conduction defect  - TTE  -  c/w  Lovenox 40 mg SQ BID for now. will change to Eliquis in next couple of days.   - Heme cx. [ called service, spoke to Dr. Casillas, routine consult. suggested to call the on call heme/onc on Monday. patient follows with Dr. Rao OP. informed her as well]    Abd pain/n/v:  suspected pancreatitis on CTA chest  - Lipase normal  - ordered abd usg. would avoid CT a/p for now as just got IC  - NPO except meds  - LR  - pain control: tylenol, dilaudid 02/0.5 IV Q4hrs PRN for now  - Protonix 40 IV daily  - zofran, Reglan PRN   - GI cx [ informed Dr. Juarez]   - monitor volume status and electrolytes    Hyponatremia  Hypokalemia  - likely due to vomiting  - s/p Krider 10 meq*2. will start kcl drip if continued vomiting  - repeat Mg and BMP at 2 pm  -  monitor on Tele    Elevated BP  - no h/o HTN  - BP elevated likely due to vomiting/pain  - control pain  - hydralazine IV PRN if BP >160/100    Lung ca with possible bone mets  New pathologic compression fractures of T2 and T3 on CTA  - control pain  - home pain meds: MS Contin 15 mg Q12hrs PRN severe pain. [ last refilled2/3/24, 90 tab], Hydromorphone 4 mg : 1-2 tab Q6-8 hrs PRN for moderate pain [ last refilled 1/31, 360 tabs]  - ortho eval. discussed with , ortho will order Brace    Anxiety  - home meds: Xanax 0.5 mg daily PRN. last refill 2/28, 30 tab]  - c/w xanax PRN    Severe Malnutrition  - nutrition eval  - ensure when PO is started    DVT-P: lovenox  GI: Protonix    updated  Francisco J at bedside     Patient with history of lung cancer currently on chemotherapy, last chemotherapy on 3/7 , former smoker, had thoracic nerve block for pain which significantly helped her cancer rib pain.  Patient presents today has been having multiple episodes of nonbloody emesis since 530 this morning.  Also complains that she is having trouble breathing today which is new for her.  Finished radiation 2 weeks ago.  Patient typically tolerates chemo well.  Complains of constipation but had milk of magnesia yesterday and has had bowel movements.  Complains of chronic low back pain.  No chest pain but does note shortness of breath.  Denies leg pain or swelling. Reviewed above with patient. endorsed same. currently reported feeling unwell with nausea. no further vomiting. SOB same. admits intermittent diffuse CP and epigastric pain. intermittent constipation. denied fever/chills/ palpitation/dizziness/headaches/ leg pain/ dysuria. all other ROS neg. no fall or injury. in ER: VS showed  HTN< bradycardia. temp and sat normal. CTA reported right subsegmental PE, new Lumbar fracture, possible acute pancreatitis. NS bolus 1 liter, Dilaudid, Zofran and Lovenox 40 SQ was given and medical admission was called    Right subsegmental PE  Chest pain, SOB: likely due to PE  h/o lung ca with Mets  - admit to tele  - tele, continue pulse ox for now. reassess in am  - CTA chest: : Right upper lobe subsegmental pulmonary thromboemboli. Decreased size of right apical mass extends through the chest wall and spine. New pathologic compression fractures of T2 and T3. Unchanged small groundglass and solid nodules in both lungs. New partially included peripancreatic fluid, possibly reflecting acute pancreatitis. Correlate with pancreatic enzymes and/or further evaluation with contrast-enhanced CT abdomen/pelvis.  - EKG: Sinus james@59 bpm, non-specific conduction defect  - TTE  -  c/w  Lovenox 40 mg SQ BID for now. will change to Eliquis in next couple of days.   -  Will consult heme/onc in AM    Abd pain/n/v, resolved, possible suspected pancreatitis on CTA chest  - Lipase normal, no abd pain  - CTAP done, waiting read  - C/w clear liquid diet; if no pancreatitis will advance diet  - GI consulted  - pain control: tylenol, dilaudid 02/0.5 IV Q4hrs PRN for now  - Protonix 40 po daily  - zofran, Reglan PRN     Hyponatremia  Hypokalemia  - likely due to vomiting  - Monitor BMP    Elevated BP  - no h/o HTN  - BP elevated likely due to vomiting/pain  - control pain  - hydralazine IV PRN if BP >160/100    Lung ca with possible bone mets  New pathologic compression fractures of T2 and T3 on CTA  - control pain  - home pain meds: MS Contin 15 mg Q12hrs PRN severe pain. [ last refilled2/3/24, 90 tab], Hydromorphone 4 mg : 1-2 tab Q6-8 hrs PRN for moderate pain [ last refilled 1/31, 360 tabs]  - ortho eval. discussed with , ortho will order Brace    Anxiety  - home meds: Xanax 0.5 mg daily PRN. last refill 2/28, 30 tab]  - c/w xanax PRN    Severe Malnutrition  - nutrition eval  - ensure when PO is started    DVT-P: lovenox  GI: Protonix  AM labs

## 2024-03-17 NOTE — DIETITIAN NUTRITION RISK NOTIFICATION - TREATMENT: THE FOLLOWING DIET HAS BEEN RECOMMENDED
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 OD.  Plan: Continue to monitor Diet, Clear Liquid (03-17-24 @ 00:28) [Active]

## 2024-03-18 NOTE — DISCHARGE NOTE PROVIDER - HOSPITAL COURSE
Hospital Course  HPI:  Patient with history of lung cancer currently on chemotherapy, last chemotherapy on 3/7 , former smoker, had thoracic nerve block for pain which significantly helped her cancer rib pain.  Patient presents today has been having multiple episodes of nonbloody emesis since 530 this morning.  Also complains that she is having trouble breathing today which is new for her.  Finished radiation 2 weeks ago.  Patient typically tolerates chemo well.  Complains of constipation but had milk of magnesia yesterday and has had bowel movements.  Complains of chronic low back pain.  No chest pain but does note shortness of breath.  Denies leg pain or swelling.    Reviewed above with patient. endorsed same. currently reported feeling unwell with nausea. no further vomiting. SOB same. admits intermittent diffuse CP and epigastric pain. intermittent constipation. denied fever/chills/ palpitation/dizziness/headaches/ leg pain/ dysuria. all other ROS neg. no fall or injury    in ER: VS showed  HTN< bradycardia. temp and sat normal. CTA reported right subsegmental PE, new Lumbar fracture, possible acute pancreatitis. NS bolus 1 liter, Dilaudid, Zofran and Lovenox 40 SQ was given and medical admission was called (16 Mar 2024 12:06)      Admitted to telemetry  Oncology consulted; started on full dose lovenox  ULT LE was negative for DVT   Patient was titrated off of oxygen  Reports feeling well  Stable for discharge home   F/U PCP, oncology

## 2024-03-18 NOTE — DISCHARGE NOTE PROVIDER - PROVIDER TOKENS
PROVIDER:[TOKEN:[[id number]]],FREE:[LAST:[Sal],PHONE:[(   )    -],FAX:[(   )    -],ADDRESS:[[phone] orthotist; if you have any issues with brace]]

## 2024-03-18 NOTE — DISCHARGE NOTE PROVIDER - NSDCMRMEDTOKEN_GEN_ALL_CORE_FT
Eliquis 5 mg oral tablet: 10 tab(s) orally 2 times a day please take for five more days, 3/19-3/23  Eliquis 5 mg oral tablet: 1 tab(s) orally 2 times a day please start this on 3/24/24  gabapentin 600 mg oral tablet: 1 tab(s) orally 3 times a day  HYDROmorphone 4 mg oral tablet: 1 tab(s) orally every 6 hours as needed for pain 1-2 tab orally every 6-8 hours PRN  meloxicam 15 mg oral tablet: 1 tab(s) orally once a day LD 2/20/24  MS Contin 15 mg oral tablet, extended release: 1 tab(s) orally every 12 hours as needed for  severe pain  Tylenol 500 mg oral tablet: 1 tab(s) orally every 8 hours as needed  Xanax 0.5 mg oral tablet: 1 tab(s) orally once a day as needed for severe anxiety

## 2024-03-18 NOTE — CONSULT NOTE ADULT - SUBJECTIVE AND OBJECTIVE BOX
KAELYN VALENTE  58y  Female  Admitting: BRIAN Rodriguez    HPI:  Patient with history of lung cancer currently on chemotherapy, last chemotherapy on 3/7 , former smoker, had thoracic nerve block for pain which significantly helped her cancer rib pain.  Patient presented having multiple episodes of nonbloody emesis x 1 day.  Also SOB. Finished radiation 2 weeks ago.  Complained of constipation but had milk of magnesia yesterday and has had bowel movements.  Complains of chronic low back pain.  No chest pain. Denied leg pain or swelling.    CTA reported right subsegmental PE, new Lumbar fracture, possible acute pancreatitis. NS bolus 1 liter, Dilaudid, Zofran and Lovenox 40 SQ was given and medical admission was called (16 Mar 2024 12:06)  Oncology consulted for h/o lung cancer/PE.    PAST MEDICAL & SURGICAL HISTORY:  Mass of right lung      COPD (chronic obstructive pulmonary disease)      Smoker      Chronic pain syndrome      HLD (hyperlipidemia)      Melanoma in situ      History of blood transfusion      Malignant neoplasm of right bronchus      H/O reduction of orbital fracture      S/P wrist surgery        HEALTH ISSUES - PROBLEM Dx:  Pulmonary thromboembolism    Abnormal finding on imaging      MEDICATIONS  (STANDING):  dextrose 5%. 1000 milliLiter(s) (50 mL/Hr) IV Continuous <Continuous>  dextrose 5%. 1000 milliLiter(s) (100 mL/Hr) IV Continuous <Continuous>  enoxaparin Injectable 40 milliGRAM(s) SubCutaneous every 12 hours  gabapentin 600 milliGRAM(s) Oral three times a day  glucagon  Injectable 1 milliGRAM(s) IntraMuscular once  pantoprazole    Tablet 40 milliGRAM(s) Oral before breakfast    MEDICATIONS  (PRN):  acetaminophen     Tablet .. 650 milliGRAM(s) Oral every 6 hours PRN Temp greater or equal to 38C (100.4F), Mild Pain (1 - 3)  ALPRAZolam 0.5 milliGRAM(s) Oral daily PRN severe anxiety  hydrALAZINE Injectable 10 milliGRAM(s) IV Push every 6 hours PRN BP>160 SBP or >100 DBP  HYDROmorphone  Injectable 0.2 milliGRAM(s) IV Push every 4 hours PRN Moderate Pain (4 - 6)  HYDROmorphone  Injectable 0.5 milliGRAM(s) IV Push every 4 hours PRN Severe Pain (7 - 10)  ondansetron Injectable 4 milliGRAM(s) IV Push every 6 hours PRN Nausea and/or Vomiting    Allergies    No Known Allergies    Intolerances    morphine (Sedation/Somnol)      FAMILY HISTORY:  FH: heart attack (Father, Mother)    FH: stomach cancer (Grandparent)    FH: lung cancer (Sibling)        SOCIAL HISTORY: No EtOH, no tobacco    REVIEW OF SYSTEMS:    CONSTITUTIONAL: No weakness, fevers or chills  EYES/ENT: No visual changes;  No vertigo or throat pain   NECK: No pain or stiffness  RESPIRATORY: No cough, wheezing, hemoptysis; No shortness of breath  CARDIOVASCULAR: No chest pain or palpitations  GASTROINTESTINAL: No abdominal or epigastric pain. No nausea, vomiting, or hematemesis; No diarrhea or constipation. No melena or hematochezia.  GENITOURINARY: No dysuria, frequency or hematuria  NEUROLOGICAL: No numbness or weakness  SKIN: No itching, burning, rashes, or lesions   All other review of systems is negative unless indicated above.        T(F): 98 (03-18-24 @ 05:19), Max: 98.6 (03-17-24 @ 22:23)  HR: 96 (03-18-24 @ 05:19)  BP: 123/88 (03-18-24 @ 05:19)  RR: 18 (03-18-24 @ 05:19)  SpO2: 98% (03-18-24 @ 05:19)      GENERAL: NAD, well-developed  HEAD:  Atraumatic, Normocephalic  EYES: EOMI, PERRLA, conjunctiva and sclera clear  NECK: Supple, No JVD  CHEST/LUNG: few scattered crackles ant.  HEART: Regular rate and rhythm; No murmurs, rubs, or gallops  ABDOMEN: Soft, Nontender, Nondistended; Bowel sounds present  EXTREMITIES:  2+ Peripheral Pulses, No clubbing, cyanosis, or edema  NEUROLOGY: awake, alert      Labs:             12.4   5.10  )-----------( 122      ( 03-18 @ 05:24 )             37.6                11.4   5.16  )-----------( 174      ( 03-16 @ 09:35 )             34.0       03-18    135  |  98  |  16  ----------------------------<  100<H>  3.6   |  24  |  0.60    Ca    9.3      18 Mar 2024 05:24  Mg     1.9     03-18    TPro  7.4  /  Alb  3.2<L>  /  TBili  0.7  /  DBili  0.2  /  AST  17  /  ALT  26  /  AlkPhos  96  03-18    Magnesium: 1.9 mg/dL [1.6 - 2.6] (03-18 @ 05:24)    Consultant notes reviewed : YES [x ] ; NO [ ]      Radiology and additional tests:  < from: US Duplex Venous Lower Ext Complete, Bilateral (03.17.24 @ 15:05) >  IMPRESSION:  No evidence of deep venous thrombosis in either lower extremity.    < end of copied text >  < from: CT Abdomen and Pelvis w/ IV Cont (03.17.24 @ 11:01) >  IMPRESSION: Question acute pancreatitis.    Questionable 9 mm lesion in the pancreatic neck is indeterminate.    Proctitis.    Hypodensity of the right kidney suggestive of infarct.    < end of copied text >  < from: CT Head w/wo IV Cont (03.17.24 @ 11:00) >  IMPRESSION:  Vasogenic edema in the bilateral frontal lobes. Intracranial metastasis.    --- End of Report ---    < end of copied text >

## 2024-03-18 NOTE — PHYSICAL THERAPY INITIAL EVALUATION ADULT - ADDITIONAL COMMENTS
pt lives with her , has 3 adult daughters, reports plenty of support at home, has stairs in the house, was ambulating independently and performing ADLs on her own, owns a cane and some type of brace for her foot

## 2024-03-18 NOTE — PROGRESS NOTE ADULT - PROBLEM SELECTOR PLAN 1
- CT A/P showing mild peripancreatic fluid and a 9 mm hypodense lesion in the   pancreatic neck  - Lipase, bili, LFTs WNL  - Abd pain and N/V resolved, tolerating regular diet this morning  - F/U pancreatic lesion outpt, follows with Dr. Seymour   - If continues to tolerate regular diet w/o abd pain/N/V, cleared for d/c home by GI - CT A/P showing mild peripancreatic fluid and a 9 mm hypodense lesion in the   pancreatic neck  - Lipase, bili, LFTs WNL  - Abd pain and N/V resolved, tolerating regular diet this morning  - F/U pancreatic lesion outpt, follows with Dr. Seymour   - If continues to tolerate regular diet w/o abd pain/N/V, cleared for d/c from GI perspective

## 2024-03-18 NOTE — PHYSICAL THERAPY INITIAL EVALUATION ADULT - PERTINENT HX OF CURRENT PROBLEM, REHAB EVAL
Jaden is a 58 yr old female with history of lung cancer currently on chemotherapy, last chemotherapy on 3/7 , former smoker, had thoracic nerve block for pain which significantly helped her cancer rib pain.  Patient presents has been having multiple episodes of nonbloody emesis.  Also complains that she is having trouble breathing which is new for her.  Finished radiation 2 weeks ago.  Patient typically tolerates chemo well.  Complains of constipation but had milk of magnesia and has had bowel movements.  Complains of chronic low back pain.  No chest pain but does note shortness of breath.  Denies leg pain or swelling. admits intermittent diffuse CP and epigastric pain. intermittent constipation.. no fall or injury. in ER: VS showed  HTN< bradycardia. temp and sat normal. CTA reported right subsegmental PE, new Lumbar fracture, possible acute pancreatitis. NS bolus 1 liter, Dilaudid, Zofran and Lovenox 40 SQ was given and medical admission was called, per ortho note, new pathologic compression fractures of T2 and T3. brace ordered and to be used as needed for comfort

## 2024-03-18 NOTE — PROGRESS NOTE ADULT - SUBJECTIVE AND OBJECTIVE BOX
INTERVAL HPI/OVERNIGHT EVENTS: None   HPI: Patient is a 58-year-old female with history of lung cancer currently on chemotherapy, last chemotherapy on 3/7, finished radiation 2 wks prior, former smoker, had thoracic nerve block for pain which significantly helped her cancer rib pain. Patient presented to ED for N/V and SOB, found to have new, R subsegmental PE, new lumbar fracture on CTA, with GI consulted for possible pancreatitis.    GI History: Pt states that she had colonoscopy in the past which was normal but cannot remember the date. Denies any alcohol use except socially in the past. Denies ever having EGD.     Today, Patient seen and examined at the bedside with . Patient denies any abdominal pain, N/V. Feeling much better today, able to tolerate regular breakfast this morning and snacks brought in by .    REVIEW OF SYSTEMS  Negative unless indicated in HPI.     ALLERGIES:  No Known Allergies    INTOLERANCES:  Morphine (Sedation/Somnol)    PAST MEDICAL & SURGICAL HISTORY:  Mass of right lung  COPD (chronic obstructive pulmonary disease)  Smoker  Chronic pain syndrome  HLD (hyperlipidemia)  Melanoma in situ  History of blood transfusion  Malignant neoplasm of right bronchus  H/O reduction of orbital fracture  S/P wrist surgery    VITALS:          PHYSICAL EXAM:  GENERAL:  Appears stated age, NAD  HEENT:  NC/AT,  conjunctivae clear and pink  CHEST:  Full & symmetric excursion, no increased effort, breath sounds clear  HEART:  Regular rhythm  ABDOMEN:  Soft, non-tender, non-distended, normoactive bowel sounds  EXTREMITIES:  no cyanosis or edema  SKIN: Warm/dry  NEURO:  Alert, oriented      LABS:                   RADIOLOGY & ADDITIONAL TESTS:    CT ABDOMEN AND PELVIS  PROCEDURE DATE:  03/17/2024      FINDINGS:  LOWER CHEST: Within normal limits.  LIVER: Within normal limits.  BILE DUCTS: Normal caliber.  GALLBLADDER: Within normal limits.  SPLEEN: Within normal limits.  PANCREAS: Mild peripancreatic fluid. A 9 mm hypodense lesion in the   pancreatic neck series 2 image 41.  ADRENALS: Within normal limits.  KIDNEYS/URETERS: Small right renal cysts. Wedge-shaped hypodensity in the   right kidney possibly infarct.  BLADDER: Within normal limits.  REPRODUCTIVE ORGANS: Within normal limits.  BOWEL: No bowel obstruction. Mild rectal thickening.  PERITONEUM: Mild ascites.  VESSELS:  Within normal limits.  RETROPERITONEUM/LYMPH NODES: No lymphadenopathy.  ABDOMINAL WALL: Within normal limits.  BONES: Anterolisthesis L5-S1 with spondylolysis.    IMPRESSION: Question acute pancreatitis.  Questionable 9 mm lesion in the pancreatic neck is indeterminate.  Proctitis.  Hypodensity of the right kidney suggestive of infarct.    CT Angio Chest PE Protocol w/ IV Cont (03.16.24 @ 10:51)   COMPARISON: CT chest 1/22/2024.  FINDINGS:  HEART/VASCULATURE: New filling defects within subsegmental branches of   the right upper lobe. Normal RV: LV ratio. No pericardial effusion.    LUNGS/AIRWAYS/PLEURA: Patent trachea and bronchi. Unchanged emphysema and   few sub-4 mm solid nodules in both lungs. Unchanged groundglass nodules   up to 1 cm in the superior segment of the right lower lobe (2-41) in the   apical segment of the right upper lobe (2-22). Decreased size of right   apical mass with broadbase along the pleura.    LYMPH NODES/MEDIASTINUM: No lymphadenopathy.    UPPER ABDOMEN: New partially included fluid surrounding the pancreatic   tail.    BONES/SOFT TISSUES: Decreased extension of right apical mass through the   right chest wall, eroding the right third rib and spine at the T2-T3   vertebral bodies. New pathologic compression fractures of T2 and T3.    IMPRESSION:    Right upper lobe subsegmental pulmonary thromboemboli. Findings were   discussed with Dr. Birmingham at 11:17 AM on 3/16/2024.    Decreased size of right apical mass extends through the chest wall and   spine.    New pathologic compression fractures of T2 and T3.    Unchanged small groundglass and solid nodules in both lungs.    New partially included peripancreatic fluid, possibly reflecting acute   pancreatitis. Correlate with pancreatic enzymes and/or further evaluation   with contrast-enhanced CT abdomen/pelvis.                ARMANDO SPRING M.D., ATTENDING RADIOLOGIST  This document has been electronically signed. Mar 16 2024 11:18AM    < end of copied text >   INTERVAL HPI/OVERNIGHT EVENTS: None   HPI: Patient is a 58-year-old female with history of lung cancer currently on chemotherapy, last chemotherapy on 3/7, finished radiation 2 wks prior, former smoker, had thoracic nerve block for pain which significantly helped her cancer rib pain. Patient presented to ED for N/V and SOB, found to have new, R subsegmental PE, new lumbar fracture on CTA, with GI consulted for possible pancreatitis.    GI History: Pt states that she had colonoscopy in the past which was normal but cannot remember the date. Denies any alcohol use except socially in the past. Denies ever having EGD.     Today, Patient seen and examined at the bedside with . Patient denies any abdominal pain, N/V. Feeling much better today, able to tolerate regular breakfast this morning and snacks brought in by . No BMs yet but hasn't been able to tolerate food in 2 days.     REVIEW OF SYSTEMS  Negative unless indicated in HPI.     ALLERGIES:  No Known Allergies    INTOLERANCES:  Morphine (Sedation/Somnol)    PAST MEDICAL & SURGICAL HISTORY:  Mass of right lung  COPD (chronic obstructive pulmonary disease)  Smoker  Chronic pain syndrome  HLD (hyperlipidemia)  Melanoma in situ  History of blood transfusion  Malignant neoplasm of right bronchus  H/O reduction of orbital fracture  S/P wrist surgery    VITALS:  Vital Signs Last 24 Hrs  T(C): 36.7 (18 Mar 2024 05:19), Max: 37 (17 Mar 2024 22:23)  T(F): 98 (18 Mar 2024 05:19), Max: 98.6 (17 Mar 2024 22:23)  HR: 96 (18 Mar 2024 05:19) (96 - 105)  BP: 123/88 (18 Mar 2024 05:19) (123/88 - 151/97)  BP(mean): --  RR: 18 (18 Mar 2024 05:19) (16 - 18)  SpO2: 94% (18 Mar 2024 10:35) (94% - 99%)    Parameters below as of 18 Mar 2024 10:35  Patient On (Oxygen Delivery Method): room air      PHYSICAL EXAM:  GENERAL:  Appears stated age, NAD  HEENT:  NC/AT,  conjunctivae clear and pink  CHEST:  Full & symmetric excursion, no increased effort, breath sounds clear  HEART:  Regular rhythm  ABDOMEN:  Soft, non-tender, non-distended, normoactive bowel sounds  EXTREMITIES:  no cyanosis or edema  SKIN: Warm/dry  NEURO:  Alert, oriented      LABS:                   12.4   5.10  )-----------( 122      ( 18 Mar 2024 05:24 )             37.6     18 Mar 2024 05:24    135    |  98     |  16     ----------------------------<  100    3.6     |  24     |  0.60     Ca    9.3        18 Mar 2024 05:24  Mg     1.9       18 Mar 2024 05:24    TPro  7.4    /  Alb  3.2    /  TBili  0.7    /  DBili  0.2    /  AST  17     /  ALT  26     /  AlkPhos  96     18 Mar 2024 05:24    CAPILLARY BLOOD GLUCOSE    LIVER FUNCTIONS - ( 18 Mar 2024 05:24 )  Alb: 3.2 g/dL / Pro: 7.4 g/dL / ALK PHOS: 96 U/L / ALT: 26 U/L / AST: 17 U/L / GGT: x           Urinalysis Basic - ( 18 Mar 2024 05:24 )    Color: x / Appearance: x / SG: x / pH: x  Gluc: 100 mg/dL / Ketone: x  / Bili: x / Urobili: x   Blood: x / Protein: x / Nitrite: x   Leuk Esterase: x / RBC: x / WBC x   Sq Epi: x / Non Sq Epi: x / Bacteria: x      RADIOLOGY & ADDITIONAL TESTS:    CT ABDOMEN AND PELVIS  PROCEDURE DATE:  03/17/2024      FINDINGS:  LOWER CHEST: Within normal limits.  LIVER: Within normal limits.  BILE DUCTS: Normal caliber.  GALLBLADDER: Within normal limits.  SPLEEN: Within normal limits.  PANCREAS: Mild peripancreatic fluid. A 9 mm hypodense lesion in the   pancreatic neck series 2 image 41.  ADRENALS: Within normal limits.  KIDNEYS/URETERS: Small right renal cysts. Wedge-shaped hypodensity in the   right kidney possibly infarct.  BLADDER: Within normal limits.  REPRODUCTIVE ORGANS: Within normal limits.  BOWEL: No bowel obstruction. Mild rectal thickening.  PERITONEUM: Mild ascites.  VESSELS:  Within normal limits.  RETROPERITONEUM/LYMPH NODES: No lymphadenopathy.  ABDOMINAL WALL: Within normal limits.  BONES: Anterolisthesis L5-S1 with spondylolysis.    IMPRESSION: Question acute pancreatitis.  Questionable 9 mm lesion in the pancreatic neck is indeterminate.  Proctitis.  Hypodensity of the right kidney suggestive of infarct.    CT Angio Chest PE Protocol w/ IV Cont (03.16.24 @ 10:51)   COMPARISON: CT chest 1/22/2024.  FINDINGS:  HEART/VASCULATURE: New filling defects within subsegmental branches of   the right upper lobe. Normal RV: LV ratio. No pericardial effusion.    LUNGS/AIRWAYS/PLEURA: Patent trachea and bronchi. Unchanged emphysema and   few sub-4 mm solid nodules in both lungs. Unchanged groundglass nodules   up to 1 cm in the superior segment of the right lower lobe (2-41) in the   apical segment of the right upper lobe (2-22). Decreased size of right   apical mass with broadbase along the pleura.    LYMPH NODES/MEDIASTINUM: No lymphadenopathy.    UPPER ABDOMEN: New partially included fluid surrounding the pancreatic   tail.    BONES/SOFT TISSUES: Decreased extension of right apical mass through the   right chest wall, eroding the right third rib and spine at the T2-T3   vertebral bodies. New pathologic compression fractures of T2 and T3.    IMPRESSION:    Right upper lobe subsegmental pulmonary thromboemboli. Findings were   discussed with Dr. Birmingham at 11:17 AM on 3/16/2024.    Decreased size of right apical mass extends through the chest wall and   spine.    New pathologic compression fractures of T2 and T3.    Unchanged small groundglass and solid nodules in both lungs.    New partially included peripancreatic fluid, possibly reflecting acute   pancreatitis. Correlate with pancreatic enzymes and/or further evaluation   with contrast-enhanced CT abdomen/pelvis.

## 2024-03-18 NOTE — PROGRESS NOTE ADULT - ASSESSMENT
Patient is a 58-year-old female with history of lung cancer currently on chemotherapy, last chemotherapy on 3/7, former smoker, had thoracic nerve block for pain which significantly helped her cancer rib pain. GI consulted for possible pancreatitis. Patient reporting resolved N/V, tolerating regular diet, appears clinically stable at this time.

## 2024-03-18 NOTE — PROGRESS NOTE ADULT - ASSESSMENT
Patient with history of lung cancer currently on chemotherapy, last chemotherapy on 3/7 , former smoker, had thoracic nerve block for pain which significantly helped her cancer rib pain.  Patient presents today has been having multiple episodes of nonbloody emesis since 530 this morning.  Also complains that she is having trouble breathing today which is new for her.  Finished radiation 2 weeks ago.  Patient typically tolerates chemo well.  Complains of constipation but had milk of magnesia yesterday and has had bowel movements.  Complains of chronic low back pain.  No chest pain but does note shortness of breath.  Denies leg pain or swelling. Reviewed above with patient. endorsed same. currently reported feeling unwell with nausea. no further vomiting. SOB same. admits intermittent diffuse CP and epigastric pain. intermittent constipation. denied fever/chills/ palpitation/dizziness/headaches/ leg pain/ dysuria. all other ROS neg. no fall or injury. in ER: VS showed  HTN< bradycardia. temp and sat normal. CTA reported right subsegmental PE, new Lumbar fracture, possible acute pancreatitis. NS bolus 1 liter, Dilaudid, Zofran and Lovenox 40 SQ was given and medical admission was called    Acute hypoxic respiratory failure due to acute right subsegmental PE  Chest pain, SOB: likely due to PE  h/o lung ca with Mets  - Titrate off of oxygen; walk test to see if she needs home oxygen  - Appreciate oncology recommendations  - Will d/c lovenox; start eliquis 10mg BID until 3/23, then 5mg BID starting 3/24      Abd pain/n/v, resolved, NOT concerning for pancreatitis  - Lipase normal, no abd pain  - CTAP done,  - Tolerating diet  - GI consult appreciated  - pain control: tylenol, dilaudid 02/0.5 IV Q4hrs PRN for now  - Protonix 40 po daily  - zofran, Reglan PRN     Hyponatremia  Hypokalemia  - likely due to vomiting  - Monitor BMP    Elevated BP  - no h/o HTN  - BP elevated likely due to vomiting/pain  - control pain  - hydralazine IV PRN if BP >160/100    Lung ca with possible bone mets  New pathologic compression fractures of T2 and T3 on CTA  Pancreatic lesion noted  - control pain  - home pain meds: MS Contin 15 mg Q12hrs PRN severe pain. [ last refilled2/3/24, 90 tab], Hydromorphone 4 mg : 1-2 tab Q6-8 hrs PRN for moderate pain [ last refilled 1/31, 360 tabs]  - ortho eval. discussed with , ortho will order Brace  - F/u Dr Seymour as o/p    Anxiety  - home meds: Xanax 0.5 mg daily PRN. last refill 2/28, 30 tab]  - c/w xanax PRN    Severe Malnutrition  - nutrition eval  - ensure when PO is started    DVT-P: lovenox  AM labs  Possible d/c in AM

## 2024-03-18 NOTE — CONSULT NOTE ADULT - PROBLEM SELECTOR RECOMMENDATION 9
Stage 4 adenocarcinoma of the lung receiving pemotrexed/carboplatin/pembrolizumab last treated 3/7/24. Admitted with N/V/pancreatitis. Patient reports symptoms improved. Supportive care for this.  Patient to f/u with Dr. Seymour for continued H/O care in ambulatory-Dr. Francois notified of patient's admission.

## 2024-03-18 NOTE — DISCHARGE NOTE PROVIDER - CARE PROVIDER_API CALL
Leone, Vincent Jean-Claude  Orthopaedic Surgery  [address], [address]  [place] [address]  Phone: [phone]  Fax: [phone]  Follow Up Time:     Jermaine   [phone] orthotist; if you have any issues with brace  Phone: (   )    -  Fax: (   )    -  Follow Up Time:

## 2024-03-18 NOTE — CONSULT NOTE ADULT - ASSESSMENT
Patient with history of lung cancer currently on chemotherapy, last chemotherapy on 3/7 , former smoker, had thoracic nerve block for pain which significantly helped her cancer rib pain.  Patient presented having multiple episodes of nonbloody emesis x 1 day.  Also SOB. Finished radiation 2 weeks ago.  Complained of constipation but had milk of magnesia yesterday and has had bowel movements.  Complains of chronic low back pain.  No chest pain. Denied leg pain or swelling.    CTA reported right subsegmental PE, new Lumbar fracture, possible acute pancreatitis. NS bolus 1 liter, Dilaudid, Zofran and Lovenox 40 SQ was given and medical admission was called (16 Mar 2024 12:06)  Oncology consulted for h/o lung cancer/PE.
GI consult requested for abnormal imaging on CT chest for a 58 year old female with history of lung cancer currently on chemotherapy, last chemotherapy on 3/7 , former smoker, had thoracic nerve block for pain which significantly helped her cancer rib pain.  Patient presents today has been having multiple episodes of nonbloody emesis since 530 this morning.     Patient appears clinically stable at present.

## 2024-03-18 NOTE — DISCHARGE NOTE PROVIDER - NSDCFUSCHEDAPPT_GEN_ALL_CORE_FT
Cooper Prasad  [facility]  [facility] [address]  Scheduled Appointment: 03/21/2024    [facility]  [facility]  Scheduled Appointment: 03/28/2024    [facility]  [facility]  Scheduled Appointment: 03/28/2024    [facility]  [facility]  Scheduled Appointment: 04/18/2024    [facility]  [facility]  Scheduled Appointment: 04/18/2024    Servando Seymour  [facility]  [facility]  Scheduled Appointment: 04/18/2024    [facility]  [facility] [address]   Scheduled Appointment: 04/18/2024    [facility]  [facility]  Scheduled Appointment: 05/09/2024    [facility]  [facility]  Scheduled Appointment: 05/09/2024

## 2024-03-18 NOTE — PROGRESS NOTE ADULT - TIME BILLING
- Ordering, reviewing, and interpreting labs, testing, and imaging.  - Independently obtaining a review of systems and performing a physical exam  - Reviewing consultant documentation/recommendations.  - Counselling and educating patient regarding interpretation of aforementioned items and plan of care.
- Ordering, reviewing, and interpreting labs, testing, and imaging.  - Independently obtaining a review of systems and performing a physical exam  - Reviewing consultant documentation/recommendations.  - Counselling and educating patient regarding interpretation of aforementioned items and plan of care.

## 2024-03-18 NOTE — CONSULT NOTE ADULT - PROBLEM SELECTOR RECOMMENDATION 2
PE in setting of advanced malignancy. May transition LMWH to xarelto or eliquis from an oncology viewpoint.

## 2024-03-18 NOTE — PROGRESS NOTE ADULT - SUBJECTIVE AND OBJECTIVE BOX
Patient is a 58y old  Female who presents with a chief complaint of suspected acute pancreatitis, new PE (18 Mar 2024 10:07)    Reports feeling improved  Denies chest pain, SOB  Tolerating diet  Patient seen and examined at bedside.    ALLERGIES:  morphine (Sedation/Somnol)  No Known Allergies    MEDICATIONS  (STANDING):  apixaban 10 milliGRAM(s) Oral every 12 hours  dextrose 5%. 1000 milliLiter(s) (50 mL/Hr) IV Continuous <Continuous>  dextrose 5%. 1000 milliLiter(s) (100 mL/Hr) IV Continuous <Continuous>  gabapentin 600 milliGRAM(s) Oral three times a day  glucagon  Injectable 1 milliGRAM(s) IntraMuscular once  pantoprazole    Tablet 40 milliGRAM(s) Oral before breakfast    MEDICATIONS  (PRN):  acetaminophen     Tablet .. 650 milliGRAM(s) Oral every 6 hours PRN Temp greater or equal to 38C (100.4F), Mild Pain (1 - 3)  ALPRAZolam 0.5 milliGRAM(s) Oral daily PRN severe anxiety  hydrALAZINE Injectable 10 milliGRAM(s) IV Push every 6 hours PRN BP>160 SBP or >100 DBP  HYDROmorphone  Injectable 0.2 milliGRAM(s) IV Push every 4 hours PRN Moderate Pain (4 - 6)  HYDROmorphone  Injectable 0.5 milliGRAM(s) IV Push every 4 hours PRN Severe Pain (7 - 10)  ondansetron Injectable 4 milliGRAM(s) IV Push every 6 hours PRN Nausea and/or Vomiting    Vital Signs Last 24 Hrs  T(F): 98 (18 Mar 2024 05:19), Max: 98.6 (17 Mar 2024 22:23)  HR: 96 (18 Mar 2024 05:19) (96 - 105)  BP: 123/88 (18 Mar 2024 05:19) (123/88 - 151/97)  RR: 18 (18 Mar 2024 05:19) (16 - 18)  SpO2: 98% (18 Mar 2024 05:19) (98% - 99%)  I&O's Summary    17 Mar 2024 07:01  -  18 Mar 2024 07:00  --------------------------------------------------------  IN: 500 mL / OUT: 0 mL / NET: 500 mL      BMI (kg/m2): 17.8 (03-16-24 @ 09:06)  PHYSICAL EXAM:  General: NAD, A/O x 3  ENT: MMM, no scleral icterus  Neck: Supple, No JVD, no thyroidomegaly  Lungs: Clear to auscultation bilaterally, no wheezes, no rales, no rhonchi, good inspiratory effort  Cardio: RRR, S1/S2, No murmurs  Abdomen: Soft, Nontender, Nondistended; Bowel sounds present  Extremities: No calf tenderness, No pitting edema, no skin changes    LABS:                        12.4   5.10  )-----------( 122      ( 18 Mar 2024 05:24 )             37.6       03-18    135  |  98  |  16  ----------------------------<  100  3.6   |  24  |  0.60    Ca    9.3      18 Mar 2024 05:24  Mg     1.9     03-18    TPro  7.4  /  Alb  3.2  /  TBili  0.7  /  DBili  0.2  /  AST  17  /  ALT  26  /  AlkPhos  96  03-18     CARDIAC MARKERS ( 16 Mar 2024 09:35 )  x     / 14.7 ng/L / x     / x     / x        01-23 Chol 211 mg/dL LDL -- HDL 52 mg/dL Trig 127 mg/dL    Urinalysis Basic - ( 18 Mar 2024 05:24 )    Color: x / Appearance: x / SG: x / pH: x  Gluc: 100 mg/dL / Ketone: x  / Bili: x / Urobili: x   Blood: x / Protein: x / Nitrite: x   Leuk Esterase: x / RBC: x / WBC x   Sq Epi: x / Non Sq Epi: x / Bacteria: x

## 2024-03-18 NOTE — CHART NOTE - NSCHARTNOTEFT_GEN_A_CORE
Pt seen at bedside to measure and fit with tLSO with axillary straps to control motion of thoracic and lumbar spine.  Pt has T2,3 pathologic fractures.  Tlso will relive pain by controlling rotation and flexion   Pt instructed to drew  Written instructions provided for refernce    Follow up if needed    Michael MALONE  [phone]

## 2024-03-19 NOTE — PROGRESS NOTE ADULT - PROBLEM SELECTOR PLAN 1
- CT A/P showing mild peripancreatic fluid and a 9 mm hypodense lesion in the   pancreatic neck  - Lipase, bili, LFTs WNL  - Abd pain and N/V resolved, tolerating regular diet this morning  - F/U pancreatic lesion outpt, follows with Dr. Seymour   - Will likely need EUS for pancreatic lesion , F/U advanced GI   - If continues to tolerate regular diet w/o abd pain/N/V, cleared for d/c from GI perspective - CT A/P showing mild peripancreatic fluid and a 9 mm hypodense lesion in the   pancreatic neck  - Lipase, bili, transaminases WNL  - Abd pain and N/V resolved, tolerating regular diet this morning  - F/U pancreatic lesion outpt, follows with oncologist Dr. Seymour   - Will likely need EUS for pancreatic lesion. F/U with advanced GI for this. She agrees.  - If continues to tolerate regular diet w/o abd pain, N/V, cleared for d/c from GI perspective

## 2024-03-19 NOTE — PROGRESS NOTE ADULT - ASSESSMENT
Patient with history of lung cancer currently on chemotherapy, last chemotherapy on 3/7 , former smoker, had thoracic nerve block for pain which significantly helped her cancer rib pain.  Patient presents today has been having multiple episodes of nonbloody emesis since 530 this morning.  Also complains that she is having trouble breathing today which is new for her.  Finished radiation 2 weeks ago.  Patient typically tolerates chemo well.  Complains of constipation but had milk of magnesia yesterday and has had bowel movements.  Complains of chronic low back pain.  No chest pain but does note shortness of breath.  Denies leg pain or swelling. Reviewed above with patient. endorsed same. currently reported feeling unwell with nausea. no further vomiting. SOB same. admits intermittent diffuse CP and epigastric pain. intermittent constipation. denied fever/chills/ palpitation/dizziness/headaches/ leg pain/ dysuria. all other ROS neg. no fall or injury. in ER: VS showed  HTN< bradycardia. temp and sat normal. CTA reported right subsegmental PE, new Lumbar fracture, possible acute pancreatitis. NS bolus 1 liter, Dilaudid, Zofran and Lovenox 40 SQ was given and medical admission was called    Acute hypoxic respiratory failure due to acute right subsegmental PE  Chest pain, SOB: likely due to PE  h/o lung ca with Mets  - Of of oxygen; does not need it  - Appreciate oncology recommendations  - c/w eliquis 10mg BID until 3/23, then 5mg BID starting 3/24    Abd pain/n/v, resolved, NOT concerning for pancreatitis  - Lipase normal, no abd pain  - CTAP done,  - Tolerating diet  - GI consult appreciated  - pain controlled  - Protonix 40 po daily  - zofran, Reglan PRN     Hyponatremia  Hypokalemia  - likely due to vomiting  - Monitor BMP    Elevated BP  - no h/o HTN  - Stable    Lung ca with possible bone mets  New pathologic compression fractures of T2 and T3 on CTA  Pancreatic lesion noted  - control pain  - c/w home pain meds: MS Contin 15 mg Q12hrs PRN severe pain. [ last refilled2/3/24, 90 tab], Hydromorphone 4 mg : 1-2 tab Q6-8 hrs PRN for moderate pain [ last refilled 1/31, 360 tabs]  - Brace delivered; f/u spine surgery Dr Schrader and orthotist as o/p  - F/u Dr Seymour as o/p    Anxiety  - home meds: Xanax 0.5 mg daily PRN. last refill 2/28, 30 tab]  - c/w xanax PRN    Severe Malnutrition  - nutrition eval  - ensure when PO is started    DVT-P: lovenox  D/c today  D/c 45 min

## 2024-03-19 NOTE — DISCHARGE NOTE NURSING/CASE MANAGEMENT/SOCIAL WORK - NSDCPEELIQUISREACT_GEN_ALL_CORE
Patient called to state that she had received the 1st shingles vaccine and was wondering when she would be dure for her 2nd vaccine. Please call patient to advise.   
Per chart review, patient received 1st dose of shingrix vaccine on 2/24/20.  Per WIR, patient will be due for next shingrix after 4/20/20.     Patient informed it is recommended she receives the 2nd shingrix vaccine 2-6 months after 1st shingrix injection. Patient verbalizes understanding.  Nurse visit scheduled 4/30/20 for 2nd shingrix injection.  Patient denies further questions at this time.   
Apixaban/Eliquis increases your risk for bleeding. Notify your doctor if you experience any of the following side effects: bleeding, coughing or vomiting blood, red or black stool, unexpected pain or swelling, itching or hives, chest pain, chest tightness, trouble breathing, changes in how much or how often you urinate, red or pink urine, numbness or tingling in your feet, or unusual muscle weakness. When Apixaban/Eliquis is taken with other medicines, they can affect how it works. Taking other medications such as aspirin, blood thinners, nonsteroidal anti-inflammatories, and medications that treat depression can increase your risk of bleeding. It is very important to tell your health care provider about all of the other medicines, including over-the-counter medications, herbs, and vitamins you are taking. DO NOT start, stop, or change the dosage of any medicine, including over-the-counter medicines, vitamins, and herbal products without your doctor’s approval. Any products containing aspirin or are nonsteroidal anti-inflammatories lessen the blood’s ability to form clots and add to the effect of Apixaban/Eliquis. Never take aspirin or medicines that contain aspirin without speaking to your doctor.

## 2024-03-19 NOTE — PROGRESS NOTE ADULT - NUTRITIONAL ASSESSMENT
This patient has been assessed with a concern for Malnutrition and has been determined to have a diagnosis/diagnoses of Severe protein-calorie malnutrition and Underweight (BMI < 19).    This patient is being managed with:   Diet Regular-  Entered: Mar 17 2024  1:04PM  

## 2024-03-19 NOTE — PROGRESS NOTE ADULT - NS ATTEND AMEND GEN_ALL_CORE FT
Patient seen and examined at the bedside. Agree with the assessment and plan of CRISTY Nolasco.  Tolerating diet. Home D/C planned.  Will need advanced GI for EUS evaluation of pancreas lesion noted on imaging. I explained this to the patient and she agrees.
Agree

## 2024-03-19 NOTE — PROGRESS NOTE ADULT - SUBJECTIVE AND OBJECTIVE BOX
Patient is a 58y old  Female who presents with a chief complaint of suspected acute pancreatitis, new PE (19 Mar 2024 08:38)    Report feeling well  TOlerating diet  Last BM yesterday  Brace was delivered yesterday; family took it home    Patient seen and examined at bedside.    ALLERGIES:  morphine (Sedation/Somnol)  No Known Allergies    MEDICATIONS  (STANDING):  apixaban 10 milliGRAM(s) Oral every 12 hours  dextrose 5%. 1000 milliLiter(s) (50 mL/Hr) IV Continuous <Continuous>  dextrose 5%. 1000 milliLiter(s) (100 mL/Hr) IV Continuous <Continuous>  gabapentin 600 milliGRAM(s) Oral three times a day  glucagon  Injectable 1 milliGRAM(s) IntraMuscular once  pantoprazole    Tablet 40 milliGRAM(s) Oral before breakfast    MEDICATIONS  (PRN):  acetaminophen     Tablet .. 650 milliGRAM(s) Oral every 6 hours PRN Temp greater or equal to 38C (100.4F), Mild Pain (1 - 3)  ALPRAZolam 0.5 milliGRAM(s) Oral daily PRN severe anxiety  hydrALAZINE Injectable 10 milliGRAM(s) IV Push every 6 hours PRN BP>160 SBP or >100 DBP  HYDROmorphone  Injectable 0.2 milliGRAM(s) IV Push every 4 hours PRN Moderate Pain (4 - 6)  HYDROmorphone  Injectable 0.5 milliGRAM(s) IV Push every 4 hours PRN Severe Pain (7 - 10)  ondansetron Injectable 4 milliGRAM(s) IV Push every 6 hours PRN Nausea and/or Vomiting    Vital Signs Last 24 Hrs  T(F): 98.2 (19 Mar 2024 06:29), Max: 98.8 (18 Mar 2024 18:59)  HR: 84 (19 Mar 2024 06:29) (73 - 84)  BP: 104/70 (19 Mar 2024 06:29) (104/70 - 106/72)  RR: 18 (19 Mar 2024 06:29) (16 - 18)  SpO2: 98% (19 Mar 2024 06:29) (94% - 98%)  I&O's Summary    BMI (kg/m2): 17.8 (03-16-24 @ 09:06)  PHYSICAL EXAM:  General: NAD, A/O x 3  ENT: MMM, no scleral icterus  Neck: Supple, No JVD, no thyroidomegaly  Lungs: Clear to auscultation bilaterally, no wheezes, no rales, no rhonchi, good inspiratory effort  Cardio: RRR, S1/S2, No murmurs  Abdomen: Soft, Nontender, Nondistended; Bowel sounds present  Extremities: No calf tenderness, No pitting edema, no skin changes    LABS:                        12.4   5.10  )-----------( 122      ( 18 Mar 2024 05:24 )             37.6       03-18    135  |  98  |  16  ----------------------------<  100  3.6   |  24  |  0.60    Ca    9.3      18 Mar 2024 05:24  Mg     1.9     03-18    TPro  7.4  /  Alb  3.2  /  TBili  0.7  /  DBili  0.2  /  AST  17  /  ALT  26  /  AlkPhos  96  03-18     01-23 Chol 211 mg/dL LDL -- HDL 52 mg/dL Trig 127 mg/dL    Urinalysis Basic - ( 18 Mar 2024 05:24 )    Color: x / Appearance: x / SG: x / pH: x  Gluc: 100 mg/dL / Ketone: x  / Bili: x / Urobili: x   Blood: x / Protein: x / Nitrite: x   Leuk Esterase: x / RBC: x / WBC x   Sq Epi: x / Non Sq Epi: x / Bacteria: x

## 2024-03-19 NOTE — DISCHARGE NOTE NURSING/CASE MANAGEMENT/SOCIAL WORK - PATIENT PORTAL LINK FT
You can access the FollowMyHealth Patient Portal offered by [facility] by registering at the following website: [email or web address]. By joining [facility]’s FollowMyHealth portal, you will also be able to view your health information using other applications (apps) compatible with our system.

## 2024-03-19 NOTE — PROGRESS NOTE ADULT - SUBJECTIVE AND OBJECTIVE BOX
INTERVAL HPI/OVERNIGHT EVENTS:  HPI:    57 y/o female seen and examined.       MEDICATIONS  (STANDING):  apixaban 10 milliGRAM(s) Oral every 12 hours  dextrose 5%. 1000 milliLiter(s) (50 mL/Hr) IV Continuous <Continuous>  dextrose 5%. 1000 milliLiter(s) (100 mL/Hr) IV Continuous <Continuous>  gabapentin 600 milliGRAM(s) Oral three times a day  glucagon  Injectable 1 milliGRAM(s) IntraMuscular once  pantoprazole    Tablet 40 milliGRAM(s) Oral before breakfast    MEDICATIONS  (PRN):  acetaminophen     Tablet .. 650 milliGRAM(s) Oral every 6 hours PRN Temp greater or equal to 38C (100.4F), Mild Pain (1 - 3)  ALPRAZolam 0.5 milliGRAM(s) Oral daily PRN severe anxiety  hydrALAZINE Injectable 10 milliGRAM(s) IV Push every 6 hours PRN BP>160 SBP or >100 DBP  HYDROmorphone  Injectable 0.2 milliGRAM(s) IV Push every 4 hours PRN Moderate Pain (4 - 6)  HYDROmorphone  Injectable 0.5 milliGRAM(s) IV Push every 4 hours PRN Severe Pain (7 - 10)  ondansetron Injectable 4 milliGRAM(s) IV Push every 6 hours PRN Nausea and/or Vomiting      Allergies    No Known Allergies    Intolerances    morphine (Sedation/Somnol)      PAST MEDICAL & SURGICAL HISTORY:  Mass of right lung      COPD (chronic obstructive pulmonary disease)      Smoker      Chronic pain syndrome      HLD (hyperlipidemia)      Melanoma in situ      History of blood transfusion      Malignant neoplasm of right bronchus      H/O reduction of orbital fracture      S/P wrist surgery	    PHYSICAL EXAM:   Vital Signs:  Vital Signs Last 24 Hrs  T(C): 36.8 (19 Mar 2024 06:29), Max: 37.1 (18 Mar 2024 18:59)  T(F): 98.2 (19 Mar 2024 06:29), Max: 98.8 (18 Mar 2024 18:59)  HR: 84 (19 Mar 2024 06:29) (73 - 84)  BP: 104/70 (19 Mar 2024 06:29) (104/70 - 106/72)  BP(mean): --  RR: 18 (19 Mar 2024 06:29) (16 - 18)  SpO2: 98% (19 Mar 2024 06:29) (94% - 98%)    Parameters below as of 19 Mar 2024 06:29  Patient On (Oxygen Delivery Method): room air      Daily     Daily I&O's Summary      GENERAL:  Appears stated age,   HEENT:  NC/AT,  conjunctivae clear and pink,  CHEST:  Full & symmetric excursion, no increased effort, breath sounds clear  HEART:  Regular rhythm, S1, S2, no murmur  ABDOMEN:  Soft, non-tender, non-distended, normoactive bowel sounds  EXTEREMITIES:  no edema  SKIN:  No rash/warm/dry  NEURO:  Alert, oriented,      LABS:                        12.4   5.10  )-----------( 122      ( 18 Mar 2024 05:24 )             37.6     03-18    135  |  98  |  16  ----------------------------<  100<H>  3.6   |  24  |  0.60    Ca    9.3      18 Mar 2024 05:24  Mg     1.9     03-18    TPro  7.4  /  Alb  3.2<L>  /  TBili  0.7  /  DBili  0.2  /  AST  17  /  ALT  26  /  AlkPhos  96  03-18      Urinalysis Basic - ( 18 Mar 2024 05:24 )    Color: x / Appearance: x / SG: x / pH: x  Gluc: 100 mg/dL / Ketone: x  / Bili: x / Urobili: x   Blood: x / Protein: x / Nitrite: x   Leuk Esterase: x / RBC: x / WBC x   Sq Epi: x / Non Sq Epi: x / Bacteria: x      Lipase: 27 U/L (03-16 @ 09:35)       GI Follow up    57 y/o female seen and examined.   Denies abdominal pain, n/v. Tolerating diet.    MEDICATIONS  (STANDING):  apixaban 10 milliGRAM(s) Oral every 12 hours  dextrose 5%. 1000 milliLiter(s) (50 mL/Hr) IV Continuous <Continuous>  dextrose 5%. 1000 milliLiter(s) (100 mL/Hr) IV Continuous <Continuous>  gabapentin 600 milliGRAM(s) Oral three times a day  glucagon  Injectable 1 milliGRAM(s) IntraMuscular once  pantoprazole    Tablet 40 milliGRAM(s) Oral before breakfast    MEDICATIONS  (PRN):  acetaminophen     Tablet .. 650 milliGRAM(s) Oral every 6 hours PRN Temp greater or equal to 38C (100.4F), Mild Pain (1 - 3)  ALPRAZolam 0.5 milliGRAM(s) Oral daily PRN severe anxiety  hydrALAZINE Injectable 10 milliGRAM(s) IV Push every 6 hours PRN BP>160 SBP or >100 DBP  HYDROmorphone  Injectable 0.2 milliGRAM(s) IV Push every 4 hours PRN Moderate Pain (4 - 6)  HYDROmorphone  Injectable 0.5 milliGRAM(s) IV Push every 4 hours PRN Severe Pain (7 - 10)  ondansetron Injectable 4 milliGRAM(s) IV Push every 6 hours PRN Nausea and/or Vomiting      Allergies    No Known Allergies    Intolerances    morphine (Sedation/Somnol)        PHYSICAL EXAM:   Vital Signs:  Vital Signs Last 24 Hrs  T(C): 36.8 (19 Mar 2024 06:29), Max: 37.1 (18 Mar 2024 18:59)  T(F): 98.2 (19 Mar 2024 06:29), Max: 98.8 (18 Mar 2024 18:59)  HR: 84 (19 Mar 2024 06:29) (73 - 84)  BP: 104/70 (19 Mar 2024 06:29) (104/70 - 106/72)  BP(mean): --  RR: 18 (19 Mar 2024 06:29) (16 - 18)  SpO2: 98% (19 Mar 2024 06:29) (94% - 98%)    Parameters below as of 19 Mar 2024 06:29  Patient On (Oxygen Delivery Method): room air      Daily     Daily I&O's Summary      GENERAL:  Appears stated age, NAD  HEENT:  NC/AT,  conjunctivae clear  CHEST:  clear  HEART:  Regular rhythm  ABDOMEN:  Soft, non-tender, non-distended, normoactive bowel sounds  EXTREMITIES:  no edema  SKIN:  No rash or jaundice  NEURO:  Alert, oriented      LABS:                        12.4   5.10  )-----------( 122      ( 18 Mar 2024 05:24 )             37.6     03-18    135  |  98  |  16  ----------------------------<  100<H>  3.6   |  24  |  0.60    Ca    9.3      18 Mar 2024 05:24  Mg     1.9     03-18    TPro  7.4  /  Alb  3.2<L>  /  TBili  0.7  /  DBili  0.2  /  AST  17  /  ALT  26  /  AlkPhos  96  03-18      Lipase: 27 U/L (03.16.24 @ 09:35)

## 2024-03-19 NOTE — DISCHARGE NOTE NURSING/CASE MANAGEMENT/SOCIAL WORK - NSDCVIVACCINE_GEN_ALL_CORE_FT
Tdap; 02-Sep-2023 21:06; Leticia Henry (RN); [employer]; [id number] (Exp. Date: 06-Jun-2025); IntraMuscular; Deltoid Left.; 0.5 milliLiter(s); VIS (VIS Published: 09-May-2013, VIS Presented: 02-Sep-2023);

## 2024-03-19 NOTE — PROGRESS NOTE ADULT - REASON FOR ADMISSION
suspected acute pancreatitis, new PE

## 2024-03-19 NOTE — DISCHARGE NOTE NURSING/CASE MANAGEMENT/SOCIAL WORK - NSDCPEFALRISK_GEN_ALL_CORE
For information on Fall & Injury Prevention, visit: [email or web address] OR  [email or web address] OR  https://www.cdc.gov/steadi/patient.html

## 2024-03-19 NOTE — PROGRESS NOTE ADULT - ASSESSMENT
Patient is a 58-year-old female with history of lung cancer currently on chemotherapy, last chemotherapy on 3/7, former smoker, had thoracic nerve block for pain which significantly helped her cancer rib pain. GI consulted for possible pancreatitis. Patient reporting resolved N/V, tolerating regular diet, appears clinically stable at this time.    58 year-old woman w/PMHx lung cancer currently on chemotherapy, last chemotherapy on 3/7, former smoker, had thoracic nerve block for pain which significantly helped her cancer rib pain.  GI consulted for possible pancreatitis.   Patient reporting resolved N/V, tolerating regular diet, appears clinically stable at this time.

## 2024-03-21 NOTE — REVIEW OF SYSTEMS
[Dyspepsia: Grade 0] : Dyspepsia: Grade 0 [Dysphagia: Grade 0] : Dysphagia: Grade 0 [Esophagitis: Grade 0] : Esophagitis: Grade 0 [Nausea: Grade 0] : Nausea: Grade 0 [Vomiting: Grade 0] : Vomiting: Grade 0 [Cough: Grade 0] : Cough: Grade 0 [Dyspnea: Grade 0] : Dyspnea: Grade 0 [Hypoxia: Grade 0] : Hypoxia: Grade 0 [Pneumonitis: Grade 0] : Pneumonitis: Grade 0 [Pruritus: Grade 0] : Pruritus: Grade 0 [Skin Hyperpigmentation: Grade 0] : Skin Hyperpigmentation: Grade 0 [Dermatitis Radiation: Grade 0] : Dermatitis Radiation: Grade 0

## 2024-03-21 NOTE — VITALS
[Least Pain Intensity: 0/10] : 0/10 [Maximal Pain Intensity: 5/10] : 5/10 [Pain Description/Quality: ___] : Pain description/quality: [unfilled] [Pain Duration: ___] : Pain duration: [unfilled] [Pain Location: ___] : Pain Location: [unfilled] [70: Cares for self; unalbe to carry on normal activity or do active work.] : 70: Cares for self; unable to carry on normal activity or do active work.

## 2024-03-27 PROBLEM — S22.029A: Status: ACTIVE | Noted: 2024-01-01

## 2024-03-27 PROBLEM — I26.99 PULMONARY EMBOLI: Status: ACTIVE | Noted: 2024-01-01

## 2024-03-27 PROBLEM — Z09 HOSPITAL DISCHARGE FOLLOW-UP: Status: ACTIVE | Noted: 2024-01-01

## 2024-03-27 NOTE — PHYSICAL EXAM
[No Acute Distress] : no acute distress [Normal Sclera/Conjunctiva] : normal sclera/conjunctiva [Normal Outer Ear/Nose] : the outer ears and nose were normal in appearance [No Respiratory Distress] : no respiratory distress  [No Accessory Muscle Use] : no accessory muscle use [Clear to Auscultation] : lungs were clear to auscultation bilaterally [Normal] : no rash [No Focal Deficits] : no focal deficits [Normal Gait] : normal gait [79400 - High Complexity requires an extensive number of possible diagnoses and/or the management options, extensive complexity of the medical data (tests, etc.) to be reviewed, and a high risk of significant complications, morbidity, and/or mortality as w] : High Complexity

## 2024-03-27 NOTE — REVIEW OF SYSTEMS
[Negative] : Neurological [Chest Pain] : no chest pain [Palpitations] : no palpitations [Leg Claudication] : no leg claudication [Lower Ext Edema] : no lower extremity edema [Orthopnea] : no orthopnea [FreeTextEntry5] : + b/l calf discomfort

## 2024-03-27 NOTE — HISTORY OF PRESENT ILLNESS
[Post-hospitalization from ___ Hospital] : Post-hospitalization from [unfilled] Hospital [Admitted on: ___] : The patient was admitted on [unfilled] [Discharged on ___] : discharged on [unfilled] [Discharge Summary] : discharge summary [Pertinent Labs] : pertinent labs [Radiology Findings] : radiology findings [Discharge Med List] : discharge medication list [Med Reconciliation] : medication reconciliation has been completed [Patient Contacted By: ____] : and contacted by [unfilled] [FreeTextEntry2] : Hospital Course Patient with history of lung cancer currently on chemotherapy, last chemotherapy on 3/7 , former smoker, had thoracic nerve block for pain which significantly helped her cancer rib pain.  Patient presents today has been having multiple episodes of nonbloody emesis since 530 this morning.  Also complains that she is having trouble breathing today which is new for her. Finished radiation 2 weeks ago.  Patient typically tolerates chemo well. Complains of constipation but had milk of magnesia yesterday and has had bowel movements.  Complains of chronic low back pain.  No chest pain but does note shortness of breath.  Denies leg pain or swelling. Reviewed above with patient. endorsed same. currently reported feeling unwell with nausea. no further vomiting. SOB same. admits intermittent diffuse CP and epigastric pain. intermittent constipation. denied fever/chills/ palpitation/dizziness/headaches/ leg pain/ dysuria. all other ROS neg. no fall or injury in ER: VS showed  HTN< bradycardia. temp and sat normal. CTA reported right subsegmental PE, new Lumbar fracture, possible acute pancreatitis. NS bolus 1 liter, Dilaudid, Zofran and Lovenox 40 SQ was given and medical admission was called (16 Mar 2024 12:06). Admitted to telemetry .Oncology consulted; started on full dose lovenox ULT LE was negative for DVT Patient was titrated off of oxygen .Reports feeling well F/U PCP, oncolog. Today pt  denies  N,no V< no C, she c/o b/l calf discomfort ( pt with PE on AC)  no HA,no CP,no Abd pain

## 2024-03-27 NOTE — ASSESSMENT
[FreeTextEntry1] : Chemo in 2 days f/u with Hematol  - I will contact her re: recent hospitalization  increase PO intake avoid constipation - hydration, Dulcolax Patient was educated to call the Office 24/7 if any concerns

## 2024-03-27 NOTE — HEALTH RISK ASSESSMENT
[No] : In the past 12 months have you used drugs other than those required for medical reasons? No [No falls in past year] : Patient reported no falls in the past year [Former] : Former [20 or more] : 20 or more [< 15 Years] : < 15 Years [de-identified] : none [de-identified] : poor appetite

## 2024-03-28 NOTE — HISTORY OF PRESENT ILLNESS
[de-identified] : 58F, postmenopausal, current smoker, PMHx HLD, melanoma, COPD, referred for medical oncology consultation of right lung cancer.  CASE SYNOPSIS: 2/03/2022 CT chest  : - 9 mm right upper lobe nodule (2, 22). 4 mm right upper lobe nodule (2, 51). 3 mm left upper lobe nodule (2, 29). 2 mm left lower lobe nodule (2, 87). Scattered small groundglass nodules, measuring up to 1.1 cm in the right upper lobe (2, 22). - No displaced fracture. No suspicious osseous lesion. Minimal angulation at the anterior aspects of the left second and third ribs, could reflect age-indeterminate nondisplaced fractures (example 2, 34 and 2, 45). 3/23/2022 PET/CT ([facility]): There is FDG avid right apical pulmonary nodule.  Findings may be inflammatory vs malignant.   CT follow-up versus biopsy recommended as clinically indicated.  There is mildly FDG avid left rib fracture.  03/23/2022 PET/CT  ([facility]) : FDG avid posterior aspect right upper lobe pulmonary nodule (SUV 1.5, 1.0 cm, series 3 image 62). Malignancy cannot be excluded. Left posterior medial eighth rib fracture (SUV 2.9, series 3 image 105). Consulted by IR (Dr. Campos) for CT guided biopsy. "When compared to 2/2015 chest CT the right apical nodule is unchanged in size & shape &  therefore is benign, biopsy is not indicated at this time. Given emphysematous changes identified on 3/2022 PET/CT, I have recommended consistent follow ups & serial imaging surveillance with Dr Rboles".  11/28/2023-presents to ED at [facility] with a right upper back pain for right upper back pain 11/28/2023: CT angiogram There is a lobulated irregular mass in the posterior right upper lobe measuring 7.7 x 4.8 x 6.5 cm corresponding to 8 cm pulmonary nodule seen on CT of February 3, 2022. There is lysis of the right posterior third rib. Redemonstration of mild to moderate centrilobular emphysema. Redemonstration of a 5 mm spiculated groundglass nodule in the lateral left upper lobe. Right lower outer breast 9 mm nodule for which correlation with breast examination and/or recent mammography is recommended.  12/13/2023 [facility] right upper lobe lung CT-guided core biopsy: Poorly differentiated adenocarcinoma, PD-L1 TPS 20%  1/16/2024 PET - IMPRESSION:  1.  Erosive locally invasive RIGHT apical pulmonary mass has enlarged since 11/2023 with increasing osseous destruction and invasion into the central mediastinum. Separate mild-moderate focus at the RIGHT hilum where additional malignancy is not excluded. The subcarinal region and LEFT hilum/lung fields are unremarkable.  2.  Worsening RIGHT lung pleural involvement, with uptake seen along the pleural surface and a pleural based mass posterolaterally.  3.  Increasing RIGHT pleural effusion.  4.  Diffuse, mild marrow uptake in the spine and pelvis. This is nonspecific. Consider MRI if there is suspicion for pathology.  5.  There is displacement of the esophagus to the LEFT; correlate for history of dysphasia localizing to the superior thoracic esophagus.   1/18/2024-pemetrexed/carboplatin/pembrolizumab cycle #1   1/22 - 1/26/2024 hospitalized at [facility] with altered mentation and fever.   1/22/2024 CT chest angiogram: No pulmonary embolism.  Locally invasive right apical mass into the adjacent bone and mediastinum with pleural metastases unchanged from PET 1/16/2024 but progressed since 11/28/2023.  Stable small right pleural effusion.  Emphysema.  Stable 4 mm left upper lobe pulmonary nodule.   1/24/2024 MRI brain: Numerous enhancing intracranial lesions, largest in the left frontal periventricular region measuring up to 1.3 cm compatible with metastatic disease.   1/31/2024 [facility]: No reportable alteration in the 7 disease relevant genes: ALK, BRAF, EGFR, ERBB2, MET, RET, ROS1.  Findings: KRAS G12C, MYC amplification.   3/16 - 3/19/2024 admitted at [facility] with suspicion of acute pancreatitis. CT angiogram: Right upper lobe subsegmental pulmonary thromboemboli.  Decreased size of right apical mass extends through the chest wall and spine.  New pathologic compression fractures T2 and T3.  Unchanged small groundglass and solid nodules in both lungs.  New partially included peripancreatic fluid, possibly reflecting acute pancreatitis  [FreeTextEntry1] : Palliative systemic chemotherapy [de-identified] : Ms. VALENTE  was admitted at [facility] between 3/16 - 3/19/2024 with abdominal pain, constipation, suspicion for acute pancreatitis.  Patient presented with numerous episodes of nonbloody emesis and difficulty breathing.  In the emergency room she was found bradycardic.  CT angiogram showed a right subsegmental PE, new lumbar fractures, possible acute pancreatitis.  Patient treated with Dilaudid, Zofran and Lovenox, subsequently discharged on Eliquis.  She is here for cycle #4 /6 platinum/Alimta/pembrolizumab.  Did not require O2 supplementation.  Denies pain.  She describes floaters in her right eye (new).  Accompanied by her daughter.

## 2024-03-28 NOTE — ASSESSMENT
[FreeTextEntry1] : Ms. VALENTE 's questions were answered to her satisfaction.  She  expressed her  understanding and willingness to comply with the above recommendations, and  will return to the office in 3 weeks.

## 2024-04-13 NOTE — ED PROVIDER NOTE - CARE PLAN
Principal Discharge DX:	Altered mental status  Secondary Diagnosis:	Seizure  Secondary Diagnosis:	Hyperpyrexia   1

## 2024-04-13 NOTE — PROGRESS NOTE ADULT - SUBJECTIVE AND OBJECTIVE BOX
HPI:  59yo woman with h/o subsegmental PE on Eliquis 5mg BID (last dose: Thurs) and h/o NSCLC with known mets to brain, on immunotherapy and s/p lung RT 3/1/24 (also was planned for GKRS w/ Dr. Candelaria this wk, but no prior radiation tx to brain), presents as a transfer from  for progressive lethargy since Thursday, then unresponsive this AM. CTH with L anterior frontal lesion 2x2cm with significant vasogenic edema and some hemorrhage with another R frontal met with vasogenic edema. Lesions increased in size from CT 3/17/24. Last MRI 1/24/24 w/ numerous small mets + L frontal lesion measuring 1.3cm. Coags/plts wnl. With seizure at  for which patient received Ativan and Keppra 1g. Lactate 5.1. S/p Versed 5mg in ED for poor responsiveness and possible non-convulsive status.     In NSCU, febrile to 40.2, tachycardic, lactate cleared, tachypneic, bedside POCUS with a small collapsible IVC, good cardiac function. Repeat CTH stable. CXR with clear lungs. Procalc 0.61.     VITALS:  T(C): , Max: 40.2 (04-13-24 @ 12:12)  HR:  (105 - 145)  BP:  (146/88 - 178/102)  ABP: --  RR:  (18 - 60)  SpO2:  (97% - 100%)  Wt(kg): --      LABS:  Na: 137 (04-13 @ 12:35), 136 (04-13 @ 07:00)  K: 2.7 (04-13 @ 12:35), 4.1 (04-13 @ 07:00)  Cl: 95 (04-13 @ 12:35), 94 (04-13 @ 07:00)  CO2: 18 (04-13 @ 12:35), 24 (04-13 @ 07:00)  BUN: 10 (04-13 @ 12:35), 10 (04-13 @ 07:00)  Cr: 0.52 (04-13 @ 12:35), 0.90 (04-13 @ 07:00)  Glu: 238(04-13 @ 12:35), 270(04-13 @ 07:00)    Hgb: 13.3 (04-13 @ 12:35), 13.2 (04-13 @ 07:00)  Hct: 39.3 (04-13 @ 12:35), 38.9 (04-13 @ 07:00)  WBC: 7.81 (04-13 @ 12:35), 10.64 (04-13 @ 07:00)  Plt: 196 (04-13 @ 12:35), 250 (04-13 @ 07:00)    INR: 1.42 04-13-24 @ 12:35, 1.17 04-13-24 @ 07:00  PTT: 25.0 04-13-24 @ 12:35, 26.1 04-13-24 @ 07:00    MEDICATIONS:  acetaminophen   IVPB .. 750 milliGRAM(s) IV Intermittent every 6 hours PRN  ALPRAZolam 0.5 milliGRAM(s) Oral daily PRN  chlorhexidine 4% Liquid 1 Application(s) Topical <User Schedule>  dexAMETHasone  Injectable 4 milliGRAM(s) IV Push every 6 hours  dextrose 50% Injectable 25 Gram(s) IV Push once  gabapentin 600 milliGRAM(s) Oral three times a day  levETIRAcetam  IVPB 1000 milliGRAM(s) IV Intermittent every 12 hours  ondansetron Injectable 4 milliGRAM(s) IV Push every 6 hours PRN  pantoprazole  Injectable 40 milliGRAM(s) IV Push daily  piperacillin/tazobactam IVPB. 3.375 Gram(s) IV Intermittent once  piperacillin/tazobactam IVPB.- 3.375 Gram(s) IV Intermittent once  polyethylene glycol 3350 17 Gram(s) Oral daily  potassium chloride  10 mEq/100 mL IVPB 10 milliEquivalent(s) IV Intermittent every 1 hour  senna 2 Tablet(s) Oral at bedtime  sodium chloride 0.9%. 1000 milliLiter(s) IV Continuous <Continuous>  vancomycin  IVPB 1000 milliGRAM(s) IV Intermittent every 12 hours    EXAMINATION:  General: ill appearing  HEENT:  dry MM  Neuro: lethargic, opens eye to noxious, grimaces to noxious, does not attend, does not follow commands, withdraws all 4 extremities to noxious   Cards:  tachycardic, regular   Respiratory:  no respiratory distress  Abdomen:  soft  Extremities:  no LE edema    Assessment/Plan:   Seizure in setting of worsening brain mets and likely sepsis.     Neuro:  q1h NC  c/w Keppra 750 mg BID   VEEG   MRI when able   s/p dex 10 mg, c/w dex 4 mg q6h   MRI spine for T2/3 pathologic fracture     CV:  MAP goal >65    Pulm:  start HFNC for WOB    GI:  NPO    Renal:  s/p 1L bolus, will give another bolus of NS   IVF    Heme:  SCDs, hold Lov ppx until CTH tomorrow   hold home Eliquis     ID:  UA, cultures, MRSA swab  start vanc and Zosyn  tylenol and cooling blanket     Endo:   FS goal 120-180    Patient seen and examined by attending on 4/12/2023.    Patient is critically ill due to seizure and brain edema and at high risk for neurological deterioration or death due to: at high risk for further seizure, requiring HFNC for high WOB.      HPI:  59yo woman with h/o subsegmental PE on Eliquis 5mg BID (last dose: Thurs) and h/o NSCLC with known mets to brain, on immunotherapy and s/p lung RT 3/1/24 (also was planned for GKRS w/ Dr. Candelaria this wk, but no prior radiation tx to brain), presents as a transfer from  for progressive lethargy since Thursday, then unresponsive this AM. CTH with L anterior frontal lesion 2x2cm with significant vasogenic edema and some hemorrhage with another R frontal met with vasogenic edema. Lesions increased in size from CT 3/17/24. Last MRI 1/24/24 w/ numerous small mets + L frontal lesion measuring 1.3cm. Coags/plts wnl. With seizure at  for which patient received Ativan and Keppra 1g. Lactate 5.1. S/p Versed 5mg in ED for poor responsiveness and possible non-convulsive status.     In NSCU, febrile to 40.2, tachycardic, lactate cleared, tachypneic, bedside POCUS with a small collapsible IVC, good cardiac function. Repeat CTH stable. CXR with clear lungs. Procalc 0.61.     VITALS:  T(C): , Max: 40.2 (04-13-24 @ 12:12)  HR:  (105 - 145)  BP:  (146/88 - 178/102)  ABP: --  RR:  (18 - 60)  SpO2:  (97% - 100%)  Wt(kg): --      LABS:  Na: 137 (04-13 @ 12:35), 136 (04-13 @ 07:00)  K: 2.7 (04-13 @ 12:35), 4.1 (04-13 @ 07:00)  Cl: 95 (04-13 @ 12:35), 94 (04-13 @ 07:00)  CO2: 18 (04-13 @ 12:35), 24 (04-13 @ 07:00)  BUN: 10 (04-13 @ 12:35), 10 (04-13 @ 07:00)  Cr: 0.52 (04-13 @ 12:35), 0.90 (04-13 @ 07:00)  Glu: 238(04-13 @ 12:35), 270(04-13 @ 07:00)    Hgb: 13.3 (04-13 @ 12:35), 13.2 (04-13 @ 07:00)  Hct: 39.3 (04-13 @ 12:35), 38.9 (04-13 @ 07:00)  WBC: 7.81 (04-13 @ 12:35), 10.64 (04-13 @ 07:00)  Plt: 196 (04-13 @ 12:35), 250 (04-13 @ 07:00)    INR: 1.42 04-13-24 @ 12:35, 1.17 04-13-24 @ 07:00  PTT: 25.0 04-13-24 @ 12:35, 26.1 04-13-24 @ 07:00    MEDICATIONS:  acetaminophen   IVPB .. 750 milliGRAM(s) IV Intermittent every 6 hours PRN  ALPRAZolam 0.5 milliGRAM(s) Oral daily PRN  chlorhexidine 4% Liquid 1 Application(s) Topical <User Schedule>  dexAMETHasone  Injectable 4 milliGRAM(s) IV Push every 6 hours  dextrose 50% Injectable 25 Gram(s) IV Push once  gabapentin 600 milliGRAM(s) Oral three times a day  levETIRAcetam  IVPB 1000 milliGRAM(s) IV Intermittent every 12 hours  ondansetron Injectable 4 milliGRAM(s) IV Push every 6 hours PRN  pantoprazole  Injectable 40 milliGRAM(s) IV Push daily  piperacillin/tazobactam IVPB. 3.375 Gram(s) IV Intermittent once  piperacillin/tazobactam IVPB.- 3.375 Gram(s) IV Intermittent once  polyethylene glycol 3350 17 Gram(s) Oral daily  potassium chloride  10 mEq/100 mL IVPB 10 milliEquivalent(s) IV Intermittent every 1 hour  senna 2 Tablet(s) Oral at bedtime  sodium chloride 0.9%. 1000 milliLiter(s) IV Continuous <Continuous>  vancomycin  IVPB 1000 milliGRAM(s) IV Intermittent every 12 hours    EXAMINATION:  General: ill appearing  HEENT:  dry MM  Neuro: lethargic, opens eye to noxious, grimaces to noxious, does not attend, does not follow commands, withdraws all 4 extremities to noxious   Cards:  tachycardic, regular   Respiratory:  no respiratory distress  Abdomen:  soft  Extremities:  no LE edema    Assessment/Plan:   Seizure in setting of worsening brain mets and likely sepsis.     Neuro:  q1h NC  c/w Keppra 750 mg BID   VEEG   MRI when able   s/p dex 10 mg, c/w dex 4 mg q6h   MRI spine for T2/3 pathologic fracture     CV:  MAP goal >65    Pulm:  start HFNC for WOB    GI:  NPO    Renal:  s/p 1L bolus, will give another bolus of NS   IVF    Heme:  SCDs, hold Lov ppx until CTH tomorrow   hold home Eliquis     ID:  UA, cultures, MRSA swab  start vanc and Zosyn  tylenol and cooling blanket     Endo:   FS goal 120-180    Patient seen and examined by attending on 4/13/2023.    Patient is critically ill due to seizure and brain edema and at high risk for neurological deterioration or death due to: at high risk for further seizure, requiring HFNC for high WOB.

## 2024-04-13 NOTE — ED ADULT NURSE NOTE - NS ED NOTE  TALK SOMEONE YN
74y Female with PMHx of HTN HLD, DM, R hip replacement, RA, CKD (Cr baseline ~2) presents to the  ED for unsteadiness and dizziness x1 week. LKN was a week ago. mRS of 2, walks with a rolling walker but is able to care for her basic everyday needs. NIH of 5 for L facial and dysarthria and LUE weakness. 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 Daughter at bedside states that she is at her baseline. LUE is pain limited to her rheumatoid arthritis but patient states that she definitely has noticed it to be slightly weaker than usual. Otherwise no numbness or tingling, no headache, N/V. CTH was completed in the ED concerning for age indeterminate L cerebellar lacunar infarcts. CTA deferred due to CKD, will obtain MRA with MR brain without contrast instead. Admit to stroke neurology for work up under Dr. Canada to stroke tele.     Neuro  #CVA workup  - continue aspirin 81mg and plavix 75mg daily  - continue atorvastatin 80mg daily  - q4hr stroke neuro checks and vitals  - obtain MRI Brain without contrast with vessels (since could not get CTA because of CKD)  - Stroke Code HCT Results: age indeterminate L cerebellar lacunar infarcts  - Stroke Code CTA Results: deferred due to CKD  - Stroke education    Cards  #HTN  - permissive hypertension, Goal SBP <180  - restarted BP meds, amlodipine 10mg, losartan 100mg, toprol 100mg   - obtain TTE  - Stroke Code EKG Results: pending  - follow up Trop- had trop leak to 40 in the ED    #HLD  - high dose statin as above in CVA  - LDL results: pending    Pulm  - call provider if SPO2 < 94%    GI  #Nutrition/Fluids/Electrolytes   - replete K<4 and Mg <2  - Diet: carb controlled    Renal  - CKD- baseline Cr 2  - voiding    Infectious Disease  - afebrile  - Stroke CXR results: pending    Endocrine  #DM  - A1C results: pendng  - ISS  - TSH results: pending    DVT Prophylaxis  - lovenox sq for DVT prophylaxis   - SCDs for DVT prophylaxis      Discussed daily hospital plans and goals with patient and family at bedside. (Called and updated family.)    Discussed with Neurology Attending No

## 2024-04-13 NOTE — PROVIDER CONTACT NOTE (OTHER) - ASSESSMENT
Pt. obtunded, pupils 3mm sluggish and reactive, withdraws x4. Pt. febrile to 39.5C, tachycardic with HR 140s, and tachypneic with a rate in the 50s.
Pt. tachypneic, tachycardic and febrile at this time. Pt. ordered for multiple supplements and fluid boluses with poor IV access. Pt. severely dehydrated. PAs and RNs have made multiple attempts to place IV access with multiple IVs infiltrating. MD is aware of patient access.
At 1503 Temp 39.3, patient placed on cooling blanket. Patient noted to be breathing at a rate of 52 and patient was placed on hi mitra NC, patient arrived to unit with 3 PIV, all 3 PIV have infiltrated at this time, the patient is severely dehydrated and currently has two PIV at this time, has been stuck for IV access multiple times with failed attempt, patient requiring fluid resuscitation, electrolytes, and IV antibiotics. Patient is currently breathing at a rate of 50 with sternal retraction, intercostal muscle retraction, abdominal breathing.

## 2024-04-13 NOTE — ED PROVIDER NOTE - OBJECTIVE STATEMENT
58-year-old female with history of non-small cell lung cancer stage IV with brain mets, currently undergoing chemotherapy and immunotherapy, status post radiation therapy, also PE, currently on Eliquis, brought in by EMS from home for altered mental status for last 24 hours.   and daughter state that patient has not been eating or drinking much at all for the last 2 days.  Has been more lethargic and now this morning seems to be worse, had incontinence and seems more confused.  Patient reportedly had a fever fever of 100.9 yesterday.  No cough or shortness of breath.  Vomited once yesterday morning.  Patient had some mild headache which has been ongoing.  No reported fall or injury or head strike

## 2024-04-13 NOTE — ED ADULT NURSE REASSESSMENT NOTE - STATUS
Unable to get 2nd set of blood culture per night nurse due to insufficient amount. Patient hard stick. Lab called and ask phlebotomist to draw another set of BC's. NS infusing. No infiltrate noted.

## 2024-04-13 NOTE — PROGRESS NOTE ADULT - ASSESSMENT
57yo woman with h/o subsegmental PE on Eliquis 5mg BID (last dose: Thurs) and h/o NSCLC with known mets to brain, on immunotherapy and s/p lung RT 3/1/24 (also was planned for [facility] w/ Dr. Candelaria this wk, but no prior radiation tx to brain), presents as a transfer from  for progressive lethargy since Thursday, then unresponsive this AM. CTH with L anterior frontal lesion 2x2cm with significant vasogenic edema and some hemorrhage with another R frontal met with vasogenic edema. Lesions increased in size from CT 3/17/24. Last MRI 1/24/24 w/ numerous small mets + L frontal lesion measuring 1.3cm. Admitted to NSCU after sz, c/f nonconvulsive status.    ---Neuro---  #hemorrhagic mets in setting of NSCLC  - Interval CTH stable  - MR w and w/out pending  - MR C/T/L spine for metastatic evaluation  - serial q1 hour neuro exams  - hold AC/AP in setting of heme mets  - dex 4 q6 for vasogenic edema  - neurosurgery following    #GTC w c/f non-convulsive status  - s/p Ativan?? @  ED/ Versed 5mg in [facility] ED  - 1.5g keppra load  - Keppra 750mg BID  - cont. vEEG    ---Resp---  #AHRF  - on Hiflow 30/50  - serial ABGs PRN  - CXR w/ out interstitial infiltrate or lobar consolidation  - monitor airway    #NSCLSC  - on immunotherapy and s/p lung RT 3/1/24  - consult heme/onc    #HAGMA  - ABG with HAGMA with resp. compensation likely 2/2 lactemia, now downtrending s/p abx and 2L NS  - repeat ABG and lactate    ---CV----  - Maintain normotension, MAP >65  - TTE pending    ---GI---  - Maintain pt/ NPO given c/f intubation and poor mental status  - Obtain enteral access  - bowel regimen miralax/senna  - PPI    ------  - NS @ 75cc/hr, target normal NA  - monitor renal function  - replete lytes PRN     ---Heme---  #Hx of PE on Eliquis  - obtain CTPA   - AC deferred in setting of acute ICH  - likely hypercoagulable in setting of neoplasm    #VTE ppx  - chemical ppx held given acute heme  - BL SCDs  - BL LE US pending, f/u    ---Endo---  - Hgb A1c 5.8  - Maintain euglycemia 120-180  - ISS  - TSH 2.43, f/u repeat     ---ID---  #Fever in setting of metastatic CA  - T Max of 39.3 on presentation  - UA grossly normal, CXR w/o evidence of lobar consolidation or infiltrate  - f/u BCx  - procal 3.7  - f/u amylase, lipase to r/o acute abd pathology  - consider CT CAP in setting of unclear source fo fever  - cont. empriric vanc/zosyn     Lines  Right femoral central line        Patient transferred to care of NSCU given GTC in setting of hemorrhagic mets with risk for acute decompensation and possible need for definitive airway management 59yo woman with h/o subsegmental PE on Eliquis 5mg BID (last dose: Thurs) and h/o NSCLC with known mets to brain, on immunotherapy and s/p lung RT 3/1/24 (also was planned for [facility] w/ Dr. Candelaria this wk, but no prior radiation tx to brain), presents as a transfer from  for progressive lethargy since Thursday, then unresponsive this AM. CTH with L anterior frontal lesion 2x2cm with significant vasogenic edema and some hemorrhage with another R frontal met with vasogenic edema. Lesions increased in size from CT 3/17/24. Last MRI 1/24/24 w/ numerous small mets + L frontal lesion measuring 1.3cm. Admitted to NSCU after sz, c/f nonconvulsive status.    ---Neuro---  #hemorrhagic mets in setting of NSCLC  - Interval CTH stable  - MR w and w/out pending  - MR C/T/L spine for metastatic evaluation  - serial q1 hour neuro exams  - hold AC/AP in setting of heme mets  - dex 4 q6 for vasogenic edema  - neurosurgery following    #GTC w c/f non-convulsive status  - s/p Ativan?? @  ED/ Versed 5mg in [facility] ED  - 1.5g keppra load  - Keppra 750mg BID  - cont. vEEG    ---Resp---  #AHRF  - on Hiflow 30/50  - serial ABGs PRN  - CXR w/ out interstitial infiltrate or lobar consolidation  - monitor airway    #NSCLSC  - on immunotherapy and s/p lung RT 3/1/24  - consult heme/onc    #Lactemia with low bicarbonate and Anion gap  - now downtrending s/p abx and 2L NS  - repeat ABG and lactate    ---CV----  - Maintain normotension, MAP >65  - TTE pending    ---GI---  - Maintain pt/ NPO given c/f intubation and poor mental status  - Obtain enteral access  - bowel regimen miralax/senna  - PPI    ------  #hyponeutremia  - urine , Urine osm 399, serum osm 271 c/w SIADH  - NS @ 75cc/hr while NPO  - consider fluid restriction after initial volume resuscitation   - monitor renal function  - replete lytes PRN     ---Heme---  #Hx of PE on Eliquis  - obtain CTPA   - AC deferred in setting of acute ICH  - likely hypercoagulable in setting of neoplasm    #VTE ppx  - chemical ppx held given acute heme  - BL SCDs  - BL LE US pending, f/u    ---Endo---  - Hgb A1c 5.8  - Maintain euglycemia 120-180  - ISS  - TSH 2.43, f/u repeat     ---ID---  #Fever in setting of metastatic CA  - T Max of 39.3 on presentation  - UA grossly normal, CXR w/o evidence of lobar consolidation or infiltrate  - f/u BCx  - procal 3.7  - f/u amylase, lipase to r/o acute abd pathology  - consider CT CAP in setting of unclear source fo fever  - cont. empriric vanc/zosyn     Lines  Right femoral central line        Patient transferred to care of NSCU given GTC in setting of hemorrhagic mets with risk for acute decompensation and possible need for definitive airway management 59yo woman with h/o subsegmental PE on Eliquis 5mg BID (last dose: Thurs) and h/o NSCLC with known mets to brain, on immunotherapy and s/p lung RT 3/1/24 (also was planned for [facility] w/ Dr. Candelaria this wk, but no prior radiation tx to brain), presents as a transfer from  for progressive lethargy since Thursday, then unresponsive this AM. CTH with L anterior frontal lesion 2x2cm with significant vasogenic edema and some hemorrhage with another R frontal met with vasogenic edema. Lesions increased in size from CT 3/17/24. Last MRI 1/24/24 w/ numerous small mets + L frontal lesion measuring 1.3cm. Admitted to NSCU after sz, c/f nonconvulsive status.    ---Neuro---  #hemorrhagic mets in setting of NSCLC  - Interval CTH stable  - MR w and w/out pending  - MR C/T/L spine for metastatic evaluation  - serial q1 hour neuro exams  - hold AC/AP in setting of heme mets  - dex 4 q6 for vasogenic edema  - neurosurgery following    #GTC w c/f non-convulsive status  - s/p Ativan?? @  ED/ Versed 5mg in [facility] ED  - 1.5g keppra load  - Keppra 750mg BID  - cont. vEEG    ---Resp---  #AHRF  - on Hiflow 30/50  - serial ABGs PRN  - CXR w/ out interstitial infiltrate or lobar consolidation  - monitor airway    #NSCLSC  - on immunotherapy and s/p lung RT 3/1/24  - consult heme/onc    #Lactemia with low bicarbonate and Anion gap  - now downtrending s/p abx and 2L NS  - repeat ABG and lactate    ---CV----  - Maintain normotension, MAP >65  - TTE pending    ---GI---  - Maintain pt/ NPO given c/f intubation and poor mental status  - Obtain enteral access  - bowel regimen miralax/senna  - PPI    ------  #hyponeutremia  - urine , Urine osm 399, serum osm 271 c/w SIADH  - NS @ 75cc/hr while NPO  - consider fluid restriction after initial volume resuscitation   - monitor renal function  - replete lytes PRN     ---Heme---  #Hx of PE on Eliquis  - obtain CTPA   - AC deferred in setting of acute ICH  - likely hypercoagulable in setting of neoplasm    #VTE ppx  - chemical ppx held given acute heme  - BL SCDs  - BL LE US pending, f/u    ---Endo---  - Hgb A1c 5.8  - Maintain euglycemia 120-180  - ISS  - TSH 2.43, f/u repeat     ---ID---  #Fever in setting of metastatic CA  - T Max of 39.3 on presentation  - UA grossly normal, CXR w/o evidence of lobar consolidation or infiltrate  - f/u BCx  - procal 3.7  - f/u amylase, lipase to r/o acute abd pathology  - consider CT CAP in setting of unclear source fo fever  - cont. empriric vanc/zosyn     Lines  Right femoral central line  L axillary arterial line      Patient transferred to care of NSCU given GTC in setting of hemorrhagic mets with risk for acute decompensation and possible need for definitive airway management 59yo woman with h/o subsegmental PE on Eliquis 5mg BID (last dose: Thurs) and h/o NSCLC with known mets to brain, on immunotherapy and s/p lung RT 3/1/24 (also was planned for [facility] w/ Dr. Candelaria this wk, but no prior radiation tx to brain), presents as a transfer from  for progressive lethargy since Thursday, then unresponsive this AM. CTH with L anterior frontal lesion 2x2cm with significant vasogenic edema and some hemorrhage with another R frontal met with vasogenic edema. Lesions increased in size from CT 3/17/24. Last MRI 1/24/24 w/ numerous small mets + L frontal lesion measuring 1.3cm. Admitted to NSCU after sz, c/f nonconvulsive status.    ---Neuro---  #hemorrhagic mets in setting of NSCLC  - Interval CTH stable  - MR w and w/out pending  - MR C/T/L spine for metastatic evaluation  - serial q1 hour neuro exams  - hold AC/AP in setting of heme mets  - dex 4 q6 for vasogenic edema  - neurosurgery following    #GTC w c/f non-convulsive status  - s/p Ativan?? @  ED/ Versed 5mg in [facility] ED  - 1.5g keppra load  - Keppra 750mg BID  - cont. vEEG    ---Resp---  - on Hiflow 30/50  - serial ABGs PRN  - CXR w/ out interstitial infiltrate or lobar consolidation  - monitor airway    #NSCLSC  - on immunotherapy and s/p lung RT 3/1/24  - consult heme/onc    ---CV----  - Maintain normotension, MAP >65  - TTE pending    #Lactemia with low bicarbonate and Anion gap  - lactate now downtrending s/p abx and 2L NS  - repeat ABG and lactate    ---GI---  - Maintain pt/ NPO given c/f intubation and poor mental status  - Obtain enteral access  - bowel regimen miralax/senna  - PPI    ------  #hyponeutremia  - urine , Urine osm 399, serum osm 271 c/w SIADH  - NS @ 75cc/hr while NPO  - consider fluid restriction after initial volume resuscitation   - monitor renal function  - replete lytes PRN     ---Heme---  #Hx of PE on Eliquis  - obtain CTPA   - AC deferred in setting of acute ICH  - likely hypercoagulable in setting of neoplasm    #VTE ppx  - chemical ppx held given acute heme  - BL SCDs  - BL LE US pending, f/u    ---Endo---  - Hgb A1c 5.8  - Maintain euglycemia 120-180  - ISS  - TSH 2.43, f/u repeat     #Angion gap w/ low bicarb and ketosis  - pt. w/ likely starvation ketosis 2/2 poor PO intake  - consider D5NS in event anion gap persists    ---ID---  #Fever in setting of metastatic CA  - T Max of 39.3 on presentation  - UA grossly normal, CXR w/o evidence of lobar consolidation or infiltrate  - f/u BCx  - procal 3.7  - f/u amylase, lipase to r/o acute abd pathology  - consider CT CAP in setting of unclear source fo fever  - cont. empriric vanc/zosyn     Lines  Right femoral central line  L axillary arterial line      Patient transferred to care of NSCU given GTC in setting of hemorrhagic mets with risk for acute decompensation and possible need for definitive airway management 59yo woman with h/o subsegmental PE on Eliquis 5mg BID (last dose: Thurs) and h/o NSCLC with known mets to brain, on immunotherapy and s/p lung RT 3/1/24 (also was planned for [facility] w/ Dr. Candelaria this wk, but no prior radiation tx to brain), presents as a transfer from  for progressive lethargy since Thursday, then unresponsive this AM. CTH with L anterior frontal lesion 2x2cm with significant vasogenic edema and some hemorrhage with another R frontal met with vasogenic edema. Lesions increased in size from CT 3/17/24. Last MRI 1/24/24 w/ numerous small mets + L frontal lesion measuring 1.3cm. Admitted to NSCU after sz, c/f nonconvulsive status.    ---Neuro---  #hemorrhagic mets in setting of NSCLC  - Interval CTH stable  - MR w and w/out pending  - MR C/T/L spine for metastatic evaluation  - serial q1 hour neuro exams  - hold AC/AP in setting of heme mets  - dex 4 q6 for vasogenic edema  - neurosurgery following    #GTC w c/f non-convulsive status  - s/p Ativan?? @  ED/ Versed 5mg in [facility] ED  - 1.5g keppra load  - Keppra 750mg BID  - cont. vEEG    ---Resp---  - on Hiflow 30/50  - serial ABGs PRN  - CXR w/ out interstitial infiltrate or lobar consolidation  - monitor airway    #NSCLSC  - on immunotherapy and s/p lung RT 3/1/24  - consult heme/onc    ---CV----  - Maintain normotension, MAP >65  - TTE pending    #Lactemia  - lactate now downtrending s/p abx and 2L NS  - repeat ABG and lactate    ---GI---  - Maintain pt/ NPO given c/f intubation and poor mental status  - Obtain enteral access  - bowel regimen miralax/senna  - PPI    ------  #hyponeutremia  - urine , Urine osm 399, serum osm 271 c/w SIADH  - NS @ 75cc/hr while NPO  - consider fluid restriction after initial volume resuscitation   - monitor renal function  - replete lytes PRN     ---Heme---  #Hx of PE on Eliquis  - obtain CTPA   - AC deferred in setting of acute ICH  - likely hypercoagulable in setting of neoplasm    #VTE ppx  - chemical ppx held given acute heme  - BL SCDs  - BL LE US pending, f/u    ---Endo---  - Hgb A1c 5.8  - Maintain euglycemia 120-180  - ISS  - TSH 2.43, f/u repeat     #Angion gap w/ low bicarb and ketosis  - pt. w/ anion gap 2/2 starvation ketosis vs. lactemia  - consider D5NS in event anion gap persists s/p volume lactate guided volume resuscitation     ---ID---  #Fever in setting of metastatic CA  - T Max of 39.3 on presentation  - UA grossly normal, CXR w/o evidence of lobar consolidation or infiltrate  - f/u BCx  - procal 3.7  - f/u amylase, lipase to r/o acute abd pathology  - consider CT CAP in setting of unclear source fo fever  - cont. empriric vanc/zosyn     Lines  Right femoral central line  L axillary arterial line      Patient transferred to care of NSCU given GTC in setting of hemorrhagic mets with risk for acute decompensation and possible need for definitive airway management 59yo woman with h/o subsegmental PE on Eliquis 5mg BID (last dose: Thurs) and h/o NSCLC with known mets to brain, on immunotherapy and s/p lung RT 3/1/24 (also was planned for [facility] w/ Dr. Candelaria this wk, but no prior radiation tx to brain), presents as a transfer from  for progressive lethargy since Thursday, then unresponsive this AM. CTH with L anterior frontal lesion 2x2cm with significant vasogenic edema and some hemorrhage with another R frontal met with vasogenic edema. Lesions increased in size from CT 3/17/24. Last MRI 1/24/24 w/ numerous small mets + L frontal lesion measuring 1.3cm. Admitted to NSCU after sz, c/f nonconvulsive status.    ---Neuro---  #hemorrhagic mets in setting of NSCLC  - Interval CTH stable  - MR w and w/out pending  - MR C/T/L spine for metastatic evaluation  - serial q1 hour neuro exams  - hold AC/AP in setting of heme mets  - dex 4 q6 for vasogenic edema  - neurosurgery following    #GTC w c/f non-convulsive status  - s/p Ativan @  ED/ Versed 5mg in [facility] ED  - 1.5g keppra load  - Keppra 750mg BID  - cont. vEEG    ---Resp---  - received in \[facility]\"" 30/50, intubated for airway control in setting of poor mental status  - serial ABGs PRN  - CXR w/ out interstitial infiltrate or lobar consolidation    #NSCLS  - on immunotherapy and s/p lung RT 3/1/24  - consult heme/onc in AM    ---CV----  - Maintain normotension, MAP >65  - TTE pending    #Lactemia  - lactate now downtrending s/p abx and 2L NS  - repeat ABG and lactate    ---GI---  - OG tube, f/u CXR  - start trickle feeds given likely ketosis (ketones in urine, BHB 0.9)  - bowel regimen miralax/senna  - PPI while inbuated/on steroids    ------  #hyponeutremia  - urine , Urine osm 399, serum osm 271 c/w SIADH (hypotonic hyponatremia w/ elevated Na in urine and urine osmolality >200)  - NS @ 75cc/hr, until tube feeds @ goal  - consider fluid restriction after initial volume resuscitation   - monitor renal function  - replete lytes PRN     ---Heme---  #Hx of PE on Eliquis  - obtain CTPA   - AC deferred in setting of acute ICH  - likely hypercoagulable in setting of neoplasm  - f/u TTE to assess RV function     #VTE ppx  - chemical ppx held given acute heme  - BL SCDs  - BL LE US pending, f/u    ---Endo---  - Hgb A1c 5.8  - Maintain euglycemia 120-180  - ISS  - TSH 2.43, f/u repeat     #Angion gap w/ low bicarb and ketosis  - pt. w/ anion gap 2/2 starvation ketosis vs. lactemia (initial Lactate 5.1, BHB 0.9)  - trickle feeds to inhibit ketosis, repeat BHB in AM  - lactate cleared s/p fluids    ---ID---  #Fever in setting of metastatic CA  - T Max of 39.3 on presentation  - UA grossly normal, CXR w/o evidence of lobar consolidation or infiltrate  - f/u BCx  - procal 3.7  - f/u amylase, lipase to r/o acute abd pathology  - consider CT CAP in setting of unclear source fo fever  - cont. empriric vanc/zosyn     Lines  Right femoral central line  L axillary arterial line      Patient transferred to care of NSCU given GTC in setting of hemorrhagic mets with risk for acute decompensation and possible need for definitive airway management 57yo woman with h/o subsegmental PE on Eliquis 5mg BID (last dose: Thurs) and h/o NSCLC with known mets to brain, on immunotherapy and s/p lung RT 3/1/24 (also was planned for [facility] w/ Dr. Candelaria this wk, but no prior radiation tx to brain), presents as a transfer from  for progressive lethargy since Thursday, then unresponsive this AM. CTH with L anterior frontal lesion 2x2cm with significant vasogenic edema and some hemorrhage with another R frontal met with vasogenic edema. Lesions increased in size from CT 3/17/24. Last MRI 1/24/24 w/ numerous small mets + L frontal lesion measuring 1.3cm. Admitted to NSCU after sz, c/f nonconvulsive status.    ---Neuro---  #hemorrhagic mets in setting of NSCLC  - Interval CTH stable  - MR w and w/out pending  - MR C/T/L spine for metastatic evaluation  - serial q1 hour neuro exams  - hold AC/AP in setting of heme mets  - dex 4 q6 for vasogenic edema  - neurosurgery following    #GTC w c/f non-convulsive status  - s/p Ativan @  ED/ Versed 5mg in [facility] ED  - 1.5g keppra load  - Keppra 750mg BID  - cont. vEEG    ---Resp---  - received in [facility] 30/50, intubated for airway control in setting of poor mental status  - serial ABGs PRN  - CXR w/ out interstitial infiltrate or lobar consolidation    #[facility]  - on immunotherapy and s/p lung RT 3/1/24  - consult heme/onc in AM    ---CV----  - Maintain normotension, MAP >65  - TTE pending    #Lactemia  - lactate now WNL s/p abx and 2L NS  - repeat ABG and lactate    ---GI---  - OG tube, trickle feeds @ 20  - start trickle feeds given ketosis likely contributory to anion gap acidosis (ketones in urine, BHB 0.9)  - bowel regimen miralax/senna  - PPI while inbuated/on steroids    ------  #hyponeutremia  - urine , Urine osm 399, serum osm 271 c/w SIADH (hypotonic hyponatremia w/ elevated Na in urine and urine osmolality >200)  - fluid restrict to 800cc/hr  - consider fluid restriction after initial volume resuscitation   - monitor renal function  - replete lytes PRN     ---Heme---  #Hx of PE on Eliquis  - obtain CTPA   - AC deferred in setting of acute ICH  - likely hypercoagulable in setting of neoplasm  - f/u TTE to assess RV function     #VTE ppx  - chemical ppx held given acute heme  - BL SCDs  - BL LE US pending, f/u    ---Endo---  - Hgb A1c 5.8  - Maintain euglycemia 120-180  - ISS  - TSH 2.43, f/u repeat     #Angion gap w/ low bicarb and ketosis  - pt. w/ anion gap 2/2 starvation ketosis vs. lactemia (initial Lactate 5.1, BHB 0.9)  - trickle feeds to inhibit ketosis, repeat BHB in AM  - lactate cleared s/p fluids    ---ID---  #Fever in setting of metastatic CA  - T Max of 39.3 on presentation  - UA grossly normal, CXR w/o evidence of lobar consolidation or infiltrate  - f/u BCx  - procal 3.7  - f/u amylase, lipase to r/o acute abd pathology  - consider CT CAP in setting of unclear source fo fever  - cont. empriric vanc/zosyn     Lines  Right femoral central line  L axillary arterial line      Patient transferred to care of NSCU given GTC in setting of hemorrhagic mets with risk for acute decompensation and possible need for definitive airway management 59yo woman with h/o subsegmental PE on Eliquis 5mg BID (last dose: Thurs) and h/o NSCLC with known mets to brain, on immunotherapy and s/p lung RT 3/1/24 (also was planned for [facility] w/ Dr. Candelaria this wk, but no prior radiation tx to brain), presents as a transfer from  for progressive lethargy since Thursday, then unresponsive this AM. CTH with L anterior frontal lesion 2x2cm with significant vasogenic edema and some hemorrhage with another R frontal met with vasogenic edema. Lesions increased in size from CT 3/17/24. Last MRI 1/24/24 w/ numerous small mets + L frontal lesion measuring 1.3cm. Admitted to NSCU after sz, c/f nonconvulsive status.    ---Neuro---  #hemorrhagic mets in setting of NSCLC  - Interval CTH stable  - MR w and w/out pending  - MR C/T/L spine for metastatic evaluation  - serial q1 hour neuro exams  - hold AC/AP in setting of heme mets  - dex 4 q6 for vasogenic edema  - neurosurgery following    #GTC w c/f non-convulsive status  - s/p Ativan @  ED/ Versed 5mg in [facility] ED  - 1.5g keppra load  - Keppra 750mg BID  - cont. vEEG    ---Resp---  - received in [facility] 30/50, intubated for airway control in setting of poor mental status  - PRVC 16/350/5/40  - serial ABGs PRN  - CXR w/ out interstitial infiltrate or lobar consolidation    #NSCLSC  - on immunotherapy and s/p lung RT 3/1/24  - consult heme/onc in AM    ---CV----  - Maintain normotension, MAP >65  - TTE pending    #Lactemia  - lactate now WNL s/p abx and 2L NS  - repeat ABG and lactate    ---GI---  - OG tube, trickle feeds @ 20  - start trickle feeds given ketosis likely contributory to anion gap acidosis (ketones in urine, BHB 0.9)  - bowel regimen miralax/senna  - PPI while inbuated/on steroids    ------  #hyponeutremia  - urine , Urine osm 399, serum osm 271 c/w SIADH (hypotonic hyponatremia w/ elevated Na in urine and urine osmolality >200)  - fluid restrict to 800cc/hr  - consider fluid restriction after initial volume resuscitation   - monitor renal function  - replete lytes PRN     ---Heme---  #Hx of PE on Eliquis  - obtain CTPA   - AC deferred in setting of acute ICH  - likely hypercoagulable in setting of neoplasm  - f/u TTE to assess RV function     #VTE ppx  - chemical ppx held given acute heme  - BL SCDs  - BL LE US pending, f/u    ---Endo---  - Hgb A1c 5.8  - Maintain euglycemia 120-180  - ISS  - TSH 2.43, f/u repeat     #Angion gap w/ low bicarb and ketosis  - pt. w/ anion gap 2/2 starvation ketosis vs. lactemia (initial Lactate 5.1, BHB 0.9)  - trickle feeds to inhibit ketosis, repeat BHB in AM  - lactate cleared s/p fluids    ---ID---  #Fever in setting of metastatic CA  - T Max of 39.3 on presentation  - UA grossly normal, CXR w/o evidence of lobar consolidation or infiltrate  - f/u BCx  - procal 3.7  - f/u amylase, lipase to r/o acute abd pathology  - consider CT CAP in setting of unclear source fo fever  - cont. empriric vanc/zosyn     Lines  Right femoral central line  L axillary arterial line      Patient transferred to care of NSCU given GTC in setting of hemorrhagic mets with risk for acute decompensation and possible need for definitive airway management 59yo woman with h/o subsegmental PE on Eliquis 5mg BID (last dose: Thurs) and h/o NSCLC with known mets to brain, on immunotherapy and s/p lung RT 3/1/24 (also was planned for [facility]RS w/ Dr. Candelaria this wk, but no prior radiation tx to brain), presents as a transfer from  for progressive lethargy since Thursday, then unresponsive this AM. CTH with L anterior frontal lesion 2x2cm with significant vasogenic edema and some hemorrhage with another R frontal met with vasogenic edema. Lesions increased in size from CT 3/17/24. Last MRI 1/24/24 w/ numerous small mets + L frontal lesion measuring 1.3cm. Admitted to NSCU after sz, c/f nonconvulsive status.    ---Neuro---  #hemorrhagic mets in setting of NSCLC  - Interval CTH stable  - MR w and w/out pending  - MR C/T/L spine for metastatic evaluation  - serial q1 hour neuro exams  - hold AC/AP in setting of heme mets  - dex 4 q6 for vasogenic edema  - neurosurgery following    #GTC w c/f non-convulsive status  - s/p Ativan @  ED/ Versed 5mg in [facility] ED  - 1.5g keppra load  - Keppra 750mg BID  - cont. vEEG    ---Resp---  - received in [place] 30/50, intubated for airway control in setting of poor mental status  - PRVC 16/350/5/40  - serial ABGs PRN  - CXR w/ out interstitial infiltrate or lobar consolidation    #NSCLSC  - on immunotherapy and s/p lung RT 3/1/24  - consult heme/onc in AM    ---CV----  - Maintain normotension, MAP >65  - TTE pending    #Lactemia  - lactate now WNL s/p abx and 2L NS  - repeat ABG and lactate    ---GI---  - OG tube, trickle feeds @ 20  - start trickle feeds given ketosis likely contributory to anion gap acidosis (ketones in urine, BHB 0.9)  - bowel regimen miralax/senna  - PPI while inbuated/on steroids    ------  #hyponeutremia  - urine , Urine osm 399, serum osm 271 c/w SIADH (hypotonic hyponatremia w/ elevated Na in urine and urine osmolality >200)  - fluid restrict to 800cc/hr  - consider fluid restriction after initial volume resuscitation   - monitor renal function  - replete lytes PRN     ---Heme---  #Hx of PE on Eliquis  - obtain CTPA   - AC deferred in setting of acute ICH  - likely hypercoagulable in setting of neoplasm  - f/u TTE to assess RV function     #VTE ppx  - chemical ppx held given acute heme  - BL SCDs  - BL LE US pending, f/u    ---Endo---  - Hgb A1c 5.8  - Maintain euglycemia 120-180  - ISS  - TSH 0.22 from 2.43 (3/28/24)    #Angion gap w/ low bicarb and ketosis  - pt. w/ anion gap 2/2 starvation ketosis vs. lactemia (initial Lactate 5.1, BHB 0.9)  - trickle feeds to inhibit ketosis, repeat BHB in AM  - lactate cleared s/p fluids    ---ID---  #Fever in setting of metastatic CA  - T Max of 39.3 on presentation  - UA grossly normal, CXR w/o evidence of lobar consolidation or infiltrate  - f/u BCx  - procal 3.7  - f/u amylase, lipase to r/o acute abd pathology  - consider CT CAP in setting of unclear source fo fever  - cont. empriric vanc/zosyn     Lines  Right femoral central line  L axillary arterial line      Patient transferred to care of [facility] given GTC in setting of hemorrhagic mets with risk for acute decompensation and possible need for definitive airway management 59yo woman with h/o subsegmental PE on Eliquis 5mg BID (last dose: Thurs) and h/o NSCLC with known mets to brain, on immunotherapy and s/p lung RT 3/1/24 (also was planned for [facility] w/ Dr. Candelaria this wk, but no prior radiation tx to brain), presents as a transfer from  for progressive lethargy since Thursday, then unresponsive this AM. CTH with L anterior frontal lesion 2x2cm with significant vasogenic edema and some hemorrhage with another R frontal met with vasogenic edema. Lesions increased in size from CT 3/17/24. Last MRI 1/24/24 w/ numerous small mets + L frontal lesion measuring 1.3cm. Admitted to [facility] after sz, c/f nonconvulsive status.    ---Neuro---  #hemorrhagic mets in setting of NSCLC  - Interval CTH stable  - MR w and w/out pending  - MR C/T/L spine for metastatic evaluation  - serial q1 hour neuro exams  - hold AC/AP in setting of heme mets  - dex 4 q6 for vasogenic edema  - neurosurgery following    #GTC w c/f non-convulsive status  - s/p Ativan @  ED/ Versed 5mg in [facility] ED  - 1.5g keppra load  - Keppra 750mg BID  - cont. vEEG    ---Resp---  - received in [place] 30/50, intubated for airway control in setting of poor mental status  - PRVC 16/350/5/40  - serial ABGs PRN  - CXR w/ out interstitial infiltrate or lobar consolidation    #NSCLSC  - on immunotherapy and s/p lung RT 3/1/24  - consult heme/onc in AM    ---CV----  - Maintain normotension, MAP >65  - TTE pending    #Lactemia  - lactate now WNL s/p abx and 2L NS  - repeat ABG and lactate    ---GI---  - OG tube, trickle feeds @ 20  - start trickle feeds given ketosis likely contributory to anion gap acidosis (ketones in urine, BHB 0.9)  - bowel regimen miralax/senna  - PPI while inbuated/on steroids    ------  #hyponeutremia  - urine , Urine K xxx, Urine osm 399, serum osm 271 c/w SIADH  - Urine cation concentration xxx  - 3% @ 30cc/hr for Na 135-145  - fluid restrict to 800cc/day  - monitor renal function  - replete lytes PRN     ---Heme---  #Hx of PE on Eliquis  - obtain CTPA   - AC deferred in setting of acute ICH  - likely hypercoagulable in setting of neoplasm  - f/u TTE to assess RV function     #VTE ppx  - chemical ppx held given acute heme  - BL SCDs  - BL LE US pending, f/u    ---Endo---  - Hgb A1c 5.8  - Maintain euglycemia 120-180  - ISS  - TSH 0.22 from 2.43 (3/28/24)    #Angion gap w/ low bicarb and ketosis  - pt. w/ anion gap 2/2 starvation ketosis vs. lactemia (initial Lactate 5.1, BHB 0.9)  - trickle feeds to inhibit ketosis, repeat BHB in AM  - lactate cleared s/p fluids    ---ID---  #Fever in setting of metastatic CA  - T Max of 39.3 on presentation  - UA grossly normal, CXR w/o evidence of lobar consolidation or infiltrate  - f/u BCx  - procal 3.7  - f/u amylase, lipase to r/o acute abd pathology  - consider CT CAP in setting of unclear source fo fever  - cont. empriric vanc/zosyn     Lines  Right femoral central line  L axillary arterial line      Patient transferred to care of [facility] given GTC in setting of hemorrhagic mets with risk for acute decompensation and possible need for definitive airway management 59yo woman with h/o subsegmental PE on Eliquis 5mg BID (last dose: Thurs) and h/o NSCLC with known mets to brain, on immunotherapy and s/p lung RT 3/1/24 (also was planned for [facility] w/ Dr. Candelaria this wk, but no prior radiation tx to brain), presents as a transfer from  for progressive lethargy since Thursday, then unresponsive this AM. CTH with L anterior frontal lesion 2x2cm with significant vasogenic edema and some hemorrhage with another R frontal met with vasogenic edema. Lesions increased in size from CT 3/17/24. Last MRI 1/24/24 w/ numerous small mets + L frontal lesion measuring 1.3cm. Admitted to [facility] after sz, c/f nonconvulsive status.    ---Neuro---  #hemorrhagic mets in setting of NSCLC  - Interval CTH stable  - MR w and w/out pending  - MR C/T/L spine for metastatic evaluation  - serial q1 hour neuro exams  - hold AC/AP in setting of heme mets  - dex 4 q6 for vasogenic edema  - neurosurgery following    #GTC w c/f non-convulsive status  - s/p Ativan @  ED/ Versed 5mg in [facility] ED  - 1.5g keppra load  - Keppra 750mg BID  - cont. vEEG    ---Resp---  - received in [place] 30/50, intubated for airway control in setting of poor mental status  - PRVC 16/350/5/40  - serial ABGs PRN  - CXR w/ out interstitial infiltrate or lobar consolidation    #NSCLSC  - on immunotherapy and s/p lung RT 3/1/24  - consult heme/onc in AM    ---CV----  - Maintain normotension, MAP >65  - TTE pending    #Lactemia  - lactate now WNL s/p abx and 2L NS  - repeat ABG and lactate    ---GI---  - OG tube, trickle feeds @ 20  - start trickle feeds given ketosis likely contributory to anion gap acidosis (ketones in urine, BHB 0.9)  - bowel regimen miralax/senna  - PPI while inbuated/on steroids    ------  #hyponeutremia  - urine , Urine K xxx, Urine osm 399, serum osm 271 c/w SIADH  - Urine cation concentration xxx  - 3% @ 30cc/hr for Na 135-145  - fluid restrict to 800cc/day  - monitor renal function  - replete lytes PRN     ---Heme---  #Hx of PE on Eliquis  - obtain CTPA   - AC deferred in setting of acute ICH  - likely hypercoagulable in setting of neoplasm  - f/u TTE to assess RV function     #VTE ppx  - chemical ppx held given acute heme  - BL SCDs  - BL LE US pending, f/u    ---Endo---  - Hgb A1c 5.8  - Maintain euglycemia 120-180  - ISS  - TSH 0.22 from 2.43 (3/28/24)  - f/u Free T3, T4    #Angion gap w/ low bicarb and ketosis  - pt. w/ anion gap 2/2 starvation ketosis vs. lactemia (initial Lactate 5.1, BHB 0.9)  - trickle feeds to inhibit ketosis, repeat BHB in AM  - lactate cleared s/p fluids    ---ID---  #Fever in setting of metastatic CA  - T Max of 39.3 on presentation  - UA grossly normal, CXR w/o evidence of lobar consolidation or infiltrate  - f/u BCx  - procal 3.7  - f/u amylase, lipase to r/o acute abd pathology  - consider CT CAP in setting of unclear source fo fever  - cont. empriric vanc/zosyn     Lines  Right femoral central line  L axillary arterial line      Patient transferred to care of [facility] given GTC in setting of hemorrhagic mets with risk for acute decompensation and possible need for definitive airway management 57yo woman with h/o subsegmental PE on Eliquis 5mg BID (last dose: Thurs) and h/o NSCLC with known mets to brain, on immunotherapy and s/p lung RT 3/1/24 (also was planned for [facility] w/ Dr. Candelaria this wk, but no prior radiation tx to brain), presents as a transfer from  for progressive lethargy since Thursday, then unresponsive this AM. CTH with L anterior frontal lesion 2x2cm with significant vasogenic edema and some hemorrhage with another R frontal met with vasogenic edema. Lesions increased in size from CT 3/17/24. Last MRI 1/24/24 w/ numerous small mets + L frontal lesion measuring 1.3cm. Admitted to [facility] after sz, c/f nonconvulsive status.    ---Neuro---  #hemorrhagic mets in setting of NSCLC  - Interval CTH stable  - MR w and w/out pending  - MR C/T/L spine for metastatic evaluation  - serial q1 hour neuro exams  - hold AC/AP in setting of heme mets  - dex 4 q6 for vasogenic edema  - neurosurgery following    #GTC w c/f non-convulsive status  - s/p Ativan @  ED/ Versed 5mg in [facility] ED  - 1.5g keppra load  - Keppra 750mg BID  - cont. vEEG    ---Resp---  - received in [place] 30/50, intubated for airway control in setting of poor mental status  - PRVC 16/350/5/40  - serial ABGs PRN  - CXR w/ out interstitial infiltrate or lobar consolidation    #NSCLSC  - on immunotherapy and s/p lung RT 3/1/24  - consult heme/onc in AM    ---CV----  - Maintain normotension, MAP >65  - TTE pending    #Lactemia  - lactate now WNL s/p abx and 2L NS  - repeat ABG and lactate    ---GI---  - OG tube, trickle feeds @ 20  - start trickle feeds given ketosis likely contributory to anion gap acidosis (ketones in urine, BHB 0.9)  - bowel regimen miralax/senna  - PPI while inbuated/on steroids    ------  #hyponeutremia  - urine , Urine K 18, Urine osm 399, serum osm 271 c/w SIADH  - Urine cation concentration 194  - 3% @ 30cc/hr for Na 135-145  - fluid restrict to 800cc/day  - monitor renal function  - replete lytes PRN     ---Heme---  #Hx of PE on Eliquis  - obtain CTPA   - AC deferred in setting of acute ICH  - likely hypercoagulable in setting of neoplasm  - f/u TTE to assess RV function     #VTE ppx  - chemical ppx held given acute heme  - BL SCDs  - BL LE US pending, f/u    ---Endo---  - Hgb A1c 5.8  - Maintain euglycemia 120-180  - ISS  - TSH 0.22 from 2.43 (3/28/24)  - f/u Free T3, T4    #Angion gap w/ low bicarb and ketosis  - pt. w/ anion gap 2/2 starvation ketosis vs. lactemia (initial Lactate 5.1, BHB 0.9)  - trickle feeds to inhibit ketosis, repeat BHB in AM  - lactate cleared s/p fluids    ---ID---  #Fever in setting of metastatic CA  - T Max of 39.3 on presentation  - UA grossly normal, CXR w/o evidence of lobar consolidation or infiltrate  - f/u BCx  - procal 3.7  - f/u amylase, lipase to r/o acute abd pathology  - consider CT CAP in setting of unclear source fo fever  - cont. empriric vanc/zosyn     Lines  Right femoral central line  L axillary arterial line      Patient transferred to care of [facility] given GTC in setting of hemorrhagic mets with risk for acute decompensation and possible need for definitive airway management 59yo woman with h/o subsegmental PE on Eliquis 5mg BID (last dose: Thurs) and h/o NSCLC with known mets to brain, on immunotherapy and s/p lung RT 3/1/24 (also was planned for [facility] w/ Dr. Candelaria this wk, but no prior radiation tx to brain), presents as a transfer from  for progressive lethargy since Thursday, then unresponsive this AM. CTH with L anterior frontal lesion 2x2cm with significant vasogenic edema and some hemorrhage with another R frontal met with vasogenic edema. Lesions increased in size from CT 3/17/24. Last MRI 1/24/24 w/ numerous small mets + L frontal lesion measuring 1.3cm. Admitted to [facility] after sz, c/f nonconvulsive status.    ---Neuro---  #hemorrhagic mets in setting of NSCLC  - Interval CTH stable  - MR w and w/out pending  - MR C/T/L spine for metastatic evaluation  - serial q1 hour neuro exams  - hold AC/AP in setting of heme mets  - dex 4 q6 for vasogenic edema  - neurosurgery following    #GTC w c/f non-convulsive status  - s/p Ativan @  ED/ Versed 5mg in [facility] ED  - 1.5g keppra load  - Keppra 750mg BID  - cont. vEEG    ---Resp---  - received in [place] 30/50, intubated for airway control in setting of poor mental status  - PRVC 16/350/5/40  - serial ABGs PRN  - CXR w/ out interstitial infiltrate or lobar consolidation    #NSCLSC  - on immunotherapy and s/p lung RT 3/1/24  - consult heme/onc in AM    ---CV----  - Maintain normotension, MAP >65  - TTE pending    #Lactemia  - lactate now WNL s/p abx and 2L NS  - repeat ABG and lactate    ---GI---  - OG tube, trickle feeds @ 20  - start trickle feeds given ketosis likely contributory to anion gap acidosis (ketones in urine, BHB 0.9)  - bowel regimen miralax/senna  - PPI while inbuated/on steroids    ------  #hyponeutremia  - urine , Urine K 18, Urine osm 399, serum osm 271 c/w SIADH  - Urine cation concentration 194  - Urine osmolality from 399 to 369 s/p 3L of isotonic volume repletion, c/w SIADH  - 3% @ 30cc/hr for Na 135-145  - fluid restrict to 800cc/day  - monitor renal function  - replete lytes PRN     ---Heme---  #Hx of PE on Eliquis  - obtain CTPA   - AC deferred in setting of acute ICH  - likely hypercoagulable in setting of neoplasm  - f/u TTE to assess RV function     #VTE ppx  - chemical ppx held given acute heme  - BL SCDs  - BL LE US pending, f/u    ---Endo---  - Hgb A1c 5.8  - Maintain euglycemia 120-180  - ISS  - TSH 0.22 from 2.43 (3/28/24)  - f/u Free T3, T4    #Angion gap w/ low bicarb and ketosis  - pt. w/ anion gap 2/2 starvation ketosis vs. lactemia (initial Lactate 5.1, BHB 0.9)  - trickle feeds to inhibit ketosis, repeat BHB in AM  - lactate cleared s/p fluids    ---ID---  #Fever in setting of metastatic CA  - T Max of 39.3 on presentation  - UA grossly normal, CXR w/o evidence of lobar consolidation or infiltrate  - f/u BCx  - procal 3.7  - f/u amylase, lipase to r/o acute abd pathology  - consider CT CAP in setting of unclear source fo fever  - cont. empriric vanc/zosyn     Lines  Right femoral central line  L axillary arterial line      Patient transferred to care of [facility] given GTC in setting of hemorrhagic mets with risk for acute decompensation and possible need for definitive airway management 57yo woman with h/o subsegmental PE on Eliquis 5mg BID (last dose: Thurs) and h/o NSCLC with known mets to brain, on immunotherapy and s/p lung RT 3/1/24 (also was planned for [facility] w/ Dr. Candelaria this wk, but no prior radiation tx to brain), presents as a transfer from  for progressive lethargy since Thursday, then unresponsive this AM. CTH with L anterior frontal lesion 2x2cm with significant vasogenic edema and some hemorrhage with another R frontal met with vasogenic edema. Lesions increased in size from CT 3/17/24. Last MRI 1/24/24 w/ numerous small mets + L frontal lesion measuring 1.3cm. Admitted to [facility] after sz, c/f nonconvulsive status.    ---Neuro---  #hemorrhagic mets in setting of NSCLC  - Interval CTH stable  - MR w and w/out pending  - MR C/T/L spine for metastatic evaluation  - serial q1 hour neuro exams  - hold AC/AP in setting of heme mets  - dex 4 q6 for vasogenic edema  - neurosurgery following    #GTC w c/f non-convulsive status  - s/p Ativan @  ED/ Versed 5mg in [facility] ED  - 1.5g keppra load  - Keppra 750mg BID  - cont. vEEG    ---Resp---  - received in [place] 30/50, intubated for airway control in setting of poor mental status  - PRVC 16/350/5/40  - serial ABGs PRN  - CXR w/ out interstitial infiltrate or lobar consolidation    #NSCLSC  - on immunotherapy and s/p lung RT 3/1/24  - consult heme/onc in AM    ---CV----  - Maintain normotension, MAP >65  - TTE pending    #Lactemia  - lactate now WNL s/p abx and 2L NS  - repeat ABG and lactate    ---GI---  - OG tube, trickle feeds @ 20  - start trickle feeds given ketosis likely contributory to anion gap (ketones in urine, BHB 0.9)  - bowel regimen miralax/senna  - PPI while inbuated/on steroids    ------  #hyponeutremia  - urine , Urine K 18, Urine osm 399, serum osm 271 c/w SIADH  - Urine cation concentration 194  - Urine osmolality from 399 to 369 s/p 3L of isotonic volume repletion, c/w SIADH  - 3% @ 30cc/hr for Na 135-145  - fluid restrict to 800cc/day  - monitor renal function  - replete lytes PRN     ---Heme---  #Hx of PE on Eliquis  - obtain CTPA   - AC deferred in setting of acute ICH  - likely hypercoagulable in setting of neoplasm  - f/u TTE to assess RV function     #VTE ppx  - chemical ppx held given acute heme  - BL SCDs  - BL LE US pending, f/u    ---Endo---  - Hgb A1c 5.8  - Maintain euglycemia 120-180  - ISS  - TSH 0.22 from 2.43 (3/28/24)  - f/u Free T3, T4    #Angion gap w/ low bicarb and ketosis  - pt. w/ anion gap likely multifactorial 2/2 starvation ketosis vs. lactemia (initial Lactate 5.1, BHB 0.9)  - trickle feeds to inhibit ketosis, repeat BHB in AM  - lactate cleared s/p fluids    ---ID---  #Fever in setting of metastatic CA  - T Max of 39.3 on presentation  - UA grossly normal, CXR w/o evidence of lobar consolidation or infiltrate  - f/u BCx  - procal 3.7  - Amylase, Lipase WNL  - CT CAP in setting of unclear source fo fever  - cont. empriric vanc/zosyn     Lines  Right femoral central line  L axillary arterial line      Patient transferred to care of [facility] given GTC in setting of hemorrhagic mets with risk for acute decompensation and possible need for definitive airway management

## 2024-04-13 NOTE — ED ADULT NURSE NOTE - NSFALLHARMRISKINTERV_ED_ALL_ED

## 2024-04-13 NOTE — PROGRESS NOTE ADULT - SUBJECTIVE AND OBJECTIVE BOX
57yo woman with h/o subsegmental PE on Eliquis 5mg BID (last dose: Thurs) and h/o NSCLC with known mets to brain, on immunotherapy and s/p lung RT 3/1/24 (also was planned for G[facility] w/ Dr. Candelaria this wk, but no prior radiation tx to brain), presents as a transfer from  for progressive lethargy since Thursday, then unresponsive this AM. CTH with L anterior frontal lesion 2x2cm with significant vasogenic edema and some hemorrhage with another R frontal met with vasogenic edema. Lesions increased in size from CT 3/17/24. Last MRI 1/24/24 w/ numerous small mets + L frontal lesion measuring 1.3cm. Coags/plts wnl. With seizure at  for which patient received Ativan and Keppra 1g. Lactate 5.1. S/p Versed 5mg in ED for poor responsiveness and possible non-convulsive status.     Interval Events:  In NSCU, febrile to 40.2, tachycardic, lactate cleared, tachypneic, bedside POCUS with a small collapsible IVC, good cardiac function. Repeat CTH stable. CXR with clear lungs. Procalc 0.61.  Pt. with resolving lactemia    Review of Systems  Unattainable secondary to poor mental status    Allergies    No Known Allergies    Intolerances    morphine (Sedation/Somnol)    ICU Vital Signs Last 24 Hrs  T(C): 37.7 (13 Apr 2024 17:00), Max: 40.2 (13 Apr 2024 12:12)  T(F): 99.9 (13 Apr 2024 17:00), Max: 104.3 (13 Apr 2024 12:12)  HR: 95 (13 Apr 2024 17:30) (95 - 145)  BP: 169/93 (13 Apr 2024 17:30) (146/88 - 183/85)  BP(mean): 123 (13 Apr 2024 17:30) (101 - 130)  ABP: 165/99 (13 Apr 2024 17:30) (165/99 - 193/109)  ABP(mean): 124 (13 Apr 2024 17:30) (124 - 143)  RR: 48 (13 Apr 2024 17:30) (18 - 60)  SpO2: 100% (13 Apr 2024 17:30) (97% - 100%)    O2 Parameters below as of 13 Apr 2024 15:30  Patient On (Oxygen Delivery Method): nasal cannula, high flow  O2 Flow (L/min): 50  O2 Concentration (%): 30    I&O's Summary    13 Apr 2024 07:01  -  13 Apr 2024 18:37  --------------------------------------------------------  IN: 1999.9 mL / OUT: 0 mL / NET: 1999.9 mL    MEDICATIONS  (STANDING):  chlorhexidine 4% Liquid 1 Application(s) Topical <User Schedule>  dexAMETHasone  Injectable 4 milliGRAM(s) IV Push every 6 hours  dextrose 50% Injectable 25 Gram(s) IV Push once  gabapentin 600 milliGRAM(s) Oral three times a day  insulin lispro (ADMELOG) corrective regimen sliding scale   SubCutaneous every 6 hours  levETIRAcetam  IVPB 750 milliGRAM(s) IV Intermittent every 12 hours  pantoprazole  Injectable 40 milliGRAM(s) IV Push daily  phytonadione  IVPB 10 milliGRAM(s) IV Intermittent once  piperacillin/tazobactam IVPB.- 3.375 Gram(s) IV Intermittent once  polyethylene glycol 3350 17 Gram(s) Oral daily  potassium chloride  10 mEq/100 mL IVPB 10 milliEquivalent(s) IV Intermittent every 1 hour  potassium chloride  10 mEq/100 mL IVPB 10 milliEquivalent(s) IV Intermittent every 1 hour  senna 2 Tablet(s) Oral at bedtime  sodium chloride 0.9%. 1000 milliLiter(s) (75 mL/Hr) IV Continuous <Continuous>  vancomycin  IVPB 1000 milliGRAM(s) IV Intermittent every 12 hours    MEDICATIONS  (PRN):  acetaminophen   IVPB .. 750 milliGRAM(s) IV Intermittent every 6 hours PRN Mild Pain (1 - 3)  ALPRAZolam 0.5 milliGRAM(s) Oral daily PRN severe anxiety  ondansetron Injectable 4 milliGRAM(s) IV Push every 6 hours PRN Nausea and/or Vomiting                          11.5   8.33  )-----------( 216      ( 13 Apr 2024 16:22 )             33.7     04-13    134<L>  |  101  |  8   ----------------------------<  189<H>  3.3<L>   |  14<L>  |  0.44<L>    Ca    9.2      13 Apr 2024 16:22  Phos  3.7     04-13  Mg     1.5     04-13    TPro  7.1  /  Alb  3.7  /  TBili  0.8  /  DBili  x   /  AST  18  /  ALT  13  /  AlkPhos  81  04-13    ABG - ( 13 Apr 2024 17:18 )  pH, Arterial: 7.50  pH, Blood: x     /  pCO2: 20    /  pO2: 150   / HCO3: 16    / Base Excess: -5.7  /  SaO2: 99.3      PT/INR - ( 13 Apr 2024 16:22 )   PT: 15.3 sec;   INR: 1.40 ratio         PTT - ( 13 Apr 2024 16:22 )  PTT:27.2 sec    < from: CT Head No Cont (04.13.24 @ 13:18) >  FINDINGS:    A hemorrhagic lesion is again noted centered in the right inferior   frontal lobe, measuring approximately 2.4 x 1.8 x 2.2 cm. There is   extensive vasogenic edema surrounding the lesion. A second hemorrhagic   lesion is noted at the right posterior frontal vertex, with surrounding   vasogenic edema. There is subfalcine herniation at the level of the gyrus   recti measuring 5 mm left-to-right. There is trace midline shift at the   level of the septum pellucidum. There is no uncal or tonsillar herniation.    The appearance is consistent with hemorrhagic metastases. The findings   are unchanged compared to today's earlier study, but significantly   progressed compared to 1/24/2024, accounting for differences in technique.    Two small cavitary lesions in the left cerebellum corresponding to   enhancing nodules on the prior MRI, consistent with treated metastasis.    Cerebral volume is within normal limits. No premature white matter   disease.    No acute intracranial hemorrhage. No CT evidence of acute territorial   infarction, although MRI with DWI would be more sensitive.    The visualized sinuses andmastoids are clear. There are postsurgical   changes in the wall of the left maxillary sinus, unchanged. There is an   incidental high riding right jugular bulb    Limited views of the orbits and visualized soft tissues of the neck,   face, scalp, skull base, and calvarium are otherwise unremarkable.    IMPRESSION:    1.  Stable examination compared to today's earlier exam.  2.  Hemorrhagic metastases with associated vasogenic edema.  3.  Treated tumor in the left cerebellum.    < end of copied text >      EXAMINATION:  General: ill appearing  HEENT:  dry MM  Neuro: lethargic, opens eye to noxious, grimaces to noxious, does not attend, does not follow commands, withdraws all 4 extremities to noxious   Cards:  tachycardic, regular   Respiratory:  no respiratory distress  Abdomen:  soft  Extremities:  no LE edema    Assessment/Plan:   Seizure in setting of worsening brain mets and likely sepsis.     Neuro:  q1h NC  c/w Keppra 750 mg BID   VEEG   MRI when able   s/p dex 10 mg, c/w dex 4 mg q6h   MRI spine for T2/3 pathologic fracture     CV:  MAP goal >65    Pulm:  start HFNC for WOB    GI:  NPO    Renal:  s/p 1L bolus, will give another bolus of NS   IVF    Heme:  SCDs, hold Lov ppx until CTH tomorrow   hold home Eliquis     ID:  UA, cultures, MRSA swab  start vanc and Zosyn  tylenol and cooling blanket     Endo:   FS goal 120-180    Patient seen and examined by attending on 4/13/2023.    Patient is critically ill due to seizure and brain edema and at high risk for neurological deterioration or death due to: at high risk for further seizure, requiring HFNC for high WOB.      57yo woman with h/o subsegmental PE on Eliquis 5mg BID (last dose: Thurs) and h/o NSCLC with known mets to brain, on immunotherapy and s/p lung RT 3/1/24 (also was planned for [facility] w/ Dr. Candelaria this wk, but no prior radiation tx to brain), presents as a transfer from  for progressive lethargy since Thursday, then unresponsive this AM. CTH with L anterior frontal lesion 2x2cm with significant vasogenic edema and some hemorrhage with another R frontal met with vasogenic edema. Lesions increased in size from CT 3/17/24. Last MRI 1/24/24 w/ numerous small mets + L frontal lesion measuring 1.3cm. Coags/plts wnl. With seizure at  for which patient received Ativan and Keppra 1g. Lactate 5.1. S/p Versed 5mg in ED for poor responsiveness and possible non-convulsive status.     Interval Events:  In NSCU, febrile to 40.2, tachycardic, lactate cleared, tachypneic, bedside POCUS with a small collapsible IVC, good cardiac function. Repeat CTH stable. CXR with clear lungs. Procalc 0.61.  Pt. with resolving lactemia    Review of Systems  Unattainable secondary to poor mental status    Allergies    No Known Allergies    Intolerances    morphine (Sedation/Somnol)    ICU Vital Signs Last 24 Hrs  T(C): 37.7 (13 Apr 2024 17:00), Max: 40.2 (13 Apr 2024 12:12)  T(F): 99.9 (13 Apr 2024 17:00), Max: 104.3 (13 Apr 2024 12:12)  HR: 95 (13 Apr 2024 17:30) (95 - 145)  BP: 169/93 (13 Apr 2024 17:30) (146/88 - 183/85)  BP(mean): 123 (13 Apr 2024 17:30) (101 - 130)  ABP: 165/99 (13 Apr 2024 17:30) (165/99 - 193/109)  ABP(mean): 124 (13 Apr 2024 17:30) (124 - 143)  RR: 48 (13 Apr 2024 17:30) (18 - 60)  SpO2: 100% (13 Apr 2024 17:30) (97% - 100%)    O2 Parameters below as of 13 Apr 2024 15:30  Patient On (Oxygen Delivery Method): nasal cannula, high flow  O2 Flow (L/min): 50  O2 Concentration (%): 30    I&O's Summary    13 Apr 2024 07:01  -  13 Apr 2024 18:37  --------------------------------------------------------  IN: 1999.9 mL / OUT: 0 mL / NET: 1999.9 mL    MEDICATIONS  (STANDING):  chlorhexidine 4% Liquid 1 Application(s) Topical <User Schedule>  dexAMETHasone  Injectable 4 milliGRAM(s) IV Push every 6 hours  dextrose 50% Injectable 25 Gram(s) IV Push once  gabapentin 600 milliGRAM(s) Oral three times a day  insulin lispro (ADMELOG) corrective regimen sliding scale   SubCutaneous every 6 hours  levETIRAcetam  IVPB 750 milliGRAM(s) IV Intermittent every 12 hours  pantoprazole  Injectable 40 milliGRAM(s) IV Push daily  phytonadione  IVPB 10 milliGRAM(s) IV Intermittent once  piperacillin/tazobactam IVPB.- 3.375 Gram(s) IV Intermittent once  polyethylene glycol 3350 17 Gram(s) Oral daily  potassium chloride  10 mEq/100 mL IVPB 10 milliEquivalent(s) IV Intermittent every 1 hour  potassium chloride  10 mEq/100 mL IVPB 10 milliEquivalent(s) IV Intermittent every 1 hour  senna 2 Tablet(s) Oral at bedtime  sodium chloride 0.9%. 1000 milliLiter(s) (75 mL/Hr) IV Continuous <Continuous>  vancomycin  IVPB 1000 milliGRAM(s) IV Intermittent every 12 hours    MEDICATIONS  (PRN):  acetaminophen   IVPB .. 750 milliGRAM(s) IV Intermittent every 6 hours PRN Mild Pain (1 - 3)  ALPRAZolam 0.5 milliGRAM(s) Oral daily PRN severe anxiety  ondansetron Injectable 4 milliGRAM(s) IV Push every 6 hours PRN Nausea and/or Vomiting                          11.5   8.33  )-----------( 216      ( 13 Apr 2024 16:22 )             33.7     04-13    134<L>  |  101  |  8   ----------------------------<  189<H>  3.3<L>   |  14<L>  |  0.44<L>    Ca    9.2      13 Apr 2024 16:22  Phos  3.7     04-13  Mg     1.5     04-13    TPro  7.1  /  Alb  3.7  /  TBili  0.8  /  DBili  x   /  AST  18  /  ALT  13  /  AlkPhos  81  04-13    ABG - ( 13 Apr 2024 17:18 )  pH, Arterial: 7.50  pH, Blood: x     /  pCO2: 20    /  pO2: 150   / HCO3: 16    / Base Excess: -5.7  /  SaO2: 99.3      PT/INR - ( 13 Apr 2024 16:22 )   PT: 15.3 sec;   INR: 1.40 ratio         PTT - ( 13 Apr 2024 16:22 )  PTT:27.2 sec    < from: CT Head No Cont (04.13.24 @ 13:18) >  FINDINGS:    A hemorrhagic lesion is again noted centered in the right inferior   frontal lobe, measuring approximately 2.4 x 1.8 x 2.2 cm. There is   extensive vasogenic edema surrounding the lesion. A second hemorrhagic   lesion is noted at the right posterior frontal vertex, with surrounding   vasogenic edema. There is subfalcine herniation at the level of the gyrus   recti measuring 5 mm left-to-right. There is trace midline shift at the   level of the septum pellucidum. There is no uncal or tonsillar herniation.    The appearance is consistent with hemorrhagic metastases. The findings   are unchanged compared to today's earlier study, but significantly   progressed compared to 1/24/2024, accounting for differences in technique.    Two small cavitary lesions in the left cerebellum corresponding to   enhancing nodules on the prior MRI, consistent with treated metastasis.    Cerebral volume is within normal limits. No premature white matter   disease.    No acute intracranial hemorrhage. No CT evidence of acute territorial   infarction, although MRI with DWI would be more sensitive.    The visualized sinuses andmastoids are clear. There are postsurgical   changes in the wall of the left maxillary sinus, unchanged. There is an   incidental high riding right jugular bulb    Limited views of the orbits and visualized soft tissues of the neck,   face, scalp, skull base, and calvarium are otherwise unremarkable.    IMPRESSION:    1.  Stable examination compared to today's earlier exam.  2.  Hemorrhagic metastases with associated vasogenic edema.  3.  Treated tumor in the left cerebellum.    < end of copied text >      EXAMINATION:  General: ill appearing  HEENT:  dry MM  Neuro: lethargic, opens eye to noxious, grimaces to noxious, does not attend, does not follow commands, withdraws all 4 extremities to noxious   Cards:  tachycardic, regular   Respiratory:  no respiratory distress  Abdomen:  soft  Extremities:  no LE edema

## 2024-04-13 NOTE — CHART NOTE - NSCHARTNOTEFT_GEN_A_CORE
:  Inez Alejandra    DATE/TIME: 04/13/24, 23:00    INDICATION:    [x] Shock    [x] Acute Hypoxic Respiratory failure    [ ] Other:       PROCEDURE:    [x] LIMITED ECHO    [x] LIMITED CHEST    [ ] LIMITED ABDOMINAL    [ ] OTHER      FINDINGS:  Cardiac:   verticalized   LV: Grossly Normal LV Function. Hyperdynamic LV, LVOT VTI 16cm. D sign  RV: Normal RV size.   Pericardium: No pericardial effusion.      Pulmonary: A lines throughout. No pleural Effusion.     Perihepatique effusion, silvestre-splenique loculated effusion    IVC 1.1cm with respiratory variations     INTERPRETATION:  LVOT VTI 16cm, normal LV function, no pericardial effusion   Normal aeration pattern of the lung.  Perihepatique effusion, silvestre-splenique loculated effusion  IVC collapsable     Images stored on PACS/[facility]PATH :  Inez Alejandra    DATE/TIME: 04/13/24, 23:00    INDICATION:    [x] Shock    [x] Acute Hypoxic Respiratory failure    [ ] Other:       PROCEDURE:    [x] LIMITED ECHO    [x] LIMITED CHEST    [x] LIMITED ABDOMINAL    [ ] OTHER      FINDINGS:  Cardiac:   verticalized   LV: Grossly Normal LV Function. Hyperdynamic LV, LVOT VTI 16cm. D sign  RV: Normal RV size.   Pericardium: No pericardial effusion.      Pulmonary: A lines throughout. No pleural Effusion.     Perihepatique effusion, silvestre-splenique loculated effusion    IVC 1.1cm with respiratory variations     Empty antrum    INTERPRETATION:  LVOT VTI 16cm, normal LV function, no pericardial effusion   Normal aeration pattern of the lung.  Perihepatique effusion, silvestre-splenique loculated effusion  IVC collapsable   Empty antrum    Images stored on PACS/[employer] [facility] [employer] [facility]

## 2024-04-13 NOTE — ED PROVIDER NOTE - CLINICAL SUMMARY MEDICAL DECISION MAKING FREE TEXT BOX
58-year-old female with history of non-small cell lung cancer stage IV with brain mets, currently undergoing chemotherapy and immunotherapy, status post radiation therapy, also PE, currently on Eliquis, brought in by EMS from home for altered mental status for last 24 hours.   and daughter state that patient has not been eating or drinking much at all for the last 2 days.  Has been more lethargic and now this morning seems to be worse, had incontinence and seems more confused.  Patient reportedly had a fever fever of 100.9 yesterday.  No cough or shortness of breath.  Vomited once yesterday morning.  Patient had some mild headache which has been ongoing.  No reported fall or injury or head strike    Patient awake but not responsible coherent.  Mildly tachycardic, heart rate 105, afebrile.  No focal neurodeficit on exam but not following commands or speaking coherently, only mumbling some words.  Partial DDx: Dehydration, electrolyte abnormality, infection, worsening brain metastases, bleeding from brain metastases.  Will check labs, CT head, EKG chest x-ray.  Give IV fluids.  Reassess 58-year-old female with history of non-small cell lung cancer stage IV with brain mets, currently undergoing chemotherapy and immunotherapy, status post radiation therapy, also PE, currently on Eliquis, brought in by EMS from home for altered mental status for last 24 hours.   and daughter state that patient has not been eating or drinking much at all for the last 2 days.  Has been more lethargic and now this morning seems to be worse, had incontinence and seems more confused.  Patient reportedly had a fever fever of 100.9 yesterday.  No cough or shortness of breath.  Vomited once yesterday morning.  Patient had some mild headache which has been ongoing.  No reported fall or injury or head strike    Patient awake but not responsible coherent.  Mildly tachycardic, heart rate 105, afebrile.  No focal neurodeficit on exam but not following commands or speaking coherently, only mumbling some words.  Partial DDx: Dehydration, electrolyte abnormality, infection, worsening brain metastases, bleeding from brain metastases.  Will check labs, CT head, EKG chest x-ray.  Give IV fluids.  Reassess.  pt is full code per HCP/  Francisco J Moreno, at bedside    0700 Patient signed out to Dr. Rodriguez.  All labs pending, CT head chest x-ray pending.  Will likely need admission. 58-year-old female with history of non-small cell lung cancer stage IV with brain mets, currently undergoing chemotherapy and immunotherapy, status post radiation therapy, also PE, currently on Eliquis, brought in by EMS from home for altered mental status for last 24 hours.   and daughter state that patient has not been eating or drinking much at all for the last 2 days.  Has been more lethargic and now this morning seems to be worse, had incontinence and seems more confused.  Patient reportedly had a fever fever of 100.9 yesterday.  No cough or shortness of breath.  Vomited once yesterday morning.  Patient had some mild headache which has been ongoing.  No reported fall or injury or head strike    Patient awake but not responsible coherent.  Mildly tachycardic, heart rate 105, afebrile.  No focal neurodeficit on exam but not following commands or speaking coherently, only mumbling some words.  Partial DDx: Dehydration, electrolyte abnormality, infection, worsening brain metastases, bleeding from brain metastases.  Will check labs, CT head, EKG chest x-ray.  Give IV fluids.  Reassess.  pt is full code per HCP/  Francisco J Moreno, at bedside      0700 Patient signed out to Dr. Rodriguez.  All labs pending, CT head chest x-ray pending.  Will likely need admission.    Jennifer - Patient with hemorrhagic conversion and increasing brain mets, will transfer to [facility] spoke with neurosurgery accepting  58-year-old female with history of non-small cell lung cancer stage IV with brain mets, currently undergoing chemotherapy and immunotherapy, status post radiation therapy, also PE, currently on Eliquis, brought in by EMS from home for altered mental status for last 24 hours.   and daughter state that patient has not been eating or drinking much at all for the last 2 days.  Has been more lethargic and now this morning seems to be worse, had incontinence and seems more confused.  Patient reportedly had a fever fever of 100.9 yesterday.  No cough or shortness of breath.  Vomited once yesterday morning.  Patient had some mild headache which has been ongoing.  No reported fall or injury or head strike    Patient awake but not responding coherently.  Mildly tachycardic, heart rate 105, afebrile.  No focal neurodeficit on exam but not following commands or speaking coherently, only mumbling some words.  Partial DDx: Dehydration, electrolyte abnormality, infection, worsening brain metastases, bleeding from brain metastases.  Will check labs, CT head, EKG chest x-ray.  Give IV fluids.  Reassess.  pt is full code per HCP/  Francisco J Moreno, at bedside      0700 Patient signed out to Dr. Rodriguez.  All labs pending, CT head chest x-ray pending.  Will likely need admission.    Jennifer - Patient with hemorrhagic conversion and increasing brain mets, will transfer to [facility] spoke with neurosurgery accepting

## 2024-04-13 NOTE — ED PROVIDER NOTE - PHYSICAL EXAMINATION
exam:   General: Awake but confused, not following commands, not making eye contact, mumbles words, incomprehensible.  HEENT: eyes perrl, nose normal, Dry mucous membranes  cor: RRR, s1s2, 2+rad pulses.   lungs: ctabl, no resp distress.   abd: soft, ntnd.   neuro: alert, not coherent, no facial asymmetry. PICKETT equally spontaneously  MSK: no extremity swelling.  Skin: normal, no rash

## 2024-04-13 NOTE — ED PROVIDER NOTE - PROGRESS NOTE DETAILS
[place] PGY3: NSGY at bedside. Recommending decadron and benzodiazepine for possible ongoing of seizure given patient's stiff posture and hyperthermia. Lab work unremarkable for possible infectious cause of hyperthermia.

## 2024-04-13 NOTE — PROVIDER CONTACT NOTE (OTHER) - BACKGROUND
mets to brain w/ edema and hemorrhagic conversion
lung CA w/mets to brain, s/p seizure at [facility]
Lung CA metastasis to brain, hemorrhagic lesions found on CTH this AM at [facility], pt seized t [place], transfer to [facility] for further management.

## 2024-04-13 NOTE — ED ADULT NURSE NOTE - OBJECTIVE STATEMENT
57 y/o female with PMH of n-small cell lung cancer stage IV with brain mass, currently undergoing chemotherapy immunotherapy, history of PE on Eliquis, [facility].   As EMS reports pt had a CT scan where shows hemorrhagic lesion in the brain with new 0.3 cm rightward midline shift. Pt was given Keppra 1 g, Ativan 2mg and Tylenol. Daughter at bedside reports pt was a little lethargic since yesterday, today family noticed pt had not responding, her mental status decreased, pt was brought to [place] for evaluation and transferred to [facility] for neuro evaluation.  On assessment pt is A&Ox0, responsive to pain, ,airway is patent, breathing spontaneously and unlabored. Skin is dry, warm, rectal temp 104 rectally, placed on cardiac monitor, tachycardic , tachypneic, RR 56, CO Patient undressed and placed into gown, side rails up with bed locked and in lowest position. 59 y/o female with PMH of n-small cell lung cancer stage IV with brain mass, currently undergoing chemotherapy immunotherapy, history of PE on Eliquis, arrives to the ER by ambulance from [facility] complaining of seizure.   As EMS reports pt had a CT scan where shows hemorrhagic lesion in the brain with new 0.3 cm rightward midline shift. Pt was given Keppra 1 g, Ativan 2mg and Tylenol. Daughter at bedside reports pt was a little lethargic since yesterday, today family noticed pt had not responding, her mental status decreased, pt was brought to [place] for evaluation and transferred to [facility] for neuro evaluation.  On assessment pt is A&Ox0, responsive to pain, ,airway is patent, breathing spontaneously and unlabored. Skin is dry, warm,  not moving extremities, rectal temp 104 rectally, placed on cardiac monitor, tachycardic , tachypneic, RR 56, CO2 Patient undressed and placed into gown, side rails up with bed locked and in lowest position. 59 y/o female with PMH of COPD, HLD, n-small cell lung cancer stage IV with brain mass, currently undergoing chemotherapy immunotherapy, history of PE on Eliquis, arrives to the ER by ambulance from [facility] complaining of seizure.   As EMS reports pt had a CT scan where shows hemorrhagic lesion in the brain with new 0.3 cm rightward midline shift. Pt was given Keppra 1 g, Ativan 2mg and Tylenol. Daughter at bedside reports pt was a little lethargic since yesterday, but able to walk and talk, today family noticed pt had not responding, her mental status decreased, pt was brought to [place] for evaluation and transferred to [facility] for neuro evaluation.  On assessment pt is A&Ox0, non verbal, responsive to pain, airway is patent, breathing spontaneously. Skin is dry, warm, stage l pressure ulcer noted in the sacrum. rectal temp 104 rectally.  Pt is not moving extremities, placed on cardiac monitor, tachycardic , tachypneic, RR 56. Patient undressed and placed into gown, cooling blanket placed. side rails up with bed locked and in lowest position. Family at bedside.

## 2024-04-13 NOTE — ED ADULT NURSE REASSESSMENT NOTE - NS ED NURSE REASSESS COMMENT FT1
Patient actively seizing. Dr. Rodriguez made aware. Seizure precaution in place. Ativan 2 mg given per orders. Continuous monitoring. patient awaiting transfer. Patient's family at bedside.

## 2024-04-13 NOTE — ED ADULT NURSE NOTE - OBJECTIVE STATEMENT
Patient BIBEMS from home due to AMS Patient BIBEMS from home due to increasingly worsening AMS since yesterday. Hx lung CA mets to the brain. Decreased PO intake. Episode of incontinence; watery stool.

## 2024-04-13 NOTE — ED ADULT NURSE REASSESSMENT NOTE - NS ED NURSE REASSESS COMMENT FT1
Attempted to get blood cultures. Unable to obtained 2nd set of blood cultures. Dr. Rodriguez made aware. Lab made aware. Continuous monitoring.

## 2024-04-13 NOTE — ED PROVIDER NOTE - ATTENDING CONTRIBUTION TO CARE
------------ATTENDING NOTE------------  pt w/ daughter transferred to ED by EMS for concerns of 1st time generalized seizure complicated by prolonged post ictal / AMS complicated by increasing brain metastatic disease from lung cancer, per daughter pt not sick prior to AMS/seizure, reviewed CT at transferring facility w/ L frontal mass/edematous changes, ? subclinical status on ED arrival, met by NSGY team on ed arrival, awaiting repeat labs / imaging and close reassessments -->  - Timothy Villa MD   --------------------------------------------- ------------ATTENDING NOTE------------  pt w/ daughter transferred to ED by EMS for concerns of 1st time generalized seizure complicated by prolonged post ictal / AMS complicated by increasing brain metastatic disease from lung cancer, per daughter pt not sick prior to AMS/seizure, reviewed CT at transferring facility w/ L frontal mass/edematous changes, ? subclinical status on ED arrival, decreased responsiveness by ABCs intact, met by NSGY team on ED arrival, awaiting repeat labs / imaging and close reassessments -->  - Timothy Villa MD   ---------------------------------------------

## 2024-04-13 NOTE — ED PROVIDER NOTE - DISPOSITION TYPE
No chief complaint on file. There were no vitals taken for this visit. 1. Have you been to the ER, urgent care clinic since your last visit? Hospitalized since your last visit? No    2. Have you seen or consulted any other health care providers outside of the [facility] since your last visit? Include any pap smears or colon screening.  No TRANSFER

## 2024-04-13 NOTE — ED PROVIDER NOTE - OBJECTIVE STATEMENT
58-year-old female, history of non-small cell lung cancer stage IV with brain mass, currently undergoing chemotherapy immunotherapy, history of PE on Eliquis, transferred from outside hospital for neurosurgery evaluation for altered mental status, seizure, CT scan finding hemorrhagic lesion in the brain with new 0.3 cm rightward midline shift. Patient's daughter Araceli at bedside. Reports that starting on 04/11, patient has been feeling lethargic with decreased appetite, this morning, they noticed that she was having decreased responsiveness and was brought to OSH for evaluation. Patient had 10-15 seconds witnessed seizure at OSH and received 2 mg of Ativan and 1000 mg of Keppra.

## 2024-04-13 NOTE — ED ADULT NURSE NOTE - NSFALLHARMRISKINTERV_ED_ALL_ED
Assistance OOB with selected safe patient handling equipment if applicable/Assistance with ambulation/Communicate risk of Fall with Harm to all staff, patient, and family/Monitor gait and stability/Monitor for mental status changes and reorient to person, place, and time, as needed/Move patient closer to nursing station/within visual sight of ED staff/Provide visual cue: red socks, yellow wristband, yellow gown, etc/Reinforce activity limits and safety measures with patient and family/Toileting schedule using arm’s reach rule for commode and bathroom/Use of alarms - bed, stretcher, chair and/or video monitoring/Bed in lowest position, wheels locked, appropriate side rails in place/Call bell, personal items and telephone in reach/Instruct patient to call for assistance before getting out of bed/chair/stretcher/Non-slip footwear applied when patient is off stretcher/[place] to call system/Physically safe environment - no spills, clutter or unnecessary equipment/Purposeful Proactive Rounding/Room/bathroom lighting operational, light cord in reach

## 2024-04-13 NOTE — H&P ADULT - ASSESSMENT
58F on Eliquis 5BID (last dose: Thurs) for recent dx subsegmental PE, pt of Dr. 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 Candelaria this wk, but no prior radiation tx to brain), p/f progressive lethargy since Thurs, acutely worse/unresponsive this AM per daughter (was still walking around and talking at home yday). CTH @830AM w/ L anterior frontal lesion 2x2cm w/ significant vasogenic edema and hemorrhagic conversion + addtl lesion in R motor strip. Lesions incr in size from CT 3/17/24 w/ incr edema/mass effect. Last MRI 1/24/24 w/ numerous tiny enhancing mets + L frontal lesion measuring 1.3cm. Coags/plts wnl. Lactate 5.1. Exam: EO to moderate stim, no FC, PERRL, wd x4   -Adm NSCU under Dr. Nicole   -VEEG given c/f seizures i/s/o elev lactate to 5.1. S/P Keppra 1g load @OSH, continue 1g BID + Valium 5mg in ED   -Dex 10 and c/w 4q6   -4h CTH stable. Rpt CTH in AM  -MR brain stereo w/wo quicktome protocol  -Hold Eliquis. CTA chest to f/u resolution of PE

## 2024-04-13 NOTE — ED ADULT TRIAGE NOTE - CHIEF COMPLAINT QUOTE
[facility] tx, lung Ca metastatic mets to brain   AMS x 2 days, seized in [facility] ER 1g of keppra and 2mg of ativan

## 2024-04-13 NOTE — PROVIDER CONTACT NOTE (OTHER) - ACTION/TREATMENT ORDERED:
No intubation at this time per MD Landaverde pt. to be started on High-flow nasal cannula, pan-culture patient. Hyperthermia blanket to be initiated.
ABG sent, Nursing administration at bedside at this time, Jodi Marx and Anna. Axillary A-line placed at bedside
Nursing administration at bedside at this time. Axillary a-line in progress. Central line pending after multiple failed IV attempts by NSCU staff. No intubation at this time per MD.

## 2024-04-13 NOTE — CHART NOTE - NSCHARTNOTEFT_GEN_A_CORE
CAPRINI SCORE [CLOT] Score on Admission for     AGE RELATED RISK FACTORS                                                       MOBILITY RELATED FACTORS  [X] Age 41-60 years                                            (1 Point)                  [ ] Bed rest                                                        (1 Point)  [ ] Age: 61-74 years                                           (2 Points)                 [ ] Plaster cast                                                   (2 Points)  [ ] Age= 75 years                                              (3 Points)                 [ ] Bed bound for more than 72 hours                 (2 Points)    DISEASE RELATED RISK FACTORS                                               GENDER SPECIFIC FACTORS  [ ] Edema in the lower extremities                       (1 Point)                  [ ] Pregnancy                                                     (1 Point)  [ ] Varicose veins                                               (1 Point)                  [ ] Post-partum < 6 weeks                                   (1 Point)             [ ] BMI > 25 Kg/m2                                            (1 Point)                  [ ] Hormonal therapy  or oral contraception          (1 Point)                 [ ] Sepsis (in the previous month)                        (1 Point)            [ ] History of pregnancy complications                 (1 point)  [ ] Pneumonia or serious lung disease                                            [ ] Unexplained or recurrent                     (1 Point)           (in the previous month)                               (1 Point)  [ ] Abnormal pulmonary function test                     (1 Point)                 SURGERY RELATED RISK FACTORS (include planned surgeries)  [ ] Acute myocardial infarction                              (1 Point)                 [ ]  Section                                             (1 Point)  [ ] Congestive heart failure (in the previous month)  (1 Point)         [ ] Minor surgery                                                  (1 Point)   [ ] Inflammatory bowel disease                             (1 Point)                 [ ] Arthroscopic surgery                                        (2 Points)  [ ] Central venous access                                      (2 Points)                 [ ] General surgery lasting more than 45 minutes   (2 Points)       [ ] Stroke (in the previous month)                          (5 Points)               [ ] Elective arthroplasty                                         (5 Points)            [X] current or past malignancy                              (2 Points)                                                                                                       HEMATOLOGY RELATED FACTORS                                                 TRAUMA RELATED RISK FACTORS  [X] Prior episodes of VTE                                     (3 Points)                [ ] Fracture of the hip, pelvis, or leg                       (5 Points)  [ ] Positive family history for VTE                         (3 Points)             [ ] Acute spinal cord injury (in the previous month)  (5 Points)  [ ] Prothrombin 32330 A                                     (3 Points)                [ ] Paralysis  (less than 1 month)                             (5 Points)  [ ] Factor V Leiden                                             (3 Points)                  [ ] Multiple Trauma within 1 month                        (5 Points)  [ ] Lupus anticoagulants                                     (3 Points)                                                           [ ] Anticardiolipin antibodies                               (3 Points)                                                       [ ] High homocysteine in the blood                      (3 Points)                                             [ ] Other congenital or acquired thrombophilia      (3 Points)                                                [ ] Heparin induced thrombocytopenia                  (3 Points)                                          Total Score [    6    ]    Risk:  Very low 0   Low 1 to 2   Moderate 3 to 4   High =5       VTE Prophylaxis Recommendations:  [ ] mechanical pneumatic compression devices                                      [ ] contraindicated: _____________________  [ ] chemo prophylaxis                                                                                  [ ] contraindicated _____________________    **** HIGH LIKELIHOOD DVT PRESENT ON ADMISSION  [ ] (please order LE dopplers within 24 hours of admission)

## 2024-04-13 NOTE — H&P ADULT - HISTORY OF PRESENT ILLNESS
58F on Eliquis 5BID (last dose: Thurs) for recent dx subsegmental PE, pt of Dr. 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 Candelaria this wk, but no prior radiation tx to brain), p/f progressive lethargy since Thurs, acutely worse/unresponsive this AM per daughter (was still walking around and talking at home yday). CTH @830AM w/ L anterior frontal lesion 2x2cm w/ significant vasogenic edema and hemorrhagic conversion + addtl lesion in R motor strip. Lesions incr in size from CT 3/17/24 w/ incr edema/mass effect. Last MRI 1/24/24 w/ numerous tiny enhancing mets + L frontal lesion measuring 1.3cm. Coags/plts wnl. Lactate 5.1. Exam: EO to moderate stim, no FC, PERRL, wd x4

## 2024-04-14 NOTE — PROGRESS NOTE ADULT - SUBJECTIVE AND OBJECTIVE BOX
Patient seen and examined at bedside.    --Anticoagulation--    T(C): 37.4 (04-14-24 @ 00:00), Max: 42 (04-13-24 @ 23:00)  HR: 104 (04-14-24 @ 00:00) (87 - 145)  BP: 157/94 (04-13-24 @ 23:00) (146/88 - 183/85)  RR: 35 (04-14-24 @ 00:00) (18 - 60)  SpO2: 100% (04-14-24 @ 00:00) (97% - 100%)  Wt(kg): --    Exam: megan, Ox0, not Fc, perrl, LUE loc, RUE wd, marjan vizcaino wd

## 2024-04-14 NOTE — PROGRESS NOTE ADULT - SUBJECTIVE AND OBJECTIVE BOX
NSCU ATTENDING -- ADDITIONAL PROGRESS NOTE    Nighttime rounds were performed -- please refer to earlier Progress Note for HPI details.    T(C): 37.1 (04-14-24 @ 19:00), Max: 42 (04-13-24 @ 23:00)  HR: 59 (04-14-24 @ 20:45) (59 - 111)  BP: 117/59 (04-14-24 @ 19:30) (82/55 - 178/136)  RR: 19 (04-14-24 @ 20:45) (17 - 48)  SpO2: 100% (04-14-24 @ 20:45) (96% - 100%)  Wt(kg): --    Relevant labwork and imaging reviewed.    CBC:            10.5   7.42  )-----------( 184      ( 04-14-24 @ 03:01 )             31.1       Chem:         ( 04-14-24 @ 20:24 )    139  |  109<H>  |  15  ----------------------------<  122<H>  4.7   |  16<L>  |  0.49<L>      Liver Functions: ( 04-13-24 @ 18:57 )  Alb: 3.6 g/dL / Pro: 6.8 g/dL / ALK PHOS: 76 U/L / ALT: 12 U/L / AST: 20 U/L / GGT: x            Type & Screen: ( 04-13-24 @ 13:47 )    ABO/Rh/Alex:  A Positive     MEDICATIONS  (STANDING):  chlorhexidine 0.12% Liquid 15 milliLiter(s) Oral Mucosa every 12 hours  chlorhexidine 4% Liquid 1 Application(s) Topical daily  dexAMETHasone  Injectable 4 milliGRAM(s) IV Push every 6 hours  dexMEDEtomidine Infusion 0.2 MICROgram(s)/kG/Hr (2.13 mL/Hr) IV Continuous <Continuous>  gabapentin 600 milliGRAM(s) Oral three times a day  HYDROmorphone   Tablet 4 milliGRAM(s) Enteral Tube every 12 hours  insulin lispro (ADMELOG) corrective regimen sliding scale   SubCutaneous every 6 hours  levETIRAcetam  IVPB 750 milliGRAM(s) IV Intermittent every 12 hours  magnesium sulfate  IVPB 1 Gram(s) IV Intermittent once  pantoprazole  Injectable 40 milliGRAM(s) IV Push daily  piperacillin/tazobactam IVPB.. 3.375 Gram(s) IV Intermittent every 8 hours  polyethylene glycol 3350 17 Gram(s) Oral daily  potassium phosphate / sodium phosphate Powder (PHOS-NaK) 2 Packet(s) Oral once  senna 2 Tablet(s) Oral at bedtime  sodium chloride 2 Gram(s) Oral every 6 hours    MEDICATIONS  (PRN):  acetaminophen   IVPB .. 750 milliGRAM(s) IV Intermittent every 6 hours PRN Mild Pain (1 - 3)  ALPRAZolam 0.5 milliGRAM(s) Oral daily PRN severe anxiety  fentaNYL    Injectable 25 MICROGram(s) IV Push every 2 hours PRN vent synchrony  ondansetron Injectable 4 milliGRAM(s) IV Push every 6 hours PRN Nausea and/or Vomiting      phenylephrine 0.5  off precedex     BUE LC, BLE WD, EO to nox, no FC    [A/P] known NSCLC with brain mets p/w GTC, intubated   PRVC 16/350/5/40%  MAP>65, SBP<160, phenylephrine prn   d/c fluid restriction   protonix ppx  tube feeding  LBM 4/14  DVT chemoppx off, BLE dopplers negative, SCDs b/l  hx subsegmental PEs, now off Eliquis  zosyn for ?enteritis--thickened fluid filled small bowel  afebrile   LP to r/o CNS source infection, febrile at admission/GTC    R fem TLC  b/l 18g pIV and 20gpIV  L axillary rosie    ATTENDING STATEMENT:    Patient is critically ill, requiring critical care services.     Attending: I have personally and independently provided 30 minutes of critical care services.  This excludes any time spent on separate procedures or teaching.   NSCU ATTENDING -- ADDITIONAL PROGRESS NOTE    Nighttime rounds were performed -- please refer to earlier Progress Note for HPI details.    T(C): 37.1 (04-14-24 @ 19:00), Max: 42 (04-13-24 @ 23:00)  HR: 59 (04-14-24 @ 20:45) (59 - 111)  BP: 117/59 (04-14-24 @ 19:30) (82/55 - 178/136)  RR: 19 (04-14-24 @ 20:45) (17 - 48)  SpO2: 100% (04-14-24 @ 20:45) (96% - 100%)  Wt(kg): --    Relevant labwork and imaging reviewed.    CBC:            10.5   7.42  )-----------( 184      ( 04-14-24 @ 03:01 )             31.1       Chem:         ( 04-14-24 @ 20:24 )    139  |  109<H>  |  15  ----------------------------<  122<H>  4.7   |  16<L>  |  0.49<L>      Liver Functions: ( 04-13-24 @ 18:57 )  Alb: 3.6 g/dL / Pro: 6.8 g/dL / ALK PHOS: 76 U/L / ALT: 12 U/L / AST: 20 U/L / GGT: x            Type & Screen: ( 04-13-24 @ 13:47 )    ABO/Rh/Alex:  A Positive     MEDICATIONS  (STANDING):  chlorhexidine 0.12% Liquid 15 milliLiter(s) Oral Mucosa every 12 hours  chlorhexidine 4% Liquid 1 Application(s) Topical daily  dexAMETHasone  Injectable 4 milliGRAM(s) IV Push every 6 hours  dexMEDEtomidine Infusion 0.2 MICROgram(s)/kG/Hr (2.13 mL/Hr) IV Continuous <Continuous>  gabapentin 600 milliGRAM(s) Oral three times a day  HYDROmorphone   Tablet 4 milliGRAM(s) Enteral Tube every 12 hours  insulin lispro (ADMELOG) corrective regimen sliding scale   SubCutaneous every 6 hours  levETIRAcetam  IVPB 750 milliGRAM(s) IV Intermittent every 12 hours  magnesium sulfate  IVPB 1 Gram(s) IV Intermittent once  pantoprazole  Injectable 40 milliGRAM(s) IV Push daily  piperacillin/tazobactam IVPB.. 3.375 Gram(s) IV Intermittent every 8 hours  polyethylene glycol 3350 17 Gram(s) Oral daily  potassium phosphate / sodium phosphate Powder (PHOS-NaK) 2 Packet(s) Oral once  senna 2 Tablet(s) Oral at bedtime  sodium chloride 2 Gram(s) Oral every 6 hours    MEDICATIONS  (PRN):  acetaminophen   IVPB .. 750 milliGRAM(s) IV Intermittent every 6 hours PRN Mild Pain (1 - 3)  ALPRAZolam 0.5 milliGRAM(s) Oral daily PRN severe anxiety  fentaNYL    Injectable 25 MICROGram(s) IV Push every 2 hours PRN vent synchrony  ondansetron Injectable 4 milliGRAM(s) IV Push every 6 hours PRN Nausea and/or Vomiting      phenylephrine 0.5  off precedex     BUE LC, BLE WD, EO to nox, no FC    [A/P] known NSCLC with brain mets p/w GTC, intubated   vEEG negative for seizures during the day  keppra 750mg Q12 continue  no sedation  pain control prn--was taking oxycontin at home 15mg Q12, can't be crushed, so give oxycodone 5mg Q6 and add 5mg/10mg prn, d/c dilaudid   cont home dose gabapentin   PRVC 16/350/5/40%  MAP>65, SBP<160, phenylephrine prn   d/c fluid restriction   protonix ppx  tube feeding  LBM 4/14  DVT chemoppx off, BLE dopplers negative, SCDs b/l  hx subsegmental PEs, now off Eliquis  zosyn for ?enteritis--thickened fluid filled small bowel  afebrile   LP to r/o CNS source infection, febrile at admission/GTC    R fem TLC  b/l 18g pIV and 20gpIV  L axillary rosie    ATTENDING STATEMENT:    Patient is critically ill, requiring critical care services.     Attending: I have personally and independently provided 30 minutes of critical care services.  This excludes any time spent on separate procedures or teaching.

## 2024-04-14 NOTE — CHART NOTE - NSCHARTNOTEFT_GEN_A_CORE
EEG preliminary read (not final) on the initial recording hour(s) = x 3     No seizures recorded.  Asynchronous slowing and independent quasiperiodic frontal sharp waves indicating risk of seizure  Moderate slowing noted, nonspecific.  Final report to follow tomorrow morning after completion of study.    [facility] EEG Reading Room Ph#: [phone]  Epilepsy Answering Service after 5PM and before 8:30AM: Ph#: [phone]

## 2024-04-14 NOTE — EEG REPORT - NS EEG TEXT BOX
EEG REPORT:      Date: 4/8/2024 119-430AM  Duration 3H    [employer]  Technical Description:    EEG performed with limited number of EEG electrodes/channels for expedited completion and evaluation for possible seizures.  The [employer] EEG recording device consists of a 10-electrode headband, an EEG recorder with the Brain Stethoscope and Clarity (auto seizure detection) features, and a cloud portal for continuous seizure monitoring, EEG data storing, and remote EEG reviewing. With the Brain Stethoscope and Clarity features, spot-checking and continuous seizure detection were available.    Interpretation:  PDR (Hz): not discerned  Slowing: generalized irregular delta/theta activity with superimposed fast activity. Also, triphasic waves noted occasionally  IEDs:  none   Seizures: none  Sleep transients:  not discerned  Artifacts: Frontal maximal blink artifacts with stimulation.  Myogenic and motion artifacts noted, sharply contoured.    Impression:  Within the technical limitations of the reduced electrode recording:  Moderate generalized slowing indicative of diffuse/multifocal cerebral dysfunction.   Triphasic waves typically seen in toxic/metabolic encephalopathy  Fast activity which is seen due to medication effect    No seizures.    Consider correlation with CEEG monitoring for further verification of findings if clinically warranted.    LEENA Millard  Attending Physician, [facility]    ------------------------------------  EEG Reading Room: [phone]  On Call Service After Hours: [phone]             EEG REPORT:   Patient: Slime Moreno  Date: 4/14/2024 23:23-10:05  Duration 23H 40 m    [employer]  Technical Description:    EEG performed with limited number of EEG electrodes/channels for expedited completion and evaluation for possible seizures.  The [employer] EEG recording device consists of a 10-electrode headband, an EEG recorder with the Brain Stethoscope and Clarity (auto seizure detection) features, and a cloud portal for continuous seizure monitoring, EEG data storing, and remote EEG reviewing. With the Brain Stethoscope and Clarity features, spot-checking and continuous seizure detection were available.    Interpretation:  PDR (Hz): not discerned  Slowing: generalized irregular delta/theta activity with superimposed fast activity. Also, triphasic waves noted occasionally  IEDs:  none   Seizures: none  Sleep transients:  not discerned  Artifacts: Frontal maximal blink artifacts with stimulation.  Myogenic and motion artifacts noted, sharply contoured.    Impression:  Within the technical limitations of the reduced electrode recording:  Moderate generalized slowing indicative of diffuse/multifocal cerebral dysfunction.   Triphasic waves typically seen in toxic/metabolic encephalopathy  Fast activity which is seen due to medication effect    No seizures.    Consider correlation with CEEG monitoring for further verification of findings if clinically warranted.    LEENA Millard  Attending Physician, [facility]    ------------------------------------  EEG Reading Room: [phone]  On Call Service After Hours: [phone]

## 2024-04-14 NOTE — CONSULT NOTE ADULT - ATTENDING COMMENTS
58F with PMH of subsegmental PE on Eliquis, NSCLC with known mets to brain, on immunotherapy and s/p lung RT 3/1/24 (also was planned for [facility]RS w/ Dr. Candelaria this wk, but no prior radiation tx to brain), presents as a transfer from  for progressive lethargy, found to have hemorrhagic brain lesions. Oncology consulted for NSCLC.    continue with dexamethasone for metastatic brain lesions with vasogenic edema  - follow up MR Brain stereotactic w/wo IV con, and MR C/T/L spine  - follow up Neurosurgery regarding role for neurosurgical intervention  - consult Radiation Oncology --> was initially planned for outpatient GKRS this week  - Agree with infectious workup and treatment per primary team  - No plan for inpatient systemic therapy at this time  - follow up vEEG  -Rest of care per Neurosurgical ICU  - Oncology will follow

## 2024-04-14 NOTE — PROGRESS NOTE ADULT - ASSESSMENT
59yo woman with h/o subsegmental PE on Eliquis 5mg BID (last dose: Thurs) and h/o NSCLC with known mets to brain, on immunotherapy and s/p lung RT 3/1/24 (also was planned for [facility] w/ Dr. Candelaria this wk, but no prior radiation tx to brain), presents as a transfer from  for progressive lethargy since Thursday, then unresponsive this AM. CTH with L anterior frontal lesion 2x2cm with significant vasogenic edema and some hemorrhage with another R frontal met with vasogenic edema. Lesions increased in size from CT 3/17/24. Last MRI 1/24/24 w/ numerous small mets + L frontal lesion measuring 1.3cm. Admitted to NSCU after sz, c/f nonconvulsive status.    imp: fever, failure to thrive past few days, on check point inhibitor for NSCLC, differential with meningitis (infectious vs aseptic), GTC in the setting of cerebral metastasis, tachypnea with mixed metabolic alkalosis unclear source (starvation, cancer).     ---Neuro---  #hemorrhagic mets in setting of NSCLC  - MR w and w/out pending   - MR C/T/L spine for metastatic evaluation  - dex 4 q6 for vasogenic edema    #GTC w c/f non-convulsive status  cont Keppra 750mg BID  vEEG    ---Resp---  #poor neurological exam/resipratory failure: intubated for airway control in setting of poor mental status  cont vent settings PRVC 16/350/5/40    #[facility]  - on immunotherapy and s/p lung RT 3/1/24  - consult heme/onc in AM    ---CV----  - Maintain normotension, MAP >65  - TTE pending    ---GI---  #Diet: OG tube, trickle feeds @ 20  - cont trickle feeds given ketosis likely contributory to anion gap (ketones in urine, BHB 0.9)  - bowel regimen miralax/senna  - PPI while inbuated/on steroids    ------  #[facility]  - 3% @ 30cc/hr for Na 135-145  - fluid restrict to 800cc/day  - monitor renal function  - replete lytes PRN     ---Heme---  #Hx of PE on Eliquis  -held eliquis  - CTPA pending    hypercoagulable in setting of neoplasm    #VTE ppx  - chemical ppx held   - BL SCDs  - BL LE US pending, f/u    ---Endo---  #DM2  Hgb A1c 5.8  - Maintain euglycemia 120-180  - ISS    - TSH 0.22 from 2.43 (3/28/24)  - f/u Free T3, T4    ---ID---  #Fever in setting of metastatic CA vs meningitis  no source for now  - f/u BCx  - procal 3.7  - CT CAP in setting of unclear source fo fever  - cont. empriric vanc/zosyn - LP?     Lines  Right femoral central line  L axillary arterial line    Full code    Dispo nsicu

## 2024-04-14 NOTE — CONSULT NOTE ADULT - ASSESSMENT
58F with PMH of subsegmental PE on Eliquis, NSCLC with known mets to brain, on immunotherapy and s/p lung RT 3/1/24 (also was planned for [facility] w/ Dr. Candelaria this wk, but no prior radiation tx to brain), presents as a transfer from  for progressive lethargy, found to have hemorrhagic brain lesions. Oncology consulted for NSCLC.    #NSCLC  - Follows Dr. Seymour at [facility]  - Oncologic history: Stage IV right lung poorly differentiated adenocarcinoma, PD-L1 TPS 20%, diagnosed December 2023, on chemoimmunotherapy with pembrolizumab, pemetrexed and carboplatin AUC 5 since January 2024. C4D1 on 3/28/24.  - CT Head (4/13/24) showed Hemorrhagic lesions in the left anterior frontal lobe and the right precentral gyrus have increased in size measuring up to 2.2 cm. Edema surrounding the left anterior frontal lesion has significantly increased   with new 0.3 cm rightward midline shift.  - On dexamethasone 4mg q6h  - on Keppra, vEEG   - Appreciate Neurosurgery recs    Recommend:  -    58F with PMH of subsegmental PE on Eliquis, NSCLC with known mets to brain, on immunotherapy and s/p lung RT 3/1/24 (also was planned for [facility] w/ Dr. Candelaria this wk, but no prior radiation tx to brain), presents as a transfer from  for progressive lethargy, found to have hemorrhagic brain lesions. Oncology consulted for NSCLC.    #NSCLC  - Follows Dr. Seymour at [facility]  - Oncologic history: Stage IV right lung poorly differentiated adenocarcinoma, PD-L1 TPS 20%, diagnosed December 2023, on chemoimmunotherapy with pembrolizumab, pemetrexed and carboplatin AUC 5 since January 2024. C4D1 on 3/28/24.  - CT Head (4/13/24) showed Hemorrhagic lesions in the left anterior frontal lobe and the right precentral gyrus have increased in size measuring up to 2.2 cm. Edema surrounding the left anterior frontal lesion has significantly increased   with new 0.3 cm rightward midline shift.  - Imaging showing progression of disease  - On dexamethasone 4mg q6h  - on Keppra, vEEG   - Appreciate Neurosurgery recs    #PE  - History of PE on Eliquis  - CTA chest (4/14) showed Similar clot burden with similar-appearing emboli in the subsegmental branches of the right upper lung. Stable size of right apical lung mass. Similar osseous erosive changes of the posterior aspect of the right   third rib, and T2 and T3 vertebral bodies secondary to metastatic spread. Fluid filled small bowel with wall thickening, may be seen with enteritis.  - Holding Eliquis in the setting of hemorrhagic brain metastasis    Recommend:  - continue with dexamethasone for metastatic brain lesions with vasogenic edema  - follow up MR Brain stereotactic w/wo IV con, and MR C/T/L spine  - follow up Neurosurgery regarding role for neurosurgical intervention  - consult Radiation Oncology - was planned for outpatient [facility] later this week  - Agree with infectious workup and treatment per primary team  - No plan for inpatient systemic therapy at this time  - follow up vEEG  -Rest of care per Neurosurgical ICU  - Oncology will follow    Case discussed w/ Dr. Elpidio Webber, PGY-4  Fellow Hematology/Oncology  pager [phone]  Available on TEAMS  After 5pm or on weekends please contact  to page on-call fellow    58F with PMH of subsegmental PE on Eliquis, NSCLC with known mets to brain, on immunotherapy and s/p lung RT 3/1/24 (also was planned for [facility] w/ Dr. Candelaria this wk, but no prior radiation tx to brain), presents as a transfer from  for progressive lethargy, found to have hemorrhagic brain lesions. Oncology consulted for NSCLC.    #NSCLC  - Follows Dr. Seymour at [facility]  - Oncologic history: Stage IV right lung poorly differentiated adenocarcinoma, PD-L1 TPS 20%, diagnosed December 2023, on chemoimmunotherapy with pembrolizumab, pemetrexed and carboplatin AUC 5 since January 2024. C4D1 on 3/28/24.  - CT Head (4/13/24) showed Hemorrhagic lesions in the left anterior frontal lobe and the right precentral gyrus have increased in size measuring up to 2.2 cm. Edema surrounding the left anterior frontal lesion has significantly increased   with new 0.3 cm rightward midline shift.  - Imaging showing progression of disease  - On dexamethasone 4mg q6h  - on Keppra, vEEG   - Appreciate Neurosurgery recs; pending MR imaging to determine role for neurosurgical intervention  - suspect progressively worsening altered mental status is multifactorial in the setting of enlarging metastatic brain lesions with hemorrhage / vasogenic edema, possible sepsis (possible enteritis seen on CT), and less likely neurotoxic effects of pembrolizumab,    #PE  - History of PE on Eliquis  - CTA chest (4/14) showed Similar clot burden with similar-appearing emboli in the subsegmental branches of the right upper lung. Stable size of right apical lung mass. Similar osseous erosive changes of the posterior aspect of the right   third rib, and T2 and T3 vertebral bodies secondary to metastatic spread. Fluid filled small bowel with wall thickening, may be seen with enteritis.  - Holding Eliquis in the setting of hemorrhagic brain metastasis    Recommend:  - continue with dexamethasone for metastatic brain lesions with vasogenic edema  - follow up MR Brain stereotactic w/wo IV con, and MR C/T/L spine  - follow up Neurosurgery regarding role for neurosurgical intervention  - consult Radiation Oncology --> was initially planned for outpatient [facility] this week  - Agree with infectious workup and treatment per primary team  - No plan for inpatient systemic therapy at this time  - follow up vEEG  -Rest of care per Neurosurgical ICU  - Oncology will follow    Case discussed w/ Dr. Elpidio Webber, PGY-4  Fellow Hematology/Oncology  pager [phone]  Available on TEAMS  After 5pm or on weekends please contact  to page on-call fellow

## 2024-04-14 NOTE — PROGRESS NOTE ADULT - SUBJECTIVE AND OBJECTIVE BOX
HPI  57yo woman with h/o subsegmental PE on Eliquis 5mg BID (last dose: Thurs) and h/o NSCLC with known mets to brain, on immunotherapy and s/p lung RT 3/1/24 (also was planned for [facility] w/ Dr. Candelaria this wk, but no prior radiation tx to brain), presents as a transfer from  for progressive lethargy since Thursday, then unresponsive this AM. CTH with L anterior frontal lesion 2x2cm with significant vasogenic edema and some hemorrhage with another R frontal met with vasogenic edema. Lesions increased in size from CT 3/17/24. Last MRI 1/24/24 w/ numerous small mets + L frontal lesion measuring 1.3cm. Coags/plts wnl. With seizure at  for which patient received Ativan and Keppra 1g. Lactate 5.1. S/p Versed 5mg in ED for poor responsiveness and possible non-convulsive status.     24h Events:  intubated for neurological impairment   ceribell found next to patient head    EXAMINATION:  General: ill appearing  HEENT:  dry MM  Neuro: intubated, eyes closed, grimaces to noxious, does not attend, does not follow commands, withdraws all 4 extremities to noxious   Cards:  tachycardic, regular   Respiratory:  no respiratory distress  Abdomen:  soft  Extremities:  no LE edema    VITALS:   Vital Signs Last 24 Hrs  T(C): 36 (14 Apr 2024 09:00), Max: 42 (13 Apr 2024 23:00)  T(F): 107.6 (14 Apr 2024 09:00), Max: 107.6 (13 Apr 2024 23:00)  HR: 80 (14 Apr 2024 09:00) (78 - 145)  BP: 126/67 (14 Apr 2024 09:00) (112/74 - 183/85)  BP(mean): 91 (14 Apr 2024 09:00) (87 - 130)  RR: 26 (14 Apr 2024 09:00) (25 - 60)  SpO2: 100% (14 Apr 2024 09:00) (97% - 100%)    Parameters below as of 14 Apr 2024 09:00  Patient On (Oxygen Delivery Method): ventilator    O2 Concentration (%): 40  CAPILLARY BLOOD GLUCOSE      POCT Blood Glucose.: 124 mg/dL (14 Apr 2024 05:54)    I&O's Summary    13 Apr 2024 07:01  -  14 Apr 2024 07:00  --------------------------------------------------------  IN: 6455 mL / OUT: 4600 mL / NET: 1855 mL    14 Apr 2024 07:01  -  14 Apr 2024 09:35  --------------------------------------------------------  IN: 188.8 mL / OUT: 0 mL / NET: 188.8 mL        Respiratory:  Mode: AC/ CMV (Assist Control/ Continuous Mandatory Ventilation)  RR (machine): 16  TV (machine): 350  FiO2: 40  PEEP: 5  ITime: 1  MAP: 10  PIP: 16    ABG - ( 14 Apr 2024 02:47 )  pH, Arterial: 7.42  pH, Blood: x     /  pCO2: 28    /  pO2: 168   / HCO3: 18    / Base Excess: -5.2  /  SaO2: 99.1                LABS:                        10.5   7.42  )-----------( 184      ( 14 Apr 2024 03:01 )             31.1     04-14    130<L>  |  98  |  11  ----------------------------<  148<H>  4.4   |  16<L>  |  0.45<L>        MEDICATION LEVELS:   Ammonia, Serum: 17 umol/L (04-13 @ 22:55)    IVF FLUIDS/MEDICATIONS:   MEDICATIONS  (STANDING):  chlorhexidine 0.12% Liquid 15 milliLiter(s) Oral Mucosa every 12 hours  chlorhexidine 4% Liquid 1 Application(s) Topical daily  dexAMETHasone  Injectable 4 milliGRAM(s) IV Push every 6 hours  gabapentin 600 milliGRAM(s) Oral three times a day  insulin lispro (ADMELOG) corrective regimen sliding scale   SubCutaneous every 6 hours  levETIRAcetam  IVPB 750 milliGRAM(s) IV Intermittent every 12 hours  pantoprazole  Injectable 40 milliGRAM(s) IV Push daily  piperacillin/tazobactam IVPB.. 3.375 Gram(s) IV Intermittent every 8 hours  polyethylene glycol 3350 17 Gram(s) Oral daily  propofol Infusion 50 MICROgram(s)/kG/Min (12.8 mL/Hr) IV Continuous <Continuous>  propofol Infusion 10 MICROgram(s)/kG/Min (2.55 mL/Hr) IV Continuous <Continuous>  senna 2 Tablet(s) Oral at bedtime  sodium chloride 2 Gram(s) Oral every 6 hours  sodium chloride 3% + sodium acetate 50:50 1000 milliLiter(s) (30 mL/Hr) IV Continuous <Continuous>  vancomycin  IVPB 1000 milliGRAM(s) IV Intermittent every 12 hours    MEDICATIONS  (PRN):  acetaminophen   IVPB .. 750 milliGRAM(s) IV Intermittent every 6 hours PRN Mild Pain (1 - 3)  ALPRAZolam 0.5 milliGRAM(s) Oral daily PRN severe anxiety  fentaNYL    Injectable 25 MICROGram(s) IV Push every 2 hours PRN vent synchrony  ondansetron Injectable 4 milliGRAM(s) IV Push every 6 hours PRN Nausea and/or Vomiting

## 2024-04-14 NOTE — CONSULT NOTE ADULT - SUBJECTIVE AND OBJECTIVE BOX
HPI:  58F on Eliquis 5BID (last dose: Thurs) for recent dx subsegmental PE, pt of Dr. 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 Candelaria this wk, but no prior radiation tx to brain), p/f progressive lethargy since Thurs, acutely worse/unresponsive this AM per daughter (was still walking around and talking at home yday). CTH @830AM w/ L anterior frontal lesion 2x2cm w/ significant vasogenic edema and hemorrhagic conversion + addtl lesion in R motor strip. Lesions incr in size from CT 3/17/24 w/ incr edema/mass effect. Last MRI 1/24/24 w/ numerous tiny enhancing mets + L frontal lesion measuring 1.3cm. Coags/plts wnl. Lactate 5.1. Exam: EO to moderate stim, no FC, PERRL, wd x4  (13 Apr 2024 13:42)      PAST MEDICAL & SURGICAL HISTORY:  Mass of right lung      COPD (chronic obstructive pulmonary disease)      Smoker      Chronic pain syndrome      HLD (hyperlipidemia)      Melanoma in situ      History of blood transfusion      Malignant neoplasm of right bronchus      H/O reduction of orbital fracture      S/P wrist surgery          Allergies    No Known Allergies    Intolerances    morphine (Sedation/Somnol)      MEDICATIONS  (STANDING):  chlorhexidine 0.12% Liquid 15 milliLiter(s) Oral Mucosa every 12 hours  chlorhexidine 4% Liquid 1 Application(s) Topical daily  dexAMETHasone  Injectable 4 milliGRAM(s) IV Push every 6 hours  dexMEDEtomidine Infusion 0.2 MICROgram(s)/kG/Hr (2.13 mL/Hr) IV Continuous <Continuous>  gabapentin 600 milliGRAM(s) Oral three times a day  HYDROmorphone   Tablet 4 milliGRAM(s) Enteral Tube every 12 hours  insulin lispro (ADMELOG) corrective regimen sliding scale   SubCutaneous every 6 hours  levETIRAcetam  IVPB 750 milliGRAM(s) IV Intermittent every 12 hours  pantoprazole  Injectable 40 milliGRAM(s) IV Push daily  piperacillin/tazobactam IVPB.. 3.375 Gram(s) IV Intermittent every 8 hours  polyethylene glycol 3350 17 Gram(s) Oral daily  propofol Infusion 10 MICROgram(s)/kG/Min (2.55 mL/Hr) IV Continuous <Continuous>  senna 2 Tablet(s) Oral at bedtime  sodium chloride 2 Gram(s) Oral every 6 hours  sodium chloride 3% + sodium acetate 50:50 1000 milliLiter(s) (30 mL/Hr) IV Continuous <Continuous>  vancomycin  IVPB 1000 milliGRAM(s) IV Intermittent every 12 hours    MEDICATIONS  (PRN):  acetaminophen   IVPB .. 750 milliGRAM(s) IV Intermittent every 6 hours PRN Mild Pain (1 - 3)  ALPRAZolam 0.5 milliGRAM(s) Oral daily PRN severe anxiety  fentaNYL    Injectable 25 MICROGram(s) IV Push every 2 hours PRN vent synchrony  ondansetron Injectable 4 milliGRAM(s) IV Push every 6 hours PRN Nausea and/or Vomiting      FAMILY HISTORY:  FH: heart attack (Father, Mother)    FH: stomach cancer (Grandparent)    FH: lung cancer (Sibling)        SOCIAL HISTORY: No EtOH, no tobacco    REVIEW OF SYSTEMS:    CONSTITUTIONAL: No weakness, fevers or chills  EYES/ENT: No visual changes;  No vertigo or throat pain   NECK: No pain or stiffness  RESPIRATORY: No cough, wheezing, hemoptysis; No shortness of breath  CARDIOVASCULAR: No chest pain or palpitations  GASTROINTESTINAL: No abdominal or epigastric pain. No nausea, vomiting, or hematemesis; No diarrhea or constipation. No melena or hematochezia.  GENITOURINARY: No dysuria, frequency or hematuria  NEUROLOGICAL: No numbness or weakness  SKIN: No itching, burning, rashes, or lesions   All other review of systems is negative unless indicated above.        T(F): 105.8 (04-14-24 @ 13:00), Max: 107.6 (04-13-24 @ 23:00)  HR: 85 (04-14-24 @ 13:00)  BP: 141/92 (04-14-24 @ 13:00)  RR: 29 (04-14-24 @ 13:00)  SpO2: 100% (04-14-24 @ 13:00)  Wt(kg): --    GENERAL: NAD, well-developed  HEAD:  Atraumatic, Normocephalic  EYES: EOMI, PERRLA, conjunctiva and sclera clear  NECK: Supple, No JVD  CHEST/LUNG: Clear to auscultation bilaterally; No wheeze  HEART: Regular rate and rhythm; No murmurs, rubs, or gallops  ABDOMEN: Soft, Nontender, Nondistended; Bowel sounds present  EXTREMITIES:  2+ Peripheral Pulses, No clubbing, cyanosis, or edema  NEUROLOGY: non-focal  SKIN: No rashes or lesions                          10.5   7.42  )-----------( 184      ( 14 Apr 2024 03:01 )             31.1       04-14    135  |  104  |  11  ----------------------------<  144<H>  4.5   |  19<L>  |  0.48<L>    Ca    9.4      14 Apr 2024 11:13  Phos  2.8     04-14  Mg     1.9     04-14    TPro  6.8  /  Alb  3.6  /  TBili  0.9  /  DBili  0.2  /  AST  20  /  ALT  12  /  AlkPhos  76  04-13      Magnesium: 1.9 mg/dL (04-14 @ 11:13)  Phosphorus: 2.8 mg/dL (04-14 @ 11:13)  Magnesium: 2.0 mg/dL (04-14 @ 03:01)  Phosphorus: 3.5 mg/dL (04-14 @ 03:01)  Magnesium: 2.4 mg/dL (04-13 @ 22:55)  Phosphorus: 2.7 mg/dL (04-13 @ 22:55)  Magnesium: 1.3 mg/dL (04-13 @ 18:57)  Phosphorus: 4.0 mg/dL (04-13 @ 18:57)  Magnesium: 1.5 mg/dL (04-13 @ 16:22)  Phosphorus: 3.7 mg/dL (04-13 @ 16:22)      PT/INR - ( 14 Apr 2024 03:01 )   PT: 12.4 sec;   INR: 1.13 ratio         PTT - ( 14 Apr 2024 03:01 )  PTT:25.7 sec    Clean Catch Clean Catch (Midstream)  01-22 @ 12:30   <10,000 CFU/mL Normal Urogenital Sandy  --  --      .Blood Blood  01-22 @ 12:15   No growth at 5 days  --  --      < from: CT Head No Cont (04.13.24 @ 08:42) >  IMPRESSION:    Hemorrhagic lesions in the left anterior frontal lobe and the right   precentral gyrus have increased in size measuring up to 2.2 cm. Edema   surrounding the left anterior frontal lesion has significantly increased   with new 0.3 cm rightward midline shift.    --- End of Report ---    < end of copied text >   HPI:  58F on Eliquis 5BID (last dose: Thurs) for recent dx subsegmental PE, pt of Dr. 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 Candelaria this wk, but no prior radiation tx to brain), p/f progressive lethargy since Thurs, acutely worse/unresponsive this AM per daughter (was still walking around and talking at home yday). CTH @830AM w/ L anterior frontal lesion 2x2cm w/ significant vasogenic edema and hemorrhagic conversion + addtl lesion in R motor strip. Lesions incr in size from CT 3/17/24 w/ incr edema/mass effect. Last MRI 1/24/24 w/ numerous tiny enhancing mets + L frontal lesion measuring 1.3cm. Coags/plts wnl. Lactate 5.1. Exam: EO to moderate stim, no FC, PERRL, wd x4  (13 Apr 2024 13:42)      PAST MEDICAL & SURGICAL HISTORY:  Mass of right lung    COPD (chronic obstructive pulmonary disease)    Smoker    Chronic pain syndrome    HLD (hyperlipidemia)    Melanoma in situ    History of blood transfusion    Malignant neoplasm of right bronchus    H/O reduction of orbital fracture    S/P wrist surgery      Allergies  No Known Allergies    Intolerances    morphine (Sedation/Somnol)      MEDICATIONS  (STANDING):  chlorhexidine 0.12% Liquid 15 milliLiter(s) Oral Mucosa every 12 hours  chlorhexidine 4% Liquid 1 Application(s) Topical daily  dexAMETHasone  Injectable 4 milliGRAM(s) IV Push every 6 hours  dexMEDEtomidine Infusion 0.2 MICROgram(s)/kG/Hr (2.13 mL/Hr) IV Continuous <Continuous>  gabapentin 600 milliGRAM(s) Oral three times a day  HYDROmorphone   Tablet 4 milliGRAM(s) Enteral Tube every 12 hours  insulin lispro (ADMELOG) corrective regimen sliding scale   SubCutaneous every 6 hours  levETIRAcetam  IVPB 750 milliGRAM(s) IV Intermittent every 12 hours  pantoprazole  Injectable 40 milliGRAM(s) IV Push daily  piperacillin/tazobactam IVPB.. 3.375 Gram(s) IV Intermittent every 8 hours  polyethylene glycol 3350 17 Gram(s) Oral daily  propofol Infusion 10 MICROgram(s)/kG/Min (2.55 mL/Hr) IV Continuous <Continuous>  senna 2 Tablet(s) Oral at bedtime  sodium chloride 2 Gram(s) Oral every 6 hours  sodium chloride 3% + sodium acetate 50:50 1000 milliLiter(s) (30 mL/Hr) IV Continuous <Continuous>  vancomycin  IVPB 1000 milliGRAM(s) IV Intermittent every 12 hours    MEDICATIONS  (PRN):  acetaminophen   IVPB .. 750 milliGRAM(s) IV Intermittent every 6 hours PRN Mild Pain (1 - 3)  ALPRAZolam 0.5 milliGRAM(s) Oral daily PRN severe anxiety  fentaNYL    Injectable 25 MICROGram(s) IV Push every 2 hours PRN vent synchrony  ondansetron Injectable 4 milliGRAM(s) IV Push every 6 hours PRN Nausea and/or Vomiting      FAMILY HISTORY:  FH: heart attack (Father, Mother)    FH: stomach cancer (Grandparent)    FH: lung cancer (Sibling)        SOCIAL HISTORY: Unable to obtain given mental status    REVIEW OF SYSTEMS: Unable to obtain given mental status      T(F): 105.8 (04-14-24 @ 13:00), Max: 107.6 (04-13-24 @ 23:00)  HR: 85 (04-14-24 @ 13:00)  BP: 141/92 (04-14-24 @ 13:00)  RR: 29 (04-14-24 @ 13:00)  SpO2: 100% (04-14-24 @ 13:00)  Wt(kg): --    GENERAL: Intubated and sedated  HEAD:  +vEEG in place  EYES: conjunctiva and sclera clear  NECK: Supple  CHEST/LUNG: +ventilator breath sounds  HEART: Regular rate and rhythm  ABDOMEN: Soft, Nontender, Nondistended  EXTREMITIES:  2+ Peripheral Pulses, No edema  NEUROLOGY: +sedated, moves extremities periodically  SKIN: No rashes or lesions                          10.5   7.42  )-----------( 184      ( 14 Apr 2024 03:01 )             31.1       04-14    135  |  104  |  11  ----------------------------<  144<H>  4.5   |  19<L>  |  0.48<L>    Ca    9.4      14 Apr 2024 11:13  Phos  2.8     04-14  Mg     1.9     04-14    TPro  6.8  /  Alb  3.6  /  TBili  0.9  /  DBili  0.2  /  AST  20  /  ALT  12  /  AlkPhos  76  04-13      Magnesium: 1.9 mg/dL (04-14 @ 11:13)  Phosphorus: 2.8 mg/dL (04-14 @ 11:13)  Magnesium: 2.0 mg/dL (04-14 @ 03:01)  Phosphorus: 3.5 mg/dL (04-14 @ 03:01)  Magnesium: 2.4 mg/dL (04-13 @ 22:55)  Phosphorus: 2.7 mg/dL (04-13 @ 22:55)  Magnesium: 1.3 mg/dL (04-13 @ 18:57)  Phosphorus: 4.0 mg/dL (04-13 @ 18:57)  Magnesium: 1.5 mg/dL (04-13 @ 16:22)  Phosphorus: 3.7 mg/dL (04-13 @ 16:22)      PT/INR - ( 14 Apr 2024 03:01 )   PT: 12.4 sec;   INR: 1.13 ratio         PTT - ( 14 Apr 2024 03:01 )  PTT:25.7 sec    Clean Catch Clean Catch (Midstream)  01-22 @ 12:30   <10,000 CFU/mL Normal Urogenital Sandy  --  --      .Blood Blood  01-22 @ 12:15   No growth at 5 days  --  --      < from: CT Head No Cont (04.13.24 @ 08:42) >  IMPRESSION:    Hemorrhagic lesions in the left anterior frontal lobe and the right   precentral gyrus have increased in size measuring up to 2.2 cm. Edema   surrounding the left anterior frontal lesion has significantly increased   with new 0.3 cm rightward midline shift.    --- End of Report ---    < end of copied text >    < from: CT Angio Chest PE Protocol w/ IV Cont (04.14.24 @ 11:46) >  IMPRESSION:  Similar clot burden with similar-appearing emboli in the subsegmental   branches of the right upper lung.  Stable size of right apical lung mass.    Similar osseous erosive changes of the posterior aspect of the right   third rib, and T2 and T3 vertebral bodies secondary to metastatic spread    Motion degraded exam limits evaluation for abdomen and pelvis.    Fluid filled small bowel with wall thickening, may be seen with enteritis.        --- End of Report ---    < end of copied text >   HPI:  58F on Eliquis 5BID (last dose: Thurs) for recent dx subsegmental PE, pt of Dr. 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 Candelaria this wk, but no prior radiation tx to brain), p/f progressive lethargy since Thurs, acutely worse/unresponsive this AM per daughter (was still walking around and talking at home yday). CTH @830AM w/ L anterior frontal lesion 2x2cm w/ significant vasogenic edema and hemorrhagic conversion + addtl lesion in R motor strip. Lesions incr in size from CT 3/17/24 w/ incr edema/mass effect. Last MRI 1/24/24 w/ numerous tiny enhancing mets + L frontal lesion measuring 1.3cm. Coags/plts wnl. Lactate 5.1. Exam: EO to moderate stim, no FC, PERRL, wd x4  (13 Apr 2024 13:42)      PAST MEDICAL & SURGICAL HISTORY:  Mass of right lung    COPD (chronic obstructive pulmonary disease)    Smoker    Chronic pain syndrome    HLD (hyperlipidemia)    Melanoma in situ    History of blood transfusion    Malignant neoplasm of right bronchus    H/O reduction of orbital fracture    S/P wrist surgery      Allergies  No Known Allergies    Intolerances    morphine (Sedation/Somnol)      MEDICATIONS  (STANDING):  chlorhexidine 0.12% Liquid 15 milliLiter(s) Oral Mucosa every 12 hours  chlorhexidine 4% Liquid 1 Application(s) Topical daily  dexAMETHasone  Injectable 4 milliGRAM(s) IV Push every 6 hours  dexMEDEtomidine Infusion 0.2 MICROgram(s)/kG/Hr (2.13 mL/Hr) IV Continuous <Continuous>  gabapentin 600 milliGRAM(s) Oral three times a day  HYDROmorphone   Tablet 4 milliGRAM(s) Enteral Tube every 12 hours  insulin lispro (ADMELOG) corrective regimen sliding scale   SubCutaneous every 6 hours  levETIRAcetam  IVPB 750 milliGRAM(s) IV Intermittent every 12 hours  pantoprazole  Injectable 40 milliGRAM(s) IV Push daily  piperacillin/tazobactam IVPB.. 3.375 Gram(s) IV Intermittent every 8 hours  polyethylene glycol 3350 17 Gram(s) Oral daily  propofol Infusion 10 MICROgram(s)/kG/Min (2.55 mL/Hr) IV Continuous <Continuous>  senna 2 Tablet(s) Oral at bedtime  sodium chloride 2 Gram(s) Oral every 6 hours  sodium chloride 3% + sodium acetate 50:50 1000 milliLiter(s) (30 mL/Hr) IV Continuous <Continuous>  vancomycin  IVPB 1000 milliGRAM(s) IV Intermittent every 12 hours    MEDICATIONS  (PRN):  acetaminophen   IVPB .. 750 milliGRAM(s) IV Intermittent every 6 hours PRN Mild Pain (1 - 3)  ALPRAZolam 0.5 milliGRAM(s) Oral daily PRN severe anxiety  fentaNYL    Injectable 25 MICROGram(s) IV Push every 2 hours PRN vent synchrony  ondansetron Injectable 4 milliGRAM(s) IV Push every 6 hours PRN Nausea and/or Vomiting      FAMILY HISTORY:  FH: heart attack (Father, Mother)    FH: stomach cancer (Grandparent)    FH: lung cancer (Sibling)      SOCIAL HISTORY: Unable to obtain given mental status    REVIEW OF SYSTEMS: Unable to obtain given mental status      T(F): 105.8 (04-14-24 @ 13:00), Max: 107.6 (04-13-24 @ 23:00)  HR: 85 (04-14-24 @ 13:00)  BP: 141/92 (04-14-24 @ 13:00)  RR: 29 (04-14-24 @ 13:00)  SpO2: 100% (04-14-24 @ 13:00)  Wt(kg): --    GENERAL: Intubated and sedated  HEAD:  +vEEG in place  EYES: conjunctiva and sclera clear  NECK: Supple  CHEST/LUNG: +ventilator breath sounds  HEART: Regular rate and rhythm  ABDOMEN: Soft, Nontender, Nondistended  EXTREMITIES:  2+ Peripheral Pulses, No edema  NEUROLOGY: +sedated, withdraws to stimuli  SKIN: No rashes or lesions                          10.5   7.42  )-----------( 184      ( 14 Apr 2024 03:01 )             31.1       04-14    135  |  104  |  11  ----------------------------<  144<H>  4.5   |  19<L>  |  0.48<L>    Ca    9.4      14 Apr 2024 11:13  Phos  2.8     04-14  Mg     1.9     04-14    TPro  6.8  /  Alb  3.6  /  TBili  0.9  /  DBili  0.2  /  AST  20  /  ALT  12  /  AlkPhos  76  04-13      Magnesium: 1.9 mg/dL (04-14 @ 11:13)  Phosphorus: 2.8 mg/dL (04-14 @ 11:13)  Magnesium: 2.0 mg/dL (04-14 @ 03:01)  Phosphorus: 3.5 mg/dL (04-14 @ 03:01)  Magnesium: 2.4 mg/dL (04-13 @ 22:55)  Phosphorus: 2.7 mg/dL (04-13 @ 22:55)  Magnesium: 1.3 mg/dL (04-13 @ 18:57)  Phosphorus: 4.0 mg/dL (04-13 @ 18:57)  Magnesium: 1.5 mg/dL (04-13 @ 16:22)  Phosphorus: 3.7 mg/dL (04-13 @ 16:22)      PT/INR - ( 14 Apr 2024 03:01 )   PT: 12.4 sec;   INR: 1.13 ratio         PTT - ( 14 Apr 2024 03:01 )  PTT:25.7 sec    Clean Catch Clean Catch (Midstream)  01-22 @ 12:30   <10,000 CFU/mL Normal Urogenital Sandy  --  --      .Blood Blood  01-22 @ 12:15   No growth at 5 days  --  --      < from: CT Head No Cont (04.13.24 @ 08:42) >  IMPRESSION:    Hemorrhagic lesions in the left anterior frontal lobe and the right   precentral gyrus have increased in size measuring up to 2.2 cm. Edema   surrounding the left anterior frontal lesion has significantly increased   with new 0.3 cm rightward midline shift.    --- End of Report ---    < end of copied text >    < from: CT Angio Chest PE Protocol w/ IV Cont (04.14.24 @ 11:46) >  IMPRESSION:  Similar clot burden with similar-appearing emboli in the subsegmental   branches of the right upper lung.  Stable size of right apical lung mass.    Similar osseous erosive changes of the posterior aspect of the right   third rib, and T2 and T3 vertebral bodies secondary to metastatic spread    Motion degraded exam limits evaluation for abdomen and pelvis.    Fluid filled small bowel with wall thickening, may be seen with enteritis.        --- End of Report ---    < end of copied text >

## 2024-04-14 NOTE — PROGRESS NOTE ADULT - ASSESSMENT
intubated for airway protection overnight  CTH AM   vEEG - p   LED - p  MRI brain stereo w/wo quicktome protocol    Heme/Onc consult  Repeat CTA Chest at some point   Known T2/3 pathologic fx - MRI ordered

## 2024-04-15 NOTE — PROGRESS NOTE ADULT - ASSESSMENT
57yo woman with h/o subsegmental PE on Eliquis 5mg BID (last dose: Thurs) and h/o NSCLC with known mets to brain, on immunotherapy and s/p lung RT 3/1/24 (also was planned for GKRS w/ Dr. Candelaria this wk, but no prior radiation tx to brain), presents as a transfer from  for progressive lethargy since Thursday, then unresponsive this AM. CTH with L anterior frontal lesion 2x2cm with significant vasogenic edema and some hemorrhage with another R frontal met with vasogenic edema. Lesions increased in size from CT 3/17/24. Last MRI 1/24/24 w/ numerous small mets + L frontal lesion measuring 1.3cm. Admitted to NSC[facility] after sz, c/f nonconvulsive status.    imp: fever, failure to thrive past few days, on check point inhibitor for NSCLC, differential with meningitis (infectious vs aseptic), GTC in the setting of cerebral metastasis, tachypnea with mixed metabolic alkalosis unclear source (starvation, cancer).     ---Neuro---  #hemorrhagic mets in setting of NSCLC  - MR w and w/out pending   - MR C/T/L spine for metastatic evaluation  - dex 4 q6 for vasogenic edema    #GTC w c/f non-convulsive status  cont Keppra 750mg BID  vEEG    ---Resp---  #poor neurological exam/resipratory failure: intubated for airway control in setting of poor mental status  cont vent settings PRVC 16/350/5/40    #[facility]  - on immunotherapy and s/p lung RT 3/1/24  - consult heme/onc in AM    ---CV----  - Maintain normotension, MAP >65  - TTE pending    ---GI---  #Diet: OG tube, trickle feeds @ 20  - cont trickle feeds given ketosis likely contributory to anion gap (ketones in urine, BHB 0.9)  - bowel regimen miralax/senna  - PPI while inbuated/on steroids    ------  #SIADH  - 3% @ 30cc/hr for Na 135-145  - fluid restrict to 800cc/day  - monitor renal function  - replete lytes PRN     ---Heme---  #Hx of PE on Eliquis  -held eliquis  - CTPA pending    hypercoagulable in setting of neoplasm    #VTE ppx  - chemical ppx held   - BL SCDs  - BL LE US pending, f/u    ---Endo---  #DM2  Hgb A1c 5.8  - Maintain euglycemia 120-180  - ISS    - TSH 0.22 from 2.43 (3/28/24)  - f/u Free T3, T4    ---ID---  #Fever in setting of metastatic CA vs meningitis  no source for now  - f/u BCx  - procal 3.7  - CT CAP in setting of unclear source fo fever  - cont. empriric vanc/zosyn - LP?     Lines  Right femoral central line  L axillary arterial line    Full code    Dispo nsicu 57yo woman with h/o subsegmental PE on Eliquis 5mg BID (last dose: Thurs) and h/o NSCLC with known mets to brain, on immunotherapy and s/p lung RT 3/1/24 (also was planned for GKRS w/ Dr. Candelaria this wk, but no prior radiation tx to brain), presents as a transfer from  for progressive lethargy since Thursday, then unresponsive this AM. CTH with L anterior frontal lesion 2x2cm with significant vasogenic edema and some hemorrhage with another R frontal met with vasogenic edema. Lesions increased in size from CT 3/17/24. Last MRI 1/24/24 w/ numerous small mets + L frontal lesion measuring 1.3cm. Admitted to NSCU after sz, c/f nonconvulsive status.    imp: fever, failure to thrive past few days, on check point inhibitor for NSCLC, differential with meningitis (infectious vs aseptic), GTC in the setting of cerebral metastasis, tachypnea with mixed metabolic alkalosis unclear source (starvation, cancer).     ---Neuro---  #hemorrhagic mets in setting of NSCLC  - MR w and w/out pending   - MR C/T/L spine for metastatic evaluation  - dex 4 q6 for vasogenic edema    #GTC w c/f non-convulsive status  cont Keppra 750mg BID  vEEG    ---Resp---  #poor neurological exam/resipratory failure: intubated for airway control in setting of poor mental status  CPAP trial tolerating well, plan to extubate  ABG pending     #[facility]  - on immunotherapy and s/p lung RT 3/1/24  - heme/onc on board, c/edexa therapy    ---CV----  - Maintain normotension, MAP >65  - TTE: pending   - Eliquis on hold given ICH    ---GI---  #Diet: OG tube, trickle feeds @ 20  - NPO for extubation  - bowel regimen miralax/senna  - PPI while inbuated/on steroids    ------  - LR: 75 ccs/hour  - monitor renal function  - replete lytes PRN     ---Heme---  #Hx of PE on Eliquis  -held eliquis  - reason ICH    #VTE ppx  - chemical ppx held   - BL SCDs  - BL LE US pending, f/u    ---Endo---  #DM2  Hgb A1c 5.8  - Maintain euglycemia 120-180  - ISS    ---ID---  #Fever in setting of metastatic CA vs meningitis  - resolved  - f/u BCx  - CT CAP in setting of unclear source fo fever: negative for consolidation  - cont withe zosyn for enteritis on the CT abd and P    Lines  Right femoral central line  L axillary arterial line    Full code    Dispo nsicu

## 2024-04-15 NOTE — PROGRESS NOTE ADULT - SUBJECTIVE AND OBJECTIVE BOX
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 Candelaria this wk, but no prior radiation tx to brain), presents as a transfer from  for progressive lethargy since Thursday, then unresponsive this AM. CTH with L anterior frontal lesion 2x2cm with significant vasogenic edema and some hemorrhage with another R frontal met with vasogenic edema. Lesions increased in size from CT 3/17/24. Last MRI 1/24/24 w/ numerous small mets + L frontal lesion measuring 1.3cm. Coags/plts wnl. With seizure at  for which patient received Ativan and Keppra 1g. Lactate 5.1. S/p Versed 5mg in ED for poor responsiveness and possible non-convulsive status.     24h Events:  intubated for neurological impairment   ceribell found next to patient head    EXAMINATION:  General: ill appearing  HEENT:  dry MM  Neuro: intubated, eyes closed, grimaces to noxious, does not attend, does not follow commands, withdraws all 4 extremities to noxious   Cards:  tachycardic, regular   Respiratory:  no respiratory distress  Abdomen:  soft  Extremities:  no LE edema    VITALS:   Vital Signs Last 24 Hrs  T(C): 36 (14 Apr 2024 09:00), Max: 42 (13 Apr 2024 23:00)  T(F): 107.6 (14 Apr 2024 09:00), Max: 107.6 (13 Apr 2024 23:00)  HR: 80 (14 Apr 2024 09:00) (78 - 145)  BP: 126/67 (14 Apr 2024 09:00) (112/74 - 183/85)  BP(mean): 91 (14 Apr 2024 09:00) (87 - 130)  RR: 26 (14 Apr 2024 09:00) (25 - 60)  SpO2: 100% (14 Apr 2024 09:00) (97% - 100%)    Parameters below as of 14 Apr 2024 09:00  Patient On (Oxygen Delivery Method): ventilator    O2 Concentration (%): 40  CAPILLARY BLOOD GLUCOSE      POCT Blood Glucose.: 124 mg/dL (14 Apr 2024 05:54)    I&O's Summary    13 Apr 2024 07:01  -  14 Apr 2024 07:00  --------------------------------------------------------  IN: 6455 mL / OUT: 4600 mL / NET: 1855 mL    14 Apr 2024 07:01  -  14 Apr 2024 09:35  --------------------------------------------------------  IN: 188.8 mL / OUT: 0 mL / NET: 188.8 mL        Respiratory:  Mode: AC/ CMV (Assist Control/ Continuous Mandatory Ventilation)  RR (machine): 16  TV (machine): 350  FiO2: 40  PEEP: 5  ITime: 1  MAP: 10  PIP: 16    ABG - ( 14 Apr 2024 02:47 )  pH, Arterial: 7.42  pH, Blood: x     /  pCO2: 28    /  pO2: 168   / HCO3: 18    / Base Excess: -5.2  /  SaO2: 99.1                LABS:                        10.5   7.42  )-----------( 184      ( 14 Apr 2024 03:01 )             31.1     04-14    130<L>  |  98  |  11  ----------------------------<  148<H>  4.4   |  16<L>  |  0.45<L>        MEDICATION LEVELS:   Ammonia, Serum: 17 umol/L (04-13 @ 22:55)    IVF FLUIDS/MEDICATIONS:   MEDICATIONS  (STANDING):  chlorhexidine 0.12% Liquid 15 milliLiter(s) Oral Mucosa every 12 hours  chlorhexidine 4% Liquid 1 Application(s) Topical daily  dexAMETHasone  Injectable 4 milliGRAM(s) IV Push every 6 hours  gabapentin 600 milliGRAM(s) Oral three times a day  insulin lispro (ADMELOG) corrective regimen sliding scale   SubCutaneous every 6 hours  levETIRAcetam  IVPB 750 milliGRAM(s) IV Intermittent every 12 hours  pantoprazole  Injectable 40 milliGRAM(s) IV Push daily  piperacillin/tazobactam IVPB.. 3.375 Gram(s) IV Intermittent every 8 hours  polyethylene glycol 3350 17 Gram(s) Oral daily  propofol Infusion 50 MICROgram(s)/kG/Min (12.8 mL/Hr) IV Continuous <Continuous>  propofol Infusion 10 MICROgram(s)/kG/Min (2.55 mL/Hr) IV Continuous <Continuous>  senna 2 Tablet(s) Oral at bedtime  sodium chloride 2 Gram(s) Oral every 6 hours  sodium chloride 3% + sodium acetate 50:50 1000 milliLiter(s) (30 mL/Hr) IV Continuous <Continuous>  vancomycin  IVPB 1000 milliGRAM(s) IV Intermittent every 12 hours    MEDICATIONS  (PRN):  acetaminophen   IVPB .. 750 milliGRAM(s) IV Intermittent every 6 hours PRN Mild Pain (1 - 3)  ALPRAZolam 0.5 milliGRAM(s) Oral daily PRN severe anxiety  fentaNYL    Injectable 25 MICROGram(s) IV Push every 2 hours PRN vent synchrony  ondansetron Injectable 4 milliGRAM(s) IV Push every 6 hours PRN Nausea and/or Vomiting           HPI  59yo woman with h/o subsegmental PE on Eliquis 5mg BID (last dose: Thurs) and h/o NSCLC with known mets to brain, on immunotherapy and s/p lung RT 3/1/24 (also was planned for GKRS w/ Dr. Candelaria this wk, but no prior radiation tx to brain), presents as a transfer from  for progressive lethargy since Thursday, then unresponsive this AM. CTH with L anterior frontal lesion 2x2cm with significant vasogenic edema and some hemorrhage with another R frontal met with vasogenic edema. Lesions increased in size from CT 3/17/24. Last MRI 1/24/24 w/ numerous small mets + L frontal lesion measuring 1.3cm. Coags/plts wnl. With seizure at  for which patient received Ativan and Keppra 1g. Lactate 5.1. S/p Versed 5mg in ED for poor responsiveness and possible non-convulsive status.     24h Events:  intubated for neurological impairment   ceribell found next to patient head  04/15: Patient tolerating CPAP trial well and following commands. Plan for safe extubation after MRI.    EXAMINATION:  HEENT:  dry MM  Neuro: intubated, A&O x 3 EOS, FC, PICKETT AG  Cards:  tachycardic, regular   Respiratory:  no respiratory distress  Abdomen:  soft  Extremities:  no LE edema    VITALS:   Vital Signs Last 24 Hrs  T(C): 36 (14 Apr 2024 09:00), Max: 42 (13 Apr 2024 23:00)  T(F): 107.6 (14 Apr 2024 09:00), Max: 107.6 (13 Apr 2024 23:00)  HR: 80 (14 Apr 2024 09:00) (78 - 145)  BP: 126/67 (14 Apr 2024 09:00) (112/74 - 183/85)  BP(mean): 91 (14 Apr 2024 09:00) (87 - 130)  RR: 26 (14 Apr 2024 09:00) (25 - 60)  SpO2: 100% (14 Apr 2024 09:00) (97% - 100%)    Parameters below as of 14 Apr 2024 09:00  Patient On (Oxygen Delivery Method): ventilator    O2 Concentration (%): 40  CAPILLARY BLOOD GLUCOSE      POCT Blood Glucose.: 124 mg/dL (14 Apr 2024 05:54)    I&O's Summary    13 Apr 2024 07:01  -  14 Apr 2024 07:00  --------------------------------------------------------  IN: 6455 mL / OUT: 4600 mL / NET: 1855 mL    14 Apr 2024 07:01  -  14 Apr 2024 09:35  --------------------------------------------------------  IN: 188.8 mL / OUT: 0 mL / NET: 188.8 mL        Respiratory:  Mode: AC/ CMV (Assist Control/ Continuous Mandatory Ventilation)  RR (machine): 16  TV (machine): 350  FiO2: 40  PEEP: 5  ITime: 1  MAP: 10  PIP: 16    ABG - ( 14 Apr 2024 02:47 )  pH, Arterial: 7.42  pH, Blood: x     /  pCO2: 28    /  pO2: 168   / HCO3: 18    / Base Excess: -5.2  /  SaO2: 99.1                LABS:                        10.5   7.42  )-----------( 184      ( 14 Apr 2024 03:01 )             31.1     04-14    130<L>  |  98  |  11  ----------------------------<  148<H>  4.4   |  16<L>  |  0.45<L>        MEDICATION LEVELS:   Ammonia, Serum: 17 umol/L (04-13 @ 22:55)    IVF FLUIDS/MEDICATIONS:   MEDICATIONS  (STANDING):  chlorhexidine 0.12% Liquid 15 milliLiter(s) Oral Mucosa every 12 hours  chlorhexidine 4% Liquid 1 Application(s) Topical daily  dexAMETHasone  Injectable 4 milliGRAM(s) IV Push every 6 hours  gabapentin 600 milliGRAM(s) Oral three times a day  insulin lispro (ADMELOG) corrective regimen sliding scale   SubCutaneous every 6 hours  levETIRAcetam  IVPB 750 milliGRAM(s) IV Intermittent every 12 hours  pantoprazole  Injectable 40 milliGRAM(s) IV Push daily  piperacillin/tazobactam IVPB.. 3.375 Gram(s) IV Intermittent every 8 hours  polyethylene glycol 3350 17 Gram(s) Oral daily  propofol Infusion 50 MICROgram(s)/kG/Min (12.8 mL/Hr) IV Continuous <Continuous>  propofol Infusion 10 MICROgram(s)/kG/Min (2.55 mL/Hr) IV Continuous <Continuous>  senna 2 Tablet(s) Oral at bedtime  sodium chloride 2 Gram(s) Oral every 6 hours  sodium chloride 3% + sodium acetate 50:50 1000 milliLiter(s) (30 mL/Hr) IV Continuous <Continuous>  vancomycin  IVPB 1000 milliGRAM(s) IV Intermittent every 12 hours    MEDICATIONS  (PRN):  acetaminophen   IVPB .. 750 milliGRAM(s) IV Intermittent every 6 hours PRN Mild Pain (1 - 3)  ALPRAZolam 0.5 milliGRAM(s) Oral daily PRN severe anxiety  fentaNYL    Injectable 25 MICROGram(s) IV Push every 2 hours PRN vent synchrony  ondansetron Injectable 4 milliGRAM(s) IV Push every 6 hours PRN Nausea and/or Vomiting

## 2024-04-15 NOTE — DIETITIAN INITIAL EVALUATION ADULT - PERTINENT LABORATORY DATA
04-15    142  |  112<H>  |  17  ----------------------------<  122<H>  4.1   |  18<L>  |  0.52    Ca    9.0      15 Apr 2024 05:54  Phos  2.8     04-15  Mg     2.3     04-15    TPro  6.8  /  Alb  3.6  /  TBili  0.9  /  DBili  0.2  /  AST  20  /  ALT  12  /  AlkPhos  76  04-13  POCT Blood Glucose.: 110 mg/dL (04-15-24 @ 05:47)  A1C with Estimated Average Glucose Result: 5.4 % (04-13-24 @ 22:55)  A1C with Estimated Average Glucose Result: 5.8 % (01-23-24 @ 06:30)

## 2024-04-15 NOTE — AIRWAY REMOVAL NOTE  ADULT & PEDS - NS REMOVAL  OF ARTIFICAL AIRWAY STRIDOR
DIPAK Stanford.  You 5 hours ago (9:21 AM)         yes    Message text      [facility] fax [phone], transmission complete  
KENNY    Caller: Merlyn Surgery Scheduler    Office Name, phone number, fax number: [facility], PH: [phone], Fax: [phone]    Fax clearance to [phone]    Procedure Name: Lumbar Epidural Steroid Injection    Procedure Scheduled Date: Not scheduled yet, waiting on surgical clearance    Callback Number: [phone]    Merlyn, surgery scheduler, was calling back to check on status of the surgical clearance and she did ask if she can get this faxed back today for the pt please.      Thank you,  Silvia KUHN   
Received stratification request from [facility] fax [phone]    Procedure: Lumbar Epidural Steroid Injection    To JJOSE, ok to proceed and hold Eliquis 3 days prior?  
no

## 2024-04-15 NOTE — DIETITIAN INITIAL EVALUATION ADULT - ENTERAL
Tube held, pending MRIs and then for possible extubation. Indirect Calorimetry not warranted at this time. Should EN continue, can continue current EN regimen. see above for what it is providing pt with.  Tube feeds held, pending MRIs and then for possible extubation. Indirect Calorimetry not warranted at this time. Should EN continue, can continue current EN regimen. see above for what it is providing pt with.

## 2024-04-15 NOTE — PROGRESS NOTE ADULT - SUBJECTIVE AND OBJECTIVE BOX
NSCU ATTENDING -- ADDITIONAL PROGRESS NOTE    Nighttime rounds were performed -- please refer to earlier Progress Note for HPI details.    ICU Vital Signs Last 24 Hrs  T(C): 37 (15 Apr 2024 15:00), Max: 37.2 (15 Apr 2024 11:00)  T(F): 98.6 (15 Apr 2024 15:00), Max: 99 (15 Apr 2024 11:00)  HR: 52 (15 Apr 2024 18:00) (38 - 97)  BP: 142/81 (15 Apr 2024 05:45) (100/62 - 153/72)  BP(mean): 103 (15 Apr 2024 05:45) (76 - 106)  ABP: 160/66 (15 Apr 2024 18:00) (82/44 - 169/92)  ABP(mean): 99 (15 Apr 2024 18:00) (56 - 142)  RR: 17 (15 Apr 2024 18:00) (9 - 25)  SpO2: 100% (15 Apr 2024 18:00) (90% - 100%)    O2 Parameters below as of 15 Apr 2024 16:03    O2 Flow (L/min): 40  O2 Concentration (%): 40        Relevant labwork and imaging reviewed.    CBC:            10.5   7.42  )-----------( 184      ( 04-14-24 @ 03:01 )             31.1         Chem:         ( 04-15-24 @ 05:54 )    142  |  112<H>  |  17  ----------------------------<  122<H>  4.1   |  18<L>  |  0.52        Liver Functions:     Type & Screen:     MEDICATIONS  (STANDING):  chlorhexidine 4% Liquid 1 Application(s) Topical daily  chlorhexidine 4% Liquid 1 Application(s) Topical <User Schedule>  dexAMETHasone  Injectable 4 milliGRAM(s) IV Push every 6 hours  dexMEDEtomidine Infusion 0.2 MICROgram(s)/kG/Hr (2.13 mL/Hr) IV Continuous <Continuous>  gabapentin Solution 600 milliGRAM(s) Oral three times a day  insulin lispro (ADMELOG) corrective regimen sliding scale   SubCutaneous every 6 hours  lactated ringers. 1000 milliLiter(s) (75 mL/Hr) IV Continuous <Continuous>  levETIRAcetam  IVPB 750 milliGRAM(s) IV Intermittent every 12 hours  norepinephrine Infusion 0.05 MICROgram(s)/kG/Min (3.98 mL/Hr) IV Continuous <Continuous>  oxyCODONE    IR 5 milliGRAM(s) Oral every 6 hours  pantoprazole  Injectable 40 milliGRAM(s) IV Push daily  phenylephrine    Infusion 0.2 MICROgram(s)/kG/Min (3.19 mL/Hr) IV Continuous <Continuous>  piperacillin/tazobactam IVPB.. 3.375 Gram(s) IV Intermittent every 8 hours  polyethylene glycol 3350 17 Gram(s) Oral daily  propofol Infusion 10 MICROgram(s)/kG/Min (2.55 mL/Hr) IV Continuous <Continuous>  senna 2 Tablet(s) Oral at bedtime  sodium chloride 2 Gram(s) Oral every 6 hours    MEDICATIONS  (PRN):  acetaminophen   IVPB .. 750 milliGRAM(s) IV Intermittent every 6 hours PRN Mild Pain (1 - 3)  ALPRAZolam 0.5 milliGRAM(s) Oral daily PRN severe anxiety  fentaNYL    Injectable 25 MICROGram(s) IV Push every 2 hours PRN vent synchrony  ondansetron Injectable 4 milliGRAM(s) IV Push every 6 hours PRN Nausea and/or Vomiting  oxyCODONE    IR 5 milliGRAM(s) Oral every 4 hours PRN Moderate Pain (4 - 6)  oxyCODONE    IR 10 milliGRAM(s) Oral every 4 hours PRN Severe Pain (7 - 10)  sodium chloride 0.9% lock flush 10 milliLiter(s) IV Push every 1 hour PRN Pre/post blood products, medications, blood draw, and to maintain line patency        phenylephrine 0.5  off precedex         [A/P] known NSCLC with brain mets p/w GTC  extubated today to high flow--wean as able  aspiration precaution, monitor airway  vEEG negative for seizures during the day  keppra 750mg Q12 continue  no sedation  pain control prn--was taking oxycontin at home 15mg Q12, can't be crushed, so give oxycodone 5mg Q6 and add 5mg/10mg prn, d/c dilaudid   cont home dose gabapentin   PRVC 16/350/5/40%  MAP>65, SBP<160, phenylephrine prn   d/c fluid restriction   protonix ppx  tube feeding  LBM 4/14  DVT chemoppx off, BLE dopplers negative, SCDs b/l  hx subsegmental PEs, now off Eliquis  zosyn for ?enteritis--thickened fluid filled small bowel  afebrile   LP to r/o CNS source infection, febrile at admission/GTC    R fem TLC  b/l 18g pIV and 20gpIV  L axillary rosie    ATTENDING STATEMENT:    Patient is critically ill, requiring critical care services.     Attending: I have personally and independently provided 30 minutes of critical care services.  This excludes any time spent on separate procedures or teaching.   NSCU ATTENDING -- ADDITIONAL PROGRESS NOTE    Nighttime rounds were performed -- please refer to earlier Progress Note for HPI details.    ICU Vital Signs Last 24 Hrs  T(C): 37 (15 Apr 2024 15:00), Max: 37.2 (15 Apr 2024 11:00)  T(F): 98.6 (15 Apr 2024 15:00), Max: 99 (15 Apr 2024 11:00)  HR: 52 (15 Apr 2024 18:00) (38 - 97)  BP: 142/81 (15 Apr 2024 05:45) (100/62 - 153/72)  BP(mean): 103 (15 Apr 2024 05:45) (76 - 106)  ABP: 160/66 (15 Apr 2024 18:00) (82/44 - 169/92)  ABP(mean): 99 (15 Apr 2024 18:00) (56 - 142)  RR: 17 (15 Apr 2024 18:00) (9 - 25)  SpO2: 100% (15 Apr 2024 18:00) (90% - 100%)    O2 Parameters below as of 15 Apr 2024 16:03    O2 Flow (L/min): 40  O2 Concentration (%): 40        Relevant labwork and imaging reviewed.    CBC:            10.5   7.42  )-----------( 184      ( 04-14-24 @ 03:01 )             31.1         Chem:         ( 04-15-24 @ 05:54 )    142  |  112<H>  |  17  ----------------------------<  122<H>  4.1   |  18<L>  |  0.52        Liver Functions:     Type & Screen:     MEDICATIONS  (STANDING):  chlorhexidine 4% Liquid 1 Application(s) Topical daily  chlorhexidine 4% Liquid 1 Application(s) Topical <User Schedule>  dexAMETHasone  Injectable 4 milliGRAM(s) IV Push every 6 hours  dexMEDEtomidine Infusion 0.2 MICROgram(s)/kG/Hr (2.13 mL/Hr) IV Continuous <Continuous>  gabapentin Solution 600 milliGRAM(s) Oral three times a day  insulin lispro (ADMELOG) corrective regimen sliding scale   SubCutaneous every 6 hours  lactated ringers. 1000 milliLiter(s) (75 mL/Hr) IV Continuous <Continuous>  levETIRAcetam  IVPB 750 milliGRAM(s) IV Intermittent every 12 hours  norepinephrine Infusion 0.05 MICROgram(s)/kG/Min (3.98 mL/Hr) IV Continuous <Continuous>  oxyCODONE    IR 5 milliGRAM(s) Oral every 6 hours  pantoprazole  Injectable 40 milliGRAM(s) IV Push daily  phenylephrine    Infusion 0.2 MICROgram(s)/kG/Min (3.19 mL/Hr) IV Continuous <Continuous>  piperacillin/tazobactam IVPB.. 3.375 Gram(s) IV Intermittent every 8 hours  polyethylene glycol 3350 17 Gram(s) Oral daily  propofol Infusion 10 MICROgram(s)/kG/Min (2.55 mL/Hr) IV Continuous <Continuous>  senna 2 Tablet(s) Oral at bedtime  sodium chloride 2 Gram(s) Oral every 6 hours    MEDICATIONS  (PRN):  acetaminophen   IVPB .. 750 milliGRAM(s) IV Intermittent every 6 hours PRN Mild Pain (1 - 3)  ALPRAZolam 0.5 milliGRAM(s) Oral daily PRN severe anxiety  fentaNYL    Injectable 25 MICROGram(s) IV Push every 2 hours PRN vent synchrony  ondansetron Injectable 4 milliGRAM(s) IV Push every 6 hours PRN Nausea and/or Vomiting  oxyCODONE    IR 5 milliGRAM(s) Oral every 4 hours PRN Moderate Pain (4 - 6)  oxyCODONE    IR 10 milliGRAM(s) Oral every 4 hours PRN Severe Pain (7 - 10)  sodium chloride 0.9% lock flush 10 milliLiter(s) IV Push every 1 hour PRN Pre/post blood products, medications, blood draw, and to maintain line patency    LP overnight  MRI in am  extubated post  high flow-->wean off now     [A/P] known NSCLC with brain mets p/w GTC  extubated today to high flow--wean as able  aspiration precaution, monitor airway  vEEG negative for seizures during the day  keppra 750mg Q12 continue  decadron for cerebral edema   no sedation  pain control oxycontin 15mg bid, oxy IR prn  xanax 0.25mg Q12 prn (per nursing got extremely lethargic with 0.5mg)  cont home dose gabapentin   MAP>65, SBP<160 off pressors now   d/c fluid restriction   protonix ppx  passed dysphagia, regular diet   LBM 4/15  DVT chemoppx off, BLE dopplers negative, SCDs b/l  hx subsegmental PEs, now off Eliquis  zosyn for ?enteritis--thickened fluid filled small bowel  afebrile   LP to r/o CNS source infection, febrile at admission/GTC  Q2 neurochecks    R fem TLC-->d/c   has three pIVs  L axillary rosie     NSCU ATTENDING -- ADDITIONAL PROGRESS NOTE    Nighttime rounds were performed -- please refer to earlier Progress Note for HPI details.    ICU Vital Signs Last 24 Hrs  T(C): 37 (15 Apr 2024 15:00), Max: 37.2 (15 Apr 2024 11:00)  T(F): 98.6 (15 Apr 2024 15:00), Max: 99 (15 Apr 2024 11:00)  HR: 52 (15 Apr 2024 18:00) (38 - 97)  BP: 142/81 (15 Apr 2024 05:45) (100/62 - 153/72)  BP(mean): 103 (15 Apr 2024 05:45) (76 - 106)  ABP: 160/66 (15 Apr 2024 18:00) (82/44 - 169/92)  ABP(mean): 99 (15 Apr 2024 18:00) (56 - 142)  RR: 17 (15 Apr 2024 18:00) (9 - 25)  SpO2: 100% (15 Apr 2024 18:00) (90% - 100%)    O2 Parameters below as of 15 Apr 2024 16:03    O2 Flow (L/min): 40  O2 Concentration (%): 40        Relevant labwork and imaging reviewed.    CBC:            10.5   7.42  )-----------( 184      ( 04-14-24 @ 03:01 )             31.1         Chem:         ( 04-15-24 @ 05:54 )    142  |  112<H>  |  17  ----------------------------<  122<H>  4.1   |  18<L>  |  0.52        Liver Functions:     Type & Screen:     MEDICATIONS  (STANDING):  chlorhexidine 4% Liquid 1 Application(s) Topical daily  chlorhexidine 4% Liquid 1 Application(s) Topical <User Schedule>  dexAMETHasone  Injectable 4 milliGRAM(s) IV Push every 6 hours  dexMEDEtomidine Infusion 0.2 MICROgram(s)/kG/Hr (2.13 mL/Hr) IV Continuous <Continuous>  gabapentin Solution 600 milliGRAM(s) Oral three times a day  insulin lispro (ADMELOG) corrective regimen sliding scale   SubCutaneous every 6 hours  lactated ringers. 1000 milliLiter(s) (75 mL/Hr) IV Continuous <Continuous>  levETIRAcetam  IVPB 750 milliGRAM(s) IV Intermittent every 12 hours  norepinephrine Infusion 0.05 MICROgram(s)/kG/Min (3.98 mL/Hr) IV Continuous <Continuous>  oxyCODONE    IR 5 milliGRAM(s) Oral every 6 hours  pantoprazole  Injectable 40 milliGRAM(s) IV Push daily  phenylephrine    Infusion 0.2 MICROgram(s)/kG/Min (3.19 mL/Hr) IV Continuous <Continuous>  piperacillin/tazobactam IVPB.. 3.375 Gram(s) IV Intermittent every 8 hours  polyethylene glycol 3350 17 Gram(s) Oral daily  propofol Infusion 10 MICROgram(s)/kG/Min (2.55 mL/Hr) IV Continuous <Continuous>  senna 2 Tablet(s) Oral at bedtime  sodium chloride 2 Gram(s) Oral every 6 hours    MEDICATIONS  (PRN):  acetaminophen   IVPB .. 750 milliGRAM(s) IV Intermittent every 6 hours PRN Mild Pain (1 - 3)  ALPRAZolam 0.5 milliGRAM(s) Oral daily PRN severe anxiety  fentaNYL    Injectable 25 MICROGram(s) IV Push every 2 hours PRN vent synchrony  ondansetron Injectable 4 milliGRAM(s) IV Push every 6 hours PRN Nausea and/or Vomiting  oxyCODONE    IR 5 milliGRAM(s) Oral every 4 hours PRN Moderate Pain (4 - 6)  oxyCODONE    IR 10 milliGRAM(s) Oral every 4 hours PRN Severe Pain (7 - 10)  sodium chloride 0.9% lock flush 10 milliLiter(s) IV Push every 1 hour PRN Pre/post blood products, medications, blood draw, and to maintain line patency    LP overnight  MRI in am  extubated post  high flow-->wean off now     [A/P] known NSCLC with brain mets p/w GTC  extubated today to high flow--wean as able  aspiration precaution, monitor airway  vEEG negative for seizures during the day  keppra 750mg Q12 continue  decadron for cerebral edema   no sedation  pain control oxycontin 15mg bid, oxy IR prn  xanax 0.25mg Q12 prn (per nursing got extremely lethargic with 0.5mg)  cont home dose gabapentin   MAP>65, SBP<160 off pressors now   d/c fluid restriction   protonix ppx  passed dysphagia, regular diet   LBM 4/15  DVT chemoppx off, BLE dopplers negative, SCDs b/l-->d/w neurosurgery to start chemoppx   hx subsegmental PEs, now off Eliquis  zosyn for ?enteritis--thickened fluid filled small bowel  afebrile   LP to r/o CNS source infection, febrile at admission/GTC  Q2 neurochecks    R fem TLC-->d/c   has three pIVs  L axillary rosie

## 2024-04-15 NOTE — DIETITIAN INITIAL EVALUATION ADULT - OTHER INFO
-phenylephrine, precedex and norepinephrine titrated down per flow sheets  -propofol discontinued   -Hgb A1c 5.4% 4/13/24 within normal limits   -Vitamin D 25OH 3/17/24 25.3 (insufficient)

## 2024-04-15 NOTE — DIETITIAN INITIAL EVALUATION ADULT - REASON INDICATOR FOR ASSESSMENT
consulted for tube feed, indirect calorimetry. information obtained from team during interdisciplinary rounds, electronic medical record

## 2024-04-15 NOTE — DIETITIAN INITIAL EVALUATION ADULT - ORAL INTAKE PTA/DIET HISTORY
visited pt at bedside this morning. intubated and pending MRIs. unable to obtain diet recall at this time. no known allergies noted in chart.   per dietitian initial evaluation 3/17/24: Pt endorses decreased appetite in setting of chemo tx with lung CA. Dislikes oral nutrition supplements; states that she drinks milk shakes & tries to eat 3 meals/day. No chewing/swallow issues.

## 2024-04-15 NOTE — DIETITIAN INITIAL EVALUATION ADULT - NS FNS REASON FOR WEIGHT CHANG
per dietitian initial evaluation 3/17/24: "Reports 26lbs weight loss over the last 6 months. UBW was ~120lbs. CBW 94lbs."  Dosing weight 93 pounds  22% loss

## 2024-04-15 NOTE — PROGRESS NOTE ADULT - ASSESSMENT
on vEEG   LED - p  MRI brain stereo w/wo quicktome protocol    Heme/Onc no plan for systemic tx this adm  Known T2/3 pathologic fx - MRI ordered  Possible LP to r/o meningitis

## 2024-04-15 NOTE — PROGRESS NOTE ADULT - SUBJECTIVE AND OBJECTIVE BOX
Patient seen and examined at bedside.    --Anticoagulation--    T(C): 37.4 (04-14-24 @ 00:00), Max: 42 (04-13-24 @ 23:00)  HR: 104 (04-14-24 @ 00:00) (87 - 145)  BP: 157/94 (04-13-24 @ 23:00) (146/88 - 183/85)  RR: 35 (04-14-24 @ 00:00) (18 - 60)  SpO2: 100% (04-14-24 @ 00:00) (97% - 100%)  Wt(kg): --    Exam: EOV, Ox0, not Fc, +cough/gag/overbreaths, PERRL, BUE LOC, BLE wds

## 2024-04-15 NOTE — PATIENT PROFILE ADULT - NSPROGENOTHERPROVIDER_GEN_A_NUR
[facility] calling with pt's insurance they have to have a script for the nebulizer can't be a e script for durable medical equipment with care source. unable to respond due to cognitive limitations

## 2024-04-15 NOTE — DIETITIAN INITIAL EVALUATION ADULT - NS FNS DIET ORDER
Diet, NPO with Tube Feed:   Tube Feeding Modality: Orogastric  Jevity 1.2 Brody (JEVITY1.2RTH)  Total Volume for 24 Hours (mL): 1200  Continuous  Starting Tube Feed Rate {mL per Hour}: 20  Increase Tube Feed Rate by (mL): 10     Every 4 hours  Until Goal Tube Feed Rate (mL per Hour): 50  Tube Feed Duration (in Hours): 24  Tube Feed Start Time: 00:00 (04-14-24 @ 21:22)

## 2024-04-15 NOTE — AIRWAY REMOVAL NOTE  ADULT & PEDS - ARTIFICAL AIRWAY REMOVAL COMMENTS
Written order for extubation verified. The patient was identified by full name and birth date compared to the identification band.  Present during the procedure was Liv NAVAS

## 2024-04-15 NOTE — PROCEDURE NOTE - NSPROCDETAILS_GEN_ALL_CORE
guidewire recovered/lumen(s) aspirated and flushed/sterile dressing applied/sterile technique, catheter placed/ultrasound guidance with use of sterile gel and probe cove
location identified, draped/prepped, sterile technique used, needle inserted/introduced

## 2024-04-15 NOTE — DIETITIAN INITIAL EVALUATION ADULT - NSFNSGIIOFT_GEN_A_CORE
04-14-24 @ 07:01  -  04-15-24 @ 07:00  --------------------------------------------------------  OUT:  Total OUT: 0 mL    Total NET: 490 mL

## 2024-04-15 NOTE — DIETITIAN INITIAL EVALUATION ADULT - ETIOLOGY
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

## 2024-04-15 NOTE — PATIENT PROFILE ADULT - FALL HARM RISK - FALL HARM RISK
I have reviewed the surgical (or preoperative) H&P that is linked to this encounter, and examined the patient. There are no significant changes   Other

## 2024-04-15 NOTE — DIETITIAN INITIAL EVALUATION ADULT - PERTINENT MEDS FT
MEDICATIONS  (STANDING):  chlorhexidine 0.12% Liquid 15 milliLiter(s) Oral Mucosa every 12 hours  chlorhexidine 4% Liquid 1 Application(s) Topical daily  dexAMETHasone  Injectable 4 milliGRAM(s) IV Push every 6 hours  dexMEDEtomidine Infusion 0.2 MICROgram(s)/kG/Hr (2.13 mL/Hr) IV Continuous <Continuous>  gabapentin Solution 600 milliGRAM(s) Oral three times a day  insulin lispro (ADMELOG) corrective regimen sliding scale   SubCutaneous every 6 hours  levETIRAcetam  IVPB 750 milliGRAM(s) IV Intermittent every 12 hours  norepinephrine Infusion 0.05 MICROgram(s)/kG/Min (3.98 mL/Hr) IV Continuous <Continuous>  oxyCODONE    IR 5 milliGRAM(s) Oral every 6 hours  pantoprazole  Injectable 40 milliGRAM(s) IV Push daily  phenylephrine    Infusion 0.2 MICROgram(s)/kG/Min (3.19 mL/Hr) IV Continuous <Continuous>  piperacillin/tazobactam IVPB.. 3.375 Gram(s) IV Intermittent every 8 hours  polyethylene glycol 3350 17 Gram(s) Oral daily  senna 2 Tablet(s) Oral at bedtime  sodium chloride 2 Gram(s) Oral every 6 hours    MEDICATIONS  (PRN):  acetaminophen   IVPB .. 750 milliGRAM(s) IV Intermittent every 6 hours PRN Mild Pain (1 - 3)  ALPRAZolam 0.5 milliGRAM(s) Oral daily PRN severe anxiety  fentaNYL    Injectable 25 MICROGram(s) IV Push every 2 hours PRN vent synchrony  ondansetron Injectable 4 milliGRAM(s) IV Push every 6 hours PRN Nausea and/or Vomiting  oxyCODONE    IR 5 milliGRAM(s) Oral every 4 hours PRN Moderate Pain (4 - 6)  oxyCODONE    IR 10 milliGRAM(s) Oral every 4 hours PRN Severe Pain (7 - 10)

## 2024-04-15 NOTE — DIETITIAN INITIAL EVALUATION ADULT - ADD RECOMMEND
1) Will continue to monitor weight, labs, skin, GI status and diet 2) nutrition risk placed in chart 3) consider addition of Multivitamin if no contraindications to aid in wound healing

## 2024-04-15 NOTE — EEG REPORT - NS EEG TEXT BOX
[facility]   COMPREHENSIVE EPILEPSY CENTER   REPORT OF CONTINUOUS VIDEO EEG     [facility]: [address], [place] [address], Ph#: [phone]  [facility]: [id number], [place] [address], Ph#: [phone]  [facility]: [address] [place] [address], Ph#: [phone]    Patient Name: KAELYN VALENTE  Age and : 58y (65)  MRN #: [id number]  Location: [place] [address]  Referring Physician: Francisco J Nicole    Start Time/Date:  on 2024  End Time/Date: 08:00 on 04-15-24  Duration: 21H    _____________________________________________________________  STUDY INFORMATION    EEG Recording Technique:  The patient underwent continuous Video-EEG monitoring, using Telemetry System hardware on the XLTek Digital System. EEG and video data were stored on a computer hard drive with important events saved in digital archive files. The material was reviewed by a physician (electroencephalographer / epileptologist) on a daily basis. Marky and seizure detection algorithms were utilized and reviewed. An EEG Technician attended to the patient, and was available throughout daytime work hours.  The epilepsy center neurologist was available in person or on call 24-hours per day.    EEG Placement and Labeling of Electrodes:  The EEG was performed utilizing 20 channel referential EEG connections (coronal over temporal over parasagittal montage) using all standard 10-20 electrode placements with EKG, with additional electrodes placed in the inferior temporal region using the modified 10-10 montage electrode placements for elective admissions, or if deemed necessary. Recording was at a sampling rate of 256 samples per second per channel. Time synchronized digital video recording was done simultaneously with EEG recording. A low light infrared camera was used for low light recording.     _____________________________________________________________  HISTORY    Patient is a 58y old  Female who presents with a chief complaint of Altered mental status     (15 Apr 2024 09:08)      PERTINENT MEDICATION:  MEDICATIONS  (STANDING):  chlorhexidine 0.12% Liquid 15 milliLiter(s) Oral Mucosa every 12 hours  chlorhexidine 4% Liquid 1 Application(s) Topical daily  chlorhexidine 4% Liquid 1 Application(s) Topical <User Schedule>  dexAMETHasone  Injectable 4 milliGRAM(s) IV Push every 6 hours  dexMEDEtomidine Infusion 0.2 MICROgram(s)/kG/Hr (2.13 mL/Hr) IV Continuous <Continuous>  gabapentin Solution 600 milliGRAM(s) Oral three times a day  insulin lispro (ADMELOG) corrective regimen sliding scale   SubCutaneous every 6 hours  lactated ringers. 1000 milliLiter(s) (75 mL/Hr) IV Continuous <Continuous>  levETIRAcetam  IVPB 750 milliGRAM(s) IV Intermittent every 12 hours  norepinephrine Infusion 0.05 MICROgram(s)/kG/Min (3.98 mL/Hr) IV Continuous <Continuous>  oxyCODONE    IR 5 milliGRAM(s) Oral every 6 hours  pantoprazole  Injectable 40 milliGRAM(s) IV Push daily  phenylephrine    Infusion 0.2 MICROgram(s)/kG/Min (3.19 mL/Hr) IV Continuous <Continuous>  piperacillin/tazobactam IVPB.. 3.375 Gram(s) IV Intermittent every 8 hours  polyethylene glycol 3350 17 Gram(s) Oral daily  propofol Infusion 10 MICROgram(s)/kG/Min (2.55 mL/Hr) IV Continuous <Continuous>  senna 2 Tablet(s) Oral at bedtime  sodium chloride 2 Gram(s) Oral every 6 hours    _____________________________________________________________  STUDY INTERPRETATION    Findings: The background was continuous, spontaneously variable and reactive.   No posterior dominant rhythm seen.  Background predominantly consisted of theta, delta and faster activities.    Focal Slowing:   None were present.    Sleep Background:  Drowsiness and stage II sleep transients were not recorded.    Other Non-Epileptiform Findings:  None were present.    Interictal Epileptiform Activity:   Frequent independent bifrontal sharp-wave discharges, at times with pseudo-periodic appearance with frequency of 0.5-1Hz.    Events:  Clinical events: None recorded.  Seizures: None recorded.    Activation Procedures:   Hyperventilation was not performed.    Photic stimulation was not performed.     Artifacts:  Intermittent myogenic and movement artifacts were noted.    _____________________________________________________________  EEG SUMMARY/CLASSIFICATION    Abnormal EEG  - Frequent independent bifrontal sharp-wave discharges, sometimes with pseudo-periodic appearance.  - Moderate generalized slowing.    _____________________________________________________________  EEG IMPRESSION/CLINICAL CORRELATE    Abnormal EEG study.  Potential epileptogenic focus in the bifrontal head regions.  Moderate nonspecific diffuse or multifocal cerebral dysfunction.   No seizure seen.    Todd Brewer MD   of Neurology  Epilepsy/EEG Attending   [facility]   COMPREHENSIVE EPILEPSY CENTER   REPORT OF CONTINUOUS VIDEO EEG     [facility]: [address], [place] [address], Ph#: [phone]  [facility]: [id number], [place] [address], Ph#: [phone]  [facility]: [address] [place] [address], Ph#: [phone]    Patient Name: KAELYN VALENTE  Age and : 58y (65)  MRN #: [id number]  Location: [place] [address]  Referring Physician: Francisco J Nicole    Start Time/Date:  on 2024  End Time/Date: 20:00 on 04-15-24  Duration: 9H    _____________________________________________________________  STUDY INFORMATION    EEG Recording Technique:  The patient underwent continuous Video-EEG monitoring, using Telemetry System hardware on the XLTek Digital System. EEG and video data were stored on a computer hard drive with important events saved in digital archive files. The material was reviewed by a physician (electroencephalographer / epileptologist) on a daily basis. Marky and seizure detection algorithms were utilized and reviewed. An EEG Technician attended to the patient, and was available throughout daytime work hours.  The epilepsy center neurologist was available in person or on call 24-hours per day.    EEG Placement and Labeling of Electrodes:  The EEG was performed utilizing 20 channel referential EEG connections (coronal over temporal over parasagittal montage) using all standard 10-20 electrode placements with EKG, with additional electrodes placed in the inferior temporal region using the modified 10-10 montage electrode placements for elective admissions, or if deemed necessary. Recording was at a sampling rate of 256 samples per second per channel. Time synchronized digital video recording was done simultaneously with EEG recording. A low light infrared camera was used for low light recording.     _____________________________________________________________  HISTORY    Patient is a 58y old  Female who presents with a chief complaint of Altered mental status     (15 Apr 2024 09:08)      PERTINENT MEDICATION:  MEDICATIONS  (STANDING):  chlorhexidine 0.12% Liquid 15 milliLiter(s) Oral Mucosa every 12 hours  chlorhexidine 4% Liquid 1 Application(s) Topical daily  chlorhexidine 4% Liquid 1 Application(s) Topical <User Schedule>  dexAMETHasone  Injectable 4 milliGRAM(s) IV Push every 6 hours  dexMEDEtomidine Infusion 0.2 MICROgram(s)/kG/Hr (2.13 mL/Hr) IV Continuous <Continuous>  gabapentin Solution 600 milliGRAM(s) Oral three times a day  insulin lispro (ADMELOG) corrective regimen sliding scale   SubCutaneous every 6 hours  lactated ringers. 1000 milliLiter(s) (75 mL/Hr) IV Continuous <Continuous>  levETIRAcetam  IVPB 750 milliGRAM(s) IV Intermittent every 12 hours  norepinephrine Infusion 0.05 MICROgram(s)/kG/Min (3.98 mL/Hr) IV Continuous <Continuous>  oxyCODONE    IR 5 milliGRAM(s) Oral every 6 hours  pantoprazole  Injectable 40 milliGRAM(s) IV Push daily  phenylephrine    Infusion 0.2 MICROgram(s)/kG/Min (3.19 mL/Hr) IV Continuous <Continuous>  piperacillin/tazobactam IVPB.. 3.375 Gram(s) IV Intermittent every 8 hours  polyethylene glycol 3350 17 Gram(s) Oral daily  propofol Infusion 10 MICROgram(s)/kG/Min (2.55 mL/Hr) IV Continuous <Continuous>  senna 2 Tablet(s) Oral at bedtime  sodium chloride 2 Gram(s) Oral every 6 hours    _____________________________________________________________  STUDY INTERPRETATION    Findings: The background was continuous, spontaneously variable and reactive.   No posterior dominant rhythm seen.  Background predominantly consisted of theta, delta and faster activities.    Focal Slowing:   None were present.    Sleep Background:  Drowsiness and stage II sleep transients were not recorded.    Other Non-Epileptiform Findings:  None were present.    Interictal Epileptiform Activity:   Frequent independent bifrontal sharp-wave discharges, at times with pseudo-periodic appearance with frequency of 0.5-1Hz.    Events:  Clinical events: None recorded.  Seizures: None recorded.    Activation Procedures:   Hyperventilation was not performed.    Photic stimulation was not performed.     Artifacts:  Intermittent myogenic and movement artifacts were noted.    _____________________________________________________________  EEG SUMMARY/CLASSIFICATION    Abnormal EEG  - Frequent independent bifrontal sharp-wave discharges, sometimes with pseudo-periodic appearance.  - Moderate generalized slowing.    _____________________________________________________________  EEG IMPRESSION/CLINICAL CORRELATE    Abnormal EEG study.  Potential epileptogenic focus in the bifrontal head regions.  Moderate nonspecific diffuse or multifocal cerebral dysfunction.   No seizure seen.    Todd Brewer MD   of Neurology  Epilepsy/EEG Attending

## 2024-04-16 NOTE — CONSULT NOTE ADULT - ASSESSMENT
58F h/o PE, NSCLC w/ mets to brain, on immunotx s/p lung RT 3/1/24 p/w increasing lethargy, not answering quests and was Intubated for neurologic impairment. No recent f/c/r, cp/sob/n/v/d/abd pain/dysuria. Brain MRI revealed:  New heterogeneously enhancing lesion within the motor cortex of the right   high posterior frontal lobe measuring approximately 1.2 x 1.2 cm with   surrounding vasogenic edema involving the right high posterior frontal   lobe and right high parietal lobe including the entirety of the right   border cortex and the medial portion of the sensory cortex.    Previously visualized enhancing lesion in the medial left inferior   frontal lobe and left genu of the of the corpus callosum, has markedly   enlarged since prior exam, measuring approximately 2.9 cm AP x 2.5 cm TR,   previously measuring 1.3 x 1.1 cm. There is a large degree of vasogenic   edema surrounding the lesion involving the entirety of the left anterior   frontal lobe and crossing the genu of the corpus callosum. This results   in severe mass effect on the frontal horns of the bilateral ventricles,   with approximately 6 mm of left to right midline shift at the level of   the anterior falx.

## 2024-04-16 NOTE — EEG REPORT - NS EEG TEXT BOX
[facility]   COMPREHENSIVE EPILEPSY CENTER   REPORT OF CONTINUOUS VIDEO EEG     [facility]: [address], [place] [address], Ph#: [phone]  [facility]: [address], [place] [address], Ph#: [phone]  [facility]: [address] [place] [address], Ph#: [phone]    Patient Name: KAELYN VALENTE  Age and : 58y (65)  MRN #: [id number]  Location: [place] [address]  Referring Physician: Francisco J Nicole    Start Time/Date: 1000 on 4-  End Time/Date: 0800 on 24  Duration: 22H    _____________________________________________________________  STUDY INFORMATION    EEG Recording Technique:  The patient underwent continuous Video-EEG monitoring, using Telemetry System hardware on the XLTek Digital System. EEG and video data were stored on a computer hard drive with important events saved in digital archive files. The material was reviewed by a physician (electroencephalographer / epileptologist) on a daily basis. Marky and seizure detection algorithms were utilized and reviewed. An EEG Technician attended to the patient, and was available throughout daytime work hours.  The epilepsy center neurologist was available in person or on call 24-hours per day.    EEG Placement and Labeling of Electrodes:  The EEG was performed utilizing 20 channel referential EEG connections (coronal over temporal over parasagittal montage) using all standard 10-20 electrode placements with EKG, with additional electrodes placed in the inferior temporal region using the modified 10-10 montage electrode placements for elective admissions, or if deemed necessary. Recording was at a sampling rate of 256 samples per second per channel. Time synchronized digital video recording was done simultaneously with EEG recording. A low light infrared camera was used for low light recording.     _____________________________________________________________  HISTORY    Patient is a 58y old  Female who presents with a chief complaint of Altered mental status     (15 Apr 2024 09:08)  _____________________________________________________________  STUDY INTERPRETATION    Findings: The background was continuous, spontaneously variable and reactive.   No posterior dominant rhythm seen.  Background predominantly consisted of theta, delta and faster activities.    Focal Slowing:   None were present.    Sleep Background:  Drowsiness and stage II sleep transients were not recorded.    Other Non-Epileptiform Findings:  None were present.    Interictal Epileptiform Activity:   Occasional independent bifrontal sharp-wave discharges.    Events:  Clinical events: None recorded.  Seizures: None recorded.    Activation Procedures:   Hyperventilation was not performed.    Photic stimulation was not performed.     Artifacts:  Intermittent myogenic and movement artifacts were noted.    _____________________________________________________________  EEG SUMMARY/CLASSIFICATION    Abnormal EEG  - Occasional independent bifrontal sharp-wave discharges (improved compared to previous day).  - Moderate generalized slowing.    _____________________________________________________________  EEG IMPRESSION/CLINICAL CORRELATE    Abnormal EEG study.  Potential epileptogenic focus in the bifrontal head regions.  Moderate nonspecific diffuse or multifocal cerebral dysfunction.   No seizure seen.    Todd Brewer MD   of Neurology  Epilepsy/EEG Attending   [facility]   COMPREHENSIVE EPILEPSY CENTER   REPORT OF CONTINUOUS VIDEO EEG     [facility]: [address] [place] [address] [address], [place] [address], Ph#: [phone]  [facility]: [address], [place] [address], Ph#: [phone]  [facility]: [address] [place] [address], Ph#: [phone]    Patient Name: KAELYN VALENTE  Age and : 58y (65)  MRN #: [id number]  Location: [place] [address]  Referring Physician: Francisco J Nicole    Start Time/Date: 1000 on 4-  End Time/Date: 1000 on 24  Duration: 24H    _____________________________________________________________  STUDY INFORMATION    EEG Recording Technique:  The patient underwent continuous Video-EEG monitoring, using Telemetry System hardware on the XLTek Digital System. EEG and video data were stored on a computer hard drive with important events saved in digital archive files. The material was reviewed by a physician (electroencephalographer / epileptologist) on a daily basis. Marky and seizure detection algorithms were utilized and reviewed. An EEG Technician attended to the patient, and was available throughout daytime work hours.  The epilepsy center neurologist was available in person or on call 24-hours per day.    EEG Placement and Labeling of Electrodes:  The EEG was performed utilizing 20 channel referential EEG connections (coronal over temporal over parasagittal montage) using all standard 10-20 electrode placements with EKG, with additional electrodes placed in the inferior temporal region using the modified 10-10 montage electrode placements for elective admissions, or if deemed necessary. Recording was at a sampling rate of 256 samples per second per channel. Time synchronized digital video recording was done simultaneously with EEG recording. A low light infrared camera was used for low light recording.     _____________________________________________________________  HISTORY    Patient is a 58y old  Female who presents with a chief complaint of Altered mental status     (15 Apr 2024 09:08)  _____________________________________________________________  STUDY INTERPRETATION    Findings: The background was continuous, spontaneously variable and reactive.   No posterior dominant rhythm seen.  Background predominantly consisted of theta, delta and faster activities.    Focal Slowing:   None were present.    Sleep Background:  Drowsiness and stage II sleep transients were not recorded.    Other Non-Epileptiform Findings:  None were present.    Interictal Epileptiform Activity:   Occasional independent bifrontal sharp-wave discharges.    Events:  Clinical events: None recorded.  Seizures: None recorded.    Activation Procedures:   Hyperventilation was not performed.    Photic stimulation was not performed.     Artifacts:  Intermittent myogenic and movement artifacts were noted.    _____________________________________________________________  EEG SUMMARY/CLASSIFICATION    Abnormal EEG  - Occasional independent bifrontal sharp-wave discharges (improved compared to previous day).  - Moderate generalized slowing.    _____________________________________________________________  EEG IMPRESSION/CLINICAL CORRELATE    Abnormal EEG study.  Potential epileptogenic focus in the bifrontal head regions.  Moderate nonspecific diffuse or multifocal cerebral dysfunction.   No seizure seen.    Todd Brewer MD   of Neurology  Epilepsy/EEG Attending   [facility]   COMPREHENSIVE EPILEPSY CENTER   REPORT OF CONTINUOUS VIDEO EEG     [facility]: [address], [place] [address], Ph#: [phone]  [facility]: [address], [place] [address], Ph#: [phone]  [facility]: [address] [place] [address], Ph#: [phone]    Patient Name: KAELYN VALENTE  Age and : 58y (65)  MRN #: [id number]  Location: [place] [address]  Referring Physician: Francisco J Nicole    Start Time/Date: 1000 on 4-  End Time/Date: 1400 on 24  Duration: 30H    _____________________________________________________________  STUDY INFORMATION    EEG Recording Technique:  The patient underwent continuous Video-EEG monitoring, using Telemetry System hardware on the XLTek Digital System. EEG and video data were stored on a computer hard drive with important events saved in digital archive files. The material was reviewed by a physician (electroencephalographer / epileptologist) on a daily basis. Marky and seizure detection algorithms were utilized and reviewed. An EEG Technician attended to the patient, and was available throughout daytime work hours.  The epilepsy center neurologist was available in person or on call 24-hours per day.    EEG Placement and Labeling of Electrodes:  The EEG was performed utilizing 20 channel referential EEG connections (coronal over temporal over parasagittal montage) using all standard 10-20 electrode placements with EKG, with additional electrodes placed in the inferior temporal region using the modified 10-10 montage electrode placements for elective admissions, or if deemed necessary. Recording was at a sampling rate of 256 samples per second per channel. Time synchronized digital video recording was done simultaneously with EEG recording. A low light infrared camera was used for low light recording.     _____________________________________________________________  HISTORY    Patient is a 58y old  Female who presents with a chief complaint of Altered mental status     (15 Apr 2024 09:08)  _____________________________________________________________  STUDY INTERPRETATION    Findings: The background was continuous, spontaneously variable and reactive.   No posterior dominant rhythm seen.  Background predominantly consisted of theta, delta and faster activities.    Focal Slowing:   None were present.    Sleep Background:  Drowsiness and stage II sleep transients were not recorded.    Other Non-Epileptiform Findings:  None were present.    Interictal Epileptiform Activity:   Occasional independent bifrontal sharp-wave discharges.    Events:  Clinical events: None recorded.  Seizures: None recorded.    Activation Procedures:   Hyperventilation was not performed.    Photic stimulation was not performed.     Artifacts:  Intermittent myogenic and movement artifacts were noted.    _____________________________________________________________  EEG SUMMARY/CLASSIFICATION    Abnormal EEG  - Occasional independent bifrontal sharp-wave discharges (improved compared to previous day).  - Moderate generalized slowing.    _____________________________________________________________  EEG IMPRESSION/CLINICAL CORRELATE    Abnormal EEG study.  Potential epileptogenic focus in the bifrontal head regions.  Moderate nonspecific diffuse or multifocal cerebral dysfunction.   No seizure seen.    Todd Brewer MD   of Neurology  Epilepsy/EEG Attending

## 2024-04-16 NOTE — PROGRESS NOTE ADULT - SUBJECTIVE AND OBJECTIVE BOX
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 Candelaria this wk, but no prior radiation tx to brain), presents as a transfer from  for progressive lethargy since Thursday, then unresponsive this AM. CTH with L anterior frontal lesion 2x2cm with significant vasogenic edema and some hemorrhage with another R frontal met with vasogenic edema. Lesions increased in size from CT 3/17/24. Last MRI 1/24/24 w/ numerous small mets + L frontal lesion measuring 1.3cm. Coags/plts wnl. With seizure at  for which patient received Ativan and Keppra 1g. Lactate 5.1. S/p Versed 5mg in ED for poor responsiveness and possible non-convulsive status.     24h Events:  intubated for neurological impairment   ceribell found next to patient head  04/15: Patient tolerating CPAP trial well and following commands. Plan for safe extubation after MRI.    EXAMINATION:  HEENT:  dry MM  Neuro: intubated, A&O x 3 EOS, FC, PICKETT AG  Cards:  tachycardic, regular   Respiratory:  no respiratory distress  Abdomen:  soft  Extremities:  no LE edema    ICU Vital Signs Last 24 Hrs  T(C): 36.8 (16 Apr 2024 03:00), Max: 37.2 (15 Apr 2024 11:00)  T(F): 98.3 (16 Apr 2024 03:00), Max: 99 (15 Apr 2024 11:00)  HR: 71 (16 Apr 2024 03:00) (46 - 92)  BP: --  BP(mean): --  ABP: 154/70 (16 Apr 2024 03:00) (106/81 - 187/80)  ABP(mean): 102 (16 Apr 2024 03:00) (77 - 122)  RR: 16 (16 Apr 2024 03:00) (9 - 25)  SpO2: 99% (16 Apr 2024 03:00) (99% - 100%)    O2 Parameters below as of 15 Apr 2024 19:00  Patient On (Oxygen Delivery Method): nasal cannula, high flow  O2 Flow (L/min): 40  O2 Concentration (%): 40          Respiratory:  Mode: AC/ CMV (Assist Control/ Continuous Mandatory Ventilation)  RR (machine): 16  TV (machine): 350  FiO2: 40  PEEP: 5  ITime: 1  MAP: 10  PIP: 16    ABG - ( 14 Apr 2024 02:47 )  pH, Arterial: 7.42  pH, Blood: x     /  pCO2: 28    /  pO2: 168   / HCO3: 18    / Base Excess: -5.2  /  SaO2: 99.1      04-16    137  |  101  |  11  ----------------------------<  103<H>  3.6   |  21<L>  |  0.42<L>    Ca    9.1      16 Apr 2024 05:38  Phos  2.7     04-16  Mg     1.7     04-16            MEDICATION LEVELS:   Ammonia, Serum: 17 umol/L (04-13 @ 22:55)    IVF FLUIDS/MEDICATIONS:   MEDICATIONS  (STANDING):  chlorhexidine 0.12% Liquid 15 milliLiter(s) Oral Mucosa every 12 hours  chlorhexidine 4% Liquid 1 Application(s) Topical daily  dexAMETHasone  Injectable 4 milliGRAM(s) IV Push every 6 hours  gabapentin 600 milliGRAM(s) Oral three times a day  insulin lispro (ADMELOG) corrective regimen sliding scale   SubCutaneous every 6 hours  levETIRAcetam  IVPB 750 milliGRAM(s) IV Intermittent every 12 hours  pantoprazole  Injectable 40 milliGRAM(s) IV Push daily  piperacillin/tazobactam IVPB.. 3.375 Gram(s) IV Intermittent every 8 hours  polyethylene glycol 3350 17 Gram(s) Oral daily  propofol Infusion 50 MICROgram(s)/kG/Min (12.8 mL/Hr) IV Continuous <Continuous>  propofol Infusion 10 MICROgram(s)/kG/Min (2.55 mL/Hr) IV Continuous <Continuous>  senna 2 Tablet(s) Oral at bedtime  sodium chloride 2 Gram(s) Oral every 6 hours  sodium chloride 3% + sodium acetate 50:50 1000 milliLiter(s) (30 mL/Hr) IV Continuous <Continuous>  vancomycin  IVPB 1000 milliGRAM(s) IV Intermittent every 12 hours    MEDICATIONS  (PRN):  acetaminophen   IVPB .. 750 milliGRAM(s) IV Intermittent every 6 hours PRN Mild Pain (1 - 3)  ALPRAZolam 0.5 milliGRAM(s) Oral daily PRN severe anxiety  fentaNYL    Injectable 25 MICROGram(s) IV Push every 2 hours PRN vent synchrony  ondansetron Injectable 4 milliGRAM(s) IV Push every 6 hours PRN Nausea and/or Vomiting           HPI  57yo woman with h/o subsegmental PE on Eliquis 5mg BID (last dose: Thurs) and h/o NSCLC with known mets to brain, on immunotherapy and s/p lung RT 3/1/24 (also was planned for [facility] w/ Dr. Candelaria this wk, but no prior radiation tx to brain), presents as a transfer from  for progressive lethargy since Thursday, then unresponsive this AM. CTH with L anterior frontal lesion 2x2cm with significant vasogenic edema and some hemorrhage with another R frontal met with vasogenic edema. Lesions increased in size from CT 3/17/24. Last MRI 1/24/24 w/ numerous small mets + L frontal lesion measuring 1.3cm. Coags/plts wnl. With seizure at  for which patient received Ativan and Keppra 1g. Lactate 5.1. S/p Versed 5mg in ED for poor responsiveness and possible non-convulsive status.     24h Events:  intubated for neurological impairment   ceribell found next to patient head  04/15: Patient tolerating CPAP trial well and following commands. Plan for safe extubation after MRI.  04/16: Patient extubated on 04/15, tolerating well.    EXAMINATION:  HEENT:  dry MM  Neuro: intubated, A&O x 3 EOS, FC, PICKETT AG  Cards:  tachycardic, regular   Respiratory:  no respiratory distress  Abdomen:  soft  Extremities:  no LE edema    ICU Vital Signs Last 24 Hrs  T(C): 36.8 (16 Apr 2024 03:00), Max: 37.2 (15 Apr 2024 11:00)  T(F): 98.3 (16 Apr 2024 03:00), Max: 99 (15 Apr 2024 11:00)  HR: 71 (16 Apr 2024 03:00) (46 - 92)  BP: --  BP(mean): --  ABP: 154/70 (16 Apr 2024 03:00) (106/81 - 187/80)  ABP(mean): 102 (16 Apr 2024 03:00) (77 - 122)  RR: 16 (16 Apr 2024 03:00) (9 - 25)  SpO2: 99% (16 Apr 2024 03:00) (99% - 100%)    O2 Parameters below as of 15 Apr 2024 19:00  Patient On (Oxygen Delivery Method): nasal cannula, high flow  O2 Flow (L/min): 40  O2 Concentration (%): 40          Respiratory:  Mode: AC/ CMV (Assist Control/ Continuous Mandatory Ventilation)  RR (machine): 16  TV (machine): 350  FiO2: 40  PEEP: 5  ITime: 1  MAP: 10  PIP: 16    ABG - ( 14 Apr 2024 02:47 )  pH, Arterial: 7.42  pH, Blood: x     /  pCO2: 28    /  pO2: 168   / HCO3: 18    / Base Excess: -5.2  /  SaO2: 99.1      04-16    137  |  101  |  11  ----------------------------<  103<H>  3.6   |  21<L>  |  0.42<L>    Ca    9.1      16 Apr 2024 05:38  Phos  2.7     04-16  Mg     1.7     04-16            MEDICATION LEVELS:   Ammonia, Serum: 17 umol/L (04-13 @ 22:55)    IVF FLUIDS/MEDICATIONS:   MEDICATIONS  (STANDING):  chlorhexidine 0.12% Liquid 15 milliLiter(s) Oral Mucosa every 12 hours  chlorhexidine 4% Liquid 1 Application(s) Topical daily  dexAMETHasone  Injectable 4 milliGRAM(s) IV Push every 6 hours  gabapentin 600 milliGRAM(s) Oral three times a day  insulin lispro (ADMELOG) corrective regimen sliding scale   SubCutaneous every 6 hours  levETIRAcetam  IVPB 750 milliGRAM(s) IV Intermittent every 12 hours  pantoprazole  Injectable 40 milliGRAM(s) IV Push daily  piperacillin/tazobactam IVPB.. 3.375 Gram(s) IV Intermittent every 8 hours  polyethylene glycol 3350 17 Gram(s) Oral daily  propofol Infusion 50 MICROgram(s)/kG/Min (12.8 mL/Hr) IV Continuous <Continuous>  propofol Infusion 10 MICROgram(s)/kG/Min (2.55 mL/Hr) IV Continuous <Continuous>  senna 2 Tablet(s) Oral at bedtime  sodium chloride 2 Gram(s) Oral every 6 hours  sodium chloride 3% + sodium acetate 50:50 1000 milliLiter(s) (30 mL/Hr) IV Continuous <Continuous>  vancomycin  IVPB 1000 milliGRAM(s) IV Intermittent every 12 hours    MEDICATIONS  (PRN):  acetaminophen   IVPB .. 750 milliGRAM(s) IV Intermittent every 6 hours PRN Mild Pain (1 - 3)  ALPRAZolam 0.5 milliGRAM(s) Oral daily PRN severe anxiety  fentaNYL    Injectable 25 MICROGram(s) IV Push every 2 hours PRN vent synchrony  ondansetron Injectable 4 milliGRAM(s) IV Push every 6 hours PRN Nausea and/or Vomiting

## 2024-04-16 NOTE — CHART NOTE - NSCHARTNOTEFT_GEN_A_CORE
Right femoral central line was removed without complication. Patient was laid flat and site cleansed with chlorhexidine. Catheter removed without resistance or difficulty and catheter tip intact. Pressure held for 10 minutes, hemostasis achieved. Dressing placed at exit site. Patient tolerated procedure well.

## 2024-04-16 NOTE — CHART NOTE - NSCHARTNOTEFT_GEN_A_CORE
Lt Axillary arterial line site cleansed with chlorhexidine and a-line removed without complication. Hemostasis achieved with direct pressure and occlusive dressing applied. Vitals remained stable.

## 2024-04-16 NOTE — PROGRESS NOTE ADULT - ASSESSMENT
on vEEG   LED - p  MRI brain stereo-d  Heme/Onc no plan for systemic tx this adm  Tumor board Wednesday  Known T2/3 pathologic fx - MRI ordered  LP low suspicion for infection Cx NGTD

## 2024-04-16 NOTE — PROGRESS NOTE ADULT - SUBJECTIVE AND OBJECTIVE BOX
Patient seen and examined at bedside.    --Anticoagulation--    T(C): 37.4 (04-14-24 @ 00:00), Max: 42 (04-13-24 @ 23:00)  HR: 104 (04-14-24 @ 00:00) (87 - 145)  BP: 157/94 (04-13-24 @ 23:00) (146/88 - 183/85)  RR: 35 (04-14-24 @ 00:00) (18 - 60)  SpO2: 100% (04-14-24 @ 00:00) (97% - 100%)  Wt(kg): --    Exam: Extubated, AOx3, mild speech apraxia, PERRL, EOMI, no facial, no drift, PICKETT 5/5, SILT

## 2024-04-16 NOTE — CONSULT NOTE ADULT - SUBJECTIVE AND OBJECTIVE BOX
Giorgi Rojas  contact via pager or TEAMS  Office [phone]    HPI:  58F on Eliquis 5BID (last dose: Thurs) for recent dx subsegmental PE, pt of Dr. 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 Candelaria this wk, but no prior radiation tx to brain), p/f progressive lethargy since Thurs, acutely worse/unresponsive this AM per daughter (was still walking around and talking at home yday). CTH @830AM w/ L anterior frontal lesion 2x2cm w/ significant vasogenic edema and hemorrhagic conversion + addtl lesion in R motor strip. Lesions incr in size from CT 3/17/24 w/ incr edema/mass effect. Last MRI 1/24/24 w/ numerous tiny enhancing mets + L frontal lesion measuring 1.3cm. Coags/plts wnl. Lactate 5.1. Exam: EO to moderate stim, no FC, PERRL, wd x4  (13 Apr 2024 13:42)      PAST MEDICAL & SURGICAL HISTORY:  Mass of right lung      COPD (chronic obstructive pulmonary disease)      Smoker      Chronic pain syndrome      HLD (hyperlipidemia)      Melanoma in situ      History of blood transfusion      Malignant neoplasm of right bronchus      H/O reduction of orbital fracture      S/P wrist surgery          Review of Systems:   CONSTITUTIONAL: No fever, weight loss, or fatigue  EYES: No eye pain, visual disturbances, or discharge  ENMT:  No difficulty hearing, tinnitus, vertigo; No sinus or throat pain  NECK: No pain or stiffness  BREASTS: No pain, masses, or nipple discharge  RESPIRATORY: No cough, wheezing, chills or hemoptysis; No shortness of breath  CARDIOVASCULAR: No chest pain, palpitations, dizziness, or leg swelling  GASTROINTESTINAL: No abdominal or epigastric pain. No nausea, vomiting, or hematemesis; No diarrhea or constipation. No melena or hematochezia.  GENITOURINARY: No dysuria, frequency, hematuria, or incontinence  NEUROLOGICAL: No headaches, memory loss, loss of strength, numbness, or tremors  SKIN: No itching, burning, rashes, or lesions   LYMPH NODES: No enlarged glands  ENDOCRINE: No heat or cold intolerance; No hair loss  MUSCULOSKELETAL: No joint pain or swelling; No muscle, back, or extremity pain  PSYCHIATRIC: No depression, anxiety, mood swings, or difficulty sleeping  HEME/LYMPH: No easy bruising, or bleeding gums  ALLERY AND IMMUNOLOGIC: No hives or eczema    Allergies    No Known Allergies    Intolerances    morphine (Sedation/Somnol)      Social History:     FAMILY HISTORY:  FH: heart attack (Father, Mother)    FH: stomach cancer (Grandparent)    FH: lung cancer (Sibling)        MEDICATIONS  (STANDING):  chlorhexidine 4% Liquid 1 Application(s) Topical <User Schedule>  dexAMETHasone  Injectable 4 milliGRAM(s) IV Push every 6 hours  gabapentin Solution 600 milliGRAM(s) Oral three times a day  insulin lispro (ADMELOG) corrective regimen sliding scale   SubCutaneous every 6 hours  levETIRAcetam 750 milliGRAM(s) Oral two times a day  oxyCODONE  ER Tablet 15 milliGRAM(s) Oral every 12 hours  pantoprazole  Injectable 40 milliGRAM(s) IV Push daily  piperacillin/tazobactam IVPB.. 3.375 Gram(s) IV Intermittent every 8 hours  polyethylene glycol 3350 17 Gram(s) Oral daily  senna 2 Tablet(s) Oral at bedtime  sodium chloride 2 Gram(s) Oral every 6 hours    MEDICATIONS  (PRN):  acetaminophen   IVPB .. 750 milliGRAM(s) IV Intermittent every 6 hours PRN Mild Pain (1 - 3)  ALPRAZolam 0.5 milliGRAM(s) Oral daily PRN severe anxiety  ondansetron Injectable 4 milliGRAM(s) IV Push every 6 hours PRN Nausea and/or Vomiting  oxyCODONE    IR 10 milliGRAM(s) Oral every 4 hours PRN Severe Pain (7 - 10)  oxyCODONE    IR 5 milliGRAM(s) Oral every 4 hours PRN Moderate Pain (4 - 6)  sodium chloride 0.9% lock flush 10 milliLiter(s) IV Push every 1 hour PRN Pre/post blood products, medications, blood draw, and to maintain line patency      Vital Signs Last 24 Hrs  T(C): 37 (16 Apr 2024 13:32), Max: 37 (15 Apr 2024 23:00)  T(F): 98.6 (16 Apr 2024 13:32), Max: 98.6 (15 Apr 2024 23:00)  HR: 66 (16 Apr 2024 13:32) (46 - 92)  BP: 111/71 (16 Apr 2024 13:32) (94/61 - 132/65)  BP(mean): 72 (16 Apr 2024 12:00) (72 - 91)  RR: 18 (16 Apr 2024 13:32) (12 - 20)  SpO2: 98% (16 Apr 2024 13:32) (97% - 100%)  CAPILLARY BLOOD GLUCOSE      POCT Blood Glucose.: 138 mg/dL (16 Apr 2024 11:24)  POCT Blood Glucose.: 108 mg/dL (16 Apr 2024 08:30)  POCT Blood Glucose.: 93 mg/dL (16 Apr 2024 05:26)  POCT Blood Glucose.: 130 mg/dL (16 Apr 2024 00:50)  POCT Blood Glucose.: 95 mg/dL (15 Apr 2024 17:49)    I&O's Summary    15 Apr 2024 07:01  -  16 Apr 2024 07:00  --------------------------------------------------------  IN: 2348 mL / OUT: 925 mL / NET: 1423 mL    16 Apr 2024 07:01  -  16 Apr 2024 15:01  --------------------------------------------------------  IN: 195 mL / OUT: 300 mL / NET: -105 mL        PHYSICAL EXAM:  GENERAL: NAD, well-developed  HEAD:  Atraumatic, Normocephalic  EYES: EOMI, PERRLA, conjunctiva and sclera clear  NECK: Supple, No JVD  CHEST/LUNG: Clear to auscultation bilaterally; No wheeze  HEART: Regular rate and rhythm; No murmurs, rubs, or gallops  ABDOMEN: Soft, Nontender, Nondistended; Bowel sounds present  EXTREMITIES:  2+ Peripheral Pulses, No clubbing, cyanosis, or edema  PSYCH: AAOx3  NEUROLOGY: non-focal  SKIN: No rashes or lesions    LABS:    04-16    137  |  101  |  11  ----------------------------<  103<H>  3.6   |  21<L>  |  0.42<L>    Ca    9.1      16 Apr 2024 05:38  Phos  2.7     04-16  Mg     1.7     04-16            Urinalysis Basic - ( 16 Apr 2024 05:38 )    Color: x / Appearance: x / SG: x / pH: x  Gluc: 103 mg/dL / Ketone: x  / Bili: x / Urobili: x   Blood: x / Protein: x / Nitrite: x   Leuk Esterase: x / RBC: x / WBC x   Sq Epi: x / Non Sq Epi: x / Bacteria: x        Culture - CSF with Gram Stain (collected 15 Apr 2024 04:36)  Source: .CSF CSF lumbar  Gram Stain (15 Apr 2024 10:58):    Few polymorphonuclear leukocytes seen per low power field    No organisms seen    by cytocentrifuge  Preliminary Report (16 Apr 2024 07:37):    No growth    Culture - Blood (collected 13 Apr 2024 16:14)  Source: .Blood Blood  Preliminary Report (15 Apr 2024 22:02):    No growth at 48 Hours    Culture - Blood (collected 13 Apr 2024 16:10)  Source: .Blood Blood  Preliminary Report (15 Apr 2024 22:02):    No growth at 48 Hours      RADIOLOGY & ADDITIONAL TESTS:    Imaging Personally Reviewed:    Consultant(s) Notes Reviewed:      Care Discussed with Consultants/Other Providers:   Giorgi Treadwellcamillebenito  contact via pager or TEAMS  Office [phone]    CC: s/p altered mental status requiring intubation    HPI:  58F h/o PE, NSCLC w/ mets to brain, on immunotx s/p lung RT 3/1/24 p/w increasing lethargy, not answering quests and was Intubated for neurologic impairment. No recent f/c/r, cp/sob/n/v/d/abd pain/dysuria. Brain MRI revealed:  New heterogeneously enhancing lesion within the motor cortex of the right   high posterior frontal lobe measuring approximately 1.2 x 1.2 cm with   surrounding vasogenic edema involving the right high posterior frontal   lobe and right high parietal lobe including the entirety of the right   border cortex and the medial portion of the sensory cortex.    Previously visualized enhancing lesion in the medial left inferior   frontal lobe and left genu of the of the corpus callosum, has markedly   enlarged since prior exam, measuring approximately 2.9 cm AP x 2.5 cm TR,   previously measuring 1.3 x 1.1 cm. There is a large degree of vasogenic   edema surrounding the lesion involving the entirety of the left anterior   frontal lobe and crossing the genu of the corpus callosum. This results   in severe mass effect on the frontal horns of the bilateral ventricles,   with approximately 6 mm of left to right midline shift at the level of   the anterior falx.        PAST MEDICAL & SURGICAL HISTORY:    NSCLC    Pulmonary embolism on Apixaban    Mass of right lung      COPD (chronic obstructive pulmonary disease)      Smoker      Chronic pain syndrome      HLD (hyperlipidemia)      Melanoma in situ      History of blood transfusion      Malignant neoplasm of right bronchus      H/O reduction of orbital fracture      S/P wrist surgery          Review of Systems:   CONSTITUTIONAL: No fever, weight loss, or fatigue  EYES: No eye pain, visual disturbances, or discharge  ENMT:  No difficulty hearing, tinnitus, vertigo; No sinus or throat pain  NECK: No pain or stiffness  BREASTS: No pain, masses, or nipple discharge  RESPIRATORY: No cough, wheezing, chills or hemoptysis; No shortness of breath  CARDIOVASCULAR: No chest pain, palpitations, dizziness, or leg swelling  GASTROINTESTINAL: No abdominal or epigastric pain. No nausea, vomiting, or hematemesis; No diarrhea or constipation. No melena or hematochezia.  GENITOURINARY: No dysuria, frequency, hematuria, or incontinence  NEUROLOGICAL: No headaches, memory loss, loss of strength, numbness, or tremors  SKIN: No itching, burning, rashes, or lesions   LYMPH NODES: No enlarged glands  ENDOCRINE: No heat or cold intolerance; No hair loss  MUSCULOSKELETAL: No joint pain or swelling; No muscle, back, or extremity pain  PSYCHIATRIC: No depression, anxiety, mood swings, or difficulty sleeping  HEME/LYMPH: No easy bruising, or bleeding gums  ALLERY AND IMMUNOLOGIC: No hives or eczema    Allergies    No Known Allergies    Intolerances    morphine (Sedation/Somnol)      Social History:     Quit smoking 6 months ago  no etoh or illegal drug use    FAMILY HISTORY:  FH: heart attack (Father, Mother)    FH: stomach cancer (Grandparent)    FH: lung cancer (Sibling)        MEDICATIONS  (STANDING):  chlorhexidine 4% Liquid 1 Application(s) Topical <User Schedule>  dexAMETHasone  Injectable 4 milliGRAM(s) IV Push every 6 hours  gabapentin Solution 600 milliGRAM(s) Oral three times a day  insulin lispro (ADMELOG) corrective regimen sliding scale   SubCutaneous every 6 hours  levETIRAcetam 750 milliGRAM(s) Oral two times a day  oxyCODONE  ER Tablet 15 milliGRAM(s) Oral every 12 hours  pantoprazole  Injectable 40 milliGRAM(s) IV Push daily  piperacillin/tazobactam IVPB.. 3.375 Gram(s) IV Intermittent every 8 hours  polyethylene glycol 3350 17 Gram(s) Oral daily  senna 2 Tablet(s) Oral at bedtime  sodium chloride 2 Gram(s) Oral every 6 hours    MEDICATIONS  (PRN):  acetaminophen   IVPB .. 750 milliGRAM(s) IV Intermittent every 6 hours PRN Mild Pain (1 - 3)  ALPRAZolam 0.5 milliGRAM(s) Oral daily PRN severe anxiety  ondansetron Injectable 4 milliGRAM(s) IV Push every 6 hours PRN Nausea and/or Vomiting  oxyCODONE    IR 10 milliGRAM(s) Oral every 4 hours PRN Severe Pain (7 - 10)  oxyCODONE    IR 5 milliGRAM(s) Oral every 4 hours PRN Moderate Pain (4 - 6)  sodium chloride 0.9% lock flush 10 milliLiter(s) IV Push every 1 hour PRN Pre/post blood products, medications, blood draw, and to maintain line patency      Vital Signs Last 24 Hrs  T(C): 37 (16 Apr 2024 13:32), Max: 37 (15 Apr 2024 23:00)  T(F): 98.6 (16 Apr 2024 13:32), Max: 98.6 (15 Apr 2024 23:00)  HR: 66 (16 Apr 2024 13:32) (46 - 92)  BP: 111/71 (16 Apr 2024 13:32) (94/61 - 132/65)  BP(mean): 72 (16 Apr 2024 12:00) (72 - 91)  RR: 18 (16 Apr 2024 13:32) (12 - 20)  SpO2: 98% (16 Apr 2024 13:32) (97% - 100%)  CAPILLARY BLOOD GLUCOSE      POCT Blood Glucose.: 138 mg/dL (16 Apr 2024 11:24)  POCT Blood Glucose.: 108 mg/dL (16 Apr 2024 08:30)  POCT Blood Glucose.: 93 mg/dL (16 Apr 2024 05:26)  POCT Blood Glucose.: 130 mg/dL (16 Apr 2024 00:50)  POCT Blood Glucose.: 95 mg/dL (15 Apr 2024 17:49)    I&O's Summary    15 Apr 2024 07:01  -  16 Apr 2024 07:00  --------------------------------------------------------  IN: 2348 mL / OUT: 925 mL / NET: 1423 mL    16 Apr 2024 07:01  -  16 Apr 2024 15:01  --------------------------------------------------------  IN: 195 mL / OUT: 300 mL / NET: -105 mL        PHYSICAL EXAM:  GENERAL: NAD, well-developed  HEAD:  Atraumatic, Normocephalic  EYES: EOMI, PERRLA, conjunctiva and sclera clear  NECK: Supple, No JVD  CHEST/LUNG: Clear to auscultation bilaterally; No wheeze  HEART: Regular rate and rhythm; No murmurs, rubs, or gallops; s1, s2+  ABDOMEN: Soft, Nontender, Nondistended; Bowel sounds present  EXTREMITIES:  No edema  PSYCH: AAOx3  NEUROLOGY: non-focal  SKIN: No rashes     LABS:    04-16    137  |  101  |  11  ----------------------------<  103<H>  3.6   |  21<L>  |  0.42<L>    Ca    9.1      16 Apr 2024 05:38  Phos  2.7     04-16  Mg     1.7     04-16            Urinalysis Basic - ( 16 Apr 2024 05:38 )    Color: x / Appearance: x / SG: x / pH: x  Gluc: 103 mg/dL / Ketone: x  / Bili: x / Urobili: x   Blood: x / Protein: x / Nitrite: x   Leuk Esterase: x / RBC: x / WBC x   Sq Epi: x / Non Sq Epi: x / Bacteria: x        Culture - CSF with Gram Stain (collected 15 Apr 2024 04:36)  Source: .CSF CSF lumbar  Gram Stain (15 Apr 2024 10:58):    Few polymorphonuclear leukocytes seen per low power field    No organisms seen    by cytocentrifuge  Preliminary Report (16 Apr 2024 07:37):    No growth    Culture - Blood (collected 13 Apr 2024 16:14)  Source: .Blood Blood  Preliminary Report (15 Apr 2024 22:02):    No growth at 48 Hours    Culture - Blood (collected 13 Apr 2024 16:10)  Source: .Blood Blood  Preliminary Report (15 Apr 2024 22:02):    No growth at 48 Hours      RADIOLOGY & ADDITIONAL TESTS:    Imaging Personally Reviewed:    Consultant(s) Notes Reviewed:      Care Discussed with Consultants/Other Providers: neurosurg

## 2024-04-16 NOTE — OCCUPATIONAL THERAPY INITIAL EVALUATION ADULT - ADL RETRAINING, OT EVAL
GOAL: pt. will perform grooming tasks at sink independently while demonstrating proper safety awareness with no cueing 100% of the time.

## 2024-04-16 NOTE — CONSULT NOTE ADULT - PROBLEM SELECTOR RECOMMENDATION 3
unclear source. resolved on Zosyn. complete course unclear source. can check UA/urine culture for completeness. resolved on Zosyn. complete course

## 2024-04-16 NOTE — OCCUPATIONAL THERAPY INITIAL EVALUATION ADULT - BALANCE TRAINING, PT EVAL
GOAL: pt. will increase dynamic standing balance in order to perform grooming tasks at sink independently

## 2024-04-16 NOTE — OCCUPATIONAL THERAPY INITIAL EVALUATION ADULT - ADDITIONAL COMMENTS
Pt lives with spouse and daughter in private home with 1 stairs to entrance , and bedroom and bathroom on first level. Prior to admission pt independent with all functional mobility including ambulation without AD. Pt owns cane from previous hospitalization.

## 2024-04-16 NOTE — PROGRESS NOTE ADULT - ASSESSMENT
57yo woman with h/o subsegmental PE on Eliquis 5mg BID (last dose: Thurs) and h/o NSCLC with known mets to brain, on immunotherapy and s/p lung RT 3/1/24 (also was planned for GKRS w/ Dr. Candelaria this wk, but no prior radiation tx to brain), presents as a transfer from  for progressive lethargy since Thursday, then unresponsive this AM. CTH with L anterior frontal lesion 2x2cm with significant vasogenic edema and some hemorrhage with another R frontal met with vasogenic edema. Lesions increased in size from CT 3/17/24. Last MRI 1/24/24 w/ numerous small mets + L frontal lesion measuring 1.3cm. Admitted to NSCU after sz, c/f nonconvulsive status.    imp: fever, failure to thrive past few days, on check point inhibitor for NSCLC, differential with meningitis (infectious vs aseptic), GTC in the setting of cerebral metastasis, tachypnea with mixed metabolic alkalosis unclear source (starvation, cancer).     ---Neuro---  #hemorrhagic mets in setting of NSCLC  - MR w and w/out pending   - MR C/T/L spine for metastatic evaluation  - dex 4 q6 for vasogenic edema    #GTC w c/f non-convulsive status  cont Keppra 750mg BID  vEEG    ---Resp---  #poor neurological exam/resipratory failure: intubated for airway control in setting of poor mental status  CPAP trial tolerating well, plan to extubate  ABG pending     #NSCLSC  - on immunotherapy and s/p lung RT 3/1/24  - heme/onc on board, c/edexa therapy    ---CV----  - Maintain normotension, MAP >65  - TTE: pending   - Eliquis on hold given ICH    ---GI---  #Diet: OG tube, trickle feeds @ 20  - NPO for extubation  - bowel regimen miralax/senna  - PPI while inbuated/on steroids    ------  - LR: 75 ccs/hour  - monitor renal function  - replete lytes PRN     ---Heme---  #Hx of PE on Eliquis  -held eliquis  - reason ICH    #VTE ppx  - chemical ppx held   - BL SCDs  - BL LE US pending, f/u    ---Endo---  #DM2  Hgb A1c 5.8  - Maintain euglycemia 120-180  - ISS    ---ID---  #Fever in setting of metastatic CA vs meningitis  - resolved  - f/u BCx  - CT CAP in setting of unclear source fo fever: negative for consolidation  - cont withe zosyn for enteritis on the CT abd and P    Lines  Right femoral central line  L axillary arterial line    Full code    Dispo nsicu 59yo woman with h/o subsegmental PE on Eliquis 5mg BID (last dose: Thurs) and h/o NSCLC with known mets to brain, on immunotherapy and s/p lung RT 3/1/24 (also was planned for GKRS w/ Dr. Candelaria this wk, but no prior radiation tx to brain), presents as a transfer from  for progressive lethargy since Thursday, then unresponsive this AM. CTH with L anterior frontal lesion 2x2cm with significant vasogenic edema and some hemorrhage with another R frontal met with vasogenic edema. Lesions increased in size from CT 3/17/24. Last MRI 1/24/24 w/ numerous small mets + L frontal lesion measuring 1.3cm. Admitted to NSCU after sz, c/f nonconvulsive status.    imp: fever, failure to thrive past few days, on check point inhibitor for NSCLC, differential with meningitis (infectious vs aseptic), GTC in the setting of cerebral metastasis, tachypnea with mixed metabolic alkalosis unclear source (starvation, cancer).     ---Neuro---  #hemorrhagic mets in setting of NSCLC  - MR w and w/out pending   - MR C/T/L spine for metastatic evaluation  - dex 4 q6 for vasogenic edema    #GTC w c/f non-convulsive status  cont Keppra 750mg BID  vEEG pre-klein negative    ---Resp---  RA  sat > 92%      #NSCLSC  - on immunotherapy and s/p lung RT 3/1/24  - heme/onc on board, c/edexa therapy    ---CV----  - Maintain normotension, MAP >65  - TTE: negative for R heart strain  - Eliquis on hold given ICH    ---GI---  #Diet: regular  - bowel regimen miralax/senna  - PPI while inbuated/on steroids    ------  - IVL  - monitor renal function  - replete lytes PRN     ---Heme---  #Hx of PE on Eliquis  -held eliquis  - reason ICH    #VTE ppx  - chemical ppx held   - BL SCDs    ---Endo---  #DM2  Hgb A1c 5.8  - Maintain euglycemia 120-180  - ISS    ---ID---  #Fever in setting of metastatic CA vs meningitis  - resolved  - f/u BCx  - CT CAP in setting of unclear source fo fever: negative for consolidation  - cont withe zosyn for enteritis on the CT abd and P        Full code    Dispo nsicu

## 2024-04-16 NOTE — OCCUPATIONAL THERAPY INITIAL EVALUATION ADULT - PERTINENT HX OF CURRENT PROBLEM, REHAB EVAL
57yo woman with h/o subsegmental PE on Eliquis 5mg BID (last dose: Thurs) and h/o NSCLC with known mets to brain, on immunotherapy and s/p lung RT 3/1/24 (also was planned for GKRS w/ Dr. Candelaria this wk, but no prior radiation tx to brain), presents as a transfer from  for progressive lethargy since Thursday, then unresponsive this AM. CTH with L anterior frontal lesion 2x2cm with significant vasogenic edema and some hemorrhage with another R frontal met with vasogenic edema. Lesions increased in size from CT 3/17/24. Last MRI 1/24/24 w/ numerous small mets + L frontal lesion measuring 1.3cm. Coags/plts wnl. With seizure at  for which patient received Ativan and Keppra 1g. Lactate 5.1. S/p Versed 5mg in ED for poor responsiveness and possible non-convulsive status.    MRI cervical: Multiple scattered areas of T1 hypointense/STIR hyperintense signal with associated enhancement throughout the cervical spine, most prominent within the inferior C3 vertebral body throughout the C4 vertebral body, suggestive of osseous metastases, however several areas are small enough that may reflect sequela of degenerative change. No epidural infiltration is identified. Additional degenerative changes as above.  MRI thoracic: Complete infiltration of the T2 and T3 vertebral bodies with abnormal T1 hypointense/T2 hyperintense and enhancing signal, compatible with complete neoplastic infiltration. There is a moderate to severe pathologic compression fracture of T2 and a mild to moderate pathologic compression fracture of T3. There is epidural extension of tumor within the right neural foramen at T2/T3 and T3/T4. There is secondary mass effect upon the cervical cord with mild impingement on the right ventral portion of the cord at the T3 level. No significant spinal canal stenosis at this level. No definite abnormal signal within the cord is identified.   There are a few punctate additional areas of abnormal signal within the thoracic spine, suggestive of additional metastases.  MRI lumbar: No osseous metastases. No epidural infiltration. Neural foraminal stenosis at L5/S1 secondary to a listhesis.  CT Abd and Pelvis: Similar clot burden with similar-appearing emboli in the subsegmental branches of the right upper lung.Stable size of right apical lung mass.Similar osseous erosive changes of the posterior aspect of the right third rib, and T2 and T3 vertebral bodies secondary to metastatic spreadMotion degraded exam limits evaluation for abdomen and pelvis. Fluid filled small bowel with wall thickening, may be seen with enteritis.  CT Head- Redemonstration of hemorrhagic metastatic disease with surrounding vasogenic edema and mass effect of the left frontal lobe and posterior right frontal cortex.Worsening effacement of the anterior horns of the lateral ventricles.

## 2024-04-17 NOTE — CONSULT NOTE ADULT - SUBJECTIVE AND OBJECTIVE BOX
Patient is a 59y old  Female who presents with a chief complaint of intracerebral tumors (16 Apr 2024 06:26)    HPI:  58F on Eliquis 5BID (last dose: Thurs) for recent dx subsegmental PE, pt of Dr. 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 Candelaria this wk, but no prior radiation tx to brain), p/f progressive lethargy since Thurs, acutely worse/unresponsive this AM per daughter (was still walking around and talking at home yday). CTH @830AM w/ L anterior frontal lesion 2x2cm w/ significant vasogenic edema and hemorrhagic conversion + addtl lesion in R motor strip. Lesions incr in size from CT 3/17/24 w/ incr edema/mass effect. Last MRI 1/24/24 w/ numerous tiny enhancing mets + L frontal lesion measuring 1.3cm. Coags/plts wnl. Lactate 5.1. Exam: EO to moderate stim, no FC, PERRL, wd x4  (13 Apr 2024 13:42)      PAST MEDICAL & SURGICAL HISTORY:  Mass of right lung      COPD (chronic obstructive pulmonary disease)      Smoker      Chronic pain syndrome      HLD (hyperlipidemia)      Melanoma in situ      History of blood transfusion      Malignant neoplasm of right bronchus      H/O reduction of orbital fracture      S/P wrist surgery          Social history:    FAMILY HISTORY:  FH: heart attack (Father, Mother)    FH: stomach cancer (Grandparent)    FH: lung cancer (Sibling)           Allergic/Immunologic:	No hives or rash   Allergies    No Known Allergies    Intolerances    morphine (Sedation/Somnol)      Antimicrobials:    piperacillin/tazobactam IVPB.. 3.375 Gram(s) IV Intermittent every 8 hours        Vital Signs Last 24 Hrs  T(C): 36.7 (17 Apr 2024 10:00), Max: 37 (16 Apr 2024 13:32)  T(F): 98.1 (17 Apr 2024 10:00), Max: 98.6 (16 Apr 2024 13:32)  HR: 71 (17 Apr 2024 11:00) (57 - 76)  BP: 124/82 (17 Apr 2024 11:00) (94/61 - 130/73)  BP(mean): 72 (16 Apr 2024 12:00) (72 - 72)  RR: 18 (17 Apr 2024 11:00) (18 - 19)  SpO2: 98% (17 Apr 2024 11:00) (98% - 100%)    Parameters below as of 17 Apr 2024 05:00  Patient On (Oxygen Delivery Method): room air            Neck:Supple,No LN,no JVD      Respiratory:Good air entry bilaterally,CTA    Cardiovascular:S1 S2 wnl, No murmurs,rub or gallops    Gastrointestinal:Soft BS(+) no tenderness no masses ,No rebound or guarding    Genitourinary:No CVA tendereness     Rectal:    Extremities:No cyanosis,clubbing or edema.    Vascular:peripheral pulses felt    Neurological:AAO X 3,No grossly focal deficits    Skin:No rash     Lymph Nodes:No palpable LNs                                 9.4    6.07  )-----------( 94       ( 17 Apr 2024 06:50 )             29.7         04-17    138  |  103  |  18  ----------------------------<  117<H>  4.2   |  20<L>  |  0.50    Ca    9.7      17 Apr 2024 06:51  Phos  2.7     04-16  Mg     1.7     04-16        RECENT CULTURES:  04-15 @ 04:36  .CSF CSF lumbar  --  --  --    No growth  --  04-13 @ 16:14  .Blood Blood  --  --  --    No growth at 72 Hours  --  04-13 @ 16:10  .Blood Blood  --  --  --    No growth at 72 Hours  --  04-13 @ 07:00  .Blood Blood-Peripheral  --  --  --    No growth at 72 Hours  --      MICROBIOLOGY:  Culture Results:   No growth (04-15 @ 04:36)  Culture Results:   No growth at 72 Hours (04-13 @ 16:14)  Culture Results:   No growth at 72 Hours (04-13 @ 16:10)  Culture Results:   No growth at 72 Hours (04-13 @ 07:00)  Culture Results:   <10,000 CFU/mL Normal Urogenital Sandy (04-13 @ 07:00)      Glucose, CSF: 72 mg/dL (04-15 @ 04:32)  Protein, CSF: 85 mg/dL (04-15 @ 04:32)  CSF Lymphocytes: 82 % (04-15 @ 04:31)      Radiology:      Assessment:        Recommendations and Plan:    Pager [phone]  After 5 pm/weekends or if no response :[phone]

## 2024-04-17 NOTE — PHYSICAL THERAPY INITIAL EVALUATION ADULT - ASR WT BEARING STATUS EVAL
normal/clear to auscultation bilaterally/no wheezes/no rales/no rhonchi/airway patent/breath sounds equal/good air movement no weight-bearing restrictions

## 2024-04-17 NOTE — PHYSICAL THERAPY INITIAL EVALUATION ADULT - LIVES WITH, PROFILE
Pt lives with spouse and daughter in private home with 1 stairs to entrance , and bedroom and bathroom on first level. Prior to admission pt independent with all functional mobility including ambulation without AD. Pt owns cane from previous hospitalization./children/spouse

## 2024-04-17 NOTE — PROGRESS NOTE ADULT - ASSESSMENT
HPI:  58F on Eliquis 5BID (last dose: Thurs) for recent dx subsegmental PE, pt of Dr. 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 Candelaria this wk, but no prior radiation tx to brain), p/f progressive lethargy since Thurs, acutely worse/unresponsive this AM per daughter (was still walking around and talking at home yday). CTH @830AM w/ L anterior frontal lesion 2x2cm w/ significant vasogenic edema and hemorrhagic conversion + addtl lesion in R motor strip. Lesions incr in size from CT 3/17/24 w/ incr edema/mass effect. Last MRI 1/24/24 w/ numerous tiny enhancing mets + L frontal lesion measuring 1.3cm. Coags/plts wnl. Lactate 5.1. Exam: EO to moderate stim, no FC, PERRL, wd x4  (13 Apr 2024 13:42)      PLAN:  - neuro and vital check Q4hrs  -  Discharge planning:    HPI:  58F on Eliquis 5BID (last dose: Thurs) for recent dx subsegmental PE, pt of Dr. 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 Candelaria this wk, but no prior radiation tx to brain), p/f progressive lethargy since Thurs, acutely worse/unresponsive this AM per daughter (was still walking around and talking at home yday). CTH @830AM w/ L anterior frontal lesion 2x2cm w/ significant vasogenic edema and hemorrhagic conversion + addtl lesion in R motor strip. Lesions incr in size from CT 3/17/24 w/ incr edema/mass effect. Last MRI 1/24/24 w/ numerous tiny enhancing mets + L frontal lesion measuring 1.3cm. Coags/plts wnl. Lactate 5.1. Exam: EO to moderate stim, no FC, PERRL, wd x4  (13 Apr 2024 13:42)      PLAN:  - neuro and vital check Q4hrs  - last CTH on 4/14 stable, need outpatient rad onc (Dr. Candelaria/Dr. Boswell) for brain mets  - seizures, spoke with neurology consult team today, thrombocytopenia can be multifactorial, continue keppra for now, re-call epilepsy if platelet count continues to drop regarding changing AED  - pain control: continue home oxycodone ER and gabapentin, continue tylenol and oxycodone prn  - cerebral edema related to brain mets, decadron tapered to 4mg Q8hrs  - anxiety, continue home xanax prn  - HTN, continue losartan  - thrombocytopenia, platelet 94 this am down from 180s on 4/14, spoke with heme/onc, will f/u repeat CBC/platelet in blue top/d-dimer/fibrinogen assay/coags/LDH/haptoglobin  - enteritis found on CT abd upon admission, on zosyn since 4/14, zosyn can also potentially cause thrombocytopenia, ID consult appreciated today regarding abx duration, zosyn d/c'ed per ID  - hyponatremia upon admission, now resolved, continue salt tabs  - tolerating regular diet  - last BM today, continue current bowel regimens  - activity increase as tolerated  - incentive spirometer  - DVT ppx: b/l SCDs and hold chemical ppx given hemorrhagic brain mets and new thrombocytopenia  Discharge planning: PT- outpatient PT    hospitalist co-management appreciated; plan discussed with Dr. Corey hudson [id number]   HPI:  58F on Eliquis 5BID (last dose: Thurs) for recent dx subsegmental PE, pt of Dr. 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 Candelaria this wk, but no prior radiation tx to brain), p/f progressive lethargy since Thurs, acutely worse/unresponsive this AM per daughter (was still walking around and talking at home yday). CTH @830AM w/ L anterior frontal lesion 2x2cm w/ significant vasogenic edema and hemorrhagic conversion + addtl lesion in R motor strip. Lesions incr in size from CT 3/17/24 w/ incr edema/mass effect. Last MRI 1/24/24 w/ numerous tiny enhancing mets + L frontal lesion measuring 1.3cm. Coags/plts wnl. Lactate 5.1. Exam: EO to moderate stim, no FC, PERRL, wd x4  (13 Apr 2024 13:42)      PLAN:  - neuro and vital check Q4hrs  - last CTH on 4/14 stable, need outpatient rad onc (Dr. Candelaria/Dr. Boswell) for brain mets  - seizures, spoke with neurology consult team today, thrombocytopenia can be multifactorial, continue keppra for now, re-call epilepsy if platelet count continues to drop regarding changing AED  - pain control: continue home oxycodone ER and gabapentin, continue tylenol and oxycodone prn  - cerebral edema related to brain mets, decadron tapered to 4mg Q8hrs  - anxiety, continue home xanax prn  - HTN, continue losartan  - thrombocytopenia, platelet 94 this am down from 180s on 4/14, spoke with heme/onc, will f/u repeat CBC/platelet in blue top/d-dimer/fibrinogen assay/coags/LDH/haptoglobin  - enteritis found on CT abd upon admission, on zosyn since 4/14, zosyn can also potentially cause thrombocytopenia, ID consult appreciated today regarding abx duration, zosyn d/c'ed per ID  - hyponatremia upon admission, now resolved, continue salt tabs  - tolerating regular diet  - last BM today, continue current bowel regimens  - protonix changed to pepcid for GI ppx given potential thrombocytopenia side effects  - activity increase as tolerated  - incentive spirometer  - DVT ppx: b/l SCDs and hold chemical ppx given hemorrhagic brain mets and new thrombocytopenia  Discharge planning: PT- outpatient PT    Above plan and information were updated to patient's daughter at bedside. All questions and concerns were addressed.    hospitalist co-management appreciated; plan discussed with Dr. Corey hudson [id number]

## 2024-04-17 NOTE — CONSULT NOTE ADULT - ASSESSMENT
58 year old  with metastatic lung cancer : numerous mets  in brain and spinal met in thoracic spine/ On immunotx admitted  unresponsive and increased size of lesion; treated with decadron.   no pna.   decreased platelets  was placed on zosyn empirically   CT with mild enteritis  pt currently alert and denies any abd pain , nausea, or vomiting /  no  diarrhea    no need for zosyn  to treat enteritis     enteritis is a non specific finding but in the absence of any symptoms would not work it up further\  discussed with NS   platelets mildly low  would trend  many potential causes

## 2024-04-17 NOTE — PROGRESS NOTE ADULT - SUBJECTIVE AND OBJECTIVE BOX
Patient was seen at bedside this am. Patient felt well. Denied any headache, dizziness, cp, sob, n/v, or abd pain.    OVERNIGHT EVENTS: no acute event overnight    Vital Signs Last 24 Hrs  T(C): 36.7 (17 Apr 2024 10:00), Max: 37 (16 Apr 2024 13:32)  T(F): 98.1 (17 Apr 2024 10:00), Max: 98.6 (16 Apr 2024 13:32)  HR: 71 (17 Apr 2024 11:00) (57 - 76)  BP: 124/82 (17 Apr 2024 11:00) (94/61 - 130/73)  BP(mean): 72 (16 Apr 2024 12:00) (72 - 72)  RR: 18 (17 Apr 2024 11:00) (18 - 19)  SpO2: 98% (17 Apr 2024 11:00) (98% - 100%)    Parameters below as of 17 Apr 2024 05:00  Patient On (Oxygen Delivery Method): room air        I&O's Detail    16 Apr 2024 07:01  -  17 Apr 2024 07:00  --------------------------------------------------------  IN:    Lactated Ringers: 75 mL    Oral Fluid: 120 mL  Total IN: 195 mL    OUT:    Incontinent per Collection Bag (mL): 300 mL  Total OUT: 300 mL    Total NET: -105 mL        I&O's Summary    16 Apr 2024 07:01  -  17 Apr 2024 07:00  --------------------------------------------------------  IN: 195 mL / OUT: 300 mL / NET: -105 mL        PHYSICAL EXAM:  Neurological:  awake, alert, oriented to person and date, but confused with place, she thought she was at home, PERRL, face symmetric, following commands, moving all extremities 5/5, sensation intact to light touch  Cardiovascular: +s1, s2  Respiratory: clear to auscultation b/l  Gastrointestinal: soft, non-distended, non-tender  Genitourinary: voiding  Extremities: warm, dry    LABS:                        9.4    6.07  )-----------( 94       ( 17 Apr 2024 06:50 )             29.7     04-17    138  |  103  |  18  ----------------------------<  117<H>  4.2   |  20<L>  |  0.50    Ca    9.7      17 Apr 2024 06:51  Phos  2.7     04-16  Mg     1.7     04-16        Urinalysis Basic - ( 17 Apr 2024 06:51 )    Color: x / Appearance: x / SG: x / pH: x  Gluc: 117 mg/dL / Ketone: x  / Bili: x / Urobili: x   Blood: x / Protein: x / Nitrite: x   Leuk Esterase: x / RBC: x / WBC x   Sq Epi: x / Non Sq Epi: x / Bacteria: x          CAPILLARY BLOOD GLUCOSE      POCT Blood Glucose.: 120 mg/dL (17 Apr 2024 07:58)  POCT Blood Glucose.: 125 mg/dL (16 Apr 2024 21:24)  POCT Blood Glucose.: 144 mg/dL (16 Apr 2024 17:38)      Drug Levels: [] N/A    CSF Analysis: [] N/A      Allergies    No Known Allergies    Intolerances    morphine (Sedation/Somnol)    MEDICATIONS:  Antibiotics:  piperacillin/tazobactam IVPB.. 3.375 Gram(s) IV Intermittent every 8 hours    Neuro:  acetaminophen   IVPB .. 750 milliGRAM(s) IV Intermittent every 6 hours PRN  ALPRAZolam 0.5 milliGRAM(s) Oral daily PRN  gabapentin Solution 600 milliGRAM(s) Oral three times a day  levETIRAcetam 750 milliGRAM(s) Oral two times a day  ondansetron Injectable 4 milliGRAM(s) IV Push every 6 hours PRN  oxyCODONE    IR 10 milliGRAM(s) Oral every 4 hours PRN  oxyCODONE    IR 5 milliGRAM(s) Oral every 4 hours PRN  oxyCODONE  ER Tablet 15 milliGRAM(s) Oral every 12 hours    Anticoagulation:    OTHER:  chlorhexidine 4% Liquid 1 Application(s) Topical <User Schedule>  dexAMETHasone     Tablet 4 milliGRAM(s) Oral every 8 hours  insulin lispro (ADMELOG) corrective regimen sliding scale   SubCutaneous Before meals and at bedtime  losartan 50 milliGRAM(s) Oral daily  pantoprazole  Injectable 40 milliGRAM(s) IV Push daily  polyethylene glycol 3350 17 Gram(s) Oral daily  senna 2 Tablet(s) Oral at bedtime    IVF:  sodium chloride 2 Gram(s) Oral every 6 hours    CULTURES:  Culture Results:   No growth (04-15 @ 04:36)  Culture Results:   No growth at 72 Hours (04-13 @ 16:14)    RADIOLOGY & ADDITIONAL TESTS:

## 2024-04-17 NOTE — PHYSICAL THERAPY INITIAL EVALUATION ADULT - ORIENTATION, REHAB EVAL
Pt repeatedly reports she is at her home, but aware of discrepancy between her answer and expected answer, states "that's not what they want me to say"/person/place/time

## 2024-04-17 NOTE — PROGRESS NOTE ADULT - SUBJECTIVE AND OBJECTIVE BOX
Giorgi Rojas   contact via pager or TEAMS        CC: Patient is a 59y old  Female who presents with a chief complaint of intracerebral tumors (16 Apr 2024 06:26)      SUBJECTIVE / OVERNIGHT EVENTS:    MEDICATIONS  (STANDING):  chlorhexidine 4% Liquid 1 Application(s) Topical <User Schedule>  dexAMETHasone  Injectable 4 milliGRAM(s) IV Push every 6 hours  gabapentin Solution 600 milliGRAM(s) Oral three times a day  insulin lispro (ADMELOG) corrective regimen sliding scale   SubCutaneous Before meals and at bedtime  levETIRAcetam 750 milliGRAM(s) Oral two times a day  losartan 50 milliGRAM(s) Oral daily  oxyCODONE  ER Tablet 15 milliGRAM(s) Oral every 12 hours  pantoprazole  Injectable 40 milliGRAM(s) IV Push daily  piperacillin/tazobactam IVPB.. 3.375 Gram(s) IV Intermittent every 8 hours  polyethylene glycol 3350 17 Gram(s) Oral daily  senna 2 Tablet(s) Oral at bedtime  sodium chloride 2 Gram(s) Oral every 6 hours    MEDICATIONS  (PRN):  acetaminophen   IVPB .. 750 milliGRAM(s) IV Intermittent every 6 hours PRN Mild Pain (1 - 3)  ALPRAZolam 0.5 milliGRAM(s) Oral daily PRN severe anxiety  ondansetron Injectable 4 milliGRAM(s) IV Push every 6 hours PRN Nausea and/or Vomiting  oxyCODONE    IR 5 milliGRAM(s) Oral every 4 hours PRN Moderate Pain (4 - 6)  oxyCODONE    IR 10 milliGRAM(s) Oral every 4 hours PRN Severe Pain (7 - 10)  sodium chloride 0.9% lock flush 10 milliLiter(s) IV Push every 1 hour PRN Pre/post blood products, medications, blood draw, and to maintain line patency      Vital Signs Last 24 Hrs  T(C): 36.5 (17 Apr 2024 05:00), Max: 37 (16 Apr 2024 13:32)  T(F): 97.7 (17 Apr 2024 05:00), Max: 98.6 (16 Apr 2024 13:32)  HR: 65 (17 Apr 2024 05:00) (57 - 76)  BP: 130/73 (17 Apr 2024 05:00) (94/61 - 130/73)  BP(mean): 72 (16 Apr 2024 12:00) (72 - 72)  RR: 18 (17 Apr 2024 05:00) (18 - 19)  SpO2: 98% (17 Apr 2024 05:00) (98% - 100%)  CAPILLARY BLOOD GLUCOSE      POCT Blood Glucose.: 120 mg/dL (17 Apr 2024 07:58)  POCT Blood Glucose.: 125 mg/dL (16 Apr 2024 21:24)  POCT Blood Glucose.: 144 mg/dL (16 Apr 2024 17:38)  POCT Blood Glucose.: 138 mg/dL (16 Apr 2024 11:24)    I&O's Summary    16 Apr 2024 07:01  -  17 Apr 2024 07:00  --------------------------------------------------------  IN: 195 mL / OUT: 300 mL / NET: -105 mL      tele:    PHYSICAL EXAM:    GENERAL: NAD   HEENT: EOMI, PERRL  PULM: Clear to auscultation bilaterally  CV: Regular rate and rhythm; nl S1, S2; No murmurs, rubs, or gallops  ABDOMEN: Soft, Nontender, Nondistended; Bowel sounds present  EXTREMITIES/MSK:  No edema, calf tenderness   PSYCH: AAOx3  NEUROLOGY: non-focal          LABS:                        9.4    6.07  )-----------( 94       ( 17 Apr 2024 06:50 )             29.7     04-17    138  |  103  |  18  ----------------------------<  117<H>  4.2   |  20<L>  |  0.50    Ca    9.7      17 Apr 2024 06:51  Phos  2.7     04-16  Mg     1.7     04-16            Urinalysis Basic - ( 17 Apr 2024 06:51 )    Color: x / Appearance: x / SG: x / pH: x  Gluc: 117 mg/dL / Ketone: x  / Bili: x / Urobili: x   Blood: x / Protein: x / Nitrite: x   Leuk Esterase: x / RBC: x / WBC x   Sq Epi: x / Non Sq Epi: x / Bacteria: x      ABG - ( 15 Apr 2024 14:25 )  pH, Arterial: 7.46  pH, Blood: x     /  pCO2: 30    /  pO2: 117   / HCO3: 21    / Base Excess: -2.1  /  SaO2: 99.6                Culture - CSF with Gram Stain (collected 15 Apr 2024 04:36)  Source: .CSF CSF lumbar  Gram Stain (15 Apr 2024 10:58):    Few polymorphonuclear leukocytes seen per low power field    No organisms seen    by cytocentrifuge  Preliminary Report (16 Apr 2024 07:37):    No growth      RADIOLOGY & ADDITIONAL TESTS:    Imaging Personally Reviewed:    Consultant(s) Notes Reviewed:      Care Discussed with Consultants/Other Providers:   Giorgi Rojas   contact via pager or TEAMS        CC: Patient is a 59y old  Female who presents with a chief complaint of intracerebral tumors (16 Apr 2024 06:26)      SUBJECTIVE / OVERNIGHT EVENTS: denies any complaints on ros    MEDICATIONS  (STANDING):  chlorhexidine 4% Liquid 1 Application(s) Topical <User Schedule>  dexAMETHasone  Injectable 4 milliGRAM(s) IV Push every 6 hours  gabapentin Solution 600 milliGRAM(s) Oral three times a day  insulin lispro (ADMELOG) corrective regimen sliding scale   SubCutaneous Before meals and at bedtime  levETIRAcetam 750 milliGRAM(s) Oral two times a day  losartan 50 milliGRAM(s) Oral daily  oxyCODONE  ER Tablet 15 milliGRAM(s) Oral every 12 hours  pantoprazole  Injectable 40 milliGRAM(s) IV Push daily  piperacillin/tazobactam IVPB.. 3.375 Gram(s) IV Intermittent every 8 hours  polyethylene glycol 3350 17 Gram(s) Oral daily  senna 2 Tablet(s) Oral at bedtime  sodium chloride 2 Gram(s) Oral every 6 hours    MEDICATIONS  (PRN):  acetaminophen   IVPB .. 750 milliGRAM(s) IV Intermittent every 6 hours PRN Mild Pain (1 - 3)  ALPRAZolam 0.5 milliGRAM(s) Oral daily PRN severe anxiety  ondansetron Injectable 4 milliGRAM(s) IV Push every 6 hours PRN Nausea and/or Vomiting  oxyCODONE    IR 5 milliGRAM(s) Oral every 4 hours PRN Moderate Pain (4 - 6)  oxyCODONE    IR 10 milliGRAM(s) Oral every 4 hours PRN Severe Pain (7 - 10)  sodium chloride 0.9% lock flush 10 milliLiter(s) IV Push every 1 hour PRN Pre/post blood products, medications, blood draw, and to maintain line patency      Vital Signs Last 24 Hrs  T(C): 36.5 (17 Apr 2024 05:00), Max: 37 (16 Apr 2024 13:32)  T(F): 97.7 (17 Apr 2024 05:00), Max: 98.6 (16 Apr 2024 13:32)  HR: 65 (17 Apr 2024 05:00) (57 - 76)  BP: 130/73 (17 Apr 2024 05:00) (94/61 - 130/73)  BP(mean): 72 (16 Apr 2024 12:00) (72 - 72)  RR: 18 (17 Apr 2024 05:00) (18 - 19)  SpO2: 98% (17 Apr 2024 05:00) (98% - 100%)  CAPILLARY BLOOD GLUCOSE      POCT Blood Glucose.: 120 mg/dL (17 Apr 2024 07:58)  POCT Blood Glucose.: 125 mg/dL (16 Apr 2024 21:24)  POCT Blood Glucose.: 144 mg/dL (16 Apr 2024 17:38)  POCT Blood Glucose.: 138 mg/dL (16 Apr 2024 11:24)    I&O's Summary    16 Apr 2024 07:01  -  17 Apr 2024 07:00  --------------------------------------------------------  IN: 195 mL / OUT: 300 mL / NET: -105 mL          PHYSICAL EXAM:    GENERAL: NAD   HEENT: EOMI, PERRL  PULM: Clear to auscultation bilaterally  CV: Regular rate and rhythm; nl S1, S2; No murmurs, rubs, or gallops  ABDOMEN: Soft, Nontender, Nondistended; Bowel sounds present  EXTREMITIES/MSK:  No edema, calf tenderness   PSYCH: AAOx2 (knows own name and date but thinks she is at home)  NEUROLOGY: non-focal          LABS:                        9.4    6.07  )-----------( 94       ( 17 Apr 2024 06:50 )             29.7     04-17    138  |  103  |  18  ----------------------------<  117<H>  4.2   |  20<L>  |  0.50    Ca    9.7      17 Apr 2024 06:51  Phos  2.7     04-16  Mg     1.7     04-16            Urinalysis Basic - ( 17 Apr 2024 06:51 )    Color: x / Appearance: x / SG: x / pH: x  Gluc: 117 mg/dL / Ketone: x  / Bili: x / Urobili: x   Blood: x / Protein: x / Nitrite: x   Leuk Esterase: x / RBC: x / WBC x   Sq Epi: x / Non Sq Epi: x / Bacteria: x      ABG - ( 15 Apr 2024 14:25 )  pH, Arterial: 7.46  pH, Blood: x     /  pCO2: 30    /  pO2: 117   / HCO3: 21    / Base Excess: -2.1  /  SaO2: 99.6                Culture - CSF with Gram Stain (collected 15 Apr 2024 04:36)  Source: .CSF CSF lumbar  Gram Stain (15 Apr 2024 10:58):    Few polymorphonuclear leukocytes seen per low power field    No organisms seen    by cytocentrifuge  Preliminary Report (16 Apr 2024 07:37):    No growth      RADIOLOGY & ADDITIONAL TESTS:    Imaging Personally Reviewed:    Consultant(s) Notes Reviewed:      Care Discussed with Consultants/Other Providers: neurosurg

## 2024-04-17 NOTE — PHYSICAL THERAPY INITIAL EVALUATION ADULT - PRECAUTIONS/LIMITATIONS, REHAB EVAL
NIH=0, GCS=15, CN 2-12 intact, 5/5 strength throughout
TLSO due to known T2/3 pathologic fx/fall precautions

## 2024-04-17 NOTE — PHYSICAL THERAPY INITIAL EVALUATION ADULT - PERTINENT HX OF CURRENT PROBLEM, REHAB EVAL
59yo female with h/o subsegmental PE on Eliquis 5mg BID (last dose: Thurs) and h/o NSCLC with known mets to brain, on immunotherapy and s/p lung RT 3/1/24 (also was planned for [facility] w/ Dr. Candelaria this wk, but no prior radiation tx to brain), known T2/3 pathologic fx; presents as a transfer from [facility] for progressive lethargy since Thursday, then unresponsive morning of admission. CTH with L anterior frontal lesion 2x2cm with significant vasogenic edema and some hemorrhage with another R frontal met with vasogenic edema. Lesions increased in size from CT 3/17/24. Last MRI 1/24/24 w/ numerous small mets + L frontal lesion measuring 1.3cm. Coags/plts wnl. Pt s/p seizure at  for which patient received Ativan and Keppra 1g. Lactate 5.1. S/p Versed 5mg in ED for poor responsiveness and possible non-convulsive status.  4/15/2024 MRI SPINE: MRI cervical: Multiple scattered areas of T1 hypointense/STIR hyperintense signal with associated enhancement throughout the cervical spine, most prominent within the inferior C3 vertebral body throughout the C4 vertebral body, suggestive of osseous metastases, however several areas are small enough that may reflect sequela of degenerative change. No epidural infiltration is identified. Additional degenerative changes as above.  MRI thoracic: Complete infiltration of the T2 and T3 vertebral bodies with abnormal T1 hypointense/T2 hyperintense and enhancing signal, compatible with complete neoplastic infiltration. There is a moderate to severe pathologic compression fracture of T2 and a mild to moderate pathologic compression fracture of T3. There is epidural extension of tumor within the right neural foramen at T2/T3 and T3/T4. There is secondary mass effect upon the cervical cord with mild impingement on the right ventral portion of the cord at the T3 level. No significant spinal canal stenosis at this level. No definite abnormal signal within the cord is identified.   There are a few punctate additional areas of abnormal signal within the thoracic spine, suggestive of additional metastases.  MRI lumbar: No osseous metastases. No epidural infiltration. Neural foraminal stenosis at L5/S1 secondary to a listhesis.  4/15/2024 MR BRAIN: New heterogeneously enhancing lesion within the motor cortex of the right high posterior frontal lobe measuring approximately 1.2 x 1.2 cm with surrounding vasogenic edema involving the right high posterior frontal lobe and right high parietal lobe including the entirety of the right border cortex and the medial portion of the sensory cortex. Previously visualized enhancing lesion in the medial left inferior frontal lobe and left genu of the of the corpus callosum, has markedly enlarged since prior exam, measuring approximately 2.9 cm AP x 2.5 cm TR, previously measuring 1.3 x 1.1 cm. There is a large degree of vasogenic edema surrounding the lesion involving the entirety of the left anterior frontal lobe and crossing the genu of the corpus callosum. This results in severe mass effect on the frontal horns of the bilateral ventricles, with approximately 6 mm of left to right midline shift at the level of the anterior falx. Rest of the previously visualized enhancing lesions within the bilateral cerebri and cerebelli have resolved since the prior exam. Several new scattered additional small areas of increased T2/FLAIR hyperintense signal in the bilateral cerebri without definite associated enhancement on the postcontrast sequences, may reflect new vasogenic edema from new not yet enhancing metastases. No leptomeningeal disease is present.

## 2024-04-18 NOTE — PROGRESS NOTE ADULT - SUBJECTIVE AND OBJECTIVE BOX
INTERVAL HPI/OVERNIGHT EVENTS:  Patient S&E at bedside. No o/n events,     VITAL SIGNS:  T(F): 97.7 (04-18-24 @ 13:36)  HR: 72 (04-18-24 @ 13:36)  BP: 144/76 (04-18-24 @ 13:36)  RR: 18 (04-18-24 @ 13:36)  SpO2: 96% (04-18-24 @ 13:36)  Wt(kg): --    PHYSICAL EXAM:    Constitutional: NAD  Eyes: EOMI, sclera non-icteric  Neck: supple  Respiratory: CTAB, no wheezes or crackles   Cardiovascular: RRR  Gastrointestinal: soft, NTND, + BS  Extremities: no cyanosis, clubbing or edema   Neurological: awake and alert      MEDICATIONS  (STANDING):  dexAMETHasone     Tablet 4 milliGRAM(s) Oral every 8 hours  famotidine    Tablet 20 milliGRAM(s) Oral daily  gabapentin Solution 600 milliGRAM(s) Oral three times a day  lacosamide IVPB 150 milliGRAM(s) IV Intermittent every 12 hours  losartan 50 milliGRAM(s) Oral daily  oxyCODONE  ER Tablet 15 milliGRAM(s) Oral every 12 hours  polyethylene glycol 3350 17 Gram(s) Oral daily  potassium chloride    Tablet ER 40 milliEquivalent(s) Oral every 4 hours  senna 2 Tablet(s) Oral at bedtime  sodium chloride 2 Gram(s) Oral every 6 hours    MEDICATIONS  (PRN):  acetaminophen     Tablet .. 650 milliGRAM(s) Oral every 6 hours PRN Temp greater or equal to 38C (100.4F), Mild Pain (1 - 3)  ALPRAZolam 0.5 milliGRAM(s) Oral daily PRN severe anxiety  ondansetron Injectable 4 milliGRAM(s) IV Push every 6 hours PRN Nausea and/or Vomiting  oxyCODONE    IR 5 milliGRAM(s) Oral every 4 hours PRN Moderate Pain (4 - 6)  oxyCODONE    IR 10 milliGRAM(s) Oral every 4 hours PRN Severe Pain (7 - 10)      Allergies    No Known Allergies    Intolerances    morphine (Sedation/Somnol)      LABS:                        9.4    7.10  )-----------( 88       ( 18 Apr 2024 08:32 )             29.2     04-18    135  |  98  |  12  ----------------------------<  98  3.4<L>   |  22  |  0.45<L>    Ca    9.3      18 Apr 2024 08:32      PT/INR - ( 17 Apr 2024 14:00 )   PT: 11.1 sec;   INR: 1.01 ratio         PTT - ( 17 Apr 2024 14:00 )  PTT:22.6 sec  Urinalysis Basic - ( 18 Apr 2024 08:32 )    Color: x / Appearance: x / SG: x / pH: x  Gluc: 98 mg/dL / Ketone: x  / Bili: x / Urobili: x   Blood: x / Protein: x / Nitrite: x   Leuk Esterase: x / RBC: x / WBC x   Sq Epi: x / Non Sq Epi: x / Bacteria: x        RADIOLOGY & ADDITIONAL TESTS:  Studies reviewed.

## 2024-04-18 NOTE — CONSULT NOTE ADULT - SUBJECTIVE AND OBJECTIVE BOX
Neurology - Consult Note    -  Spectra: [phone] ([facility]), [phone] ([facility])  -    HPI: 58 years old woman with a PMH of NSCLC with brain metastasis left paraventricular frontal 2.9 x 2.5 cm and right frontal 1.2x1.2 both hemorrhagic, subsegmental PE recently diagnosed in March 16. CTA chest repeat during this admission revealed no P. Currenlt off AC because of bleed. Patient was found to have brain metastasis on CT brain performed in March 17 2024. Patient was started on eliquis 5 mg BID for PE. She then presented to OSH where she was noted to have a seizure episode GTC and was given ativan and loaded with keppra. Metastasis was then found to be hemorrhagic thereafter. Patient is currently on keppra 750 BID. EEG was performed and revealed bilateral frontal epileptogenic foci but no active seizures noted. We were consulted to consider alterantive antiepileptic medication given potential contribution to thrombocytopenia which was [id number] today. Patient had normal platelet count on April 13th the day of admission and decreased to 88K with 40K on blue tops today.     Patient was started on Zosyn during hospitalization for questionable enteritis on CT abdomen but was stopped because was deemed not needing treatment by ID and was stopped considering that this might contribute to trhombocytopenia. Protonix was stopped due to consideration that it might contribute to thrombocytopenia.    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 Candelaria this week. Patient will still be considered for gamma knife next week after if platelets improve.    Review of Systems:  INCOMPLETE   CONSTITUTIONAL: No fevers or chills  EYES AND ENT: No visual changes or no throat pain   NECK: No pain or stiffness  RESPIRATORY: No hemoptysis or shortness of breath  CARDIOVASCULAR: No chest pain or palpitations  GASTROINTESTINAL: No melena or hematochezia  GENITOURINARY: No dysuria or hematuria  NEUROLOGICAL: +As stated in HPI above  SKIN: No itching, burning, rashes, or lesions   All other review of systems is negative unless indicated above.    Allergies:  No Known Allergies  morphine (Sedation/Somnol)      PMHx/PSHx/Family Hx: As above, otherwise see below   No pertinent past medical history    Mass of right lung    COPD (chronic obstructive pulmonary disease)    Smoker    Chronic pain syndrome    HLD (hyperlipidemia)    Melanoma in situ    History of blood transfusion    Malignant neoplasm of right bronchus        Social Hx:  No current use of tobacco, alcohol, or illicit drugs    Medications:  MEDICATIONS  (STANDING):  chlorhexidine 4% Liquid 1 Application(s) Topical <User Schedule>  dexAMETHasone     Tablet 4 milliGRAM(s) Oral every 8 hours  famotidine    Tablet 20 milliGRAM(s) Oral daily  gabapentin Solution 600 milliGRAM(s) Oral three times a day  insulin lispro (ADMELOG) corrective regimen sliding scale   SubCutaneous Before meals and at bedtime  levETIRAcetam 750 milliGRAM(s) Oral two times a day  losartan 50 milliGRAM(s) Oral daily  oxyCODONE  ER Tablet 15 milliGRAM(s) Oral every 12 hours  polyethylene glycol 3350 17 Gram(s) Oral daily  potassium chloride    Tablet ER 40 milliEquivalent(s) Oral every 4 hours  senna 2 Tablet(s) Oral at bedtime  sodium chloride 2 Gram(s) Oral every 6 hours    MEDICATIONS  (PRN):  acetaminophen   IVPB .. 750 milliGRAM(s) IV Intermittent every 6 hours PRN Mild Pain (1 - 3)  ALPRAZolam 0.5 milliGRAM(s) Oral daily PRN severe anxiety  ondansetron Injectable 4 milliGRAM(s) IV Push every 6 hours PRN Nausea and/or Vomiting  oxyCODONE    IR 5 milliGRAM(s) Oral every 4 hours PRN Moderate Pain (4 - 6)  oxyCODONE    IR 10 milliGRAM(s) Oral every 4 hours PRN Severe Pain (7 - 10)  sodium chloride 0.9% lock flush 10 milliLiter(s) IV Push every 1 hour PRN Pre/post blood products, medications, blood draw, and to maintain line patency      Vitals:  T(C): 36.7 (04-18-24 @ 08:00), Max: 36.8 (04-17-24 @ 17:23)  HR: 57 (04-18-24 @ 08:00) (56 - 89)  BP: 123/75 (04-18-24 @ 08:00) (123/75 - 144/78)  RR: 18 (04-18-24 @ 08:00) (18 - 18)  SpO2: 99% (04-18-24 @ 08:00) (98% - 99%)    Physical Examination: INCOMPLETE  General - NAD  Cardiovascular - Peripheral pulses palpable, no edema  Eyes - Fundoscopy with flat, sharp optic discs and no hemorrhage or exudates; Fundoscopy not well visualized; Fundoscopy not performed due to safety precautions in the setting of the COVID-19 pandemic    Neurologic Exam:  Mental status - Awake, Alert, Oriented to person, place, and time. Speech fluent, repetition and naming intact. Follows simple and complex commands. Attention/concentration, recent and remote memory (including registration and recall), and fund of knowledge intact    Cranial nerves - PERRLA, VFF, EOMI, face sensation (V1-V3) intact b/l, facial strength intact without asymmetry b/l, hearing intact b/l, palate with symmetric elevation, trapezius OR sternocleidomastiod 5/5 strength b/l, tongue midline on protrusion with full lateral movement    Motor - Normal bulk and tone throughout. No pronator drift.  Strength testing            Deltoid      Biceps      Triceps     Wrist Extension    Wrist Flexion     Interossei         R            5                 5               5                     5                              5                        5                 5  L             5                 5               5                     5                              5                        5                 5              Hip Flexion    Hip Extension    Knee Flexion    Knee Extension    Dorsiflexion    Plantar Flexion  R              5                           5                       5                           5                            5                          5  L              5                           5                        5                           5                            5                          5    Sensation - Light touch/temperature OR pain/vibration intact throughout    DTR's -             Biceps      Triceps     Brachioradialis      Patellar    Ankle    Toes/plantar response  R             2+             2+                  2+                       2+            2+                 Down  L              2+             2+                 2+                        2+           2+                 Down    Coordination - Finger to Nose intact b/l. No tremors appreciated    Gait and station - Normal casual gait. Romberg (-)    Labs:                        9.4    7.10  )-----------( 88       ( 18 Apr 2024 08:32 )             29.2     04-18    135  |  98  |  12  ----------------------------<  98  3.4<L>   |  22  |  0.45<L>    Ca    9.3      18 Apr 2024 08:32      CAPILLARY BLOOD GLUCOSE      POCT Blood Glucose.: 105 mg/dL (18 Apr 2024 07:37)        PT/INR - ( 17 Apr 2024 14:00 )   PT: 11.1 sec;   INR: 1.01 ratio         PTT - ( 17 Apr 2024 14:00 )  PTT:22.6 sec  CSF:    Total Nucleated Cell Count, CSF: 32 /uL (04-15-24 @ 04:31)  RBC Count - Spinal Fluid: 80 /uL (04-15-24 @ 04:31)          Protein, CSF: 85 mg/dL (04-15-24 @ 04:32)        Radiology:  MR Head w/wo IV Cont:  (15 Apr 2024 13:13)  CT Head No Cont:  (14 Apr 2024 09:14)  CT Head No Cont:  (13 Apr 2024 13:18)  CT Head No Cont:  (13 Apr 2024 08:42)    MRI 4/15    IMPRESSION:    New heterogeneously enhancing lesion within the motor cortex of the right high posterior frontal lobe measuring approximately 1.2 x 1.2 cm with surrounding vasogenic edema involving the right high posterior frontal lobe and right high parietal lobe including the entirety of the right border cortex and the medial portion of the sensory cortex.    Previously visualized enhancing lesion in the medial left inferior frontal lobe and left genu of the of the corpus callosum, has markedly enlarged since prior exam, measuring approximately 2.9 cm AP x 2.5 cm TR, previously measuring 1.3 x 1.1 cm. There is a large degree of vasogenic edema surrounding the lesion involving the entirety of the left anterior frontal lobe and crossing the genu of the corpus callosum. This results in severe mass effect on the frontal horns of the bilateral ventricles, with approximately 6 mm of left to right midline shift at the level of the anterior falx.    Rest of the previously visualized enhancing lesions within the bilateral cerebri and cerebelli have resolved since the prior exam.    Several new scattered additional small areas of increased T2/FLAIR hyperintense signal in the bilateral cerebri without definite associated enhancement on the postcontrast sequences, may reflect new vasogenic edema from new not yet enhancing metastases.    No leptomeningeal disease is present.    MRI cervical: Multiple scattered areas of T1 hypointense/STIR hyperintense signal with associated enhancement throughout the cervical spine, most prominent within the inferior C3 vertebral body throughout the C4 vertebral body, suggestive of osseous metastases, however several areas are small enough that may reflect sequela of degenerative change. No epidural infiltration is identified. Additional degenerative changes as above.    MRI thoracic: Complete infiltration of the T2 and T3 vertebral bodies with abnormal T1 hypointense/T2 hyperintense and enhancing signal, compatible with complete neoplastic infiltration. There is a moderate to severe pathologic compression fracture of T2 and a mild to moderate pathologic compression fracture of T3. There is epidural extension of tumor within the right neural foramen at T2/T3 and T3/T4. There is secondary mass effect upon the cervical cord with mild impingement on the right ventral portion of the cord at the T3 level. No significant spinal canal stenosis at this level. No definite abnormal signal within the cord is identified. There are a few punctate additional areas of abnormal signal within the thoracic spine, suggestive of additional metastases.    MRI lumbar: No osseous metastases. No epidural infiltration. Neural foraminal stenosis at L5/S1 secondary to a listhesis. Neurology - Consult Note    -  Spectra: [phone] ([facility]), [phone] ([facility])  -    HPI: 58 years old woman with a PMH of NSCLC with brain metastasis left paraventricular frontal 2.9 x 2.5 cm and right frontal 1.2x1.2 both hemorrhagic, subsegmental PE was on Eliquis 5 mg twice daily recently diagnosed in March 16. CTA chest repeat during this admission revealed no PE, currently off AC because of bleed. Patient was initially found to have brain metastasis on CT brain performed in March 17 2024. Patient was started on eliquis 5 mg BID for PE on March 16 since no bleed on CT noted. She then presented to OSH where she was noted to have a seizure episode, GTC by phenomenology and was given ativan and loaded with keppra. Metastasis was then found to be hemorrhagic thereafter. Patient is currently on keppra 750 BID. EEG was performed and revealed bilateral frontal epileptogenic foci but no active seizures noted. We were consulted to consider alternative antiepileptic medication given potential contribution to thrombocytopenia which was [id number] today. Patient had normal platelet count on April 13th the day of admission and decreased to 88K with 40K on blue tops today.  Patient was started on Zosyn during hospitalization for questionable enteritis on CT abdomen but was stopped because was deemed not needing treatment by ID. Protonix was stopped due to consideration that it might contribute to thrombocytopenia. Patient received an LP and was found to have elevated WBC count but was considered to be of metastatic etiology per below: Review of the cytospin of cerebrospinal fluid shows: A few atypical large cells with prominent nucleoli and abundant, occasionally vacuolated cytoplasm, in a background of small lymphocytes, neutrophils and macrophages. 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 Candelaria this week. Patient will still be considered for gamma knife next week after if platelets improve.    Review of Systems:    NEUROLOGICAL: +As stated in HPI above  All other review of systems is negative unless indicated above.    Allergies:  No Known Allergies  morphine (Sedation/Somnol)    PMHx/PSHx/Family Hx: As above, otherwise see below   No pertinent past medical history    Mass of right lung    COPD (chronic obstructive pulmonary disease)    Smoker    Chronic pain syndrome    HLD (hyperlipidemia)    Melanoma in situ    History of blood transfusion    Malignant neoplasm of right bronchus        Social Hx:  No current use of tobacco, alcohol, or illicit drugs    Medications:  MEDICATIONS  (STANDING):  chlorhexidine 4% Liquid 1 Application(s) Topical <User Schedule>  dexAMETHasone     Tablet 4 milliGRAM(s) Oral every 8 hours  famotidine    Tablet 20 milliGRAM(s) Oral daily  gabapentin Solution 600 milliGRAM(s) Oral three times a day  insulin lispro (ADMELOG) corrective regimen sliding scale   SubCutaneous Before meals and at bedtime  levETIRAcetam 750 milliGRAM(s) Oral two times a day  losartan 50 milliGRAM(s) Oral daily  oxyCODONE  ER Tablet 15 milliGRAM(s) Oral every 12 hours  polyethylene glycol 3350 17 Gram(s) Oral daily  potassium chloride    Tablet ER 40 milliEquivalent(s) Oral every 4 hours  senna 2 Tablet(s) Oral at bedtime  sodium chloride 2 Gram(s) Oral every 6 hours    MEDICATIONS  (PRN):  acetaminophen   IVPB .. 750 milliGRAM(s) IV Intermittent every 6 hours PRN Mild Pain (1 - 3)  ALPRAZolam 0.5 milliGRAM(s) Oral daily PRN severe anxiety  ondansetron Injectable 4 milliGRAM(s) IV Push every 6 hours PRN Nausea and/or Vomiting  oxyCODONE    IR 5 milliGRAM(s) Oral every 4 hours PRN Moderate Pain (4 - 6)  oxyCODONE    IR 10 milliGRAM(s) Oral every 4 hours PRN Severe Pain (7 - 10)  sodium chloride 0.9% lock flush 10 milliLiter(s) IV Push every 1 hour PRN Pre/post blood products, medications, blood draw, and to maintain line patency      Vitals:  T(C): 36.7 (04-18-24 @ 08:00), Max: 36.8 (04-17-24 @ 17:23)  HR: 57 (04-18-24 @ 08:00) (56 - 89)  BP: 123/75 (04-18-24 @ 08:00) (123/75 - 144/78)  RR: 18 (04-18-24 @ 08:00) (18 - 18)  SpO2: 99% (04-18-24 @ 08:00) (98% - 99%)    Physical Examination:   General - NAD    Neurologic Exam:  Mental status - Awake, Alert, Oriented to person, place, and time. Speech fluent, repetition and naming intact. Follows simple and complex commands. Attention/concentration, recent and remote memory (including registration and recall), and fund of knowledge intact    Cranial nerves - PERRLA, VFF, EOMI, face sensation (V1-V3) intact b/l, facial strength intact without asymmetry b/l, hearing intact b/l, palate with symmetric elevation, sternocleidomastiod 5/5 strength b/l, tongue midline on protrusion with full lateral movement    Motor - Normal bulk and tone throughout. No pronator drift.  Strength testing            Deltoid      Biceps      Triceps                R            5                 5               5                     5                               L             5                 5               5                     5                                          Hip Flexion    Hip Extension                  Knee Flexion    Knee Extension    Dorsiflexion    Plantar Flexion  R              5                           5                       5                           5                            5                          5  L              5                           5                        5                           5                            5                          5    Sensation - Light touch intact throughout    DTR's -             Biceps      Triceps     Brachioradialis               Patellar    Ankle    Toes/plantar response  R             2+             2+                  2+                       2+            2+                 Down  L              2+             2+                 2+                        2+           2+                 Down    Coordination - Finger to Nose intact b/l. No tremors appreciated. HTS intact bilateral    Gait and station - not attempted    Labs:                        9.4    7.10  )-----------( 88       ( 18 Apr 2024 08:32 )             29.2     04-18    135  |  98  |  12  ----------------------------<  98  3.4<L>   |  22  |  0.45<L>    Ca    9.3      18 Apr 2024 08:32      CAPILLARY BLOOD GLUCOSE      POCT Blood Glucose.: 105 mg/dL (18 Apr 2024 07:37)        PT/INR - ( 17 Apr 2024 14:00 )   PT: 11.1 sec;   INR: 1.01 ratio         PTT - ( 17 Apr 2024 14:00 )  PTT:22.6 sec  CSF:    Total Nucleated Cell Count, CSF: 32 /uL (04-15-24 @ 04:31)  RBC Count - Spinal Fluid: 80 /uL (04-15-24 @ 04:31)          Protein, CSF: 85 mg/dL (04-15-24 @ 04:32)        Radiology:  MR Head w/wo IV Cont:  (15 Apr 2024 13:13)  CT Head No Cont:  (14 Apr 2024 09:14)  CT Head No Cont:  (13 Apr 2024 13:18)  CT Head No Cont:  (13 Apr 2024 08:42)    MRI 4/15    IMPRESSION:    New heterogeneously enhancing lesion within the motor cortex of the right high posterior frontal lobe measuring approximately 1.2 x 1.2 cm with surrounding vasogenic edema involving the right high posterior frontal lobe and right high parietal lobe including the entirety of the right border cortex and the medial portion of the sensory cortex.    Previously visualized enhancing lesion in the medial left inferior frontal lobe and left genu of the of the corpus callosum, has markedly enlarged since prior exam, measuring approximately 2.9 cm AP x 2.5 cm TR, previously measuring 1.3 x 1.1 cm. There is a large degree of vasogenic edema surrounding the lesion involving the entirety of the left anterior frontal lobe and crossing the genu of the corpus callosum. This results in severe mass effect on the frontal horns of the bilateral ventricles, with approximately 6 mm of left to right midline shift at the level of the anterior falx.    Rest of the previously visualized enhancing lesions within the bilateral cerebri and cerebelli have resolved since the prior exam.    Several new scattered additional small areas of increased T2/FLAIR hyperintense signal in the bilateral cerebri without definite associated enhancement on the postcontrast sequences, may reflect new vasogenic edema from new not yet enhancing metastases.    No leptomeningeal disease is present.    MRI cervical: Multiple scattered areas of T1 hypointense/STIR hyperintense signal with associated enhancement throughout the cervical spine, most prominent within the inferior C3 vertebral body throughout the C4 vertebral body, suggestive of osseous metastases, however several areas are small enough that may reflect sequela of degenerative change. No epidural infiltration is identified. Additional degenerative changes as above.    MRI thoracic: Complete infiltration of the T2 and T3 vertebral bodies with abnormal T1 hypointense/T2 hyperintense and enhancing signal, compatible with complete neoplastic infiltration. There is a moderate to severe pathologic compression fracture of T2 and a mild to moderate pathologic compression fracture of T3. There is epidural extension of tumor within the right neural foramen at T2/T3 and T3/T4. There is secondary mass effect upon the cervical cord with mild impingement on the right ventral portion of the cord at the T3 level. No significant spinal canal stenosis at this level. No definite abnormal signal within the cord is identified. There are a few punctate additional areas of abnormal signal within the thoracic spine, suggestive of additional metastases.    MRI lumbar: No osseous metastases. No epidural infiltration. Neural foraminal stenosis at L5/S1 secondary to a listhesis. Neurology - Consult Note    -  Spectra: [phone] ([facility]), [phone] ([facility])  -    HPI: 58 years old woman with a PMH of NSCLC with brain metastasis left paraventricular frontal 2.9 x 2.5 cm and right frontal 1.2x1.2 both hemorrhagic, subsegmental PE was on Eliquis 5 mg twice daily recently diagnosed in March 16. CTA chest repeat during this admission revealed no PE, currently off AC because of bleed. Patient was initially found to have brain metastasis on CT brain performed in March 17 2024. Patient was started on eliquis 5 mg BID for PE on March 16 since no bleed on CT noted. She then presented to OSH where she was noted to have a seizure episode, GTC by phenomenology and was given ativan and loaded with keppra. Patient back then was somnolent per family and had a seizure episode with a loss of consciousness for about a minute after which she was persistently confused. She was febrile and was then intubated and then extubated the 15th of April. She was found to have hemorrhagic transformation of her metastasis. Patient is currently on keppra 750 BID. EEG was performed and revealed bilateral frontal epileptogenic foci but no active seizures noted. We were consulted to consider alternative antiepileptic medication given potential contribution to thrombocytopenia which was 61995 today. Patient had normal platelet count on April 13th the day of admission and decreased to 88K with 40K on blue tops today.  Patient was started on Zosyn during hospitalization for questionable enteritis on CT abdomen but was stopped because was deemed not needing treatment by ID. Protonix was stopped due to consideration that it might contribute to thrombocytopenia. Patient received an LP and was found to have elevated WBC count but was considered to be of metastatic etiology per below: Review of the cytospin of cerebrospinal fluid shows: A few atypical large cells with prominent nucleoli and abundant, occasionally vacuolated cytoplasm, in a background of small lymphocytes, neutrophils and macrophages. 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 Candelaria this week. Patient will still be considered for gamma knife next week after if platelets improve.    Review of Systems:    NEUROLOGICAL: +As stated in HPI above  All other review of systems is negative unless indicated above.    Allergies:  No Known Allergies  morphine (Sedation/Somnol)    PMHx/PSHx/Family Hx: As above, otherwise see below   No pertinent past medical history    Mass of right lung    COPD (chronic obstructive pulmonary disease)    Smoker    Chronic pain syndrome    HLD (hyperlipidemia)    Melanoma in situ    History of blood transfusion    Malignant neoplasm of right bronchus        Social Hx:  No current use of tobacco, alcohol, or illicit drugs    Medications:  MEDICATIONS  (STANDING):  chlorhexidine 4% Liquid 1 Application(s) Topical <User Schedule>  dexAMETHasone     Tablet 4 milliGRAM(s) Oral every 8 hours  famotidine    Tablet 20 milliGRAM(s) Oral daily  gabapentin Solution 600 milliGRAM(s) Oral three times a day  insulin lispro (ADMELOG) corrective regimen sliding scale   SubCutaneous Before meals and at bedtime  levETIRAcetam 750 milliGRAM(s) Oral two times a day  losartan 50 milliGRAM(s) Oral daily  oxyCODONE  ER Tablet 15 milliGRAM(s) Oral every 12 hours  polyethylene glycol 3350 17 Gram(s) Oral daily  potassium chloride    Tablet ER 40 milliEquivalent(s) Oral every 4 hours  senna 2 Tablet(s) Oral at bedtime  sodium chloride 2 Gram(s) Oral every 6 hours    MEDICATIONS  (PRN):  acetaminophen   IVPB .. 750 milliGRAM(s) IV Intermittent every 6 hours PRN Mild Pain (1 - 3)  ALPRAZolam 0.5 milliGRAM(s) Oral daily PRN severe anxiety  ondansetron Injectable 4 milliGRAM(s) IV Push every 6 hours PRN Nausea and/or Vomiting  oxyCODONE    IR 5 milliGRAM(s) Oral every 4 hours PRN Moderate Pain (4 - 6)  oxyCODONE    IR 10 milliGRAM(s) Oral every 4 hours PRN Severe Pain (7 - 10)  sodium chloride 0.9% lock flush 10 milliLiter(s) IV Push every 1 hour PRN Pre/post blood products, medications, blood draw, and to maintain line patency      Vitals:  T(C): 36.7 (04-18-24 @ 08:00), Max: 36.8 (04-17-24 @ 17:23)  HR: 57 (04-18-24 @ 08:00) (56 - 89)  BP: 123/75 (04-18-24 @ 08:00) (123/75 - 144/78)  RR: 18 (04-18-24 @ 08:00) (18 - 18)  SpO2: 99% (04-18-24 @ 08:00) (98% - 99%)    Physical Examination:   General - NAD    Neurologic Exam:  Mental status - Awake, Alert, Oriented to person, place, and time. Speech fluent, repetition and naming intact. Follows simple and complex commands. Attention/concentration, recent and remote memory (including registration and recall), and fund of knowledge intact    Cranial nerves - PERRLA, VFF, EOMI, face sensation (V1-V3) intact b/l, facial strength intact without asymmetry b/l, hearing intact b/l, palate with symmetric elevation, sternocleidomastiod 5/5 strength b/l, tongue midline on protrusion with full lateral movement    Motor - Normal bulk and tone throughout. No pronator drift.  Strength testing            Deltoid      Biceps      Triceps                R            5                 5               5                     5                               L             5                 5               5                     5                                          Hip Flexion    Hip Extension                  Knee Flexion    Knee Extension    Dorsiflexion    Plantar Flexion  R              5                           5                       5                           5                            5                          5  L              5                           5                        5                           5                            5                          5    Sensation - Light touch intact throughout    DTR's -             Biceps      Triceps     Brachioradialis               Patellar    Ankle    Toes/plantar response  R             2+             2+                  2+                       2+            2+                 Down  L              2+             2+                 2+                        2+           2+                 Down    Coordination - Finger to Nose intact b/l. No tremors appreciated. HTS intact bilateral    Gait and station - not attempted    Labs:                        9.4    7.10  )-----------( 88       ( 18 Apr 2024 08:32 )             29.2     04-18    135  |  98  |  12  ----------------------------<  98  3.4<L>   |  22  |  0.45<L>    Ca    9.3      18 Apr 2024 08:32      CAPILLARY BLOOD GLUCOSE      POCT Blood Glucose.: 105 mg/dL (18 Apr 2024 07:37)        PT/INR - ( 17 Apr 2024 14:00 )   PT: 11.1 sec;   INR: 1.01 ratio         PTT - ( 17 Apr 2024 14:00 )  PTT:22.6 sec  CSF:    Total Nucleated Cell Count, CSF: 32 /uL (04-15-24 @ 04:31)  RBC Count - Spinal Fluid: 80 /uL (04-15-24 @ 04:31)          Protein, CSF: 85 mg/dL (04-15-24 @ 04:32)        Radiology:  MR Head w/wo IV Cont:  (15 Apr 2024 13:13)  CT Head No Cont:  (14 Apr 2024 09:14)  CT Head No Cont:  (13 Apr 2024 13:18)  CT Head No Cont:  (13 Apr 2024 08:42)    MRI 4/15    IMPRESSION:    New heterogeneously enhancing lesion within the motor cortex of the right high posterior frontal lobe measuring approximately 1.2 x 1.2 cm with surrounding vasogenic edema involving the right high posterior frontal lobe and right high parietal lobe including the entirety of the right border cortex and the medial portion of the sensory cortex.    Previously visualized enhancing lesion in the medial left inferior frontal lobe and left genu of the of the corpus callosum, has markedly enlarged since prior exam, measuring approximately 2.9 cm AP x 2.5 cm TR, previously measuring 1.3 x 1.1 cm. There is a large degree of vasogenic edema surrounding the lesion involving the entirety of the left anterior frontal lobe and crossing the genu of the corpus callosum. This results in severe mass effect on the frontal horns of the bilateral ventricles, with approximately 6 mm of left to right midline shift at the level of the anterior falx.    Rest of the previously visualized enhancing lesions within the bilateral cerebri and cerebelli have resolved since the prior exam.    Several new scattered additional small areas of increased T2/FLAIR hyperintense signal in the bilateral cerebri without definite associated enhancement on the postcontrast sequences, may reflect new vasogenic edema from new not yet enhancing metastases.    No leptomeningeal disease is present.    MRI cervical: Multiple scattered areas of T1 hypointense/STIR hyperintense signal with associated enhancement throughout the cervical spine, most prominent within the inferior C3 vertebral body throughout the C4 vertebral body, suggestive of osseous metastases, however several areas are small enough that may reflect sequela of degenerative change. No epidural infiltration is identified. Additional degenerative changes as above.    MRI thoracic: Complete infiltration of the T2 and T3 vertebral bodies with abnormal T1 hypointense/T2 hyperintense and enhancing signal, compatible with complete neoplastic infiltration. There is a moderate to severe pathologic compression fracture of T2 and a mild to moderate pathologic compression fracture of T3. There is epidural extension of tumor within the right neural foramen at T2/T3 and T3/T4. There is secondary mass effect upon the cervical cord with mild impingement on the right ventral portion of the cord at the T3 level. No significant spinal canal stenosis at this level. No definite abnormal signal within the cord is identified. There are a few punctate additional areas of abnormal signal within the thoracic spine, suggestive of additional metastases.    MRI lumbar: No osseous metastases. No epidural infiltration. Neural foraminal stenosis at L5/S1 secondary to a listhesis.

## 2024-04-18 NOTE — PROGRESS NOTE ADULT - ASSESSMENT
58 year old  with metastatic lung cancer : numerous mets  in brain and spinal met in thoracic spine/ On immunotx admitted  unresponsive and increased size of lesion; treated with decadron.   no pna.   decreased platelets  was placed on zosyn empirically   CT with mild enteritis  pt currently alert and denies any abd pain , nausea, or vomiting /  no  diarrhea    no need for zosyn  to treat enteritis     enteritis is a non specific finding but in the absence of any symptoms would not work it up further\  discussed with NS   platelets mildly low  would trend  many potential causes     no symptoms off zosyn  for RT therapy

## 2024-04-18 NOTE — PROGRESS NOTE ADULT - SUBJECTIVE AND OBJECTIVE BOX
infectious diseases progress note:    Patient is a 59y old  Female who presents with a chief complaint of brain mets (2024 11:35)        Altered mental status             Allergies    No Known Allergies    Intolerances    morphine (Sedation/Somnol)      ANTIBIOTICS/RELEVANT:  antimicrobials    immunologic:    OTHER:  acetaminophen   IVPB .. 750 milliGRAM(s) IV Intermittent every 6 hours PRN  ALPRAZolam 0.5 milliGRAM(s) Oral daily PRN  chlorhexidine 4% Liquid 1 Application(s) Topical <User Schedule>  dexAMETHasone     Tablet 4 milliGRAM(s) Oral every 8 hours  famotidine    Tablet 20 milliGRAM(s) Oral daily  gabapentin Solution 600 milliGRAM(s) Oral three times a day  insulin lispro (ADMELOG) corrective regimen sliding scale   SubCutaneous Before meals and at bedtime  levETIRAcetam 750 milliGRAM(s) Oral two times a day  losartan 50 milliGRAM(s) Oral daily  ondansetron Injectable 4 milliGRAM(s) IV Push every 6 hours PRN  oxyCODONE    IR 5 milliGRAM(s) Oral every 4 hours PRN  oxyCODONE    IR 10 milliGRAM(s) Oral every 4 hours PRN  oxyCODONE  ER Tablet 15 milliGRAM(s) Oral every 12 hours  polyethylene glycol 3350 17 Gram(s) Oral daily  senna 2 Tablet(s) Oral at bedtime  sodium chloride 2 Gram(s) Oral every 6 hours  sodium chloride 0.9% lock flush 10 milliLiter(s) IV Push every 1 hour PRN      Objective:  Vital Signs Last 24 Hrs  T(C): 36.6 (2024 05:10), Max: 36.8 (2024 17:23)  T(F): 97.9 (2024 05:10), Max: 98.2 (2024 17:23)  HR: 68 (2024 05:10) (56 - 89)  BP: 139/83 (2024 05:10) (123/75 - 144/78)  BP(mean): --  RR: 18 (2024 05:10) (18 - 18)  SpO2: 98% (2024 05:10) (98% - 99%)    Parameters below as of 2024 05:10  Patient On (Oxygen Delivery Method): room air         Eyes:REX, EOMI  Ear/Nose/Throat: no oral lesion, no sinus tenderness on percussion	  Neck:no JVD, no lymphadenopathy, supple  Respiratory: CTA hiren  Cardiovascular: S1S2 RRR, no murmurs  Gastrointestinal:soft, (+) BS, no HSM  Extremities:no e/e/c        LABS:                        9.4    7.10  )-----------( 88       ( 2024 08:32 )             29.2         138  |  103  |  18  ----------------------------<  117<H>  4.2   |  20<L>  |  0.50    Ca    9.7      2024 06:51      PT/INR - ( 2024 14:00 )   PT: 11.1 sec;   INR: 1.01 ratio         PTT - ( 2024 14:00 )  PTT:22.6 sec  Urinalysis Basic - ( 2024 15:55 )    Color: Yellow / Appearance: Clear / S.022 / pH: x  Gluc: x / Ketone: Negative mg/dL  / Bili: Negative / Urobili: 0.2 mg/dL   Blood: x / Protein: Negative mg/dL / Nitrite: Negative   Leuk Esterase: Negative / RBC: 1 /HPF / WBC 1 /HPF   Sq Epi: x / Non Sq Epi: 1 /HPF / Bacteria: Negative /HPF          MICROBIOLOGY:    RECENT CULTURES:  04-15 @ 04:36 .CSF CSF lumbar       Few polymorphonuclear leukocytes seen per low power field  No organisms seen  by cytocentrifuge           No growth     @ 16:14 .Blood Blood                No growth at 4 days     @ 16:10 .Blood Blood                No growth at 4 days     @ 07:00 .Blood Blood-Peripheral                No growth at 4 days          RESPIRATORY CULTURES:              RADIOLOGY & ADDITIONAL STUDIES:        Pager [phone]  After 5 pm/weekends or if no response :[phone]

## 2024-04-18 NOTE — CHART NOTE - NSCHARTNOTEFT_GEN_A_CORE
Nutrition Follow Up Note  Patient seen for: Severe malnutrition follow up    Chart reviewed, events noted.    Source: [X] Patient       [x] Electronic Medical Record     [] RN        [] Family at bedside       [] Other:    -If unable to interview patient: [] Trach/Vent/BiPAP  [] Disoriented/confused/inappropriate to interview    Diet Order:   Diet, Regular (04-15-24)    - Is current order appropriate/adequate? [] Yes  [X]  No: See appropriate recommendations below    - PO intake :   [] >75%  Adequate    [X] 50-75%  Fair       [] <50%  Poor    Nutrition-related concerns:  - NSCLC; ordered for decadron.   - Endorses fair, fluctuating appetite/PO intake, consuming ~50% of meals in-house. Pt amenable to trialing [employer] Glucose Support Protein Shake (per serving provides 16 g PRO, 300 kcal) 1x/day, HP Gelatein Plus (per serving provides 20 g PRO, 160 sonja) 1x/day and Liquid Protein Supplementation (per serving provides 15 g PRO, 100 kcal) 1x/day; RD to f/u with pt's flavor preferences as able.   - POCTs x 24 hrs: 105 - 162 mg/dL WNL. Noted pt ordered for steroids in-house, which may elevate blood glucose levels. RD will continue to monitor blood glucose levels prn.   - Noted hypokalemia () s/p repletion.     GI:   - No N/V reported; ordered for odansetron. nor diarrhea/constipation reported;  Last BM . Bowel Regimen? [X] Yes: Senna, Miralax.     Weights:   - Dosing wt in k.5 (); No daily wts available. RD will continue to monitor weight trends as available/able.   - IBW: 47.7 kg (based on ht of 61 in)  - BMI: 17.7 kg/m^2 (based on dosing wt)    Nutritionally Pertinent MEDICATIONS  (STANDING):  dexAMETHasone     Tablet  famotidine    Tablet  losartan  polyethylene glycol 3350  potassium chloride    Tablet ER  senna  sodium chloride    Pertinent Labs:  @ 08:32: Na 135, BUN 12, Cr 0.45<L>, BG 98, K+ 3.4<L>, Phos --, Mg --, Alk Phos --, ALT/SGPT --, AST/SGOT --, HbA1c --    A1C with Estimated Average Glucose Result: 5.4 % (24 @ 22:55)  A1C with Estimated Average Glucose Result: 5.8 % (24 @ 06:30)    Finger Sticks:  POCT Blood Glucose.: 108 mg/dL ( @ 11:33)  POCT Blood Glucose.: 105 mg/dL ( @ 07:37)  POCT Blood Glucose.: 116 mg/dL ( @ 21:21)  POCT Blood Glucose.: 124 mg/dL ( @ 16:29)      Skin per nursing documentation: Noted stage 1 sacral pressure injury.   Edema per nursing documentation: no edema noted.     Estimated Needs: Based on dosing wt of 42.5 kg ()   [X] no change since previous assessment  Caloric Needs: 1275 - 1487 Kcal/day (30 - 35 kcal/kg)  Protein Needs: 55 - 68 g PRO/day (1.3 - 1.6 g PRO/kg)  Defer fluid needs to team.    Previous Nutrition Diagnosis: Severe chronic malnutrition; Underweight; Increased nutrient demands  Nutrition Diagnosis is: [X] ongoing  [] resolved [] not applicable     New Nutrition Diagnosis: [X] Not applicable    Nutrition Care Plan:  [X] In Progress; being addressed via PO intake, oral nutrition supplementation, micronutrient supplementation.   [] Achieved  [] Not applicable    Nutrition Interventions:     Education Provided:       [x] Yes: Educated on the importance of consuming a diet adequate in protein rich food sources; encouraged prioritizing protein foods first at meal times; recommended small, frequent nutrient dense meals. Discussed the benefits of oral nutrition supplementation; recommended having small sips in between meals to optimize protein intake. Pt receptive to diet education and made aware RD remains available upon request.     Education Handouts:  - High Protein, High Calorie Nutrition Therapy; High Protein, High Calorie Receipes       Recommendations:         [X] Continue current diet order: Regular            [X] Trial oral nutrition supplement: [employer] Glucose Support Protein Shake (per serving provides 16 g PRO, 300 kcal) 1x/day, HP Gelatein Plus (per serving provides 20 g PRO, 160 sonja) 1x/day, Liquid Protein Supplementation (per serving provides 15 g PRO, 100 kcal) 1x/day. RD to f/u with pt's flavor preferences.      [X] Consider adding multivitamin and vitamin C once daily for wound healing, pending no medical contraindications      [X] Encourage small, frequent nutrient dense meals; obtain food preferences to optimize PO intake; Provide meal time assistance prn.     Monitoring and Evaluation:   Continue to monitor nutritional intake, tolerance to diet prescription, weights, labs, skin integrity      RD remains available upon request and will follow up per protocol  Fransisca Irving RD Available via Teams

## 2024-04-18 NOTE — PROVIDER CONTACT NOTE (OTHER) - SITUATION
[place]. [place] [facility] [place] [facility]3. Pt. admitted from ED on nasal cannula, tachypneic with a respiratory rate in the 50s and febrile with a core temp of 39.5C. PT. also obtunded at this time.
Pt. has poor IV access despite multiple attempts. Pt. ordered for fluid resuscitation and supplements. Pt. in respiratory distress and may require intubation.
Patient has increased work of breathing, accessory muscle use, respiratory rate in the 50 on high mitra, limited intravenous access
platlet count 15

## 2024-04-18 NOTE — CHART NOTE - NSCHARTNOTESELECT_GEN_ALL_CORE
POCUS
EEG prelim
Event Note
Lt Axillary Arterial Line/Event Note
Nutrition Services
VTE Risk Assessment/Event Note
central line removal/Event Note

## 2024-04-18 NOTE — PROGRESS NOTE ADULT - SUBJECTIVE AND OBJECTIVE BOX
SUBJECTIVE: Patient seen and examined at bedside this AM daughter Araceli present, in no acute distress. Discussed low platelet lab value and transfusion with patient, who displays understanding and consents for blood products. Vitals stable. No significant overnight events     Vital Signs Last 24 Hrs  T(C): 36.5 (04-18-24 @ 13:36), Max: 36.8 (04-17-24 @ 17:23)  T(F): 97.7 (04-18-24 @ 13:36), Max: 98.2 (04-17-24 @ 17:23)  HR: 72 (04-18-24 @ 13:36) (56 - 89)  BP: 144/76 (04-18-24 @ 13:36) (123/75 - 144/78)  BP(mean): --  RR: 18 (04-18-24 @ 13:36) (18 - 18)  SpO2: 96% (04-18-24 @ 13:36) (96% - 99%)    PHYSICAL EXAM:  Constitutional: No Acute Distress   Neurological: awake/alert, oriented x3 (person, place, time), EOMI, Pupils 4mm reactive b/l, no facial asymmetry, Moving all extremities with 5/5 symmetric strength   Incision: prev lumbar puncture site c/d/i   Pulmonary: Clear Lungs   Cardiovascular: Regular rate and rhythm   Gastrointestinal: Soft, Non-tender, Non-distended abdomen, +bowel sounds x 4  Extremities: No calf tenderness bilaterally, no edema, B/L SCD's on     LABS:             9.4    7.10  )-----------( 88       ( 18 Apr 2024 08:32 )             29.2    04-18  135  |  98  |  12  ----------------------------<  98  3.4<L>   |  22  |  0.45<L>  Ca    9.3      18 Apr 2024 08:32  PT/INR - ( 17 Apr 2024 14:00 )   PT: 11.1 sec;   INR: 1.01 ratio    PTT - ( 17 Apr 2024 14:00 )  PTT:22.6 sec    04-17 @ 07:01  -  04-18 @ 07:00  --------------------------------------------------------  IN: 1180 mL / OUT: 0 mL / NET: 1180 mL    IMAGING: reviewed     MEDICATIONS  (STANDING):  dexAMETHasone     Tablet 4 milliGRAM(s) Oral every 8 hours  famotidine    Tablet 20 milliGRAM(s) Oral daily  gabapentin Solution 600 milliGRAM(s) Oral three times a day  lacosamide IVPB 150 milliGRAM(s) IV Intermittent every 12 hours  losartan 50 milliGRAM(s) Oral daily  oxyCODONE  ER Tablet 15 milliGRAM(s) Oral every 12 hours  polyethylene glycol 3350 17 Gram(s) Oral daily  potassium chloride    Tablet ER 40 milliEquivalent(s) Oral every 4 hours  senna 2 Tablet(s) Oral at bedtime  sodium chloride 2 Gram(s) Oral every 6 hours    MEDICATIONS  (PRN):  acetaminophen   IVPB .. 750 milliGRAM(s) IV Intermittent every 6 hours PRN Mild Pain (1 - 3)  ALPRAZolam 0.5 milliGRAM(s) Oral daily PRN severe anxiety  ondansetron Injectable 4 milliGRAM(s) IV Push every 6 hours PRN Nausea and/or Vomiting  oxyCODONE    IR 10 milliGRAM(s) Oral every 4 hours PRN Severe Pain (7 - 10)  oxyCODONE    IR 5 milliGRAM(s) Oral every 4 hours PRN Moderate Pain (4 - 6)    DIET: Regular    SUBJECTIVE: Patient seen and examined at bedside this AM daughter Araceli present, in no acute distress. Discussed low platelet lab value and transfusion with patient, who displays understanding and consents for blood products. Vitals stable. No significant overnight events     Vital Signs Last 24 Hrs  T(C): 36.5 (04-18-24 @ 13:36), Max: 36.8 (04-17-24 @ 17:23)  T(F): 97.7 (04-18-24 @ 13:36), Max: 98.2 (04-17-24 @ 17:23)  HR: 72 (04-18-24 @ 13:36) (56 - 89)  BP: 144/76 (04-18-24 @ 13:36) (123/75 - 144/78)  BP(mean): --  RR: 18 (04-18-24 @ 13:36) (18 - 18)  SpO2: 96% (04-18-24 @ 13:36) (96% - 99%)    PHYSICAL EXAM:  Constitutional: No Acute Distress   Neurological: awake/alert, oriented x3 (person, place, time), EOMI, Pupils 4mm reactive b/l, no facial asymmetry, Moving all extremities with 5/5 symmetric strength   Incision: prev lumbar puncture site c/d/i   Pulmonary: Clear Lungs   Cardiovascular: Regular rate and rhythm   Gastrointestinal: Soft, Non-tender, Non-distended abdomen, +bowel sounds x 4  Extremities: No calf tenderness bilaterally, no edema, B/L SCD's     LABS:             9.4    7.10  )-----------( 88       ( 18 Apr 2024 08:32 )             29.2    04-18  135  |  98  |  12  ----------------------------<  98  3.4<L>   |  22  |  0.45<L>  Ca    9.3      18 Apr 2024 08:32  PT/INR - ( 17 Apr 2024 14:00 )   PT: 11.1 sec;   INR: 1.01 ratio    PTT - ( 17 Apr 2024 14:00 )  PTT:22.6 sec    04-17 @ 07:01  -  04-18 @ 07:00  --------------------------------------------------------  IN: 1180 mL / OUT: 0 mL / NET: 1180 mL    IMAGING: reviewed     MEDICATIONS  (STANDING):  dexAMETHasone     Tablet 4 milliGRAM(s) Oral every 8 hours  famotidine    Tablet 20 milliGRAM(s) Oral daily  gabapentin Solution 600 milliGRAM(s) Oral three times a day  lacosamide IVPB 150 milliGRAM(s) IV Intermittent every 12 hours  losartan 50 milliGRAM(s) Oral daily  oxyCODONE  ER Tablet 15 milliGRAM(s) Oral every 12 hours  polyethylene glycol 3350 17 Gram(s) Oral daily  potassium chloride    Tablet ER 40 milliEquivalent(s) Oral every 4 hours  senna 2 Tablet(s) Oral at bedtime  sodium chloride 2 Gram(s) Oral every 6 hours    MEDICATIONS  (PRN):  acetaminophen   IVPB .. 750 milliGRAM(s) IV Intermittent every 6 hours PRN Mild Pain (1 - 3)  ALPRAZolam 0.5 milliGRAM(s) Oral daily PRN severe anxiety  ondansetron Injectable 4 milliGRAM(s) IV Push every 6 hours PRN Nausea and/or Vomiting  oxyCODONE    IR 10 milliGRAM(s) Oral every 4 hours PRN Severe Pain (7 - 10)  oxyCODONE    IR 5 milliGRAM(s) Oral every 4 hours PRN Moderate Pain (4 - 6)    DIET: Regular

## 2024-04-18 NOTE — PROVIDER CONTACT NOTE (OTHER) - RECOMMENDATIONS
transfuse platlets
ABG, escalate respiratory support, central line for IV access.
Place central line to appropriate manage patient and place arterial line for appropriate SBP monitoring, intubate patient in respiratory distress
intubate patient for airway protection, pan-culture patient and cool with hyperthermia blanket

## 2024-04-18 NOTE — CONSULT NOTE ADULT - ASSESSMENT
HPI: 58 years old woman with a PMH of NSCLC with brain metastasis left paraventricular frontal 2.9 x 2.5 cm and right frontal 1.2x1.2 both hemorrhagic, subsegmental PE recently diagnosed in March 16. CTA chest repeat during this admission revealed no P. Currenlt off AC because of bleed. Patient was found to have brain metastasis on CT brain performed in March 17 2024. Patient was started on eliquis 5 mg BID for PE. She then presented to OSH where she was noted to have a seizure episode GTC and was given ativan and loaded with keppra. Metastasis was then found to be hemorrhagic thereafter. Patient is currently on keppra 750 BID. EEG was performed and revealed bilateral frontal epileptogenic foci but no active seizures noted. We were consulted to consider alterantive antiepileptic medication given potential contribution to thrombocytopenia which was [id number] today. Patient had normal platelet count on April 13th the day of admission and decreased to 88K with 40K on blue tops today.     Patient was started on Zosyn during hospitalization for questionable enteritis on CT abdomen but was stopped because was deemed not needing treatment by ID and was stopped considering that this might contribute to trhombocytopenia. Protonix was stopped due to consideration that it might contribute to thrombocytopenia.    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 Candelaria this week. Patient will still be considered for gamma knife next week after if platelets improve.    Impression:    Recommendations:  INCOMPLETE  EKG is being obtained  vimpat 150 BID IV  stop keppra  consult hematology    To be discussed with neurology attending and will be formally staffed by attending during morning rounds. Recommendations will be finalized once signed by attending.  HPI: 58 years old woman with a PMH of NSCLC with brain metastasis left paraventricular frontal 2.9 x 2.5 cm and right frontal 1.2x1.2 both hemorrhagic, subsegmental PE recently diagnosed in March 16. CTA chest repeat during this admission revealed no P. Currenlt off AC because of bleed. Patient was found to have brain metastasis on CT brain performed in March 17 2024. Patient was started on eliquis 5 mg BID for PE. She then presented to OSH where she was noted to have a seizure episode GTC and was given ativan and loaded with keppra. Metastasis was then found to be hemorrhagic thereafter. Patient is currently on keppra 750 BID. EEG was performed and revealed bilateral frontal epileptogenic foci but no active seizures noted. We were consulted to consider alterantive antiepileptic medication given potential contribution to thrombocytopenia which was [id number] today. Patient had normal platelet count on April 13th the day of admission and decreased to 88K with 40K on blue tops today.     Patient was started on Zosyn during hospitalization for questionable enteritis on CT abdomen but was stopped because was deemed not needing treatment by ID and was stopped considering that this might contribute to trhombocytopenia. Protonix was stopped due to consideration that it might contribute to thrombocytopenia.    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 Candelaria this week. Patient will still be considered for gamma knife next week after if platelets improve.    Impression: Thrombocytopenia in the setting hemorraghic brain mets 2/2 unclear etiology at this time. Keppra is rarely known to cause thrombocytopenia so would rule out toxic/metabolic/infectious processes for it. At this time may consider switching keppra to vimpat given unclear etiology.     Recommendations:  INCOMPLETE  EKG is being obtained  vimpat 150 BID IV  stop keppra  consult hematology    Miscellaneous:  [] Q4H neurological checks and vital signs  [] Seizure, fall and aspiration precautions. Avoid sleep deprivation.  -If pt has a convulsion, please document accurately the lenght of episode and specifically what the pt was doing paying attention to eye blinking vs. closure, gaze deviation, shaking of extremities, tongue biting, urinary incontinence, any derangements of vital signs    To be discussed with neurology attending and will be formally staffed by attending during morning rounds. Recommendations will be finalized once signed by attending.      HPI: 58 years old woman with a PMH of NSCLC with brain metastasis left paraventricular frontal 2.9 x 2.5 cm and right frontal 1.2x1.2 both hemorrhagic, subsegmental PE was on Eliquis 5 mg twice daily recently diagnosed in March 16. CTA chest repeat during this admission revealed no PE, currently off AC because of bleed. Patient was initially found to have brain metastasis on CT brain performed in March 17 2024. Patient was started on eliquis 5 mg BID for PE on March 16 since no bleed on CT noted. She then presented to OSH where she was noted to have a seizure episode, GTC by phenomenology and was given ativan and loaded with keppra. Metastasis was then found to be hemorrhagic thereafter. Patient is currently on keppra 750 BID. EEG was performed and revealed bilateral frontal epileptogenic foci but no active seizures noted. We were consulted to consider alternative antiepileptic medication given potential contribution to thrombocytopenia which was [id number] today. Patient had normal platelet count on April 13th the day of admission and decreased to 88K with 40K on blue tops today.  Patient was started on Zosyn during hospitalization for questionable enteritis on CT abdomen but was stopped because was deemed not needing treatment by ID. Protonix was stopped due to consideration that it might contribute to thrombocytopenia. Patient received an LP and was found to have elevated WBC count but was considered to be of metastatic etiology per below: Review of the cytospin of cerebrospinal fluid shows: A few atypical large cells with prominent nucleoli and abundant, occasionally vacuolated cytoplasm, in a background of small lymphocytes, neutrophils and macrophages. 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 Candelaria this week. Patient will still be considered for gamma knife next week after if platelets improve.    Impression: Thrombocytopenia in the setting hemorraghic brain mets 2/2 unclear etiology at this time. Keppra is rarely known to cause thrombocytopenia so would rule out toxic/metabolic/infectious processes for it. At this time may consider switching keppra to vimpat given unclear etiology.     Recommendations:  [] obtain EKG, if no RI prolongation, follow below recommendations.  [] vimpat 150 BID IV  [] stop keppra  [] consult hematology again for low platelets    Miscellaneous:  [] Q4H neurological checks and vital signs  [] Seizure, fall and aspiration precautions. Avoid sleep deprivation.  -If pt has a convulsion, please document accurately the lenght of episode and specifically what the pt was doing paying attention to eye blinking vs. closure, gaze deviation, shaking of extremities, tongue biting, urinary incontinence, any derangements of vital signs    To be discussed with neurology attending and will be formally staffed by attending during morning rounds. Recommendations will be finalized once signed by attending.

## 2024-04-18 NOTE — PROGRESS NOTE ADULT - ASSESSMENT
ASSESSMENT: 58F on Eliquis 5BID (last dose: Thurs) for recent dx subsegmental PE, pt of Dr. 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 Candelaria this wk, but no prior radiation tx to brain), p/f progressive lethargy since Thurs, acutely worse/unresponsive this AM per daughter (was still walking around and talking at home yday). CTH @830AM w/ L anterior frontal lesion 2x2cm w/ significant vasogenic edema and hemorrhagic conversion + addtl lesion in R motor strip. Lesions incr in size from CT 3/17/24 w/ incr edema/mass effect. Last MRI 1/24/24 w/ numerous tiny enhancing mets + L frontal lesion measuring 1.3cm. Coags/plts wnl. Lactate 5.1. Exam: EO to moderate stim, no FC, PERRL, wd x4  (13 Apr 2024 13:42)    NEURO: Neurochecks / Vitals q4h   s/p LP 4/15 - Cx NGTD, viral panel neg, cytology sent   Neurology saw: Seizure management w/ Keppra now switched to Vimpat 150 mg bid today due to thrombocytopenia   Last CTH on 4/14 stable, discussed at Tumor Board 4/17--> outpatient SRS w/ Rad onc (Dr. Candelaria/Dr. Boswell) for brain mets. continue Decadron 4 mg q8h   MRI T2/T3 pathologic fracture- continue Brace when oob   Pain control prn meds Tylenol, oxycodone continue home meds oxycontin and gabapentin   Hospitalist following for co-management   PT/OT rec outpatient PT/OT     PULM: on room air, sat >92%   Incentive spirometer mobilize as tolerated  h/o NSCLC s/p immunotherapy and lung RT 3/2024 - per heme onc no plan for inpatient systemic therapy   h/o PE, on room air. Eliquis on hold - 4/14 CTA Chest no PE (appended read)     CV: -160 mmHg   HTN continue losartan 50mg qd     RENAL: IVL, voiding  replete electrolytes prn  Hypokalemia 3.4, given KCl 40mEq x2    GI: Regular diet   continue bowel regimen w/ miralax and senna   continue GI ppx w/ famotidine while on dex     ENDO:  no active issues     HEME/ONC: anemia stable   VTE prophylaxis: [x] SCDs [] chemoprophylaxis [x] hold chemoprophylaxis due to: thrombocytopenia [x] high risk of DVT/PE on admission due to: malignancy. CTA Chest 4/14 neg for PE, LED 4/14 neg for DVT   Thrombocytopenia PLT Blue top 40-- Discussed w/ heme, thrombocytopenia may be multifactorial and medication related. Hemolysis labs neg, low suspicion for DIC. Zosyn and PPI d/c'd yest and Keppra changed to vimpat today as these medications may be contributing. PLT goal >100. Will transfuse 1 U PLT and continue to follow counts closely     ID: afebrile, no leukocytosis. LP 4/15 Cx NGTD, viral panel neg   CT A/P (met work up) w/ enteritis however patient asymptomatic. ID consulted rec monitor off ABX     Will discuss with Dr. Nicole   [id number] spectra  ASSESSMENT: 58F on Eliquis 5BID (last dose: Thurs) for recent dx subsegmental PE, pt of Dr. 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 Candelaria this wk, but no prior radiation tx to brain), p/f progressive lethargy since Thurs, acutely worse/unresponsive this AM per daughter (was still walking around and talking at home yday). CTH @830AM w/ L anterior frontal lesion 2x2cm w/ significant vasogenic edema and hemorrhagic conversion + addtl lesion in R motor strip. Lesions incr in size from CT 3/17/24 w/ incr edema/mass effect. Last MRI 1/24/24 w/ numerous tiny enhancing mets + L frontal lesion measuring 1.3cm. Coags/plts wnl. Lactate 5.1. Exam: EO to moderate stim, no FC, PERRL, wd x4  (13 Apr 2024 13:42)    NEURO: Neurochecks / Vitals q4h   s/p LP 4/15 - Cx NGTD, viral panel neg, cytology sent   Neurology saw: Seizure management w/ Keppra now switched to Vimpat 150 mg bid today due to thrombocytopenia   Last CTH on 4/14 stable, discussed at Tumor Board 4/17--> outpatient SRS w/ Rad onc (Dr. Candelaria/Dr. Boswell) for brain mets. continue Decadron 4 mg q8h   MRI T2/T3 pathologic fracture- continue Brace when oob   Pain control prn meds Tylenol, oxycodone continue home meds oxycontin and gabapentin   Hospitalist following for co-management   PT/OT rec outpatient PT/OT     PULM: on room air, sat >92%   Incentive spirometer mobilize as tolerated  h/o NSCLC s/p immunotherapy and lung RT 3/2024 - per heme onc no plan for inpatient systemic therapy   h/o PE, on room air. Eliquis on hold - 4/14 CTA Chest no PE (appended read)     CV: -160 mmHg   HTN continue losartan 50mg qd     RENAL: IVL, voiding  replete electrolytes prn  Hypokalemia 3.4, given KCl 40mEq x2    GI: Regular diet   continue bowel regimen w/ miralax and senna   continue GI ppx w/ famotidine while on dex     ENDO:  no active issues     HEME/ONC: anemia stable   VTE prophylaxis: [x] SCDs [] chemoprophylaxis [x] hold chemoprophylaxis due to: thrombocytopenia [x] high risk of DVT/PE on admission due to: malignancy. CTA Chest 4/14 neg for PE, LED 4/14 neg for DVT   Thrombocytopenia PLT Blue top 40-- Discussed w/ heme, thrombocytopenia may be multifactorial and medication related. Hemolysis labs neg, low suspicion for DIC. Zosyn and PPI d/c'd yest and Keppra changed to vimpat today as these medications may be contributing. PLT goal >100. Will transfuse 1 U PLT and continue to follow counts closely     ID: afebrile, no leukocytosis. LP 4/15 Cx NGTD, viral panel neg   CT A/P (met work up) w/ enteritis however patient asymptomatic. ID consulted rec monitor off ABX     Will discuss with Dr. Nicole   [id number] spectra

## 2024-04-18 NOTE — PROGRESS NOTE ADULT - ASSESSMENT
58F with PMH of subsegmental PE on Eliquis, NSCLC with known mets to brain, on immunotherapy and s/p lung RT 3/1/24 (also was planned for [facility] w/ Dr. Candelaria this wk, but no prior radiation tx to brain), presents as a transfer from  for progressive lethargy, found to have hemorrhagic brain lesions. Oncology consulted for NSCLC.    #NSCLC  - Follows Dr. Seymour at [facility]  - Oncologic history: Stage IV right lung poorly differentiated adenocarcinoma, PD-L1 TPS 20%, diagnosed December 2023, on chemoimmunotherapy with pembrolizumab, pemetrexed and carboplatin AUC 5 since January 2024. C4D1 on 3/28/24.  - CT Head (4/13/24) showed Hemorrhagic lesions in the left anterior frontal lobe and the right precentral gyrus have increased in size measuring up to 2.2 cm. Edema surrounding the left anterior frontal lesion has significantly increased   with new 0.3 cm rightward midline shift.  - Imaging showing progression of disease  - On dexamethasone 4mg q6h  - on Keppra, vEEG   - Appreciate Neurosurgery recs; pending MR imaging to determine role for neurosurgical intervention  - suspect progressively worsening altered mental status is multifactorial in the setting of enlarging metastatic brain lesions with hemorrhage / vasogenic edema, possible sepsis (possible enteritis seen on CT), and less likely neurotoxic effects of pembrolizumab,    #PE  - History of PE on Eliquis  - CTA chest (4/14) showed Similar clot burden with similar-appearing emboli in the subsegmental branches of the right upper lung. Stable size of right apical lung mass. Similar osseous erosive changes of the posterior aspect of the right   third rib, and T2 and T3 vertebral bodies secondary to metastatic spread. Fluid filled small bowel with wall thickening, may be seen with enteritis.  - Holding Eliquis in the setting of hemorrhagic brain metastasis    # Thrombocytopenia  - pt with downtrending plt, likely related to medication  - smear to be reviewed:  - hemolytic labs unremarkable. coags normal, less likely DIC.     Recommend:  - continue with dexamethasone for metastatic brain lesions with vasogenic edema  - follow up MR Brain stereotactic w/wo IV con, and MR C/T/L spine  - follow up Neurosurgery regarding role for neurosurgical intervention  - consider consulting Radiation Oncology --> was initially planned for outpatient [facility]KRS this week  - Agree with infectious workup and treatment per primary team  - No plan for inpatient systemic therapy at this time  - follow up vEEG  -Rest of care per Neurosurgical ICU  - Oncology will follow   58F with PMH of subsegmental PE on Eliquis, NSCLC with known mets to brain, on immunotherapy and s/p lung RT 3/1/24 (also was planned for GKRS w/ Dr. Candelaria this wk, but no prior radiation tx to brain), presents as a transfer from  for progressive lethargy, found to have hemorrhagic brain lesions. Oncology consulted for NSCLC.    #NSCLC  - Follows Dr. Seymour at [facility]  - Oncologic history: Stage IV right lung poorly differentiated adenocarcinoma, PD-L1 TPS 20%, diagnosed December 2023, on chemoimmunotherapy with pembrolizumab, pemetrexed and carboplatin AUC 5 since January 2024. C4D1 on 3/28/24.  - CT Head (4/13/24) showed hemorrhagic lesions in the left anterior frontal lobe and the right precentral gyrus have increased in size measuring up to 2.2 cm. Edema surrounding the left anterior frontal lesion has significantly increased with new 0.3 cm rightward midline shift.  - On dexamethasone 4mg q6h  - on Keppra-> however, given thrombocytopenia, switching to vimpat  - Appreciate Neurosurgery recs; pending MR imaging to determine role for neurosurgical intervention  - 4/15 MRI brain : New heterogeneously enhancing lesion within the motor cortex of the right high posterior frontal lobe measuring approximately 1.2 x 1.2 cm with surrounding vasogenic edema involving the right high posterior frontal lobe and right high parietal lobe including the entirety of the right border cortex and the medial portion of the sensory cortex.  - MRI cervical: Multiple scattered areas of T1 hypointense/STIR hyperintense signal with associated enhancement throughout the cervical spine, most prominent within the inferior C3 vertebral body throughout the C4 vertebral body, suggestive ofosseous metastases, however several areas are small enough that may reflect sequela of degenerative change. No epidural infiltration is identified.    - MRI thoracic: Complete infiltration of the T2 and T3 vertebral bodies with abnormal T1 hypointense/T2 hyperintense and enhancing signal, compatible with complete neoplastic infiltration. There is a moderate to severe pathologic compression fracture of T2 and a mild to moderate pathologic compression fracture of T3. There is epidural extension of  tumor within the right neural foramen at T2/T3 and T3/T4. There is secondary mass effect upon the cervical cord with mild impingement on the right ventral portion of the cord at the T3 level. No significant spinal canal stenosis at this level. No definite abnormal signal within the cord is identified. There are a few punctate additional areas of abnormal signal within the thoracic spine, suggestive of additional metastases.          #PE  - History of PE on Eliquis  - CTA chest (4/14) showed Similar clot burden with similar-appearing emboli in the subsegmental branches of the right upper lung. Stable size of right apical lung mass. Similar osseous erosive changes of the posterior aspect of the right   third rib, and T2 and T3 vertebral bodies secondary to metastatic spread. Fluid filled small bowel with wall thickening, may be seen with enteritis.  - Holding Eliquis in the setting of hemorrhagic brain metastasis    # Thrombocytopenia  - pt with downtrending plt, likely related to medication  - smear to be reviewed: decreased plt per hpf ( k/ul), anisopoikilocytosis +, no significant schistocytes seen. a few large plts seen.  - hemolytic labs unremarkable. coags normal, less likely DIC.   - Given the presence of petechiae over her body and downtrending plt, recommend 1u of plt transfusion ( ideally would prefer to keep plt close to 100 in the setting of hemorrhagic brain met)  - recommend switching zosyn and keppra to an alternative with less thrombocytopenic side effect    Recommend:  - continue with dexamethasone for metastatic brain lesions with vasogenic edema  - follow up with Radiation Oncology --> was initially planned for outpatient [facility] this week but may need to be rescheduled  - Agree with infectious workup and treatment per primary team  - No plan for inpatient systemic therapy at this time  - follow up vEEG  - Rest of care per Neurosurgical ICU  - Oncology will follow

## 2024-04-18 NOTE — PROGRESS NOTE ADULT - SUBJECTIVE AND OBJECTIVE BOX
Giorgi Rojas   contact via pager or TEAMS        CC: Patient is a 59y old  Female who presents with a chief complaint of mass (18 Apr 2024 09:05)      SUBJECTIVE / OVERNIGHT EVENTS:    MEDICATIONS  (STANDING):  chlorhexidine 4% Liquid 1 Application(s) Topical <User Schedule>  dexAMETHasone     Tablet 4 milliGRAM(s) Oral every 8 hours  famotidine    Tablet 20 milliGRAM(s) Oral daily  gabapentin Solution 600 milliGRAM(s) Oral three times a day  insulin lispro (ADMELOG) corrective regimen sliding scale   SubCutaneous Before meals and at bedtime  levETIRAcetam 750 milliGRAM(s) Oral two times a day  losartan 50 milliGRAM(s) Oral daily  oxyCODONE  ER Tablet 15 milliGRAM(s) Oral every 12 hours  polyethylene glycol 3350 17 Gram(s) Oral daily  potassium chloride    Tablet ER 40 milliEquivalent(s) Oral every 4 hours  senna 2 Tablet(s) Oral at bedtime  sodium chloride 2 Gram(s) Oral every 6 hours    MEDICATIONS  (PRN):  acetaminophen   IVPB .. 750 milliGRAM(s) IV Intermittent every 6 hours PRN Mild Pain (1 - 3)  ALPRAZolam 0.5 milliGRAM(s) Oral daily PRN severe anxiety  ondansetron Injectable 4 milliGRAM(s) IV Push every 6 hours PRN Nausea and/or Vomiting  oxyCODONE    IR 5 milliGRAM(s) Oral every 4 hours PRN Moderate Pain (4 - 6)  oxyCODONE    IR 10 milliGRAM(s) Oral every 4 hours PRN Severe Pain (7 - 10)  sodium chloride 0.9% lock flush 10 milliLiter(s) IV Push every 1 hour PRN Pre/post blood products, medications, blood draw, and to maintain line patency      Vital Signs Last 24 Hrs  T(C): 36.7 (18 Apr 2024 08:00), Max: 36.8 (17 Apr 2024 17:23)  T(F): 98.1 (18 Apr 2024 08:00), Max: 98.2 (17 Apr 2024 17:23)  HR: 57 (18 Apr 2024 08:00) (56 - 89)  BP: 123/75 (18 Apr 2024 08:00) (123/75 - 144/78)  BP(mean): --  RR: 18 (18 Apr 2024 08:00) (18 - 18)  SpO2: 99% (18 Apr 2024 08:00) (98% - 99%)  CAPILLARY BLOOD GLUCOSE      POCT Blood Glucose.: 108 mg/dL (18 Apr 2024 11:33)  POCT Blood Glucose.: 105 mg/dL (18 Apr 2024 07:37)  POCT Blood Glucose.: 116 mg/dL (17 Apr 2024 21:21)  POCT Blood Glucose.: 124 mg/dL (17 Apr 2024 16:29)  POCT Blood Glucose.: 162 mg/dL (17 Apr 2024 11:43)    I&O's Summary    17 Apr 2024 07:01  -  18 Apr 2024 07:00  --------------------------------------------------------  IN: 1180 mL / OUT: 0 mL / NET: 1180 mL      tele:    PHYSICAL EXAM:    GENERAL: NAD   HEENT: EOMI, PERRL  PULM: Clear to auscultation bilaterally  CV: Regular rate and rhythm; nl S1, S2; No murmurs, rubs, or gallops  ABDOMEN: Soft, Nontender, Nondistended; Bowel sounds present  EXTREMITIES/MSK:  No edema, calf tenderness   PSYCH: AAOx3  NEUROLOGY: non-focal          LABS:                        9.4    7.10  )-----------( 88       ( 18 Apr 2024 08:32 )             29.2     04-18    135  |  98  |  12  ----------------------------<  98  3.4<L>   |  22  |  0.45<L>    Ca    9.3      18 Apr 2024 08:32      PT/INR - ( 17 Apr 2024 14:00 )   PT: 11.1 sec;   INR: 1.01 ratio         PTT - ( 17 Apr 2024 14:00 )  PTT:22.6 sec      Urinalysis Basic - ( 18 Apr 2024 08:32 )    Color: x / Appearance: x / SG: x / pH: x  Gluc: 98 mg/dL / Ketone: x  / Bili: x / Urobili: x   Blood: x / Protein: x / Nitrite: x   Leuk Esterase: x / RBC: x / WBC x   Sq Epi: x / Non Sq Epi: x / Bacteria: x          RADIOLOGY & ADDITIONAL TESTS:    Imaging Personally Reviewed:    Consultant(s) Notes Reviewed:      Care Discussed with Consultants/Other Providers:   Giorgi Rojas   contact via pager or TEAMS        CC: Patient is a 59y old  Female who presents with a chief complaint of mass (18 Apr 2024 09:05)      SUBJECTIVE / OVERNIGHT EVENTS: denies any complaints on ros    MEDICATIONS  (STANDING):  chlorhexidine 4% Liquid 1 Application(s) Topical <User Schedule>  dexAMETHasone     Tablet 4 milliGRAM(s) Oral every 8 hours  famotidine    Tablet 20 milliGRAM(s) Oral daily  gabapentin Solution 600 milliGRAM(s) Oral three times a day  insulin lispro (ADMELOG) corrective regimen sliding scale   SubCutaneous Before meals and at bedtime  levETIRAcetam 750 milliGRAM(s) Oral two times a day  losartan 50 milliGRAM(s) Oral daily  oxyCODONE  ER Tablet 15 milliGRAM(s) Oral every 12 hours  polyethylene glycol 3350 17 Gram(s) Oral daily  potassium chloride    Tablet ER 40 milliEquivalent(s) Oral every 4 hours  senna 2 Tablet(s) Oral at bedtime  sodium chloride 2 Gram(s) Oral every 6 hours    MEDICATIONS  (PRN):  acetaminophen   IVPB .. 750 milliGRAM(s) IV Intermittent every 6 hours PRN Mild Pain (1 - 3)  ALPRAZolam 0.5 milliGRAM(s) Oral daily PRN severe anxiety  ondansetron Injectable 4 milliGRAM(s) IV Push every 6 hours PRN Nausea and/or Vomiting  oxyCODONE    IR 5 milliGRAM(s) Oral every 4 hours PRN Moderate Pain (4 - 6)  oxyCODONE    IR 10 milliGRAM(s) Oral every 4 hours PRN Severe Pain (7 - 10)  sodium chloride 0.9% lock flush 10 milliLiter(s) IV Push every 1 hour PRN Pre/post blood products, medications, blood draw, and to maintain line patency      Vital Signs Last 24 Hrs  T(C): 36.7 (18 Apr 2024 08:00), Max: 36.8 (17 Apr 2024 17:23)  T(F): 98.1 (18 Apr 2024 08:00), Max: 98.2 (17 Apr 2024 17:23)  HR: 57 (18 Apr 2024 08:00) (56 - 89)  BP: 123/75 (18 Apr 2024 08:00) (123/75 - 144/78)  BP(mean): --  RR: 18 (18 Apr 2024 08:00) (18 - 18)  SpO2: 99% (18 Apr 2024 08:00) (98% - 99%)  CAPILLARY BLOOD GLUCOSE      POCT Blood Glucose.: 108 mg/dL (18 Apr 2024 11:33)  POCT Blood Glucose.: 105 mg/dL (18 Apr 2024 07:37)  POCT Blood Glucose.: 116 mg/dL (17 Apr 2024 21:21)  POCT Blood Glucose.: 124 mg/dL (17 Apr 2024 16:29)  POCT Blood Glucose.: 162 mg/dL (17 Apr 2024 11:43)    I&O's Summary    17 Apr 2024 07:01  -  18 Apr 2024 07:00  --------------------------------------------------------  IN: 1180 mL / OUT: 0 mL / NET: 1180 mL          PHYSICAL EXAM:    GENERAL: NAD   HEENT: EOMI, PERRL  PULM: Clear to auscultation bilaterally  CV: Regular rate and rhythm; nl S1, S2; No murmurs, rubs, or gallops  ABDOMEN: Soft, Nontender, Nondistended; Bowel sounds present  EXTREMITIES/MSK:  No edema, calf tenderness   PSYCH: AAOx3  NEUROLOGY: non-focal          LABS:                        9.4    7.10  )-----------( 88       ( 18 Apr 2024 08:32 )             29.2     04-18    135  |  98  |  12  ----------------------------<  98  3.4<L>   |  22  |  0.45<L>    Ca    9.3      18 Apr 2024 08:32      PT/INR - ( 17 Apr 2024 14:00 )   PT: 11.1 sec;   INR: 1.01 ratio         PTT - ( 17 Apr 2024 14:00 )  PTT:22.6 sec      Urinalysis Basic - ( 18 Apr 2024 08:32 )    Color: x / Appearance: x / SG: x / pH: x  Gluc: 98 mg/dL / Ketone: x  / Bili: x / Urobili: x   Blood: x / Protein: x / Nitrite: x   Leuk Esterase: x / RBC: x / WBC x   Sq Epi: x / Non Sq Epi: x / Bacteria: x          RADIOLOGY & ADDITIONAL TESTS:    Imaging Personally Reviewed:    Consultant(s) Notes Reviewed:      Care Discussed with Consultants/Other Providers: neurosurg

## 2024-04-18 NOTE — CHART NOTE - NSCHARTNOTEFT_GEN_A_CORE
called to bedside by RN after platelet administration to eval Left Upper extremity and concern for IV infiltration. Left upper extremity has mild swelling and tenderness, patient has full strength in the Left Upper extremity and it is warm to touch and has palpable strong pulses. Will continue to monitor q2 hour vascular checks overnight. called to bedside by RN after platelet administration to eval Left Upper extremity and concern for IV infiltration. Left upper extremity has mild swelling and tenderness, patient has full strength in the Left Upper extremity and it is warm to touch and has palpable strong pulses. Will continue to monitor q2 hour vascular checks overnight and elevate the extremity.

## 2024-04-18 NOTE — PROGRESS NOTE ADULT - ATTENDING COMMENTS
57yo woman with h/o subsegmental PE on Eliquis 5mg BID (last dose Thurs) and h/o NSCLC with known mets to brain, on pembrolizumab. Admitted 4/14 in the setting of several days of lethargy, then found unresponsive. With GTC at OSH. CTH with worsening brain mets with hemorrhagic transformation of L frontal met. Also with fever on admission.    Intubated overnight.   Afebrile overnight.   CTH stable this AM.  CT CAP with known PE and possible enteritis.   Initial VEEG with no seizures.     On exam on Precedex at 0.2, patient slightly opens eyes, does not attend, does not follow commands, grimaces, PERRL, localizes with b/l UEs, withdraws b/l LEs     c/w Keppra 750 mg BID  c/w VEEG  minimize precedex for sedation   MRI brain   consult oncology   c/w dex 4 mg q6h  MRI spine for T2/3 pathologic fracture  start Dilaudid 4mg BID (on 8mg BID at home) and fentanyl pushes PRN (on morphine PRN at home), c/w home gabapentin   full vent support  SBP goal 100-180  on TF  c/w Zosyn, d/c vanc since MRSA neg. If mental status does not improve, consider LP.     Patient seen and examined by attending on 4/14/2023.    Patient is critically ill due to seizure and brain edema and at high risk for neurological deterioration or death due to: requiring mechanical ventilation for airway protection.
Patient laying in bed.  She reports fatigue.   and brother-in-law at bedside.  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  Patient is on treatment with combination chemotherapy and immunotherapy with carboplatin/pemetrexed and pembrolizumab since January 2024.  She status post C4 on 3/28.  Patient admitted with hemorrhagic brain metastasis.  She is on steroids and antiseizure prophylaxis.  She was on anticoagulation with Eliquis for prior PE.  Anticoagulation on hold due to current thrombocytopenia.  Thrombocytopenia likely multifactorial, certainly with her systemic therapy as a significant component.  There may be a concomitant effect from her medications during this hospitalization.    Nino Cottrell MD
no acute events overnight.  maintains respiratory status s/p extubation yesterday afternoon.  d/w NSG, ok to transfer out of ICU when bed available.
MRI this afternoon, remained intubated, NSG reviewing images.  post-MRI trialed on CPAP, gas OK, CXR clear, extubated directly to [facility].  airway guarded.

## 2024-04-18 NOTE — PROVIDER CONTACT NOTE (OTHER) - NAME OF MD/NP/PA/DO NOTIFIED:
MD Gretta Landaverde
jeremy
MD Landaverde, FLORENTINO Dinh, FLORENTINO Prasad
MD Landaverde, FLORENTINO Dinh

## 2024-04-18 NOTE — PROVIDER CONTACT NOTE (OTHER) - REASON
Increased work of breathing, difficulty achieving IV access
low platletsw
Pt. tachypneic, tachycardic and febrile to 39.5C
Pt. has poor IV access

## 2024-04-19 NOTE — PROGRESS NOTE ADULT - TIME BILLING
chart review, d/w pt, d/w neurosurg, exam, note
chart review, d/w pt, exam, multiple d/w neurosurg, note
chart reviewed, d/w pt, exam, d/w neurosurg

## 2024-04-19 NOTE — PROGRESS NOTE ADULT - PROBLEM SELECTOR PLAN 5
repeat labs with coags and fibrinogen. would reconsult hem/onc. may need to stop some meds that may be causing low plts
zosyn, PPI, keppra stopped. hematology following. f/u hematology recs. transfuse per neurosurg/hematology recs
zosyn, PPI stopped yest. neuro consulted to see if keppra can be changed to another med. awaiting hem consult. transfuse per neurosurg/hematology recs

## 2024-04-19 NOTE — PROGRESS NOTE ADULT - PROBLEM SELECTOR PLAN 4
AC when safe from neurosurg perspective

## 2024-04-19 NOTE — PROGRESS NOTE ADULT - PROBLEM SELECTOR PLAN 3
pt denies dysuria. ros neg. abx stopped per ID
pt denies dysuria. ros neg. unclear if abx needed. would consult ID to see if zosyn can be stopped
pt denies dysuria. ros neg. unclear if abx needed. would consult ID to see if zosyn can be stopped

## 2024-04-19 NOTE — PROGRESS NOTE ADULT - SUBJECTIVE AND OBJECTIVE BOX
SUBJECTIVE: Patient seen and examined at bedside,  and daughter Araceli present. Patient well appearing, in no acute distress. Platelet counts improving. Patient ambulating on 4 carrizales with brace     Vital Signs Last 24 Hrs  T(C): 36.7 (04-19-24 @ 13:00), Max: 36.7 (04-18-24 @ 20:20)  T(F): 98.1 (04-19-24 @ 13:00), Max: 98.1 (04-19-24 @ 13:00)  HR: 72 (04-19-24 @ 13:00) (67 - 76)  BP: 137/85 (04-19-24 @ 13:00) (115/78 - 160/91)  BP(mean): --  RR: 18 (04-19-24 @ 13:00) (16 - 18)  SpO2: 98% (04-19-24 @ 13:00) (97% - 100%)    PHYSICAL EXAM:  Constitutional: No Acute Distress   Neurological: awake/alert, oriented x3 (person, place, time), EOMI, Pupils 4mm reactive b/l, no facial asymmetry, Moving all extremities with 5/5 symmetric strength   Incision: prev lumbar puncture site c/d/i   Pulmonary: Clear Lungs   Cardiovascular: Regular rate and rhythm   Gastrointestinal: Soft, Non-tender, Non-distended abdomen, +bowel sounds x 4  Extremities: No calf tenderness bilaterally, no edema, B/L SCD's, LEFT upper extremity mild tenderness, swelling improved 2+ pulses     LABS:                11.1   10.98 )-----------( 123      ( 19 Apr 2024 16:19 )             33.8   04-18  132<L>  |  98  |  15  ----------------------------<  107<H>  4.4   |  18<L>  |  0.49<L>  Ca    9.4      18 Apr 2024 16:59    I&O's Summary    19 Apr 2024 07:01  -  19 Apr 2024 17:05  --------------------------------------------------------  IN: 840 mL / OUT: 0 mL / NET: 840 mL    IMAGING: reviewed     MEDICATIONS  (STANDING):  dexAMETHasone     Tablet 4 milliGRAM(s) Oral every 8 hours  famotidine    Tablet 20 milliGRAM(s) Oral daily  gabapentin Solution 600 milliGRAM(s) Oral three times a day  lacosamide IVPB 150 milliGRAM(s) IV Intermittent every 12 hours  losartan 50 milliGRAM(s) Oral daily  oxyCODONE  ER Tablet 15 milliGRAM(s) Oral every 12 hours  polyethylene glycol 3350 17 Gram(s) Oral daily  senna 2 Tablet(s) Oral at bedtime  sodium chloride 2 Gram(s) Oral every 6 hours    MEDICATIONS  (PRN):  acetaminophen     Tablet .. 650 milliGRAM(s) Oral every 6 hours PRN Temp greater or equal to 38C (100.4F), Mild Pain (1 - 3)  ALPRAZolam 0.5 milliGRAM(s) Oral daily PRN severe anxiety  ondansetron Injectable 4 milliGRAM(s) IV Push every 6 hours PRN Nausea and/or Vomiting  oxyCODONE    IR 10 milliGRAM(s) Oral every 4 hours PRN Severe Pain (7 - 10)  oxyCODONE    IR 5 milliGRAM(s) Oral every 4 hours PRN Moderate Pain (4 - 6)    DIET: Regular

## 2024-04-19 NOTE — PROGRESS NOTE ADULT - ASSESSMENT
ASSESSMENT: 58F on Eliquis 5BID (last dose: Thurs) for recent dx subsegmental PE, pt of Dr. 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 Candelaria this wk, but no prior radiation tx to brain), p/f progressive lethargy since Thurs, acutely worse/unresponsive this AM per daughter (was still walking around and talking at home yday). CTH @830AM w/ L anterior frontal lesion 2x2cm w/ significant vasogenic edema and hemorrhagic conversion + addtl lesion in R motor strip. Lesions incr in size from CT 3/17/24 w/ incr edema/mass effect. Last MRI 1/24/24 w/ numerous tiny enhancing mets + L frontal lesion measuring 1.3cm. Coags/plts wnl. Lactate 5.1. Exam: EO to moderate stim, no FC, PERRL, wd x4  (13 Apr 2024 13:42)    NEURO: Neurochecks / Vitals q4h   s/p LP 4/15 - Cx NGTD, viral panel neg, cytology sent   Neurology saw: Seizure management w/ Vimpat 150 mg bid  Last CTH on 4/14 stable, discussed at Tumor Board 4/17--> outpatient SRS w/ Rad onc (Dr. Candelaria/Dr. Boswell) for brain mets. Continue Decadron 4 mg q8h taper to 2mg bid  MRI T2/T3 pathologic fracture- continue Brace when oob   Pain control prn meds Tylenol, oxycodone continue home meds oxycontin and gabapentin   Hospitalist following for co-management   PT/OT rec outpatient PT/OT     PULM: on room air, sat >92%   Incentive spirometer mobilize as tolerated  h/o NSCLC s/p immunotherapy and lung RT 3/2024 - per heme onc no plan for inpatient systemic therapy   h/o PE, on room air. Eliquis on hold - 4/14 CTA Chest no PE (appended read)     CV: -160 mmHg   HTN continue losartan 50mg qd     RENAL: IVL, voiding  replete electrolytes prn     GI: Regular diet   continue bowel regimen w/ miralax and senna   continue GI ppx w/ famotidine while on dex     ENDO:  no active issues     HEME/ONC: anemia stable   VTE prophylaxis: [x] SCDs [x] hold chemoprophylaxis due to: thrombocytopenia [x] high risk of DVT/PE on admission due to: malignancy. CTA Chest 4/14 neg for PE, LED 4/14 neg for DVT   Thrombocytopenia improving s/p 2 U PLT yesterday 4/19 - Discussed w/ heme, thrombocytopenia may be multifactorial and medication related. Hemolysis labs neg, low suspicion for DIC. Zosyn and PPI d/c'd 4/17 and Keppra changed to vimpat yest as these medications may be contributing. Will continue to follow counts closely     ID: afebrile, no leukocytosis. LP 4/15 Cx NGTD, viral panel neg   CT A/P (met work up) w/ enteritis however patient asymptomatic. ID consulted rec monitor off ABX     Will discuss with Dr. Nicole   [id number] spectra

## 2024-04-19 NOTE — PROGRESS NOTE ADULT - PROBLEM SELECTOR PLAN 1
steroids per neurosurg. tx per tumor board. monitor

## 2024-04-19 NOTE — PROGRESS NOTE ADULT - SUBJECTIVE AND OBJECTIVE BOX
Giorgi Rojas   contact via pager or TEAMS        CC: Patient is a 59y old  Female who presents with a chief complaint of seizures / brain metastasis (18 Apr 2024 14:09)      SUBJECTIVE / OVERNIGHT EVENTS:    MEDICATIONS  (STANDING):  dexAMETHasone     Tablet 4 milliGRAM(s) Oral every 8 hours  famotidine    Tablet 20 milliGRAM(s) Oral daily  gabapentin Solution 600 milliGRAM(s) Oral three times a day  lacosamide IVPB 150 milliGRAM(s) IV Intermittent every 12 hours  losartan 50 milliGRAM(s) Oral daily  oxyCODONE  ER Tablet 15 milliGRAM(s) Oral every 12 hours  polyethylene glycol 3350 17 Gram(s) Oral daily  senna 2 Tablet(s) Oral at bedtime  sodium chloride 2 Gram(s) Oral every 6 hours    MEDICATIONS  (PRN):  acetaminophen     Tablet .. 650 milliGRAM(s) Oral every 6 hours PRN Temp greater or equal to 38C (100.4F), Mild Pain (1 - 3)  ALPRAZolam 0.5 milliGRAM(s) Oral daily PRN severe anxiety  ondansetron Injectable 4 milliGRAM(s) IV Push every 6 hours PRN Nausea and/or Vomiting  oxyCODONE    IR 10 milliGRAM(s) Oral every 4 hours PRN Severe Pain (7 - 10)  oxyCODONE    IR 5 milliGRAM(s) Oral every 4 hours PRN Moderate Pain (4 - 6)      Vital Signs Last 24 Hrs  T(C): 36.7 (19 Apr 2024 08:00), Max: 36.7 (18 Apr 2024 16:30)  T(F): 98 (19 Apr 2024 08:00), Max: 98.1 (18 Apr 2024 16:30)  HR: 67 (19 Apr 2024 08:00) (57 - 76)  BP: 115/78 (19 Apr 2024 08:00) (115/78 - 160/91)  BP(mean): --  RR: 18 (19 Apr 2024 08:00) (16 - 18)  SpO2: 99% (19 Apr 2024 08:00) (96% - 100%)  CAPILLARY BLOOD GLUCOSE      POCT Blood Glucose.: 105 mg/dL (19 Apr 2024 07:29)  POCT Blood Glucose.: 108 mg/dL (18 Apr 2024 11:33)    I&O's Summary    tele:    PHYSICAL EXAM:    GENERAL: NAD   HEENT: EOMI, PERRL  PULM: Clear to auscultation bilaterally  CV: Regular rate and rhythm; nl S1, S2; No murmurs, rubs, or gallops  ABDOMEN: Soft, Nontender, Nondistended; Bowel sounds present  EXTREMITIES/MSK:  No edema, calf tenderness   PSYCH: AAOx3  NEUROLOGY: non-focal          LABS:                        10.2   10.63 )-----------( 92       ( 19 Apr 2024 03:02 )             31.1     04-18    132<L>  |  98  |  15  ----------------------------<  107<H>  4.4   |  18<L>  |  0.49<L>    Ca    9.4      18 Apr 2024 16:59      PT/INR - ( 17 Apr 2024 14:00 )   PT: 11.1 sec;   INR: 1.01 ratio         PTT - ( 17 Apr 2024 14:00 )  PTT:22.6 sec      Urinalysis Basic - ( 18 Apr 2024 16:59 )    Color: x / Appearance: x / SG: x / pH: x  Gluc: 107 mg/dL / Ketone: x  / Bili: x / Urobili: x   Blood: x / Protein: x / Nitrite: x   Leuk Esterase: x / RBC: x / WBC x   Sq Epi: x / Non Sq Epi: x / Bacteria: x          Culture - Urine (collected 17 Apr 2024 15:55)  Source: Clean Catch Clean Catch (Midstream)  Final Report (18 Apr 2024 16:56):    No growth      RADIOLOGY & ADDITIONAL TESTS:    Imaging Personally Reviewed:    Consultant(s) Notes Reviewed:      Care Discussed with Consultants/Other Providers:   Giorgi Rojas   contact via pager or TEAMS        CC: Patient is a 59y old  Female who presents with a chief complaint of seizures / brain metastasis (18 Apr 2024 14:09)      SUBJECTIVE / OVERNIGHT EVENTS: denies any complaints on ros    MEDICATIONS  (STANDING):  dexAMETHasone     Tablet 4 milliGRAM(s) Oral every 8 hours  famotidine    Tablet 20 milliGRAM(s) Oral daily  gabapentin Solution 600 milliGRAM(s) Oral three times a day  lacosamide IVPB 150 milliGRAM(s) IV Intermittent every 12 hours  losartan 50 milliGRAM(s) Oral daily  oxyCODONE  ER Tablet 15 milliGRAM(s) Oral every 12 hours  polyethylene glycol 3350 17 Gram(s) Oral daily  senna 2 Tablet(s) Oral at bedtime  sodium chloride 2 Gram(s) Oral every 6 hours    MEDICATIONS  (PRN):  acetaminophen     Tablet .. 650 milliGRAM(s) Oral every 6 hours PRN Temp greater or equal to 38C (100.4F), Mild Pain (1 - 3)  ALPRAZolam 0.5 milliGRAM(s) Oral daily PRN severe anxiety  ondansetron Injectable 4 milliGRAM(s) IV Push every 6 hours PRN Nausea and/or Vomiting  oxyCODONE    IR 10 milliGRAM(s) Oral every 4 hours PRN Severe Pain (7 - 10)  oxyCODONE    IR 5 milliGRAM(s) Oral every 4 hours PRN Moderate Pain (4 - 6)      Vital Signs Last 24 Hrs  T(C): 36.7 (19 Apr 2024 08:00), Max: 36.7 (18 Apr 2024 16:30)  T(F): 98 (19 Apr 2024 08:00), Max: 98.1 (18 Apr 2024 16:30)  HR: 67 (19 Apr 2024 08:00) (57 - 76)  BP: 115/78 (19 Apr 2024 08:00) (115/78 - 160/91)  BP(mean): --  RR: 18 (19 Apr 2024 08:00) (16 - 18)  SpO2: 99% (19 Apr 2024 08:00) (96% - 100%)  CAPILLARY BLOOD GLUCOSE      POCT Blood Glucose.: 105 mg/dL (19 Apr 2024 07:29)  POCT Blood Glucose.: 108 mg/dL (18 Apr 2024 11:33)    I&O's Summary        PHYSICAL EXAM:    GENERAL: NAD   HEENT: EOMI, PERRL  PULM: Clear to auscultation bilaterally  CV: Regular rate and rhythm; nl S1, S2; No murmurs, rubs, or gallops  ABDOMEN: Soft, Nontender, Nondistended; Bowel sounds present  EXTREMITIES/MSK:  No edema, calf tenderness   PSYCH: AAOx3  NEUROLOGY: non-focal          LABS:                        10.2   10.63 )-----------( 92       ( 19 Apr 2024 03:02 )             31.1     04-18    132<L>  |  98  |  15  ----------------------------<  107<H>  4.4   |  18<L>  |  0.49<L>    Ca    9.4      18 Apr 2024 16:59      PT/INR - ( 17 Apr 2024 14:00 )   PT: 11.1 sec;   INR: 1.01 ratio         PTT - ( 17 Apr 2024 14:00 )  PTT:22.6 sec      Urinalysis Basic - ( 18 Apr 2024 16:59 )    Color: x / Appearance: x / SG: x / pH: x  Gluc: 107 mg/dL / Ketone: x  / Bili: x / Urobili: x   Blood: x / Protein: x / Nitrite: x   Leuk Esterase: x / RBC: x / WBC x   Sq Epi: x / Non Sq Epi: x / Bacteria: x          Culture - Urine (collected 17 Apr 2024 15:55)  Source: Clean Catch Clean Catch (Midstream)  Final Report (18 Apr 2024 16:56):    No growth      RADIOLOGY & ADDITIONAL TESTS:    Imaging Personally Reviewed:    Consultant(s) Notes Reviewed:      Care Discussed with Consultants/Other Providers: neurosurg

## 2024-04-20 NOTE — DISCHARGE NOTE PROVIDER - NSDCFUSCHEDAPPT_GEN_ALL_CORE_FT
[facility]  [facility]  Scheduled Appointment: 04/23/2024    [facility]  [facility]  Scheduled Appointment: 05/09/2024    [facility]  Scheduled Appointment: 05/09/2024    Celestina Garcia  [address]  Scheduled Appointment: 06/19/2024

## 2024-04-20 NOTE — DISCHARGE NOTE PROVIDER - NSDCADMDATE_GEN_ALL_CORE_FT
ADMISSION NOTE    Patient: Stevo Escobar  : 2023 11:45 PM  MRN: [id number]  Referring Physician:  Information for the patient's mother:  Will Escobar [[id number]]   Mily Clarke MD      Information for the patient's mother:  Will Escobar [[id number]]   Jose E Hartmann MD     Date of admission: 2023      Gestational Age: 37w0d   PMA: 37w1d   Day of Life on Admission (Day of birth being DOL 1): 1        REASON for admission: SGA/IUGR and possible ASD    BIRTH HOSPITAL: [facility]    MATERNAL/PRENATAL HISTORY    Information for the patient's mother:  Will Escobar [[id number]]   1988      Information for the patient's mother:  Will Escobar [[id number]]   34 year old     Information for the patient's mother:  Will Escobar [[id number]]         Information for the patient's mother:  Will Escobar [[id number]]   37w1d      Information for the patient's mother:  Will Escobar [[id number]]   2023     Information for the patient's mother:  Will Escobar [[id number]]     OB History    Para Term  AB Living   4 1 1   2 1   SAB IAB Ectopic Molar Multiple Live Births     1       1      # Outcome Date GA Lbr Thanh/2nd Weight Sex Delivery Anes PTL Lv   4 Current            3 IAB 20 7w1d          2 Term 11 37w0d  2863 g M Vag-Spont EPI N DEEDEE      Birth Comments: Diet controlled GDM, stated normal 2 hr PP glucose, IOL d/t oligo   1 AB  8w0d    ELECTIVE ABO         Obstetric Comments   Gestational DM        Ethnicity: Black    Maternal labs:    Information for the patient's mother:  Will Escobar [[id number]]     Lab Results   Component Value Date/Time    CULT  2020 03:16 PM     10,000 TO 50,000 CFU/mL MIXED BACTERIAL ORI WITH NO PREDOMINATING TYPE    ABR O POSITIVE 2020 03:25 PM    HBAG NEGATIVE 2020 03:25 PM    HBAG NEGATIVE 10/07/2014 01:05 PM    RPR Nonreactive 2023 09:35 AM    RPR Nonreactive 2022 11:07 AM    RPR  13-Apr-2024 13:24 NONREACTIVE 2020 04:22 PM    RPR NONREACTIVE 2018 08:15 AM    RPR NONREACTIVE 2014 09:40 AM    HIV Nonreactive 2023 09:35 AM    HIV Nonreactive 2022 11:07 AM    HIV NONREACTIVE 2020 03:25 PM    HIV NONREACTIVE 2020 04:22 PM    HIV NONREACTIVE 2018 08:15 AM    HIV NONREACTIVE 2014 09:40 AM    RUBEL 128.4 2022 11:07 AM    RUBEL 168.1 2020 03:25 PM    RUBEL 181.6 2019 09:13 AM    RUBEL 43.7 10/07/2014 01:05 PM    GCPT Negative 2022 11:15 AM    GCPT NEGATIVE 2020 04:13 PM    GCPT NEGATIVE 2020 04:34 PM    GCPT NEGATIVE 2020 04:16 PM    GCPT NEGATIVE 2019 11:02 AM    GCPT NEGATIVE 2018 08:15 AM    GCPT NEGATIVE 2017 12:01 AM    GCPT NEGATIVE 10/14/2016 12:01 AM    GCPT NEGATIVE 2014 12:01 AM    CHPT Negative 2022 11:15 AM    CHPT NEGATIVE 2020 04:13 PM    CHPT NEGATIVE 2020 04:34 PM    CHPT NEGATIVE 2020 04:16 PM    CHPT NEGATIVE 2019 11:02 AM    CHPT NEGATIVE 2018 08:15 AM    CHPT NEGATIVE 2017 12:01 AM    CHPT NEGATIVE 10/14/2016 12:01 AM    CHPT NEGATIVE 2014 12:01 AM        Information for the patient's mother:  Shawn Facundomichael POPE [[id number]]     Recent Labs   Lab 23  0935 22  1115 22  1107   HIV Antigen/ Antibody Combo Screen Nonreactive  --  Nonreactive   RPR Nonreactive  --  Nonreactive   Neisseria gonorrhoeae by Nucleic Acid Amplification  --  Negative  --    Chlamydia trachomatis by Nucleic Acid Amplification  --  Negative  --    Rubella Antibody, IgG  --   --  128.4          GBS: negative2  Antibiotics: NA      Prenatal care: Yes   steroids during pregnancy: Unknown, No, Number of Doses: None   Magnesium Sulfate: No  Chorioamnionitis: No  Maternal HTN, Chronic or Pregnancy Induced: No  Maternal Diabetes: Yes  Congenital Infection - TORCH / Zika / Parvovirus: No  Maternal COVID-19: negative  Mutiple Gestation:  No    Other Pregnancy Complication:  • IUGR   • A2GDM  • Paroxysmal SVT/AFIB on flecainide and metoprolol  • ? Fetal ASD      Information for the patient's mother:  Will Escobar [[id number]]     Past Medical History:   Diagnosis Date   • Anxiety disorder    • Asthma    • Atrial ectopic tachycardia (CMD) 06/12/2013    Dr Rubalcava   • Bacterial vaginitis 12/05/2020   • Candida vaginitis 12/05/2020   • Candidal vaginitis 04/07/2020   • Contraception 11/26/2018    S/p OCP & Nexplanon.     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   • Preventative health care 12/13/2018   • RAD (reactive airway disease)    • Screening examination for STD (sexually transmitted disease) 04/07/2020   • Screening for malignant neoplasm of cervix 08/18/2019   • Supervision of normal pregnancy 11/19/2020   • SVT (supraventricular tachycardia) (CMD)    • Trichomonas vaginitis 04/07/2020   • Vaginal discharge 12/05/2020   • Vaginal itching 05/02/2020          Maternal Medication: PNV, flecainide and metoprolol      DELIVERY/BIRTH HISTORY    Time of birth: 2023 11:45 PM by    Sex:  female   Information for the patient's mother:  Will Escobar [[id number]]   2023     GA   Information for the patient's mother:  Will Escobar [[id number]]   37w1d       Labor  · Delivery Method:   vaginal  · Time of Birth: 11:45 PM  Information for the patient's mother:  Will Escobar [[id number]]   Membranes  Membrane Status: Intact  Sac Identifier: Sac 1      Delivery Medications: pitocin    Delayed cord clamping x 30 seconds:  Yes    Resuscitation:  Oxygen: no  Face Mask Ventilation (PPV): no  Endotracheal Tube Intubation: no  CPAP: no              APGARS  One minute Five minutes   Skin color:   1   1   Heart rate:   2   2   Reflex:   2   2   Muscle tone:   1   1   Breathin   2   Totals:   8   8       Reason High Risk Delivery Team requested by L&D: history of ASD, IUGR     Delivery Room Course: Infant was brought to warmer and was dried and stimulated.  Bulb suctioning of mouth and nares performed.  HR > 100 bpm and infant was vigorous with good respiratory effort.     Initial Hospital Course: Infant brought to NICU. PIV placed. CBC drawn.       Laboratory and Radiological Findings:  Invalid input(s): RECENT LABS     Physical Exam:     Body Measurement:     Weight: (!) 1900 g    Head Circumference: 30 cm (11.81\")    Length 18.11\" (46 cm)         Vital Signs:   Current Vitals:  Temp: 98.2 °F (36.8 °C) Heart Rate: 155  Resp: 34  BP: (!) 88/70  SpO2: 100 %      General: Well appearing, no acute distress, responds to stimuli  HENT: AFSOF, normcephalic, ears normally set, no cleft, palate intact  Neck: supple, full range of motion  CV: RRR, no murmurs, 2+ pulses, good perfusion  Lungs: clear and equal bilaterally, no retractions, no nasal flaring  Abdomen: soft, non-tender, non-distended, no organomegaly  Extremities: no focal deficits; FROM x 4  Neuro: good tone  Skin: no rash  : normal for GA; anus patent  Back: no geovanny, no dimples      ASSESSMENT AND PLAN BY SYSTEM:       ACTIVE PROBLEMS:  Principal Problem:    SGA (small for gestational age)      Fluids, electrolytes and nutrition:   Assessment: Term infant  Plan:   - start IVFs with D10W at 80 ml/kg/day  - Initiate feeds with donor breast milk 5mL q3H  - donor consent verbally obtained  - BMP at 12 hours    Respiratory:  Assessment: No active respiratory issues    Respiratory support: Room Air    Plan:   -continue to monitor    Cardiovascular:  Assessment: prenatal echo with  ASD    Plan:   - continue cardiorespiratory monitoring  - ECHO in AM    Infectious Disease:  Risk factors for sepsis: Risk factors for sepsis is low    Early onset sepsis screen:  Gestational Age: 37w0d  Highest Maternal Antepartum Temp (F): 98.2                           Clinical Exam:  Well Appearing (no persistent physiologic abnormalities)    Clinical recommendation:   Well appearing: rodríguez: No culture, no antibiotics, routine vitals  Equivocal: rodríguez: Blood culture, vitals every 4 hours for 24 hours  Clinical illness: rodríguez: Strongly consider starting empiric antibiotics, vitals per NICU  Empiric antibiotics, vitals per NICU    Plan:   - CBC on admission - Yes  - No Antibiotics were initiated due to low risk of Sepsis - But will initiate later, if clinically indicated.    Hyperbilirubin:   Assessment: At risk for  hyperbilirubinemia  Risk factors for hyperbilirubinemia - Mom O+, infant blood type pending    Plan:   - bilirubin at 12pm    Hematology:  Assessment: No active hematologic issues  Plan:   - Monitor clinically    Neurology:  Assessment: No active neurologic concerns  Plan:   - No active issues      Lines:   - PIV    Social:   - Spoke to parents in-depth. All questions and concerns addressed. Parents expressed verbal understanding.      Matilde Burton, DO

## 2024-04-20 NOTE — DISCHARGE NOTE PROVIDER - NSDCFUADDAPPT_GEN_ALL_CORE_FT
Please follow up with Neurologist of your choice at the following office: [facility] at [address], [place] [address] [phone]

## 2024-04-20 NOTE — DISCHARGE NOTE PROVIDER - PROVIDER TOKENS
PROVIDER:[TOKEN:[[id number]]],PROVIDER:[TOKEN:[[id number]]],PROVIDER:[TOKEN:[[id number]]]

## 2024-04-20 NOTE — DISCHARGE NOTE PROVIDER - NSDCMRMEDTOKEN_GEN_ALL_CORE_FT
Eliquis 5 mg oral tablet: 1 tab(s) orally 2 times a day please start this on 3/24/24  gabapentin 600 mg oral tablet: 1 tab(s) orally 3 times a day  HYDROmorphone 4 mg oral tablet: 1 tab(s) orally every 6 hours as needed for pain 1-2 tab orally every 6-8 hours PRN  MS Contin 15 mg oral tablet, extended release: 1 tab(s) orally every 12 hours as needed for  severe pain  Vimpat 150 mg oral tablet: 1 tab(s) orally every 12 hours MDD: 2 tablets  Xanax 0.5 mg oral tablet: 1 tab(s) orally once a day as needed for severe anxiety   famotidine 20 mg oral tablet: 1 tab(s) orally once a day  gabapentin 600 mg oral tablet: 1 tab(s) orally 3 times a day  HYDROmorphone 4 mg oral tablet: 1 tab(s) orally every 6 hours as needed for pain 1-2 tab orally every 6-8 hours PRN  melatonin 5 mg oral tablet: 1 tab(s) orally once a day (at bedtime)  MS Contin 15 mg oral tablet, extended release: 1 tab(s) orally every 12 hours as needed for  severe pain  polyethylene glycol 3350 oral powder for reconstitution: 17 gram(s) orally once a day  senna leaf extract oral tablet: 2 tab(s) orally once a day (at bedtime)  Vimpat 150 mg oral tablet: 1 tab(s) orally every 12 hours MDD: 2 tablets  Xanax 0.5 mg oral tablet: 1 tab(s) orally once a day as needed for severe anxiety   acetaminophen 325 mg oral tablet: 2 tab(s) orally every 6 hours As needed Temp greater or equal to 38C (100.4F), Mild Pain (1 - 3)  dexAMETHasone 2 mg oral tablet: 1 tab(s) orally every 12 hours continue the following taper   - on 4/20-4/23 take 4 mg (2 tabs) every 12 hours   - on 4/24 and 4/25 in AM take 4 mg (2 tabs) in the AM and 2 mg (1 tab) in the evening   - on 4/26 take 2 mg every 12 hours  famotidine 20 mg oral tablet: 1 tab(s) orally once a day  gabapentin 600 mg oral tablet: 1 tab(s) orally 3 times a day  HYDROmorphone 4 mg oral tablet: 1 tab(s) orally every 6 hours as needed for pain 1-2 tab orally every 6-8 hours PRN  losartan 50 mg oral tablet: 1 tab(s) orally once a day  melatonin 5 mg oral tablet: 1 tab(s) orally once a day (at bedtime)  MS Contin 15 mg oral tablet, extended release: 1 tab(s) orally every 12 hours as needed for  severe pain  polyethylene glycol 3350 oral powder for reconstitution: 17 gram(s) orally once a day  senna leaf extract oral tablet: 2 tab(s) orally once a day (at bedtime)  Vimpat 150 mg oral tablet: 1 tab(s) orally every 12 hours MDD: 2 tablets  Xanax 0.5 mg oral tablet: 1 tab(s) orally once a day as needed for severe anxiety   acetaminophen 325 mg oral tablet: 2 tab(s) orally every 6 hours As needed Temp greater or equal to 38C (100.4F), Mild Pain (1 - 3)  dexAMETHasone 2 mg oral tablet: 1 tab(s) orally every 12 hours continue the following taper   - on 4/20-4/23 take 4 mg (2 tabs) every 12 hours   - on 4/24 and 4/25 in AM take 4 mg (2 tabs) in the AM and 2 mg (1 tab) in the evening   - on 4/26 take 2 mg every 12 hours and continue this dose DAILY  famotidine 20 mg oral tablet: 1 tab(s) orally once a day  HYDROmorphone 4 mg oral tablet: 1 tab(s) orally every 6 hours as needed for pain 1-2 tab orally every 6-8 hours PRN  losartan 50 mg oral tablet: 1 tab(s) orally once a day  melatonin 5 mg oral tablet: 1 tab(s) orally once a day (at bedtime)  MS Contin 15 mg oral tablet, extended release: 1 tab(s) orally every 12 hours as needed for  severe pain  Neurontin 600 mg oral tablet: 1 tab(s) orally once a day (at bedtime)  polyethylene glycol 3350 oral powder for reconstitution: 17 gram(s) orally once a day  senna leaf extract oral tablet: 2 tab(s) orally once a day (at bedtime)  Vimpat 150 mg oral tablet: 1 tab(s) orally every 12 hours MDD: 2 tablets  Xanax 0.5 mg oral tablet: 1 tab(s) orally once a day as needed for severe anxiety

## 2024-04-20 NOTE — PROGRESS NOTE ADULT - NUTRITIONAL ASSESSMENT
This patient has been assessed with a concern for Malnutrition and has been determined to have a diagnosis/diagnoses of Severe protein-calorie malnutrition and Underweight (BMI < 19).    This patient is being managed with:   Diet NPO with Tube Feed-  Tube Feeding Modality: Orogastric  Jevity 1.2 Brody (JEVITY1.2RTH)  Total Volume for 24 Hours (mL): 1200  Continuous  Starting Tube Feed Rate {mL per Hour}: 20  Increase Tube Feed Rate by (mL): 10     Every 4 hours  Until Goal Tube Feed Rate (mL per Hour): 50  Tube Feed Duration (in Hours): 24  Tube Feed Start Time: 00:00  Entered: Apr 14 2024  9:23PM  
This patient has been assessed with a concern for Malnutrition and has been determined to have a diagnosis/diagnoses of Severe protein-calorie malnutrition and Underweight (BMI < 19).    This patient is being managed with:   Diet Regular-  Entered: Apr 15 2024  9:15PM  
This patient has been assessed with a concern for Malnutrition and has been determined to have a diagnosis/diagnoses of Severe protein-calorie malnutrition and Underweight (BMI < 19).    This patient is being managed with:   Diet Regular-  Entered: Apr 15 2024  9:15PM  
This patient has been assessed with a concern for Malnutrition and has been determined to have a diagnosis/diagnoses of Severe protein-calorie malnutrition and Underweight (BMI < 19).    This patient is being managed with:   Diet Regular-  Prosource Gelatein Plus     Qty per Day:  1  Liquid Protein Supplement     Qty per Day:  1  Entered: Apr 18 2024  4:09PM  
This patient has been assessed with a concern for Malnutrition and has been determined to have a diagnosis/diagnoses of Severe protein-calorie malnutrition and Underweight (BMI < 19).    This patient is being managed with:   Diet Regular-  Prosource Gelatein Plus     Qty per Day:  1  Liquid Protein Supplement     Qty per Day:  1  Entered: Apr 18 2024  4:09PM

## 2024-04-20 NOTE — DISCHARGE NOTE NURSING/CASE MANAGEMENT/SOCIAL WORK - NSDCFUADDAPPT_GEN_ALL_CORE_FT
Please follow up with Neurologist of your choice at the following office: [facility] at [address], [place] [address] [phone]

## 2024-04-20 NOTE — DISCHARGE NOTE PROVIDER - NSDCACTIVITY_GEN_ALL_CORE
Do not drive or operate machinery/Showering allowed/Do not make important decisions/Stairs allowed/Walking - Indoors allowed/No heavy lifting/straining/Walking - Outdoors allowed/Follow Instructions Provided by your Surgical Team/Activity as tolerated

## 2024-04-20 NOTE — DISCHARGE NOTE NURSING/CASE MANAGEMENT/SOCIAL WORK - PATIENT PORTAL LINK FT
You can access the FollowMyHealth Patient Portal offered by [facility] by registering at the following website: [email or web address]. By joining [employer]’s FollowMyHealth portal, you will also be able to view your health information using other applications (apps) compatible with our system.

## 2024-04-20 NOTE — DISCHARGE NOTE PROVIDER - CARE PROVIDERS DIRECT ADDRESSES
,[email or web address] Get [email or web address] To Get Approved [email or web address] To Get Approved [email or web address]

## 2024-04-20 NOTE — DISCHARGE NOTE NURSING/CASE MANAGEMENT/SOCIAL WORK - NSDCVIVACCINE_GEN_ALL_CORE_FT
Tdap; 02-Sep-2023 21:06; Leticia Henry (RN); [employer]; [id number] (Exp. Date: 06-Jun-2025); IntraMuscular; Deltoid Left.; 0.5 milliLiter(s); VIS (VIS Published: 09-May-2013, VIS Presented: 02-Sep-2023);

## 2024-04-20 NOTE — DISCHARGE NOTE NURSING/CASE MANAGEMENT/SOCIAL WORK - NSDCPEFALRISK_GEN_ALL_CORE
For information on Fall & Injury Prevention, visit: [email or web address] OR  [email or web address] OR  https://www.cdc.gov/steadi/patient.html

## 2024-04-20 NOTE — PROGRESS NOTE ADULT - THIS PATIENT HAS THE FOLLOWING CONDITION(S)/DIAGNOSES ON THIS ADMISSION:
seizure/Cerebral Edema
Acute Respiratory Failure
Acute Respiratory Failure
None
seizure
Cerebral Edema
None
None
seizure
None
seizure/Cerebral Edema
seizure/Cerebral Edema

## 2024-04-20 NOTE — DISCHARGE NOTE PROVIDER - CARE PROVIDER_API CALL
Francisco J Nicole  Neurosurgery  [address] [place] [address]  Phone: [phone]  Fax: [phone]  Follow Up Time:     Tana Boswell  Radiation Oncology  [address] [place] [address]  Phone: [phone]  Fax: [phone]  Follow Up Time:     Servando Cervantes  Medical Oncology  [address] [place] [address]  Phone: [phone]  Fax: [phone]  Follow Up Time:

## 2024-04-20 NOTE — DISCHARGE NOTE PROVIDER - DETAILS OF MALNUTRITION DIAGNOSIS/DIAGNOSES
This patient has been assessed with a concern for Malnutrition and was treated during this hospitalization for the following Nutrition diagnosis/diagnoses:     -  04/15/2024: Severe protein-calorie malnutrition   -  04/15/2024: Underweight (BMI < 19)

## 2024-04-20 NOTE — DISCHARGE NOTE PROVIDER - NSDCCPCAREPLAN_GEN_ALL_CORE_FT
PRINCIPAL DISCHARGE DIAGNOSIS  Diagnosis: Brain mass  Assessment and Plan of Treatment:       SECONDARY DISCHARGE DIAGNOSES  Diagnosis: Seizure  Assessment and Plan of Treatment:     Diagnosis: Hyperpyrexia  Assessment and Plan of Treatment:      PRINCIPAL DISCHARGE DIAGNOSIS  Diagnosis: Brain mass  Assessment and Plan of Treatment: You were admitted at [facility] for Seizure and finding of brain mass. You had a lumbar puncture on 4/15   Please follow up with the following doctors within 1 week of discharge:  - Neurosurgeon Dr. Nicole   - Radiation oncologist Dr. Boswell/Dr. Candelaria for SRS / Gamma Knife Surgery   - Oncologist Dr. Seymour   Please continue taking Decadron and continue the following taper:   - Decadron 4 mg every 12 hours until 4/21   - on 4 /22 take 4 mg decadron in the AM and 2 mg in PM   - 4/23 continue taking 2 mg every 12 hours DAILY   ***Please DO NOT take any Aspirin and NSAIDs (Advil, Aleve, Motrin, Ibuprofen) until cleared by your Neurosurgeon** Please DO NOT do any heavy lifting, bending, twisting and straining. You may shower. Please come to the emergency room for any of the following: altered mental status, seizures, pain uncontrolled by pain medications, fevers, leaking / bleeding from surgical site, chest pain and shortness of breath      SECONDARY DISCHARGE DIAGNOSES  Diagnosis: Seizure  Assessment and Plan of Treatment: Please continue the following medications as treatment of seizures:  - VIMPAT (Lacosamide) 150 mg every 12 hours   Please follow up with Neurologist of your choice at the following office: [facility] at [address], [place] [address]  [phone]  Avoid the following medication keppra (levetiracetam) as this has believed to have caused a reaction of thrombocytopenia    Diagnosis: Pathologic thoracic fracture  Assessment and Plan of Treatment: As discussed MRI T Spine showed compression fractures at the following levels: T2/T3  ontinue to wear your fitted brace whenever you are out of bed  Please come to the emergency room if you experience:   Please follow up with your orthopedist in 1-2 weeks of discharge     PRINCIPAL DISCHARGE DIAGNOSIS  Diagnosis: Brain mass  Assessment and Plan of Treatment: You were admitted at [facility] for Seizure and finding of brain mass. You had a lumbar puncture on 4/15   Please follow up with the following doctors within 1 week of discharge:  - Neurosurgeon Dr. Nicole   - Radiation oncologist Dr. Boswell/Dr. Candelaria for SRS / Gamma Knife Surgery   - Oncologist Dr. Seymour ( you have an existing appt on 4/23)  Please continue taking Decadron and continue the following taper:    - on 4/20-4/23 take 4 mg every 12 hours   - on 4/24-4/25 take 4 mg in the morning and 2 mg in the evening  - on 4/26 and onward continue daily dose: 2 mg every 12 hours   ***Please DO NOT take any Aspirin and NSAIDs (Advil, Aleve, Motrin, Ibuprofen) until cleared by your Neurosurgeon** Please DO NOT do any heavy lifting, bending, twisting and straining. You may shower. Please come to the emergency room for any of the following: altered mental status, seizures, pain uncontrolled by pain medications, fevers, leaking / bleeding from surgical site, chest pain and shortness of breath      SECONDARY DISCHARGE DIAGNOSES  Diagnosis: Thrombocytopenia  Assessment and Plan of Treatment: Your labs this admission were consistent with thrombocytopenia. This may have been medication induced.   You were given 2 units of platelets on 4/18  PLEASE FOLLOW UP WITH YOUR PRIMARY CARE PROVIDER OR ONCOLOGIST WITHIN 2-3 DAYS FOR FOLLOW UP LAB WORK   - COMPLETE BLOOD COUNT (to follow up your platelet count)   Monitor for sign / symptoms of thrombocyotpenia including:  Easy or excessive bruising (purpura)  Superficial bleeding into the skin that appears as a rash of pinpoint-sized reddish-purple spots (petechiae), usually on the lower legs.  Prolonged bleeding from cuts.  Bleeding from your gums or nose.  Blood in urine or stools.    Diagnosis: Seizure  Assessment and Plan of Treatment: Please continue the following medications as treatment of seizures:  - VIMPAT (Lacosamide) 150 mg every 12 hours   Please follow up with Neurologist of your choice at the following office: [facility] at [address], [place] [address]  [phone]  Please monitor for adverse reactions of vimpat including: lethargy, dizziness, headache.   OF NOTE: Avoid the following medication keppra (levetiracetam) as this has believed to have caused a reaction of thrombocytopenia    Diagnosis: Pulmonary embolism  Assessment and Plan of Treatment: CTA chest performed on this admission showed resolution of your previously diagnosed Pulmonary Embolism. Lower extremity dopplers negative for DVT   YOUR ELIQUIS WAS STOPPED THIS ADMISSION DUE TO CONCERN FOR BLOOD SURROUNDING THE BRAIN MASS AND LOW PLATELET COUNT   PLEASE FOLLOW UP WITH YOUR ONCOLOGIST AND NEUROSURGEON REGARDING WHEN IT IS SAFE TO RESTART THIS MEDICATION AS IT IS NECESSARY FOR TREATMENT OF PREVIOUSLY DIAGNOSED PULMONARY EMBOLUS  Come to the hospital immediately for the following symptoms: Sudden shortness of breath (most common)  Chest pain (usually worse with breathing)  A feeling of anxiety.  A feeling of dizziness, lightheadedness, or fainting.  Irregular heartbeat.  Palpitations (heart racing)  Coughing and/or coughing up blood.  Sweating    Diagnosis: Pathologic thoracic fracture  Assessment and Plan of Treatment: As discussed MRI T Spine showed compression fractures at the following levels: T2/T3  ontinue to wear your fitted brace whenever you are out of bed  Please come to the emergency room if you experience: weakness in any extremity, worsening back pain, numbness   Please follow up with your orthopedist in 1-2 weeks of discharge    Diagnosis: Extravasation of blood  Assessment and Plan of Treatment: IV Infiltration during platelet adminstration on 4/18. Continue to monitor your Left upper extremity for any changes. This may include cool/cold extremity, weakness, severe pain or loss of pulses.    Diagnosis: Hypertension  Assessment and Plan of Treatment: You have been started on a new medication Losartan 50 mg once a day for hypertension.   Please continue to monitor your blood pressure daily with a manual cuff before taking your daily medication losartan. Call your primary care provider with any abnormally low or high readings. Do not take losartan if your systolic (top #) blood pressure is below 110. And call your primary care provider     PRINCIPAL DISCHARGE DIAGNOSIS  Diagnosis: Brain mass  Assessment and Plan of Treatment: You were admitted at [facility] for Seizure and finding of brain mass. You had a lumbar puncture on 4/15   Please follow up with the following doctors within 1 week of discharge:  - Neurosurgeon Dr. Nicole   - Radiation oncologist Dr. Boswell/Dr. Candelaria for SRS / Gamma Knife Surgery   - Oncologist Dr. Seymour ( you have an existing appt on 4/23)  Please continue taking Decadron and continue the following taper:    - on 4/20-4/23 take 4 mg every 12 hours   - on 4/24-4/25 take 4 mg in the morning and 2 mg in the evening  - on 4/26 and onward continue daily dose: 2 mg every 12 hours   You may continue your daily home regimen as needed for pain control. Do not take the following medications together: xanax and other narcotic products like dilaudid or oxycontin. These may cause co-occuring effects of lethargy, dizziness.   ***Please DO NOT take any Aspirin and NSAIDs (Advil, Meloxicam Aleve, Motrin, Ibuprofen) until cleared by your Neurosurgeon** Please DO NOT do any heavy lifting, bending, twisting and straining. You may shower. Please come to the emergency room for any of the following: altered mental status, seizures, pain uncontrolled by pain medications, fevers, leaking / bleeding from surgical site, chest pain and shortness of breath      SECONDARY DISCHARGE DIAGNOSES  Diagnosis: Thrombocytopenia  Assessment and Plan of Treatment: Your labs this admission were consistent with thrombocytopenia. This may have been medication induced.   You were given 2 units of platelets on 4/18  PLEASE FOLLOW UP WITH YOUR PRIMARY CARE PROVIDER OR ONCOLOGIST WITHIN 2-3 DAYS FOR FOLLOW UP LAB WORK   - COMPLETE BLOOD COUNT (to follow up your platelet count)   Monitor for sign / symptoms of thrombocyotpenia including:  Easy or excessive bruising (purpura)  Superficial bleeding into the skin that appears as a rash of pinpoint-sized reddish-purple spots (petechiae), usually on the lower legs.  Prolonged bleeding from cuts.  Bleeding from your gums or nose.  Blood in urine or stools.    Diagnosis: Seizure  Assessment and Plan of Treatment: Please continue the following medications as treatment of seizures:  - VIMPAT (Lacosamide) 150 mg every 12 hours   Please follow up with Neurologist of your choice at the following office: [facility] at [address], [place] [address]  [phone]  Please monitor for adverse reactions of vimpat including: lethargy, dizziness, headache.   OF NOTE: Avoid the following medication keppra (levetiracetam) as this has believed to have caused a reaction of thrombocytopenia    Diagnosis: Pulmonary embolism  Assessment and Plan of Treatment: CTA chest performed on this admission showed resolution of your previously diagnosed Pulmonary Embolism. Lower extremity dopplers negative for DVT   YOUR ELIQUIS WAS STOPPED THIS ADMISSION DUE TO CONCERN FOR BLOOD SURROUNDING THE BRAIN MASS AND LOW PLATELET COUNT   PLEASE FOLLOW UP WITH YOUR ONCOLOGIST AND NEUROSURGEON REGARDING WHEN IT IS SAFE TO RESTART THIS MEDICATION AS IT IS NECESSARY FOR TREATMENT OF PREVIOUSLY DIAGNOSED PULMONARY EMBOLUS  Come to the hospital immediately for the following symptoms: Sudden shortness of breath (most common)  Chest pain (usually worse with breathing)  A feeling of anxiety.  A feeling of dizziness, lightheadedness, or fainting.  Irregular heartbeat.  Palpitations (heart racing)  Coughing and/or coughing up blood.  Sweating    Diagnosis: Pathologic thoracic fracture  Assessment and Plan of Treatment: As discussed MRI T Spine showed compression fractures at the following levels: T2/T3  ontinue to wear your fitted brace whenever you are out of bed  Please come to the emergency room if you experience: weakness in any extremity, worsening back pain, numbness   Please follow up with your orthopedist in 1-2 weeks of discharge    Diagnosis: Extravasation of blood  Assessment and Plan of Treatment: IV Infiltration during platelet adminstration on 4/18. Continue to monitor your Left upper extremity for any changes. This may include cool/cold extremity, weakness, severe pain or loss of pulses.    Diagnosis: Hypertension  Assessment and Plan of Treatment: You have been started on a new medication Losartan 50 mg once a day for hypertension.   Please continue to monitor your blood pressure daily with a manual cuff before taking your daily medication losartan. Call your primary care provider with any abnormally low or high readings. Do not take losartan if your systolic (top #) blood pressure is below 110. And call your primary care provider

## 2024-04-20 NOTE — PROGRESS NOTE ADULT - ASSESSMENT
ASSESSMENT: 58F on Eliquis 5BID (last dose: Thurs) for recent dx subsegmental PE, pt of Dr. 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 Candelaria this wk, but no prior radiation tx to brain), p/f progressive lethargy since Thurs, acutely worse/unresponsive this AM per daughter (was still walking around and talking at home yday). CTH @830AM w/ L anterior frontal lesion 2x2cm w/ significant vasogenic edema and hemorrhagic conversion + addtl lesion in R motor strip. Lesions incr in size from CT 3/17/24 w/ incr edema/mass effect. Last MRI 1/24/24 w/ numerous tiny enhancing mets + L frontal lesion measuring 1.3cm. Coags/plts wnl. Lactate 5.1. Exam: EO to moderate stim, no FC, PERRL, wd x4  (13 Apr 2024 13:42)    NEURO: Neurochecks / Vitals q4h   s/p LP 4/15 - Cx NGTD, viral panel neg, cytology sent   Neurology saw: Seizure management w/ Vimpat 150 mg bid  Last CTH on 4/14 stable, discussed at Tumor Board 4/17--> outpatient SRS w/ Rad onc (Dr. Candelaria/Dr. Boswell) for brain mets. Continue Decadron 4 mg q12h -- taper to 2mg bid  MRI T2/T3 pathologic fracture- continue Brace when oob   Pain control prn meds Tylenol, oxycodone continue home meds oxycontin and gabapentin   Hospitalist following for co-management   PT/OT rec outpatient PT/OT     PULM: on room air, sat >92%   Incentive spirometer mobilize as tolerated  h/o NSCLC s/p immunotherapy and lung RT 3/2024 - per heme onc no plan for inpatient systemic therapy   h/o PE, on room air. Eliquis on hold - 4/14 CTA Chest no PE (appended read)     CV: -160 mmHg   HTN continue losartan 50mg qd     RENAL: IVL, voiding  replete electrolytes prn   K 3.5 on AM bmp, given 40 mEq KCl x1    GI: Regular diet w/ dietary recs below   continue bowel regimen w/ miralax and senna. LBM 4/16   continue GI ppx w/ famotidine while on dex     ENDO:  no active issues     HEME/ONC: anemia stable   VTE prophylaxis: [x] SCDs [x] hold chemoprophylaxis due to: thrombocytopenia [x] high risk of DVT/PE on admission due to: malignancy. CTA Chest 4/14 neg for PE, LED 4/14 neg for DVT - Eliquis on hold given thrombocytopenia and heme mets. Will follow up with heme onc & neurosurgery this week to discuss when to safely restart.   Thrombocytopenia improving s/p 2 U PLT this admission on 4/18- Per heme, thrombocytopenia may be multifactorial and medication related. Hemolysis labs neg, low suspicion for DIC. Zosyn and PPI d/c'd 4/17 and Keppra changed to vimpat on this admission as these medications may be contributing. Discussed w/ heme onc PLT stable for discharge with close follow up outpatient this week for repeat labs     ID: afebrile, no leukocytosis. LP 4/15 Cx NGTD, viral panel neg   CT A/P (met work up) w/ enteritis however patient asymptomatic. ID consulted rec monitor off ABX     Will discuss with Dr. Nicole   [id number] spectra  ASSESSMENT: 58F on Eliquis 5BID (last dose: Thurs) for recent dx subsegmental PE, pt of Dr. 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 Candelaria this wk, but no prior radiation tx to brain), p/f progressive lethargy since Thurs, acutely worse/unresponsive this AM per daughter (was still walking around and talking at home yday). CTH @830AM w/ L anterior frontal lesion 2x2cm w/ significant vasogenic edema and hemorrhagic conversion + addtl lesion in R motor strip. Lesions incr in size from CT 3/17/24 w/ incr edema/mass effect. Last MRI 1/24/24 w/ numerous tiny enhancing mets + L frontal lesion measuring 1.3cm. Coags/plts wnl. Lactate 5.1. Exam: EO to moderate stim, no FC, PERRL, wd x4  (13 Apr 2024 13:42)    NEURO: Neurochecks / Vitals q4h   s/p LP 4/15 - Cx NGTD, viral panel neg, cytology sent   Neurology saw: Seizure management w/ Vimpat 150 mg bid  Last CTH on 4/14 stable, discussed at Tumor Board 4/17--> outpatient SRS w/ Rad onc (Dr. Candelaria/Dr. Boswell) for brain mets. Continue Decadron 4 mg q12h -- taper to 2mg bid  MRI T2/T3 pathologic fracture- continue Brace when oob   Pain control prn meds Tylenol, oxycodone continue home meds oxycontin and gabapentin   Hospitalist following for co-management   PT/OT rec outpatient PT/OT     PULM: on room air, sat >92%   Incentive spirometer mobilize as tolerated  h/o NSCLC s/p immunotherapy and lung RT 3/2024 - per heme onc no plan for inpatient systemic therapy   h/o PE, on room air. Eliquis on hold - 4/14 CTA Chest no PE (appended read)     CV: -160 mmHg   HTN continue losartan 50mg qd     RENAL: IVL, voiding  replete electrolytes prn   K 3.5 on AM bmp, given 40 mEq KCl x1    GI: Regular diet w/ dietary recs below   continue bowel regimen w/ miralax and senna. LBM 4/16   continue GI ppx w/ famotidine while on dex     ENDO:  no active issues     HEME/ONC: anemia stable   VTE prophylaxis: [x] SCDs [x] hold chemoprophylaxis due to: thrombocytopenia [x] high risk of DVT/PE on admission due to: malignancy. CTA Chest 4/14 neg for PE, LED 4/14 neg for DVT - Eliquis on hold given thrombocytopenia and heme mets. Will follow up with heme onc & neurosurgery this week outpatient to discuss when to safely restart.   Thrombocytopenia improving s/p 2 U PLT this admission on 4/18- Per heme, thrombocytopenia may be multifactorial and medication related. Hemolysis labs neg, low suspicion for DIC. Zosyn and PPI d/c'd 4/17 and Keppra changed to vimpat on this admission as these medications may be contributing. Discussed w/ heme onc PLT stable for discharge with close follow up outpatient this week for repeat labs     ID: afebrile, no leukocytosis. LP 4/15 Cx NGTD, viral panel neg   CT A/P (met work up) w/ enteritis however patient asymptomatic. ID consulted rec monitor off ABX     Will discuss with Dr. Nicole   [id number] spectra

## 2024-04-20 NOTE — PROGRESS NOTE ADULT - REASON FOR ADMISSION
intracerebral tumors
seizures / brain metastasis
mass
intracerebral tumors
intracerebral tumors
mets w/ heme
seizures

## 2024-04-20 NOTE — DISCHARGE NOTE PROVIDER - HOSPITAL COURSE
58F on Eliquis 5BID (last dose: Thurs) for recent dx subsegmental PE, pt of Dr. 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 Candelaria this wk, but no prior radiation tx to brain), p/f progressive lethargy since Thurs, acutely worse/unresponsive this AM per daughter (was still walking around and talking at home yday). CTH @830AM w/ L anterior frontal lesion 2x2cm w/ significant vasogenic edema and hemorrhagic conversion + addtl lesion in R motor strip. Lesions incr in size from CT 3/17/24 w/ incr edema/mass effect. Last MRI 1/24/24 w/ numerous tiny enhancing mets + L frontal lesion measuring 1.3cm. Coags/plts wnl. Lactate 5.1. Exam: EO to moderate stim, no FC, PERRL, wd x4. Patient transferred to NSCU, unable to protect airway became obtunded and intubated for airway management. concern for seizures placed on ceribell. 58F on Eliquis 5BID (last dose: Thurs) for recent dx subsegmental PE, pt of Dr. 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 Candelaria this wk, but no prior radiation tx to brain), p/f progressive lethargy since Thurs, acutely worse/unresponsive this AM per daughter (was still walking around and talking at home yday). CTH @830AM w/ L anterior frontal lesion 2x2cm w/ significant vasogenic edema and hemorrhagic conversion + addtl lesion in R motor strip. Lesions incr in size from CT 3/17/24 w/ incr edema/mass effect. Last MRI 1/24/24 w/ numerous tiny enhancing mets + L frontal lesion measuring 1.3cm. Coags/plts wnl. Lactate 5.1. Exam: EO to moderate stim, no FC, PERRL, wd x4. [facility], unable to protect airway became obtunded and intubated for airway management. concern for seizures placed on ceribell then formal EEG monitoring and loaded with Keppra. Steroids started. Infectious work up sent for hypothermia, unremarkable. Met work up imaging performed- CT Abdomen concerning for Enteritis started on zosyn. MRI Brain and C/T/L Spine performed - T spine showing already known (to patient) T2/T3 compression fracture. MR Brain confirming L frontal lesion and R frontal lesion w/ surrounding edema concerning for metastatic disease. Compression fractures of the spine already previously addressed and patient has a fitted brace, brought to hospital and should continue to wear at all time when out of bed. LP Performed for cytology (no clinical evidence of infection see in CSF numbers and culture remains negative) on 4/15. Patient successfully extubated on 4/15. Bilateral Lower extremity dopplers performed and CTA chest performed given recent Hx of PE, no evidence of PE (resolved clot burden) and no DVT seen on ultrasound. Patient transferred to [address] [place] [address] from [facility], discussed at tumor board consensus w/ patient and team for outpatient SRS therapy. Serial labs performed showing worsening thrombocytopenia, no evidence of hemorrhage or active bleeding h/h stable. Hematology consulted who believe this is multifactorial and likely medication induced, all possibly potentiating meds discontinued (zosyn, keppra and PPI). Received 2 U Platelets on 4/18 for low platelet with appropriate response. (L Arm IV infiltration during platelet administration - neurovascular checks serially performed swelling improved arm remains warm with strong pulses). Serial labs drawn, platelets stable 114. Will follow up/has appt with [facility] center and oncologist for repeat labs this upcoming week    ID consulted for ABX management being that patient is asymptomatic and the enteritis is an incidental finding- discontinue and monitor off ABX. Neurology consulted for Anti-epileptic management change in setting of thrombocytopenia potentially worsened by Keppra. AED changed to Vimpat 150 mg bid ( will continue on discharge). Hospitalist following on 4 cohen for co-management.    Evaluated by physical therapy / occupational therapy who recommend outpatient PT/OT services. patient has brace for thoracic compression fractures and the brace should be work when out of bed.   On the day of discharge the patient is medically and neurologically stable for discharge to home. 58F on Eliquis 5BID (last dose: Thurs) for recent dx subsegmental PE, pt of Dr. 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 Candelaria this wk, but no prior radiation tx to brain), p/f progressive lethargy since Thurs, acutely worse/unresponsive this AM per daughter (was still walking around and talking at home yday). CTH @830AM w/ L anterior frontal lesion 2x2cm w/ significant vasogenic edema and hemorrhagic conversion + addtl lesion in R motor strip. Lesions incr in size from CT 3/17/24 w/ incr edema/mass effect. Last MRI 1/24/24 w/ numerous tiny enhancing mets + L frontal lesion measuring 1.3cm. Coags/plts wnl. Lactate 5.1. Exam: EO to moderate stim, no FC, PERRL, wd x4. Patient [facility], unable to protect airway became obtunded and intubated for airway management. concern for seizures placed on ceribell then formal EEG monitoring and loaded with Keppra. Steroids started. Infectious work up sent for hypothermia, unremarkable. Met work up imaging performed- CT Abdomen concerning for Enteritis started on zosyn. MRI Brain and C/T/L Spine performed - T spine showing already known (to patient) T2/T3 compression fracture. MR Brain confirming L frontal lesion and R frontal lesion w/ surrounding edema concerning for metastatic disease. Compression fractures of the spine already previously addressed and patient has a fitted brace, brought to hospital and should continue to wear at all time when out of bed. LP Performed for cytology (no clinical evidence of infection see in CSF numbers and culture remains negative) on 4/15. Patient successfully extubated on 4/15. Bilateral Lower extremity dopplers performed and CTA chest performed given recent Hx of PE, no evidence of PE (resolved clot burden) and no DVT seen on ultrasound. Patient transferred to [address] [place] [address] [facility], discussed at tumor board consensus w/ patient and team for outpatient SRS therapy. Serial labs performed showing worsening thrombocytopenia, no evidence of hemorrhage or active bleeding h/h stable. Hematology consulted who believe this is multifactorial and likely medication induced, all possibly potentiating meds discontinued (zosyn, keppra and PPI). Received 2 U Platelets on 4/18 for low platelet with appropriate response. (L Arm IV infiltration during platelet administration - neurovascular checks serially performed swelling improved arm remains warm with strong pulses). Serial labs drawn, platelets stable 114. Will follow up/has appt with [facility] and oncologist for repeat labs this upcoming week    ID consulted for ABX management being that patient is asymptomatic and the enteritis is an incidental finding- discontinue and monitor off ABX. Neurology consulted for Anti-epileptic management change in setting of thrombocytopenia potentially worsened by Keppra. AED changed to Vimpat 150 mg bid ( will continue on discharge). Hospitalist following on 4 cohen for co-management.  Evaluated by physical therapy / occupational therapy who recommend outpatient PT/OT services. patient has brace for thoracic compression fractures and the brace should be work when out of bed.   On the day of discharge the patient is medically and neurologically stable for discharge to home.

## 2024-04-20 NOTE — PROGRESS NOTE ADULT - SUBJECTIVE AND OBJECTIVE BOX
SUBJECTIVE: Patient seen and examined at bedside, in no acute distress. PLT stable on AM CBC. Plan to discharge today and follow up with heme onc/rad onc next week for repeat CBC to f/u platelets and SRS w/ Dr. Boswell/Dr. Candelaria. discussed medication reconciliation, medication side effects and follow up instructions with patient and family who display understanding     Vital Signs Last 24 Hrs  T(C): 37.2 (04-20-24 @ 09:00), Max: 37.2 (04-20-24 @ 09:00)  T(F): 99 (04-20-24 @ 09:00), Max: 99 (04-20-24 @ 09:00)  HR: 66 (04-20-24 @ 09:00) (60 - 74)  BP: 123/81 (04-20-24 @ 09:00) (120/74 - 160/89)  BP(mean): --  RR: 18 (04-20-24 @ 09:00) (18 - 18)  SpO2: 94% (04-20-24 @ 09:00) (94% - 100%)    PHYSICAL EXAM:  Constitutional: No Acute Distress   Neurological: awake/alert, Oriented x3 (person, place, time), EOMI, Pupils 4mm reactive b/l, no facial asymmetry, Moving all extremities with 5/5 symmetric strength   Incision: prev lumbar puncture site c/d/i   Pulmonary: Clear Lungs   Cardiovascular: Regular rate and rhythm   Gastrointestinal: Soft, Non-tender, Non-distended abdomen, +bowel sounds x 4  Extremities: No calf tenderness bilaterally, no edema, B/L SCD's, LEFT upper extremity mild tenderness, swelling improved 2+ pulses     LABS:                    11.3   9.95  )-----------( 114      ( 20 Apr 2024 08:46 )             34.0     04-20  135  |  98  |  12  ----------------------------<  87  3.5   |  23  |  0.44<L>  Ca    9.8      20 Apr 2024 08:46    I&O's Summary  19 Apr 2024 07:01  -  20 Apr 2024 07:00  --------------------------------------------------------  IN: 1320 mL / OUT: 0 mL / NET: 1320 mL    IMAGING: reviewed     MEDICATIONS  (STANDING):  dexAMETHasone     Tablet 4 milliGRAM(s) Oral every 12 hours  famotidine    Tablet 20 milliGRAM(s) Oral daily  gabapentin Solution 600 milliGRAM(s) Oral three times a day  lacosamide IVPB 150 milliGRAM(s) IV Intermittent every 12 hours  losartan 50 milliGRAM(s) Oral daily  melatonin 5 milliGRAM(s) Oral at bedtime  oxyCODONE  ER Tablet 15 milliGRAM(s) Oral every 12 hours  polyethylene glycol 3350 17 Gram(s) Oral daily  potassium chloride    Tablet ER 40 milliEquivalent(s) Oral once  senna 2 Tablet(s) Oral at bedtime  sodium chloride 2 Gram(s) Oral every 6 hours    MEDICATIONS  (PRN):  acetaminophen     Tablet .. 650 milliGRAM(s) Oral every 6 hours PRN Temp greater or equal to 38C (100.4F), Mild Pain (1 - 3)  ALPRAZolam 0.5 milliGRAM(s) Oral daily PRN severe anxiety  ondansetron Injectable 4 milliGRAM(s) IV Push every 6 hours PRN Nausea and/or Vomiting  oxyCODONE    IR 10 milliGRAM(s) Oral every 4 hours PRN Severe Pain (7 - 10)  oxyCODONE    IR 5 milliGRAM(s) Oral every 4 hours PRN Moderate Pain (4 - 6)    DIET: Regular    SUBJECTIVE: Patient seen and examined at bedside, in no acute distress. PLT stable on AM CBC. Plan to discharge today and follow up with heme onc/rad onc next week for repeat CBC to f/u platelets and SRS w/ Dr. Boswell/Dr. Candelaria. Discussed medication reconciliation, medication side effects and follow up instructions with patient and family who display understanding     Vital Signs Last 24 Hrs  T(C): 37.2 (04-20-24 @ 09:00), Max: 37.2 (04-20-24 @ 09:00)  T(F): 99 (04-20-24 @ 09:00), Max: 99 (04-20-24 @ 09:00)  HR: 66 (04-20-24 @ 09:00) (60 - 74)  BP: 123/81 (04-20-24 @ 09:00) (120/74 - 160/89)  BP(mean): --  RR: 18 (04-20-24 @ 09:00) (18 - 18)  SpO2: 94% (04-20-24 @ 09:00) (94% - 100%)    PHYSICAL EXAM:  Constitutional: No Acute Distress   Neurological: awake/alert, Oriented x3 (person, place, time), EOMI, Pupils 4mm reactive b/l, no facial asymmetry, Moving all extremities with 5/5 symmetric strength   Incision: prev lumbar puncture site c/d/i   Pulmonary: Clear Lungs   Cardiovascular: Regular rate and rhythm   Gastrointestinal: Soft, Non-tender, Non-distended abdomen, +bowel sounds x 4  Extremities: No calf tenderness bilaterally, no edema, B/L SCD's, LEFT upper extremity mild tenderness, swelling improved 2+ pulses     LABS:                    11.3   9.95  )-----------( 114      ( 20 Apr 2024 08:46 )             34.0     04-20  135  |  98  |  12  ----------------------------<  87  3.5   |  23  |  0.44<L>  Ca    9.8      20 Apr 2024 08:46    I&O's Summary  19 Apr 2024 07:01  -  20 Apr 2024 07:00  --------------------------------------------------------  IN: 1320 mL / OUT: 0 mL / NET: 1320 mL    IMAGING: reviewed     MEDICATIONS  (STANDING):  dexAMETHasone     Tablet 4 milliGRAM(s) Oral every 12 hours  famotidine    Tablet 20 milliGRAM(s) Oral daily  gabapentin Solution 600 milliGRAM(s) Oral three times a day  lacosamide IVPB 150 milliGRAM(s) IV Intermittent every 12 hours  losartan 50 milliGRAM(s) Oral daily  melatonin 5 milliGRAM(s) Oral at bedtime  oxyCODONE  ER Tablet 15 milliGRAM(s) Oral every 12 hours  polyethylene glycol 3350 17 Gram(s) Oral daily  potassium chloride    Tablet ER 40 milliEquivalent(s) Oral once  senna 2 Tablet(s) Oral at bedtime  sodium chloride 2 Gram(s) Oral every 6 hours    MEDICATIONS  (PRN):  acetaminophen     Tablet .. 650 milliGRAM(s) Oral every 6 hours PRN Temp greater or equal to 38C (100.4F), Mild Pain (1 - 3)  ALPRAZolam 0.5 milliGRAM(s) Oral daily PRN severe anxiety  ondansetron Injectable 4 milliGRAM(s) IV Push every 6 hours PRN Nausea and/or Vomiting  oxyCODONE    IR 10 milliGRAM(s) Oral every 4 hours PRN Severe Pain (7 - 10)  oxyCODONE    IR 5 milliGRAM(s) Oral every 4 hours PRN Moderate Pain (4 - 6)    DIET: Regular

## 2024-04-20 NOTE — PROGRESS NOTE ADULT - PROVIDER SPECIALTY LIST ADULT
Hospitalist
Infectious Disease
NSICU
NSICU
Neurosurgery
Heme/Onc
Hospitalist
Hospitalist
NSICU
NSICU
Neurosurgery
Neurosurgery
NSICU
NSICU
Neurosurgery
Neurosurgery
NSICU
Neurosurgery
Neurosurgery

## 2024-04-22 PROBLEM — G93.89 BRAIN MASS: Status: ACTIVE | Noted: 2024-01-01

## 2024-04-22 PROBLEM — G40.909 SEIZURE DISORDER: Status: ACTIVE | Noted: 2024-01-01

## 2024-04-23 PROBLEM — G93.6 BRAIN EDEMA: Status: ACTIVE | Noted: 2024-01-01

## 2024-05-06 NOTE — ED PROVIDER NOTE - COVID-19 ORDERING FACILITY
Addended by: KRISTEL BRAMBILA on: 11/15/2023 04:18 PM     Modules accepted: Orders    
[facility]

## 2024-05-06 NOTE — ED PROVIDER NOTE - PHYSICAL EXAMINATION
VITAL SIGNS: I have reviewed nursing notes and confirm.   GEN: Well-developed; well-nourished; in no acute distress. Speaking full sentences.  SKIN: Warm, pink, no rash, no diaphoresis, no cyanosis, well perfused.   HEAD: Normocephalic; atraumatic.    NECK: Supple; non tender. no meningismus  EYES: Pupils 3mm equal, round, reactive to light and accomodation, conjunctiva and sclera clear. Extra-ocular movements intact bilaterally.  ENT: No nasal discharge; airway clear. Trachea is midline.    CV: RRR. S1, S2 normal; no murmurs, gallops, or rubs. Capillary refill < 2 seconds throughout.   RESP: CTA bilaterally. No wheezes, rales, or rhonchi.   ABD: Normal bowel sounds, soft, non-distended, non-tender, no rebound, no guarding, no rigidity,   MSK: Normal range of motion and movement of all 4 extremities.    NEURO: Alert & oriented x 3, Grossly unremarkable. Sensory and motor intact throughout. No focal deficits. Gait: Fluid. Normal speech and coordination. cerebellar intact ftn hts b/l, no facial droop, no rigidity.

## 2024-05-06 NOTE — ED PROVIDER NOTE - OBJECTIVE STATEMENT
59f pmhx of copd, htn, hld, lung cancer s/p gamma knife surgery for brain metastasis presenting with headaches since surgery, sent by neurosurgeon for CTH. Patient reporting diffuse headache, intermittent, since the surgery. Denies any chest pain, abdominal pain, shortness of breath, nausea/vomiting,  fevers, chills,  weakness, syncope,     subjective neurological deficits, photophobia, phonophobia.

## 2024-05-06 NOTE — ED PROVIDER NOTE - NSFOLLOWUPINSTRUCTIONS_ED_ALL_ED_FT
Headache    A headache is pain or discomfort felt around the head or neck area. The specific cause of a headache may not be found as there are many types including tension headaches, migraine headaches, and cluster headaches. Watch your condition for any changes. Things you can do to manage your pain include taking over the counter and prescription medications as instructed by your health care provider, lying down in a dark quiet room, limiting stress, getting regular sleep, and refraining from alcohol and tobacco products.    SEEK IMMEDIATE MEDICAL CARE IF YOU HAVE ANY OF THE FOLLOWING SYMPTOMS: fever, vomiting, stiff neck, loss of vision, problems with speech, muscle weakness, loss of balance, trouble walking, passing out, or confusion.     Take acetaminophen 650 mg orally every 6-8 hours for pain control as needed. Please do not exceed 4,000 mg of acetaminophen during a 24 hours period. Acetaminophen can be found in many over-the-counter cold medications as well as opioid medications that may be given for pain.

## 2024-05-06 NOTE — HISTORY OF PRESENT ILLNESS
[FreeTextEntry1] :  RT hx: GKRS to LEFT frontal, RIGHT frontal lesion 20Gy over 1 Fx completed 4/23/2024. SBRT RIGH lung, TSpine/ribs 20GY over 5 Fxs 2/13-2/21/2024  INITIAL CONSULTATION: 2/1/2024 Ms. KAELYN VALENTE, 58 year old female, former smoker (quit in 11/2023), w/ hx of COPD, chronic pain syndrome, HLD, Melanoma in situ, and recently diagnosed metastatic lung cancer with metastasis to the brain and 3rd rib, [id number], Stage IVB, PDL1 20%. Patient presents to discuss radiation therapy. OF note patient is extremely claustrophobic.  Brief Oncological History: There is a history of a CT chest on 02/03/2022 showing a 9 mm right upper lobe nodule (2, 22), 4 mm right upper lobe nodule (2, 51), 3 mm left upper lobe nodule (2, 29), 2 mm left lower lobe nodule (2, 87). Scattered small groundglass nodules, measuring up to 1.1 cm in the right upper lobe (2, 22).  Followup PET/CT on 03/23/2022 showed mild FDG avid posterior aspect right upper lobe pulmonary nodule (SUV 1.5, 1.0 cm, series 3 image 62). Malignancy cannot be excluded. There is a left posterior medial eighth rib fracture (SUV 2.9, series 3 image 105).  Patient was consulted with Dr. Campos (IR) for CT guided biopsy but the right apical nodule was noted to be unchanged in size & shape & is therefore is benign, biopsy is not indicated at this time compared to CT 2/2015. Was suggested followup surveillance.  Patient then presented to ED. CTA on 11/28/2023 showing a lobulated irregular mass in the posterior right upper lobe measuring 7.7 x 4.8 x 6.5 cm corresponding to 8 mm pulmonary nodule seen on CT of February 3, 2023. There is lysis of the right posterior third rib. Redemonstration of mild to moderate centrilobular emphysema. Redemonstration of a 5 mm spiculated groundglass nodule in the lateral left upper lobe. In the right lower outer breast, there is a 9 mm nodule for which correlation with breast examination and/or recent mammography is recommended.  CT guided biopsy of RUL nodule on 12/13/2023. Path revealed positive for malignant cell. Poorly differentiated adenocarcinoma. P40 immunostudy is negative. TTF1 immunostain is inconclusive (rare cells, weak reactivity). Molecular studies in progress; addendum to follow. PD-L1 20%.  Patient then had a PET CT intensely FDG-avid LEFT UPPER lobe lung mass invading centrally to the mediastinum and laterally displacing the esophagus. This is eroding into the posterior RIGHT apical chest wall with destruction of the posterior RIGHT third rib and medial portion of the T4 vertebral body and this is invading the RIGHT lung pleural surface and measures approximately 10.4 x 5.3 cm in largest contiguous axial dimension with SUV 45.0 (image 58). Max 10.6 cm in dimension in craniocaudal extent on coronal images. Lytic destructive RIGHT third rib and T4 vertebral changes.   Established care with Dr. Seymour started systemic therapy on 1/18/2024) chemoradiotherapy (pemetrexed and carboplatin AUC 5) + immunotherapy with pembrolizumab, followed by pembrolizumab maintenance) Reports taken to the ER  for "I was discombobulated" admits to utilizing prescribed opioid medication for pain in the right scapular area and thinks this may have been a possible side effect. MRI Brain showed: Numerous enhancing intracranial lesions, largest in the left frontal periventricular region measuring up to 1.3 cm, compatible with metastatic disease.  2/1/2024 Patient here for consultation to discuss the role of CNS radiation in lieu of recent finding on cranial images. Seen by Dr Prasad with recommendations for 20Gy/5 fractions for palliation of the R Lung and chest wall pain. Reports persistent pain right scapular which is non radiating.  Denies N/V, HA/unilateral extremity weakness/memory changes/gait disturbance/bowel/bladder dysfunction or other neurologic symptoms. No issues with speech or comprehension.  VISIT DATED 5/6/2024 Patient recently completed GKRS to LEFT frontal, RIGHT frontal lesion 20Gy over 1 Fx completed 4/23/2024 (patient underwent multiple attempts). Reports post treatment headache

## 2024-05-06 NOTE — ED PROVIDER NOTE - CLINICAL SUMMARY MEDICAL DECISION MAKING FREE TEXT BOX
Dr. Mathieu Lazaro MD, EM And Medical Toxicology Attendinf pmhx of lung cancer s/p gamma knife surgery for brain metastasis presenting with headaches since surgery, sent by neurosurgeon for CTH. Patient reporting diffuse headache, intermittent, since the surgery. Denies any chest pain, abdominal pain, shortness of breath, nausea/vomiting,  fevers, chills,  weakness, syncope,     subjective neurological deficits, photophobia, phonophobia.   History obtained from independent historian: N/A  External note reviewed: N/A  DDx: generalized headache, post-op complication from recent gamma knife surgery  Decision making, ED course, independent interpretation of imaging studies, and consults:   - CTH showing improved swelling and no new lesions or hemorrhage.   - Patient otherwise well appearing, neurologically intact, a/ox3. Since CTH is showing improvement, patient can be discharged home with follow up primary NSG.   - patient requesting help at home since her surgery. placed SW consult for home health aide.     Consideration hospitalization vs de-escalation of care: dc  Disposition: DC

## 2024-05-06 NOTE — ED PROVIDER NOTE - PATIENT PORTAL LINK FT
You can access the FollowMyHealth Patient Portal offered by [facility] by registering at the following website: [email or web address]. By joining [facility]’s FollowMyHealth portal, you will also be able to view your health information using other applications (apps) compatible with our system.

## 2024-05-09 PROBLEM — C34.91 ADENOCARCINOMA OF RIGHT LUNG: Status: ACTIVE | Noted: 2024-01-01

## 2024-05-09 NOTE — ED PROVIDER NOTE - ATTENDING CONTRIBUTION TO CARE
Patient is a 59-year-old female with history of lung CA with mets to the brain, here with her  and daughter for evaluation of persistent headache x 16 days after having gamma knife procedure by Dr. Candelaria.  patient is been on p.o. Dilaudid and morphine without any improvement in her pain.  Daughter states that patient was initially on 4 mg of Decadron 3 times a day which was reduced and then increased because of persistent pain.  There have been no fevers or chills.  Patient has been eating per family but she is in bed secondary to pain. Today patient had an episode of vomiting.  Family states they contacted the neurosurgery office and were told to come in for evaluation.    VS noted  Gen.  lethargic, holding head, able to answer limited questions  HEENT:  PERRL, EOMI, mmm  Lungs: CTAB/L no C/ W /R   CVS: RRR   Abd; Soft non tender, non distended   Ext: no edema  Skin: no rash  Neuro:  awake, alert, face symmetrical, tongue midline, strength equal bilaterally, clear speech  a/p:  headache–  Patient has had a persistent headache.  Plan for CT head, pain control with Reglan and IV Tylenol.  Morphine was offered but declined as patient did not do well with it in the past.  Neurosurgery paged.    See progress notes for updates.    - Godfrey MANDUJANO

## 2024-05-09 NOTE — ED PROVIDER NOTE - PROGRESS NOTE DETAILS
[facility] PGY 3 Neurosurgery made aware [facility] PGY 3 Patient still endorsing headache will give Dilaudid IV patient's tried to have expedited CT scan but due to longline >20 (call placed at approx 8pm) CT scan tech unable to amend/result will do best.

## 2024-05-09 NOTE — ED ADULT TRIAGE NOTE - CHIEF COMPLAINT QUOTE
c/o headache and weakness for a couple days. Currently being treated for lung CA, last treatment three weeks. Denies headache, chest pain, dizziness. Phx HLD

## 2024-05-09 NOTE — CONSULT NOTE ADULT - SUBJECTIVE AND OBJECTIVE BOX
NEUROSURGERY CONSULT    HPI: 60yo F PMH COPD, HTN, HLD and stage IV right lung cancer w/ brain mets s/p lung RT 3/1/2024 on chemoimmuotherapy since Jan 2024  s/p GKRS  __ presenting with lethargy and headaches. Pt was recently evaluated in ED on 5/6 with similar symptoms with head CT showing left frontal hemorrhagic mass slightly smaller with improving vasogenic edema. Pt currently on dexamethasone 4mg TID.     RADIOLOGY:     MEDS:      Vital Signs Last 24 Hrs  T(C): 36.7 (09 May 2024 18:28), Max: 36.7 (09 May 2024 10:02)  T(F): 98 (09 May 2024 18:28), Max: 98.1 (09 May 2024 10:02)  HR: 56 (09 May 2024 19:28) (56 - 77)  BP: 126/77 (09 May 2024 19:28) (119/73 - 157/96)  BP(mean): --  RR: 15 (09 May 2024 19:28) (15 - 18)  SpO2: 96% (09 May 2024 19:28) (95% - 100%)    Parameters below as of 09 May 2024 19:28  Patient On (Oxygen Delivery Method): nasal cannula        LABS:                        13.2   13.06 )-----------( 111      ( 09 May 2024 19:19 )             36.1                 PHYSICAL EXAM:     NEUROSURGERY CONSULT    HPI: 58yo F PMH COPD, HTN, HLD and stage IV right lung cancer w/ brain mets s/p lung RT 3/1/2024 on chemoimmuotherapy since Jan 2024  s/p GKRS  4/23/24 presenting with lethargy and headaches. Per patients daughter at bedside, pt has had headaches and lethargy since GKRS. Patient's daughter brought her to her oncology appointment today, though she notes that today she was a bit more lethargic and fatigued then normal, pt was suppose to have chemotherapy today, but was held due to her current symptoms. Per patient's daughter, patient usually has bouts of fatigue, but otherwise is awake and alert and interactive. Pt was recently evaluated in ED on 5/6 with similar symptoms with head CT showing left frontal hemorrhagic mass slightly smaller with improving vasogenic edema. Pt currently on dexamethasone 4mg TID and Vimpat. Pt not on any AC/AP.       MEDS:      Vital Signs Last 24 Hrs  T(C): 36.7 (09 May 2024 18:28), Max: 36.7 (09 May 2024 10:02)  T(F): 98 (09 May 2024 18:28), Max: 98.1 (09 May 2024 10:02)  HR: 56 (09 May 2024 19:28) (56 - 77)  BP: 126/77 (09 May 2024 19:28) (119/73 - 157/96)  BP(mean): --  RR: 15 (09 May 2024 19:28) (15 - 18)  SpO2: 96% (09 May 2024 19:28) (95% - 100%)    Parameters below as of 09 May 2024 19:28  Patient On (Oxygen Delivery Method): nasal cannula        LABS:                        13.2   13.06 )-----------( 111      ( 09 May 2024 19:19 )             36.1                 PHYSICAL EXAM:  Lethargic but arousable w/ stimulation, oriented x 3, following commands  PERRL  PICKETT antigravity  SILFOX

## 2024-05-09 NOTE — ED ADULT NURSE NOTE - OBJECTIVE STATEMENT
Pt awake and alert arrives with co headahce x few days with weakness. Pt with co nausea no vomiting.  Pt medicated for her symptoms, fluids infusing. pt afebrile placed on cardiac monitor denies sob or chest pain. pts family at bedside awaiting dispo.

## 2024-05-09 NOTE — ED PROVIDER NOTE - OBJECTIVE STATEMENT
59-year-old female chief complaint of headache patient has a history of lung cancer with mets to the brain recent given her procedure approximately 2 weeks ago.  Patient has been much more lethargic ever since undergoing this procedure.  At baseline approximately 3 weeks ago she was walking and self-sufficient currently she is unable to walk without assistance given that she is very weak.  No fevers poor p.o. intake severe headache not controlled with Dilaudid and morphine at home

## 2024-05-09 NOTE — REVIEW OF SYSTEMS
[Fatigue] : fatigue [Recent Change In Weight] : ~T recent weight change [Cough] : cough [SOB on Exertion] : shortness of breath during exertion [Diarrhea: Grade 0] : Diarrhea: Grade 0 [Negative] : Psychiatric [Dizziness] : dizziness [FreeTextEntry2] : loosing weight [de-identified] : headache

## 2024-05-09 NOTE — HISTORY OF PRESENT ILLNESS
[de-identified] : 59F, postmenopausal, current smoker, PMHx HLD, melanoma, COPD, referred for medical oncology consultation of right lung cancer.  CASE SYNOPSIS: 2/03/2022 CT chest  : - 9 mm right upper lobe nodule (2, 22). 4 mm right upper lobe nodule (2, 51). 3 mm left upper lobe nodule (2, 29). 2 mm left lower lobe nodule (2, 87). Scattered small groundglass nodules, measuring up to 1.1 cm in the right upper lobe (2, 22). - No displaced fracture. No suspicious osseous lesion. Minimal angulation at the anterior aspects of the left second and third ribs, could reflect age-indeterminate nondisplaced fractures (example 2, 34 and 2, 45). 3/23/2022 PET/CT ([facility]): There is FDG avid right apical pulmonary nodule.  Findings may be inflammatory vs malignant.   CT follow-up versus biopsy recommended as clinically indicated.  There is mildly FDG avid left rib fracture.  03/23/2022 PET/CT  ([facility]) : FDG avid posterior aspect right upper lobe pulmonary nodule (SUV 1.5, 1.0 cm, series 3 image 62). Malignancy cannot be excluded. Left posterior medial eighth rib fracture (SUV 2.9, series 3 image 105). Consulted by IR (Dr. Campos) for CT guided biopsy. "When compared to 2/2015 chest CT the right apical nodule is unchanged in size & shape &  therefore is benign, biopsy is not indicated at this time. Given emphysematous changes identified on 3/2022 PET/CT, I have recommended consistent follow ups & serial imaging surveillance with Dr Robles".  11/28/2023-presents to ED at [facility] with a right upper back pain for right upper back pain 11/28/2023: CT angiogram There is a lobulated irregular mass in the posterior right upper lobe measuring 7.7 x 4.8 x 6.5 cm corresponding to 8 cm pulmonary nodule seen on CT of February 3, 2022. There is lysis of the right posterior third rib. Redemonstration of mild to moderate centrilobular emphysema. Redemonstration of a 5 mm spiculated groundglass nodule in the lateral left upper lobe. Right lower outer breast 9 mm nodule for which correlation with breast examination and/or recent mammography is recommended.  12/13/2023 FNA right upper lobe lung CT-guided core biopsy: Poorly differentiated adenocarcinoma, PD-L1 TPS 20%  1/16/2024 PET - IMPRESSION:  1.  Erosive locally invasive RIGHT apical pulmonary mass has enlarged since 11/2023 with increasing osseous destruction and invasion into the central mediastinum. Separate mild-moderate focus at the RIGHT hilum where additional malignancy is not excluded. The subcarinal region and LEFT hilum/lung fields are unremarkable.  2.  Worsening RIGHT lung pleural involvement, with uptake seen along the pleural surface and a pleural based mass posterolaterally.  3.  Increasing RIGHT pleural effusion.  4.  Diffuse, mild marrow uptake in the spine and pelvis. This is nonspecific. Consider MRI if there is suspicion for pathology.  5.  There is displacement of the esophagus to the LEFT; correlate for history of dysphasia localizing to the superior thoracic esophagus.   1/18/2024-pemetrexed/carboplatin/pembrolizumab cycle #1   1/22 - 1/26/2024 hospitalized at [facility] with altered mentation and fever.   1/22/2024 CT chest angiogram: No pulmonary embolism.  Locally invasive right apical mass into the adjacent bone and mediastinum with pleural metastases unchanged from PET 1/16/2024 but progressed since 11/28/2023.  Stable small right pleural effusion.  Emphysema.  Stable 4 mm left upper lobe pulmonary nodule.   1/24/2024 MRI brain: Numerous enhancing intracranial lesions, largest in the left frontal periventricular region measuring up to 1.3 cm compatible with metastatic disease.   1/31/2024 [facility]: No reportable alteration in the 7 disease relevant genes: ALK, BRAF, EGFR, ERBB2, MET, RET, ROS1.  Findings: KRAS G12C, MYC amplification.   3/16 - 3/19/2024 admitted at [facility] with suspicion of acute pancreatitis. CT angiogram: Right upper lobe subsegmental pulmonary thromboemboli.  Decreased size of right apical mass extends through the chest wall and spine.  New pathologic compression fractures T2 and T3.  Unchanged small groundglass and solid nodules in both lungs.  New partially included peripancreatic fluid, possibly reflecting acute pancreatitis  [FreeTextEntry1] : Palliative systemic chemotherapy [de-identified] : Ms. VALENTE is here for C5 D1 carboplatin/pemetrexed.  She is complaining of headaches and does not want to be treated today.  She is also here for B12 injection.  Accompanied by her daughter, Ofelia, who states that patient has been declining cognitively, presents at times irritability and verbal aggression.  Denies blurred vision and her speech is not slurred. Has regular follow-up with neuro-oncology; pending new evaluation by Dr. Candelaria in the near future.  No new brain imaging available, pending repeat CT head.  Reviewed medication list-remains on Dilaudid, antinausea (ondansetron) and apixaban.  Hematologic profile consistent with steroid-induced leukocytosis (patient on dexamethasone 3 times a day), normal CBC and platelet.

## 2024-05-09 NOTE — ED PROVIDER NOTE - PHYSICAL EXAMINATION
GENERAL: Awake, alert, NAD  LUNGS: non labored breathing   ABDOMEN: Soft, non tender, non distended, no rebound, no guarding  EXT: No edema, no calf tenderness,  no deformities.  NEURO: A&Ox3. Moving all extremities.  SKIN: Warm and dry. No rash.  PSYCH: Normal affect.

## 2024-05-09 NOTE — CONSULT NOTE ADULT - ASSESSMENT
58yo F PMH COPD, HTN, HLD and stage IV right lung cancer w/ brain mets s/p lung RT 3/1/2024 on chemoimmuotherapy since Jan 2024  s/p GKRS  4/23/24 presenting with lethargy and headaches.

## 2024-05-09 NOTE — ED ADULT NURSE NOTE - NSFALLRISKINTERV_ED_ALL_ED

## 2024-05-09 NOTE — ED PROVIDER NOTE - CLINICAL SUMMARY MEDICAL DECISION MAKING FREE TEXT BOX
Given history and physical exam clinical concern for cerebral edema not responsive towards the steroids will consult neurosurgery will control headache with medication as well such as Reglan.  Likely to be admitted to get be given patient is having decreased function at home

## 2024-05-10 NOTE — CHART NOTE - NSCHARTNOTEFT_GEN_A_CORE
Slime Moreno	1965	Female	[address]	[place] [facility]	[id number]    Prescription Information      PDI Filter:    PDI	My Rx	Current Rx	Drug Type	Rx Written	Rx Dispensed	Drug	Quantity	Days Supply	Prescriber Name	Prescriber KNEY #	Payment Method	Dispenser  A	N	N	O	04/29/2024	04/30/2024	hydromorphone 4 mg tablet	56	6	Fitzgerald, Nick	[id number]	Insurance	[facility] #[id number]  A	N	N	O	04/29/2024	04/30/2024	morphine sulf er 15 mg tablet	14	7	Fitzgerald, Nick	[id number]	Insurance	[facility] #[id number]  A	N	Y		04/19/2024	04/20/2024	lacosamide 150 mg tablet	60	30	Jodi Hoffmann	[id number]	Insurance	[facility]  A	N	N	O	03/12/2024	03/20/2024	morphine sulfate ir 15 mg tab	60	30	Fitzgerald, Nick	[id number]	Insurance	[facility] #[id number]  A	N	N	O	03/12/2024	03/20/2024	hydromorphone 4 mg tablet	99	12	Fitzgerald Nick	[id number]	Insurance	[facility] #[id number]  A	N	N	B	02/28/2024	03/01/2024	alprazolam 0.5 mg tablet	30	30	Celestina Garcia)	[id number]	Insurance	[facility] #[id number]  A	N	N	B	02/12/2024	02/12/2024	alprazolam 0.25 mg tablet	30	10	Celestina Garcia)	[id number]	Insurance	[facility] #[id number]  A	N	N	O	01/30/2024	02/03/2024	morphine sulf er 15 mg tablet	90	30	Fitzgerald, Nick	[id number]	Insurance	[facility] #[id number]  A	N	N	O	01/31/2024	01/31/2024	hydromorphone 4 mg tablet	360	30	Fitzgerald, Nick	[id number]	Insurance	[facility] #[id number]  A	N	N	B	01/30/2024	01/30/2024	alprazolam 0.25 mg tablet	30	10	Celestina Garcia)	[id number]	Insurance	[facility] #[id number]  A	N	N	O	01/26/2024	01/26/2024	morphine sulf er 15 mg tablet	14	7	Chas Ibarra	[id number]	Insurance	[facility] #[id number]  A	N	N	B	01/12/2024	01/14/2024	alprazolam 0.5 mg tablet	2	28	Eula Whitehead R, PADontaeC	[id number]	Insurance	[facility] #[id number]  A	N	N	O	12/29/2023	12/29/2023	hydromorphone 4 mg tablet	264	22	Fitzgerald, Nick	[id number]	Insurance	[facility] #[id number]  A	N	N	O	12/19/2023	12/19/2023	hydromorphone 4 mg tablet	102	8	Fitzgerald, Nick	[id number]	Insurance	[facility] #[id number]  A	N	N	O	12/07/2023	12/07/2023	hydromorphone 4 mg tablet	60	8	Ohebshalom, Ramonita	[id number]	Insurance	[facility] #[id number]  A	N	N	O	12/01/2023	12/01/2023	hydromorphone 4 mg tablet	42	7	Mathieu Hodge	[id number]	Insurance	[facility] #[id number]  A	N	N	O	11/28/2023	11/28/2023	oxycodone-acetaminophen 7.5-325 mg tablet	9	3	Carlos Jacob K, DO	[id number]	Insurance	[facility] #[id number]  A	N	N	O	11/20/2023	11/20/2023	oxycodone-acetaminophen 5-325 mg tablet	21	7	Mathieu Hodge	[id number]	Insurance	[facility] #[id number]  A	N	N	B	11/08/2023	11/16/2023	alprazolam 0.5 mg tablet	7	7	Tiana Paz MD	[id number]	Insurance	[facility] #[id number]

## 2024-05-10 NOTE — H&P ADULT - NSHPREVIEWOFSYSTEMS_GEN_ALL_CORE
CONSTITUTIONAL:  No weight loss, fever, chills, weakness or fatigue.  HEENT:  Eyes:  No visual loss, blurred vision, double vision or yellow sclerae. Ears, Nose, Throat:  No hearing loss, sneezing, congestion, runny nose or sore throat.  SKIN:  No rash or itching.  CARDIOVASCULAR:  No chest pain, chest pressure or chest discomfort. No palpitations.  RESPIRATORY:  No shortness of breath, cough or sputum.  GASTROINTESTINAL:  No anorexia, nausea, vomiting or diarrhea. No abdominal pain or blood.  GENITOURINARY:  Denies hematuria, dysuria.   NEUROLOGICAL:  (+) Headache, ataxia. No dizziness, syncope, paralysis, numbness or tingling in the extremities. No change in bowel or bladder control.  MUSCULOSKELETAL:  No muscle, back pain, joint pain or stiffness.  HEMATOLOGIC:  No anemia, bleeding or bruising.  LYMPHATICS:  No enlarged nodes.   PSYCHIATRIC:  No history of depression or anxiety.  ENDOCRINOLOGIC:  No reports of sweating, cold or heat intolerance. No polyuria or polydipsia.  ALLERGIES:  No history of asthma, hives, eczema or rhinitis.

## 2024-05-10 NOTE — DIETITIAN INITIAL EVALUATION ADULT - ADD RECOMMEND
Recommend continue regular diet. RDN remains available to obtain food preferences, review nutrition supplements.

## 2024-05-10 NOTE — PROGRESS NOTE ADULT - ATTENDING COMMENTS
59 year old female with a history of NSCLC with brain metastases s/p gamma knife on chemo presenting with worsening headache.    patient seen and examined at bedside. patient sleepy, but states that she is still having a severe headache. reports relief with Dilaudid in the ER. denies nausea.    -c/w Dilaudid PRN for pain, c/w home MS Contin  -palliative care consulted  -hyponatremia improving with hypertonic saline- Na improved from 123 to 131  -CT head with stable brain metastases, no worsened bleeding noted  -monitor BMP q8h to avoid overcorrection  -suspect hyponatremia may be from SIADH given uncontrolled pain and metastatic cancer; Na worsened initially in the ER with NS IVF, and pt does not appear to be hypervolemic  -nutrition eval for severe protein-calorie malnutrition in setting of malignancy  -PT eval    d/w HS 5

## 2024-05-10 NOTE — H&P ADULT - PROBLEM SELECTOR PLAN 1
More likely SIADH as opposed to hypovolemic given absence of response to 1L bolus given in ED. Potential sources of pain from headache, cerebral involvement of metastases. Unlikely due to decreased glucocorticoids given exogenous use.  -urine Na, Cr to confirm etiology  -3% saline 30ccs/hr x4 hrs w/ q4h BMP to follow More likely SIADH as opposed to hypovolemic given absence of response to 1L bolus given in ED. Potential sources of pain from headache, cerebral involvement of metastases. Unlikely due to decreased glucocorticoids given exogenous use.  -urine Na, Cr to confirm etiology  -3% saline 30ccs/hr x4 hrs w/ q4h BMP to follow  -nephro consult in AM

## 2024-05-10 NOTE — H&P ADULT - PROBLEM SELECTOR PLAN 4
Unclear if metastases are cause for headache given absence of significant headache prior to GKRS. Also potential etiologies include hyponatremia and less likely migraine. Hyponatremia may also be driving lethargy given 2 day history of worsening symptoms per patient.  -s/p CTH w/o worsening mets  -NSx recs appreciated, no acute intervention

## 2024-05-10 NOTE — H&P ADULT - NSHPLABSRESULTS_GEN_ALL_CORE
13.2   13.06 )-----------( 111      ( 09 May 2024 19:19 )             36.1     05-10    123<L>  |  88<L>  |  15  ----------------------------<  128<H>  3.9   |  21<L>  |  0.25<L>    Ca    8.7      10 May 2024 04:19  Mg     1.70     05-09    TPro  7.1  /  Alb  4.6  /  TBili  1.1  /  DBili  x   /  AST  15  /  ALT  24  /  AlkPhos  74  05-09    CT Head No Cont (05.10.24 @ 01:50)    FINDINGS:  Redemonstrated hemorrhagic mass in the left frontal lobe with surrounding   vasogenic edema, not significantly changed compared to prior. Stable mass   effect on the left frontal horn. No significant midline shift. Stable   mass in the right middle frontal gyrus. Stable left cerebellar chronic   lacunar infarcts.    There is mild cerebral volume loss. There is  mild  white matter   hypoattenuation whichis nonspecific in etiology but likely related to   chronic microvascular ischemic disease.    The visualized paranasal sinuses are clear.    The mastoids are clear bilaterally.    The calvarium and skull base appear within normal limits.    IMPRESSION:  No significant interval change in previously noted hemorrhagic masses. No   acute intracranial hemorrhage or mass effect.

## 2024-05-10 NOTE — PROGRESS NOTE ADULT - ASSESSMENT
59y F w/ hx of SCLC w/ bilateral frontal brain metastases s/p GKRS presenting for progressively worsening headache uncontrolled by home pain regiment.  59y F w/ hx of NSCLC w/ bilateral frontal brain metastases s/p GKRS presenting for progressively worsening headache uncontrolled by home pain regimen.

## 2024-05-10 NOTE — H&P ADULT - PROBLEM SELECTOR PLAN 5
Neutrophilic predominant and without bands. No localizing infectious symptoms, on increased dose of decadron (4mg TID), potential source.   -monitor off abx  -trend WBCs

## 2024-05-10 NOTE — CHART NOTE - NSCHARTNOTEFT_GEN_A_CORE
58yo F PMH stage IV R lung cancer w/ brain mets w/ GK approx 2 weeks ago presenting with headaches and lethargy. CTH done- stable. Hyponatremic (Na 126) could be contributing to lethargy- rec repleting. Pt currently on dexamethasone 4mg e2vlytz, would rec tapering steroids to 2BID, can also consult rad/onc. No acute neurosurgical intervention. Reconsult PRN. [id number] Case d/w attending Dr. Nicole.

## 2024-05-10 NOTE — ED ADULT NURSE REASSESSMENT NOTE - HEART RATE (BEATS/MIN)
PATIENT CALLED IN REGARDS TO MEDICATION    Liraglutide (Victoza) 18 MG/3ML solution pen-injector injection  HE BROUGHT IN PAPERWORK IN FEBRUARY TO HAVE IT FILLED OUT TO GET MEDICATION AT A LOW PRICE FROM THE . HE HASN'T HAD THIS MEDICATION IN 3 MONTHS.     HE WANTS TO KNOW IF SAMPLES ARE AVAILABLE.   PLEASE CALL AND ADVISE  [phone]   64

## 2024-05-10 NOTE — H&P ADULT - NSHPPHYSICALEXAM_GEN_ALL_CORE
PHYSICAL EXAM:   GENERAL: Resting comfortably in bed.  HEAD:  Atraumatic. Normocephalic.  EYES: EOMI. PERRLA. Normal conjunctiva/sclera.  ENT: Neck supple. No JVD. Moist oral mucosa. Not edentulous. No thrush.  LYMPH: Normal supraclavicular/cervical lymph nodes.   CARDIAC: Not tachy, Not james. Regular rhythm. Not irregularly irregular. S1. S2. No murmur. No rub. No distant heart sounds.  LUNG/CHEST: CTAB. BS equal bilaterally. No wheezes. No rales. No rhonchi.  ABDOMEN: Soft. No tenderness. No distension. No fluid wave. Normal bowel sounds.  BACK: No midline/vertebral tenderness. No flank tenderness.  VASCULAR: +2 b/l radial or ulnar pulses. Palpable DP pulses.  EXTREMITIES:  No clubbing. No cyanosis. No edema. Moving all 4.  NEUROLOGY: A&Ox3. Non-focal exam. Cranial nerves intact. Normal speech. Sensation intact.  PSYCH: Normal behavior. Normal affect.  SKIN: No jaundice. No erythema. No rash/lesion.  ICU Vital Signs Last 24 Hrs  T(C): 36.7 (10 May 2024 05:26), Max: 36.7 (09 May 2024 10:02)  T(F): 98 (10 May 2024 05:26), Max: 98.1 (09 May 2024 10:02)  HR: 64 (10 May 2024 05:26) (56 - 77)  BP: 123/77 (10 May 2024 05:26) (119/73 - 203/99)  BP(mean): --  ABP: --  ABP(mean): --  RR: 16 (10 May 2024 05:26) (15 - 18)  SpO2: 98% (10 May 2024 05:26) (95% - 100%)    O2 Parameters below as of 10 May 2024 05:26  Patient On (Oxygen Delivery Method): room air          I&O's Summary

## 2024-05-10 NOTE — PROGRESS NOTE ADULT - PROBLEM SELECTOR PLAN 2
Headache i/s/o recent gammaknife surgery w/o aura suggestive of migraine, no discontinuation of NSAIDs to suggest rebound headache. CTH w/ improving edema. Potentially secondary to hyponatremia given additional symptoms of tremulousness and imbalance.  - Dilaudid 0.5mg/1mg q4h PRN mod/severe  - Hyponatremia management as above

## 2024-05-10 NOTE — PROGRESS NOTE ADULT - SUBJECTIVE AND OBJECTIVE BOX
Patient is a 59y old  Female who presents with a chief complaint of Weakness     (10 May 2024 09:58)      SUBJECTIVE / OVERNIGHT EVENTS: Patient seen and examined at bedside.    MEDICATIONS  (STANDING):  dexAMETHasone     Tablet 2 milliGRAM(s) Oral every 8 hours  famotidine    Tablet 20 milliGRAM(s) Oral daily  lacosamide 150 milliGRAM(s) Oral two times a day  losartan 50 milliGRAM(s) Oral daily  morphine ER Tablet 15 milliGRAM(s) Oral two times a day  polyethylene glycol 3350 17 Gram(s) Oral daily  senna 2 Tablet(s) Oral at bedtime  sodium chloride 3%. 500 milliLiter(s) (30 mL/Hr) IV Continuous <Continuous>    MEDICATIONS  (PRN):  acetaminophen     Tablet .. 650 milliGRAM(s) Oral every 6 hours PRN Temp greater or equal to 38C (100.4F), Mild Pain (1 - 3)  ALPRAZolam 0.5 milliGRAM(s) Oral daily PRN severe anxiety  HYDROmorphone  Injectable 0.5 milliGRAM(s) IV Push every 4 hours PRN Moderate Pain (4 - 6)  HYDROmorphone  Injectable 1 milliGRAM(s) IV Push every 4 hours PRN Severe Pain (7 - 10)  melatonin 3 milliGRAM(s) Oral at bedtime PRN Insomnia  ondansetron Injectable 4 milliGRAM(s) IV Push every 8 hours PRN Nausea and/or Vomiting      Vital Signs Last 24 Hrs  T(C): 36.4 (10 May 2024 07:55), Max: 36.7 (09 May 2024 18:28)  T(F): 97.6 (10 May 2024 07:55), Max: 98 (09 May 2024 18:28)  HR: 87 (10 May 2024 07:55) (56 - 87)  BP: 136/92 (10 May 2024 07:55) (119/73 - 203/99)  BP(mean): --  RR: 18 (10 May 2024 07:55) (15 - 18)  SpO2: 100% (10 May 2024 07:55) (95% - 100%)    Parameters below as of 10 May 2024 07:55  Patient On (Oxygen Delivery Method): room air      CAPILLARY BLOOD GLUCOSE        I&O's Summary    10 May 2024 07:01  -  10 May 2024 10:33  --------------------------------------------------------  IN: 0 mL / OUT: 350 mL / NET: -350 mL        PHYSICAL EXAM:  GENERAL: NAD, well-developed  HEAD:  Atraumatic, Normocephalic  EYES: EOMI, PERRLA, conjunctiva and sclera clear  NECK: Supple, No JVD  CHEST/LUNG: Clear to auscultation bilaterally; No wheeze  HEART: Regular rate and rhythm; No murmurs, rubs, or gallops  ABDOMEN: Soft, Nontender, Nondistended; Bowel sounds present  EXTREMITIES:  2+ Peripheral Pulses, No clubbing, cyanosis, or edema  PSYCH: AAOx3  NEUROLOGY: non-focal  SKIN: No rashes or lesions    LABS:                        13.2   13.06 )-----------( 111      ( 09 May 2024 19:19 )             36.1     05-10    123<L>  |  88<L>  |  15  ----------------------------<  128<H>  3.9   |  21<L>  |  0.25<L>    Ca    8.7      10 May 2024 04:19  Mg     1.70     05-09    TPro  7.1  /  Alb  4.6  /  TBili  1.1  /  DBili  x   /  AST  15  /  ALT  24  /  AlkPhos  74  05-09    PT/INR - ( 09 May 2024 19:19 )   PT: 11.4 sec;   INR: 1.02 ratio         PTT - ( 09 May 2024 19:19 )  PTT:22.4 sec      Urinalysis Basic - ( 10 May 2024 04:19 )    Color: x / Appearance: x / SG: x / pH: x  Gluc: 128 mg/dL / Ketone: x  / Bili: x / Urobili: x   Blood: x / Protein: x / Nitrite: x   Leuk Esterase: x / RBC: x / WBC x   Sq Epi: x / Non Sq Epi: x / Bacteria: x        RADIOLOGY & ADDITIONAL TESTS:    Imaging Personally Reviewed:    Consultant(s) Notes Reviewed:      Care Discussed with Consultants/Other Providers:    Nino Mueller MD, Internal Medicine Resident   Patient is a 59y old  Female who presents with a chief complaint of Weakness    SUBJECTIVE / OVERNIGHT EVENTS: Patient seen and examined at bedside. Admitted to Medicine service overnight. This AM patient endorsing headache, slightly improved compared to presentation. Denies CP, SOB, fever, chills, visual disturbances, n/v/d.    MEDICATIONS  (STANDING):  dexAMETHasone     Tablet 2 milliGRAM(s) Oral every 8 hours  famotidine    Tablet 20 milliGRAM(s) Oral daily  lacosamide 150 milliGRAM(s) Oral two times a day  losartan 50 milliGRAM(s) Oral daily  morphine ER Tablet 15 milliGRAM(s) Oral two times a day  polyethylene glycol 3350 17 Gram(s) Oral daily  senna 2 Tablet(s) Oral at bedtime  sodium chloride 3%. 500 milliLiter(s) (30 mL/Hr) IV Continuous <Continuous>    MEDICATIONS  (PRN):  acetaminophen     Tablet .. 650 milliGRAM(s) Oral every 6 hours PRN Temp greater or equal to 38C (100.4F), Mild Pain (1 - 3)  ALPRAZolam 0.5 milliGRAM(s) Oral daily PRN severe anxiety  HYDROmorphone  Injectable 0.5 milliGRAM(s) IV Push every 4 hours PRN Moderate Pain (4 - 6)  HYDROmorphone  Injectable 1 milliGRAM(s) IV Push every 4 hours PRN Severe Pain (7 - 10)  melatonin 3 milliGRAM(s) Oral at bedtime PRN Insomnia  ondansetron Injectable 4 milliGRAM(s) IV Push every 8 hours PRN Nausea and/or Vomiting      Vital Signs Last 24 Hrs  T(C): 36.4 (10 May 2024 07:55), Max: 36.7 (09 May 2024 18:28)  T(F): 97.6 (10 May 2024 07:55), Max: 98 (09 May 2024 18:28)  HR: 87 (10 May 2024 07:55) (56 - 87)  BP: 136/92 (10 May 2024 07:55) (119/73 - 203/99)  BP(mean): --  RR: 18 (10 May 2024 07:55) (15 - 18)  SpO2: 100% (10 May 2024 07:55) (95% - 100%)    Parameters below as of 10 May 2024 07:55  Patient On (Oxygen Delivery Method): room air    CAPILLARY BLOOD GLUCOSE    I&O's Summary    10 May 2024 07:01  -  10 May 2024 10:33  --------------------------------------------------------  IN: 0 mL / OUT: 350 mL / NET: -350 mL    PHYSICAL EXAM:  GENERAL: Lying comfortably in bed  HEAD:  Atraumatic, Normocephalic  EYES: EOMI, PERRLA, conjunctiva and sclera clear  NECK: Supple, No JVD  CHEST/LUNG: Clear to auscultation bilaterally; No wheeze  HEART: Regular rate and rhythm; No murmurs, rubs, or gallops  ABDOMEN: Soft, Nontender, Nondistended; Bowel sounds present  EXTREMITIES:  2+ Peripheral Pulses, No clubbing, cyanosis, or edema  PSYCH: AAOx3  NEUROLOGY: non-focal  SKIN: No rashes or lesions    LABS:                        13.2   13.06 )-----------( 111      ( 09 May 2024 19:19 )             36.1     05-10    123<L>  |  88<L>  |  15  ----------------------------<  128<H>  3.9   |  21<L>  |  0.25<L>    Ca    8.7      10 May 2024 04:19  Mg     1.70     05-09    TPro  7.1  /  Alb  4.6  /  TBili  1.1  /  DBili  x   /  AST  15  /  ALT  24  /  AlkPhos  74  05-09    PT/INR - ( 09 May 2024 19:19 )   PT: 11.4 sec;   INR: 1.02 ratio       PTT - ( 09 May 2024 19:19 )  PTT:22.4 sec    Urinalysis Basic - ( 10 May 2024 04:19 )    Color: x / Appearance: x / SG: x / pH: x  Gluc: 128 mg/dL / Ketone: x  / Bili: x / Urobili: x   Blood: x / Protein: x / Nitrite: x   Leuk Esterase: x / RBC: x / WBC x   Sq Epi: x / Non Sq Epi: x / Bacteria: x    Nino Labagnara, MD, Internal Medicine Resident

## 2024-05-10 NOTE — H&P ADULT - PROBLEM SELECTOR PLAN 3
Thrombocytopenia on repeat CBC to 111, previously attributed to medications of keppra, zosyn. No bleeding events or infectious symptoms to suggest septic cause.   -Trend w/ CBC

## 2024-05-10 NOTE — DIETITIAN INITIAL EVALUATION ADULT - OTHER CALCULATIONS
Apparent clinically significant ~30 lb (27%) weight loss x5 months, per history obtained via chart: (5/10 dosing) 79.8 lbs / 36.2 kg, (4/22) 91 lbs, (12/7/23) 110 lbs.  Ideal Body Weight: 105 lbs / 47.7 kg +/-10%

## 2024-05-10 NOTE — PROGRESS NOTE ADULT - PROBLEM SELECTOR PLAN 1
Patient with hyponatremia likely in setting of malignancy, decreased PO intake, and ADH stimulation from pain 2/2 to headache. No L/E on exam. SNa of 126 on presentation, repeat of 123 s/p 1U bolus in ED. TSH wnl. Unlikely due to decreased glucocorticoids given exogenous use.  - F/u serum osms, urine osms, urine Na, Cr to confirm etiology  - 3% saline 30ccs/hr x4 hrs w/ q4h BMP to follow  - Fluid restriction, optimize glucose control  - CTM SNa  - Fall precautions

## 2024-05-10 NOTE — PATIENT PROFILE ADULT - FUNCTIONAL ASSESSMENT - BASIC MOBILITY 6.
4-calculated by average/Not able to assess (calculate score using [facility] averaging method)

## 2024-05-10 NOTE — PATIENT PROFILE ADULT - FALL HARM RISK - HARM RISK INTERVENTIONS
Assistance with ambulation/Assistance OOB with selected safe patient handling equipment/Communicate Risk of Fall with Harm to all staff/Discuss with provider need for PT consult/Monitor gait and stability/Provide patient with walking aids - walker, cane, crutches/Reinforce activity limits and safety measures with patient and family/Review medications for side effects contributing to fall risk/Sit up slowly, dangle for a short time, stand at bedside before walking/Tailored Fall Risk Interventions/Use of alarms - bed, chair and/or voice tab/Visual Cue: Yellow wristband and red socks/Bed in lowest position, wheels locked, appropriate side rails in place/Call bell, personal items and telephone in reach/Instruct patient to call for assistance before getting out of bed or chair/Non-slip footwear when patient is out of bed/[place] to call system/Physically safe environment - no spills, clutter or unnecessary equipment/Purposeful Proactive Rounding/Room/bathroom lighting operational, light cord in reach

## 2024-05-10 NOTE — ED ADULT NURSE REASSESSMENT NOTE - NS ED NURSE REASSESS COMMENT FT1
Break coverage RN: Pt waiting for neurosurg consult. Arousable to verbal and painful stimuli. Pt in NAD
Daughter reports episode of pt not seeming herself, with slight upper body shaking and abnormal eye blinking. Daughter report tapping the pt and the pt responding, unsure of what happened when asked about it. Pt A&Ox3, reposition, vital repeated, and pt reports still not feeling well with headache and nausea. MD Donahue called to bedside to assess pt and made aware of vitals. Magnesium to be ordered and administered. Pt pending CT scan. Will continue to monitor.
Report  received from YOSEF Ramsey. Pt resting in stretcher, A&Ox4. Pt endorsing headache, appears uncomfortable in stretcher. VS as noted in flowsheet. Pt medicated as per MAR. NSR on cardiac monitor. Pt denies chest pain, blurry vision, dizziness, N/V/D, SOB at this time. respirations even and unlabored. safety maintained, call bell within reach
Upon returning from CT scan, pt noted to be shaking to the upper extremities, abnormal eye blinking. Pt drowsy, but able to respond to orientation questions, following commands. Arousable to verbal stimuli, withdraws from painful stimuli. MD  made aware and at bedside for eval, will consult with neuro. VS as noted. respirations even and unlabored. Safety maintained, bed locked in lowest position

## 2024-05-10 NOTE — DIETITIAN INITIAL EVALUATION ADULT - PERTINENT MEDS FT
dexAMETHasone Tablet  famotidine Tablet   polyethylene glycol   senna   sodium chloride 3% IV Continuous   ondansetron IV PRN

## 2024-05-10 NOTE — H&P ADULT - HISTORY OF PRESENT ILLNESS
58y  F pt of Dr. Nicole, h/o NSCLC w/ mets to brain, on immunotx s/p lung RT 3/1/24, GKRS presenting for progressively worsening headache. Pt accompanied by daughter who notes pt has had headaches that are constant but waxing and waning since GKRS ~2 weeks ago - most acutely worsening over the past 2 days. She also endorses tremulousness but no loss of consciousness. Pt notified neurosurg re: headache around 1 week ago regarding worsening headaches and Endorses lethargy, nausea, weakness, and tremulousness. Denies fever, chills, falls, changes in vision, dizziness, photophobia, phonophobia, paresthesia, cramps, initiation or discontinuation of new medications.     Of note, was seen in ED 5/6 with complaint of headache w/ CT at the time suggestive of improving cerebral edema.     In ED, VS: T 97.7, HR 77, /88, RR 18, O2 99% on RA. Administered tylenol w/ no relief - improved w/ dilaudid 1mg daily. Also administered magnesium, reglan.  58y  F pt of Dr. Nicole, h/o NSCLC w/ mets to brain, on immunotx s/p lung RT 3/1/24, GKRS, T3-T4 compression fractures presenting for progressively worsening headache. Pt accompanied by daughter who notes pt has had headaches that are constant but waxing and waning since GKRS ~2 weeks ago - most acutely worsening over the past 2 days. She also endorses tremulousness but no loss of consciousness. Pt notified neurosurg re: headache around 1 week ago regarding worsening headaches and recommended increasing decadron to 4mg TID. Endorses lethargy, nausea, weakness, and tremulousness. Denies fever, chills, falls, changes in vision, dizziness, photophobia, phonophobia, paresthesia, cramps, initiation or discontinuation of new medications.     Of note, was seen in ED 5/6 with complaint of headache w/ CT at the time suggestive of improving cerebral edema. Also recently hospitalized 4/2024 w/ new onset seizures likely 2/2 L frontal and R motor strip lesions requiring keppra loading, intubation, and NSCU stay.     In ED, VS: T 97.7, HR 77, /88, RR 18, O2 99% on RA. Administered tylenol w/ no relief - improved w/ dilaudid 1mg daily. Also administered magnesium, reglan. Evaluated by NSx given cerebral mets however CTH showing improving edema and recommending tapering down steroids.

## 2024-05-10 NOTE — H&P ADULT - NSICDXFAMILYHX_GEN_ALL_CORE_FT
- - -
FAMILY HISTORY:  Father  Still living? Unknown  FH: heart attack, Age at diagnosis: Age Unknown    Mother  Still living? Unknown  FH: heart attack, Age at diagnosis: Age Unknown    Sibling  Still living? Yes, Estimated age: Age Unknown  FH: lung cancer, Age at diagnosis: Age Unknown    Grandparent  Still living? No  FH: stomach cancer, Age at diagnosis: Age Unknown

## 2024-05-10 NOTE — H&P ADULT - ASSESSMENT
59y F w/ hx of SCLC w/ bilateral frontal brain metastases s/p GKRS presenting for progressively worsening headache uncontrolled by home pain regiment.

## 2024-05-10 NOTE — H&P ADULT - PROBLEM SELECTOR PLAN 2
Headache i/s/o recent gammaknife surgery w/o aura suggestive of migraine, no discontinuation of NSAIDs to suggest rebound headache. CTH w/ improving edema. Potentially secondary to hyponatremia however   -dilaudid 1mg q4h PRN  -hyponatremia management as above Headache i/s/o recent gammaknife surgery w/o aura suggestive of migraine, no discontinuation of NSAIDs to suggest rebound headache. CTH w/ improving edema. Potentially secondary to hyponatremia.  -dilaudid 0.5mg/1mg q4h PRN  -hyponatremia management as above Headache i/s/o recent gammaknife surgery w/o aura suggestive of migraine, no discontinuation of NSAIDs to suggest rebound headache. CTH w/ improving edema. Potentially secondary to hyponatremia.  -dilaudid 0.5mg/1mg q4h PRN mod/severe  -hyponatremia management as above

## 2024-05-10 NOTE — DIETITIAN INITIAL EVALUATION ADULT - OTHER INFO
Per chart, pt is 59 year old female PMH NSCLC with mets to brain (on immunotx s/p lung RT 3/1/24), GKRS, T3-T4 compression fractures presenting for progressively worsening headache.     Pt with limited contribution to discussion, is focused on being discharged and states she does not plan to be here past today. Comprehensive chart review completed. Of note, pt with recent neurosurgical ICU stay (4/13-4/20/24) during which time she met criteria for severe malnutrition per RDN note (4/15). Noted with nausea, weakness, headaches x2 weeks PTA. Pt reports generally poor appetite/PO intake PTA, consumes a regular diet at baseline and supplements with Breakfast Essential shakes.     Pt had not yet consumed breakfast tray during time of visit. Offered to obtain food preferences, provide nutrition supplement however pt declines. No noted GI distress, unknown last BM; ordered for senna 2 tablets qHS and Miralax 17 gm qD. Labs notable for hyponatremia (on 3% NaCl IVF) and hyperglycemia (on steroids).

## 2024-05-10 NOTE — H&P ADULT - ATTENDING COMMENTS
Currently has continued headache with some relief from dilaudid.    On exam: pt in NAD, heart RRR, lungs CTA B, abd s/nt/nd BS normal.  EDUARDO.    Labs reviewed notable for hyponatremia worsening after NS administration.    CT head reviewed showing similar findings to prior.    Reviewed neurosurgery consult.  Final recs pending.    Will trend BMP Q 4.  3% saline  will need nephrology consult in am  dilaudid for pain ctrl.    continue Dexamethasone

## 2024-05-10 NOTE — DIETITIAN NUTRITION RISK NOTIFICATION - ADDITIONAL COMMENTS/DIETITIAN RECOMMENDATIONS
Please see Dietitian Initial Assessment for complete recommendations.  Susi Delacruz, DONATO, CDN #[id number]  Also available on [employer] Teams

## 2024-05-11 NOTE — PROGRESS NOTE ADULT - ATTENDING COMMENTS
Briefly, this is a 58 y/o female with a history of NSCLC with brain metastases s/p gamma knife on chemo presenting with worsening headache.    Patient seen and examined at bedside. Reports that she continues to have headache however does note gradual improvement compared to prior. Has been able to eat but appetite overall poor. Denies any fevers or chills. Denies any CP or SOB.    #Headache  -CT head with stable brain metastases, no worsened bleeding noted  -c/w Dilaudid PRN for pain, c/w home MS Contin  -can attempt transition to home dilaudid PRNs  -palliative care consulted    #Hyponatremia  -hyponatremia improving with hypertonic saline  -Na improved from 123 to 131  -monitor BMP q8h to avoid overcorrection  -suspect hyponatremia may be from SIADH given uncontrolled pain and metastatic cancer;   -Na worsened initially in the ER with NS IVF, and pt does not appear to be hypervolemic  -nutrition eval for severe protein-calorie malnutrition in setting of malignancy  -PT eval    Remainder of plan as outlined above  Case and plan of care discussed with HS5

## 2024-05-11 NOTE — PROGRESS NOTE ADULT - ASSESSMENT
59y F w/ hx of NSCLC w/ bilateral frontal brain metastases s/p GKRS presenting for progressively worsening headache uncontrolled by home pain regimen.

## 2024-05-11 NOTE — PROGRESS NOTE ADULT - PROBLEM SELECTOR PLAN 2
Patient:   AYAZ ROCHA            MRN: [id number]            FIN: [id number]              Age:   26 years     Sex:  FEMALE     :  94   Associated Diagnoses:   None   Author:   THOMAS BASILIO      R2 History & Physical  Prenatal Care Provider: ROSSANA  Chief Complaint: epigastric pain  HPI:  Patient is a 27 yo @38+2wga by  25 wk ultrasound who presents from clinic with epigastric pain and mild range pressures. Reports that epigastric pain started about 2:30 this afternoon. Reports that it was sharp and radiates to the back. Pain is not afected by position. Denies heart-burn, paolo chest pain, shortness of breath, decreased appetite, or vomiting. She ate a usual lunch for her around noon. Denies changes in bowel movements or  urination. Denies VB, LOF, pain, contractions. Endorses good FM. Has also had daily headaches for about 4 weeks. Reports that she does have a hx of migraines and headaches are similar to those. Usually takes 500mg of acetaminophen for the headaches. Endorses intermittent nausea and photosensitivity with headaches. Currently does not have a headache.  Significant Hx:  hx of migraines  OB: :  G1: current  Gyn: denies hx of abnormal paps or sti's  PMH: hx of migraines  PSH: cyst removal from axilla  Meds: PNV  All: NKDA  Fam: denies  Soc: lives with her , feels safe at home  denies alcohol, tobacco or drug use in pregnancy  Objective:  Vitals:  Vitals between:   2020 17:15:11   TO   2020 17:15:11                   LAST RESULT MINIMUM MAXIMUM  Temperature 37.0 37.0 37.0  Heart Rate 60 60 60  Respiratory Rate 16 16 16  NISBP           106 106 106  NIDBP           70 70 70   Physical Exam:  Heart:  RRR, S1, S2  Lungs: CTAB  Abdomen: gravid, soft, no fundal tenderness, no RUQ tenderness, + epigastric tenderness, no increased ttp in epigastric region  Extremities: no edema, no calf tenderness  Neuro/Reflexes:  CNII-XII grossly intact  EFM:  FHR: baseline 140,  moderate variability, + accelerations, - decelerations  TOCO: acontractile  Limited Bedside Ultrasound:  Presentation: cepahlic  Placenta: posterior   Estimated Fetal Weight: 3385 by US extrapolation from 36 wk US on   Labs:  No Qualifying Labs are resulted on this patient in the last 24 hours  Prenatal Labs:  Blood type: O+  Rubella: immune  HIV: NR  RPR: NR  HepB Ag: negative  GBBS: +  ______________________  Assessment / Diagnosis:  25 yo @38+2wga by  25 wk ultrasound who presents from clinic with epigastric pain and mild range pressures  ______________________  -ihsan normal upon presentation  -pre-eclampsia labs ordered  -lipase ordered   -GI cocktail ordered  Will continue to monitor.  Bull Alves MD PGY1  [facility]  Service Phone:   Labs between:  2020 18:49 to 2020 18:49  CBC:                 WBC  HgB  Hct  Plt  MCV  RDW   2020 (H) 11.6  12.0  36.5  165  86.9  14.4   BMP:                 Na  Cl  BUN  Glu   2020 136  (H) 108  14  90                              K  CO2  Cr  Ca                              4.0  21  0.76  8.7   CMP:                 AST  ALT  AlkPhos  Bili  Albumin   2020 14  17  (H) 126  0.4  (L) 2.9                    bp's remain normal. CBC and CMP wnl. Awating PCR. Pt has not yet had GI cocktail. Will continue to monitor.  Bull Alves MD PGY2  [facility]  pt seen, agree with above resident assessment and plan, 38 2/7 wks with epigastric pain and mild range bp's in triage.  Epigastric pain improved with gi cocktail.  .  Patient not pre-eclamptic.  FHT reviewed with baseline of 125 category I tracing.  Patient discharged home with labor precautions and pre-eclamptic precautions.   Headache i/s/o recent gammaknife surgery w/o aura suggestive of migraine, no discontinuation of NSAIDs to suggest rebound headache. CTH w/ improving edema. Potentially secondary to hyponatremia given additional symptoms of tremulousness and imbalance.  - Pain control w morphine ER, PO dilaudid, IVP dilaudid. Reassess daily.  - increase bowel reg prn to goal > 1 BM/ day  - Hyponatremia management as above

## 2024-05-11 NOTE — PROGRESS NOTE ADULT - PROBLEM SELECTOR PLAN 1
Patient with hyponatremia likely in setting of malignancy, decreased PO intake, and ADH stimulation from pain 2/2 to headache. No L/E on exam. SNa of 126 on presentation, repeat of 123 s/p 1U bolus in ED. TSH wnl. Unlikely due to decreased glucocorticoids given exogenous use.  3/10: SNa 131 SOsm 277 hypoosmolar hyponatremia. Grossly euvolemic v hypovolemic.     - F/u serum osms, urine osms, urine Na, Cr to confirm etiology  - NaCl tabs if SNa self correcting.  - Fluid restriction, optimize glucose control, encourage solute intake  - BMP q12h now that SNa > 130  - Fall precautions  - RD consulted; appreciate recs

## 2024-05-11 NOTE — PROGRESS NOTE ADULT - SUBJECTIVE AND OBJECTIVE BOX
***************************************************************  Juancarlos Hammond, PGY2  Internal Medicine   Available on [employer] TEAMS  ***************************************************************    KAELYN VALENTE  59y  MRN: [id number]    Patient is a 59y old  Female who presents with a chief complaint of headache (10 May 2024 10:32)      Interval/Overnight Events: no events ON.   - Sodium 131 at goal. Continue q8h BMP  - taper decadron to 2mg BID per NSx recs  - pt states pain better controlled with standing Dilaudid "the pain is not there and then it's there, better and not better" -- will convert to PO today and leave IVP prn  - no BM yday will incr bowel reg.    Subjective: Pt seen and examined at bedside. Denies fever, CP, SOB, abn pain, N/V, dysuria. Tolerating diet.      MEDICATIONS  (STANDING):  chlorhexidine 2% Cloths 1 Application(s) Topical <User Schedule>  dexAMETHasone     Tablet 2 milliGRAM(s) Oral every 12 hours  famotidine    Tablet 20 milliGRAM(s) Oral daily  HYDROmorphone   Tablet 4 milliGRAM(s) Oral every 4 hours  lacosamide 150 milliGRAM(s) Oral two times a day  losartan 50 milliGRAM(s) Oral daily  morphine ER Tablet 15 milliGRAM(s) Oral two times a day  polyethylene glycol 3350 17 Gram(s) Oral <User Schedule>  senna 2 Tablet(s) Oral at bedtime    MEDICATIONS  (PRN):  acetaminophen     Tablet .. 650 milliGRAM(s) Oral every 6 hours PRN Temp greater or equal to 38C (100.4F), Mild Pain (1 - 3)  ALPRAZolam 0.5 milliGRAM(s) Oral daily PRN severe anxiety  bisacodyl Suppository 10 milliGRAM(s) Rectal daily PRN Constipation  HYDROmorphone  Injectable 1 milliGRAM(s) IV Push every 4 hours PRN Severe Pain (7 - 10)  melatonin 3 milliGRAM(s) Oral at bedtime PRN Insomnia  ondansetron Injectable 4 milliGRAM(s) IV Push every 8 hours PRN Nausea and/or Vomiting    Objective:    Vitals: Vital Signs Last 24 Hrs  T(C): 36.7 (05-11-24 @ 05:17), Max: 37.3 (05-10-24 @ 13:16)  T(F): 98.1 (05-11-24 @ 05:17), Max: 99.1 (05-10-24 @ 13:16)  HR: 92 (05-11-24 @ 05:17) (65 - 92)  BP: 147/85 (05-11-24 @ 05:17) (101/73 - 147/85)  BP(mean): --  RR: 17 (05-11-24 @ 05:17) (16 - 18)  SpO2: 100% (05-11-24 @ 05:17) (99% - 100%)                I&O's Summary  10 May 2024 07:01  -  11 May 2024 07:00  --------------------------------------------------------  IN: 120 mL / OUT: 350 mL / NET: -230 mL    PHYSICAL EXAM:  GENERAL: NAD, apathetic mood  HEAD:  Atraumatic, Normocephalic  EYES: EOMI, conjunctiva and sclera clear  CHEST/LUNG: Clear to auscultation bilaterally; No rales, rhonchi, wheezing, or rubs  HEART: Regular rate and rhythm; No murmurs, rubs, or gallops  ABDOMEN: +moderately distended. Soft, Nontender  SKIN: No rashes or lesions  NERVOUS SYSTEM:  Alert & Oriented X3, no focal deficits    LABS:                        13.4   9.78  )-----------( 108      ( 11 May 2024 07:13 )             38.8                         12.8   10.73 )-----------( 119      ( 10 May 2024 14:58 )             37.9                         13.2   13.06 )-----------( 111      ( 09 May 2024 19:19 )             36.1     05-10    131<L>  |  94<L>  |  21  ----------------------------<  129<H>  3.2<L>   |  21<L>  |  0.34<L>  05-10    131<L>  |  96<L>  |  20  ----------------------------<  116<H>  3.3<L>   |  20<L>  |  0.36<L>  05-10    123<L>  |  88<L>  |  15  ----------------------------<  128<H>  3.9   |  21<L>  |  0.25<L>    Ca    9.0      10 May 2024 20:35  Ca    9.3      10 May 2024 14:58  Ca    8.7      10 May 2024 04:19  Phos  3.0     05-10  Mg     2.00     05-10    TPro  7.1  /  Alb  4.6  /  TBili  1.1  /  DBili  x   /  AST  15  /  ALT  24  /  AlkPhos  74  05-09    CAPILLARY BLOOD GLUCOSE    PT/INR - ( 09 May 2024 19:19 )   PT: 11.4 sec;   INR: 1.02 ratio    PTT - ( 09 May 2024 19:19 )  PTT:22.4 sec    Urinalysis Basic - ( 10 May 2024 20:35 )  Color: x / Appearance: x / SG: x / pH: x  Gluc: 129 mg/dL / Ketone: x  / Bili: x / Urobili: x   Blood: x / Protein: x / Nitrite: x   Leuk Esterase: x / RBC: x / WBC x   Sq Epi: x / Non Sq Epi: x / Bacteria: x    RADIOLOGY & ADDITIONAL TESTS:    Imaging Personally Reviewed:  [x ] YES  [ ] NO    Consultants involved in case:   Consultant(s) Notes Reviewed:  [ x] YES  [ ] NO:   Care Discussed with Consultants/Other Providers [x ] YES  [ ] NO

## 2024-05-12 NOTE — DISCHARGE NOTE PROVIDER - DETAILS OF MALNUTRITION DIAGNOSIS/DIAGNOSES
This patient has been assessed with a concern for Malnutrition and was treated during this hospitalization for the following Nutrition diagnosis/diagnoses:     -  05/10/2024: Severe protein-calorie malnutrition   -  05/10/2024: Underweight (BMI < 19)

## 2024-05-12 NOTE — DISCHARGE NOTE PROVIDER - NSDCFUSCHEDAPPT_GEN_ALL_CORE_FT
[facility]  ON[address]MGT [address]   Scheduled Appointment: 05/15/2024    Celestina Garcia  [facility] [address]  Scheduled Appointment: 06/19/2024

## 2024-05-12 NOTE — PROGRESS NOTE ADULT - SUBJECTIVE AND OBJECTIVE BOX
Patient is a 59y old  Female who presents with a chief complaint of headache    SUBJECTIVE / OVERNIGHT EVENTS: Patient seen and examined at bedside. No overnight events.    MEDICATIONS  (STANDING):  chlorhexidine 2% Cloths 1 Application(s) Topical <User Schedule>  dexAMETHasone     Tablet 2 milliGRAM(s) Oral every 12 hours  famotidine    Tablet 20 milliGRAM(s) Oral daily  lacosamide 150 milliGRAM(s) Oral two times a day  losartan 50 milliGRAM(s) Oral daily  morphine ER Tablet 15 milliGRAM(s) Oral two times a day  naloxone Injectable 0.4 milliGRAM(s) IV Push once  polyethylene glycol 3350 17 Gram(s) Oral <User Schedule>  senna 2 Tablet(s) Oral at bedtime  sodium chloride 2 Gram(s) Oral three times a day    MEDICATIONS  (PRN):  acetaminophen     Tablet .. 650 milliGRAM(s) Oral every 6 hours PRN Temp greater or equal to 38C (100.4F), Mild Pain (1 - 3)  ALPRAZolam 0.5 milliGRAM(s) Oral daily PRN severe anxiety  bisacodyl Suppository 10 milliGRAM(s) Rectal daily PRN Constipation  HYDROmorphone   Tablet 4 milliGRAM(s) Oral every 4 hours PRN Moderate Pain (4 - 6)  HYDROmorphone  Injectable 1 milliGRAM(s) IV Push every 4 hours PRN Severe Pain (7 - 10)  melatonin 3 milliGRAM(s) Oral at bedtime PRN Insomnia  ondansetron Injectable 4 milliGRAM(s) IV Push every 8 hours PRN Nausea and/or Vomiting    Vital Signs Last 24 Hrs  T(C): 36.4 (12 May 2024 05:33), Max: 36.8 (11 May 2024 13:31)  T(F): 97.5 (12 May 2024 05:33), Max: 98.2 (11 May 2024 13:31)  HR: 59 (12 May 2024 05:33) (59 - 96)  BP: 154/84 (12 May 2024 05:33) (111/69 - 154/84)  BP(mean): --  RR: 18 (12 May 2024 05:33) (18 - 18)  SpO2: 100% (12 May 2024 05:33) (97% - 100%)    Parameters below as of 12 May 2024 05:33  Patient On (Oxygen Delivery Method): room air    CAPILLARY BLOOD GLUCOSE    I&O's Summary    PHYSICAL EXAM:  GENERAL: NAD, well-developed  HEAD:  Atraumatic, Normocephalic  EYES: EOMI, PERRLA, conjunctiva and sclera clear  NECK: Supple, No JVD  CHEST/LUNG: Clear to auscultation bilaterally; No wheeze  HEART: Regular rate and rhythm; No murmurs, rubs, or gallops  ABDOMEN: Soft, Nontender, Nondistended; Bowel sounds present  EXTREMITIES:  2+ Peripheral Pulses, No clubbing, cyanosis, or edema  PSYCH: AAOx3  NEUROLOGY: non-focal  SKIN: No rashes or lesions    LABS:                        13.4   9.78  )-----------( 108      ( 11 May 2024 07:13 )             38.8     05-11    131<L>  |  94<L>  |  22  ----------------------------<  124<H>  4.1   |  23  |  0.35<L>    Ca    9.3      11 May 2024 22:26  Phos  2.9     05-11  Mg     1.80     05-11    Urinalysis Basic - ( 11 May 2024 22:26 )    Color: x / Appearance: x / SG: x / pH: x  Gluc: 124 mg/dL / Ketone: x  / Bili: x / Urobili: x   Blood: x / Protein: x / Nitrite: x   Leuk Esterase: x / RBC: x / WBC x   Sq Epi: x / Non Sq Epi: x / Bacteria: x    Nino Mueller MD, Internal Medicine Resident Patient is a 59y old  Female who presents with a chief complaint of headache    SUBJECTIVE / OVERNIGHT EVENTS: Patient seen and examined at bedside. No overnight events. Reports her headache has slightly improved compared to the day prior, but still rates her pain as a 7/10. Denies CP, SOB, subjective fever, chills, n/v/d.    MEDICATIONS  (STANDING):  chlorhexidine 2% Cloths 1 Application(s) Topical <User Schedule>  dexAMETHasone     Tablet 2 milliGRAM(s) Oral every 12 hours  famotidine    Tablet 20 milliGRAM(s) Oral daily  lacosamide 150 milliGRAM(s) Oral two times a day  losartan 50 milliGRAM(s) Oral daily  morphine ER Tablet 15 milliGRAM(s) Oral two times a day  naloxone Injectable 0.4 milliGRAM(s) IV Push once  polyethylene glycol 3350 17 Gram(s) Oral <User Schedule>  senna 2 Tablet(s) Oral at bedtime  sodium chloride 2 Gram(s) Oral three times a day    MEDICATIONS  (PRN):  acetaminophen     Tablet .. 650 milliGRAM(s) Oral every 6 hours PRN Temp greater or equal to 38C (100.4F), Mild Pain (1 - 3)  ALPRAZolam 0.5 milliGRAM(s) Oral daily PRN severe anxiety  bisacodyl Suppository 10 milliGRAM(s) Rectal daily PRN Constipation  HYDROmorphone   Tablet 4 milliGRAM(s) Oral every 4 hours PRN Moderate Pain (4 - 6)  HYDROmorphone  Injectable 1 milliGRAM(s) IV Push every 4 hours PRN Severe Pain (7 - 10)  melatonin 3 milliGRAM(s) Oral at bedtime PRN Insomnia  ondansetron Injectable 4 milliGRAM(s) IV Push every 8 hours PRN Nausea and/or Vomiting    Vital Signs Last 24 Hrs  T(C): 36.4 (12 May 2024 05:33), Max: 36.8 (11 May 2024 13:31)  T(F): 97.5 (12 May 2024 05:33), Max: 98.2 (11 May 2024 13:31)  HR: 59 (12 May 2024 05:33) (59 - 96)  BP: 154/84 (12 May 2024 05:33) (111/69 - 154/84)  BP(mean): --  RR: 18 (12 May 2024 05:33) (18 - 18)  SpO2: 100% (12 May 2024 05:33) (97% - 100%)    Parameters below as of 12 May 2024 05:33  Patient On (Oxygen Delivery Method): room air    CAPILLARY BLOOD GLUCOSE    I&O's Summary    PHYSICAL EXAM:  GENERAL: NAD, well-developed  HEAD:  Atraumatic, Normocephalic  EYES: EOMI, PERRLA, conjunctiva and sclera clear  NECK: Supple, No JVD  CHEST/LUNG: Clear to auscultation bilaterally; No wheeze  HEART: Regular rate and rhythm; No murmurs, rubs, or gallops  ABDOMEN: Soft, Nontender, Nondistended; Bowel sounds present  EXTREMITIES:  2+ Peripheral Pulses, No clubbing, cyanosis, or edema  PSYCH: AAOx3  NEUROLOGY: non-focal  SKIN: No rashes or lesions    LABS:                        13.4   9.78  )-----------( 108      ( 11 May 2024 07:13 )             38.8     05-11    131<L>  |  94<L>  |  22  ----------------------------<  124<H>  4.1   |  23  |  0.35<L>    Ca    9.3      11 May 2024 22:26  Phos  2.9     05-11  Mg     1.80     05-11    Urinalysis Basic - ( 11 May 2024 22:26 )    Color: x / Appearance: x / SG: x / pH: x  Gluc: 124 mg/dL / Ketone: x  / Bili: x / Urobili: x   Blood: x / Protein: x / Nitrite: x   Leuk Esterase: x / RBC: x / WBC x   Sq Epi: x / Non Sq Epi: x / Bacteria: x    Nino Mueller MD, Internal Medicine Resident

## 2024-05-12 NOTE — PROGRESS NOTE ADULT - PROBLEM SELECTOR PLAN 1
Patient with hyponatremia likely in setting of malignancy, decreased PO intake, and ADH stimulation from pain 2/2 to headache. No L/E on exam. SNa of 126 on presentation, repeat of 123 s/p 1U bolus in ED. TSH wnl. Unlikely due to decreased glucocorticoids given exogenous use.  3/10: SNa 131 SOsm 277 hypoosmolar hyponatremia. Grossly euvolemic v hypovolemic.     - F/u serum osms, urine osms, urine Na, Cr to confirm etiology  - NaCl tabs if SNa self correcting.  - Fluid restriction, optimize glucose control, encourage solute intake  - BMP q12h now that SNa > 130  - Fall precautions  - RD consulted; appreciate recs Patient with hyponatremia likely in setting of malignancy, decreased PO intake, and ADH stimulation from pain 2/2 to headache. No L/E on exam. SNa of 126 on presentation, repeat of 123 s/p 1U bolus in ED. TSH wnl. Unlikely due to decreased glucocorticoids given exogenous use.  3/10: SNa 131 SOsm 277 hypoosmolar hyponatremia. Grossly euvolemic v hypovolemic.   - F/u serum osms, urine osms, urine Na, Cr to confirm etiology - likely SIADH  - C/w NaCL tabs 2g TID  - 1L Fluid restriction, optimize glucose control, encourage solute intake  - BMP q12h now that SNa > 130  - Fall precautions  - RD consulted; appreciate recs

## 2024-05-12 NOTE — PHYSICAL THERAPY INITIAL EVALUATION ADULT - PERTINENT HX OF CURRENT PROBLEM, REHAB EVAL
Patient is 59 year old femal,e, history of NSCLC with bilateral frontal brain metastases s/p GKRS presenting for progressively worsening headache uncontrolled by home pain regimen.
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
## 2024-05-12 NOTE — DISCHARGE NOTE PROVIDER - NSDCMRMEDTOKEN_GEN_ALL_CORE_FT
acetaminophen 325 mg oral tablet: 2 tab(s) orally every 6 hours As needed Temp greater or equal to 38C (100.4F), Mild Pain (1 - 3)  dexAMETHasone 2 mg oral tablet: 1 tab(s) orally every 12 hours  famotidine 20 mg oral tablet: 1 tab(s) orally once a day  HYDROmorphone 4 mg oral tablet: 1 tab(s) orally every 6 hours as needed for pain 1-2 tab orally every 6-8 hours PRN  lacosamide 150 mg oral tablet: 1 tab(s) orally 2 times a day  losartan 50 mg oral tablet: 1 tab(s) orally once a day  melatonin 5 mg oral tablet: 1 tab(s) orally once a day (at bedtime)  MS Contin 15 mg oral tablet, extended release: 1 tab(s) orally every 12 hours as needed for  severe pain  polyethylene glycol 3350 oral powder for reconstitution: 17 gram(s) orally once a day  senna leaf extract oral tablet: 2 tab(s) orally once a day (at bedtime)  Xanax 0.5 mg oral tablet: 1 tab(s) orally once a day as needed for severe anxiety

## 2024-05-12 NOTE — PROGRESS NOTE ADULT - PROBLEM SELECTOR PLAN 2
Headache i/s/o recent gammaknife surgery w/o aura suggestive of migraine, no discontinuation of NSAIDs to suggest rebound headache. CTH w/ improving edema. Potentially secondary to hyponatremia given additional symptoms of tremulousness and imbalance.  - Pain control w morphine ER, PO dilaudid, IVP dilaudid. Reassess daily.  - increase bowel reg prn to goal > 1 BM/ day  - Hyponatremia management as above Headache i/s/o recent gammaknife surgery w/o aura suggestive of migraine, no discontinuation of NSAIDs to suggest rebound headache. CTH w/ improving edema. Potentially secondary to hyponatremia given additional symptoms of tremulousness and imbalance.  - Pain control w morphine ER, PO dilaudid, IVP dilaudid. Reassess daily.  - Increase bowel reg prn to goal > 1 BM/ day  - Hyponatremia management as above

## 2024-05-12 NOTE — PROGRESS NOTE ADULT - ATTENDING COMMENTS
Briefly, this is a 58 y/o female with a history of NSCLC with brain metastases s/p gamma knife on chemo presenting with worsening headache.    Patient seen and examined at bedside. Continues to endorse headache that is unchanged in quality. States that it does seem slightly improved compared to yesterday. Denies any nausea or vomiting. Denies any fevers or chills. Utilized IV PRNs overnight.    #Headache  -CT head with stable brain metastases, no worsened bleeding noted  -c/w Dilaudid PRN for pain, c/w home MS Contin  -can attempt transition to home dilaudid PRNs  -palliative care consulted    #Hyponatremia  -hyponatremia improving with hypertonic saline  -Na improved from 123 to 128  -monitor BMP q12h to avoid overcorrection  -suspect hyponatremia may be from SIADH given pain and metastatic cancer;   -Na worsened initially in the ER with NS IVF, and pt does not appear to be hypervolemic  -nutrition eval for severe protein-calorie malnutrition in setting of malignancy  -PT rec CLARY    Remainder of plan as outlined above  Case and plan of care discussed with HS5.

## 2024-05-13 NOTE — PROGRESS NOTE ADULT - ASSESSMENT
59y F w/ hx of NSCLC w/ bilateral frontal brain metastases s/p GKRS presenting for progressively worsening headache uncontrolled by home pain regiment. Found to have hyponatremia, likely SIADH, started on salt repletion.

## 2024-05-13 NOTE — PROVIDER CONTACT NOTE (MEDICATION) - BACKGROUND
Pt was admitted for weakness
Pt admitted for weakness, presenting with worsening headache due to frontal brain metastases of SCLC

## 2024-05-13 NOTE — PROGRESS NOTE ADULT - PROBLEM SELECTOR PLAN 5
Neutrophilic predominant and without bands. No localizing infectious symptoms, on increased dose of decadron (4mg TID), potential source.   -monitor off abx  -trend WBCs On losartan 50mg @ home  -c/w home med

## 2024-05-13 NOTE — PROGRESS NOTE ADULT - PROBLEM SELECTOR PLAN 2
Headache i/s/o recent gammaknife surgery w/o aura suggestive of migraine, no discontinuation of NSAIDs to suggest rebound headache. CTH w/ improving edema. Potentially secondary to hyponatremia given additional symptoms of tremulousness and imbalance.  - Pain control w morphine ER, PO dilaudid, IVP dilaudid. Reassess daily.  - Increase bowel reg prn to goal > 1 BM/ day  - Hyponatremia management as above Patient with hyponatremia likely in setting of malignancy, decreased PO intake, and ADH stimulation from pain 2/2 to headache. No L/E on exam. SNa of 126 on presentation, repeat of 123 s/p 1U bolus in ED. TSH wnl. Unlikely due to decreased glucocorticoids given exogenous use.  3/10: SNa 131 SOsm 277 hypoosmolar hyponatremia. Grossly euvolemic v hypovolemic.   - F/u serum osms, urine osms, urine Na, Cr to confirm etiology - likely SIADH  - C/w NaCL tabs 2g TID. Increased to NaCL tabs 3g   - 1L Fluid restriction, optimize glucose control, encourage solute intake  - BMP q12h now that SNa > 130  - Fall precautions  - RD consulted; appreciate recs

## 2024-05-13 NOTE — PROGRESS NOTE ADULT - ATTENDING COMMENTS
59 yr old woman PMH NSCLC with brain metastases s/p lung RT 3/1/2024 on chemoimmunotherapy since Jan 2024 and s/p recent gamma knife 4/23/24 p/w worsening headache.    Continues to report 10/10 headache, now at 7/10 this morning when evaluated. Declined to take AM PO decadron and declined AM labs    Will attempt migraine cocktail: IV decadron 4mg x1, benadryl, compazine, reglan, tylenol, magnesium. Avoiding NSAIDs given hemorrhagic mass  Can also consider tizanidine, lidocaine patch  CT head 5/10 with stable brain metastases and hemorrhagic mass in the left frontal lobe with surrounding vasogenic edema, stable  No further inpatient workup per NSx  Rad Onc consult if persistent headache  C/w Dilaudid PRN for pain, c/w home MS Contin 15 BID, decadron 2mg PO q 12  Awaiting palliative care recs for pain management  Hyponatremia improved with hypertonic saline, likely from SIADH given pain and metastatic cancer.   Patient agreeable to checking labs after migraine cocktail  Thrombocytopenia stable-likely from chemo    PT rec CLARY

## 2024-05-13 NOTE — PROVIDER CONTACT NOTE (MEDICATION) - SITUATION
Pt was asked and offered evening medication and she refuses to take them, saying no I am not taking them.
Pt was given AM medication and she refused to take them

## 2024-05-13 NOTE — PROGRESS NOTE ADULT - PROBLEM SELECTOR PLAN 1
Patient with hyponatremia likely in setting of malignancy, decreased PO intake, and ADH stimulation from pain 2/2 to headache. No L/E on exam. SNa of 126 on presentation, repeat of 123 s/p 1U bolus in ED. TSH wnl. Unlikely due to decreased glucocorticoids given exogenous use.  3/10: SNa 131 SOsm 277 hypoosmolar hyponatremia. Grossly euvolemic v hypovolemic.   - F/u serum osms, urine osms, urine Na, Cr to confirm etiology - likely SIADH  - C/w NaCL tabs 2g TID. Increased to NaCL tabs 3g   - 1L Fluid restriction, optimize glucose control, encourage solute intake  - BMP q12h now that SNa > 130  - Fall precautions  - RD consulted; appreciate recs Headache i/s/o recent gammaknife surgery w/o aura suggestive of migraine, no discontinuation of NSAIDs to suggest rebound headache. CTH w/ improving edema. Potentially secondary to hyponatremia given additional symptoms of tremulousness and imbalance.  - Pain control w morphine ER, PO dilaudid, IVP dilaudid. Reassess daily.  - Increase bowel reg prn to goal > 1 BM/ day  - Hyponatremia management as above

## 2024-05-13 NOTE — PROVIDER CONTACT NOTE (MEDICATION) - ACTION/TREATMENT ORDERED:
No action taken at this time
Provider saw and spoke to pt, encourage pt to take medications she refuses, emphasis was place on the pain medication she still refuses.

## 2024-05-14 NOTE — PROGRESS NOTE ADULT - PROBLEM SELECTOR PLAN 6
On famotidine 20mg daily  -c/w home med Hx of GERD on famotidine 20mg qd    Plan:  - c/w famotidine 20mg qd

## 2024-05-14 NOTE — CONSULT NOTE ADULT - SUBJECTIVE AND OBJECTIVE BOX
HPI:  Patient is a 58y  F pt of Dr. Nicole, h/o NSCLC w/ mets to brain, on immunotx s/p lung RT 3/1/24, GKRS, T3-T4 compression fractures presenting for progressively worsening headache. Pt accompanied by daughter who notes pt has had headaches that are constant but waxing and waning since GKRS ~2 weeks ago - most acutely worsening over the past 2 days. She also endorses tremulousness but no loss of consciousness. Pt notified neurosurg re: headache around 1 week ago regarding worsening headaches and recommended increasing decadron to 4mg TID. Endorses lethargy, nausea, weakness, and tremulousness. Denies fever, chills, falls, changes in vision, dizziness, photophobia, phonophobia, paresthesia, cramps, initiation or discontinuation of new medications. Of note, was seen in ED 5/6 with complaint of headache w/ CT at the time suggestive of improving cerebral edema. Also recently hospitalized 4/2024 w/ new onset seizures likely 2/2 L frontal and R motor strip lesions requiring keppra loading, intubation, and NSCU stay. In ED, VS: T 97.7, HR 77, /88, RR 18, O2 99% on RA. Administered tylenol w/ no relief - improved w/ dilaudid 1mg daily. Also administered magnesium, reglan. Evaluated by NSx given cerebral mets however CTH showing improving edema and recommending tapering down steroids.        PERTINENT PM/SXH:   No pertinent past medical history    Mass of right lung    COPD (chronic obstructive pulmonary disease)    Smoker    Chronic pain syndrome    HLD (hyperlipidemia)    Melanoma in situ    History of blood transfusion    Malignant neoplasm of right bronchus      No significant past surgical history    H/O reduction of orbital fracture    S/P wrist surgery      FAMILY HISTORY:  FH: heart attack (Father, Mother)    FH: stomach cancer (Grandparent)    FH: lung cancer (Sibling)      ------------------------------------------------------------------------------------------------------------  ITEMS NOT CHECKED ARE NOT PRESENT    SOCIAL HISTORY:   Living Situation: [ ]Home  [ ]Long term care  [ ]Rehab [ ]Other  Support:     Substance hx:  [ ]   Tobacco hx:  [ ]   Alcohol hx: [ ]   Family Hx substance abuse [ ]yes [ ]no    Sikhism/Spirituality:  PCSSQ[Palliative Care Spiritual Screening Question]   Severity (0-10): 0  Score of 4 or > indicate consideration of Chaplaincy referral.  Chaplaincy Referral: [ ] yes [X ] refused [ ] following    Anticipatory Grief present?:  [ ] yes [X ] no  [ ] Deferred                      Other Referrals:    [ ]Hospice   [ ]Caregiver [place] Support  [ ]Child Life    [ ]Patient & Family Centered Care Referral  [ ]Holistic Therapy     ------------------------------------------------------------------------------------------------------------    PRESENT SYMPTOMS:     [ ] Unable to self-report      [ ] PAINADS     [ ] RDOS    [ ] No     [ ] Yes     Source if other than patient:  [ ]Family   [ ]Team     PAIN:   If blank, patient unable to specify     [ ]yes [ ]no    1. Location-   2. Radiation-    3. Quality-   4. Timing-   5. Minimal acceptable level/pain goal-   6. Aggravating factors-   7. QOL impact-     SYMPTOMS:   Dyspnea:                           [ ]Mild [ ]Moderate [ ]Severe  Anxiety:                             [ ]Mild [ ]Moderate [ ]Severe  Fatigue:                             [ ]Mild [ ]Moderate [ ]Severe  Nausea/Vomiting:              [ ]Mild [ ]Moderate [ ]Severe  Loss of appetite:                [ ]Mild [ ]Moderate [ ]Severe  Constipation:                     [ ]Mild [ ]Moderate [ ]Severe    Other Symptoms:  [X ]All other review of systems negative     ------------------------------------------------------------------------------------------------------------      I-Stop Reference No: [id number]    Prescription Information      PDI Filter:    PDI	Current Rx	Drug Type	Rx Written	Rx Dispensed	Drug	Quantity	Days Supply	Prescriber Name	Prescriber KENY #	Payment Method	Dispenser  A	N	O	04/29/2024	04/30/2024	hydromorphone 4 mg tablet	56	6	FitzgeraldNick	[id number]	Insurance	[facility] #[id number]  A	N	O	04/29/2024	04/30/2024	morphine sulf er 15 mg tablet	14	7	FitzgeraldNick maza	[id number]	[facility] #[id number]  A	Y		04/19/2024	04/20/2024	lacosamide 150 mg tablet	60	30	Jodi Hoffmann	[id number]	Insurance	[facility]	N	O	03/12/2024	03/20/2024	morphine sulfate ir 15 mg tab	60	30	FitzgeraldNick maza	[id number]	Insurance	[facility] #[id number]  A	N	O	03/12/2024	03/20/2024	hydromorphone 4 mg tablet	99	12	FitzgeraldNick maza	[id number]	Insurance	[facility] #[id number]  A	N	B	02/28/2024	03/01/2024	alprazolam 0.5 mg tablet	30	30	Celestina Garcia)	[id number]	Insurance	[facility] #[id number]  A	N	B	02/12/2024	02/12/2024	alprazolam 0.25 mg tablet	30	10	Celestina Garcia)	[id number]	Insurance	[facility] #[id number]  A	N	O	01/30/2024	02/03/2024	morphine sulf er 15 mg tablet	90	30	FitzgeraldNick maza	[id number]	Insurance	[facility] #[id number]  A	N	O	01/31/2024	01/31/2024	hydromorphone 4 mg tablet	360	30	FitzgeraldNick maza	[id number]	Insurance	[facility] #[id number]  A	N	B	01/30/2024	01/30/2024	alprazolam 0.25 mg tablet	30	10	Tworzydlo, Celestina (MD)	[id number]	Insurance	[facility] #[id number]  A	N	O	01/26/2024	01/26/2024	morphine sulf er 15 mg tablet	14	7	Chas Ibarra	[id number]	Insurance	[facility] #[id number]    ------------------------------------------------------------------------------------------------------------    FUNCTIONAL STATUS:     Baseline ADL (prior to admission):  [ ] Independent  [ ] Moderate Assistance [ ] Dependent    Palliative Performance Score (current):     [ ]PPSV2 < or = to 30%   [ ]artificial nutrition      NUTRITIONAL STATUS:     Protein Calorie Malnutrition Present:   [ ]mild   [ ]moderate or severe    Weight:   [ ]underweight (BMI 18.5 or less)   [ ]morbid obesity (BMI 30 or higher)   [ ]anasarca  [ ]significant weight loss     Height (cm): 154.9 (05-10-24 @ 07:55), 158 (05-09-24 @ 10:02), 154.9 (05-06-24 @ 18:51)  Weight (kg): 36.2 (05-10-24 @ 07:55), 35.8 (05-09-24 @ 10:02), 45.4 (05-06-24 @ 18:51)  BMI (kg/m2): 15.1 (05-10-24 @ 07:55), 14.3 (05-09-24 @ 10:02), 18.9 (05-06-24 @ 18:51)    ------------------------------------------------------------------------------------------------------------    PRIOR ADVANCE DIRECTIVES:    [ ] DNR/MOLST    [ ] Living Will    [ ] Health Care Proxy(s)    DECISION MAKER(s):  [ ] Patient    [ ] Surrogate(s)  [ ] HCP   [ ] Guardian             ------------------------------------------------------------------------------------------------------------  PHYSICAL EXAM:  Vital Signs Last 24 Hrs  T(C): 36.6 (14 May 2024 04:55), Max: 36.7 (13 May 2024 14:45)  T(F): 97.9 (14 May 2024 04:55), Max: 98 (13 May 2024 14:45)  HR: 88 (14 May 2024 04:55) (88 - 92)  BP: 153/94 (14 May 2024 04:55) (113/68 - 153/94)  BP(mean): --  RR: 19 (14 May 2024 04:55) (17 - 19)  SpO2: 100% (14 May 2024 04:55) (100% - 100%)    Parameters below as of 14 May 2024 04:55  Patient On (Oxygen Delivery Method): room air     I&O's Summary    13 May 2024 07:01  -  14 May 2024 07:00  --------------------------------------------------------  IN: 795 mL / OUT: 600 mL / NET: 195 mL        GENERAL:  [ ]Cachexia  [ ] Frail  [ ]Awake  [ ]Oriented x   [ ]Lethargic  [ ]Unarousable  [ ]Verbal  [ ]Non-Verbal    BEHAVIORAL:   [ ] Anxiety  [ ] Delirium [ ] Agitation [ ] Other    HEENT:   [ ]Normal   [ ]Dry mouth   [ ]ET Tube/Trach  [ ]Oral lesions    PULMONARY:   [ ]Clear              [ ]Tachypnea  [ ]Audible excessive secretions   [ ]Rhonchi        [ ]Right [ ]Left [ ]Bilateral  [ ]Crackles        [ ]Right [ ]Left [ ]Bilateral  [ ]Wheezing     [ ]Right [ ]Left [ ]Bilateral  [ ]Diminished breath sounds [ ]right [ ]left [ ]bilateral    CARDIOVASCULAR:    [ ]Regular [ ]Irregular [ ]Tachy  [ ]Gian [ ]Murmur [ ]Other    GASTROINTESTINAL:  [ ]Soft  [ ]Distended   [ ]+BS  [ ]Non tender [ ]Tender  [ ]Other [ ]PEG [ ]OGT/ NGT      GENITOURINARY:  [ ]Normal [ ] Incontinent   [ ]Oliguria/Anuria   [ ]Napier    MUSCULOSKELETAL:   [ ]Normal   [ ]Weakness  [ ]Bed/Wheelchair bound [ ]Edema    NEUROLOGIC:   [ ]No focal deficits  [ ]Cognitive impairment  [ ]Dysphagia [ ]Dysarthria [ ]Paresis [ ]Other     SKIN:   [ ]Normal  [ ]Rash  [ ]Other  [ ]Pressure ulcer(s)       Present on admission [ ]y [ ]n    ------------------------------------------------------------------------------------------------------------    LABS:                        13.0   10.01 )-----------( 80       ( 14 May 2024 04:55 )             36.9   05-14    130<L>  |  92<L>  |  20  ----------------------------<  120<H>  3.4<L>   |  22  |  0.33<L>    Ca    9.6      14 May 2024 04:55  Phos  3.0     05-14  Mg     1.90     05-14    TPro  6.4  /  Alb  4.0  /  TBili  1.1  /  DBili  x   /  AST  22  /  ALT  26  /  AlkPhos  88  05-14      Urinalysis Basic - ( 14 May 2024 04:55 )    Color: x / Appearance: x / SG: x / pH: x  Gluc: 120 mg/dL / Ketone: x  / Bili: x / Urobili: x   Blood: x / Protein: x / Nitrite: x   Leuk Esterase: x / RBC: x / WBC x   Sq Epi: x / Non Sq Epi: x / Bacteria: x        ------------------------------------------------------------------------------------------------------------    CRITICAL CARE:  [ ]Shock Present  [ ]Septic [ ]Cardiogenic [ ]Neurologic [ ]Hypovolemic [ ] Undifferentiated/Mixed   [ ]Vasopressors [ ]Inotropes     [ ]Respiratory failure present: [ ]Acute  [ ]Chronic [ ]Hypoxic  [ ]Hypercarbic   [ ]Mechanical ventilation   [ ]Trach collar   [ ]Non-invasive ventilatory support   [ ]High flow    [ ]Non-rebreather/Venti     [ ]Other organ failure     ------------------------------------------------------------------------------------------------------------    RADIOLOGY & ADDITIONAL STUDIES:    < from: CT Head No Cont (05.06.24 @ 19:38) >    IMPRESSION:  Previously identified hemorrhagic masses appear mildly smaller.   Previously seen mass effect and edema has improved.  No new areas of hemorrhage. No hydrocephalus.    < end of copied text >    < from: CT Head No Cont (05.10.24 @ 01:50) >  IMPRESSION:  No significant interval change in previously noted hemorrhagic masses. No   acute intracranial hemorrhage or mass effect.    < end of copied text >    ------------------------------------------------------------------------------------------------------------    ALLERGIES:  Allergies    No Known Allergies    Intolerances    morphine (Sedation/Somnol)      MEDICATIONS  (STANDING):  chlorhexidine 2% Cloths 1 Application(s) Topical <User Schedule>  dexAMETHasone     Tablet 2 milliGRAM(s) Oral every 12 hours  famotidine    Tablet 20 milliGRAM(s) Oral daily  lacosamide 150 milliGRAM(s) Oral two times a day  lidocaine   4% Patch 1 Patch Transdermal daily  losartan 50 milliGRAM(s) Oral daily  melatonin 3 milliGRAM(s) Oral at bedtime  morphine ER Tablet 15 milliGRAM(s) Oral two times a day  naloxone Injectable 0.4 milliGRAM(s) IV Push once  polyethylene glycol 3350 17 Gram(s) Oral <User Schedule>  senna 2 Tablet(s) Oral at bedtime  sodium chloride 3 Gram(s) Oral three times a day    MEDICATIONS  (PRN):  acetaminophen     Tablet .. 650 milliGRAM(s) Oral every 6 hours PRN Temp greater or equal to 38C (100.4F), Mild Pain (1 - 3)  ALPRAZolam 0.5 milliGRAM(s) Oral daily PRN severe anxiety  bisacodyl Suppository 10 milliGRAM(s) Rectal daily PRN Constipation  HYDROmorphone   Tablet 4 milliGRAM(s) Oral every 4 hours PRN Moderate Pain (4 - 6)  HYDROmorphone  Injectable 1 milliGRAM(s) IV Push every 4 hours PRN Severe Pain (7 - 10)  ondansetron Injectable 4 milliGRAM(s) IV Push every 8 hours PRN Nausea and/or Vomiting           HPI:  Patient is a 58y  F pt of Dr. Nicole, h/o NSCLC w/ mets to brain, on immunotx s/p lung RT 3/1/24, GKRS, T3-T4 compression fractures presenting for progressively worsening headache. Pt accompanied by daughter who notes pt has had headaches that are constant but waxing and waning since GKRS ~2 weeks ago - most acutely worsening over the past 2 days. She also endorses tremulousness but no loss of consciousness. Pt notified neurosurg re: headache around 1 week ago regarding worsening headaches and recommended increasing decadron to 4mg TID. Endorses lethargy, nausea, weakness, and tremulousness. Denies fever, chills, falls, changes in vision, dizziness, photophobia, phonophobia, paresthesia, cramps, initiation or discontinuation of new medications. Of note, was seen in ED 5/6 with complaint of headache w/ CT at the time suggestive of improving cerebral edema. Also recently hospitalized 4/2024 w/ new onset seizures likely 2/2 L frontal and R motor strip lesions requiring keppra loading, intubation, and NSCU stay. In ED, VS: T 97.7, HR 77, /88, RR 18, O2 99% on RA. Administered tylenol w/ no relief - improved w/ dilaudid 1mg daily. Also administered magnesium, reglan. Evaluated by NSx given cerebral mets however CTH showing improving edema and recommending tapering down steroids.      Patient seen this PM. Patient lethargic, able to speak but minimally conversant. Asked multiple times if patient had pain, patient denied pain at this time. Patient unable to give information on her condition or her support system.     PERTINENT PM/SXH:   No pertinent past medical history    Mass of right lung    COPD (chronic obstructive pulmonary disease)    Smoker    Chronic pain syndrome    HLD (hyperlipidemia)    Melanoma in situ    History of blood transfusion    Malignant neoplasm of right bronchus      No significant past surgical history    H/O reduction of orbital fracture    S/P wrist surgery      FAMILY HISTORY:  FH: heart attack (Father, Mother)    FH: stomach cancer (Grandparent)    FH: lung cancer (Sibling)      ------------------------------------------------------------------------------------------------------------  ITEMS NOT CHECKED ARE NOT PRESENT    SOCIAL HISTORY:   Living Situation: [X ]Home  [ ]Long term care  [ ]Rehab [ ]Other  Support: , children     Substance hx:  [ ]   Tobacco hx:  [ ]   Alcohol hx: [ ]   Family Hx substance abuse [ ]yes [ ]no    Mormonism/Spirituality:  PCSSQ[Palliative Care Spiritual Screening Question]   Severity (0-10): 0  Score of 4 or > indicate consideration of Chaplaincy referral.  Chaplaincy Referral: [ ] yes [X ] refused [ ] following    Anticipatory Grief present?:  [ ] yes [X ] no  [ ] Deferred                      Other Referrals:    [ ]Hospice   [ ]Caregiver [place] Support  [ ]Child Life    [ ]Patient & Family Centered Care Referral  [ ]Holistic Therapy     ------------------------------------------------------------------------------------------------------------    PRESENT SYMPTOMS:     [ ] Unable to self-report      [ ] PAINADS     [ ] RDOS    [X ] No     [ ] Yes     Source if other than patient:  [ ]Family   [ ]Team     PAIN:   If blank, patient unable to specify     [ ]yes [ ]no    1. Location-   2. Radiation-    3. Quality-   4. Timing-   5. Minimal acceptable level/pain goal-   6. Aggravating factors-   7. QOL impact-     SYMPTOMS:   Dyspnea:                           [ ]Mild [ ]Moderate [ ]Severe  Anxiety:                             [ ]Mild [ ]Moderate [ ]Severe  Fatigue:                             [ ]Mild [ ]Moderate [ ]Severe  Nausea/Vomiting:              [ ]Mild [ ]Moderate [ ]Severe  Loss of appetite:                [ ]Mild [ ]Moderate [ ]Severe  Constipation:                     [ ]Mild [ ]Moderate [ ]Severe    Other Symptoms:  [X ]All other review of systems negative     ------------------------------------------------------------------------------------------------------------      I-Stop Reference No: [id number]    Prescription Information      PDI Filter:    PDI	Current Rx	Drug Type	Rx Written	Rx Dispensed	Drug	Quantity	Days Supply	Prescriber Name	Prescriber KENY #	Payment Method	Dispenser  A	N	O	04/29/2024	04/30/2024	hydromorphone 4 mg tablet	56	6	Nick Fitzgerald	[id number]	[facility] #[id number]  A	N	O	04/29/2024	04/30/2024	morphine sulf er 15 mg tablet	14	7	Nick Fitzgerald	[id number]	[facility] #[id number]  A	Y		04/19/2024	04/20/2024	lacosamide 150 mg tablet	60	30	Jodi Hoffmann	[id number]	Insurance	[facility]	N	O	03/12/2024	03/20/2024	morphine sulfate ir 15 mg tab	60	30	Nick Fitzgeradl	[id number]	[facility] #[id number]  A	N	O	03/12/2024	03/20/2024	hydromorphone 4 mg tablet	99	12	Nick Fitzgerald	[id number]	[facility] #[id number]  A	N	B	02/28/2024	03/01/2024	alprazolam 0.5 mg tablet	30	30	Celestina Garcia)	[id number]	[facility] #[id number]  A	N	B	02/12/2024	02/12/2024	alprazolam 0.25 mg tablet	30	10	Celestina Garcia)	[id number]	[facility] #[id number]  A	N	O	01/30/2024	02/03/2024	morphine sulf er 15 mg tablet	90	30	Nick Fitzgerald	[id number]	[facility] #[id number]  A	N	O	01/31/2024	01/31/2024	hydromorphone 4 mg tablet	360	30	Nick Fitzgerald	[id number]	Insurance	[facility] #[id number]  A	N	B	01/30/2024	01/30/2024	alprazolam 0.25 mg tablet	30	10	Celestina Garcia)	[id number]	Insurance	[facility] #[id number]  A	N	O	01/26/2024	01/26/2024	morphine sulf er 15 mg tablet	14	7	Chas Iabrra	[id number]	Insurance	[facility] #[id number]    ------------------------------------------------------------------------------------------------------------    FUNCTIONAL STATUS:     Baseline ADL (prior to admission):  [ ] Independent  [ ] Moderate Assistance [X ] Dependent    Palliative Performance Score (current): 20%    [ ]PPSV2 < or = to 30%   [ ]artificial nutrition      NUTRITIONAL STATUS:     Protein Calorie Malnutrition Present:   [ ]mild   [ ]moderate or severe    Weight:   [X ]underweight (BMI 18.5 or less)   [ ]morbid obesity (BMI 30 or higher)   [ ]anasarca  [ ]significant weight loss     Height (cm): 154.9 (05-10-24 @ 07:55), 158 (05-09-24 @ 10:02), 154.9 (05-06-24 @ 18:51)  Weight (kg): 36.2 (05-10-24 @ 07:55), 35.8 (05-09-24 @ 10:02), 45.4 (05-06-24 @ 18:51)  BMI (kg/m2): 15.1 (05-10-24 @ 07:55), 14.3 (05-09-24 @ 10:02), 18.9 (05-06-24 @ 18:51)    ------------------------------------------------------------------------------------------------------------    PRIOR ADVANCE DIRECTIVES:    [ ] DNR/MOLST    [ ] Living Will    [ ] Health Care Proxy(s)    DECISION MAKER(s):  [ ] Patient    [X ] Surrogate(s)-    [ ] HCP   [ ] Guardian             ------------------------------------------------------------------------------------------------------------  PHYSICAL EXAM:  Vital Signs Last 24 Hrs  T(C): 36.6 (14 May 2024 04:55), Max: 36.7 (13 May 2024 14:45)  T(F): 97.9 (14 May 2024 04:55), Max: 98 (13 May 2024 14:45)  HR: 88 (14 May 2024 04:55) (88 - 92)  BP: 153/94 (14 May 2024 04:55) (113/68 - 153/94)  BP(mean): --  RR: 19 (14 May 2024 04:55) (17 - 19)  SpO2: 100% (14 May 2024 04:55) (100% - 100%)    Parameters below as of 14 May 2024 04:55  Patient On (Oxygen Delivery Method): room air     I&O's Summary    13 May 2024 07:01  -  14 May 2024 07:00  --------------------------------------------------------  IN: 795 mL / OUT: 600 mL / NET: 195 mL    GENERAL:  [X ]Cachexia  [X ] Frail  [X ]Awake  [X ]Oriented x 1  [ ]Lethargic  [ ]Unarousable  [ ]Verbal  [ ]Non-Verbal    BEHAVIORAL:   [ ] Anxiety  [ ] Delirium [ ] Agitation [ ] Other    HEENT:   [X ]Normal   [ ]Dry mouth   [ ]ET Tube/Trach  [ ]Oral lesions    PULMONARY:   [ ]Clear              [ ]Tachypnea  [ ]Audible excessive secretions   [ ]Rhonchi        [ ]Right [ ]Left [ ]Bilateral  [ ]Crackles        [ ]Right [ ]Left [ ]Bilateral  [ ]Wheezing     [ ]Right [ ]Left [ ]Bilateral  [ ]Diminished breath sounds [ ]right [ ]left [ ]bilateral    CARDIOVASCULAR:    [X ]Regular [ ]Irregular [ ]Tachy  [ ]Gian [ ]Murmur [ ]Other    GASTROINTESTINAL:  [X ]Soft  [ ]Distended   [ ]+BS  [X ]Non tender [ ]Tender  [ ]Other [ ]PEG [ ]OGT/ NGT      GENITOURINARY:  [ ]Normal [X ] Incontinent   [ ]Oliguria/Anuria   [ ]Napier    MUSCULOSKELETAL:   [ ]Normal   [ ]Weakness  [X ]Bed/Wheelchair bound [ ]Edema    NEUROLOGIC:   [X ]No focal deficits  [ ]Cognitive impairment  [ ]Dysphagia [ ]Dysarthria [ ]Paresis [ ]Other     SKIN:   [X ]Normal  [ ]Rash  [ ]Other  [ ]Pressure ulcer(s)       Present on admission [ ]y [ ]n    ------------------------------------------------------------------------------------------------------------    LABS:                        13.0   10.01 )-----------( 80       ( 14 May 2024 04:55 )             36.9   05-14    130<L>  |  92<L>  |  20  ----------------------------<  120<H>  3.4<L>   |  22  |  0.33<L>    Ca    9.6      14 May 2024 04:55  Phos  3.0     05-14  Mg     1.90     05-14    TPro  6.4  /  Alb  4.0  /  TBili  1.1  /  DBili  x   /  AST  22  /  ALT  26  /  AlkPhos  88  05-14      Urinalysis Basic - ( 14 May 2024 04:55 )    Color: x / Appearance: x / SG: x / pH: x  Gluc: 120 mg/dL / Ketone: x  / Bili: x / Urobili: x   Blood: x / Protein: x / Nitrite: x   Leuk Esterase: x / RBC: x / WBC x   Sq Epi: x / Non Sq Epi: x / Bacteria: x    ------------------------------------------------------------------------------------------------------------    CRITICAL CARE:  [ ]Shock Present  [ ]Septic [ ]Cardiogenic [ ]Neurologic [ ]Hypovolemic [ ] Undifferentiated/Mixed   [ ]Vasopressors [ ]Inotropes     [ ]Respiratory failure present: [ ]Acute  [ ]Chronic [ ]Hypoxic  [ ]Hypercarbic   [ ]Mechanical ventilation   [ ]Trach collar   [ ]Non-invasive ventilatory support   [ ]High flow    [ ]Non-rebreather/Venti     [ ]Other organ failure     ------------------------------------------------------------------------------------------------------------    RADIOLOGY & ADDITIONAL STUDIES:    < from: CT Head No Cont (05.06.24 @ 19:38) >    IMPRESSION:  Previously identified hemorrhagic masses appear mildly smaller.   Previously seen mass effect and edema has improved.  No new areas of hemorrhage. No hydrocephalus.    < end of copied text >    < from: CT Head No Cont (05.10.24 @ 01:50) >  IMPRESSION:  No significant interval change in previously noted hemorrhagic masses. No   acute intracranial hemorrhage or mass effect.    < end of copied text >    ------------------------------------------------------------------------------------------------------------    ALLERGIES:  Allergies    No Known Allergies    Intolerances    morphine (Sedation/Somnol)      MEDICATIONS  (STANDING):  chlorhexidine 2% Cloths 1 Application(s) Topical <User Schedule>  dexAMETHasone     Tablet 2 milliGRAM(s) Oral every 12 hours  famotidine    Tablet 20 milliGRAM(s) Oral daily  lacosamide 150 milliGRAM(s) Oral two times a day  lidocaine   4% Patch 1 Patch Transdermal daily  losartan 50 milliGRAM(s) Oral daily  melatonin 3 milliGRAM(s) Oral at bedtime  morphine ER Tablet 15 milliGRAM(s) Oral two times a day  naloxone Injectable 0.4 milliGRAM(s) IV Push once  polyethylene glycol 3350 17 Gram(s) Oral <User Schedule>  senna 2 Tablet(s) Oral at bedtime  sodium chloride 3 Gram(s) Oral three times a day    MEDICATIONS  (PRN):  acetaminophen     Tablet .. 650 milliGRAM(s) Oral every 6 hours PRN Temp greater or equal to 38C (100.4F), Mild Pain (1 - 3)  ALPRAZolam 0.5 milliGRAM(s) Oral daily PRN severe anxiety  bisacodyl Suppository 10 milliGRAM(s) Rectal daily PRN Constipation  HYDROmorphone   Tablet 4 milliGRAM(s) Oral every 4 hours PRN Moderate Pain (4 - 6)  HYDROmorphone  Injectable 1 milliGRAM(s) IV Push every 4 hours PRN Severe Pain (7 - 10)  ondansetron Injectable 4 milliGRAM(s) IV Push every 8 hours PRN Nausea and/or Vomiting           HPI:  Patient is a 58y  F pt of Dr. Nicole, h/o NSCLC w/ mets to brain, on immunotx s/p lung RT 3/1/24, GKRS, T3-T4 compression fractures presenting for progressively worsening headache. Pt accompanied by daughter who notes pt has had headaches that are constant but waxing and waning since GKRS ~2 weeks ago - most acutely worsening over the past 2 days. She also endorses tremulousness but no loss of consciousness. Pt notified neurosurg re: headache around 1 week ago regarding worsening headaches and recommended increasing decadron to 4mg TID. Endorses lethargy, nausea, weakness, and tremulousness. Denies fever, chills, falls, changes in vision, dizziness, photophobia, phonophobia, paresthesia, cramps, initiation or discontinuation of new medications. Of note, was seen in ED 5/6 with complaint of headache w/ CT at the time suggestive of improving cerebral edema. Also recently hospitalized 4/2024 w/ new onset seizures likely 2/2 L frontal and R motor strip lesions requiring keppra loading, intubation, and NSCU stay. In ED, VS: T 97.7, HR 77, /88, RR 18, O2 99% on RA. Administered tylenol w/ no relief - improved w/ dilaudid 1mg daily. Also administered magnesium, reglan. Evaluated by NSx given cerebral mets however CTH showing improving edema and recommending tapering down steroids.      Patient seen this PM. Patient lethargic, able to speak but minimally conversant. Asked multiple times if patient had pain, patient denied pain at this time. Patient unable to give information on her condition or her support system.     PERTINENT PM/SXH:   No pertinent past medical history    Mass of right lung    COPD (chronic obstructive pulmonary disease)    Smoker    Chronic pain syndrome    HLD (hyperlipidemia)    Melanoma in situ    History of blood transfusion    Malignant neoplasm of right bronchus      No significant past surgical history    H/O reduction of orbital fracture    S/P wrist surgery      FAMILY HISTORY:  FH: heart attack (Father, Mother)    FH: stomach cancer (Grandparent)    FH: lung cancer (Sibling)      ------------------------------------------------------------------------------------------------------------  ITEMS NOT CHECKED ARE NOT PRESENT    SOCIAL HISTORY:   Living Situation: [X ]Home  [ ]Long term care  [ ]Rehab [ ]Other  Support: , children     Substance hx:  [ ]   Tobacco hx:  [ ]   Alcohol hx: [ ]   Family Hx substance abuse [ ]yes [ ]no    Amish/Spirituality:  PCSSQ[Palliative Care Spiritual Screening Question]   Severity (0-10): 0  Score of 4 or > indicate consideration of Chaplaincy referral.  Chaplaincy Referral: [ ] yes [X ] refused [ ] following    Anticipatory Grief present?:  [ ] yes [X ] no  [ ] Deferred                      Other Referrals:    [ ]Hospice   [ ]Caregiver [place] Support  [ ]Child Life    [ ]Patient & Family Centered Care Referral  [ ]Holistic Therapy     ------------------------------------------------------------------------------------------------------------    PRESENT SYMPTOMS:     [X ] Unable to self-report      [X ] PAINADS     [X ] RDOS    [ ] No     [ ] Yes     Source if other than patient:  [ ]Family   [ ]Team     PAIN:   If blank, patient unable to specify     [ ]yes [ ]no    1. Location-   2. Radiation-    3. Quality-   4. Timing-   5. Minimal acceptable level/pain goal-   6. Aggravating factors-   7. QOL impact-     SYMPTOMS:   Dyspnea:                           [ ]Mild [ ]Moderate [ ]Severe  Anxiety:                             [ ]Mild [ ]Moderate [ ]Severe  Fatigue:                             [ ]Mild [ ]Moderate [ ]Severe  Nausea/Vomiting:              [ ]Mild [ ]Moderate [ ]Severe  Loss of appetite:                [ ]Mild [ ]Moderate [ ]Severe  Constipation:                     [ ]Mild [ ]Moderate [ ]Severe    Other Symptoms:  [ ]All other review of systems negative     ------------------------------------------------------------------------------------------------------------      I-Stop Reference No: [id number]    Prescription Information      PDI Filter:    PDI	Current Rx	Drug Type	Rx Written	Rx Dispensed	Drug	Quantity	Days Supply	Prescriber Name	Prescriber KENY #	Payment Method	Dispenser  A	N	O	04/29/2024	04/30/2024	hydromorphone 4 mg tablet	56	6	Nick Fitzgerald	[id number]	[facility] #[id number]  A	N	O	04/29/2024	04/30/2024	morphine sulf er 15 mg tablet	14	7	Nick Fitzgerald	[id number]	[facility] #[id number]  A	Y		04/19/2024	04/20/2024	lacosamide 150 mg tablet	60	30	Jodi Hoffmann	[id number]	Insurance	[facility]	N	O	03/12/2024	03/20/2024	morphine sulfate ir 15 mg tab	60	30	Nick Fitzgerald	[id number]	[facility] #[id number]  A	N	O	03/12/2024	03/20/2024	hydromorphone 4 mg tablet	99	12	Nick Fitzgerald	[id number]	[facility] #[id number]  A	N	B	02/28/2024	03/01/2024	alprazolam 0.5 mg tablet	30	30	Celestina Garcia)	[id number]	[facility] #[id number]  A	N	B	02/12/2024	02/12/2024	alprazolam 0.25 mg tablet	30	10	Celestina Garcia)	[id number]	[facility] #[id number]  A	N	O	01/30/2024	02/03/2024	morphine sulf er 15 mg tablet	90	30	Nick Fitzgerald	[id number]	[facility] #[id number]  A	N	O	01/31/2024	01/31/2024	hydromorphone 4 mg tablet	360	30	Nick Fitzgerald	[id number]	Insurance	[facility] #[id number]  A	N	B	01/30/2024	01/30/2024	alprazolam 0.25 mg tablet	30	10	Celestina Garcia)	[id number]	Insurance	[facility] #[id number]  A	N	O	01/26/2024	01/26/2024	morphine sulf er 15 mg tablet	14	7	Chas Ibarra	[id number]	Insurance	[facility] #[id number]    ------------------------------------------------------------------------------------------------------------    FUNCTIONAL STATUS:     Baseline ADL (prior to admission):  [ ] Independent  [ ] Moderate Assistance [X ] Dependent    Palliative Performance Score (current): 20%    [ ]PPSV2 < or = to 30%   [ ]artificial nutrition      NUTRITIONAL STATUS:     Protein Calorie Malnutrition Present:   [ ]mild   [ ]moderate or severe    Weight:   [X ]underweight (BMI 18.5 or less)   [ ]morbid obesity (BMI 30 or higher)   [ ]anasarca  [ ]significant weight loss     Height (cm): 154.9 (05-10-24 @ 07:55), 158 (05-09-24 @ 10:02), 154.9 (05-06-24 @ 18:51)  Weight (kg): 36.2 (05-10-24 @ 07:55), 35.8 (05-09-24 @ 10:02), 45.4 (05-06-24 @ 18:51)  BMI (kg/m2): 15.1 (05-10-24 @ 07:55), 14.3 (05-09-24 @ 10:02), 18.9 (05-06-24 @ 18:51)    ------------------------------------------------------------------------------------------------------------    PRIOR ADVANCE DIRECTIVES:    [ ] DNR/MOLST    [ ] Living Will    [ ] Health Care Proxy(s)    DECISION MAKER(s):  [ ] Patient    [X ] Surrogate(s)-    [ ] HCP   [ ] Guardian             ------------------------------------------------------------------------------------------------------------  PHYSICAL EXAM:  Vital Signs Last 24 Hrs  T(C): 36.6 (14 May 2024 04:55), Max: 36.7 (13 May 2024 14:45)  T(F): 97.9 (14 May 2024 04:55), Max: 98 (13 May 2024 14:45)  HR: 88 (14 May 2024 04:55) (88 - 92)  BP: 153/94 (14 May 2024 04:55) (113/68 - 153/94)  BP(mean): --  RR: 19 (14 May 2024 04:55) (17 - 19)  SpO2: 100% (14 May 2024 04:55) (100% - 100%)    Parameters below as of 14 May 2024 04:55  Patient On (Oxygen Delivery Method): room air     I&O's Summary    13 May 2024 07:01  -  14 May 2024 07:00  --------------------------------------------------------  IN: 795 mL / OUT: 600 mL / NET: 195 mL    GENERAL:  [X ]Cachexia  [X ] Frail  [X ]Awake  [X ]Oriented x 1  [ ]Lethargic  [ ]Unarousable  [ ]Verbal  [ ]Non-Verbal    BEHAVIORAL:   [ ] Anxiety  [ ] Delirium [ ] Agitation [ ] Other    HEENT:   [X ]Normal   [ ]Dry mouth   [ ]ET Tube/Trach  [ ]Oral lesions    PULMONARY:   [ ]Clear              [ ]Tachypnea  [ ]Audible excessive secretions   [ ]Rhonchi        [ ]Right [ ]Left [ ]Bilateral  [ ]Crackles        [ ]Right [ ]Left [ ]Bilateral  [ ]Wheezing     [ ]Right [ ]Left [ ]Bilateral  [ ]Diminished breath sounds [ ]right [ ]left [ ]bilateral    CARDIOVASCULAR:    [X ]Regular [ ]Irregular [ ]Tachy  [ ]Gian [ ]Murmur [ ]Other    GASTROINTESTINAL:  [X ]Soft  [ ]Distended   [ ]+BS  [X ]Non tender [ ]Tender  [ ]Other [ ]PEG [ ]OGT/ NGT      GENITOURINARY:  [ ]Normal [X ] Incontinent   [ ]Oliguria/Anuria   [ ][place]    MUSCULOSKELETAL:   [ ]Normal   [ ]Weakness  [X ]Bed/Wheelchair bound [ ]Edema    NEUROLOGIC:   [X ]No focal deficits  [ ]Cognitive impairment  [ ]Dysphagia [ ]Dysarthria [ ]Paresis [ ]Other     SKIN:   [X ]Normal  [ ]Rash  [ ]Other  [ ]Pressure ulcer(s)       Present on admission [ ]y [ ]n    ------------------------------------------------------------------------------------------------------------    LABS:                        13.0   10.01 )-----------( 80       ( 14 May 2024 04:55 )             36.9   05-14    130<L>  |  92<L>  |  20  ----------------------------<  120<H>  3.4<L>   |  22  |  0.33<L>    Ca    9.6      14 May 2024 04:55  Phos  3.0     05-14  Mg     1.90     05-14    TPro  6.4  /  Alb  4.0  /  TBili  1.1  /  DBili  x   /  AST  22  /  ALT  26  /  AlkPhos  88  05-14      Urinalysis Basic - ( 14 May 2024 04:55 )    Color: x / Appearance: x / SG: x / pH: x  Gluc: 120 mg/dL / Ketone: x  / Bili: x / Urobili: x   Blood: x / Protein: x / Nitrite: x   Leuk Esterase: x / RBC: x / WBC x   Sq Epi: x / Non Sq Epi: x / Bacteria: x    ------------------------------------------------------------------------------------------------------------    CRITICAL CARE:  [ ]Shock Present  [ ]Septic [ ]Cardiogenic [ ]Neurologic [ ]Hypovolemic [ ] Undifferentiated/Mixed   [ ]Vasopressors [ ]Inotropes     [ ]Respiratory failure present: [ ]Acute  [ ]Chronic [ ]Hypoxic  [ ]Hypercarbic   [ ]Mechanical ventilation   [ ]Trach collar   [ ]Non-invasive ventilatory support   [ ]High flow    [ ]Non-rebreather/Venti     [ ]Other organ failure     ------------------------------------------------------------------------------------------------------------    RADIOLOGY & ADDITIONAL STUDIES:    < from: CT Head No Cont (05.06.24 @ 19:38) >    IMPRESSION:  Previously identified hemorrhagic masses appear mildly smaller.   Previously seen mass effect and edema has improved.  No new areas of hemorrhage. No hydrocephalus.    < end of copied text >    < from: CT Head No Cont (05.10.24 @ 01:50) >  IMPRESSION:  No significant interval change in previously noted hemorrhagic masses. No   acute intracranial hemorrhage or mass effect.    < end of copied text >    ------------------------------------------------------------------------------------------------------------    ALLERGIES:  Allergies    No Known Allergies    Intolerances    morphine (Sedation/Somnol)      MEDICATIONS  (STANDING):  chlorhexidine 2% Cloths 1 Application(s) Topical <User Schedule>  dexAMETHasone     Tablet 2 milliGRAM(s) Oral every 12 hours  famotidine    Tablet 20 milliGRAM(s) Oral daily  lacosamide 150 milliGRAM(s) Oral two times a day  lidocaine   4% Patch 1 Patch Transdermal daily  losartan 50 milliGRAM(s) Oral daily  melatonin 3 milliGRAM(s) Oral at bedtime  morphine ER Tablet 15 milliGRAM(s) Oral two times a day  naloxone Injectable 0.4 milliGRAM(s) IV Push once  polyethylene glycol 3350 17 Gram(s) Oral <User Schedule>  senna 2 Tablet(s) Oral at bedtime  sodium chloride 3 Gram(s) Oral three times a day    MEDICATIONS  (PRN):  acetaminophen     Tablet .. 650 milliGRAM(s) Oral every 6 hours PRN Temp greater or equal to 38C (100.4F), Mild Pain (1 - 3)  ALPRAZolam 0.5 milliGRAM(s) Oral daily PRN severe anxiety  bisacodyl Suppository 10 milliGRAM(s) Rectal daily PRN Constipation  HYDROmorphone   Tablet 4 milliGRAM(s) Oral every 4 hours PRN Moderate Pain (4 - 6)  HYDROmorphone  Injectable 1 milliGRAM(s) IV Push every 4 hours PRN Severe Pain (7 - 10)  ondansetron Injectable 4 milliGRAM(s) IV Push every 8 hours PRN Nausea and/or Vomiting

## 2024-05-14 NOTE — PROGRESS NOTE ADULT - ASSESSMENT
59y F w/ hx of NSCLC w/ bilateral frontal brain metastases s/p GKRS presenting for progressively worsening headache uncontrolled by home pain regiment. Found to have hyponatremia, likely SIADH, started on salt repletion.  59F w/ NSCLC s/p RT (3/2024) w/ bilateral frontal brain metastases s/p GKRS (4/2024), HTN, GERD, admitted w/ progressively worsening headache, found to have SIADH, c/c/b AMS 5/14.

## 2024-05-14 NOTE — PROGRESS NOTE ADULT - PROBLEM SELECTOR PLAN 5
On losartan 50mg @ home  -c/w home med Hx of HTN on losartan 50mg qd at home    Plan:  - c/w losartan 50mg qd  - monitor BPs

## 2024-05-14 NOTE — PROGRESS NOTE ADULT - PROBLEM SELECTOR PLAN 2
Patient with hyponatremia likely in setting of malignancy, decreased PO intake, and ADH stimulation from pain 2/2 to headache. No L/E on exam. SNa of 126 on presentation, repeat of 123 s/p 1U bolus in ED. TSH wnl. Unlikely due to decreased glucocorticoids given exogenous use.  3/10: SNa 131 SOsm 277 hypoosmolar hyponatremia. Grossly euvolemic v hypovolemic.   - F/u serum osms, urine osms, urine Na, Cr to confirm etiology - likely SIADH  - C/w NaCL tabs 2g TID. Increased to NaCL tabs 3g   - 1L Fluid restriction, optimize glucose control, encourage solute intake  - BMP q12h now that SNa > 130  - Fall precautions  - RD consulted; appreciate recs Patient with hyponatremia, likely multifactorial- SIADH in setting of malignancy, decreased PO intake, and pain 2/2 to headache. No L/E on exam. SNa of 126 on presentation, repeat of 123 s/p 1U bolus in ED. TSH wnl. Unlikely due to decreased glucocorticoids given exogenous use  - serum/urine studies consistent w/ hypo-osmolar hyponatremia/SIADH    Plan:  - c/w salt tabs 3g TID  - c/w fluid restriction  - monitor BMP  - fall, seizure precautions

## 2024-05-14 NOTE — PROGRESS NOTE ADULT - PROBLEM SELECTOR PLAN 1
Headache i/s/o recent gammaknife surgery w/o aura suggestive of migraine, no discontinuation of NSAIDs to suggest rebound headache. CTH w/ improving edema. Potentially secondary to hyponatremia given additional symptoms of tremulousness and imbalance.  - Pain control w morphine ER, PO dilaudid, IVP dilaudid. Reassess daily.  - Increase bowel reg prn to goal > 1 BM/ day  - Hyponatremia management as above Headache i/s/o recent Gammaknife surgery w/o aura suggestive of migraine, no discontinuation of NSAIDs to suggest rebound headache. CTH w/ improving edema. Potentially secondary to hyponatremia given additional symptoms of tremulousness and imbalance  - CT head (5/10): stable findings compared to prior CT head, no acute intracranial hemorrhage or mass effect  - repeat CT head (5/14): stable findings  - noted to have mild AMS/lethargy on 5/14, arousable to loud voice, follows intermittent commands  - palliative consulted for pain management; appreciate recs    Plan:   - radiation oncology consult for possible palliative radiation given ongoing pain  - pain control: morphine ER 15mg BID, PO Dilaudid 4mg q4h for moderate pain, IV Dilaudid 1mg q4h for severe pain  - bowel regimen: Dulcolax, Senna, Miralax  - hyponatremia management as below

## 2024-05-14 NOTE — PROGRESS NOTE ADULT - PROBLEM SELECTOR PLAN 3
Thrombocytopenia on repeat CBC to 111, previously attributed to medications of keppra, zosyn. No bleeding events or infectious symptoms to suggest septic cause.   -Trend w/ CBC Hx of NSCLC s/p RT (3/2024) w/ b/l frontal brain mets s/p GKRS. Unclear if metastases are cause for headache given absence of significant headache prior to GKRS. Also potential etiologies include hyponatremia and less likely migraine. Hyponatremia may also be driving lethargy given 2 day history of worsening symptoms per patient.  - CT head (5/10): stable findings compared to prior CT head, no acute intracranial hemorrhage or mass effect  - repeat CT head (5/14): stable findings  - neurosurgery consulted; appreciate recs- no acute intervention    Plan:  - c/w dexamethasone 2mg BID  - c/w Vimpat 150mg BID for seizure ppx  - outpatient oncology follow-up

## 2024-05-14 NOTE — CHART NOTE - NSCHARTNOTEFT_GEN_A_CORE
Discussed with neurosurgery PA, ok to resume DVT ppx given stable CT head findings. Will start Lovenox 30mg qd given patient weight.    *******************************  Keli Ag MD (PGY-2)  Internal Medicine  Contact via [employer] TEAMS  *******************************

## 2024-05-14 NOTE — PROGRESS NOTE ADULT - SUBJECTIVE AND OBJECTIVE BOX
*******************************  Keli Ag MD (PGY-2)  Internal Medicine  Contact via [employer] TEAMS  *******************************    PROGRESS NOTE:     Patient is a 59y old  Female who presents with a chief complaint of headache (13 May 2024 06:52)    INTERVAL EVENTS: intermittently refusing meds overnight    SUBJECTIVE: Patient seen and examined at bedside. This morning, the patient is comfortable and doing well. No acute complaints.    MEDICATIONS  (STANDING):  chlorhexidine 2% Cloths 1 Application(s) Topical <User Schedule>  dexAMETHasone     Tablet 2 milliGRAM(s) Oral every 12 hours  famotidine    Tablet 20 milliGRAM(s) Oral daily  lacosamide 150 milliGRAM(s) Oral two times a day  lidocaine   4% Patch 1 Patch Transdermal daily  losartan 50 milliGRAM(s) Oral daily  melatonin 3 milliGRAM(s) Oral at bedtime  morphine ER Tablet 15 milliGRAM(s) Oral two times a day  naloxone Injectable 0.4 milliGRAM(s) IV Push once  polyethylene glycol 3350 17 Gram(s) Oral <User Schedule>  senna 2 Tablet(s) Oral at bedtime  sodium chloride 3 Gram(s) Oral three times a day    MEDICATIONS  (PRN):  acetaminophen     Tablet .. 650 milliGRAM(s) Oral every 6 hours PRN Temp greater or equal to 38C (100.4F), Mild Pain (1 - 3)  ALPRAZolam 0.5 milliGRAM(s) Oral daily PRN severe anxiety  bisacodyl Suppository 10 milliGRAM(s) Rectal daily PRN Constipation  HYDROmorphone   Tablet 4 milliGRAM(s) Oral every 4 hours PRN Moderate Pain (4 - 6)  HYDROmorphone  Injectable 1 milliGRAM(s) IV Push every 4 hours PRN Severe Pain (7 - 10)  ondansetron Injectable 4 milliGRAM(s) IV Push every 8 hours PRN Nausea and/or Vomiting    I&O's Summary    13 May 2024 07:01  -  14 May 2024 07:00  --------------------------------------------------------  IN: 795 mL / OUT: 600 mL / NET: 195 mL    PHYSICAL EXAM:  Vital Signs Last 24 Hrs  T(C): 36.6 (14 May 2024 04:55), Max: 36.7 (13 May 2024 14:45)  T(F): 97.9 (14 May 2024 04:55), Max: 98 (13 May 2024 14:45)  HR: 88 (14 May 2024 04:55) (88 - 92)  BP: 153/94 (14 May 2024 04:55) (113/68 - 153/94)  BP(mean): --  RR: 19 (14 May 2024 04:55) (17 - 19)  SpO2: 100% (14 May 2024 04:55) (100% - 100%)    Parameters below as of 14 May 2024 04:55  Patient On (Oxygen Delivery Method): room air    GENERAL: NAD, lying in bed comfortably  HEENT: PERRLA, moist mucous membranes  HEART: S1, S2, Regular rate and rhythm, no murmurs, rubs, or gallops  LUNGS: Unlabored respirations, clear to auscultation bilaterally, no crackles, wheezing, or rhonchi  ABDOMEN: Soft, nontender, nondistended, +BS  EXTREMITIES: 2+ peripheral pulses bilaterally. No clubbing, cyanosis, or edema  NERVOUS SYSTEM:  A&Ox3, no focal deficits   SKIN: No rashes or lesions    LABS:                        13.0   10.01 )-----------( 80       ( 14 May 2024 04:55 )             36.9     05-13    130<L>  |  95<L>  |  21  ----------------------------<  122<H>  4.5   |  18<L>  |  0.31<L>    Ca    9.0      13 May 2024 17:44  Phos  3.1     05-13  Mg     2.20     05-13    TPro  6.3  /  Alb  3.9  /  TBili  1.0  /  DBili  x   /  AST  24  /  ALT  30  /  AlkPhos  79  05-13    RADIOLOGY & ADDITIONAL TESTS:  Results Reviewed:   Imaging Personally Reviewed:  Electrocardiogram Personally Reviewed:  Tele: *******************************  Keli Ag MD (PGY-2)  Internal Medicine  Contact via [employer] TEAMS  *******************************    PROGRESS NOTE:     Patient is a 59y old  Female who presents with a chief complaint of headache (13 May 2024 06:52)    INTERVAL EVENTS: intermittently refusing meds overnight    SUBJECTIVE: Patient seen and examined at bedside. This morning, the patient is lethargic but arousable, AAOx2, not participating much interview. ROS limited    MEDICATIONS  (STANDING):  chlorhexidine 2% Cloths 1 Application(s) Topical <User Schedule>  dexAMETHasone     Tablet 2 milliGRAM(s) Oral every 12 hours  famotidine    Tablet 20 milliGRAM(s) Oral daily  lacosamide 150 milliGRAM(s) Oral two times a day  lidocaine   4% Patch 1 Patch Transdermal daily  losartan 50 milliGRAM(s) Oral daily  melatonin 3 milliGRAM(s) Oral at bedtime  morphine ER Tablet 15 milliGRAM(s) Oral two times a day  naloxone Injectable 0.4 milliGRAM(s) IV Push once  polyethylene glycol 3350 17 Gram(s) Oral <User Schedule>  senna 2 Tablet(s) Oral at bedtime  sodium chloride 3 Gram(s) Oral three times a day    MEDICATIONS  (PRN):  acetaminophen     Tablet .. 650 milliGRAM(s) Oral every 6 hours PRN Temp greater or equal to 38C (100.4F), Mild Pain (1 - 3)  ALPRAZolam 0.5 milliGRAM(s) Oral daily PRN severe anxiety  bisacodyl Suppository 10 milliGRAM(s) Rectal daily PRN Constipation  HYDROmorphone   Tablet 4 milliGRAM(s) Oral every 4 hours PRN Moderate Pain (4 - 6)  HYDROmorphone  Injectable 1 milliGRAM(s) IV Push every 4 hours PRN Severe Pain (7 - 10)  ondansetron Injectable 4 milliGRAM(s) IV Push every 8 hours PRN Nausea and/or Vomiting    I&O's Summary    13 May 2024 07:01  -  14 May 2024 07:00  --------------------------------------------------------  IN: 795 mL / OUT: 600 mL / NET: 195 mL    PHYSICAL EXAM:  Vital Signs Last 24 Hrs  T(C): 36.6 (14 May 2024 04:55), Max: 36.7 (13 May 2024 14:45)  T(F): 97.9 (14 May 2024 04:55), Max: 98 (13 May 2024 14:45)  HR: 88 (14 May 2024 04:55) (88 - 92)  BP: 153/94 (14 May 2024 04:55) (113/68 - 153/94)  BP(mean): --  RR: 19 (14 May 2024 04:55) (17 - 19)  SpO2: 100% (14 May 2024 04:55) (100% - 100%)    Parameters below as of 14 May 2024 04:55  Patient On (Oxygen Delivery Method): room air    GENERAL: NAD, lying in bed comfortably  HEENT: PERRLA, moist mucous membranes  HEART: S1, S2, Regular rate and rhythm, no murmurs, rubs, or gallops  LUNGS: Unlabored respirations, clear to auscultation bilaterally, no crackles, wheezing, or rhonchi  ABDOMEN: Soft, nontender, nondistended, +BS  EXTREMITIES: 2+ peripheral pulses bilaterally. No clubbing, cyanosis, or edema  NERVOUS SYSTEM:  A&Ox2, lethargic but arousable, intermittently follows commands  SKIN: No rashes or lesions    LABS:                        13.0   10.01 )-----------( 80       ( 14 May 2024 04:55 )             36.9     05-14    130<L>  |  92<L>  |  20  ----------------------------<  120<H>  3.4<L>   |  22  |  0.33<L>    Ca    9.6      14 May 2024 04:55  Phos  3.0     05-14  Mg     1.90     05-14    TPro  6.4  /  Alb  4.0  /  TBili  1.1  /  DBili  x   /  AST  22  /  ALT  26  /  AlkPhos  88  05-14    RADIOLOGY & ADDITIONAL TESTS:  Results Reviewed:   Imaging Personally Reviewed:  Electrocardiogram Personally Reviewed:  Tele:

## 2024-05-14 NOTE — CONSULT NOTE ADULT - PROBLEM SELECTOR RECOMMENDATION 2
- Diagnosed in Dec 2023      - Treatment history- on chemoimmunotherapy with pembrolizumab, pemetrexed and carboplatin AUC 5 since January 2024  - POD with hemorrhagic brain mets, bone mets, worsening physical and neurologic decline   - Follows with Dr. Rao at [facility] -> recommend to discuss with Dr. Rao on overall prognosis, if hospice appropriate

## 2024-05-14 NOTE — CONSULT NOTE ADULT - PROBLEM SELECTOR RECOMMENDATION 9
- PRN use in past 24 hours from 8 AM to 8 AM: PO dilaudid 4 mg x 2 doses   - c/w morphine ER Tablet 15 milliGRAM(s) Oral two times a day  - c/w HYDROmorphone   Tablet 4 milliGRAM(s) Oral every 4 hours PRN Moderate Pain (4 - 6)  HYDROmorphone  Injectable 1 milliGRAM(s) IV Push every 4 hours PRN Severe Pain (7 - 10) - on home regimen   - PRN use in past 24 hours from 8 AM to 8 AM: PO dilaudid 4 mg x 2 doses   - c/w morphine ER Tablet 15 milliGRAM(s) Oral two times a day  - c/w HYDROmorphone   Tablet 4 milliGRAM(s) Oral every 4 hours PRN Moderate Pain (4 - 6)  HYDROmorphone  Injectable 1 milliGRAM(s) IV Push every 4 hours PRN Severe Pain (7 - 10) - Patient awake but unable to give pain history   - on home regimen   - PRN use in past 24 hours from 8 AM to 8 AM: PO dilaudid 4 mg x 2 doses   - c/w morphine ER Tablet 15 milliGRAM(s) Oral two times a day  - c/w HYDROmorphone   Tablet 4 milliGRAM(s) Oral every 4 hours PRN Moderate Pain (4 - 6)  HYDROmorphone  Injectable 1 milliGRAM(s) IV Push every 4 hours PRN Severe Pain (7 - 10)

## 2024-05-14 NOTE — PROGRESS NOTE ADULT - PROBLEM SELECTOR PLAN 4
Unclear if metastases are cause for headache given absence of significant headache prior to GKRS. Also potential etiologies include hyponatremia and less likely migraine. Hyponatremia may also be driving lethargy given 2 day history of worsening symptoms per patient.  -s/p CTH w/o worsening mets  -NSx recs appreciated, no acute intervention Plt 156k on admission, has been downtrending. Previously attributed Keppra/Zosyn  - currently w/o evidence of gum bleeding, petechiae, bruising/hematomas    Plan:  - monitor CBC; transfuse for plt<10k, <20k and febrile, or <50k and actively bleeding/pending procedure

## 2024-05-14 NOTE — PROGRESS NOTE ADULT - PROBLEM SELECTOR PLAN 7
DVT PPx: SCDs  Diet: Regular, DASH   Code status: Full code DVT PPx: SCDs; chemical VTE ppx contraindicated given hemorrhagic brain mets  Diet: DASH/TLC  Code Status: Full code  Dispo: [facility]

## 2024-05-14 NOTE — CONSULT NOTE ADULT - PROBLEM SELECTOR RECOMMENDATION 3
For pain management and GOC For pain management and GOC    In the event of worsening symptoms, please contact the Palliative Medicine team via pager (if the patient is at [facility] #[phone] or if the patient is at [facility] #[phone]) The Geriatric and Palliative Medicine service has coverage 24 hours a day/ 7 days a week to provide medical recommendations regarding symptom management needs via telephone

## 2024-05-14 NOTE — PROGRESS NOTE ADULT - ATTENDING COMMENTS
59 yr old woman PMH PE previously on Eliquis (no longer present on recent CTA 4/14/24), NSCLC with known brain metastases s/p lung RT 3/1/2024 on chemoimmunotherapy since Jan 2024 and s/p recent gamma knife 4/23/24 p/w worsening headache.    Intermittently declining meds and labs  This AM, appears lethargic but arousable, responds to some questions but unable to answer further questions about her headache to determine if she's improving or not. Follows commands.  Rpt CTH 5/14 with stable mets. No further inpatient workup per NSx. C/w seizure ppx with Vimpat  Rad Onc consult to determine if pt's symptoms typical after gamma knife and further treatment plans  C/w Dilaudid PRN for pain, c/w home MS Contin 15 BID, decadron 2mg PO q 12. Awaiting palliative care recs for pain management  Will need to discuss overall prognosis and treatment plan from Oncology to guide advance directive discussion with family.  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 Na stable at 130  On SCDs for DVT ppx.  PT rec [facility]

## 2024-05-14 NOTE — CONSULT NOTE ADULT - ASSESSMENT
Patient is a 58y  F pt of Dr. Nicole, h/o NSCLC w/ mets to brain, on immunotx s/p lung RT 3/1/24, GKRS, T3-T4 compression fractures presenting for progressively worsening headache. Pt accompanied by daughter who notes pt has had headaches that are constant but waxing and waning since GKRS ~2 weeks ago - most acutely worsening over the past 2 days. She also endorses tremulousness but no loss of consciousness. Pt notified neurosurg re: headache around 1 week ago regarding worsening headaches and recommended increasing decadron to 4mg TID. Endorses lethargy, nausea, weakness, and tremulousness. Denies fever, chills, falls, changes in vision, dizziness, photophobia, phonophobia, paresthesia, cramps, initiation or discontinuation of new medications. Of note, was seen in ED 5/6 with complaint of headache w/ CT at the time suggestive of improving cerebral edema. Also recently hospitalized 4/2024 w/ new onset seizures likely 2/2 L frontal and R motor strip lesions requiring keppra loading, intubation, and NSCU stay. In ED, VS: T 97.7, HR 77, /88, RR 18, O2 99% on RA. Administered tylenol w/ no relief - improved w/ dilaudid 1mg daily. Also administered magnesium, reglan. Evaluated by NSx given cerebral mets however CTH showing improving edema and recommending tapering down steroids.

## 2024-05-15 NOTE — PROGRESS NOTE ADULT - PROBLEM SELECTOR PLAN 3
Patient with hyponatremia, likely multifactorial- SIADH in setting of malignancy, decreased PO intake, and pain 2/2 to headache. SNa of 126 on presentation, repeat of 123 s/p 1U bolus in ED. TSH wnl. Unlikely due to decreased glucocorticoids given exogenous use  - serum/urine studies consistent w/ hypo-osmolar hyponatremia/SIADH    Plan:  - c/w salt tabs 3g TID  - c/w fluid restriction to 1L/day  - monitor BMP  - fall, seizure precautions

## 2024-05-15 NOTE — CONSULT NOTE ADULT - SUBJECTIVE AND OBJECTIVE BOX
HEMATOLOGY ONCOLOGY CONSULT     Patient is a 59y old  Female who presents with a chief complaint of headache (15 May 2024 07:23)      HPI:  58y  F pt of Dr. Nicole, h/o NSCLC w/ mets to brain, on immunotx s/p lung RT 3/1/24, GKRS, T3-T4 compression fractures presenting for progressively worsening headache. Pt accompanied by daughter who notes pt has had headaches that are constant but waxing and waning since GKRS ~2 weeks ago - most acutely worsening over the past 2 days. She also endorses tremulousness but no loss of consciousness. Pt notified neurosurg re: headache around 1 week ago regarding worsening headaches and recommended increasing decadron to 4mg TID. Endorses lethargy, nausea, weakness, and tremulousness. Denies fever, chills, falls, changes in vision, dizziness, photophobia, phonophobia, paresthesia, cramps, initiation or discontinuation of new medications.     Of note, was seen in ED 5/6 with complaint of headache w/ CT at the time suggestive of improving cerebral edema. Also recently hospitalized 4/2024 w/ new onset seizures likely 2/2 L frontal and R motor strip lesions requiring keppra loading, intubation, and NSCU stay.     In ED, VS: T 97.7, HR 77, /88, RR 18, O2 99% on RA. Administered tylenol w/ no relief - improved w/ dilaudid 1mg daily. Also administered magnesium, reglan. Evaluated by NSx given cerebral mets however CTH showing improving edema and recommending tapering down steroids.  (10 May 2024 05:58)       ROS:  Negative except for:    PAST MEDICAL & SURGICAL HISTORY:  Mass of right lung      COPD (chronic obstructive pulmonary disease)      Smoker      Chronic pain syndrome      HLD (hyperlipidemia)      Melanoma in situ      History of blood transfusion      Malignant neoplasm of right bronchus      H/O reduction of orbital fracture      S/P wrist surgery          SOCIAL HISTORY:    FAMILY HISTORY:  FH: heart attack (Father, Mother)    FH: stomach cancer (Grandparent)    FH: lung cancer (Sibling)        MEDICATIONS  (STANDING):  chlorhexidine 2% Cloths 1 Application(s) Topical <User Schedule>  dexAMETHasone  Injectable 2 milliGRAM(s) IV Push every 12 hours  enoxaparin Injectable 30 milliGRAM(s) SubCutaneous every 24 hours  famotidine    Tablet 20 milliGRAM(s) Oral daily  lacosamide IVPB 150 milliGRAM(s) IV Intermittent every 12 hours  lidocaine   4% Patch 1 Patch Transdermal daily  losartan 50 milliGRAM(s) Oral daily  melatonin 3 milliGRAM(s) Oral at bedtime  morphine ER Tablet 15 milliGRAM(s) Oral two times a day  naloxone Injectable 0.4 milliGRAM(s) IV Push once  polyethylene glycol 3350 17 Gram(s) Oral <User Schedule>  senna 2 Tablet(s) Oral at bedtime  sodium chloride 3 Gram(s) Oral three times a day    MEDICATIONS  (PRN):  acetaminophen     Tablet .. 650 milliGRAM(s) Oral every 6 hours PRN Temp greater or equal to 38C (100.4F), Mild Pain (1 - 3)  ALPRAZolam 0.5 milliGRAM(s) Oral daily PRN severe anxiety  bisacodyl Suppository 10 milliGRAM(s) Rectal daily PRN Constipation  HYDROmorphone   Tablet 4 milliGRAM(s) Oral every 4 hours PRN Moderate Pain (4 - 6)  HYDROmorphone  Injectable 1 milliGRAM(s) IV Push every 4 hours PRN Severe Pain (7 - 10)  ondansetron Injectable 4 milliGRAM(s) IV Push every 8 hours PRN Nausea and/or Vomiting      Allergies    No Known Allergies    Intolerances    morphine (Sedation/Somnol)      Vital Signs Last 24 Hrs  T(C): 37 (15 May 2024 14:37), Max: 37 (15 May 2024 14:37)  T(F): 98.6 (15 May 2024 14:37), Max: 98.6 (15 May 2024 14:37)  HR: 73 (15 May 2024 14:37) (73 - 98)  BP: 155/95 (15 May 2024 14:37) (133/86 - 155/95)  BP(mean): --  RR: 18 (15 May 2024 14:37) (17 - 19)  SpO2: 100% (15 May 2024 14:37) (99% - 100%)    Parameters below as of 15 May 2024 05:34  Patient On (Oxygen Delivery Method): room air        PHYSICAL EXAM  General: adult in NAD  HEENT: clear oropharynx, anicteric sclera, pink conjunctiva  Neck: supple  CV: normal S1/S2 with no murmur rubs or gallops  Lungs: positive air movement b/l ant lungs,clear to auscultation, no wheezes, no rales  Abdomen: soft non-tender non-distended, no hepatosplenomegaly  Ext: no clubbing cyanosis or edema  Skin: no rashes and no petechiae  Neuro: alert and oriented X 4, no focal deficits      05-14-24 @ 07:01  -  05-15-24 @ 07:00  --------------------------------------------------------  IN: 0 mL / OUT: 300 mL / NET: -300 mL      LABS:                          13.1   9.15  )-----------( 79       ( 15 May 2024 06:17 )             38.9         Mean Cell Volume : 93.7 fL  Mean Cell Hemoglobin : 31.6 pg  Mean Cell Hemoglobin Concentration : 33.7 gm/dL  Auto Neutrophil # : x  Auto Lymphocyte # : x  Auto Monocyte # : x  Auto Eosinophil # : x  Auto Basophil # : x  Auto Neutrophil % : x  Auto Lymphocyte % : x  Auto Monocyte % : x  Auto Eosinophil % : x  Auto Basophil % : x      05-15    136  |  97<L>  |  20  ----------------------------<  113<H>  3.6   |  22  |  0.39<L>    Ca    9.8      15 May 2024 06:17  Phos  2.7     05-15  Mg     2.00     05-15    TPro  6.4  /  Alb  4.0  /  TBili  1.1  /  DBili  x   /  AST  22  /  ALT  26  /  AlkPhos  88  05-14                      BLOOD SMEAR INTERPRETATION:       RADIOLOGY & ADDITIONAL STUDIES:

## 2024-05-15 NOTE — PROGRESS NOTE ADULT - PROBLEM SELECTOR PLAN 1
Progressively worsening mental status and lethargy since admission. Patient has been refusing medications intermittently since admission as well despite attempts to educate on their importance. Etiology likely multifactorial 2/2 progression of malignancy, acute hyponatremia, poor PO intake  - no fevers, leukocytosis, or other findings c/f infection  - CT head (5/10): stable findings compared to prior CT head, no acute intracranial hemorrhage or mass effect  - repeat CT head (5/14): stable findings    Plan:  - management of hyponatremia and NSCLC as below  - monitor mental status  - GOC discussions ongoing Progressively worsening mental status and lethargy since admission. Patient has been refusing medications intermittently since admission as well despite attempts to educate on their importance. Etiology likely multifactorial 2/2 progression of malignancy, acute hyponatremia, poor PO intake  - no fevers, leukocytosis, or other findings c/f infection  - CT head (5/10): stable findings compared to prior CT head, no acute intracranial hemorrhage or mass effect  - repeat CT head (5/14): stable findings    Plan:  - management of hyponatremia and NSCLC as below  - monitor mental status  - GOC discussions ongoing with oncology, palliative care, and family

## 2024-05-15 NOTE — PROGRESS NOTE ADULT - PROBLEM SELECTOR PLAN 2
Headache i/s/o recent Gammaknife surgery w/o aura suggestive of migraine, no discontinuation of NSAIDs to suggest rebound headache. CTH w/ improving edema. Potentially secondary to hyponatremia given additional symptoms of tremulousness and imbalance  - CT head (5/10): stable findings compared to prior CT head, no acute intracranial hemorrhage or mass effect  - repeat CT head (5/14): stable findings  - palliative care following for pain management; appreciate recs    Plan:   - pain control: morphine ER 15mg BID, PO Dilaudid 4mg q4h for moderate pain, IV Dilaudid 1mg q4h for severe pain  - bowel regimen: Dulcolax, Senna, Miralax  - management of hyponatremia as below

## 2024-05-15 NOTE — PROGRESS NOTE ADULT - NS ATTEST RISK PROBLEM GEN_ALL_CORE FT
Patient is seriously ill with metastatic cancer progression of disease and side effect of treatment   High risk of complications, further morbidity and mortality   IV controlled substances

## 2024-05-15 NOTE — PROGRESS NOTE ADULT - PROBLEM SELECTOR PLAN 2
- Diagnosed in Dec 2023      - Treatment history- on chemoimmunotherapy with pembrolizumab, pemetrexed and carboplatin AUC 5 since January 2024  - POD with hemorrhagic brain mets, bone mets, worsening physical and neurologic decline   - Follows with Dr. Rao at [facility] -> recommend to discuss with Dr. Rao on overall prognosis, if hospice appropriate. - Diagnosed in Dec 2023      - Treatment history- on chemoimmunotherapy with pembrolizumab, pemetrexed and carboplatin AUC 5 since January 2024  - POD with hemorrhagic brain mets, bone mets, worsening physical and neurologic decline   - s/p RT   - Follows with Dr. Rao at [facility] -> recommend to discuss with Dr. Rao on overall prognosis, if hospice appropriate. F/u oncology

## 2024-05-15 NOTE — PROGRESS NOTE ADULT - PROBLEM SELECTOR PLAN 5
Plt 156k on admission, has been downtrending. Previously attributed Keppra/Zosyn  - currently w/o evidence of gum bleeding, petechiae, bruising/hematomas    Plan:  - monitor CBC; transfuse for plt<10k, <20k and febrile, or <50k and actively bleeding/pending procedure

## 2024-05-15 NOTE — PROGRESS NOTE ADULT - SUBJECTIVE AND OBJECTIVE BOX
*******************************  Keli Ag MD (PGY-2)  Internal Medicine  Contact via [employer] TEAMS  *******************************    PROGRESS NOTE:     Patient is a 59y old  Female who presents with a chief complaint of headache (14 May 2024 09:20)    INTERVAL EVENTS: Reportedly lethargic, continues to refuse meds despite multiple attempts to educate patient on importance of taking her medications    SUBJECTIVE: Patient seen and examined at bedside. This morning, the patient is comfortable and doing well. No acute complaints.    MEDICATIONS  (STANDING):  chlorhexidine 2% Cloths 1 Application(s) Topical <User Schedule>  dexAMETHasone     Tablet 2 milliGRAM(s) Oral every 12 hours  enoxaparin Injectable 30 milliGRAM(s) SubCutaneous every 24 hours  famotidine    Tablet 20 milliGRAM(s) Oral daily  lacosamide 150 milliGRAM(s) Oral two times a day  lidocaine   4% Patch 1 Patch Transdermal daily  losartan 50 milliGRAM(s) Oral daily  melatonin 3 milliGRAM(s) Oral at bedtime  morphine ER Tablet 15 milliGRAM(s) Oral two times a day  naloxone Injectable 0.4 milliGRAM(s) IV Push once  polyethylene glycol 3350 17 Gram(s) Oral <User Schedule>  senna 2 Tablet(s) Oral at bedtime  sodium chloride 3 Gram(s) Oral three times a day    MEDICATIONS  (PRN):  acetaminophen     Tablet .. 650 milliGRAM(s) Oral every 6 hours PRN Temp greater or equal to 38C (100.4F), Mild Pain (1 - 3)  ALPRAZolam 0.5 milliGRAM(s) Oral daily PRN severe anxiety  bisacodyl Suppository 10 milliGRAM(s) Rectal daily PRN Constipation  HYDROmorphone   Tablet 4 milliGRAM(s) Oral every 4 hours PRN Moderate Pain (4 - 6)  HYDROmorphone  Injectable 1 milliGRAM(s) IV Push every 4 hours PRN Severe Pain (7 - 10)  ondansetron Injectable 4 milliGRAM(s) IV Push every 8 hours PRN Nausea and/or Vomiting    PHYSICAL EXAM:  Vital Signs Last 24 Hrs  T(C): 36.4 (15 May 2024 05:34), Max: 36.7 (14 May 2024 14:00)  T(F): 97.5 (15 May 2024 05:34), Max: 98.1 (14 May 2024 14:00)  HR: 98 (15 May 2024 05:34) (85 - 98)  BP: 144/87 (15 May 2024 05:34) (120/78 - 144/87)  BP(mean): --  RR: 17 (15 May 2024 05:34) (17 - 19)  SpO2: 99% (15 May 2024 05:34) (99% - 100%)    Parameters below as of 15 May 2024 05:34  Patient On (Oxygen Delivery Method): room air    GENERAL: NAD, lying in bed comfortably  HEENT: PERRLA, moist mucous membranes  HEART: S1, S2, Regular rate and rhythm, no murmurs, rubs, or gallops  LUNGS: Unlabored respirations, clear to auscultation bilaterally, no crackles, wheezing, or rhonchi  ABDOMEN: Soft, nontender, nondistended, +BS  EXTREMITIES: 2+ peripheral pulses bilaterally. No clubbing, cyanosis, or edema  NERVOUS SYSTEM:  A&Ox2, lethargic but arousable, intermittently follows commands  SKIN: No rashes or lesions    LABS:                        13.0   10.01 )-----------( 80       ( 14 May 2024 04:55 )             36.9     05-14    130<L>  |  92<L>  |  20  ----------------------------<  120<H>  3.4<L>   |  22  |  0.33<L>    Ca    9.6      14 May 2024 04:55  Phos  3.0     05-14  Mg     1.90     05-14    TPro  6.4  /  Alb  4.0  /  TBili  1.1  /  DBili  x   /  AST  22  /  ALT  26  /  AlkPhos  88  05-14    RADIOLOGY & ADDITIONAL TESTS:  Results Reviewed:   Imaging Personally Reviewed:  < from: CT Head No Cont (05.14.24 @ 13:28) >  IMPRESSION: Left frontal lesion again seen with associated areas of   hemorrhage. Overall this area of hemorrhage has demonstrated expected   evolutionary changes.    Electrocardiogram Personally Reviewed:  Tele: *******************************  Keli Ag MD (PGY-2)  Internal Medicine  Contact via [employer] TEAMS  *******************************    PROGRESS NOTE:     Patient is a 59y old  Female who presents with a chief complaint of headache (14 May 2024 09:20)    INTERVAL EVENTS: Reportedly lethargic, continues to refuse meds despite multiple attempts to educate patient on importance of taking her medications    SUBJECTIVE: Patient seen and examined at bedside. This morning, the patient is comfortable and doing well. Somewhat more alert and responsive but confused, AAOx2. States she feels "terrible" but unable to elaborate further.    MEDICATIONS  (STANDING):  chlorhexidine 2% Cloths 1 Application(s) Topical <User Schedule>  dexAMETHasone     Tablet 2 milliGRAM(s) Oral every 12 hours  enoxaparin Injectable 30 milliGRAM(s) SubCutaneous every 24 hours  famotidine    Tablet 20 milliGRAM(s) Oral daily  lacosamide 150 milliGRAM(s) Oral two times a day  lidocaine   4% Patch 1 Patch Transdermal daily  losartan 50 milliGRAM(s) Oral daily  melatonin 3 milliGRAM(s) Oral at bedtime  morphine ER Tablet 15 milliGRAM(s) Oral two times a day  naloxone Injectable 0.4 milliGRAM(s) IV Push once  polyethylene glycol 3350 17 Gram(s) Oral <User Schedule>  senna 2 Tablet(s) Oral at bedtime  sodium chloride 3 Gram(s) Oral three times a day    MEDICATIONS  (PRN):  acetaminophen     Tablet .. 650 milliGRAM(s) Oral every 6 hours PRN Temp greater or equal to 38C (100.4F), Mild Pain (1 - 3)  ALPRAZolam 0.5 milliGRAM(s) Oral daily PRN severe anxiety  bisacodyl Suppository 10 milliGRAM(s) Rectal daily PRN Constipation  HYDROmorphone   Tablet 4 milliGRAM(s) Oral every 4 hours PRN Moderate Pain (4 - 6)  HYDROmorphone  Injectable 1 milliGRAM(s) IV Push every 4 hours PRN Severe Pain (7 - 10)  ondansetron Injectable 4 milliGRAM(s) IV Push every 8 hours PRN Nausea and/or Vomiting    PHYSICAL EXAM:  Vital Signs Last 24 Hrs  T(C): 36.4 (15 May 2024 05:34), Max: 36.7 (14 May 2024 14:00)  T(F): 97.5 (15 May 2024 05:34), Max: 98.1 (14 May 2024 14:00)  HR: 98 (15 May 2024 05:34) (85 - 98)  BP: 144/87 (15 May 2024 05:34) (120/78 - 144/87)  BP(mean): --  RR: 17 (15 May 2024 05:34) (17 - 19)  SpO2: 99% (15 May 2024 05:34) (99% - 100%)    Parameters below as of 15 May 2024 05:34  Patient On (Oxygen Delivery Method): room air    GENERAL: NAD, lying in bed comfortably  HEENT: PERRLA, moist mucous membranes  HEART: S1, S2, Regular rate and rhythm, no murmurs, rubs, or gallops  LUNGS: Unlabored respirations, clear to auscultation bilaterally, no crackles, wheezing, or rhonchi  ABDOMEN: Soft, nontender, nondistended, +BS  EXTREMITIES: 2+ peripheral pulses bilaterally. No clubbing, cyanosis, or edema  NERVOUS SYSTEM:  A&Ox2, drowsy but arousable, intermittently follows commands  SKIN: No rashes or lesions    LABS:                        13.1   9.15  )-----------( 79       ( 15 May 2024 06:17 )             38.9     05-15    136  |  97<L>  |  20  ----------------------------<  113<H>  3.6   |  22  |  0.39<L>    Ca    9.8      15 May 2024 06:17  Phos  2.7     05-15  Mg     2.00     05-15    TPro  6.4  /  Alb  4.0  /  TBili  1.1  /  DBili  x   /  AST  22  /  ALT  26  /  AlkPhos  88  05-14                RADIOLOGY & ADDITIONAL TESTS:  Results Reviewed:   Imaging Personally Reviewed:  < from: CT Head No Cont (05.14.24 @ 13:28) >  IMPRESSION: Left frontal lesion again seen with associated areas of   hemorrhage. Overall this area of hemorrhage has demonstrated expected   evolutionary changes.    Electrocardiogram Personally Reviewed:  Tele:

## 2024-05-15 NOTE — PROGRESS NOTE ADULT - PROBLEM SELECTOR PLAN 1
- on home regimen   - PRN use in past 24 hours from 8 AM to 8 AM: PO dilaudid 4 mg x 2 doses   - c/w morphine ER Tablet 15 milliGRAM(s) Oral two times a day  - c/w HYDROmorphone   Tablet 4 milliGRAM(s) Oral every 4 hours PRN Moderate Pain (4 - 6)  HYDROmorphone  Injectable 1 milliGRAM(s) IV Push every 4 hours PRN Severe Pain (7 - 10) - on home regimen   - PRN use in past 24 hours from 8 AM to 8 AM: IV dilaudid 1 mg x 2 doses   - c/w morphine ER Tablet 15 milliGRAM(s) Oral two times a day  - c/w HYDROmorphone   Tablet 4 milliGRAM(s) Oral every 4 hours PRN Moderate Pain (4 - 6)  HYDROmorphone  Injectable 1 milliGRAM(s) IV Push every 4 hours PRN Severe Pain (7 - 10)

## 2024-05-15 NOTE — CONSULT NOTE ADULT - ASSESSMENT
Pt is a 60 yo famel with stage IV SCLC with known mets to brain on Pem/Pem/Carbo with recent RT to the brain admitted for worsening headahces, found to have SIADH and worsening altered mental status. Oncology consulted for NSCLC.    #Stage IV NSCLC  Pt follows with Dr. Rao at [facility]. She was initially diagnosed with Stage IV poorly differentiated PD-L1 TPS 20%, diagnosed December 2023. She was treated with chemoimmunotherapy with pembrolizumab, pemetrexed and carboplatin AUC 5 since January 2024 - April 2024). C5 was held due to AMS which prompted her admission to the hospital. She recently had GK SRS to two brain lesions given with steroids. Repeat CT Heads inpatient have shown stable hemorrhagic brain lesions.   - No role for inpatient chemothearpy    #AMS  - Consider MRI brain  - Would consider a Neurology consult to r/o subclinical seizures.    Attending recommendations to follow.    Kaylee Burnett M.D.  Hematology and Medical Oncology Fellow  Pager: [phone]  For weekends and evenings (5 pm - 8 am), please page Heme/Onc fellow on call. Pt is a 58 yo famel with stage IV SCLC with known mets to brain on Pem/Pem/Carbo with recent RT to the brain admitted for worsening headahces, found to have SIADH and worsening altered mental status. Oncology consulted for NSCLC.    #Stage IV NSCLC  Pt follows with Dr. Rao at [facility]. She was initially diagnosed with Stage IV poorly differentiated PD-L1 TPS 20%, diagnosed December 2023. She was treated with chemoimmunotherapy with pembrolizumab, pemetrexed and carboplatin AUC 5 since January 2024 - April 2024). C5 was held due to AMS which prompted her admission to the hospital. She recently had GK SRS to two brain lesions given with steroids. Repeat CT Heads inpatient have shown stable hemorrhagic brain lesions.   - No role for inpatient chemotherapy    #AMS  - Consider MRI brain  - Would consider a Neurology consult to r/o subclinical seizures.  - Please consult Neurooncology and Radiation Oncology    Case d/w Dr. Seymour and Dr. Frias.    Kaylee Burnett M.D.  Hematology and Medical Oncology Fellow  Pager: [phone]  For weekends and evenings (5 pm - 8 am), please page Heme/Onc fellow on call.

## 2024-05-15 NOTE — PROGRESS NOTE ADULT - SUBJECTIVE AND OBJECTIVE BOX
Indication of Geriatrics and Palliative Medicine Services:  [X  ] Complex Medical Decision Making   [X  ] Symptom/Pain management     DNR on chart: No       INTERVAL EVENTS:     -------------------------------------------------------------------------------------------------------    PRESENT SYMPTOMS:     [ ] Unable to self-report      [ ] PAINADS     [ ] RDOS    [X ] No     [ ] Yes     Source if other than patient:  [ ]Family   [ ]Team     PAIN:   If blank, patient unable to specify     [ ]yes [ ]no    1. Location-   2. Radiation-    3. Quality-   4. Timing-   5. Minimal acceptable level/pain goal-   6. Aggravating factors-   7. QOL impact-     SYMPTOMS:   Dyspnea:                           [ ]Mild [ ]Moderate [ ]Severe  Anxiety:                             [ ]Mild [ ]Moderate [ ]Severe  Fatigue:                             [ ]Mild [ ]Moderate [ ]Severe  Nausea/Vomiting:              [ ]Mild [ ]Moderate [ ]Severe  Loss of appetite:                [ ]Mild [ ]Moderate [ ]Severe  Constipation:                     [ ]Mild [ ]Moderate [ ]Severe    Other Symptoms:  [X ]All other review of systems negative     -------------------------------------------------------------------------------------------------------    I STOP: [id number]    Prescription Information      PDI Filter:    PDI	Current Rx	Drug Type	Rx Written	Rx Dispensed	Drug	Quantity	Days Supply	Prescriber Name	Prescriber KENY #	Payment Method	Dispenser  A	N	O	04/29/2024	04/30/2024	hydromorphone 4 mg tablet	56	6	Nick Fitzgerald	[id number]	Insurance	[facility] #[id number]  A	N	O	04/29/2024	04/30/2024	morphine sulf er 15 mg tablet	14	7	Fitzgerald, Nick	[id number]	Insurance	[facility] #[id number]  A	Y		04/19/2024	04/20/2024	lacosamide 150 mg tablet	60	30	MopeggyJodi thomson	[id number]	Insurance	[facility]	N	O	03/12/2024	03/20/2024	morphine sulfate ir 15 mg tab	60	30	Fitzgerald, Nick	[id number]	Insurance	[facility] #[id number]  A	N	O	03/12/2024	03/20/2024	hydromorphone 4 mg tablet	99	12	Fitzgerald, Nick	[id number]	Insurance	[facility] #[id number]  A	N	B	02/28/2024	03/01/2024	alprazolam 0.5 mg tablet	30	30	Celestina Garcia)	[id number]	[facility] #[id number]  A	N	B	02/12/2024	02/12/2024	alprazolam 0.25 mg tablet	30	10	Celestina Garcia)	[id number]	Insurance	[facility] #[id number]  A	N	O	01/30/2024	02/03/2024	morphine sulf er 15 mg tablet	90	30	Fitzgerald, Nick	[id number]	Insurance	[facility] #[id number]  A	N	O	01/31/2024	01/31/2024	hydromorphone 4 mg tablet	360	30	Fitzgerald, Nick	[id number]	Insurance	[facility] #[id number]  A	N	B	01/30/2024	01/30/2024	alprazolam 0.25 mg tablet	30	10	Celestina Garcia)	[id number]	[facility] #[id number]  A	N	O	01/26/2024	01/26/2024	morphine sulf er 15 mg tablet	14	7	Chas Ibarra	[id number]	[facility] #[id number]    -------------------------------------------------------------------------------------------------------    ITEMS UNCHECKED ARE NOT PRESENT    PHYSICAL:  Vital Signs Last 24 Hrs  T(C): 37 (15 May 2024 14:37), Max: 37 (15 May 2024 14:37)  T(F): 98.6 (15 May 2024 14:37), Max: 98.6 (15 May 2024 14:37)  HR: 73 (15 May 2024 14:37) (73 - 98)  BP: 155/95 (15 May 2024 14:37) (133/86 - 155/95)  BP(mean): --  RR: 18 (15 May 2024 14:37) (17 - 19)  SpO2: 100% (15 May 2024 14:37) (99% - 100%)    Parameters below as of 15 May 2024 05:34  Patient On (Oxygen Delivery Method): room air     I&O's Summary    14 May 2024 07:01  -  15 May 2024 07:00  --------------------------------------------------------  IN: 0 mL / OUT: 300 mL / NET: -300 mL    GENERAL:  [X ]Cachexia  [X ] Frail  [X ]Awake  [X ]Oriented x 1  [ ]Lethargic  [ ]Unarousable  [ ]Verbal  [ ]Non-Verbal    BEHAVIORAL:   [ ] Anxiety  [ ] Delirium [ ] Agitation [ ] Other    HEENT:   [X ]Normal   [ ]Dry mouth   [ ]ET Tube/Trach  [ ]Oral lesions    PULMONARY:   [ ]Clear              [ ]Tachypnea  [ ]Audible excessive secretions   [ ]Rhonchi        [ ]Right [ ]Left [ ]Bilateral  [ ]Crackles        [ ]Right [ ]Left [ ]Bilateral  [ ]Wheezing     [ ]Right [ ]Left [ ]Bilateral  [ ]Diminished breath sounds [ ]right [ ]left [ ]bilateral    CARDIOVASCULAR:    [X ]Regular [ ]Irregular [ ]Tachy  [ ]Gian [ ]Murmur [ ]Other    GASTROINTESTINAL:  [X ]Soft  [ ]Distended   [ ]+BS  [X ]Non tender [ ]Tender  [ ]Other [ ]PEG [ ]OGT/ NGT      GENITOURINARY:  [ ]Normal [X ] Incontinent   [ ]Oliguria/Anuria   [ ]Napier    MUSCULOSKELETAL:   [ ]Normal   [ ]Weakness  [X ]Bed/Wheelchair bound [ ]Edema    NEUROLOGIC:   [X ]No focal deficits  [ ]Cognitive impairment  [ ]Dysphagia [ ]Dysarthria [ ]Paresis [ ]Other     SKIN:   [X ]Normal  [ ]Rash  [ ]Other  [ ]Pressure ulcer(s)       Present on admission [ ]y [ ]n      -------------------------------------------------------------------------------------------------------    LABS:                        13.1   9.15  )-----------( 79       ( 15 May 2024 06:17 )             38.9   05-15    136  |  97<L>  |  20  ----------------------------<  113<H>  3.6   |  22  |  0.39<L>    Ca    9.8      15 May 2024 06:17  Phos  2.7     05-15  Mg     2.00     05-15    TPro  6.4  /  Alb  4.0  /  TBili  1.1  /  DBili  x   /  AST  22  /  ALT  26  /  AlkPhos  88  05-14      Urinalysis Basic - ( 15 May 2024 06:17 )    Color: x / Appearance: x / SG: x / pH: x  Gluc: 113 mg/dL / Ketone: x  / Bili: x / Urobili: x   Blood: x / Protein: x / Nitrite: x   Leuk Esterase: x / RBC: x / WBC x   Sq Epi: x / Non Sq Epi: x / Bacteria: x    -------------------------------------------------------------------------------------------------------    CRITICAL CARE:  [ ]Shock Present  [ ]Septic [ ]Cardiogenic [ ]Neurologic [ ]Hypovolemic [ ]Undifferentiated    [ ]Vasopressors [ ]Inotropes    [ ]Respiratory failure present [ ]Acute  [ ]Chronic [ ]Hypoxic  [ ]Hypercarbic [ ]Mixed   [ ]Mechanical Ventilation  [ ]Trach collar   [ ]Non-invasive ventilatory support   [ ]High-Flow   [ ]Oxygen mask/venti     [ ]Other organ failure     -------------------------------------------------------------------------------------------------------    RADIOLOGY & ADDITIONAL STUDIES:     < from: CT Head No Cont (05.06.24 @ 19:38) >    IMPRESSION:  Previously identified hemorrhagic masses appear mildly smaller.   Previously seen mass effect and edema has improved.  No new areas of hemorrhage. No hydrocephalus.    < end of copied text >    < from: CT Head No Cont (05.10.24 @ 01:50) >  IMPRESSION:  No significant interval change in previously noted hemorrhagic masses. No   acute intracranial hemorrhage or mass effect.    < end of copied text >    -------------------------------------------------------------------------------------------------------  MEDICATIONS:     MEDICATIONS  (STANDING):  chlorhexidine 2% Cloths 1 Application(s) Topical <User Schedule>  dexAMETHasone  Injectable 2 milliGRAM(s) IV Push every 12 hours  enoxaparin Injectable 30 milliGRAM(s) SubCutaneous every 24 hours  famotidine    Tablet 20 milliGRAM(s) Oral daily  lacosamide IVPB 150 milliGRAM(s) IV Intermittent every 12 hours  lidocaine   4% Patch 1 Patch Transdermal daily  losartan 50 milliGRAM(s) Oral daily  melatonin 3 milliGRAM(s) Oral at bedtime  morphine ER Tablet 15 milliGRAM(s) Oral two times a day  naloxone Injectable 0.4 milliGRAM(s) IV Push once  polyethylene glycol 3350 17 Gram(s) Oral <User Schedule>  senna 2 Tablet(s) Oral at bedtime  sodium chloride 3 Gram(s) Oral three times a day    MEDICATIONS  (PRN):  acetaminophen     Tablet .. 650 milliGRAM(s) Oral every 6 hours PRN Temp greater or equal to 38C (100.4F), Mild Pain (1 - 3)  ALPRAZolam 0.5 milliGRAM(s) Oral daily PRN severe anxiety  bisacodyl Suppository 10 milliGRAM(s) Rectal daily PRN Constipation  HYDROmorphone   Tablet 4 milliGRAM(s) Oral every 4 hours PRN Moderate Pain (4 - 6)  HYDROmorphone  Injectable 1 milliGRAM(s) IV Push every 4 hours PRN Severe Pain (7 - 10)  ondansetron Injectable 4 milliGRAM(s) IV Push every 8 hours PRN Nausea and/or Vomiting         Indication of Geriatrics and Palliative Medicine Services:  [X  ] Complex Medical Decision Making   [X  ] Symptom/Pain management     DNR on chart: No     INTERVAL EVENTS: Patient seen this AM, lethargic, comfortable in no distress.     -------------------------------------------------------------------------------------------------------    PRESENT SYMPTOMS:     [X ] Unable to self-report      [X ] PAINADS     [X ] RDOS    [ ] No     [ ] Yes     Source if other than patient:  [ ]Family   [ ]Team     PAIN:   If blank, patient unable to specify     [ ]yes [ ]no    1. Location-   2. Radiation-    3. Quality-   4. Timing-   5. Minimal acceptable level/pain goal-   6. Aggravating factors-   7. QOL impact-     SYMPTOMS:   Dyspnea:                           [ ]Mild [ ]Moderate [ ]Severe  Anxiety:                             [ ]Mild [ ]Moderate [ ]Severe  Fatigue:                             [ ]Mild [ ]Moderate [ ]Severe  Nausea/Vomiting:              [ ]Mild [ ]Moderate [ ]Severe  Loss of appetite:                [ ]Mild [ ]Moderate [ ]Severe  Constipation:                     [ ]Mild [ ]Moderate [ ]Severe    Other Symptoms:  [X ]All other review of systems negative     -------------------------------------------------------------------------------------------------------    I STOP: [id number]    Prescription Information      PDI Filter:    PDI	Current Rx	Drug Type	Rx Written	Rx Dispensed	Drug	Quantity	Days Supply	Prescriber Name	Prescriber KENY #	Payment Method	Dispenser  A	N	O	04/29/2024	04/30/2024	hydromorphone 4 mg tablet	56	6	Nick Fitzgerald	[id number]	Insurance	[facility] #[id number]  A	N	O	04/29/2024	04/30/2024	morphine sulf er 15 mg tablet	14	7	Nick Fitzgerald	[id number]	Insurance	[facility] #[id number]  A	Y		04/19/2024	04/20/2024	lacosamide 150 mg tablet	60	30	Jodi Hoffmann	[id number]	Insurance	[facility]	N	O	03/12/2024	03/20/2024	morphine sulfate ir 15 mg tab	60	30	FitzgeraldNick cheney	[id number]	Insurance	[facility] #[id number]  A	N	O	03/12/2024	03/20/2024	hydromorphone 4 mg tablet	99	12	FitzgeraldNick cheney	[id number]	Insurance	[facility] #[id number]  A	N	B	02/28/2024	03/01/2024	alprazolam 0.5 mg tablet	30	30	Celestina Garcia)	[id number]	Insurance	[facility] #[id number]  A	N	B	02/12/2024	02/12/2024	alprazolam 0.25 mg tablet	30	10	Celestina Garcia)	[id number]	Insurance	[facility] #[id number]  A	N	O	01/30/2024	02/03/2024	morphine sulf er 15 mg tablet	90	30	Nick Fitzgerald	[id number]	Insurance	[facility] #[id number]  A	N	O	01/31/2024	01/31/2024	hydromorphone 4 mg tablet	360	30	Nick Fitzgerald	[id number]	Insurance	[facility] #[id number]  A	N	B	01/30/2024	01/30/2024	alprazolam 0.25 mg tablet	30	10	Celestina Garcia)	[id number]	Insurance	[facility] #[id number]  A	N	O	01/26/2024	01/26/2024	morphine sulf er 15 mg tablet	14	7	Chas Ibarra	[id number]	[facility] #[id number]    -------------------------------------------------------------------------------------------------------    ITEMS UNCHECKED ARE NOT PRESENT    PHYSICAL:  Vital Signs Last 24 Hrs  T(C): 37 (15 May 2024 14:37), Max: 37 (15 May 2024 14:37)  T(F): 98.6 (15 May 2024 14:37), Max: 98.6 (15 May 2024 14:37)  HR: 73 (15 May 2024 14:37) (73 - 98)  BP: 155/95 (15 May 2024 14:37) (133/86 - 155/95)  BP(mean): --  RR: 18 (15 May 2024 14:37) (17 - 19)  SpO2: 100% (15 May 2024 14:37) (99% - 100%)    Parameters below as of 15 May 2024 05:34  Patient On (Oxygen Delivery Method): room air     I&O's Summary    14 May 2024 07:01  -  15 May 2024 07:00  --------------------------------------------------------  IN: 0 mL / OUT: 300 mL / NET: -300 mL    GENERAL:  [X ]Cachexia  [X ] Frail  [ ]Awake  [ ]Oriented   [X ]Lethargic  [ ]Unarousable  [ ]Verbal  [ ]Non-Verbal    BEHAVIORAL:   [ ] Anxiety  [ ] Delirium [ ] Agitation [ ] Other    HEENT:   [X ]Normal   [ ]Dry mouth   [ ]ET Tube/Trach  [ ]Oral lesions    PULMONARY:   [ ]Clear              [ ]Tachypnea  [ ]Audible excessive secretions   [ ]Rhonchi        [ ]Right [ ]Left [ ]Bilateral  [ ]Crackles        [ ]Right [ ]Left [ ]Bilateral  [ ]Wheezing     [ ]Right [ ]Left [ ]Bilateral  [ ]Diminished breath sounds [ ]right [ ]left [ ]bilateral    CARDIOVASCULAR:    [X ]Regular [ ]Irregular [ ]Tachy  [ ]Gian [ ]Murmur [ ]Other    GASTROINTESTINAL:  [X ]Soft  [ ]Distended   [ ]+BS  [X ]Non tender [ ]Tender  [ ]Other [ ]PEG [ ]OGT/ NGT      GENITOURINARY:  [ ]Normal [X ] Incontinent   [ ]Oliguria/Anuria   [ ]Napier    MUSCULOSKELETAL:   [ ]Normal   [ ]Weakness  [X ]Bed/Wheelchair bound [ ]Edema    NEUROLOGIC:   [X ]No focal deficits  [ ]Cognitive impairment  [ ]Dysphagia [ ]Dysarthria [ ]Paresis [ ]Other     SKIN:   [X ]Normal  [ ]Rash  [ ]Other  [ ]Pressure ulcer(s)       Present on admission [ ]y [ ]n      -------------------------------------------------------------------------------------------------------    LABS:                        13.1   9.15  )-----------( 79       ( 15 May 2024 06:17 )             38.9   05-15    136  |  97<L>  |  20  ----------------------------<  113<H>  3.6   |  22  |  0.39<L>    Ca    9.8      15 May 2024 06:17  Phos  2.7     05-15  Mg     2.00     05-15    TPro  6.4  /  Alb  4.0  /  TBili  1.1  /  DBili  x   /  AST  22  /  ALT  26  /  AlkPhos  88  05-14      Urinalysis Basic - ( 15 May 2024 06:17 )    Color: x / Appearance: x / SG: x / pH: x  Gluc: 113 mg/dL / Ketone: x  / Bili: x / Urobili: x   Blood: x / Protein: x / Nitrite: x   Leuk Esterase: x / RBC: x / WBC x   Sq Epi: x / Non Sq Epi: x / Bacteria: x    -------------------------------------------------------------------------------------------------------    CRITICAL CARE:  [ ]Shock Present  [ ]Septic [ ]Cardiogenic [ ]Neurologic [ ]Hypovolemic [ ]Undifferentiated    [ ]Vasopressors [ ]Inotropes    [ ]Respiratory failure present [ ]Acute  [ ]Chronic [ ]Hypoxic  [ ]Hypercarbic [ ]Mixed   [ ]Mechanical Ventilation  [ ]Trach collar   [ ]Non-invasive ventilatory support   [ ]High-Flow   [ ]Oxygen mask/venti     [ ]Other organ failure     -------------------------------------------------------------------------------------------------------    RADIOLOGY & ADDITIONAL STUDIES:     < from: CT Head No Cont (05.06.24 @ 19:38) >    IMPRESSION:  Previously identified hemorrhagic masses appear mildly smaller.   Previously seen mass effect and edema has improved.  No new areas of hemorrhage. No hydrocephalus.    < end of copied text >    < from: CT Head No Cont (05.10.24 @ 01:50) >  IMPRESSION:  No significant interval change in previously noted hemorrhagic masses. No   acute intracranial hemorrhage or mass effect.    < end of copied text >    -------------------------------------------------------------------------------------------------------  MEDICATIONS:     MEDICATIONS  (STANDING):  chlorhexidine 2% Cloths 1 Application(s) Topical <User Schedule>  dexAMETHasone  Injectable 2 milliGRAM(s) IV Push every 12 hours  enoxaparin Injectable 30 milliGRAM(s) SubCutaneous every 24 hours  famotidine    Tablet 20 milliGRAM(s) Oral daily  lacosamide IVPB 150 milliGRAM(s) IV Intermittent every 12 hours  lidocaine   4% Patch 1 Patch Transdermal daily  losartan 50 milliGRAM(s) Oral daily  melatonin 3 milliGRAM(s) Oral at bedtime  morphine ER Tablet 15 milliGRAM(s) Oral two times a day  naloxone Injectable 0.4 milliGRAM(s) IV Push once  polyethylene glycol 3350 17 Gram(s) Oral <User Schedule>  senna 2 Tablet(s) Oral at bedtime  sodium chloride 3 Gram(s) Oral three times a day    MEDICATIONS  (PRN):  acetaminophen     Tablet .. 650 milliGRAM(s) Oral every 6 hours PRN Temp greater or equal to 38C (100.4F), Mild Pain (1 - 3)  ALPRAZolam 0.5 milliGRAM(s) Oral daily PRN severe anxiety  bisacodyl Suppository 10 milliGRAM(s) Rectal daily PRN Constipation  HYDROmorphone   Tablet 4 milliGRAM(s) Oral every 4 hours PRN Moderate Pain (4 - 6)  HYDROmorphone  Injectable 1 milliGRAM(s) IV Push every 4 hours PRN Severe Pain (7 - 10)  ondansetron Injectable 4 milliGRAM(s) IV Push every 8 hours PRN Nausea and/or Vomiting

## 2024-05-15 NOTE — GOALS OF CARE CONVERSATION - ADVANCED CARE PLANNING - CONVERSATION DETAILS
Met with patient and her daughter, Ofelia, to discuss overall goals of care. Explained that the patient has been having fluctuating mental status over the past several days, likely multifactorial in the setting of recent GKRS, acute hyponatremia, poor PO intake, possible progressive malignancy. Explained that the patient has be refusing to participate with PT and refusing some of her medications as well. I explained that it is unclear at this time if her encephalopathy will improve with time or if will continue to worsen. Per discussions with oncology and the daughter, she was due for C5 of chemotherapy and was responding well to it outpatient prior to the GKRS but treatment was placed on hold due to headaches and poor functional status.     Daughter proceeded to inquire if patient was headed towards hospice to which I stated that it is difficult to say without oncology and radiation oncology input regarding prognosis and treatment options. I explained the philosophy of hospice including a focus on comfort and minimizing interventions. At this point, the daughter would like to continue therapies if oncology continues to offer treatment but will discuss with her father/HCP in further detail.    I also discussed code status with daughter including chest compressions/CPR and intubation. Daughter expressed that her mother previously indicated that she would like to be resuscitated in the event of cardiac and/or respiratory arrest including CPR and intubation with mechanical ventilation. She also expressed that her mother was previously intubated and extubated within 24h and it would be within her GOC to pursue FULL CODE.

## 2024-05-15 NOTE — PROGRESS NOTE ADULT - ASSESSMENT
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

## 2024-05-15 NOTE — PROGRESS NOTE ADULT - PROBLEM SELECTOR PLAN 3
For pain management and GOC    In the event of worsening symptoms, please contact the Palliative Medicine team via pager (if the patient is at [facility] #[phone] or if the patient is at [facility] #[phone]) The Geriatric and Palliative Medicine service has coverage 24 hours a day/ 7 days a week to provide medical recommendations regarding symptom management needs via telephone. For pain management and GOC- Patient with progressive decline. Discussed with primary team and oncology.     In the event of worsening symptoms, please contact the Palliative Medicine team via pager (if the patient is at [facility] #[phone] or if the patient is at [facility] #[phone]) The Geriatric and Palliative Medicine service has coverage 24 hours a day/ 7 days a week to provide medical recommendations regarding symptom management needs via telephone.

## 2024-05-15 NOTE — PROGRESS NOTE ADULT - PROBLEM SELECTOR PLAN 4
Hx of NSCLC s/p RT (3/2024) w/ b/l frontal brain mets s/p GKRS, follows Dr. Rao at [facility]. Unclear if metastases are cause for headache given absence of significant headache prior to GKRS. Also potential etiologies include hyponatremia and less likely migraine. Hyponatremia may also be driving lethargy given 2 day history of worsening symptoms per patient.  - CT head (5/10): stable findings compared to prior CT head, no acute intracranial hemorrhage or mass effect  - repeat CT head (5/14): stable findings  - neurosurgery consulted; appreciate recs- no acute intervention  - oncology consulted for prognostication and GOC given progressive encephalopathy as above; appreciate recs    Plan:  - c/w dexamethasone 2mg BID  - c/w Vimpat 150mg BID for seizure ppx  - outpatient oncology follow-up Hx of NSCLC s/p RT (3/2024) w/ b/l frontal brain mets s/p GKRS, follows Dr. Rao at [facility]. Unclear if metastases are cause for headache given absence of significant headache prior to GKRS. Also potential etiologies include hyponatremia and less likely migraine. Hyponatremia may also be driving lethargy given 2 day history of worsening symptoms per patient.  - CT head (5/10): stable findings compared to prior CT head, no acute intracranial hemorrhage or mass effect  - repeat CT head (5/14): stable findings  - neurosurgery consulted; appreciate recs- no acute intervention  - oncology consulted for prognostication and GOC given progressive encephalopathy as above; appreciate recs    Plan:  - c/w dexamethasone 2mg BID; will change to IV given patient refusal of PO medications  - c/w Vimpat 150mg BID for seizure ppx; will change to IV given patient refusal of PO medications  - outpatient oncology follow-up

## 2024-05-15 NOTE — PROGRESS NOTE ADULT - ATTENDING COMMENTS
59 yr old woman PMH PE previously on Eliquis (no longer present on recent CTA 4/14/24), NSCLC with known brain metastases s/p lung RT 3/1/2024 on chemoimmunotherapy since Jan 2024 and s/p recent gamma knife 4/23/24 p/w worsening headache.    More awake and alert this morning, eating slowly  Denies headaches or any pain but having some aphasia, slow movements    Intermittently declining meds and labs so switched steroids and vimpat to IV  Rpt [facility] 5/14 with stable mets. No further inpatient workup per NSx.   Check VEEG, can consider repeat MRI brain to reassess brain mets. Last MRI brain was April 2024  Rad Onc consulted to determine if pt's symptoms typical after gamma knife and further treatment plans  C/w Dilaudid PRN for pain, c/w home MS Contin 15 BID, decadron. Discussed with palliative care, Dr. Mccallum  Oncology following and plans for further systemic therapy in future once improved  Hyponatremia improved, c/w salt tabs  C/w lovenox for DVT ppx.  PT rec [facility] but patient declining to participate further  Full code as per GOC discussion 5/15

## 2024-05-16 NOTE — PROGRESS NOTE ADULT - PROBLEM SELECTOR PLAN 5
Patient with hyponatremia, likely multifactorial- SIADH in setting of malignancy, decreased PO intake, and pain 2/2 to headache. SNa of 126 on presentation, repeat of 123 s/p 1U bolus in ED. TSH wnl. Unlikely due to decreased glucocorticoids given exogenous use, RESOLVED  - serum/urine studies consistent w/ hypo-osmolar hyponatremia/SIADH    Plan:  - c/w salt tabs 3g TID  - c/w fluid restriction to 1L/day  - monitor BMP  - fall, seizure precautions Patient with hyponatremia, likely multifactorial- SIADH in setting of malignancy, decreased PO intake, and pain 2/2 to headache. SNa of 126 on presentation, repeat of 123 s/p 1U bolus in ED. TSH wnl. Unlikely due to decreased glucocorticoids given exogenous use, RESOLVED  - serum/urine studies consistent w/ hypo-osmolar hyponatremia/SIADH    Plan:  - c/w salt tabs 3g TID  - c/w fluid restriction to 2L/day  - monitor BMP  - fall, seizure precautions

## 2024-05-16 NOTE — PROVIDER CONTACT NOTE (CRITICAL VALUE NOTIFICATION) - NS PROVIDER READ BACK
Assessment/Plan:  1  Displaced fracture of distal phalanx of left ring finger, subsequent encounter for fracture with routine healing  Ambulatory referral to PT/OT hand therapy       Scribe Attestation    I,:   Edwina Biswas MA am acting as a scribe while in the presence of the attending physician :        I,:   Georges Vazquez DO personally performed the services described in this documentation    as scribed in my presence :              Jacklyn is doing well  Her wounds are well healed  There is no erythema or signs of infection  A referral was provided to OT for a custom brace  There is a mild droop on exam today  I did discuss with the patient that she may have a droop ongoing as she has had a bad injury to the finger  A finger splint was applied in the office today she was instructed to wear the splint full time except for therapy  Patient's vitamin D levels were very low  Patient has been taking Vitamin D supplement at the recommendation of her PCP  She will follow up in 2 weeks for repeat evaluation and x-ray  Subjective:   Jamia Deleon is a 27 y o  female who presents to the office today for follow up evaluation 8 weeks s/p left ring finger DIP joint pinning and extensor tendon repair performed at OSH  Patient states she is doing well  She notes improvement in her range of motion  Patient states she has been using neosporin and cleaning the wounds daily  She denies any numbness or tingling  Review of Systems   Constitutional: Negative for chills and fever  HENT: Negative for drooling and sneezing  Eyes: Negative for redness  Respiratory: Negative for cough and wheezing  Gastrointestinal: Negative for nausea and vomiting  Musculoskeletal: Negative for arthralgias, joint swelling and myalgias  Neurological: Negative for weakness and numbness  Psychiatric/Behavioral: Negative for behavioral problems  The patient is not nervous/anxious            Past Medical History:   Diagnosis Date
 Fractures     Status post tendon repair     tendon repair of left ring finger       Past Surgical History:   Procedure Laterality Date    HAND SURGERY      WISDOM TOOTH EXTRACTION         Family History   Problem Relation Age of Onset    No Known Problems Mother     No Known Problems Father     No Known Problems Sister     No Known Problems Brother     No Known Problems Maternal Aunt     No Known Problems Maternal Uncle     No Known Problems Paternal Aunt     No Known Problems Paternal Uncle     No Known Problems Maternal Grandmother     No Known Problems Maternal Grandfather     No Known Problems Paternal Grandmother     No Known Problems Paternal Grandfather     Substance Abuse Neg Hx     Mental illness Neg Hx        Social History     Occupational History    Not on file   Tobacco Use    Smoking status: Never Smoker    Smokeless tobacco: Never Used   Substance and Sexual Activity    Alcohol use: Never     Frequency: Never    Drug use: Never    Sexual activity: Yes     Partners: Male     Birth control/protection: Condom Male         Current Outpatient Medications:     Multiple Vitamin (MULTIVITAMIN) tablet, Take 1 tablet by mouth daily, Disp: , Rfl:     Probiotic Product (PROBIOTIC DAILY PO), Take by mouth, Disp: , Rfl:     No Known Allergies    Objective:  Vitals:    12/06/19 1021   BP: 103/69   Pulse: 69       Ortho Exam     Left ring finger    Wounds healing well  No erythema or signs of infection   Compartments soft  Brisk capillary refill  S/m intact median, radial, and ulnar nerve   Mild extensor lag at the DIP approx 15 degrees    Physical Exam   Constitutional: She is oriented to person, place, and time  She appears well-developed and well-nourished  HENT:   Head: Normocephalic and atraumatic  Eyes: Conjunctivae are normal  Right eye exhibits no discharge  Left eye exhibits no discharge  Neck: Normal range of motion  Neck supple     Cardiovascular: Normal rate and intact
distal pulses  Pulmonary/Chest: Effort normal  No respiratory distress  Musculoskeletal:   As noted in HPI   Neurological: She is alert and oriented to person, place, and time  Skin: Skin is warm and dry  Psychiatric: She has a normal mood and affect   Her behavior is normal  Judgment and thought content normal 
yes
yes

## 2024-05-16 NOTE — PROGRESS NOTE ADULT - ATTENDING COMMENTS
59 yr old woman PMH PE previously on Eliquis (no longer present on recent CTA 4/14/24), NSCLC with known brain metastases s/p lung RT 3/1/2024 on chemoimmunotherapy since Jan 2024 and s/p recent gamma knife 4/23/24 p/w worsening headache. Found to have hyponatremia improved s/p hypertonic saline and salt tabs. Course c/b urinary retention s/p marks.    # Urinary retention  # Metabolic Encephalopathy likely related to malignancy, r/o infectious causes  # NSCLC with hemorraghic brain mets s/p chemo and recent GKRS on seizure ppx with Vimpat  # Hyponatremia, suspect SIADH: improved  # Neoplasm related pain  # Physical debility    Patient appears weak and lethargic but arousable, minimal verbal interaction with team    Found to be retaining urine and required marks placement, >2L urine on marks placement. UA with bacteria but no LE, nitrites, no fever or leukocytosis  Rpt CTH 5/14 with stable mets. No further inpatient workup per NSx. Check MRI brain, CT A/P, VEEG  Infectious workup r/o other causes of toxic metabolic encephalopathy: Bcx, CXR, no clear indication for Abx at this time.  C/w IV steroids and IV vimpat  C/w Dilaudid PRN for pain, c/w home MS Contin 15 BID, decadron.   Hyponatremia improved, c/w salt tabs  C/w lovenox for DVT ppx.  PT rec [facility] but patient declining to participate further  Full code as per GOC discussion  Rad Onc, Neuro Onc, Heme Onc, palliative consulted/following 59 yr old woman PMH PE previously on Eliquis (no longer present on recent CTA 4/14/24), NSCLC with known brain metastases s/p lung RT 3/1/2024 on chemoimmunotherapy since Jan 2024 and s/p recent gamma knife 4/23/24 p/w worsening headache. Found to have hyponatremia improved s/p hypertonic saline and salt tabs. Course c/b urinary retention s/p marks.    # Urinary retention  # Metabolic Encephalopathy likely related to malignancy, r/o infectious causes  # NSCLC with hemorraghic brain mets s/p chemo and recent GKRS on seizure ppx with Vimpat  # Hyponatremia, suspect SIADH: improved  # Neoplasm related pain  # Physical debility    Patient appears weak and lethargic but arousable, minimal verbal interaction with team    Found to be retaining urine and required marks placement, >2L urine on marks placement. UA with bacteria but no LE, nitrites, no fever or leukocytosis  Monitor for signs of postobstructive diuresis, trend electrolytes. Supplement as needed  Rpt CTH 5/14 with stable mets. No further inpatient workup per NSx. Check MRI brain, CT A/P, VEEG  Infectious workup r/o other causes of toxic metabolic encephalopathy: Bcx, CXR, no clear indication for Abx at this time.  C/w IV steroids and IV vimpat  C/w Dilaudid PRN for pain, c/w home MS Contin 15 BID, decadron.   Hyponatremia improved, c/w salt tabs  C/w lovenox for DVT ppx.  PT rec [facility] but patient declining to participate further  Full code as per GOC discussion  Rad Onc, Neuro Onc, Heme Onc, palliative consulted/following

## 2024-05-16 NOTE — PROGRESS NOTE ADULT - PROBLEM SELECTOR PLAN 4
Headache i/s/o recent Gammaknife surgery w/o aura suggestive of migraine, no discontinuation of NSAIDs to suggest rebound headache. CTH w/ improving edema. Potentially secondary to hyponatremia given additional symptoms of tremulousness and imbalance, currently stable  - CT head (5/10): stable findings compared to prior CT head, no acute intracranial hemorrhage or mass effect  - repeat CT head (5/14): stable findings    Plan:   - palliative care following for pain management; appreciate recs  - pain control: morphine ER 15mg BID, PO Dilaudid 4mg q4h for moderate pain, IV Dilaudid 1mg q4h for severe pain  - bowel regimen: Dulcolax, Senna, Miralax  - management of hyponatremia as below Headache i/s/o recent Gammaknife surgery w/o aura suggestive of migraine, no discontinuation of NSAIDs to suggest rebound headache. CTH w/ improving edema. Potentially secondary to hyponatremia given additional symptoms of tremulousness and imbalance, currently stable  - CT head (5/10): stable findings compared to prior CT head, no acute intracranial hemorrhage or mass effect  - repeat CT head (5/14): stable findings    Plan:   - palliative care following for pain management; appreciate recs  - f/u MRI brain as above  - pain control: morphine ER 15mg BID, PO Dilaudid 4mg q4h for moderate pain, IV Dilaudid 1mg q4h for severe pain  - bowel regimen: Dulcolax, Senna, Miralax  - management of hyponatremia as below

## 2024-05-16 NOTE — PROVIDER CONTACT NOTE (OTHER) - ASSESSMENT
lethargic and confused, disoriented to time and place.
distended abdomen, distended bladder.
lethargic and confused, disoriented to time and place.
Pt is calm and asymptomatic at this time. No s/s of acute distress noted.

## 2024-05-16 NOTE — PROVIDER CONTACT NOTE (OTHER) - REASON
pt refusing all am meds
Pt refused morning labs
pt bladder scan read >1400cc
pt mental status worsening? pt refusing all pm meds.

## 2024-05-16 NOTE — PROGRESS NOTE ADULT - ASSESSMENT
59F w/ NSCLC s/p RT (3/2024) w/ bilateral frontal brain metastases s/p GKRS (4/2024), HTN, GERD, admitted w/ progressively worsening headache, found to have SIADH, c/c/b progressive encephalopathy and urinary retention. 59F w/ NSCLC s/p RT (3/2024) on chemotherapy w/ bilateral frontal brain metastases s/p GKRS (4/2024), HTN, GERD, admitted w/ progressively worsening headache, found to have hyponatremia, c/c/b progressive encephalopathy and urinary retention.

## 2024-05-16 NOTE — PROVIDER CONTACT NOTE (CRITICAL VALUE NOTIFICATION) - RECOMMENDATIONS
MD aware. 40mEQ powder packets and 6 Potassium chloride IVPB ordered. Care plan continues
Continue RRT and patient being transferred to CCU will re-draw

## 2024-05-16 NOTE — PROGRESS NOTE ADULT - PROBLEM SELECTOR PROBLEM 8
GERD (gastroesophageal reflux disease)
Prophylactic measure

## 2024-05-16 NOTE — PROGRESS NOTE ADULT - SUBJECTIVE AND OBJECTIVE BOX
*******************************  Keli Ag MD (PGY-2)  Internal Medicine  Contact via [employer] TEAMS  *******************************    PROGRESS NOTE:     Patient is a 59y old  Female who presents with a chief complaint of headache (15 May 2024 15:38)    INTERVAL EVENTS: Noted to have >1800cc on bladder scan, straight cath w/ 1L output. Repeat bladder scan w/ 1400cc Seen by oncology who recommended MR brain and neuro-oncology and radiation oncology evaluation as well as vEEG to evaluate for subclinical seizures    SUBJECTIVE: Patient seen and examined at bedside. This morning, the patient...    MEDICATIONS  (STANDING):  chlorhexidine 2% Cloths 1 Application(s) Topical <User Schedule>  dexAMETHasone  Injectable 2 milliGRAM(s) IV Push every 12 hours  enoxaparin Injectable 30 milliGRAM(s) SubCutaneous every 24 hours  famotidine    Tablet 20 milliGRAM(s) Oral daily  lacosamide IVPB 150 milliGRAM(s) IV Intermittent every 12 hours  lactated ringers. 1000 milliLiter(s) (75 mL/Hr) IV Continuous <Continuous>  lidocaine   4% Patch 1 Patch Transdermal daily  losartan 50 milliGRAM(s) Oral daily  melatonin 3 milliGRAM(s) Oral at bedtime  morphine ER Tablet 15 milliGRAM(s) Oral two times a day  naloxone Injectable 0.4 milliGRAM(s) IV Push once  polyethylene glycol 3350 17 Gram(s) Oral <User Schedule>  senna 2 Tablet(s) Oral at bedtime  sodium chloride 3 Gram(s) Oral three times a day    MEDICATIONS  (PRN):  acetaminophen     Tablet .. 650 milliGRAM(s) Oral every 6 hours PRN Temp greater or equal to 38C (100.4F), Mild Pain (1 - 3)  ALPRAZolam 0.5 milliGRAM(s) Oral daily PRN severe anxiety  bisacodyl Suppository 10 milliGRAM(s) Rectal daily PRN Constipation  HYDROmorphone   Tablet 4 milliGRAM(s) Oral every 4 hours PRN Moderate Pain (4 - 6)  HYDROmorphone  Injectable 1 milliGRAM(s) IV Push every 4 hours PRN Severe Pain (7 - 10)  ondansetron Injectable 4 milliGRAM(s) IV Push every 8 hours PRN Nausea and/or Vomiting    I&O's Summary    15 May 2024 07:01  -  16 May 2024 07:00  --------------------------------------------------------  IN: 0 mL / OUT: 2350 mL / NET: -2350 mL    PHYSICAL EXAM:  Vital Signs Last 24 Hrs  T(C): 36.4 (16 May 2024 05:38), Max: 37 (15 May 2024 14:37)  T(F): 97.5 (16 May 2024 05:38), Max: 98.6 (15 May 2024 14:37)  HR: 69 (16 May 2024 05:38) (61 - 99)  BP: 151/87 (16 May 2024 05:38) (127/86 - 155/95)  BP(mean): --  RR: 18 (16 May 2024 05:38) (17 - 18)  SpO2: 100% (16 May 2024 05:38) (99% - 100%)    Parameters below as of 16 May 2024 05:38  Patient On (Oxygen Delivery Method): room air    GENERAL: NAD, lying in bed comfortably  HEART: S1, S2, Regular rate and rhythm, no murmurs, rubs, or gallops  LUNGS: Unlabored respirations, clear to auscultation bilaterally, no crackles, wheezing, or rhonchi  ABDOMEN: firm, mildly distended, but nontender to palpation, +BS  EXTREMITIES: 2+ peripheral pulses bilaterally. No clubbing, cyanosis, or edema  NERVOUS SYSTEM:  A&Ox2, drowsy but arousable, intermittently follows commands  SKIN: No rashes or lesions    LABS:                        13.1   9.15  )-----------( 79       ( 15 May 2024 06:17 )             38.9     05-15    136  |  97<L>  |  20  ----------------------------<  113<H>  3.6   |  22  |  0.39<L>    Ca    9.8      15 May 2024 06:17  Phos  2.7     05-15  Mg     2.00     05-15u    RADIOLOGY & ADDITIONAL TESTS:  Results Reviewed:   Imaging Personally Reviewed:  Electrocardiogram Personally Reviewed:  Tele: *******************************  Keli Ag MD (PGY-2)  Internal Medicine  Contact via [employer] TEAMS  *******************************    PROGRESS NOTE:     Patient is a 59y old  Female who presents with a chief complaint of headache (15 May 2024 15:38)    INTERVAL EVENTS: Noted to have >1800cc on bladder scan, straight cath w/ 1L output. Repeat bladder scan w/ 1400cc Seen by oncology who recommended MR brain and neuro-oncology and radiation oncology evaluation as well as vEEG to evaluate for subclinical seizures    SUBJECTIVE: Patient seen and examined at bedside. This morning, the patient is lethargic. Opens eyes and follows simple commands but otherwise AAOx0-1. ROS limited    MEDICATIONS  (STANDING):  chlorhexidine 2% Cloths 1 Application(s) Topical <User Schedule>  dexAMETHasone  Injectable 2 milliGRAM(s) IV Push every 12 hours  enoxaparin Injectable 30 milliGRAM(s) SubCutaneous every 24 hours  famotidine    Tablet 20 milliGRAM(s) Oral daily  lacosamide IVPB 150 milliGRAM(s) IV Intermittent every 12 hours  lactated ringers. 1000 milliLiter(s) (75 mL/Hr) IV Continuous <Continuous>  lidocaine   4% Patch 1 Patch Transdermal daily  losartan 50 milliGRAM(s) Oral daily  melatonin 3 milliGRAM(s) Oral at bedtime  morphine ER Tablet 15 milliGRAM(s) Oral two times a day  naloxone Injectable 0.4 milliGRAM(s) IV Push once  polyethylene glycol 3350 17 Gram(s) Oral <User Schedule>  senna 2 Tablet(s) Oral at bedtime  sodium chloride 3 Gram(s) Oral three times a day    MEDICATIONS  (PRN):  acetaminophen     Tablet .. 650 milliGRAM(s) Oral every 6 hours PRN Temp greater or equal to 38C (100.4F), Mild Pain (1 - 3)  ALPRAZolam 0.5 milliGRAM(s) Oral daily PRN severe anxiety  bisacodyl Suppository 10 milliGRAM(s) Rectal daily PRN Constipation  HYDROmorphone   Tablet 4 milliGRAM(s) Oral every 4 hours PRN Moderate Pain (4 - 6)  HYDROmorphone  Injectable 1 milliGRAM(s) IV Push every 4 hours PRN Severe Pain (7 - 10)  ondansetron Injectable 4 milliGRAM(s) IV Push every 8 hours PRN Nausea and/or Vomiting    I&O's Summary    15 May 2024 07:01  -  16 May 2024 07:00  --------------------------------------------------------  IN: 0 mL / OUT: 2350 mL / NET: -2350 mL    PHYSICAL EXAM:  Vital Signs Last 24 Hrs  T(C): 36.4 (16 May 2024 05:38), Max: 37 (15 May 2024 14:37)  T(F): 97.5 (16 May 2024 05:38), Max: 98.6 (15 May 2024 14:37)  HR: 69 (16 May 2024 05:38) (61 - 99)  BP: 151/87 (16 May 2024 05:38) (127/86 - 155/95)  BP(mean): --  RR: 18 (16 May 2024 05:38) (17 - 18)  SpO2: 100% (16 May 2024 05:38) (99% - 100%)    Parameters below as of 16 May 2024 05:38  Patient On (Oxygen Delivery Method): room air    GENERAL: NAD, lying in bed comfortably  HEART: S1, S2, Regular rate and rhythm, no murmurs, rubs, or gallops  LUNGS: Unlabored respirations, clear to auscultation bilaterally, no crackles, wheezing, or rhonchi  ABDOMEN: mildly distended, but nontender to palpation, +BS  EXTREMITIES: 2+ peripheral pulses bilaterally. No clubbing, cyanosis, or edema  NERVOUS SYSTEM:  A&Ox0-1, lethargic, opens eyes intermittently and follows simple commands  SKIN: No rashes or lesions    LABS:                         13.6   9.21  )-----------( 81       ( 16 May 2024 07:00 )             39.5     05-16    134<L>  |  93<L>  |  15  ----------------------------<  113<H>  3.7   |  24  |  0.32<L>    Ca    9.8      16 May 2024 07:00  Phos  2.8     05-16  Mg     1.90     05-16    Urinalysis Basic - ( 16 May 2024 07:16 )    Color: Yellow / Appearance: Clear / S.018 / pH: x  Gluc: x / Ketone: Negative mg/dL  / Bili: Negative / Urobili: 0.2 mg/dL   Blood: x / Protein: Negative mg/dL / Nitrite: Negative   Leuk Esterase: Negative / RBC: x / WBC x   Sq Epi: x / Non Sq Epi: x / Bacteria: x    RADIOLOGY & ADDITIONAL TESTS:  Results Reviewed:   Imaging Personally Reviewed:  Electrocardiogram Personally Reviewed:  Tele:

## 2024-05-16 NOTE — CHART NOTE - NSCHARTNOTEFT_GEN_A_CORE
EEG preliminary read (not final) on the initial recording hour(s) = x 3    No seizures recorded.  Left frontal cerebral dysfunction.  Moderate slowing noted, nonspecific.  Final report to follow tomorrow morning after completion of study.    [facility] EEG Reading Room Ph#: [phone]  Epilepsy Answering Service after 5PM and before 8:30AM: Ph#: [phone]

## 2024-05-16 NOTE — CONSULT NOTE ADULT - ASSESSMENT
59y (1965) woman with a PMHx significant for NSCLC w/ mets to brain, on immunotx s/p lung RT 3/1/24, GKRS, T3-T4 compression fractures presenting to [facility] on 5/10 for progressively worsening headache. s/p GK brain RT completed 4/23/24  (sites: Left frontal, and Right frontal.) s/p R lung RT 2/20/24. Neurology consulted d/t AMS. Per primary team on arrival patient AA0x3. Was found to be hyponatremic 123. For the past 2-3 days, she has become progressively more confused and less interactive. Primary team stating she is waxing and waning. CTH with no acute intracranial hemorrhage or mass effect.  Left frontal lesion seen with associated areas of hemorrhage (expected evolutionary changes). She is currently being maintained on VImpat 150mg bid and previously was on Keppra (switched 4/24 d/t thrombocytopenia?). No seizures noted since medication switch. Previous notes indicate prior seizures are GTC by phenomenology. On decadron 2q12. LOPEZ has managed with opioids and migraine cocktail.     Impression: Diffuse cerebral dysfunction of unclear etiology DDX includes but not limited to seizures vs worseningintracranial mets (although CT does not show interval change) vs paraneoplastic  vs toxic/metabolic disturbances vs hospital delirium.     Recommendations:   Diagnostics:  [] 24 hour VEEG to evaluate for focal slowing, epileptiform discharges, or seizures  [] MRI brain w/w/o con   [] Send serum paraneoplastic panel now   [] IF not improving over the next few days and work up otherwise unremarkable consider LP       Medications:  [] Continue with patient home AED: Vimpat 150 mg bid    [] Steroids as per NSG   [] Lorazepam 2mg IV PRN for seizure activity lasting >3-5mins, >2x/hr, >3x/day, or if GTC , repeat after 1 minute (max dose 0.1 mg/kg)      Management:   [] Telemetry monitoring; Neurochecks/VS per unit protocol  [] Seizure, fall and aspiration precautions. Avoid sleep deprivation.     Case to be seen by attending.  Note incomplete until finalized by attending signature/attestation.

## 2024-05-16 NOTE — RAPID RESPONSE TEAM SUMMARY - NSSITUATIONBACKGROUNDRRT_GEN_ALL_CORE
59F w/ NSCLC s/p RT (3/2024) on chemotherapy w/ bilateral frontal brain metastases s/p GKRS (4/2024), HTN, GERD, admitted w/ progressively worsening headache, found to have hyponatremia, c/c/b progressive encephalopathy and urinary retention.    RRT called for sudden onset 9/10 chest pain and discomfort. ECG performed prior to arrival with STEMI V2-V5 with possible reciprocal changes in II-III. Deep T waves from cerebral pathology considered but with J point elevation & multiple stable scans w/o Cushingoid vitals & w/o obtunded neuro exam, suspect less likely.     ASA 324mg PO given, but plavix & heparin avoided ico known hx of brain mets with ICH in the past. NSGY contacted, usually want 2 weeks of stability before AC; she's outside of that window technically, but rec additional CTH prior to AC.    [email or web address] emailed. STEMI attg concerned about cancer prognosis & ICH.

## 2024-05-16 NOTE — PROGRESS NOTE ADULT - PROBLEM SELECTOR PLAN 2
Patient w/ firm, distended abdomen, found to have >1800cc urine on bladder scan 5/15 PM s/p straight cath w/ 1L urine output. Napier placed overnight 5/15  - possibly 2/2 constipation from opioid use  - no fever or leukocytosis    Plan:  - f/u CT abdomen/pelvis to assess for ileus versus obstruction  - f/u U/A to assess for UTI  - bladder scans q8h  - monitor strict I/Os, SCr, UOP Patient w/ firm, distended abdomen, found to have >1800cc urine on bladder scan 5/15 PM s/p straight cath w/ 1L urine output. Napier placed overnight 5/15  - possibly 2/2 constipation from opioid use  - no fever or leukocytosis, U/A w/o evidence of infection    Plan:  - f/u CT abdomen/pelvis to assess for ileus versus obstruction  - monitor bladder scans q8h  - monitor strict I/Os, SCr, UOP Patient w/ firm, distended abdomen, found to have >1800cc urine on bladder scan 5/15 PM s/p straight cath w/ 1L urine output. Napier placed overnight 5/15  - possibly 2/2 chronic opioid use  - no fevers, leukocytosis, or other findings c/f infection (though patient may not mount fevers 2/2 chemotherapy)  - U/A w/o evidence of infection    Plan:  - f/u CT abdomen/pelvis for further assessment  - monitor bladder scans q8h  - monitor strict I/Os, SCr, UOP

## 2024-05-16 NOTE — CONSULT NOTE ADULT - ATTENDING COMMENTS
Pt seen and examined, 58 yo w with stage IV SCLC with known mets to brain on Pem/Pem/Carbo with recent RT to the brain admitted for worsening headahces, found to have SIADH and worsening altered mental status. Of note, tumor had KRAS G12 mut which may be an option once pt stable. No role for inpatient chemotherapy. Consider MRI brain and neuro consult.   A/w above
Ms. Moreno is a 58 yo woman with NSCLCa with brain metastases with worsening encephalopathy.   I agree with work up and management as above.   Plan developed with the guidance of the neuro-oncology team.   Thank you.

## 2024-05-16 NOTE — PROGRESS NOTE ADULT - PROBLEM SELECTOR PLAN 9
DVT PPx: Lovenox  Diet: DASH/TLC  Code Status: full code (see [facility] note 5/15)  Dispo: [facility] DVT PPx: Lovenox  Diet: DASH/TLC  Code Status: full code (see [facility] note 5/15)  Dispo: pending clinical improvement, eventually [facility]

## 2024-05-16 NOTE — PROGRESS NOTE ADULT - PROBLEM SELECTOR PLAN 3
Hx of NSCLC s/p RT (3/2024) w/ b/l frontal brain mets s/p GKRS, follows Dr. Rao at [facility], [place]. Unclear if metastases are cause for headache given absence of significant headache prior to GKRS. Also potential etiologies include hyponatremia and less likely migraine. Hyponatremia may also be driving lethargy given 2 day history of worsening symptoms per patient.  - CT head (5/10): stable findings compared to prior CT head, no acute intracranial hemorrhage or mass effect  - repeat CT head (5/14): stable findings    Plan:  - neurosurgery consulted on admission; appreciate recs- no acute intervention  - oncology following; appreciate recs  - neuro-oncology consulted; f/u recs  - radiation oncology consulted; f/u recs  - c/w IV dexamethasone 2mg BID  - c/w IV Vimpat 150mg BID for seizure ppx  - seizure precautions Hx of NSCLC s/p RT (3/2024) w/ b/l frontal brain mets s/p GKRS, follows Dr. Rao at [facility]. Unclear if metastases are cause for headache given absence of significant headache prior to GKRS. Also potential etiologies include hyponatremia and less likely migraine. Hyponatremia may also be driving lethargy given 2 day history of worsening symptoms per patient.  - CT head (5/10): stable findings compared to prior CT head, no acute intracranial hemorrhage or mass effect  - repeat CT head (5/14): stable findings    Plan:  - neurosurgery consulted on admission; appreciate recs- no acute intervention  - oncology following; appreciate recs  - radiation oncology following; appreciate recs- no role for repeat RT  - neuro-oncology consulted; f/u recs  - c/w IV dexamethasone 2mg BID  - c/w IV Vimpat 150mg BID for seizure ppx  - management of encephalopathy as above  - seizure precautions

## 2024-05-16 NOTE — PROVIDER CONTACT NOTE (CRITICAL VALUE NOTIFICATION) - ACTION/TREATMENT ORDERED:
Continue RRT and patient being transferred to CCU will re-draw next set
NP will wait for BMP results and then repeat .

## 2024-05-16 NOTE — PROVIDER CONTACT NOTE (OTHER) - SITUATION
Pt refused morning labs
pt extremely slow to answer, nurse received in report aox4 unsure. upon assessment pt is aox2, very lethargic and confused, reporting to md per policy and for assessment. pt also...
during the day, pt bladder scan read >1800cc. at 0245am, bladder scan read >1400cc with a very distended bladder.
pt refusing am meds

## 2024-05-16 NOTE — PROVIDER CONTACT NOTE (OTHER) - BACKGROUND
Pt admitted for weakness
... refusing to take any pm medications.
chronic cancer related pain
pt recently retaining urine.

## 2024-05-16 NOTE — CONSULT NOTE ADULT - SUBJECTIVE AND OBJECTIVE BOX
HPI:  58y  F pt of Dr. Nicole, also of Dr. Teagan lemus onc had been treated by brain RT GK with Dr. Tana Boswell , having h/o NSCLC w/ mets to brain, on immunotx s/p lung RT 3/1/24, GKRS, T3-T4 compression fractures presenting for progressively worsening headache.     Pt accompanied by daughter who notes pt has had headaches that are constant but waxing and waning since GKRS ~2 weeks ago - most acutely worsening over the past 2 days. She also endorses tremulousness but no loss of consciousness. Pt notified neurosurg re: headache around 1 week ago regarding worsening headaches and recommended increasing decadron to 4mg TID. Endorses lethargy, nausea, weakness, and tremulousness. Denies fever, chills, falls, changes in vision, dizziness, photophobia, phonophobia, paresthesia.    Bedside, she is somnolent but arousable. Nods off during conversations.  She is on ativan, also decadron low dosage as below.    She is on decadron 2mg po BID now.  CT head done by no MRI.    < from: CT Head No Cont (05.14.24 @ 13:28) >  The visualized paranasal sinuses mastoid and middle ear are clear.    IMPRESSION: Left frontal lesion again seen with associated areas of   hemorrhage. Overall this area of hemorrhage has demonstrated expected   evolutionary changes.              Allergies    No Known Allergies    Intolerances    morphine (Sedation/Somnol)      ROS: [  ] Fever  [  ] Chills  [  ]Chest Pain [  ] SOB  [  ]Cough [  ] N/V  [  ] Diarrhea [  ]Constipation [  ]Other ROS:  [  ] ROS otherwise negative    PAST MEDICAL & SURGICAL HISTORY:  No pertinent past medical history    Mass of right lung    COPD (chronic obstructive pulmonary disease)    Smoker    Chronic pain syndrome    HLD (hyperlipidemia)    Melanoma in situ    History of blood transfusion    Malignant neoplasm of right bronchus    No significant past surgical history    H/O reduction of orbital fracture    S/P wrist surgery        FAMILY HISTORY:  FH: heart attack (Father, Mother)    FH: stomach cancer (Grandparent)    FH: lung cancer (Sibling)        MEDICATIONS  (STANDING):  chlorhexidine 2% Cloths 1 Application(s) Topical <User Schedule>  dexAMETHasone  Injectable 2 milliGRAM(s) IV Push every 12 hours  enoxaparin Injectable 30 milliGRAM(s) SubCutaneous every 24 hours  famotidine    Tablet 20 milliGRAM(s) Oral daily  lacosamide IVPB 150 milliGRAM(s) IV Intermittent every 12 hours  lactated ringers. 1000 milliLiter(s) (75 mL/Hr) IV Continuous <Continuous>  lidocaine   4% Patch 1 Patch Transdermal daily  LORazepam   Injectable 0.5 milliGRAM(s) IV Push once  losartan 50 milliGRAM(s) Oral daily  melatonin 3 milliGRAM(s) Oral at bedtime  morphine ER Tablet 15 milliGRAM(s) Oral two times a day  naloxone Injectable 0.4 milliGRAM(s) IV Push once  polyethylene glycol 3350 17 Gram(s) Oral two times a day  senna 2 Tablet(s) Oral at bedtime  sodium chloride 3 Gram(s) Oral three times a day    MEDICATIONS  (PRN):  acetaminophen     Tablet .. 650 milliGRAM(s) Oral every 6 hours PRN Temp greater or equal to 38C (100.4F), Mild Pain (1 - 3)  bisacodyl Suppository 10 milliGRAM(s) Rectal daily PRN Constipation  HYDROmorphone   Tablet 4 milliGRAM(s) Oral every 4 hours PRN Moderate Pain (4 - 6)  HYDROmorphone  Injectable 1 milliGRAM(s) IV Push every 4 hours PRN Severe Pain (7 - 10)  ondansetron Injectable 4 milliGRAM(s) IV Push every 8 hours PRN Nausea and/or Vomiting      PHYSICAL EXAM  Vital Signs Last 24 Hrs  T(C): 36.4 (16 May 2024 05:38), Max: 37 (15 May 2024 14:37)  T(F): 97.5 (16 May 2024 05:38), Max: 98.6 (15 May 2024 14:37)  HR: 69 (16 May 2024 05:38) (61 - 99)  BP: 151/87 (16 May 2024 05:38) (135/89 - 155/95)  BP(mean): --  RR: 18 (16 May 2024 05:38) (17 - 18)  SpO2: 100% (16 May 2024 05:38) (99% - 100%)    Parameters below as of 16 May 2024 05:38  Patient On (Oxygen Delivery Method): room air      KPS 50  General: very somnolent but arousable, nods off during conversation  HEENT: NC/AT; EOMI, PERRL, sclera nonicteric; external ears normal; no rhinorrhea or epistaxis; mucous membranes moist; oropharynx clear and without erythema  CV: NR, RR; no appreciable r/m/g  Lungs: CTAB, no increased work of breathing  Abdomen: Bowel sounds present; soft, NTND  MSK: Vertebral spine non-tender to palpation  Neuro: AAOx3; cranial nerves II-XII intact; strength 5/5 in upper and lower extremities; sensation to light touch in tact bilaterally.  skin: upper chest hyperpigmented s/p RT      IMAGING/LABS/PATHOLOGY: I have personally reviewed the relevant labs, pathology, and imaging as noted in the HPI.  In addition,                          13.6   9.21  )-----------( 81       ( 16 May 2024 07:00 )             39.5     05-16    134<L>  |  93<L>  |  15  ----------------------------<  113<H>  3.7   |  24  |  0.32<L>    Ca    9.8      16 May 2024 07:00  Phos  2.8     05-16  Mg     1.90     05-16          ASSESSMENT/PLAN    KAELYN VALENTE is a 59y woman with h/o NSCLC , brain mets, s/p lung RT, s/p GK RT last month, presenting with worsening headaches s/p GK brain RT.  Seen very somnolent, arousable but nods off again during conversation.  CT head in chart but no MRI.    Recommend MRI with contrast of brain.  It is very unlikely that her symptoms are related to post GK RT.  R/o encephalopathy.  Continue steroids and supportive care.  No role for further RT. HPI:  58y  F pt , Maltese speaking, patient of Dr. Nicole, also of Dr. Teagan lemus onc had been treated by brain RT GK with Dr. Tana Boswell , having h/o NSCLC w/ mets to brain, on immunotx s/p lung RT 3/1/24, GKRS, T3-T4 compression fractures presenting for progressively worsening headache.  s/p GK brain RT completed 4/23/24  s/p R lung RT 2/20/24      Pt accompanied by daughter who notes pt has had headaches that are constant but waxing and waning since GKRS ~2 weeks ago - most acutely worsening over the past 2 days. She also endorses tremulousness but no loss of consciousness. Pt notified neurosurg re: headache around 1 week ago regarding worsening headaches and recommended increasing decadron to 4mg TID. Endorses lethargy, nausea, weakness, and tremulousness. Denies fever, chills, falls, changes in vision, dizziness, photophobia, phonophobia, paresthesia.    Bedside, she is somnolent but arousable. Nods off during conversations.  She is on ativan, also decadron low dosage as below.    She is on decadron 2mg po BID now.  CT head done by no MRI.    < from: CT Head No Cont (05.14.24 @ 13:28) >  The visualized paranasal sinuses mastoid and middle ear are clear.    IMPRESSION: Left frontal lesion again seen with associated areas of   hemorrhage. Overall this area of hemorrhage has demonstrated expected   evolutionary changes.              Allergies    No Known Allergies    Intolerances    morphine (Sedation/Somnol)      ROS: [  ] Fever  [  ] Chills  [  ]Chest Pain [  ] SOB  [  ]Cough [  ] N/V  [  ] Diarrhea [  ]Constipation [  ]Other ROS:  [  ] ROS otherwise negative    PAST MEDICAL & SURGICAL HISTORY:  No pertinent past medical history    Mass of right lung    COPD (chronic obstructive pulmonary disease)    Smoker    Chronic pain syndrome    HLD (hyperlipidemia)    Melanoma in situ    History of blood transfusion    Malignant neoplasm of right bronchus    No significant past surgical history    H/O reduction of orbital fracture    S/P wrist surgery        FAMILY HISTORY:  FH: heart attack (Father, Mother)    FH: stomach cancer (Grandparent)    FH: lung cancer (Sibling)        MEDICATIONS  (STANDING):  chlorhexidine 2% Cloths 1 Application(s) Topical <User Schedule>  dexAMETHasone  Injectable 2 milliGRAM(s) IV Push every 12 hours  enoxaparin Injectable 30 milliGRAM(s) SubCutaneous every 24 hours  famotidine    Tablet 20 milliGRAM(s) Oral daily  lacosamide IVPB 150 milliGRAM(s) IV Intermittent every 12 hours  lactated ringers. 1000 milliLiter(s) (75 mL/Hr) IV Continuous <Continuous>  lidocaine   4% Patch 1 Patch Transdermal daily  LORazepam   Injectable 0.5 milliGRAM(s) IV Push once  losartan 50 milliGRAM(s) Oral daily  melatonin 3 milliGRAM(s) Oral at bedtime  morphine ER Tablet 15 milliGRAM(s) Oral two times a day  naloxone Injectable 0.4 milliGRAM(s) IV Push once  polyethylene glycol 3350 17 Gram(s) Oral two times a day  senna 2 Tablet(s) Oral at bedtime  sodium chloride 3 Gram(s) Oral three times a day    MEDICATIONS  (PRN):  acetaminophen     Tablet .. 650 milliGRAM(s) Oral every 6 hours PRN Temp greater or equal to 38C (100.4F), Mild Pain (1 - 3)  bisacodyl Suppository 10 milliGRAM(s) Rectal daily PRN Constipation  HYDROmorphone   Tablet 4 milliGRAM(s) Oral every 4 hours PRN Moderate Pain (4 - 6)  HYDROmorphone  Injectable 1 milliGRAM(s) IV Push every 4 hours PRN Severe Pain (7 - 10)  ondansetron Injectable 4 milliGRAM(s) IV Push every 8 hours PRN Nausea and/or Vomiting      PHYSICAL EXAM  Vital Signs Last 24 Hrs  T(C): 36.4 (16 May 2024 05:38), Max: 37 (15 May 2024 14:37)  T(F): 97.5 (16 May 2024 05:38), Max: 98.6 (15 May 2024 14:37)  HR: 69 (16 May 2024 05:38) (61 - 99)  BP: 151/87 (16 May 2024 05:38) (135/89 - 155/95)  BP(mean): --  RR: 18 (16 May 2024 05:38) (17 - 18)  SpO2: 100% (16 May 2024 05:38) (99% - 100%)    Parameters below as of 16 May 2024 05:38  Patient On (Oxygen Delivery Method): room air      KPS 50  General: very somnolent but arousable, nods off during conversation  HEENT: NC/AT; EOMI, PERRL, sclera nonicteric; external ears normal; no rhinorrhea or epistaxis; mucous membranes moist; oropharynx clear and without erythema  CV: NR, RR; no appreciable r/m/g  Lungs: CTAB, no increased work of breathing  Abdomen: Bowel sounds present; soft, NTND  MSK: Vertebral spine non-tender to palpation  Neuro: AAOx3; cranial nerves II-XII intact; strength 5/5 in upper and lower extremities; sensation to light touch in tact bilaterally.  skin: upper chest hyperpigmented s/p RT      IMAGING/LABS/PATHOLOGY: I have personally reviewed the relevant labs, pathology, and imaging as noted in the HPI.  In addition,                          13.6   9.21  )-----------( 81       ( 16 May 2024 07:00 )             39.5     05-16    134<L>  |  93<L>  |  15  ----------------------------<  113<H>  3.7   |  24  |  0.32<L>    Ca    9.8      16 May 2024 07:00  Phos  2.8     05-16  Mg     1.90     05-16          ASSESSMENT/PLAN    KAELYN VALENTE is a 59y woman with h/o NSCLC , brain mets, s/p lung RT, s/p GK RT last month, presenting with worsening headaches s/p GK brain RT.  Seen very somnolent, arousable but nods off again during conversation.  CT head in chart but no MRI.    Recommend MRI with contrast of brain.  It is very unlikely that her symptoms are related to post GK RT.  R/o encephalopathy.  Continue steroids and supportive care.  No role for further RT. HPI:  58y  F pt , Slovenian speaking, patient of Dr. Nicole, also of Dr. Teagan lemus onc had been treated by brain RT GK with Dr. Tana Boswell , having h/o NSCLC w/ mets to brain, on immunotx s/p lung RT 3/1/24, GKRS, T3-T4 compression fractures presenting for progressively worsening headache.  s/p GK brain RT completed 4/23/24  (sites: Left frontal, and Right frontal.)  s/p R lung RT 2/20/24      Pt accompanied by daughter who notes pt has had headaches that are constant but waxing and waning since GKRS ~2 weeks ago - most acutely worsening over the past 2 days. She also endorses tremulousness but no loss of consciousness. Pt notified neurosurg re: headache around 1 week ago regarding worsening headaches and recommended increasing decadron to 4mg TID. Endorses lethargy, nausea, weakness, and tremulousness. Denies fever, chills, falls, changes in vision, dizziness, photophobia, phonophobia, paresthesia.    Bedside, she is somnolent but arousable. Nods off during conversations.  She is on ativan, also decadron low dosage as below.    She is on decadron 2mg po BID now.  CT head done by no MRI.    < from: CT Head No Cont (05.14.24 @ 13:28) >  The visualized paranasal sinuses mastoid and middle ear are clear.    IMPRESSION: Left frontal lesion again seen with associated areas of   hemorrhage. Overall this area of hemorrhage has demonstrated expected   evolutionary changes.              Allergies    No Known Allergies    Intolerances    morphine (Sedation/Somnol)      ROS: [  ] Fever  [  ] Chills  [  ]Chest Pain [  ] SOB  [  ]Cough [  ] N/V  [  ] Diarrhea [  ]Constipation [  ]Other ROS:  [  ] ROS otherwise negative    PAST MEDICAL & SURGICAL HISTORY:  No pertinent past medical history    Mass of right lung    COPD (chronic obstructive pulmonary disease)    Smoker    Chronic pain syndrome    HLD (hyperlipidemia)    Melanoma in situ    History of blood transfusion    Malignant neoplasm of right bronchus    No significant past surgical history    H/O reduction of orbital fracture    S/P wrist surgery        FAMILY HISTORY:  FH: heart attack (Father, Mother)    FH: stomach cancer (Grandparent)    FH: lung cancer (Sibling)        MEDICATIONS  (STANDING):  chlorhexidine 2% Cloths 1 Application(s) Topical <User Schedule>  dexAMETHasone  Injectable 2 milliGRAM(s) IV Push every 12 hours  enoxaparin Injectable 30 milliGRAM(s) SubCutaneous every 24 hours  famotidine    Tablet 20 milliGRAM(s) Oral daily  lacosamide IVPB 150 milliGRAM(s) IV Intermittent every 12 hours  lactated ringers. 1000 milliLiter(s) (75 mL/Hr) IV Continuous <Continuous>  lidocaine   4% Patch 1 Patch Transdermal daily  LORazepam   Injectable 0.5 milliGRAM(s) IV Push once  losartan 50 milliGRAM(s) Oral daily  melatonin 3 milliGRAM(s) Oral at bedtime  morphine ER Tablet 15 milliGRAM(s) Oral two times a day  naloxone Injectable 0.4 milliGRAM(s) IV Push once  polyethylene glycol 3350 17 Gram(s) Oral two times a day  senna 2 Tablet(s) Oral at bedtime  sodium chloride 3 Gram(s) Oral three times a day    MEDICATIONS  (PRN):  acetaminophen     Tablet .. 650 milliGRAM(s) Oral every 6 hours PRN Temp greater or equal to 38C (100.4F), Mild Pain (1 - 3)  bisacodyl Suppository 10 milliGRAM(s) Rectal daily PRN Constipation  HYDROmorphone   Tablet 4 milliGRAM(s) Oral every 4 hours PRN Moderate Pain (4 - 6)  HYDROmorphone  Injectable 1 milliGRAM(s) IV Push every 4 hours PRN Severe Pain (7 - 10)  ondansetron Injectable 4 milliGRAM(s) IV Push every 8 hours PRN Nausea and/or Vomiting      PHYSICAL EXAM  Vital Signs Last 24 Hrs  T(C): 36.4 (16 May 2024 05:38), Max: 37 (15 May 2024 14:37)  T(F): 97.5 (16 May 2024 05:38), Max: 98.6 (15 May 2024 14:37)  HR: 69 (16 May 2024 05:38) (61 - 99)  BP: 151/87 (16 May 2024 05:38) (135/89 - 155/95)  BP(mean): --  RR: 18 (16 May 2024 05:38) (17 - 18)  SpO2: 100% (16 May 2024 05:38) (99% - 100%)    Parameters below as of 16 May 2024 05:38  Patient On (Oxygen Delivery Method): room air      KPS 50  General: very somnolent but arousable, nods off during conversation  HEENT: NC/AT; EOMI, PERRL, sclera nonicteric; external ears normal; no rhinorrhea or epistaxis; mucous membranes moist; oropharynx clear and without erythema  CV: NR, RR; no appreciable r/m/g  Lungs: CTAB, no increased work of breathing  Abdomen: Bowel sounds present; soft, NTND  MSK: Vertebral spine non-tender to palpation  Neuro: AAOx3; cranial nerves II-XII intact; strength 5/5 in upper and lower extremities; sensation to light touch in tact bilaterally.  skin: upper chest hyperpigmented s/p RT      IMAGING/LABS/PATHOLOGY: I have personally reviewed the relevant labs, pathology, and imaging as noted in the HPI.  In addition,                          13.6   9.21  )-----------( 81       ( 16 May 2024 07:00 )             39.5     05-16    134<L>  |  93<L>  |  15  ----------------------------<  113<H>  3.7   |  24  |  0.32<L>    Ca    9.8      16 May 2024 07:00  Phos  2.8     05-16  Mg     1.90     05-16          ASSESSMENT/PLAN    KAELYN VALENTE is a 59y woman with h/o NSCLC , brain mets, s/p lung RT, s/p GK RT last month, presenting with worsening headaches s/p GK brain RT.  Seen very somnolent, arousable but nods off again during conversation.  CT head in chart but no MRI.    Recommend MRI with contrast of brain.  It is very unlikely that her symptoms are related to post GK RT.  R/o encephalopathy.  Continue steroids and supportive care.  No role for further RT. HPI:  58y  F pt  patient of Dr. Nicole, also of Dr. Candelaria rad onc had been treated by brain RT GK with Dr. Tana Boswell , having h/o NSCLC w/ mets to brain, on immunotx s/p lung RT 3/1/24, GKRS, T3-T4 compression fractures presenting for progressively worsening headache.  s/p GK brain RT completed 4/23/24  (sites: Left frontal, and Right frontal.)  s/p R lung RT 2/20/24      Pt was accompanied by daughter who noted pt has had headaches that are constant but waxing and waning since GKRS ~2 weeks ago - most acutely worsening over the past 2 days. She also endorses tremulousness but no loss of consciousness. Pt notified neurosurg re: headache around 1 week ago regarding worsening headaches and recommended increasing decadron to 4mg TID. Endorses lethargy, nausea, weakness, and tremulousness. Denies fever, chills, falls, changes in vision, dizziness, photophobia, phonophobia, paresthesia.    Bedside, she is somnolent but arousable. Nods off during conversations.  She is on ativan, also decadron low dosage as below.    She is on decadron 2mg po BID now.  CT head done by no MRI.    < from: CT Head No Cont (05.14.24 @ 13:28) >  The visualized paranasal sinuses mastoid and middle ear are clear.    IMPRESSION: Left frontal lesion again seen with associated areas of   hemorrhage. Overall this area of hemorrhage has demonstrated expected   evolutionary changes.      NSG note:   60yo F PMH stage IV R lung cancer w/ brain mets w/ GK approx 2 weeks ago presenting with headaches and lethargy. CTH done- stable. Hyponatremic (Na 126) could be contributing to lethargy- rec repleting. Pt currently on dexamethasone 4mg [id number], would rec tapering steroids to 2BID, can also consult rad/onc. No acute neurosurgical intervention. Reconsult PRN. [id number] Case d/w attending Dr. Nicole.          Allergies    No Known Allergies    Intolerances    morphine (Sedation/Somnol)      ROS: [  ] Fever  [  ] Chills  [  ]Chest Pain [  ] SOB  [  ]Cough [  ] N/V  [  ] Diarrhea [  ]Constipation [  ]Other ROS:  [  ] ROS otherwise negative    PAST MEDICAL & SURGICAL HISTORY:  No pertinent past medical history    Mass of right lung    COPD (chronic obstructive pulmonary disease)    Smoker    Chronic pain syndrome    HLD (hyperlipidemia)    Melanoma in situ    History of blood transfusion    Malignant neoplasm of right bronchus    No significant past surgical history    H/O reduction of orbital fracture    S/P wrist surgery        FAMILY HISTORY:  FH: heart attack (Father, Mother)    FH: stomach cancer (Grandparent)    FH: lung cancer (Sibling)        MEDICATIONS  (STANDING):  chlorhexidine 2% Cloths 1 Application(s) Topical <User Schedule>  dexAMETHasone  Injectable 2 milliGRAM(s) IV Push every 12 hours  enoxaparin Injectable 30 milliGRAM(s) SubCutaneous every 24 hours  famotidine    Tablet 20 milliGRAM(s) Oral daily  lacosamide IVPB 150 milliGRAM(s) IV Intermittent every 12 hours  lactated ringers. 1000 milliLiter(s) (75 mL/Hr) IV Continuous <Continuous>  lidocaine   4% Patch 1 Patch Transdermal daily  LORazepam   Injectable 0.5 milliGRAM(s) IV Push once  losartan 50 milliGRAM(s) Oral daily  melatonin 3 milliGRAM(s) Oral at bedtime  morphine ER Tablet 15 milliGRAM(s) Oral two times a day  naloxone Injectable 0.4 milliGRAM(s) IV Push once  polyethylene glycol 3350 17 Gram(s) Oral two times a day  senna 2 Tablet(s) Oral at bedtime  sodium chloride 3 Gram(s) Oral three times a day    MEDICATIONS  (PRN):  acetaminophen     Tablet .. 650 milliGRAM(s) Oral every 6 hours PRN Temp greater or equal to 38C (100.4F), Mild Pain (1 - 3)  bisacodyl Suppository 10 milliGRAM(s) Rectal daily PRN Constipation  HYDROmorphone   Tablet 4 milliGRAM(s) Oral every 4 hours PRN Moderate Pain (4 - 6)  HYDROmorphone  Injectable 1 milliGRAM(s) IV Push every 4 hours PRN Severe Pain (7 - 10)  ondansetron Injectable 4 milliGRAM(s) IV Push every 8 hours PRN Nausea and/or Vomiting      PHYSICAL EXAM  Vital Signs Last 24 Hrs  T(C): 36.4 (16 May 2024 05:38), Max: 37 (15 May 2024 14:37)  T(F): 97.5 (16 May 2024 05:38), Max: 98.6 (15 May 2024 14:37)  HR: 69 (16 May 2024 05:38) (61 - 99)  BP: 151/87 (16 May 2024 05:38) (135/89 - 155/95)  BP(mean): --  RR: 18 (16 May 2024 05:38) (17 - 18)  SpO2: 100% (16 May 2024 05:38) (99% - 100%)    Parameters below as of 16 May 2024 05:38  Patient On (Oxygen Delivery Method): room air      KPS 50  General: very somnolent but arousable, nods off during conversation  HEENT: NC/AT; EOMI, PERRL, sclera nonicteric; external ears normal; no rhinorrhea or epistaxis; mucous membranes moist; oropharynx clear and without erythema  CV: NR, RR; no appreciable r/m/g  Lungs: CTAB, no increased work of breathing  Abdomen: Bowel sounds present; soft, NTND  MSK: Vertebral spine non-tender to palpation  Neuro: AAOx3; cranial nerves II-XII intact; strength 5/5 in upper and lower extremities; sensation to light touch in tact bilaterally.  skin: upper chest hyperpigmented s/p RT      IMAGING/LABS/PATHOLOGY: I have personally reviewed the relevant labs, pathology, and imaging as noted in the HPI.  In addition,                          13.6   9.21  )-----------( 81       ( 16 May 2024 07:00 )             39.5     05-16    134<L>  |  93<L>  |  15  ----------------------------<  113<H>  3.7   |  24  |  0.32<L>    Ca    9.8      16 May 2024 07:00  Phos  2.8     05-16  Mg     1.90     05-16          ASSESSMENT/PLAN    KAELYN VALENTE is a 59y woman with h/o NSCLC , brain mets, s/p lung RT, s/p GK RT two weeks agon 4/23/24, now presenting with worsening headaches s/p GK brain RT.  Seen very somnolent, arousable but nods off again during conversation.  CT head in chart but no MRI.    Recommend MRI with contrast of brain.  It is very unlikely that her symptoms are related to post GK RT.  R/o encephalopathy, other causes, hyponatremia;  Continue steroids and supportive care.  No role for further RT.  Will f/u on MRI.  Discussed and seen with Dr. Canchola.

## 2024-05-16 NOTE — PROGRESS NOTE ADULT - PROBLEM SELECTOR PLAN 8
Hx of GERD on famotidine 20mg qd    Plan:  - c/w famotidine 20mg qd
DVT PPx: SCDs  Diet: Regular, DASH   Code status: Full code
DVT PPx: Lovenox  Diet: DASH/TLC  Code Status: full code, pending further GOC  Dispo: [facility]
DVT PPx: SCDs  Diet: Regular, DASH   Code status: Full code

## 2024-05-16 NOTE — CONSULT NOTE ADULT - SUBJECTIVE AND OBJECTIVE BOX
Neurology - Consult Note    -  Spectra: [phone] ([facility]), [phone] ([facility])  -    HPI: Patient KAELYN VALENTE is a 59y (1965) woman with a PMHx significant for NSCLC w/ mets to brain, on immunotx s/p lung RT 3/1/24, GKRS, T3-T4 compression fractures presenting to [facility] on 5/10 for progressively worsening headache. s/p GK brain RT completed 4/23/24  (sites: Left frontal, and Right frontal.) s/p R lung RT 2/20/24. Neurology consulted d/t AMS. Per primary team on arrival patient [id number]. Was found to be hyponatremic 123. For the past 2-3 days, she has become progressively more confused and less interactive. Primary team stating she is waxing and waning. CTH with no acute intracranial hemorrhage or mass effect.  Left frontal lesion seen with associated areas of hemorrhage (expected evolutionary changes). She is currently being maintained on VImpat 150mg bid and previously was on Keppra (switched 4/24 d/t thrombocytopenia?). No seizures noted since medication switch. Previous notes indicate prior seizures are GTC by phenomenology. On decadron 2q12. Wilde managed with opioids and migraine cocktail.       Review of Systems:  INCOMPLETE   CONSTITUTIONAL: No fevers or chills  EYES AND ENT: No visual changes or no throat pain   NECK: No pain or stiffness  RESPIRATORY: No hemoptysis or shortness of breath  CARDIOVASCULAR: No chest pain or palpitations  GASTROINTESTINAL: No melena or hematochezia  GENITOURINARY: No dysuria or hematuria  NEUROLOGICAL: +As stated in HPI above  SKIN: No itching, burning, rashes, or lesions   All other review of systems is negative unless indicated above.    Allergies:  No Known Allergies  morphine (Sedation/Somnol)      PMHx/PSHx/Family Hx: As above, otherwise see below   No pertinent past medical history    Mass of right lung    COPD (chronic obstructive pulmonary disease)    Smoker    Chronic pain syndrome    HLD (hyperlipidemia)    Melanoma in situ    History of blood transfusion    Malignant neoplasm of right bronchus        Social Hx:  No reported use of tobacco, alcohol, or illicit drugs    Medications:  MEDICATIONS  (STANDING):  chlorhexidine 2% Cloths 1 Application(s) Topical <User Schedule>  dexAMETHasone  Injectable 2 milliGRAM(s) IV Push every 12 hours  enoxaparin Injectable 30 milliGRAM(s) SubCutaneous every 24 hours  famotidine    Tablet 20 milliGRAM(s) Oral daily  lacosamide IVPB 150 milliGRAM(s) IV Intermittent every 12 hours  lactated ringers. 1000 milliLiter(s) (75 mL/Hr) IV Continuous <Continuous>  lidocaine   4% Patch 1 Patch Transdermal daily  LORazepam   Injectable 0.5 milliGRAM(s) IV Push once  losartan 50 milliGRAM(s) Oral daily  melatonin 3 milliGRAM(s) Oral at bedtime  morphine ER Tablet 15 milliGRAM(s) Oral two times a day  naloxone Injectable 0.4 milliGRAM(s) IV Push once  polyethylene glycol 3350 17 Gram(s) Oral two times a day  senna 2 Tablet(s) Oral at bedtime  sodium chloride 3 Gram(s) Oral three times a day    MEDICATIONS  (PRN):  acetaminophen     Tablet .. 650 milliGRAM(s) Oral every 6 hours PRN Temp greater or equal to 38C (100.4F), Mild Pain (1 - 3)  bisacodyl Suppository 10 milliGRAM(s) Rectal daily PRN Constipation  HYDROmorphone   Tablet 4 milliGRAM(s) Oral every 4 hours PRN Moderate Pain (4 - 6)  HYDROmorphone  Injectable 1 milliGRAM(s) IV Push every 4 hours PRN Severe Pain (7 - 10)  ondansetron Injectable 4 milliGRAM(s) IV Push every 8 hours PRN Nausea and/or Vomiting        Home Medications:  acetaminophen 325 mg oral tablet: 2 tab(s) orally every 6 hours As needed Temp greater or equal to 38C (100.4F), Mild Pain (1 - 3) (10 May 2024 06:10)  dexAMETHasone 2 mg oral tablet: 1 tab(s) orally every 12 hours (12 May 2024 13:08)  HYDROmorphone 4 mg oral tablet: 1 tab(s) orally every 6 hours as needed for pain 1-2 tab orally every 6-8 hours PRN (10 May 2024 07:00)  lacosamide 150 mg oral tablet: 1 tab(s) orally 2 times a day (12 May 2024 13:08)  losartan 50 mg oral tablet: 1 tab(s) orally once a day (12 May 2024 13:08)  melatonin 5 mg oral tablet: 1 tab(s) orally once a day (at bedtime) (10 May 2024 07:00)  MS Contin 15 mg oral tablet, extended release: 1 tab(s) orally every 12 hours as needed for  severe pain (10 May 2024 06:10)  polyethylene glycol 3350 oral powder for reconstitution: 17 gram(s) orally once a day (10 May 2024 06:10)  senna leaf extract oral tablet: 2 tab(s) orally once a day (at bedtime) (10 May 2024 06:10)  Xanax 0.5 mg oral tablet: 1 tab(s) orally once a day as needed for severe anxiety (10 May 2024 07:00)      Vitals:  T(C): 36.4 (05-16-24 @ 05:38), Max: 37 (05-15-24 @ 14:37)  HR: 69 (05-16-24 @ 10:01) (61 - 99)  BP: 149/94 (05-16-24 @ 10:01) (135/89 - 155/95)  RR: 17 (05-16-24 @ 10:01) (17 - 18)  SpO2: 97% (05-16-24 @ 10:01) (97% - 100%)    Physical Examination: INCOMPLETE  General - NAD  Cardiovascular - Peripheral pulses palpable, no edema    Neurologic Exam:  Mental status - Awake, Alert, Oriented to person, place, and time. Speech fluent, repetition and naming intact. Follows simple and complex commands. attention, concentration and fund of knowledge intact.     Cranial nerves:  CN II: Visual fields are full to confrontation. Pupils are 3 mm and briskly reactive to light.   CN III, IV, VI: EOMI, no nystagmus, no ptosis  CN V: Facial sensation is intact to pinprick in all 3 divisions bilaterally.  CN VII: Face is symmetric with normal eye closure and smile.  CN VII: Hearing is normal to rubbing fingers  CN IX, X: Palate elevates symmetrically. Phonation is normal.  CN XI: Head turning and shoulder shrug are intact  CN XII: Tongue is midline with normal movements and no atrophy.    Motor - Normal bulk and tone throughout. No pronator drift.  Strength testing            Deltoid      Biceps      Triceps     Wrist Extension    Wrist Flexion       R            5                 5               5                     5                              5                        5  L             5                 5               5                     5                              5                       5              Hip Flexion    Hip Extension    Knee Flexion    Knee Extension    Dorsiflexion    Plantar Flexion  R              5                           5                       5                           5                            5                          5  L              5                           5                        5                           5                            5                          5    neg hoffmans b/l    Sensation - Light touch/temperature intact throughout    DTR's -             Biceps      Triceps     Brachioradialis      Patellar    Ankle    Toes/plantar response  R             2+             2+                  2+                       2+            2+                 Down  L              2+             2+                 2+                        2+           2+                 Down    Coordination - Finger to Nose and heal to shin intact b/l. No tremors appreciated    Gait and station - Normal casual gait. Romberg (-)    Labs:                        13.6   9.21  )-----------( 81       ( 16 May 2024 07:00 )             39.5     05-16    134<L>  |  93<L>  |  15  ----------------------------<  113<H>  3.7   |  24  |  0.32<L>    Ca    9.8      16 May 2024 07:00  Phos  2.8     05-16  Mg     1.90     05-16      CAPILLARY BLOOD GLUCOSE              CSF:              Radiology:  CT Head No Cont:  (14 May 2024 13:28)  CT Head No Cont:  (10 May 2024 01:50)    < from: CT Head No Cont (05.14.24 @ 13:28) >    IMPRESSION: Left frontal lesion again seen with associated areas of   hemorrhage. Overall this area of hemorrhage has demonstrated expected   evolutionary changes.    < end of copied text >  < from: CT Head No Cont (05.10.24 @ 01:50) >    IMPRESSION:  No significant interval change in previously noted hemorrhagic masses. No   acute intracranial hemorrhage or mass effect.    < end of copied text >  < from: MR Head w/wo IV Cont (04.15.24 @ 13:13) >  IMPRESSION:    New heterogeneouslyenhancing lesion within the motor cortex of the right   high posterior frontal lobe measuring approximately 1.2 x 1.2 cm with   surrounding vasogenic edema involving the right high posterior frontal   lobe and right high parietal lobe including the entirety of the right   border cortex and the medial portion of the sensory cortex.    Previously visualized enhancing lesion in the medial left inferior   frontal lobe and left genu of the of the corpus callosum, has markedly   enlarged since prior exam, measuring approximately 2.9 cm AP x 2.5 cm TR,   previously measuring 1.3 x 1.1 cm. There is a large degree of vasogenic   edema surrounding the lesion involving the entirety of the left anterior   frontal lobe and crossing the genu of the corpus callosum. This results   in severe mass effect on the frontal horns of the bilateral ventricles,   with approximately 6 mm of left to right midline shift at the level of   the anterior falx.    Rest of the previously visualized enhancing lesions within the bilateral   cerebri and cerebelli have resolved since the prior exam.    Several new scattered additional small areas of increased T2/FLAIR   hyperintense signal in the bilateral cerebri without definite associated   enhancement on the postcontrast sequences, may reflect new vasogenic   edema from new not yet enhancing metastases.    No leptomeningeal disease is present.    --- End of Report ---    < end of copied text >  < from: MR Cervical Spine w/wo IV Cont (04.15.24 @ 13:14) >  IMPRESSION:    MRI cervical: Multiple scattered areas of T1 hypointense/STIR   hyperintense signal with associated enhancement throughout the cervical   spine, most prominent within the inferior C3 vertebral body throughout   the C4 vertebral body, suggestive ofosseous metastases, however several   areas are small enough that may reflect sequela of degenerative change.   No epidural infiltration is identified. Additional degenerative changes   as above.    MRI thoracic: Complete infiltration of the T2 and T3 vertebral bodies   with abnormal T1 hypointense/T2 hyperintense and enhancing signal,   compatible with complete neoplastic infiltration. There is a moderate to   severe pathologic compression fracture of T2 and a mild to moderate   pathologic compression fracture of T3. There is epidural extension of   tumor within the right neural foramen at T2/T3 and T3/T4. There is   secondary mass effect upon the cervical cord with mild impingement on the   right ventral portion of the cord at the T3 level.No significant spinal   canal stenosis at this level. No definite abnormal signal within the cord   is identified.   There are a few punctate additional areas of abnormal   signal within the thoracic spine, suggestive of additional metastases.    MRIlumbar: No osseous metastases. No epidural infiltration. Neural   foraminal stenosis at L5/S1 secondary to a listhesis.    Please see report of CT chest abdomen and pelvis of 4/14/2024 for   intrathoracic abdominal and pelvic findings.    < end of copied text >   Neurology - Consult Note    -  Spectra: [phone] ([facility]), [phone] ([facility])  -    HPI: Patient KAELYN VALENTE is a 59y (1965) woman with a PMHx significant for NSCLC w/ mets to brain, on immunotx s/p lung RT 3/1/24, GKRS, T3-T4 compression fractures presenting to [facility] on 5/10 for progressively worsening headache. s/p GK brain RT completed 4/23/24  (sites: Left frontal, and Right frontal.) s/p R lung RT 2/20/24. Neurology consulted d/t AMS. Per primary team on arrival patient [id number]. Was found to be hyponatremic 123. For the past 2-3 days, she has become progressively more confused and less interactive. Primary team stating she is waxing and waning. CTH with no acute intracranial hemorrhage or mass effect.  Left frontal lesion seen with associated areas of hemorrhage (expected evolutionary changes). She is currently being maintained on VImpat 150mg bid and previously was on Keppra (switched 4/24 d/t thrombocytopenia?). No seizures noted since medication switch. Previous notes indicate prior seizures are GTC by phenomenology. On decadron 2q12. LOPEZ has managed with opioids and migraine cocktail.       Review of Systems:  JANINE d/t AMS    Allergies:  No Known Allergies  morphine (Sedation/Somnol)      PMHx/PSHx/Family Hx: As above, otherwise see below   No pertinent past medical history    Mass of right lung    COPD (chronic obstructive pulmonary disease)    Smoker    Chronic pain syndrome    HLD (hyperlipidemia)    Melanoma in situ    History of blood transfusion    Malignant neoplasm of right bronchus        Social Hx:  No reported use of tobacco, alcohol, or illicit drugs    Medications:  MEDICATIONS  (STANDING):  chlorhexidine 2% Cloths 1 Application(s) Topical <User Schedule>  dexAMETHasone  Injectable 2 milliGRAM(s) IV Push every 12 hours  enoxaparin Injectable 30 milliGRAM(s) SubCutaneous every 24 hours  famotidine    Tablet 20 milliGRAM(s) Oral daily  lacosamide IVPB 150 milliGRAM(s) IV Intermittent every 12 hours  lactated ringers. 1000 milliLiter(s) (75 mL/Hr) IV Continuous <Continuous>  lidocaine   4% Patch 1 Patch Transdermal daily  LORazepam   Injectable 0.5 milliGRAM(s) IV Push once  losartan 50 milliGRAM(s) Oral daily  melatonin 3 milliGRAM(s) Oral at bedtime  morphine ER Tablet 15 milliGRAM(s) Oral two times a day  naloxone Injectable 0.4 milliGRAM(s) IV Push once  polyethylene glycol 3350 17 Gram(s) Oral two times a day  senna 2 Tablet(s) Oral at bedtime  sodium chloride 3 Gram(s) Oral three times a day    MEDICATIONS  (PRN):  acetaminophen     Tablet .. 650 milliGRAM(s) Oral every 6 hours PRN Temp greater or equal to 38C (100.4F), Mild Pain (1 - 3)  bisacodyl Suppository 10 milliGRAM(s) Rectal daily PRN Constipation  HYDROmorphone   Tablet 4 milliGRAM(s) Oral every 4 hours PRN Moderate Pain (4 - 6)  HYDROmorphone  Injectable 1 milliGRAM(s) IV Push every 4 hours PRN Severe Pain (7 - 10)  ondansetron Injectable 4 milliGRAM(s) IV Push every 8 hours PRN Nausea and/or Vomiting        Home Medications:  acetaminophen 325 mg oral tablet: 2 tab(s) orally every 6 hours As needed Temp greater or equal to 38C (100.4F), Mild Pain (1 - 3) (10 May 2024 06:10)  dexAMETHasone 2 mg oral tablet: 1 tab(s) orally every 12 hours (12 May 2024 13:08)  HYDROmorphone 4 mg oral tablet: 1 tab(s) orally every 6 hours as needed for pain 1-2 tab orally every 6-8 hours PRN (10 May 2024 07:00)  lacosamide 150 mg oral tablet: 1 tab(s) orally 2 times a day (12 May 2024 13:08)  losartan 50 mg oral tablet: 1 tab(s) orally once a day (12 May 2024 13:08)  melatonin 5 mg oral tablet: 1 tab(s) orally once a day (at bedtime) (10 May 2024 07:00)  MS Contin 15 mg oral tablet, extended release: 1 tab(s) orally every 12 hours as needed for  severe pain (10 May 2024 06:10)  polyethylene glycol 3350 oral powder for reconstitution: 17 gram(s) orally once a day (10 May 2024 06:10)  senna leaf extract oral tablet: 2 tab(s) orally once a day (at bedtime) (10 May 2024 06:10)  Xanax 0.5 mg oral tablet: 1 tab(s) orally once a day as needed for severe anxiety (10 May 2024 07:00)      Vitals:  T(C): 36.4 (05-16-24 @ 05:38), Max: 37 (05-15-24 @ 14:37)  HR: 69 (05-16-24 @ 10:01) (61 - 99)  BP: 149/94 (05-16-24 @ 10:01) (135/89 - 155/95)  RR: 17 (05-16-24 @ 10:01) (17 - 18)  SpO2: 97% (05-16-24 @ 10:01) (97% - 100%)    Physical Examination:    General - cachetic, no distress   Cardiovascular - Peripheral pulses palpable, no edema    Neurologic Exam:  Mental status - Awake eyes open, lethargic slow to respond, needs verbal stimuli to maintain alertness, Oriented to person, but not place (knows shes in the hospital but stated she was at [place]), or time (2025). Speech fluent, repetition and naming intact. Follows simple and complex commands. attention, concentration and fund of knowledge intact.     Cranial nerves:  CN II: Visual fields are full to confrontation. Pupils are 3 mm and briskly reactive to light.   CN III, IV, VI: EOMI, no nystagmus, no ptosis  CN V: Facial sensation is intact to pinprick in all 3 divisions bilaterally.  CN VII: Face is symmetric with normal eye closure and smile.  CN VII: Hearing is normal to rubbing fingers  CN IX, X: Palate elevates symmetrically. Phonation is normal.  CN XI: Head turning and shoulder shrug are intact  CN XII: Tongue is midline with normal movements and no atrophy.    Motor - Normal bulk and tone throughout. No pronator drift.  Strength testing            Deltoid      Biceps      Triceps     Wrist Extension    Wrist Flexion       R            5                 5               5                     5                              5                        5  L             5                 5               5                     5                              5                       5              Hip Flexion    Hip Extension    Knee Flexion    Knee Extension    Dorsiflexion    Plantar Flexion  R              5                           5                       5                           5                            5                          5  L              5                           5                        5                           5                            5                          5    neg hoffmans b/l    Sensation - Light touch/temperature intact throughout    DTR's -             Biceps      Triceps     Brachioradialis      Patellar    Ankle    Toes/plantar response  R             2+             2+                  2+                       2+            2+                 Down  L              2+             2+                 2+                        2+           2+                 Down    Coordination - Finger to Nose and heal to shin intact b/l. No tremors appreciated    Gait and station - Normal casual gait. Romberg (-)    Labs:                        13.6   9.21  )-----------( 81       ( 16 May 2024 07:00 )             39.5     05-16    134<L>  |  93<L>  |  15  ----------------------------<  113<H>  3.7   |  24  |  0.32<L>    Ca    9.8      16 May 2024 07:00  Phos  2.8     05-16  Mg     1.90     05-16      CAPILLARY BLOOD GLUCOSE              CSF:              Radiology:  CT Head No Cont:  (14 May 2024 13:28)  CT Head No Cont:  (10 May 2024 01:50)    < from: CT Head No Cont (05.14.24 @ 13:28) >    IMPRESSION: Left frontal lesion again seen with associated areas of   hemorrhage. Overall this area of hemorrhage has demonstrated expected   evolutionary changes.    < end of copied text >  < from: CT Head No Cont (05.10.24 @ 01:50) >    IMPRESSION:  No significant interval change in previously noted hemorrhagic masses. No   acute intracranial hemorrhage or mass effect.    < end of copied text >  < from: MR Head w/wo IV Cont (04.15.24 @ 13:13) >  IMPRESSION:    New heterogeneouslyenhancing lesion within the motor cortex of the right   high posterior frontal lobe measuring approximately 1.2 x 1.2 cm with   surrounding vasogenic edema involving the right high posterior frontal   lobe and right high parietal lobe including the entirety of the right   border cortex and the medial portion of the sensory cortex.    Previously visualized enhancing lesion in the medial left inferior   frontal lobe and left genu of the of the corpus callosum, has markedly   enlarged since prior exam, measuring approximately 2.9 cm AP x 2.5 cm TR,   previously measuring 1.3 x 1.1 cm. There is a large degree of vasogenic   edema surrounding the lesion involving the entirety of the left anterior   frontal lobe and crossing the genu of the corpus callosum. This results   in severe mass effect on the frontal horns of the bilateral ventricles,   with approximately 6 mm of left to right midline shift at the level of   the anterior falx.    Rest of the previously visualized enhancing lesions within the bilateral   cerebri and cerebelli have resolved since the prior exam.    Several new scattered additional small areas of increased T2/FLAIR   hyperintense signal in the bilateral cerebri without definite associated   enhancement on the postcontrast sequences, may reflect new vasogenic   edema from new not yet enhancing metastases.    No leptomeningeal disease is present.    --- End of Report ---    < end of copied text >  < from: MR Cervical Spine w/wo IV Cont (04.15.24 @ 13:14) >  IMPRESSION:    MRI cervical: Multiple scattered areas of T1 hypointense/STIR   hyperintense signal with associated enhancement throughout the cervical   spine, most prominent within the inferior C3 vertebral body throughout   the C4 vertebral body, suggestive ofosseous metastases, however several   areas are small enough that may reflect sequela of degenerative change.   No epidural infiltration is identified. Additional degenerative changes   as above.    MRI thoracic: Complete infiltration of the T2 and T3 vertebral bodies   with abnormal T1 hypointense/T2 hyperintense and enhancing signal,   compatible with complete neoplastic infiltration. There is a moderate to   severe pathologic compression fracture of T2 and a mild to moderate   pathologic compression fracture of T3. There is epidural extension of   tumor within the right neural foramen at T2/T3 and T3/T4. There is   secondary mass effect upon the cervical cord with mild impingement on the   right ventral portion of the cord at the T3 level.No significant spinal   canal stenosis at this level. No definite abnormal signal within the cord   is identified.   There are a few punctate additional areas of abnormal   signal within the thoracic spine, suggestive of additional metastases.    MRIlumbar: No osseous metastases. No epidural infiltration. Neural   foraminal stenosis at L5/S1 secondary to a listhesis.    Please see report of CT chest abdomen and pelvis of 4/14/2024 for   intrathoracic abdominal and pelvic findings.    < end of copied text >   Neurology - Consult Note    -  Spectra: [phone] ([facility]), [phone] ([facility])  -    HPI: Patient KAELYN VALENTE is a 59y (1965) woman with a PMHx significant for NSCLC w/ mets to brain, on immunotx s/p lung RT 3/1/24, GKRS, T3-T4 compression fractures presenting to [facility] on 5/10 for progressively worsening headache. s/p GK brain RT completed 4/23/24  (sites: Left frontal, and Right frontal.) s/p R lung RT 2/20/24. Neurology consulted d/t AMS. Per primary team on arrival patient [id number]. Was found to be hyponatremic 123. For the past 2-3 days, she has become progressively more confused and less interactive. Primary team stating she is waxing and waning. CTH with no acute intracranial hemorrhage or mass effect.  Left frontal lesion seen with associated areas of hemorrhage (expected evolutionary changes). She is currently being maintained on VImpat 150mg bid and previously was on Keppra (switched 4/24 d/t thrombocytopenia?). No seizures noted since medication switch. Previous notes indicate prior seizures are GTC by phenomenology. On decadron 2q12. LOPEZ has managed with opioids and migraine cocktail.       Review of Systems:  JANINE d/t AMS    Allergies:  No Known Allergies  morphine (Sedation/Somnol)      PMHx/PSHx/Family Hx: As above, otherwise see below   No pertinent past medical history    Mass of right lung    COPD (chronic obstructive pulmonary disease)    Smoker    Chronic pain syndrome    HLD (hyperlipidemia)    Melanoma in situ    History of blood transfusion    Malignant neoplasm of right bronchus        Social Hx:  No reported use of tobacco, alcohol, or illicit drugs    Medications:  MEDICATIONS  (STANDING):  chlorhexidine 2% Cloths 1 Application(s) Topical <User Schedule>  dexAMETHasone  Injectable 2 milliGRAM(s) IV Push every 12 hours  enoxaparin Injectable 30 milliGRAM(s) SubCutaneous every 24 hours  famotidine    Tablet 20 milliGRAM(s) Oral daily  lacosamide IVPB 150 milliGRAM(s) IV Intermittent every 12 hours  lactated ringers. 1000 milliLiter(s) (75 mL/Hr) IV Continuous <Continuous>  lidocaine   4% Patch 1 Patch Transdermal daily  LORazepam   Injectable 0.5 milliGRAM(s) IV Push once  losartan 50 milliGRAM(s) Oral daily  melatonin 3 milliGRAM(s) Oral at bedtime  morphine ER Tablet 15 milliGRAM(s) Oral two times a day  naloxone Injectable 0.4 milliGRAM(s) IV Push once  polyethylene glycol 3350 17 Gram(s) Oral two times a day  senna 2 Tablet(s) Oral at bedtime  sodium chloride 3 Gram(s) Oral three times a day    MEDICATIONS  (PRN):  acetaminophen     Tablet .. 650 milliGRAM(s) Oral every 6 hours PRN Temp greater or equal to 38C (100.4F), Mild Pain (1 - 3)  bisacodyl Suppository 10 milliGRAM(s) Rectal daily PRN Constipation  HYDROmorphone   Tablet 4 milliGRAM(s) Oral every 4 hours PRN Moderate Pain (4 - 6)  HYDROmorphone  Injectable 1 milliGRAM(s) IV Push every 4 hours PRN Severe Pain (7 - 10)  ondansetron Injectable 4 milliGRAM(s) IV Push every 8 hours PRN Nausea and/or Vomiting        Home Medications:  acetaminophen 325 mg oral tablet: 2 tab(s) orally every 6 hours As needed Temp greater or equal to 38C (100.4F), Mild Pain (1 - 3) (10 May 2024 06:10)  dexAMETHasone 2 mg oral tablet: 1 tab(s) orally every 12 hours (12 May 2024 13:08)  HYDROmorphone 4 mg oral tablet: 1 tab(s) orally every 6 hours as needed for pain 1-2 tab orally every 6-8 hours PRN (10 May 2024 07:00)  lacosamide 150 mg oral tablet: 1 tab(s) orally 2 times a day (12 May 2024 13:08)  losartan 50 mg oral tablet: 1 tab(s) orally once a day (12 May 2024 13:08)  melatonin 5 mg oral tablet: 1 tab(s) orally once a day (at bedtime) (10 May 2024 07:00)  MS Contin 15 mg oral tablet, extended release: 1 tab(s) orally every 12 hours as needed for  severe pain (10 May 2024 06:10)  polyethylene glycol 3350 oral powder for reconstitution: 17 gram(s) orally once a day (10 May 2024 06:10)  senna leaf extract oral tablet: 2 tab(s) orally once a day (at bedtime) (10 May 2024 06:10)  Xanax 0.5 mg oral tablet: 1 tab(s) orally once a day as needed for severe anxiety (10 May 2024 07:00)      Vitals:  T(C): 36.4 (05-16-24 @ 05:38), Max: 37 (05-15-24 @ 14:37)  HR: 69 (05-16-24 @ 10:01) (61 - 99)  BP: 149/94 (05-16-24 @ 10:01) (135/89 - 155/95)  RR: 17 (05-16-24 @ 10:01) (17 - 18)  SpO2: 97% (05-16-24 @ 10:01) (97% - 100%)    Physical Examination:    General - cachetic, no distress   Cardiovascular - Peripheral pulses palpable, no edema    Neurologic Exam:  Mental status - Awake eyes open, lethargic slow to respond, needs verbal stimuli to maintain alertness, Oriented to person, but not place (knows shes in the hospital but stated she was at [place]), or time (2025). Speech fluent, repetition intact. She is intermittently unable to name items (cannot name pen, gloves or thumb but could name her own nose). Intermittently following simple commands. attention, concentration and fund of knowledge limited.     Cranial nerves:  CN II: Visual fields are full to confrontation. Pupils are 3 mm very minimally reactive vs nonreactive b/l   CN III, IV, VI: b/link to threat b/l, crosses midline, no nystagmus, no ptosis  CN V: Facial sensation is intact to pinprick in all 3 divisions bilaterally.  CN VII: Face is symmetric with normal eye closure and smile.  CN VII: Hearing is normal to rubbing fingers  CN IX, X: Palate elevates symmetrically. Phonation is normal.  CN XI: Head turning and shoulder shrug are intact  CN XII: Tongue is midline with normal movements and no atrophy.    Motor - decreased bulk and tone throughout. No pronator drift.  Strength testing limited d/t effort            Deltoid      Biceps      Triceps            R            3                4-               3                     4                    L             4                 4-              3               4                                        Hip Flexion    Hip Extension         Dorsiflexion    Plantar Flexion  R              2                           2                       0                           4                        L              2                           2                        4                           4                                neg hoffmans b/l    Sensation - Light touch/temperature intact throughout    DTR's -             Biceps      Triceps     Brachioradialis      Patellar    Ankle    Toes/plantar response  R             1+             1+                  1+                       0            0                 Down  L              1+             1+                 1+                        0          0                  Down    Coordination - Fingers JANINE d/t ams. No tremors appreciated    Gait and station - Not assessed     Labs:                        13.6   9.21  )-----------( 81       ( 16 May 2024 07:00 )             39.5     05-16    134<L>  |  93<L>  |  15  ----------------------------<  113<H>  3.7   |  24  |  0.32<L>    Ca    9.8      16 May 2024 07:00  Phos  2.8     05-16  Mg     1.90     05-16      CAPILLARY BLOOD GLUCOSE             Radiology:  CT Head No Cont:  (14 May 2024 13:28)  CT Head No Cont:  (10 May 2024 01:50)    < from: CT Head No Cont (05.14.24 @ 13:28) >    IMPRESSION: Left frontal lesion again seen with associated areas of   hemorrhage. Overall this area of hemorrhage has demonstrated expected   evolutionary changes.    < end of copied text >  < from: CT Head No Cont (05.10.24 @ 01:50) >    IMPRESSION:  No significant interval change in previously noted hemorrhagic masses. No   acute intracranial hemorrhage or mass effect.    < end of copied text >  < from: MR Head w/wo IV Cont (04.15.24 @ 13:13) >  IMPRESSION:    New heterogeneouslyenhancing lesion within the motor cortex of the right   high posterior frontal lobe measuring approximately 1.2 x 1.2 cm with   surrounding vasogenic edema involving the right high posterior frontal   lobe and right high parietal lobe including the entirety of the right   border cortex and the medial portion of the sensory cortex.    Previously visualized enhancing lesion in the medial left inferior   frontal lobe and left genu of the of the corpus callosum, has markedly   enlarged since prior exam, measuring approximately 2.9 cm AP x 2.5 cm TR,   previously measuring 1.3 x 1.1 cm. There is a large degree of vasogenic   edema surrounding the lesion involving the entirety of the left anterior   frontal lobe and crossing the genu of the corpus callosum. This results   in severe mass effect on the frontal horns of the bilateral ventricles,   with approximately 6 mm of left to right midline shift at the level of   the anterior falx.    Rest of the previously visualized enhancing lesions within the bilateral   cerebri and cerebelli have resolved since the prior exam.    Several new scattered additional small areas of increased T2/FLAIR   hyperintense signal in the bilateral cerebri without definite associated   enhancement on the postcontrast sequences, may reflect new vasogenic   edema from new not yet enhancing metastases.    No leptomeningeal disease is present.    --- End of Report ---    < end of copied text >  < from: MR Cervical Spine w/wo IV Cont (04.15.24 @ 13:14) >  IMPRESSION:    MRI cervical: Multiple scattered areas of T1 hypointense/STIR   hyperintense signal with associated enhancement throughout the cervical   spine, most prominent within the inferior C3 vertebral body throughout   the C4 vertebral body, suggestive ofosseous metastases, however several   areas are small enough that may reflect sequela of degenerative change.   No epidural infiltration is identified. Additional degenerative changes   as above.    MRI thoracic: Complete infiltration of the T2 and T3 vertebral bodies   with abnormal T1 hypointense/T2 hyperintense and enhancing signal,   compatible with complete neoplastic infiltration. There is a moderate to   severe pathologic compression fracture of T2 and a mild to moderate   pathologic compression fracture of T3. There is epidural extension of   tumor within the right neural foramen at T2/T3 and T3/T4. There is   secondary mass effect upon the cervical cord with mild impingement on the   right ventral portion of the cord at the T3 level.No significant spinal   canal stenosis at this level. No definite abnormal signal within the cord   is identified.   There are a few punctate additional areas of abnormal   signal within the thoracic spine, suggestive of additional metastases.    MRIlumbar: No osseous metastases. No epidural infiltration. Neural   foraminal stenosis at L5/S1 secondary to a listhesis.    Please see report of CT chest abdomen and pelvis of 4/14/2024 for   intrathoracic abdominal and pelvic findings.    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 s/p GK brain RT completed 4/23/24  (sites: Left frontal, and Right frontal.) s/p R lung RT 2/20/24. Neurology consulted d/t AMS. Per primary team on arrival patient [id number]. Was found to be hyponatremic 123. For the past 2-3 days, she has become progressively more confused and less interactive. Primary team stating she is waxing and waning. CTH with no acute intracranial hemorrhage or mass effect.  Left frontal lesion seen with associated areas of hemorrhage (expected evolutionary changes). She is currently being maintained on VImpat 150mg bid and previously was on Keppra (switched 4/24 d/t thrombocytopenia?). No seizures noted since medication switch. Previous notes indicate prior seizures are GTC by phenomenology. On decadron 2q12. LOPEZ has managed with opioids and migraine cocktail.       Review of Systems:  JANINE d/t AMS    Allergies:  No Known Allergies  morphine (Sedation/Somnol)      PMHx/PSHx/Family Hx: As above, otherwise see below   No pertinent past medical history    Mass of right lung    COPD (chronic obstructive pulmonary disease)    Smoker    Chronic pain syndrome    HLD (hyperlipidemia)    Melanoma in situ    History of blood transfusion    Malignant neoplasm of right bronchus        Social Hx:  No reported use of tobacco, alcohol, or illicit drugs    Medications:  MEDICATIONS  (STANDING):  chlorhexidine 2% Cloths 1 Application(s) Topical <User Schedule>  dexAMETHasone  Injectable 2 milliGRAM(s) IV Push every 12 hours  enoxaparin Injectable 30 milliGRAM(s) SubCutaneous every 24 hours  famotidine    Tablet 20 milliGRAM(s) Oral daily  lacosamide IVPB 150 milliGRAM(s) IV Intermittent every 12 hours  lactated ringers. 1000 milliLiter(s) (75 mL/Hr) IV Continuous <Continuous>  lidocaine   4% Patch 1 Patch Transdermal daily  LORazepam   Injectable 0.5 milliGRAM(s) IV Push once  losartan 50 milliGRAM(s) Oral daily  melatonin 3 milliGRAM(s) Oral at bedtime  morphine ER Tablet 15 milliGRAM(s) Oral two times a day  naloxone Injectable 0.4 milliGRAM(s) IV Push once  polyethylene glycol 3350 17 Gram(s) Oral two times a day  senna 2 Tablet(s) Oral at bedtime  sodium chloride 3 Gram(s) Oral three times a day    MEDICATIONS  (PRN):  acetaminophen     Tablet .. 650 milliGRAM(s) Oral every 6 hours PRN Temp greater or equal to 38C (100.4F), Mild Pain (1 - 3)  bisacodyl Suppository 10 milliGRAM(s) Rectal daily PRN Constipation  HYDROmorphone   Tablet 4 milliGRAM(s) Oral every 4 hours PRN Moderate Pain (4 - 6)  HYDROmorphone  Injectable 1 milliGRAM(s) IV Push every 4 hours PRN Severe Pain (7 - 10)  ondansetron Injectable 4 milliGRAM(s) IV Push every 8 hours PRN Nausea and/or Vomiting        Home Medications:  acetaminophen 325 mg oral tablet: 2 tab(s) orally every 6 hours As needed Temp greater or equal to 38C (100.4F), Mild Pain (1 - 3) (10 May 2024 06:10)  dexAMETHasone 2 mg oral tablet: 1 tab(s) orally every 12 hours (12 May 2024 13:08)  HYDROmorphone 4 mg oral tablet: 1 tab(s) orally every 6 hours as needed for pain 1-2 tab orally every 6-8 hours PRN (10 May 2024 07:00)  lacosamide 150 mg oral tablet: 1 tab(s) orally 2 times a day (12 May 2024 13:08)  losartan 50 mg oral tablet: 1 tab(s) orally once a day (12 May 2024 13:08)  melatonin 5 mg oral tablet: 1 tab(s) orally once a day (at bedtime) (10 May 2024 07:00)  MS Contin 15 mg oral tablet, extended release: 1 tab(s) orally every 12 hours as needed for  severe pain (10 May 2024 06:10)  polyethylene glycol 3350 oral powder for reconstitution: 17 gram(s) orally once a day (10 May 2024 06:10)  senna leaf extract oral tablet: 2 tab(s) orally once a day (at bedtime) (10 May 2024 06:10)  Xanax 0.5 mg oral tablet: 1 tab(s) orally once a day as needed for severe anxiety (10 May 2024 07:00)      Vitals:  T(C): 36.4 (05-16-24 @ 05:38), Max: 37 (05-15-24 @ 14:37)  HR: 69 (05-16-24 @ 10:01) (61 - 99)  BP: 149/94 (05-16-24 @ 10:01) (135/89 - 155/95)  RR: 17 (05-16-24 @ 10:01) (17 - 18)  SpO2: 97% (05-16-24 @ 10:01) (97% - 100%)    Physical Examination:    General - cachetic, no distress   Cardiovascular - Peripheral pulses palpable, no edema    Neurologic Exam:  Mental status - Awake eyes open, lethargic slow to respond, needs verbal stimuli to maintain alertness, Oriented to person, but not place (knows shes in the hospital but stated she was at [place]), or time (2025). Speech fluent, repetition intact. She is intermittently unable to name items (cannot name pen, gloves or thumb but could name her own nose). Intermittently following simple commands. attention, concentration and fund of knowledge limited.     Cranial nerves:  CN II: Visual fields are full to confrontation. Pupils are 3 mm very minimally reactive vs nonreactive b/l   CN III, IV, VI: b/link to threat b/l, crosses midline, no nystagmus, no ptosis  CN V: Facial sensation is intact to pinprick in all 3 divisions bilaterally.  CN VII: Face is symmetric with normal eye closure and smile.  CN VII: Hearing is normal to rubbing fingers  CN IX, X: Palate elevates symmetrically. Phonation is normal.  CN XI: Head turning and shoulder shrug are intact  CN XII: Tongue is midline with normal movements and no atrophy.    Motor - decreased bulk and tone throughout. No pronator drift.  Strength testing limited d/t effort  RUE falls faster than LUE after examiner manually holds arms upwards   Both LE not AG, R foot drop noted, plantar flexion intact b/l              neg hoffmans b/l    Sensation - Light touch/temperature intact throughout    DTR's -             Biceps      Triceps     Brachioradialis      Patellar    Ankle    Toes/plantar response  R             1+             1+                  1+                       0            0                 Down  L              1+             1+                 1+                        0          0                  Down    Coordination - Fingers JANINE d/t ams. No tremors appreciated    Gait and station - Not assessed     Labs:                        13.6   9.21  )-----------( 81       ( 16 May 2024 07:00 )             39.5     05-16    134<L>  |  93<L>  |  15  ----------------------------<  113<H>  3.7   |  24  |  0.32<L>    Ca    9.8      16 May 2024 07:00  Phos  2.8     05-16  Mg     1.90     05-16      CAPILLARY BLOOD GLUCOSE             Radiology:  CT Head No Cont:  (14 May 2024 13:28)  CT Head No Cont:  (10 May 2024 01:50)    < from: CT Head No Cont (05.14.24 @ 13:28) >    IMPRESSION: Left frontal lesion again seen with associated areas of   hemorrhage. Overall this area of hemorrhage has demonstrated expected   evolutionary changes.    < end of copied text >  < from: CT Head No Cont (05.10.24 @ 01:50) >    IMPRESSION:  No significant interval change in previously noted hemorrhagic masses. No   acute intracranial hemorrhage or mass effect.    < end of copied text >  < from: MR Head w/wo IV Cont (04.15.24 @ 13:13) >  IMPRESSION:    New heterogeneouslyenhancing lesion within the motor cortex of the right   high posterior frontal lobe measuring approximately 1.2 x 1.2 cm with   surrounding vasogenic edema involving the right high posterior frontal   lobe and right high parietal lobe including the entirety of the right   border cortex and the medial portion of the sensory cortex.    Previously visualized enhancing lesion in the medial left inferior   frontal lobe and left genu of the of the corpus callosum, has markedly   enlarged since prior exam, measuring approximately 2.9 cm AP x 2.5 cm TR,   previously measuring 1.3 x 1.1 cm. There is a large degree of vasogenic   edema surrounding the lesion involving the entirety of the left anterior   frontal lobe and crossing the genu of the corpus callosum. This results   in severe mass effect on the frontal horns of the bilateral ventricles,   with approximately 6 mm of left to right midline shift at the level of   the anterior falx.    Rest of the previously visualized enhancing lesions within the bilateral   cerebri and cerebelli have resolved since the prior exam.    Several new scattered additional small areas of increased T2/FLAIR   hyperintense signal in the bilateral cerebri without definite associated   enhancement on the postcontrast sequences, may reflect new vasogenic   edema from new not yet enhancing metastases.    No leptomeningeal disease is present.    --- End of Report ---    < end of copied text >  < from: MR Cervical Spine w/wo IV Cont (04.15.24 @ 13:14) >  IMPRESSION:    MRI cervical: Multiple scattered areas of T1 hypointense/STIR   hyperintense signal with associated enhancement throughout the cervical   spine, most prominent within the inferior C3 vertebral body throughout   the C4 vertebral body, suggestive ofosseous metastases, however several   areas are small enough that may reflect sequela of degenerative change.   No epidural infiltration is identified. Additional degenerative changes   as above.    MRI thoracic: Complete infiltration of the T2 and T3 vertebral bodies   with abnormal T1 hypointense/T2 hyperintense and enhancing signal,   compatible with complete neoplastic infiltration. There is a moderate to   severe pathologic compression fracture of T2 and a mild to moderate   pathologic compression fracture of T3. There is epidural extension of   tumor within the right neural foramen at T2/T3 and T3/T4. There is   secondary mass effect upon the cervical cord with mild impingement on the   right ventral portion of the cord at the T3 level.No significant spinal   canal stenosis at this level. No definite abnormal signal within the cord   is identified.   There are a few punctate additional areas of abnormal   signal within the thoracic spine, suggestive of additional metastases.    MRIlumbar: No osseous metastases. No epidural infiltration. Neural   foraminal stenosis at L5/S1 secondary to a listhesis.    Please see report of CT chest abdomen and pelvis of 4/14/2024 for   intrathoracic abdominal and pelvic findings.    < end of copied text >   Neurology - Consult Note    -  Spectra: [phone] ([facility]), [phone] ([facility])  -    HPI: Patient KAELYN VALENTE is a 59y (1965) woman with a PMHx significant for NSCLC w/ mets to brain, on immunotx s/p lung RT 3/1/24, GKRS, T3-T4 compression fractures presenting to [facility] on 5/10 for progressively worsening headache. s/p GK brain RT completed 4/23/24  (sites: Left frontal, and Right frontal.) s/p R lung RT 2/20/24. Neurology consulted d/t AMS. Per primary team on arrival patient [id number]. Was found to be hyponatremic 123. For the past 2-3 days, she has become progressively more confused and less interactive. Primary team stating she is waxing and waning. CTH with no acute intracranial hemorrhage or mass effect.  Left frontal lesion seen with associated areas of hemorrhage (expected evolutionary changes). She is currently being maintained on VImpat 150mg bid and previously was on Keppra (switched 4/24 d/t thrombocytopenia?). No seizures noted since medication switch. Previous notes indicate prior seizures are GTC by phenomenology. On decadron 2q12. LOPEZ has managed with opioids and migraine cocktail.       Review of Systems:  JANINE d/t AMS    Allergies:  No Known Allergies  morphine (Sedation/Somnol)      PMHx/PSHx/Family Hx: As above, otherwise see below   No pertinent past medical history    Mass of right lung    COPD (chronic obstructive pulmonary disease)    Smoker    Chronic pain syndrome    HLD (hyperlipidemia)    Melanoma in situ    History of blood transfusion    Malignant neoplasm of right bronchus        Social Hx:  No reported use of tobacco, alcohol, or illicit drugs    Medications:  MEDICATIONS  (STANDING):  chlorhexidine 2% Cloths 1 Application(s) Topical <User Schedule>  dexAMETHasone  Injectable 2 milliGRAM(s) IV Push every 12 hours  enoxaparin Injectable 30 milliGRAM(s) SubCutaneous every 24 hours  famotidine    Tablet 20 milliGRAM(s) Oral daily  lacosamide IVPB 150 milliGRAM(s) IV Intermittent every 12 hours  lactated ringers. 1000 milliLiter(s) (75 mL/Hr) IV Continuous <Continuous>  lidocaine   4% Patch 1 Patch Transdermal daily  LORazepam   Injectable 0.5 milliGRAM(s) IV Push once  losartan 50 milliGRAM(s) Oral daily  melatonin 3 milliGRAM(s) Oral at bedtime  morphine ER Tablet 15 milliGRAM(s) Oral two times a day  naloxone Injectable 0.4 milliGRAM(s) IV Push once  polyethylene glycol 3350 17 Gram(s) Oral two times a day  senna 2 Tablet(s) Oral at bedtime  sodium chloride 3 Gram(s) Oral three times a day    MEDICATIONS  (PRN):  acetaminophen     Tablet .. 650 milliGRAM(s) Oral every 6 hours PRN Temp greater or equal to 38C (100.4F), Mild Pain (1 - 3)  bisacodyl Suppository 10 milliGRAM(s) Rectal daily PRN Constipation  HYDROmorphone   Tablet 4 milliGRAM(s) Oral every 4 hours PRN Moderate Pain (4 - 6)  HYDROmorphone  Injectable 1 milliGRAM(s) IV Push every 4 hours PRN Severe Pain (7 - 10)  ondansetron Injectable 4 milliGRAM(s) IV Push every 8 hours PRN Nausea and/or Vomiting        Home Medications:  acetaminophen 325 mg oral tablet: 2 tab(s) orally every 6 hours As needed Temp greater or equal to 38C (100.4F), Mild Pain (1 - 3) (10 May 2024 06:10)  dexAMETHasone 2 mg oral tablet: 1 tab(s) orally every 12 hours (12 May 2024 13:08)  HYDROmorphone 4 mg oral tablet: 1 tab(s) orally every 6 hours as needed for pain 1-2 tab orally every 6-8 hours PRN (10 May 2024 07:00)  lacosamide 150 mg oral tablet: 1 tab(s) orally 2 times a day (12 May 2024 13:08)  losartan 50 mg oral tablet: 1 tab(s) orally once a day (12 May 2024 13:08)  melatonin 5 mg oral tablet: 1 tab(s) orally once a day (at bedtime) (10 May 2024 07:00)  MS Contin 15 mg oral tablet, extended release: 1 tab(s) orally every 12 hours as needed for  severe pain (10 May 2024 06:10)  polyethylene glycol 3350 oral powder for reconstitution: 17 gram(s) orally once a day (10 May 2024 06:10)  senna leaf extract oral tablet: 2 tab(s) orally once a day (at bedtime) (10 May 2024 06:10)  Xanax 0.5 mg oral tablet: 1 tab(s) orally once a day as needed for severe anxiety (10 May 2024 07:00)      Vitals:  T(C): 36.4 (05-16-24 @ 05:38), Max: 37 (05-15-24 @ 14:37)  HR: 69 (05-16-24 @ 10:01) (61 - 99)  BP: 149/94 (05-16-24 @ 10:01) (135/89 - 155/95)  RR: 17 (05-16-24 @ 10:01) (17 - 18)  SpO2: 97% (05-16-24 @ 10:01) (97% - 100%)    Physical Examination:    General - cachetic, no distress   Cardiovascular - Peripheral pulses palpable, no edema    Neurologic Exam:  Mental status - Awake eyes open, lethargic slow to respond, needs verbal stimuli to maintain alertness, Oriented to person, but not place (knows shes in the hospital but stated she was at [place]), or time (2025). Speech fluent, repetition intact. She is intermittently unable to name items (cannot name pen, gloves or thumb but could name her own nose). Intermittently following simple commands. attention, concentration and fund of knowledge limited.     Cranial nerves:  CN II: Visual fields are full to confrontation. Pupils are 3 mm very minimally reactive vs nonreactive b/l   CN III, IV, VI: blink to threat b/l, crosses midline, no nystagmus, no ptosis  CN V: Facial sensation is intact to pinprick in all 3 divisions bilaterally.  CN VII: Face is symmetric with normal eye closure and smile.  CN VII: Hearing is normal to rubbing fingers  CN IX, X: Palate elevates symmetrically. Phonation is normal.  CN XI: Head turning and shoulder shrug are intact  CN XII: Tongue is midline with normal movements and no atrophy.    Motor - decreased bulk and tone throughout. No pronator drift.  Strength testing limited d/t effort  RUE falls faster than LUE after examiner manually holds arms upwards   Both LE not AG, R foot drop noted, plantar flexion intact b/l              neg hoffmans b/l    Sensation - Light touch/temperature intact throughout    DTR's -             Biceps      Triceps     Brachioradialis      Patellar    Ankle    Toes/plantar response  R             1+             1+                  1+                       0            0                 Down  L              1+             1+                 1+                        0          0                  Down    Coordination - Fingers JANINE d/t ams. No tremors appreciated    Gait and station - Not assessed     Labs:                        13.6   9.21  )-----------( 81       ( 16 May 2024 07:00 )             39.5     05-16    134<L>  |  93<L>  |  15  ----------------------------<  113<H>  3.7   |  24  |  0.32<L>    Ca    9.8      16 May 2024 07:00  Phos  2.8     05-16  Mg     1.90     05-16      CAPILLARY BLOOD GLUCOSE             Radiology:  CT Head No Cont:  (14 May 2024 13:28)  CT Head No Cont:  (10 May 2024 01:50)    < from: CT Head No Cont (05.14.24 @ 13:28) >    IMPRESSION: Left frontal lesion again seen with associated areas of   hemorrhage. Overall this area of hemorrhage has demonstrated expected   evolutionary changes.    < end of copied text >  < from: CT Head No Cont (05.10.24 @ 01:50) >    IMPRESSION:  No significant interval change in previously noted hemorrhagic masses. No   acute intracranial hemorrhage or mass effect.    < end of copied text >  < from: MR Head w/wo IV Cont (04.15.24 @ 13:13) >  IMPRESSION:    New heterogeneouslyenhancing lesion within the motor cortex of the right   high posterior frontal lobe measuring approximately 1.2 x 1.2 cm with   surrounding vasogenic edema involving the right high posterior frontal   lobe and right high parietal lobe including the entirety of the right   border cortex and the medial portion of the sensory cortex.    Previously visualized enhancing lesion in the medial left inferior   frontal lobe and left genu of the of the corpus callosum, has markedly   enlarged since prior exam, measuring approximately 2.9 cm AP x 2.5 cm TR,   previously measuring 1.3 x 1.1 cm. There is a large degree of vasogenic   edema surrounding the lesion involving the entirety of the left anterior   frontal lobe and crossing the genu of the corpus callosum. This results   in severe mass effect on the frontal horns of the bilateral ventricles,   with approximately 6 mm of left to right midline shift at the level of   the anterior falx.    Rest of the previously visualized enhancing lesions within the bilateral   cerebri and cerebelli have resolved since the prior exam.    Several new scattered additional small areas of increased T2/FLAIR   hyperintense signal in the bilateral cerebri without definite associated   enhancement on the postcontrast sequences, may reflect new vasogenic   edema from new not yet enhancing metastases.    No leptomeningeal disease is present.    --- End of Report ---    < end of copied text >  < from: MR Cervical Spine w/wo IV Cont (04.15.24 @ 13:14) >  IMPRESSION:    MRI cervical: Multiple scattered areas of T1 hypointense/STIR   hyperintense signal with associated enhancement throughout the cervical   spine, most prominent within the inferior C3 vertebral body throughout   the C4 vertebral body, suggestive ofosseous metastases, however several   areas are small enough that may reflect sequela of degenerative change.   No epidural infiltration is identified. Additional degenerative changes   as above.    MRI thoracic: Complete infiltration of the T2 and T3 vertebral bodies   with abnormal T1 hypointense/T2 hyperintense and enhancing signal,   compatible with complete neoplastic infiltration. There is a moderate to   severe pathologic compression fracture of T2 and a mild to moderate   pathologic compression fracture of T3. There is epidural extension of   tumor within the right neural foramen at T2/T3 and T3/T4. There is   secondary mass effect upon the cervical cord with mild impingement on the   right ventral portion of the cord at the T3 level.No significant spinal   canal stenosis at this level. No definite abnormal signal within the cord   is identified.   There are a few punctate additional areas of abnormal   signal within the thoracic spine, suggestive of additional metastases.    MRIlumbar: No osseous metastases. No epidural infiltration. Neural   foraminal stenosis at L5/S1 secondary to a listhesis.    Please see report of CT chest abdomen and pelvis of 4/14/2024 for   intrathoracic abdominal and pelvic findings.    < end of copied text >

## 2024-05-16 NOTE — PROVIDER CONTACT NOTE (OTHER) - RECOMMENDATIONS
make md aware. Karthikeyan?
make md aware. educate pt.
make md aware. nurse manager catrachito assessed pt as well.
Notify MD

## 2024-05-16 NOTE — PROGRESS NOTE ADULT - PROBLEM SELECTOR PLAN 1
Progressively worsening mental status and lethargy since admission. Patient has been refusing medications intermittently since admission as well despite attempts to educate on their importance. DDx for symptoms include post-GKRS side effects vs possible progression of malignancy vs acute hyponatremia vs subclinical seizures vs acute urinary retention as below  - no fevers, leukocytosis, or other findings c/f infection  - CT head (5/10): stable findings compared to prior CT head, no acute intracranial hemorrhage or mass effect  - repeat CT head (5/14): stable findings    Plan:  - neuro-oncology consulted; f/u recs  - f/u AMS labs (B12, folate, RPR, vitamin D, vitamin E)  - f/u vEEG to evaluate for subclinical seizures  - consider repeat MRI brain  - management of urinary retention, NSCLC, and hyponatremia as below  - GOC discussions ongoing with oncology, palliative care, and family Progressively worsening mental status and lethargy since admission. Patient has been refusing medications intermittently since admission as well despite attempts to educate on their importance. DDx for symptoms include post-GKRS side effects vs possible progression of malignancy vs acute hyponatremia vs subclinical seizures vs acute urinary retention as below  - no fevers, leukocytosis, or other findings c/f infection  - CT head (5/10): stable findings compared to prior CT head, no acute intracranial hemorrhage or mass effect  - repeat CT head (5/14): stable findings    Plan:  - neuro-oncology consulted; f/u recs  - f/u AMS labs (B12, folate, RPR, vitamin D, vitamin E)  - f/u vEEG to evaluate for subclinical seizures  - consider repeat MRI brain (although had MRI brain 4/15)  - management of urinary retention, NSCLC, and hyponatremia as below  - GOC discussions ongoing with oncology, palliative care, and family Progressively worsening mental status and lethargy since admission. Patient has been refusing medications intermittently since admission as well despite attempts to educate on their importance. DDx for symptoms include post-GKRS side effects vs possible progression of malignancy vs acute hyponatremia vs subclinical seizures vs acute urinary retention as below  - no fevers, leukocytosis, or other findings c/f infection  - CT head (5/10): stable findings compared to prior CT head, no acute intracranial hemorrhage or mass effect  - repeat CT head (5/14): stable findings    Plan:  - neuro-oncology consulted; f/u recs  - f/u AMS labs  - f/u vEEG to evaluate for subclinical seizures  - consider repeat MRI brain (although had MRI brain 4/15)  - management of urinary retention, NSCLC, and hyponatremia as below  - GOC discussions ongoing with oncology, palliative care, and family Progressively worsening mental status and lethargy since admission. Patient has been refusing medications intermittently since admission as well despite attempts to educate on their importance. DDx for symptoms include post-GKRS side effects vs possible progression of malignancy vs acute hyponatremia vs subclinical seizures vs acute urinary retention as below  - no fevers, leukocytosis, or other findings c/f infection  - CT head (5/10): stable findings compared to prior CT head, no acute intracranial hemorrhage or mass effect  - repeat CT head (5/14): stable findings    Plan:  - neuro-oncology consulted; f/u recs  - f/u AMS labs  - f/u vEEG to evaluate for subclinical seizures  - f/u repeat MRI brain  - management of urinary retention, NSCLC, and hyponatremia as below  - GOC discussions ongoing with oncology, palliative care, and family Progressively worsening mental status and lethargy since admission. Patient has been refusing medications intermittently since admission as well despite attempts to educate on their importance. DDx for symptoms include post-GKRS side effects vs possible progression of malignancy vs acute hyponatremia vs subclinical seizures vs acute urinary retention as below  - no fevers, leukocytosis, or other findings c/f infection (though patient may not mount fevers 2/2 chemotherapy)  - CT head (5/10): stable findings compared to prior CT head, no acute intracranial hemorrhage or mass effect  - repeat CT head (5/14): stable findings    Plan:  - neuro-oncology consulted; f/u recs  - f/u AMS labs  - f/u CXR, BCx, FluVID  - f/u vEEG to evaluate for subclinical seizures  - f/u MRI brain  - management of urinary retention, NSCLC, and hyponatremia as below  - GOC discussions ongoing with oncology, palliative care, and family

## 2024-05-16 NOTE — RAPID RESPONSE TEAM SUMMARY - NSADDTLFINDINGSRRT_GEN_ALL_CORE
- HypoK already being treated with 6x toital K runs throughout the day  - pt with stool impaction, began with BMs toward the end of the rapid; unclear contribution to pain but possibly contributing to UR    - Trop, CKMB ordered w/ T&S, PTT/PT/INR, CMP, CBC  - frequent NSVT (but no NSVT contiguous for > 30s) on telemetry ([facility])

## 2024-05-17 NOTE — STROKE CODE NOTE - NIH STROKE SCALE: 1A. LEVEL OF CONSCIOUSNESS, QM
Scheduled For Follow Up In (Optional): 2-4 weeks shave removal Detail Level: Simple (2) Not alert; requires repeated stimulation to attend, or is obtunded and requires strong or painful stimulation to make movements (not stereotyped)

## 2024-05-17 NOTE — CHART NOTE - NSCHARTNOTESELECT_GEN_ALL_CORE
Event Note
ISTOP/Event Note
Neurology/Event Note
neurosurgery/Event Note
DVT PPx
EEG Prelim
Event Note
Neurosurgery/Event Note
Unresponsiveness/Event Note

## 2024-05-17 NOTE — AIRWAY PLACEMENT NOTE ADULT - DISPOSITION AFTER INTUBATION:
Anesthesia Volume In Cc: 1.5 Did You Provide Opioid Counseling: No patient placed on mechanical ventilation Repair Type: Simple Repair Suturegard Retention Suture: 2-0 Nylon Retention Suture Bite Size: 3 mm Length To Time In Minutes Device Was In Place: 10 Number Of Hemigard Strips Per Side: 1 Undermining Type: Entire Wound Debridement Text: The wound edges were debrided prior to proceeding with the closure to facilitate wound healing. Helical Rim Text: The closure involved the helical rim. Vermilion Border Text: The closure involved the vermilion border. Nostril Rim Text: The closure involved the nostril rim. Retention Suture Text: Retention sutures were placed to support the closure and prevent dehiscence. Primary Defect Width (In Cm): 0.8 Secondary Defect Length (In Cm): 0 Skin Substitute: EpiFix Micronized Suture Removal: 14 days Detail Level: Detailed Anesthesia Type: 1% lidocaine with epinephrine Additional Anesthesia Volume In Cc: 6 Hemostasis: Electrocautery Estimated Blood Loss (Cc): minimal Brow Lift Text: A midfrontal incision was made medially to the defect to allow access to the tissues just superior to the left eyebrow. Following careful dissection inferiorly in a supraperiosteal plane to the level of the left eyebrow, several 3-0 monocryl sutures were used to resuspend the eyebrow orbicularis oculi muscular unit to the superior frontal bone periosteum. This resulted in an appropriate reapproximation of static eyebrow symmetry and correction of the left brow ptosis. Epidermal Sutures: 5-0 Prolene Epidermal Closure: running Wound Care: Bacitracin Dressing: dry sterile dressing Unna Boot Text: An Unna boot was placed to help immobilize the limb and facilitate more rapid healing. Suturegard Intro: Intraoperative tissue expansion was performed, utilizing the SUTUREGARD device, in order to reduce wound tension. Suturegard Body: The suture ends were repeatedly re-tightened and re-clamped to achieve the desired tissue expansion. Hemigard Intro: Due to skin fragility and wound tension, it was decided to use HEMIGARD adhesive retention suture devices to permit a linear closure. The skin was cleaned and dried for a 6cm distance away from the wound. Excessive hair, if present, was removed to allow for adhesion. Hemigard Postcare Instructions: The HEMIGARD strips are to remain completely dry for at least 5-7 days. Epidermal Closure Graft Donor Site (Optional): simple interrupted Closure 2 Information: This tab is for additional flaps and grafts, including complex repair and grafts and complex repair and flaps. You can also specify a different location for the additional defect, if the location is the same you do not need to select a new one. We will insert the automated text for the repair you select below just as we do for solitary flaps and grafts. Please note that at this time if you select a location with a different insurance zone you will need to override the ICD10 and CPT if appropriate. Closure 3 Information: This tab is for additional flaps and grafts above and beyond our usual structured repairs.  Please note if you enter information here it will not currently bill and you will need to add the billing information manually. Closure 4 Information: This tab is for additional flaps and grafts above and beyond our usual structured repairs.  Please note if you enter information here it will not currently bill and you will need to add the billing information manually. Complex Repair Preamble Text (Leave Blank If You Do Not Want): Extensive wide undermining was performed. Intermediate Repair Preamble Text (Leave Blank If You Do Not Want): Undermining was performed with blunt dissection. Flap Thinning Complex Repair Preamble Text (Leave Blank If You Do Not Want): An incision was made along the previous flap suture line. Undermining was performed beneath the flap and redundant tissue was removed to restore the normal contour of the skin. Non-Graft Cartilage Fenestration Text: The cartilage was fenestrated with a 2mm punch biopsy to help facilitate healing. Graft Cartilage Fenestration Text: The cartilage was fenestrated with a 2mm punch biopsy to help facilitate graft survival and healing. Preparation Of Recipient Site - Flap: The eschar was removed surgically with sharp dissection to facilitate appropriate wound healing of the following adjacent tissue rearrangement. Preparation Of Recipient Site - Graft: The eschar was removed surgically with sharp dissection to facilitate appropriate survival of the following graft. Preparation Of Recipient Site - Flap Takedown: The eschar and granulation tissue was removed surgically with sharp dissection to facilitate appropriate healing after division and inset of the proximal and distal interpolation flap. Secondary Intention Text (Leave Blank If You Do Not Want): The defect will heal with secondary intention. No Repair - Repaired With Adjacent Surgical Defect Text (Leave Blank If You Do Not Want): After obtaining clear surgical margins the defect was repaired concurrently with another surgical defect which was in close approximation. Referred To Oculoplastics For Closure Text (Leave Blank If You Do Not Want): After obtaining clear surgical margins the patient was sent to oculoplastics for surgical repair.  The patient understands they will receive post-surgical care and follow-up from the referring physician's office. Referred To Otolaryngology For Closure Text (Leave Blank If You Do Not Want): After obtaining clear surgical margins the patient was sent to otolaryngology for surgical repair.  The patient understands they will receive post-surgical care and follow-up from the referring physician's office. Referred To Plastics For Closure Text (Leave Blank If You Do Not Want): After obtaining clear surgical margins the patient was sent to plastics for surgical repair.  The patient understands they will receive post-surgical care and follow-up from the referring physician's office. Referred To Asc For Closure Text (Leave Blank If You Do Not Want): After obtaining clear surgical margins the patient was sent to an ASC for surgical repair.  The patient understands they will receive post-surgical care and follow-up from the ASC physician. Referred To Mid-Level For Closure Text (Leave Blank If You Do Not Want): After obtaining clear surgical margins the patient was sent to a mid-level provider for surgical repair.  The patient understands they will receive post-surgical care and follow-up from the mid-level provider. Repair Performed By Another Provider Text (Leave Blank If You Do Not Want): After obtaining clear surgical margins the defect was repaired by another provider. Adjacent Tissue Transfer Text: The defect edges were debeveled with a #15 scalpel blade.  Given the location of the defect and the proximity to free margins an adjacent tissue transfer was deemed most appropriate.  Using a sterile surgical marker, an appropriate flap was drawn incorporating the defect and placing the expected incisions within the relaxed skin tension lines where possible.    The area thus outlined was incised deep to adipose tissue with a #15 scalpel blade.  The skin margins were undermined to an appropriate distance in all directions utilizing iris scissors. Advancement Flap (Single) Text: The defect edges were debeveled with a #15 scalpel blade.  Given the location of the defect and the proximity to free margins a single advancement flap was deemed most appropriate.  Using a sterile surgical marker, an appropriate advancement flap was drawn incorporating the defect and placing the expected incisions within the relaxed skin tension lines where possible.    The area thus outlined was incised deep to adipose tissue with a #15 scalpel blade.  The skin margins were undermined to an appropriate distance in all directions utilizing iris scissors. Advancement Flap (Double) Text: The defect edges were debeveled with a #15 scalpel blade.  Given the location of the defect and the proximity to free margins a double advancement flap was deemed most appropriate.  Using a sterile surgical marker, the appropriate advancement flaps were drawn incorporating the defect and placing the expected incisions within the relaxed skin tension lines where possible.    The area thus outlined was incised deep to adipose tissue with a #15 scalpel blade.  The skin margins were undermined to an appropriate distance in all directions utilizing iris scissors. Burow's Advancement Flap Text: The defect edges were debeveled with a #15 scalpel blade.  Given the location of the defect and the proximity to free margins a Burow's advancement flap was deemed most appropriate.  Using a sterile surgical marker, the appropriate advancement flap was drawn incorporating the defect and placing the expected incisions within the relaxed skin tension lines where possible.    The area thus outlined was incised deep to adipose tissue with a #15 scalpel blade.  The skin margins were undermined to an appropriate distance in all directions utilizing iris scissors. Chonodrocutaneous Helical Advancement Flap Text: The defect edges were debeveled with a #15 scalpel blade.  Given the location of the defect and the proximity to free margins a chondrocutaneous helical advancement flap was deemed most appropriate.  Using a sterile surgical marker, the appropriate advancement flap was drawn incorporating the defect and placing the expected incisions within the relaxed skin tension lines where possible.    The area thus outlined was incised deep to adipose tissue with a #15 scalpel blade.  The skin margins were undermined to an appropriate distance in all directions utilizing iris scissors. Crescentic Advancement Flap Text: The defect edges were debeveled with a #15 scalpel blade.  Given the location of the defect and the proximity to free margins a crescentic advancement flap was deemed most appropriate.  Using a sterile surgical marker, the appropriate advancement flap was drawn incorporating the defect and placing the expected incisions within the relaxed skin tension lines where possible.    The area thus outlined was incised deep to adipose tissue with a #15 scalpel blade.  The skin margins were undermined to an appropriate distance in all directions utilizing iris scissors. A-T Advancement Flap Text: The defect edges were debeveled with a #15 scalpel blade.  Given the location of the defect, shape of the defect and the proximity to free margins an A-T advancement flap was deemed most appropriate.  Using a sterile surgical marker, an appropriate advancement flap was drawn incorporating the defect and placing the expected incisions within the relaxed skin tension lines where possible.    The area thus outlined was incised deep to adipose tissue with a #15 scalpel blade.  The skin margins were undermined to an appropriate distance in all directions utilizing iris scissors. O-T Advancement Flap Text: The defect edges were debeveled with a #15 scalpel blade.  Given the location of the defect, shape of the defect and the proximity to free margins an O-T advancement flap was deemed most appropriate.  Using a sterile surgical marker, an appropriate advancement flap was drawn incorporating the defect and placing the expected incisions within the relaxed skin tension lines where possible.    The area thus outlined was incised deep to adipose tissue with a #15 scalpel blade.  The skin margins were undermined to an appropriate distance in all directions utilizing iris scissors. O-L Flap Text: The defect edges were debeveled with a #15 scalpel blade.  Given the location of the defect, shape of the defect and the proximity to free margins an O-L flap was deemed most appropriate.  Using a sterile surgical marker, an appropriate advancement flap was drawn incorporating the defect and placing the expected incisions within the relaxed skin tension lines where possible.    The area thus outlined was incised deep to adipose tissue with a #15 scalpel blade.  The skin margins were undermined to an appropriate distance in all directions utilizing iris scissors. O-Z Flap Text: The defect edges were debeveled with a #15 scalpel blade.  Given the location of the defect, shape of the defect and the proximity to free margins an O-Z flap was deemed most appropriate.  Using a sterile surgical marker, an appropriate transposition flap was drawn incorporating the defect and placing the expected incisions within the relaxed skin tension lines where possible. The area thus outlined was incised deep to adipose tissue with a #15 scalpel blade.  The skin margins were undermined to an appropriate distance in all directions utilizing iris scissors. Double O-Z Flap Text: The defect edges were debeveled with a #15 scalpel blade.  Given the location of the defect, shape of the defect and the proximity to free margins a Double O-Z flap was deemed most appropriate.  Using a sterile surgical marker, an appropriate transposition flap was drawn incorporating the defect and placing the expected incisions within the relaxed skin tension lines where possible. The area thus outlined was incised deep to adipose tissue with a #15 scalpel blade.  The skin margins were undermined to an appropriate distance in all directions utilizing iris scissors. V-Y Flap Text: The defect edges were debeveled with a #15 scalpel blade.  Given the location of the defect, shape of the defect and the proximity to free margins a V-Y flap was deemed most appropriate.  Using a sterile surgical marker, an appropriate advancement flap was drawn incorporating the defect and placing the expected incisions within the relaxed skin tension lines where possible.    The area thus outlined was incised deep to adipose tissue with a #15 scalpel blade.  The skin margins were undermined to an appropriate distance in all directions utilizing iris scissors. Advancement-Rotation Flap Text: The defect edges were debeveled with a #15 scalpel blade.  Given the location of the defect, shape of the defect and the proximity to free margins an advancement-rotation flap was deemed most appropriate.  Using a sterile surgical marker, an appropriate flap was drawn incorporating the defect and placing the expected incisions within the relaxed skin tension lines where possible. The area thus outlined was incised deep to adipose tissue with a #15 scalpel blade.  The skin margins were undermined to an appropriate distance in all directions utilizing iris scissors. Mercedes Flap Text: The defect edges were debeveled with a #15 scalpel blade.  Given the location of the defect, shape of the defect and the proximity to free margins a Mercedes flap was deemed most appropriate.  Using a sterile surgical marker, an appropriate advancement flap was drawn incorporating the defect and placing the expected incisions within the relaxed skin tension lines where possible. The area thus outlined was incised deep to adipose tissue with a #15 scalpel blade.  The skin margins were undermined to an appropriate distance in all directions utilizing iris scissors. Modified Advancement Flap Text: The defect edges were debeveled with a #15 scalpel blade.  Given the location of the defect, shape of the defect and the proximity to free margins a modified advancement flap was deemed most appropriate.  Using a sterile surgical marker, an appropriate advancement flap was drawn incorporating the defect and placing the expected incisions within the relaxed skin tension lines where possible.    The area thus outlined was incised deep to adipose tissue with a #15 scalpel blade.  The skin margins were undermined to an appropriate distance in all directions utilizing iris scissors. Mucosal Advancement Flap Text: Given the location of the defect, shape of the defect and the proximity to free margins a mucosal advancement flap was deemed most appropriate. Incisions were made with a 15 blade scalpel in the appropriate fashion along the cutaneous vermilion border and the mucosal lip. The remaining actinically damaged mucosal tissue was excised.  The mucosal advancement flap was then elevated to the gingival sulcus with care taken to preserve the neurovascular structures and advanced into the primary defect. Care was taken to ensure that precise realignment of the vermilion border was achieved. Peng Advancement Flap Text: The defect edges were debeveled with a #15 scalpel blade.  Given the location of the defect, shape of the defect and the proximity to free margins a Peng advancement flap was deemed most appropriate.  Using a sterile surgical marker, an appropriate advancement flap was drawn incorporating the defect and placing the expected incisions within the relaxed skin tension lines where possible. The area thus outlined was incised deep to adipose tissue with a #15 scalpel blade.  The skin margins were undermined to an appropriate distance in all directions utilizing iris scissors. Hatchet Flap Text: The defect edges were debeveled with a #15 scalpel blade.  Given the location of the defect, shape of the defect and the proximity to free margins a hatchet flap was deemed most appropriate.  Using a sterile surgical marker, an appropriate hatchet flap was drawn incorporating the defect and placing the expected incisions within the relaxed skin tension lines where possible.    The area thus outlined was incised deep to adipose tissue with a #15 scalpel blade.  The skin margins were undermined to an appropriate distance in all directions utilizing iris scissors. Rotation Flap Text: The defect edges were debeveled with a #15 scalpel blade.  Given the location of the defect, shape of the defect and the proximity to free margins a rotation flap was deemed most appropriate.  Using a sterile surgical marker, an appropriate rotation flap was drawn incorporating the defect and placing the expected incisions within the relaxed skin tension lines where possible.    The area thus outlined was incised deep to adipose tissue with a #15 scalpel blade.  The skin margins were undermined to an appropriate distance in all directions utilizing iris scissors. Spiral Flap Text: The defect edges were debeveled with a #15 scalpel blade.  Given the location of the defect, shape of the defect and the proximity to free margins a spiral flap was deemed most appropriate.  Using a sterile surgical marker, an appropriate rotation flap was drawn incorporating the defect and placing the expected incisions within the relaxed skin tension lines where possible. The area thus outlined was incised deep to adipose tissue with a #15 scalpel blade.  The skin margins were undermined to an appropriate distance in all directions utilizing iris scissors. Staged Advancement Flap Text: The defect edges were debeveled with a #15 scalpel blade.  Given the location of the defect, shape of the defect and the proximity to free margins a staged advancement flap was deemed most appropriate.  Using a sterile surgical marker, an appropriate advancement flap was drawn incorporating the defect and placing the expected incisions within the relaxed skin tension lines where possible. The area thus outlined was incised deep to adipose tissue with a #15 scalpel blade.  The skin margins were undermined to an appropriate distance in all directions utilizing iris scissors. Star Wedge Flap Text: The defect edges were debeveled with a #15 scalpel blade.  Given the location of the defect, shape of the defect and the proximity to free margins a star wedge flap was deemed most appropriate.  Using a sterile surgical marker, an appropriate rotation flap was drawn incorporating the defect and placing the expected incisions within the relaxed skin tension lines where possible. The area thus outlined was incised deep to adipose tissue with a #15 scalpel blade.  The skin margins were undermined to an appropriate distance in all directions utilizing iris scissors. Transposition Flap Text: The defect edges were debeveled with a #15 scalpel blade.  Given the location of the defect and the proximity to free margins a transposition flap was deemed most appropriate.  Using a sterile surgical marker, an appropriate transposition flap was drawn incorporating the defect.    The area thus outlined was incised deep to adipose tissue with a #15 scalpel blade.  The skin margins were undermined to an appropriate distance in all directions utilizing iris scissors. Muscle Hinge Flap Text: The defect edges were debeveled with a #15 scalpel blade.  Given the size, depth and location of the defect and the proximity to free margins a muscle hinge flap was deemed most appropriate.  Using a sterile surgical marker, an appropriate hinge flap was drawn incorporating the defect. The area thus outlined was incised with a #15 scalpel blade.  The skin margins were undermined to an appropriate distance in all directions utilizing iris scissors. Mustarde Flap Text: The defect edges were debeveled with a #15 scalpel blade.  Given the size, depth and location of the defect and the proximity to free margins a Mustarde flap was deemed most appropriate.  Using a sterile surgical marker, an appropriate flap was drawn incorporating the defect. The area thus outlined was incised with a #15 scalpel blade.  The skin margins were undermined to an appropriate distance in all directions utilizing iris scissors. Nasal Turnover Hinge Flap Text: The defect edges were debeveled with a #15 scalpel blade.  Given the size, depth, location of the defect and the defect being full thickness a nasal turnover hinge flap was deemed most appropriate.  Using a sterile surgical marker, an appropriate hinge flap was drawn incorporating the defect. The area thus outlined was incised with a #15 scalpel blade. The flap was designed to recreate the nasal mucosal lining and the alar rim. The skin margins were undermined to an appropriate distance in all directions utilizing iris scissors. Nasalis-Muscle-Based Myocutaneous Island Pedicle Flap Text: Using a #15 blade, an incision was made around the donor flap to the level of the nasalis muscle. Wide lateral undermining was then performed in both the subcutaneous plane above the nasalis muscle, and in a submuscular plane just above periosteum. This allowed the formation of a free nasalis muscle axial pedicle (based on the angular artery) which was still attached to the actual cutaneous flap, increasing its mobility and vascular viability. Hemostasis was obtained with pinpoint electrocoagulation. The flap was mobilized into position and the pivotal anchor points positioned and stabilized with buried interrupted sutures. Subcutaneous and dermal tissues were closed in a multilayered fashion with sutures. Tissue redundancies were excised, and the epidermal edges were apposed without significant tension and sutured with sutures. Orbicularis Oris Muscle Flap Text: The defect edges were debeveled with a #15 scalpel blade.  Given that the defect affected the competency of the oral sphincter an obicularis oris muscle flap was deemed most appropriate to restore this competency and normal muscle function.  Using a sterile surgical marker, an appropriate flap was drawn incorporating the defect. The area thus outlined was incised with a #15 scalpel blade. Melolabial Transposition Flap Text: The defect edges were debeveled with a #15 scalpel blade.  Given the location of the defect and the proximity to free margins a melolabial flap was deemed most appropriate.  Using a sterile surgical marker, an appropriate melolabial transposition flap was drawn incorporating the defect.    The area thus outlined was incised deep to adipose tissue with a #15 scalpel blade.  The skin margins were undermined to an appropriate distance in all directions utilizing iris scissors. Rhombic Flap Text: The defect edges were debeveled with a #15 scalpel blade.  Given the location of the defect and the proximity to free margins a rhombic flap was deemed most appropriate.  Using a sterile surgical marker, an appropriate rhombic flap was drawn incorporating the defect.    The area thus outlined was incised deep to adipose tissue with a #15 scalpel blade.  The skin margins were undermined to an appropriate distance in all directions utilizing iris scissors. Rhomboid Transposition Flap Text: The defect edges were debeveled with a #15 scalpel blade.  Given the location of the defect and the proximity to free margins a rhomboid transposition flap was deemed most appropriate.  Using a sterile surgical marker, an appropriate rhomboid flap was drawn incorporating the defect.    The area thus outlined was incised deep to adipose tissue with a #15 scalpel blade.  The skin margins were undermined to an appropriate distance in all directions utilizing iris scissors. Bi-Rhombic Flap Text: The defect edges were debeveled with a #15 scalpel blade.  Given the location of the defect and the proximity to free margins a bi-rhombic flap was deemed most appropriate.  Using a sterile surgical marker, an appropriate rhombic flap was drawn incorporating the defect. The area thus outlined was incised deep to adipose tissue with a #15 scalpel blade.  The skin margins were undermined to an appropriate distance in all directions utilizing iris scissors. Helical Rim Advancement Flap Text: The defect edges were debeveled with a #15 blade scalpel.  Given the location of the defect and the proximity to free margins (helical rim) a double helical rim advancement flap was deemed most appropriate.  Using a sterile surgical marker, the appropriate advancement flaps were drawn incorporating the defect and placing the expected incisions between the helical rim and antihelix where possible.  The area thus outlined was incised through and through with a #15 scalpel blade.  With a skin hook and iris scissors, the flaps were gently and sharply undermined and freed up. Bilateral Helical Rim Advancement Flap Text: The defect edges were debeveled with a #15 blade scalpel.  Given the location of the defect and the proximity to free margins (helical rim) a bilateral helical rim advancement flap was deemed most appropriate.  Using a sterile surgical marker, the appropriate advancement flaps were drawn incorporating the defect and placing the expected incisions between the helical rim and antihelix where possible.  The area thus outlined was incised through and through with a #15 scalpel blade.  With a skin hook and iris scissors, the flaps were gently and sharply undermined and freed up. Ear Star Wedge Flap Text: The defect edges were debeveled with a #15 blade scalpel.  Given the location of the defect and the proximity to free margins (helical rim) an ear star wedge flap was deemed most appropriate.  Using a sterile surgical marker, the appropriate flap was drawn incorporating the defect and placing the expected incisions between the helical rim and antihelix where possible.  The area thus outlined was incised through and through with a #15 scalpel blade. Banner Transposition Flap Text: The defect edges were debeveled with a #15 scalpel blade.  Given the location of the defect and the proximity to free margins a Banner transposition flap was deemed most appropriate.  Using a sterile surgical marker, an appropriate flap drawn around the defect. The area thus outlined was incised deep to adipose tissue with a #15 scalpel blade.  The skin margins were undermined to an appropriate distance in all directions utilizing iris scissors. Bilobed Flap Text: The defect edges were debeveled with a #15 scalpel blade.  Given the location of the defect and the proximity to free margins a bilobe flap was deemed most appropriate.  Using a sterile surgical marker, an appropriate bilobe flap drawn around the defect.    The area thus outlined was incised deep to adipose tissue with a #15 scalpel blade.  The skin margins were undermined to an appropriate distance in all directions utilizing iris scissors. Bilobed Transposition Flap Text: The defect edges were debeveled with a #15 scalpel blade.  Given the location of the defect and the proximity to free margins a bilobed transposition flap was deemed most appropriate.  Using a sterile surgical marker, an appropriate bilobe flap drawn around the defect.    The area thus outlined was incised deep to adipose tissue with a #15 scalpel blade.  The skin margins were undermined to an appropriate distance in all directions utilizing iris scissors. Trilobed Flap Text: The defect edges were debeveled with a #15 scalpel blade.  Given the location of the defect and the proximity to free margins a trilobed flap was deemed most appropriate.  Using a sterile surgical marker, an appropriate trilobed flap drawn around the defect.    The area thus outlined was incised deep to adipose tissue with a #15 scalpel blade.  The skin margins were undermined to an appropriate distance in all directions utilizing iris scissors. Dorsal Nasal Flap Text: The defect edges were debeveled with a #15 scalpel blade.  Given the location of the defect and the proximity to free margins a dorsal nasal flap was deemed most appropriate.  Using a sterile surgical marker, an appropriate dorsal nasal flap was drawn around the defect.    The area thus outlined was incised deep to adipose tissue with a #15 scalpel blade.  The skin margins were undermined to an appropriate distance in all directions utilizing iris scissors. Island Pedicle Flap Text: The defect edges were debeveled with a #15 scalpel blade.  Given the location of the defect, shape of the defect and the proximity to free margins an island pedicle advancement flap was deemed most appropriate.  Using a sterile surgical marker, an appropriate advancement flap was drawn incorporating the defect, outlining the appropriate donor tissue and placing the expected incisions within the relaxed skin tension lines where possible.    The area thus outlined was incised deep to adipose tissue with a #15 scalpel blade.  The skin margins were undermined to an appropriate distance in all directions around the primary defect and laterally outward around the island pedicle utilizing iris scissors.  There was minimal undermining beneath the pedicle flap. Island Pedicle Flap With Canthal Suspension Text: The defect edges were debeveled with a #15 scalpel blade.  Given the location of the defect, shape of the defect and the proximity to free margins an island pedicle advancement flap was deemed most appropriate.  Using a sterile surgical marker, an appropriate advancement flap was drawn incorporating the defect, outlining the appropriate donor tissue and placing the expected incisions within the relaxed skin tension lines where possible. The area thus outlined was incised deep to adipose tissue with a #15 scalpel blade.  The skin margins were undermined to an appropriate distance in all directions around the primary defect and laterally outward around the island pedicle utilizing iris scissors.  There was minimal undermining beneath the pedicle flap. A suspension suture was placed in the canthal tendon to prevent tension and prevent ectropion. Alar Island Pedicle Flap Text: The defect edges were debeveled with a #15 scalpel blade.  Given the location of the defect, shape of the defect and the proximity to the alar rim an island pedicle advancement flap was deemed most appropriate.  Using a sterile surgical marker, an appropriate advancement flap was drawn incorporating the defect, outlining the appropriate donor tissue and placing the expected incisions within the nasal ala running parallel to the alar rim. The area thus outlined was incised with a #15 scalpel blade.  The skin margins were undermined minimally to an appropriate distance in all directions around the primary defect and laterally outward around the island pedicle utilizing iris scissors.  There was minimal undermining beneath the pedicle flap. Double Island Pedicle Flap Text: The defect edges were debeveled with a #15 scalpel blade.  Given the location of the defect, shape of the defect and the proximity to free margins a double island pedicle advancement flap was deemed most appropriate.  Using a sterile surgical marker, an appropriate advancement flap was drawn incorporating the defect, outlining the appropriate donor tissue and placing the expected incisions within the relaxed skin tension lines where possible.    The area thus outlined was incised deep to adipose tissue with a #15 scalpel blade.  The skin margins were undermined to an appropriate distance in all directions around the primary defect and laterally outward around the island pedicle utilizing iris scissors.  There was minimal undermining beneath the pedicle flap. Island Pedicle Flap-Requiring Vessel Identification Text: The defect edges were debeveled with a #15 scalpel blade.  Given the location of the defect, shape of the defect and the proximity to free margins an island pedicle advancement flap was deemed most appropriate.  Using a sterile surgical marker, an appropriate advancement flap was drawn, based on the axial vessel mentioned above, incorporating the defect, outlining the appropriate donor tissue and placing the expected incisions within the relaxed skin tension lines where possible.    The area thus outlined was incised deep to adipose tissue with a #15 scalpel blade.  The skin margins were undermined to an appropriate distance in all directions around the primary defect and laterally outward around the island pedicle utilizing iris scissors.  There was minimal undermining beneath the pedicle flap. Keystone Flap Text: The defect edges were debeveled with a #15 scalpel blade.  Given the location of the defect, shape of the defect a keystone flap was deemed most appropriate.  Using a sterile surgical marker, an appropriate keystone flap was drawn incorporating the defect, outlining the appropriate donor tissue and placing the expected incisions within the relaxed skin tension lines where possible. The area thus outlined was incised deep to adipose tissue with a #15 scalpel blade.  The skin margins were undermined to an appropriate distance in all directions around the primary defect and laterally outward around the flap utilizing iris scissors. O-T Plasty Text: The defect edges were debeveled with a #15 scalpel blade.  Given the location of the defect, shape of the defect and the proximity to free margins an O-T plasty was deemed most appropriate.  Using a sterile surgical marker, an appropriate O-T plasty was drawn incorporating the defect and placing the expected incisions within the relaxed skin tension lines where possible.    The area thus outlined was incised deep to adipose tissue with a #15 scalpel blade.  The skin margins were undermined to an appropriate distance in all directions utilizing iris scissors. O-Z Plasty Text: The defect edges were debeveled with a #15 scalpel blade.  Given the location of the defect, shape of the defect and the proximity to free margins an O-Z plasty (double transposition flap) was deemed most appropriate.  Using a sterile surgical marker, the appropriate transposition flaps were drawn incorporating the defect and placing the expected incisions within the relaxed skin tension lines where possible.    The area thus outlined was incised deep to adipose tissue with a #15 scalpel blade.  The skin margins were undermined to an appropriate distance in all directions utilizing iris scissors.  Hemostasis was achieved with electrocautery.  The flaps were then transposed into place, one clockwise and the other counterclockwise, and anchored with interrupted buried subcutaneous sutures. Double O-Z Plasty Text: The defect edges were debeveled with a #15 scalpel blade.  Given the location of the defect, shape of the defect and the proximity to free margins a Double O-Z plasty (double transposition flap) was deemed most appropriate.  Using a sterile surgical marker, the appropriate transposition flaps were drawn incorporating the defect and placing the expected incisions within the relaxed skin tension lines where possible. The area thus outlined was incised deep to adipose tissue with a #15 scalpel blade.  The skin margins were undermined to an appropriate distance in all directions utilizing iris scissors.  Hemostasis was achieved with electrocautery.  The flaps were then transposed into place, one clockwise and the other counterclockwise, and anchored with interrupted buried subcutaneous sutures. V-Y Plasty Text: The defect edges were debeveled with a #15 scalpel blade.  Given the location of the defect, shape of the defect and the proximity to free margins an V-Y advancement flap was deemed most appropriate.  Using a sterile surgical marker, an appropriate advancement flap was drawn incorporating the defect and placing the expected incisions within the relaxed skin tension lines where possible.    The area thus outlined was incised deep to adipose tissue with a #15 scalpel blade.  The skin margins were undermined to an appropriate distance in all directions utilizing iris scissors. H Plasty Text: Given the location of the defect, shape of the defect and the proximity to free margins a H-plasty was deemed most appropriate for repair.  Using a sterile surgical marker, the appropriate advancement arms of the H-plasty were drawn incorporating the defect and placing the expected incisions within the relaxed skin tension lines where possible. The area thus outlined was incised deep to adipose tissue with a #15 scalpel blade. The skin margins were undermined to an appropriate distance in all directions utilizing iris scissors.  The opposing advancement arms were then advanced into place in opposite direction and anchored with interrupted buried subcutaneous sutures. W Plasty Text: The lesion was extirpated to the level of the fat with a #15 scalpel blade.  Given the location of the defect, shape of the defect and the proximity to free margins a W-plasty was deemed most appropriate for repair.  Using a sterile surgical marker, the appropriate transposition arms of the W-plasty were drawn incorporating the defect and placing the expected incisions within the relaxed skin tension lines where possible.    The area thus outlined was incised deep to adipose tissue with a #15 scalpel blade.  The skin margins were undermined to an appropriate distance in all directions utilizing iris scissors.  The opposing transposition arms were then transposed into place in opposite direction and anchored with interrupted buried subcutaneous sutures. Z Plasty Text: The lesion was extirpated to the level of the fat with a #15 scalpel blade.  Given the location of the defect, shape of the defect and the proximity to free margins a Z-plasty was deemed most appropriate for repair.  Using a sterile surgical marker, the appropriate transposition arms of the Z-plasty were drawn incorporating the defect and placing the expected incisions within the relaxed skin tension lines where possible.    The area thus outlined was incised deep to adipose tissue with a #15 scalpel blade.  The skin margins were undermined to an appropriate distance in all directions utilizing iris scissors.  The opposing transposition arms were then transposed into place in opposite direction and anchored with interrupted buried subcutaneous sutures. Zygomaticofacial Flap Text: Given the location of the defect, shape of the defect and the proximity to free margins a zygomaticofacial flap was deemed most appropriate for repair.  Using a sterile surgical marker, the appropriate flap was drawn incorporating the defect and placing the expected incisions within the relaxed skin tension lines where possible. The area thus outlined was incised deep to adipose tissue with a #15 scalpel blade with preservation of a vascular pedicle.  The skin margins were undermined to an appropriate distance in all directions utilizing iris scissors.  The flap was then placed into the defect and anchored with interrupted buried subcutaneous sutures. Cheek Interpolation Flap Text: A decision was made to reconstruct the defect utilizing an interpolation axial flap and a staged reconstruction.  A telfa template was made of the defect.  This telfa template was then used to outline the Cheek Interpolation flap.  The donor area for the pedicle flap was then injected with anesthesia.  The flap was excised through the skin and subcutaneous tissue down to the layer of the underlying musculature.  The interpolation flap was carefully excised within this deep plane to maintain its blood supply.  The edges of the donor site were undermined.   The donor site was closed in a primary fashion.  The pedicle was then rotated into position and sutured.  Once the tube was sutured into place, adequate blood supply was confirmed with blanching and refill.  The pedicle was then wrapped with xeroform gauze and dressed appropriately with a telfa and gauze bandage to ensure continued blood supply and protect the attached pedicle. Cheek-To-Nose Interpolation Flap Text: A decision was made to reconstruct the defect utilizing an interpolation axial flap and a staged reconstruction.  A telfa template was made of the defect.  This telfa template was then used to outline the Cheek-To-Nose Interpolation flap.  The donor area for the pedicle flap was then injected with anesthesia.  The flap was excised through the skin and subcutaneous tissue down to the layer of the underlying musculature.  The interpolation flap was carefully excised within this deep plane to maintain its blood supply.  The edges of the donor site were undermined.   The donor site was closed in a primary fashion.  The pedicle was then rotated into position and sutured.  Once the tube was sutured into place, adequate blood supply was confirmed with blanching and refill.  The pedicle was then wrapped with xeroform gauze and dressed appropriately with a telfa and gauze bandage to ensure continued blood supply and protect the attached pedicle. Interpolation Flap Text: A decision was made to reconstruct the defect utilizing an interpolation axial flap and a staged reconstruction.  A telfa template was made of the defect.  This telfa template was then used to outline the interpolation flap.  The donor area for the pedicle flap was then injected with anesthesia.  The flap was excised through the skin and subcutaneous tissue down to the layer of the underlying musculature.  The interpolation flap was carefully excised within this deep plane to maintain its blood supply.  The edges of the donor site were undermined.   The donor site was closed in a primary fashion.  The pedicle was then rotated into position and sutured.  Once the tube was sutured into place, adequate blood supply was confirmed with blanching and refill.  The pedicle was then wrapped with xeroform gauze and dressed appropriately with a telfa and gauze bandage to ensure continued blood supply and protect the attached pedicle. Melolabial Interpolation Flap Text: A decision was made to reconstruct the defect utilizing an interpolation axial flap and a staged reconstruction.  A telfa template was made of the defect.  This telfa template was then used to outline the melolabial interpolation flap.  The donor area for the pedicle flap was then injected with anesthesia.  The flap was excised through the skin and subcutaneous tissue down to the layer of the underlying musculature.  The pedicle flap was carefully excised within this deep plane to maintain its blood supply.  The edges of the donor site were undermined.   The donor site was closed in a primary fashion.  The pedicle was then rotated into position and sutured.  Once the tube was sutured into place, adequate blood supply was confirmed with blanching and refill.  The pedicle was then wrapped with xeroform gauze and dressed appropriately with a telfa and gauze bandage to ensure continued blood supply and protect the attached pedicle. Mastoid Interpolation Flap Text: A decision was made to reconstruct the defect utilizing an interpolation axial flap and a staged reconstruction.  A telfa template was made of the defect.  This telfa template was then used to outline the mastoid interpolation flap.  The donor area for the pedicle flap was then injected with anesthesia.  The flap was excised through the skin and subcutaneous tissue down to the layer of the underlying musculature.  The pedicle flap was carefully excised within this deep plane to maintain its blood supply.  The edges of the donor site were undermined.   The donor site was closed in a primary fashion.  The pedicle was then rotated into position and sutured.  Once the tube was sutured into place, adequate blood supply was confirmed with blanching and refill.  The pedicle was then wrapped with xeroform gauze and dressed appropriately with a telfa and gauze bandage to ensure continued blood supply and protect the attached pedicle. Posterior Auricular Interpolation Flap Text: A decision was made to reconstruct the defect utilizing an interpolation axial flap and a staged reconstruction.  A telfa template was made of the defect.  This telfa template was then used to outline the posterior auricular interpolation flap.  The donor area for the pedicle flap was then injected with anesthesia.  The flap was excised through the skin and subcutaneous tissue down to the layer of the underlying musculature.  The pedicle flap was carefully excised within this deep plane to maintain its blood supply.  The edges of the donor site were undermined.   The donor site was closed in a primary fashion.  The pedicle was then rotated into position and sutured.  Once the tube was sutured into place, adequate blood supply was confirmed with blanching and refill.  The pedicle was then wrapped with xeroform gauze and dressed appropriately with a telfa and gauze bandage to ensure continued blood supply and protect the attached pedicle. Paramedian Forehead Flap Text: A decision was made to reconstruct the defect utilizing an interpolation axial flap and a staged reconstruction.  A telfa template was made of the defect.  This telfa template was then used to outline the paramedian forehead pedicle flap.  The donor area for the pedicle flap was then injected with anesthesia.  The flap was excised through the skin and subcutaneous tissue down to the layer of the underlying musculature.  The pedicle flap was carefully excised within this deep plane to maintain its blood supply.  The edges of the donor site were undermined.   The donor site was closed in a primary fashion.  The pedicle was then rotated into position and sutured.  Once the tube was sutured into place, adequate blood supply was confirmed with blanching and refill.  The pedicle was then wrapped with xeroform gauze and dressed appropriately with a telfa and gauze bandage to ensure continued blood supply and protect the attached pedicle. Abbe Flap (Upper To Lower Lip) Text: The defect of the lower lip was assessed and measured.  Given the location and size of the defect, an Abbe flap was deemed most appropriate.  Using a sterile surgical marker, an appropriate Abbe flap was measured and drawn on the upper lip. Local anesthesia was then infiltrated.  A scalpel was then used to incise the upper lip through and through the skin, vermilion, muscle and mucosa, leaving the flap pedicled on the opposite side.  The flap was then rotated and transferred to the lower lip defect.  The flap was then sutured into place with a three layer technique, closing the orbicularis oris muscle layer with subcutaneous buried sutures, followed by a mucosal layer and an epidermal layer. Abbe Flap (Lower To Upper Lip) Text: The defect of the upper lip was assessed and measured.  Given the location and size of the defect, an Abbe flap was deemed most appropriate.  Using a sterile surgical marker, an appropriate Abbe flap was measured and drawn on the lower lip. Local anesthesia was then infiltrated. A scalpel was then used to incise the upper lip through and through the skin, vermilion, muscle and mucosa, leaving the flap pedicled on the opposite side.  The flap was then rotated and transferred to the lower lip defect.  The flap was then sutured into place with a three layer technique, closing the orbicularis oris muscle layer with subcutaneous buried sutures, followed by a mucosal layer and an epidermal layer. Estlander Flap (Upper To Lower Lip) Text: The defect of the lower lip was assessed and measured.  Given the location and size of the defect, an Estlander flap was deemed most appropriate.  Using a sterile surgical marker, an appropriate Estlander flap was measured and drawn on the upper lip. Local anesthesia was then infiltrated. A scalpel was then used to incise the lateral aspect of the flap, through skin, muscle and mucosa, leaving the flap pedicled medially.  The flap was then rotated and positioned to fill the lower lip defect.  The flap was then sutured into place with a three layer technique, closing the orbicularis oris muscle layer with subcutaneous buried sutures, followed by a mucosal layer and an epidermal layer. Estlander Flap (Lower To Upper Lip) Text: The defect of the lower lip was assessed and measured.  Given the location and size of the defect, an Estlander flap was deemed most appropriate.  Using a sterile surgical marker, an appropriate Estlander flap was measured and drawn on the upper lip. Local anesthesia was then infiltrated. A scalpel was then used to incise the lateral aspect of the flap, through skin, muscle and mucosa, leaving the flap pedicled medially.  The flap was then rotated and positioned to fill the lower lip defect.  The flap was then sutured into place with a three layer technique, closing the orbicularis oris muscle layer with subcutaneous buried sutures, followed by a mucosal layer and an epidermal layer. Cheiloplasty (Less Than 50%) Text: A decision was made to reconstruct the defect with a  cheiloplasty.  The defect was undermined extensively.  Additional obicularis oris muscle was excised with a 15 blade scalpel.  The defect was converted into a full thickness wedge, of less than 50% of the vertical height of the lip, to facilite a better cosmetic result.  Small vessels were then tied off with 5-0 monocyrl. The obicularis oris, superficial fascia, adipose and dermis were then reapproximated.  After the deeper layers were approximated the epidermis was reapproximated with particular care given to realign the vermilion border. Cheiloplasty (Complex) Text: A decision was made to reconstruct the defect with a  cheiloplasty.  The defect was undermined extensively.  Additional obicularis oris muscle was excised with a 15 blade scalpel.  The defect was converted into a full thickness wedge to facilite a better cosmetic result.  Small vessels were then tied off with 5-0 monocyrl. The obicularis oris, superficial fascia, adipose and dermis were then reapproximated.  After the deeper layers were approximated the epidermis was reapproximated with particular care given to realign the vermilion border. Ear Wedge Repair Text: A wedge excision was completed by carrying down an excision through the full thickness of the ear and cartilage with an inward facing Burow's triangle. The wound was then closed in a layered fashion. Full Thickness Lip Wedge Repair (Flap) Text: Given the location of the defect and the proximity to free margins a full thickness wedge repair was deemed most appropriate.  Using a sterile surgical marker, the appropriate repair was drawn incorporating the defect and placing the expected incisions perpendicular to the vermilion border.  The vermilion border was also meticulously outlined to ensure appropriate reapproximation during the repair.  The area thus outlined was incised through and through with a #15 scalpel blade.  The muscularis and dermis were reaproximated with deep sutures following hemostasis. Care was taken to realign the vermilion border before proceeding with the superficial closure.  Once the vermilion was realigned the superfical and mucosal closure was finished. Ftsg Text: The defect edges were debeveled with a #15 scalpel blade.  Given the location of the defect, shape of the defect and the proximity to free margins a full thickness skin graft was deemed most appropriate.  Using a sterile surgical marker, the primary defect shape was transferred to the donor site. The area thus outlined was incised deep to adipose tissue with a #15 scalpel blade.  The harvested graft was then trimmed of adipose tissue until only dermis and epidermis was left.  The skin margins of the secondary defect were undermined to an appropriate distance in all directions utilizing iris scissors.  The secondary defect was closed with interrupted buried subcutaneous sutures.  The skin edges were then re-apposed with running  sutures.  The skin graft was then placed in the primary defect and oriented appropriately. Split-Thickness Skin Graft Text: The defect edges were debeveled with a #15 scalpel blade.  Given the location of the defect, shape of the defect and the proximity to free margins a split thickness skin graft was deemed most appropriate.  Using a sterile surgical marker, the primary defect shape was transferred to the donor site. The split thickness graft was then harvested.  The skin graft was then placed in the primary defect and oriented appropriately. Burow's Graft Text: The defect edges were debeveled with a #15 scalpel blade.  Given the location of the defect, shape of the defect, the proximity to free margins and the presence of a standing cone deformity a Burow's skin graft was deemed most appropriate. The standing cone was removed and this tissue was then trimmed to the shape of the primary defect. The adipose tissue was also removed until only dermis and epidermis were left.  The skin margins of the secondary defect were undermined to an appropriate distance in all directions utilizing iris scissors.  The secondary defect was closed with interrupted buried subcutaneous sutures.  The skin edges were then re-apposed with running  sutures.  The skin graft was then placed in the primary defect and oriented appropriately. Cartilage Graft Text: The defect edges were debeveled with a #15 scalpel blade.  Given the location of the defect, shape of the defect, the fact the defect involved a full thickness cartilage defect a cartilage graft was deemed most appropriate.  An appropriate donor site was identified, cleansed, and anesthetized. The cartilage graft was then harvested and transferred to the recipient site, oriented appropriately and then sutured into place.  The secondary defect was then repaired using a primary closure. Composite Graft Text: The defect edges were debeveled with a #15 scalpel blade.  Given the location of the defect, shape of the defect, the proximity to free margins and the fact the defect was full thickness a composite graft was deemed most appropriate.  The defect was outline and then transferred to the donor site.  A full thickness graft was then excised from the donor site. The graft was then placed in the primary defect, oriented appropriately and then sutured into place.  The secondary defect was then repaired using a primary closure. Epidermal Autograft Text: The defect edges were debeveled with a #15 scalpel blade.  Given the location of the defect, shape of the defect and the proximity to free margins an epidermal autograft was deemed most appropriate.  Using a sterile surgical marker, the primary defect shape was transferred to the donor site. The epidermal graft was then harvested.  The skin graft was then placed in the primary defect and oriented appropriately. Dermal Autograft Text: The defect edges were debeveled with a #15 scalpel blade.  Given the location of the defect, shape of the defect and the proximity to free margins a dermal autograft was deemed most appropriate.  Using a sterile surgical marker, the primary defect shape was transferred to the donor site. The area thus outlined was incised deep to adipose tissue with a #15 scalpel blade.  The harvested graft was then trimmed of adipose and epidermal tissue until only dermis was left.  The skin graft was then placed in the primary defect and oriented appropriately. Skin Substitute Text: The defect edges were debeveled with a #15 scalpel blade.  Given the location of the defect, shape of the defect and the proximity to free margins a skin substitute graft was deemed most appropriate.  The graft material was trimmed to fit the size of the defect. The graft was then placed in the primary defect and oriented appropriately. Skin Substitute Paste Text: The defect edges were debeveled with a #15 scalpel blade.  Given the location of the defect, shape of the defect and the proximity to free margins a skin substitute micronized graft was deemed most appropriate.  The entire vial contents were admixed with 0.5ccs of sterile saline, formed into a paste and then evenly spread over the entire wound bed. Skin Substitute Injection Text: The defect edges were debeveled with a #15 scalpel blade.  Given the location of the defect, shape of the defect and the proximity to free margins a skin substitute micronized graft was deemed most appropriate.  The entire vial contents were admixed with 3.0ccs of sterile saline and then injected subcutaneously throughout the entire wound bed. Tissue Cultured Epidermal Autograft Text: The defect edges were debeveled with a #15 scalpel blade.  Given the location of the defect, shape of the defect and the proximity to free margins a tissue cultured epidermal autograft was deemed most appropriate.  The graft was then trimmed to fit the size of the defect.  The graft was then placed in the primary defect and oriented appropriately. Xenograft Text: The defect edges were debeveled with a #15 scalpel blade.  Given the location of the defect, shape of the defect and the proximity to free margins a xenograft was deemed most appropriate.  The graft was then trimmed to fit the size of the defect.  The graft was then placed in the primary defect and oriented appropriately. Purse String (Simple) Text: Given the location of the defect and the characteristics of the surrounding skin a purse string closure was deemed most appropriate.  Undermining was performed circumfirentially around the surgical defect.  A purse string suture was then placed and tightened. Purse String (Intermediate) Text: Given the location of the defect and the characteristics of the surrounding skin a purse string intermediate closure was deemed most appropriate.  Undermining was performed circumfirentially around the surgical defect.  A purse string suture was then placed and tightened. Partial Purse String (Simple) Text: Given the location of the defect and the characteristics of the surrounding skin a simple purse string closure was deemed most appropriate.  Undermining was performed circumfirentially around the surgical defect.  A purse string suture was then placed and tightened. Wound tension only allowed a partial closure of the circular defect. Partial Purse String (Intermediate) Text: Given the location of the defect and the characteristics of the surrounding skin an intermediate purse string closure was deemed most appropriate.  Undermining was performed circumfirentially around the surgical defect.  A purse string suture was then placed and tightened. Wound tension only allowed a partial closure of the circular defect. Localized Dermabrasion Text: The patient was draped in routine manner.  Localized dermabrasion using 3 x 17 mm wire brush was performed in routine manner to papillary dermis. This spot dermabrasion is being performed to complete skin cancer reconstruction. It also will eliminate the other sun damaged precancerous cells that are known to be part of the regional effect of a lifetime's worth of sun exposure. This localized dermabrasion is therapeutic and should not be considered cosmetic in any regard. Tarsorrhaphy Text: A tarsorrhaphy was performed using Frost sutures. Complex Repair And Flap Additional Text (Will Appearing After The Standard Complex Repair Text): The complex repair was not sufficient to completely close the primary defect. The remaining additional defect was repaired with the flap mentioned below. Complex Repair And Graft Additional Text (Will Appearing After The Standard Complex Repair Text): The complex repair was not sufficient to completely close the primary defect. The remaining additional defect was repaired with the graft mentioned below. Cheek Interpolation Flap Division And Inset Text: Division and inset of the cheek interpolation flap was performed to achieve optimal aesthetic result, restore normal anatomic appearance and avoid distortion of normal anatomy, expedite and facilitate wound healing, achieve optimal functional result and because linear closure either not possible or would produce suboptimal result. The patient was prepped and draped in the usual manner. The pedicle was infiltrated with local anesthesia. The pedicle was sectioned with a #15 blade. The pedicle was de-bulked and trimmed to match the shape of the defect. Hemostasis was achieved. The flap donor site and free margin of the flap were secured with deep buried sutures and the wound edges were re-approximated. Cheek To Nose Interpolation Flap Division And Inset Text: Division and inset of the cheek to nose interpolation flap was performed to achieve optimal aesthetic result, restore normal anatomic appearance and avoid distortion of normal anatomy, expedite and facilitate wound healing, achieve optimal functional result and because linear closure either not possible or would produce suboptimal result. The patient was prepped and draped in the usual manner. The pedicle was infiltrated with local anesthesia. The pedicle was sectioned with a #15 blade. The pedicle was de-bulked and trimmed to match the shape of the defect. Hemostasis was achieved. The flap donor site and free margin of the flap were secured with deep buried sutures and the wound edges were re-approximated. Melolabial Interpolation Flap Division And Inset Text: Division and inset of the melolabial interpolation flap was performed to achieve optimal aesthetic result, restore normal anatomic appearance and avoid distortion of normal anatomy, expedite and facilitate wound healing, achieve optimal functional result and because linear closure either not possible or would produce suboptimal result. The patient was prepped and draped in the usual manner. The pedicle was infiltrated with local anesthesia. The pedicle was sectioned with a #15 blade. The pedicle was de-bulked and trimmed to match the shape of the defect. Hemostasis was achieved. The flap donor site and free margin of the flap were secured with deep buried sutures and the wound edges were re-approximated. Mastoid Interpolation Flap Division And Inset Text: Division and inset of the mastoid interpolation flap was performed to achieve optimal aesthetic result, restore normal anatomic appearance and avoid distortion of normal anatomy, expedite and facilitate wound healing, achieve optimal functional result and because linear closure either not possible or would produce suboptimal result. The patient was prepped and draped in the usual manner. The pedicle was infiltrated with local anesthesia. The pedicle was sectioned with a #15 blade. The pedicle was de-bulked and trimmed to match the shape of the defect. Hemostasis was achieved. The flap donor site and free margin of the flap were secured with deep buried sutures and the wound edges were re-approximated. Paramedian Forehead Flap Division And Inset Text: Division and inset of the paramedian forehead flap was performed to achieve optimal aesthetic result, restore normal anatomic appearance and avoid distortion of normal anatomy, expedite and facilitate wound healing, achieve optimal functional result and because linear closure either not possible or would produce suboptimal result. The patient was prepped and draped in the usual manner. The pedicle was infiltrated with local anesthesia. The pedicle was sectioned with a #15 blade. The pedicle was de-bulked and trimmed to match the shape of the defect. Hemostasis was achieved. The flap donor site and free margin of the flap were secured with deep buried sutures and the wound edges were re-approximated. Posterior Auricular Interpolation Flap Division And Inset Text: Division and inset of the posterior auricular interpolation flap was performed to achieve optimal aesthetic result, restore normal anatomic appearance and avoid distortion of normal anatomy, expedite and facilitate wound healing, achieve optimal functional result and because linear closure either not possible or would produce suboptimal result. The patient was prepped and draped in the usual manner. The pedicle was infiltrated with local anesthesia. The pedicle was sectioned with a #15 blade. The pedicle was de-bulked and trimmed to match the shape of the defect. Hemostasis was achieved. The flap donor site and free margin of the flap were secured with deep buried sutures and the wound edges were re-approximated. Manual Repair Warning Statement: We plan on removing the manually selected variable below in favor of our much easier automatic structured text blocks found in the previous tab. We decided to do this to help make the flow better and give you the full power of structured data. Manual selection is never going to be ideal in our platform and I would encourage you to avoid using manual selection from this point on, especially since I will be sunsetting this feature. It is important that you do one of two things with the customized text below. First, you can save all of the text in a word file so you can have it for future reference. Second, transfer the text to the appropriate area in the Library tab. Lastly, if there is a flap or graft type which we do not have you need to let us know right away so I can add it in before the variable is hidden. No need to panic, we plan to give you roughly 6 months to make the change. Consent: The rationale for the repair was explained to the patient and consent was obtained. The risks, benefits and alternatives to therapy were discussed in detail. Specifically, the risks of infection, scarring, bleeding, prolonged wound healing, incomplete removal, allergy to anesthesia, nerve injury and recurrence were addressed. Prior to the procedure, the treatment site was clearly identified and confirmed by the patient. All components of Universal Protocol/PAUSE Rule completed. Post-Care Instructions: I reviewed with the patient in detail post-care instructions. Patient is not to engage in any heavy lifting, exercise, or swimming for the next 14 days. Should the patient develop any fevers, chills, bleeding, severe pain patient will contact the office immediately. Pain Refusal Text: I offered to prescribe pain medication but the patient refused to take this medication. Show Asc Variables: Yes Where Do You Want The Question To Include Opioid Counseling Located?: Case Summary Tab Information: Selecting Yes will display possible errors in your note based on the variables you have selected. This validation is only offered as a suggestion for you. PLEASE NOTE THAT THE VALIDATION TEXT WILL BE REMOVED WHEN YOU FINALIZE YOUR NOTE. IF YOU WANT TO FAX A PRELIMINARY NOTE YOU WILL NEED TO TOGGLE THIS TO 'NO' IF YOU DO NOT WANT IT IN YOUR FAXED NOTE.

## 2024-05-17 NOTE — CHART NOTE - NSCHARTNOTEFT_GEN_A_CORE
IMPRESSION: Grossly stable follow-up CT examination when compared with   5/14/2024 given the extensive motion artifact.    Previously noted left frontal lesion is again seen with associated areas   of hemorrhage.    patient had RRT called overnight for chest pain found to be in NStemi. Rpt CTh done which is stable for hemorrhagic mets. Cardiology to start heparin gtt.    Benefit of starting anticoagulation therapy is to be determined by covering primary team. Patient is at risk of increasing the size of their intracerebral hemorrhage  if they are restarted on anticoagulation, which could increase morbidity/mortality.  Recommend strict PTT goal of 40-60 on heparin gtt, no bolus and rpt CTH when therapeutic for stability.  Reconsult neurosurgery for any change on CT head prior to restarting A/C. Pager [phone].

## 2024-05-17 NOTE — CHART NOTE - NSCHARTNOTEFT_GEN_A_CORE
Patient overnight had RRT for severe chest pain, found to have ST elevation and elevated troponin, transferred to ccu.    After discussion with primary team, weighing risk and benefits of left frontal hemorrhage vs treatment of ACS, it was determined that if team were to start heparin, would recommend strict PTT goal 40-60 without bolus and repeat CTH when therapeutic for stability.    Neurosurgery recs appreciated regarding above, see chart note from 00:42 5/17.    Had Code Stroke called @04:40 for AMS. Patient appeared to be more lethargic, slow to respond, not following commands reliably.     Not a tpa candidate given recently received heparin gtt. CT head non contrast was obtained which showed that there was no change in size of her lesions or hemorrhage. She became more responsive upon returning to the CCU.     Of note, the patient recently received dilaudid and is noted to have intolerance/reaction to morphine.  Patient's change in mental status, waxing-waning characterization, can be related to medication side effects, seizures, or toxic/metabolic disturbances, delirium.    Case d/w Dr. Irvin, neurology attending. Patient overnight had RRT for severe chest pain, found to have ST elevation and elevated troponin, transferred to ccu.    After discussion with primary team, weighing risk and benefits of left frontal hemorrhage vs treatment of ACS, it was determined that if team were to start heparin, would recommend strict PTT goal 40-60 without bolus and repeat CTH when therapeutic for stability. Neurosurgery recs appreciated regarding above, see chart note from 00:42 5/17.    Code Stroke called @04:40 for AMS. Patient appeared to be more lethargic, slow to respond, not following commands reliably.     Not a tpa candidate given recently received heparin gtt. CT head non contrast was obtained which showed that there was no change in size of her lesions or hemorrhage. She became more responsive upon returning to the CCU.     Of note, the patient recently received dilaudid and is noted to have intolerance/reaction to morphine.  Patient's change in mental status, waxing-waning characterization, can be related to medication side effects, seizures, or toxic/metabolic disturbances, delirium.    Case d/w Dr. Irvin, neurology attending. Case also discussed with telestroke attending, Dr. Landaverde. Patient overnight had RRT for severe chest pain, found to have ST elevation and elevated troponin, transferred to ccu.    After discussion with primary team, weighing risk and benefits of left frontal hemorrhage vs treatment of ACS, it was determined that if team were to start heparin, would recommend strict PTT goal 40-60 without bolus and repeat CTH when therapeutic for stability. Neurosurgery recs appreciated regarding above, see chart note from 00:42 5/17.    Code Stroke called @04:40 for AMS. Patient appeared to be more lethargic, slow to respond, not following commands reliably.     Not a tpa candidate given recently received heparin gtt. CT head non contrast was obtained which showed that there was no change in size of her lesions or hemorrhage. She became more responsive upon returning to the CCU.     Of note, the patient recently received dilaudid and is noted to have intolerance/reaction to morphine.  Patient's change in mental status, waxing-waning characterization, can be related to medication side effects, seizures, or toxic/metabolic disturbances, delirium.    Case d/w Dr. Irvin, neurology attending. Case also discussed with telestroke attending, Dr. Landaverde.  Thank you

## 2024-05-17 NOTE — PROGRESS NOTE ADULT - SUBJECTIVE AND OBJECTIVE BOX
Subjective/Objective:     Overnight event:  Tele event:    MEDICATIONS    heparin  Infusion 300 Unit(s)/Hr IV Continuous <Continuous>  losartan 50 milliGRAM(s) Oral daily        acetaminophen     Tablet .. 650 milliGRAM(s) Oral every 6 hours PRN  lacosamide IVPB 150 milliGRAM(s) IV Intermittent every 12 hours  LORazepam   Injectable 2 milliGRAM(s) IV Push once PRN  LORazepam   Injectable 0.5 milliGRAM(s) IV Push once  melatonin 3 milliGRAM(s) Oral at bedtime  morphine ER Tablet 15 milliGRAM(s) Oral two times a day  ondansetron Injectable 4 milliGRAM(s) IV Push every 8 hours PRN    bisacodyl 5 milliGRAM(s) Oral at bedtime  famotidine    Tablet 20 milliGRAM(s) Oral daily  polyethylene glycol 3350 17 Gram(s) Oral two times a day  senna 2 Tablet(s) Oral at bedtime    dexAMETHasone  Injectable 2 milliGRAM(s) IV Push every 12 hours    chlorhexidine 2% Cloths 1 Application(s) Topical <User Schedule>  lactated ringers. 1000 milliLiter(s) IV Continuous <Continuous>  lidocaine   4% Patch 1 Patch Transdermal daily  potassium chloride  10 mEq/100 mL IVPB 10 milliEquivalent(s) IV Intermittent every 1 hour  sodium chloride 3 Gram(s) Oral three times a day            REVIEW OF SYSTEMS:  Complete 10point ROS negative.    ICU Vital Signs Last 24 Hrs  T(C): 36 (17 May 2024 04:00), Max: 37.1 (16 May 2024 22:44)  T(F): 96.8 (17 May 2024 04:00), Max: 98.8 (16 May 2024 22:44)  HR: 85 (17 May 2024 06:00) (69 - 109)  BP: 116/86 (17 May 2024 06:00) (116/86 - 167/108)  BP(mean): 93 (17 May 2024 06:00) (93 - 123)  ABP: --  ABP(mean): --  RR: 14 (17 May 2024 06:00) (14 - 28)  SpO2: 97% (17 May 2024 06:00) (94% - 100%)    O2 Parameters below as of 17 May 2024 06:00  Patient On (Oxygen Delivery Method): room air            PHYSICAL EXAMINATION  Appearance: NAD, no distress  HEENT: Moist Mucous Membranes, Anicteric, PERRL, EOMI  Cardiovascular: Regular rate and rhythm, Normal S1 S2, No JVD, No murmurs  Respiratory: Lungs clear to auscultation. No rales, No rhonchi, No wheezing. No tenderness to palpation  Gastrointestinal:  Soft, Non-tender, + BS  Neurologic: Non-focal, A&Ox3  Skin: Warm and dry, No rashes, No ecchymosis, No cyanosis  Musculoskeletal: No clubbing, No cyanosis, No edema  Vascular: Peripheral pulses palpable 2+ bilaterally  Psychiatry: Mood & affect appropriate      	    		      I&O's Summary    16 May 2024 07:01  -  17 May 2024 07:00  --------------------------------------------------------  IN: 18 mL / OUT: 1740 mL / NET: -1722 mL    	 	  LABORATORY VALUES	 	                          11.6   7.34  )-----------( 87       ( 17 May 2024 04:33 )             33.4       05-17    125<L>  |  74<L>  |  13  ----------------------------<  733<HH>  3.5   |  18<L>  |  0.25<L>  05-16    133<L>  |  95<L>  |  15  ----------------------------<  110<H>  4.8   |  21<L>  |  0.32<L>    Ca    8.0<L>      17 May 2024 04:33  Ca    9.9      16 May 2024 21:13  Phos  34.6     05-17  Phos  2.3     05-16  Mg     2.10     05-17  Mg     1.80     05-16    TPro  6.9  /  Alb  4.3  /  TBili  1.0  /  DBili  x   /  AST  30  /  ALT  27  /  AlkPhos  91  05-16    LIVER FUNCTIONS - ( 16 May 2024 21:13 )  Alb: 4.3 g/dL / Pro: 6.9 g/dL / ALK PHOS: 91 U/L / ALT: 27 U/L / AST: 30 U/L / GGT: x           Prothrombin Time, Plasma: 11.6 sec (05-17 @ 04:46)      CARDIAC MARKERS:  Creatine Kinase, Serum: 67 U/L (05-17 @ 04:33)  Creatine Kinase, Serum: 78 U/L (05-16 @ 21:13)            Blood Gas Venous - Lactate: 1.2 mmol/L (05-16 @ 21:48)    01-23 @ 06:30  Cholesterol, Serum - 211  Direct LDL- --  HDL Cholesterol, Serum- 52  Triglycerides, Serum- 127      Thyroid Stimulating Hormone, Serum: 0.92 uIU/mL (05-13 @ 17:44)      Urinalysis Basic - ( 17 May 2024 04:33 )    Color: x / Appearance: x / SG: x / pH: x  Gluc: 733 mg/dL / Ketone: x  / Bili: x / Urobili: x   Blood: x / Protein: x / Nitrite: x   Leuk Esterase: x / RBC: x / WBC x   Sq Epi: x / Non Sq Epi: x / Bacteria: x      CAPILLARY BLOOD GLUCOSE      POCT Blood Glucose.: 219 mg/dL (17 May 2024 04:37)            LABS:                          11.6   7.34  )-----------( 87       ( 17 May 2024 04:33 )             33.4     PT/INR - ( 17 May 2024 04:46 )   PT: 11.6 sec;   INR: 1.04 ratio         PTT - ( 17 May 2024 04:46 )  PTT:32.9 sec  17 May 2024 04:33    125<L>  |  74<L>  |  13     ----------------------------<  733<HH>  3.5     |  18<L>  |  0.25<L>  16 May 2024 21:13    133<L>  |  95<L>  |  15     ----------------------------<  110<H>  4.8     |  21<L>  |  0.32<L>    Ca    8.0<L>      17 May 2024 04:33  Ca    9.9        16 May 2024 21:13  Phos  34.6<H>     17 May 2024 04:33  Phos  2.3<L>     16 May 2024 21:13  Mg     2.10      17 May 2024 04:33  Mg     1.80      16 May 2024 21:13    TPro  6.9    /  Alb  4.3    /  TBili  1.0    /  DBili  x      /  AST  30     /  ALT  27     /  AlkPhos  91     16 May 2024 21:13    CARDIAC MARKERS ( 17 May 2024 04:33 )  x     / x     / 67 U/L / x     / 11.9 ng/mL  CARDIAC MARKERS ( 16 May 2024 21:13 )  x     / x     / 78 U/L / x     / 12.6 ng/mL        Creatine Kinase, Serum: 67 U/L (05-17-24 @ 04:33)  Creatine Kinase, Serum: 78 U/L (05-16-24 @ 21:13)              EKG:  Diagnostic testing:  cath:  echo:      Assessment and Plan:         Subjective/Objective:   Admitted o/n for STEMI, but not a candidate for catheterization. Initially planned for ischemic eval with CT coronary, however patient became acutely hypoxic and coded. ROSC initially achieved and intubated for airway protection, however patient coded again x 2. Eventually decided with family that should pursue comfort measures only.    Overnight event:  Tele event:    MEDICATIONS    heparin  Infusion 300 Unit(s)/Hr IV Continuous <Continuous>  losartan 50 milliGRAM(s) Oral daily        acetaminophen     Tablet .. 650 milliGRAM(s) Oral every 6 hours PRN  lacosamide IVPB 150 milliGRAM(s) IV Intermittent every 12 hours  LORazepam   Injectable 2 milliGRAM(s) IV Push once PRN  LORazepam   Injectable 0.5 milliGRAM(s) IV Push once  melatonin 3 milliGRAM(s) Oral at bedtime  morphine ER Tablet 15 milliGRAM(s) Oral two times a day  ondansetron Injectable 4 milliGRAM(s) IV Push every 8 hours PRN    bisacodyl 5 milliGRAM(s) Oral at bedtime  famotidine    Tablet 20 milliGRAM(s) Oral daily  polyethylene glycol 3350 17 Gram(s) Oral two times a day  senna 2 Tablet(s) Oral at bedtime    dexAMETHasone  Injectable 2 milliGRAM(s) IV Push every 12 hours    chlorhexidine 2% Cloths 1 Application(s) Topical <User Schedule>  lactated ringers. 1000 milliLiter(s) IV Continuous <Continuous>  lidocaine   4% Patch 1 Patch Transdermal daily  potassium chloride  10 mEq/100 mL IVPB 10 milliEquivalent(s) IV Intermittent every 1 hour  sodium chloride 3 Gram(s) Oral three times a day            REVIEW OF SYSTEMS:  Complete 10point ROS negative.    ICU Vital Signs Last 24 Hrs  T(C): 36 (17 May 2024 04:00), Max: 37.1 (16 May 2024 22:44)  T(F): 96.8 (17 May 2024 04:00), Max: 98.8 (16 May 2024 22:44)  HR: 85 (17 May 2024 06:00) (69 - 109)  BP: 116/86 (17 May 2024 06:00) (116/86 - 167/108)  BP(mean): 93 (17 May 2024 06:00) (93 - 123)  ABP: --  ABP(mean): --  RR: 14 (17 May 2024 06:00) (14 - 28)  SpO2: 97% (17 May 2024 06:00) (94% - 100%)    O2 Parameters below as of 17 May 2024 06:00  Patient On (Oxygen Delivery Method): room air            PHYSICAL EXAMINATION  Appearance: NAD, cachectic  Cardiovascular: Regular rate and rhythm, Normal S1 S2, No JVD, No murmurs  Respiratory: Lungs clear to auscultation. No rales, No rhonchi, No wheezing. No tenderness to palpation  Gastrointestinal:  Soft, Non-tender, + BS  Neurologic: A&Ox0  Skin: Warm and dry, No rashes, No ecchymosis, No cyanosis  Musculoskeletal: No clubbing, No cyanosis, No edema  Vascular: Peripheral pulses palpable 2+ bilaterally  Psychiatry: Mood & affect appropriate      	    		      I&O's Summary    16 May 2024 07:01  -  17 May 2024 07:00  --------------------------------------------------------  IN: 18 mL / OUT: 1740 mL / NET: -1722 mL    	 	  LABORATORY VALUES	 	                          11.6   7.34  )-----------( 87       ( 17 May 2024 04:33 )             33.4       05-17    125<L>  |  74<L>  |  13  ----------------------------<  733<HH>  3.5   |  18<L>  |  0.25<L>  05-16    133<L>  |  95<L>  |  15  ----------------------------<  110<H>  4.8   |  21<L>  |  0.32<L>    Ca    8.0<L>      17 May 2024 04:33  Ca    9.9      16 May 2024 21:13  Phos  34.6     05-17  Phos  2.3     05-16  Mg     2.10     05-17  Mg     1.80     05-16    TPro  6.9  /  Alb  4.3  /  TBili  1.0  /  DBili  x   /  AST  30  /  ALT  27  /  AlkPhos  91  05-16    LIVER FUNCTIONS - ( 16 May 2024 21:13 )  Alb: 4.3 g/dL / Pro: 6.9 g/dL / ALK PHOS: 91 U/L / ALT: 27 U/L / AST: 30 U/L / GGT: x           Prothrombin Time, Plasma: 11.6 sec (05-17 @ 04:46)      CARDIAC MARKERS:  Creatine Kinase, Serum: 67 U/L (05-17 @ 04:33)  Creatine Kinase, Serum: 78 U/L (05-16 @ 21:13)            Blood Gas Venous - Lactate: 1.2 mmol/L (05-16 @ 21:48)    01-23 @ 06:30  Cholesterol, Serum - 211  Direct LDL- --  HDL Cholesterol, Serum- 52  Triglycerides, Serum- 127      Thyroid Stimulating Hormone, Serum: 0.92 uIU/mL (05-13 @ 17:44)      Urinalysis Basic - ( 17 May 2024 04:33 )    Color: x / Appearance: x / SG: x / pH: x  Gluc: 733 mg/dL / Ketone: x  / Bili: x / Urobili: x   Blood: x / Protein: x / Nitrite: x   Leuk Esterase: x / RBC: x / WBC x   Sq Epi: x / Non Sq Epi: x / Bacteria: x      CAPILLARY BLOOD GLUCOSE      POCT Blood Glucose.: 219 mg/dL (17 May 2024 04:37)            LABS:                          11.6   7.34  )-----------( 87       ( 17 May 2024 04:33 )             33.4     PT/INR - ( 17 May 2024 04:46 )   PT: 11.6 sec;   INR: 1.04 ratio         PTT - ( 17 May 2024 04:46 )  PTT:32.9 sec  17 May 2024 04:33    125<L>  |  74<L>  |  13     ----------------------------<  733<HH>  3.5     |  18<L>  |  0.25<L>  16 May 2024 21:13    133<L>  |  95<L>  |  15     ----------------------------<  110<H>  4.8     |  21<L>  |  0.32<L>    Ca    8.0<L>      17 May 2024 04:33  Ca    9.9        16 May 2024 21:13  Phos  34.6<H>     17 May 2024 04:33  Phos  2.3<L>     16 May 2024 21:13  Mg     2.10      17 May 2024 04:33  Mg     1.80      16 May 2024 21:13    TPro  6.9    /  Alb  4.3    /  TBili  1.0    /  DBili  x      /  AST  30     /  ALT  27     /  AlkPhos  91     16 May 2024 21:13    CARDIAC MARKERS ( 17 May 2024 04:33 )  x     / x     / 67 U/L / x     / 11.9 ng/mL  CARDIAC MARKERS ( 16 May 2024 21:13 )  x     / x     / 78 U/L / x     / 12.6 ng/mL        Creatine Kinase, Serum: 67 U/L (05-17-24 @ 04:33)  Creatine Kinase, Serum: 78 U/L (05-16-24 @ 21:13)              EKG:  Diagnostic testing:  cath:  echo:      Assessment and Plan:

## 2024-05-17 NOTE — DISCHARGE NOTE FOR THE EXPIRED PATIENT - HOSPITAL COURSE
59F w/ NSCLC s/p RT (3/2024) on chemotherapy w/ bilateral frontal brain metastases w/ surrounding hemorrhage s/p GKRS (4/2024), HTN, GERD, admitted w/ progressively worsening headache, found to have hyponatremia, c/c/b progressive encephalopathy and urinary retention.    RRT called for sudden onset 9/10 chest pain and discomfort. ECG performed prior to arrival with STEMI V2-V5 with possible reciprocal changes in II-III. Troponins mildly elevated 140s but downtrending to 100s. Deep T waves from cerebral pathology considered. ASA 324mg PO given.    Patient was admitted to the CCU and after consultation with interventional cardiologist and neurosurgery was started on heparin drip without a bolus and given plavix. Overnight on 5/17 patient found to be obtunded, CTA head and neck performed which overall showed no interval changes since 5/16.    In the CCU, patient initially planned to have CT coronary to evaluate severity of CAD and determine need for AC; however patient had acute respiratory decompensation requiring intubation for airway protection. Patient then coded and received CPR, epinephrine and pressors but unfortunately coded two more times. Family at bedside consulted and decision was made to transition to comfort care given poor prognosis and desire to not prolong suffering.

## 2024-05-17 NOTE — PROGRESS NOTE ADULT - PROVIDER SPECIALTY LIST ADULT
CCU
Internal Medicine
Palliative Care
Internal Medicine

## 2024-05-17 NOTE — TRANSFER ACCEPTANCE NOTE - HISTORY OF PRESENT ILLNESS
59 year old Female with a past medical history NSCLC w/ mets to brain, on immunotx s/p lung radiation therapy 3/1/24, s/p GK brain radiation therapy completed 4/23/24, T3-T4 compression fractures presenting for progressively worsening headache for 1 week. Per primary team on arrival patient AAOx3, since admission pt. with waxing and waning mental status  Pt accompanied by daughter who notes pt has had headaches that are constant but waxing and waning with increasing more confusion. CTH shows Left frontal lesion seen with associated areas of hemorrhage, with no acute intracranial hemorrhage or mass effect. Pt. was s/p RRT on 5/16 for 9/10 chest pain with Troponin 144, CK 78, CKMB 12.6, CPK 16.2% and TRINA in leads V1-V6 with . As per Cardiac Cath Attending, Dr. Bassett, pt. not a candidate for acute intervention due to history of mets to brain with left frontal hemorrhagic lesion as seen on CT. Repeat CT Head performed during RRT showed grossly stable left frontal lesion with associated areas of hemorrhage. Pt. was accepted to CCU for medical management of STEMI as per Fellow Leonel Burgess. Neuro Surgery was contacted regarding the risks/benefits of treatment with anticoagulation and antiplatelet therapy and deemed appropriate to treat with anticoagulation and antiplatelet therapy.     59 year old Female with a past medical history NSCLC w/ mets to brain, on immunotx s/p lung radiation therapy 3/1/24, s/p GK brain radiation therapy completed 4/23/24, T3-T4 compression fractures presenting for progressively worsening headache for 1 week. Per primary team on arrival patient AAOx3, since admission pt. with waxing and waning mental status  Pt accompanied by daughter who notes pt has had headaches that are constant but waxing and waning with increasing more confusion. CTH shows Left frontal lesion seen with associated areas of hemorrhage, with no acute intracranial hemorrhage or mass effect. Pt. was s/p RRT on 5/16 for 9/10 chest pain with Troponin 144, CK 78, CKMB 12.6, CPK 16.2% and TRINA in leads V1-V6 with . As per Cardiac Cath Attending, Dr. Bassett, pt. not a candidate for acute intervention due to history of mets to brain with left frontal hemorrhagic lesion as seen on CT. Repeat CT Head performed during RRT showed grossly stable left frontal lesion with associated areas of hemorrhage. Pt. was accepted to CCU for medical management of STEMI as per Fellow Leonel Burgess. Neuro Surgery was consulted regarding the risks/benefits of treatment with anticoagulation and antiplatelet therapy and deemed appropriate to treat with anticoagulation and antiplatelet therapy.     59 year old Female with a past medical history NSCLC w/ mets to brain, on immunotx s/p lung radiation therapy 3/1/24, s/p GK brain radiation therapy completed 4/23/24, T3-T4 compression fractures presenting for progressively worsening headache for 1 week. Per primary team on arrival patient AAOx3, since admission pt. with waxing and waning mental status  Pt accompanied by daughter who notes pt has had headaches that are constant but waxing and waning with increasing more confusion. CTH shows Left frontal lesion seen with associated areas of hemorrhage, with no acute intracranial hemorrhage or mass effect. Pt. was s/p RRT on 5/16 for 9/10 chest pain with Troponin 144, CK 78, CKMB 12.6, CPK 16.2% and TRINA in leads V1-V6 with . As per Cardiac Cath Attending, Dr. Bassett, pt. not a candidate for acute intervention due to history of mets to brain with left frontal hemorrhagic lesion as seen on CT. Repeat CT Head performed during RRT showed grossly stable left frontal lesion with associated areas of hemorrhage. Pt. was accepted to CCU for medical management of STEMI as per Fellow Leonel Burgess. Neuro Surgery was consulted regarding the risks/benefits of treatment with anticoagulation and antiplatelet therapy and deemed appropriate to treat with low-dose anticoagulation and low-dose antiplatelet therapy.

## 2024-05-17 NOTE — PROGRESS NOTE ADULT - NUTRITIONAL ASSESSMENT
This patient has been assessed with a concern for Malnutrition and has been determined to have a diagnosis/diagnoses of Severe protein-calorie malnutrition and Underweight (BMI < 19).    This patient is being managed with:   Diet NPO-     Special Instructions for Nursing:  CODE STROKE; NPO until Dysphagia screen or Speech Bedside Swallow Evaluation completed and passed  Entered: May 17 2024  4:49AM  
This patient has been assessed with a concern for Malnutrition and has been determined to have a diagnosis/diagnoses of Severe protein-calorie malnutrition and Underweight (BMI < 19).    This patient is being managed with:   Diet Regular-  1000mL Fluid Restriction ([id number])  Supplement Feeding Modality:  Oral  Ensure Max Cans or Servings Per Day:  1       Frequency:  Three Times a day  Entered: May 11 2024 11:23PM  
This patient has been assessed with a concern for Malnutrition and has been determined to have a diagnosis/diagnoses of Severe protein-calorie malnutrition and Underweight (BMI < 19).    This patient is being managed with:   Diet Regular-  Entered: May 10 2024  7:07AM  
This patient has been assessed with a concern for Malnutrition and has been determined to have a diagnosis/diagnoses of Severe protein-calorie malnutrition and Underweight (BMI < 19).    This patient is being managed with:   Diet Regular-  1000mL Fluid Restriction ([id number])  Supplement Feeding Modality:  Oral  Ensure Max Cans or Servings Per Day:  1       Frequency:  Three Times a day  Entered: May 11 2024 11:23PM  
This patient has been assessed with a concern for Malnutrition and has been determined to have a diagnosis/diagnoses of Severe protein-calorie malnutrition and Underweight (BMI < 19).    This patient is being managed with:   Diet Regular-  1000mL Fluid Restriction ([id number])  Supplement Feeding Modality:  Oral  Ensure Max Cans or Servings Per Day:  1       Frequency:  Three Times a day  Entered: May 11 2024 11:23PM  
This patient has been assessed with a concern for Malnutrition and has been determined to have a diagnosis/diagnoses of Severe protein-calorie malnutrition and Underweight (BMI < 19).    This patient is being managed with:   Diet Regular-  1000mL Fluid Restriction ([id number])  Supplement Feeding Modality:  Oral  Ensure Max Cans or Servings Per Day:  1       Frequency:  Three Times a day  Entered: May 11 2024 11:23PM  
This patient has been assessed with a concern for Malnutrition and has been determined to have a diagnosis/diagnoses of Severe protein-calorie malnutrition and Underweight (BMI < 19).    This patient is being managed with:   Diet Regular-  Entered: May 10 2024  7:07AM  
This patient has been assessed with a concern for Malnutrition and has been determined to have a diagnosis/diagnoses of Severe protein-calorie malnutrition and Underweight (BMI < 19).    This patient is being managed with:   Diet Regular-  1000mL Fluid Restriction ([id number])  Supplement Feeding Modality:  Oral  Ensure Max Cans or Servings Per Day:  1       Frequency:  Three Times a day  Entered: May 11 2024 11:23PM

## 2024-05-17 NOTE — EEG REPORT - NS EEG TEXT BOX
KAELYN VALENTE [facility]-[id number]     Study Date: 5/16/24 14:28-21:26  Duration: 6 hr 40 min  --------------------------------------------------------------------------------------------------  History:  CC/ HPI Patient is a 59y old  Female who presents with a chief complaint of headache (17 May 2024 07:29)    MEDICATIONS  (STANDING):  bisacodyl 5 milliGRAM(s) Oral at bedtime  chlorhexidine 2% Cloths 1 Application(s) Topical <User Schedule>  dexAMETHasone  Injectable 2 milliGRAM(s) IV Push every 12 hours  famotidine    Tablet 20 milliGRAM(s) Oral daily  heparin  Infusion 300 Unit(s)/Hr (3 mL/Hr) IV Continuous <Continuous>  lacosamide IVPB 150 milliGRAM(s) IV Intermittent every 12 hours  lactated ringers. 1000 milliLiter(s) (75 mL/Hr) IV Continuous <Continuous>  lidocaine   4% Patch 1 Patch Transdermal daily  LORazepam   Injectable 0.5 milliGRAM(s) IV Push once  losartan 50 milliGRAM(s) Oral daily  melatonin 3 milliGRAM(s) Oral at bedtime  morphine ER Tablet 15 milliGRAM(s) Oral two times a day  naloxone Injectable 0.4 milliGRAM(s) IV Push once  polyethylene glycol 3350 17 Gram(s) Oral two times a day  senna 2 Tablet(s) Oral at bedtime  sodium chloride 3 Gram(s) Oral three times a day    --------------------------------------------------------------------------------------------------  Study Interpretation:    [[[Abbreviation Key:  PDR=alpha rhythm/posterior dominant rhythm. A-P=anterior posterior.  Amplitude: ‘very low’:<20; ‘low’:20-49; ‘medium’:; ‘high’:>150uV.  Persistence for periodic/rhythmic patterns (% of epoch) ‘rare’:<1%; ‘occasional’:1-10%; ‘frequent’:10-50%; ‘abundant’:50-90%; ‘continuous’:>90%.  Persistence for sporadic discharges: ‘rare’:<1/hr; ‘occasional’:1/min-1/hr; ‘frequent’:>1/min; ‘abundant’:>1/10 sec.  RPP=rhythmic and periodic patterns; GRDA=generalized rhythmic delta activity; FIRDA=frontal intermittent GRDA; LRDA=lateralized rhythmic delta activity; TIRDA=temporal intermittent rhythmic delta activity;  LPD=PLED=lateralized periodic discharges; GPD=generalized periodic discharges; BIPDs =bilateral independent periodic discharges; Mf=multifocal; SIRPDs=stimulus induced rhythmic, periodic, or ictal appearing discharges; BIRDs=brief potentially ictal rhythmic discharges >4 Hz, lasting .5-10s; PFA (paroxysmal bursts >13 Hz or =8 Hz <10s).  Modifiers: +F=with fast component; +S=with spike component; +R=with rhythmic component.  S-B=burst suppression pattern.  Max=maximal. N1-drowsy; N2-stage II sleep; N3-slow wave sleep. SSS/BETS=small sharp spikes/benign epileptiform transients of sleep. HV=hyperventilation; PS=photic stimulation]]]    Daily EEG Visual Analysis    FINDINGS:      Background:  The background is continuous and asymmetric. The predominant background in the most wakeful state consists of diffuse polymorphic theta (often sharply contoured) and delta slowing. There is no definite posterior dominant rhythm.     Background Slowing:  Generalized slowing: As above  Focal slowing: Continuous left hemispheric focal polymorphic delta/theta slowing and paucity of faster frequencies    State Changes:   Drowsiness is characterized by fragmentation, attenuation, and slowing of the background activity.  No normal stage 2 sleep.    Interictal Findings:  Frequent generalized sharp waves with triphasic morphology, often poorly formed, occasionally semi-periodic at 0.5-1 Hz    Electrographic and Electroclinical seizures:  None    Other Clinical Events:  None    Activation Procedures:   Hyperventilation is not performed.    Photic stimulation is not performed.    Artifacts:  Intermittent myogenic and movement artifacts are present.    EKG:  Single-lead EKG shows regular rhythm.    EEG Classification / Summary:  Abnormal EEG in the awake, drowsy states.   -Frequent generalized sharp waves with triphasic morphology, occasionally semi-periodic at 0.5-1 Hz  -Continuous left hemispheric focal slowing  -Moderate diffuse slowing  -No electrographic seizures.    Clinical Impression:  -Generalized sharp waves with triphasic morphology can be seen in toxic-metabolic encephalopathies and indicate some risk of generalized seizures, especially when at higher frequencies than in this study.   -Left hemispheric focal cerebral dysfunction can be structural or functional in etiology.  -Moderate diffuse cerebral dysfunction is nonspecific in etiology.  -No seizures          -------------------------------------------------------------------------------------------------------    Anna Ahumada MD  Attending Physician, [facility]    -------------------------------------------------------------------------------------------------------    To reach EEG technologist:  Please use the pager number for the appropriate hospital or contact the .  At [facility] - Pager #: [phone]    To reach EEG-reading physician:  [facility] EEG Reading Room Phone #: [phone]  Epilepsy Answering Service after 5PM and before 8:30AM: Phone #: [phone]

## 2024-05-17 NOTE — CHART NOTE - NSCHARTNOTEFT_GEN_A_CORE
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 Discussed with Neuro, Neurosurgery need for antiplatelet therapy for MI and Neuro and Neurosurgery felt cerebral hemorrhage has bee stable and agreed to low dose heparin aspirin and plavix for treatment with follow up CT Head once therapeutic on Heparin to evqaluate for expansion of hemorrhage. Discussed risk and benefits of therapy with  and daughters and they agreed to starting therapy.    Addemdum:  Patient was unresponsive at 4:45am to noxious stimuli.  Heparin stopped, Code Stroke called and patient had CT Head showing no expansion of hemorrhage.  Patient subsequently woke up, moved everything and followed commands.  PTT came back subtherapeutic and Heparin restarted.  Patient received dilaudid for pain at 1am and it is believed this may have been the cause of patient's unresponsiveness.  PRN dilaudid stopped while patient is received Heparin to allow for neuro checks[place]  [id number]

## 2024-05-17 NOTE — TRANSFER ACCEPTANCE NOTE - ASSESSMENT
59 year old Female with a past medical history NSCLC w/ mets to brain, on immunotx s/p lung radiation therapy 3/1/24, s/p GK brain radiation therapy completed 4/23/24, T3-T4 compression fractures presenting for progressively worsening headache for 1 week. Per primary team on arrival patient AAOx3, since admission pt. with waxing and waning mental status  Pt accompanied by daughter who notes pt has had headaches that are constant but waxing and waning with increasing more confusion. CTH shows Left frontal lesion seen with associated areas of hemorrhage, with no acute intracranial hemorrhage or mass effect. Pt. was s/p RRT on 5/16 for 9/10 chest pain with Troponin 144, CK 78, CKMB 12.6, CPK 16.2% and TRINA in leads V1-V6 with . As per Cardiac Cath Attending, Dr. Bassett, pt. not a candidate for acute intervention due to history of mets to brain with left frontal hemorrhagic lesion as seen on CT. Repeat CT Head performed during RRT showed grossly stable left frontal lesion with associated areas of hemorrhage. Pt. was accepted to CCU for medical management of STEMI as per Fellow Leonel Burgess. Neuro Surgery was contacted regarding the risks/benefits of treatment with anticoagulation and antiplatelet therapy and deemed appropriate to treat with anticoagulation and antiplatelet therapy.     59 year old Female with a past medical history NSCLC w/ mets to brain, on immunotx s/p lung radiation therapy 3/1/24, s/p GK brain radiation therapy completed 4/23/24, T3-T4 compression fractures presenting for progressively worsening headache for 1 week. Per primary team on arrival patient AAOx3, since admission pt. with waxing and waning mental status  Pt accompanied by daughter who notes pt has had headaches that are constant but waxing and waning with increasing more confusion. CTH shows Left frontal lesion seen with associated areas of hemorrhage, with no acute intracranial hemorrhage or mass effect. Pt. was s/p RRT on 5/16 for 9/10 chest pain with Troponin 144, CK 78, CKMB 12.6, CPK 16.2% and TRINA in leads V1-V6 with . As per Cardiac Cath Attending, Dr. Bassett, pt. not a candidate for acute intervention due to history of mets to brain with left frontal hemorrhagic lesion as seen on CT. Repeat CT Head performed during RRT showed grossly stable left frontal lesion with associated areas of hemorrhage. Pt. was accepted to CCU for medical management of STEMI as per Fellow Leonel Burgess. Neuro Surgery was contacted regarding the risks/benefits of treatment with anticoagulation and antiplatelet therapy and deemed appropriate to treat with anticoagulation and antiplatelet therapy.    Neuro:  #NSCLC metastatic to brain  -Hx of NSCLC s/p RT (3/2024) w/ b/l frontal brain mets s/p GKRS  -Pt. alert and oriented x 0  - CT Head (5/10): stable findings compared to prior CT head, no acute intracranial hemorrhage or mass effect  - Repeat CT Head (5/14): stable findings  -CT Head 5/16: Grossly stable follow-up CT examination when compared with 5/14/2024 given the extensive motion artifact  -Per Neurology: Decadron 2mg IVP Q12  -MR Brain ordered    #Seizures  -Per Neurology: Continue with patient home AED: Vimpat 150 mg bid, Lorazepam 2mg IV PRN for seizure activity lasting >3-5mins, >2x/hr, >3x/day, or if GTC , repeat after 1 minute (max dose 0.1 mg/kg)  -f/u vEEG to evaluate for subclinical seizures    #Pain: Cancer Related  -Palliative Following  -Continue Morphine ER Tablet 15 milliGRAM(s) Oral two times a day  -Continue HYDROmorphone   Tablet 4 milliGRAM(s) Oral every 4 hours PRN Moderate Pain (4 - 6)  -Continue HYDROmorphone  Injectable 1 milliGRAM(s) IV Push every 4 hours PRN Severe Pain (7 - 10)  -GOC discussions ongoing with oncology, palliative care, and family       Problem/Plan - 1:  Progressive encephalopathy.   ·  Plan: Progressively worsening mental status and lethargy since admission. Patient has been refusing medications intermittently since admission as well despite attempts to educate on their importance. DDx for symptoms include post-GKRS side effects vs possible progression of malignancy vs acute hyponatremia vs subclinical seizures vs acute urinary retention as below  - no fevers, leukocytosis, or other findings c/f infection (though patient may not mount fevers 2/2 chemotherapy)  - CT head (5/10): stable findings compared to prior CT head, no acute intracranial hemorrhage or mass effect  - repeat CT head (5/14): stable findings    Plan:  - neuro-oncology consulted; f/u recs  - f/u AMS labs  - f/u CXR, BCx, FluVID  - f/u vEEG to evaluate for subclinical seizures  - f/u MRI brain  - management of urinary retention, NSCLC, and hyponatremia as below  - GOC discussions ongoing with oncology, palliative care, and family.    :  ·  Problem: Acute urinary retention.   ·  Plan: Patient w/ firm, distended abdomen, found to have >1800cc urine on bladder scan 5/15 PM s/p straight cath w/ 1L urine output. Napier placed overnight 5/15  - possibly 2/2 chronic opioid use  - no fevers, leukocytosis, or other findings c/f infection (though patient may not mount fevers 2/2 chemotherapy)  - U/A w/o evidence of infection    Plan:  - f/u CT abdomen/pelvis for further assessment  - monitor bladder scans q8h  - monitor strict I/Os, SCr, UOP.     Problem/Plan - 3:  ·  Problem: NSCLC metastatic to brain.   ·  Plan: Hx of NSCLC s/p RT (3/2024) w/ b/l frontal brain mets s/p GKRS, follows Dr. Rao at [facility]. Unclear if metastases are cause for headache given absence of significant headache prior to GKRS. Also potential etiologies include hyponatremia and less likely migraine. Hyponatremia may also be driving lethargy given 2 day history of worsening symptoms per patient.  - CT head (5/10): stable findings compared to prior CT head, no acute intracranial hemorrhage or mass effect  - repeat CT head (5/14): stable findings    Plan:  - neurosurgery consulted on admission; appreciate recs- no acute intervention  - oncology following; appreciate recs  - radiation oncology following; appreciate recs- no role for repeat RT  - neuro-oncology consulted; f/u recs  - c/w IV dexamethasone 2mg BID  - c/w IV Vimpat 150mg BID for seizure ppx  - management of encephalopathy as above  - seizure precautions.      Plan:   - palliative care following for pain management; appreciate recs  - f/u MRI brain as above  - pain control: morphine ER 15mg BID, PO Dilaudid 4mg q4h for moderate pain, IV Dilaudid 1mg q4h for severe pain  - bowel regimen: Dulcolax, Senna, Miralax  - management of hyponatremia as below.     Problem/Plan - 5:  ·  Problem: Acute hyponatremia.   ·  Plan: Patient with hyponatremia, likely multifactorial- SIADH in setting of malignancy, decreased PO intake, and pain 2/2 to headache. SNa of 126 on presentation, repeat of 123 s/p 1U bolus in ED. TSH wnl. Unlikely due to decreased glucocorticoids given exogenous use, RESOLVED  - serum/urine studies consistent w/ hypo-osmolar hyponatremia/SIADH    Plan:  - c/w salt tabs 3g TID  - c/w fluid restriction to 2L/day  - monitor BMP  - fall, seizure precautions.     Problem/Plan - 6:  ·  Problem: Thrombocytopenia.   ·  Plan: Plt 156k on admission, has been downtrending. Previously attributed Keppra/Zosyn  - currently w/o evidence of gum bleeding, petechiae, bruising/hematomas    Plan:  - monitor CBC; transfuse for plt<10k, <20k and febrile, or <50k and actively bleeding/pending procedure.     Problem/Plan - 7:  ·  Problem: HTN (hypertension).   ·  Plan: Hx of HTN on losartan 50mg qd at home    Plan:  - c/w losartan 50mg qd  - monitor BPs.     Problem/Plan - 8:  ·  Problem: GERD (gastroesophageal reflux disease).   ·  Plan: Hx of GERD on famotidine 20mg qd    Plan:  - c/w famotidine 20mg qd.     Problem/Plan - 9:  ·  Problem: Prophylactic measure.   ·  Plan: DVT PPx: Lovenox  Diet: DASH/TLC  Code Status: full code (see [facility] note 5/15)  Dispo: pending clinical improvement, eventually [facility].     59 year old Female with a past medical history NSCLC w/ mets to brain, on immunotx s/p lung radiation therapy 3/1/24, s/p GK brain radiation therapy completed 4/23/24, T3-T4 compression fractures presenting for progressively worsening headache for 1 week. Per primary team on arrival patient AAOx3, since admission pt. with waxing and waning mental status  Pt accompanied by daughter who notes pt has had headaches that are constant but waxing and waning with increasing more confusion. CTH shows Left frontal lesion seen with associated areas of hemorrhage, with no acute intracranial hemorrhage or mass effect. Pt. was s/p RRT on 5/16 for 9/10 chest pain with Troponin 144, CK 78, CKMB 12.6, CPK 16.2% and TRINA in leads V1-V6 with. As per Cardiac Cath Attending, Dr. Bassett, pt. not a candidate for acute intervention due to history of mets to brain with left frontal hemorrhagic lesion as seen on CT. Repeat CT Head performed during RRT showed grossly stable left frontal lesion with associated areas of hemorrhage. Pt. was accepted to CCU for medical management of STEMI as per Fellow Leonel Burgess. Neuro Surgery was contacted regarding the risks/benefits of treatment with anticoagulation and antiplatelet therapy and deemed appropriate to treat with anticoagulation and antiplatelet therapy.    Neuro:  #NSCLC metastatic to brain  -Hx of NSCLC s/p RT (3/2024) w/ b/l frontal brain mets s/p GKRS  -Pt. alert and oriented x 0  - CT Head (5/10): stable findings compared to prior CT head, no acute intracranial hemorrhage or mass effect  - Repeat CT Head (5/14): stable findings  -CT Head 5/16: Grossly stable follow-up CT examination when compared with 5/14/2024 given the extensive motion artifact  -Per Neurology: Decadron 2mg IVP Q12  -MR Brain ordered    #Seizures  -Per Neurology: Continue with patient home AED: Vimpat 150 mg bid, Lorazepam 2mg IV PRN for seizure activity lasting >3-5mins, >2x/hr, >3x/day, or if GTC , repeat after 1 minute (max dose 0.1 mg/kg)  -f/u vEEG to evaluate for subclinical seizures    #Pain: Cancer Related  -Palliative Following  -Continue Morphine ER Tablet 15 milliGRAM(s) Oral two times a day  -Continue HYDROmorphone   Tablet 4 milliGRAM(s) Oral every 4 hours PRN Moderate Pain (4 - 6)  -Continue HYDROmorphone  Injectable 1 milliGRAM(s) IV Push every 4 hours PRN Severe Pain (7 - 10)  -GOC discussions ongoing with oncology, palliative care, and family    Respiratory  -NAD, Normal RR O2 Sat % on Room Air    Cardiac:  #STEMI   -TRINA in leads V1-V6   -Cardiac Enzymes Troponin 144, CK 78, CKMB 12.6, CPK 16.2%   -As per Cardiac Cath Attending, Dr. Bassett not a candidate for emergent cardiac cath due to mets to brain w/ hx of left frontal hemorrhagic lesion.   -Continue to trend cardiac enzymes  -Neurosurgery consulted regarding risk vs. benefit of AC and Antiplatelets  -As per NSG: Recommend strict PTT goal of 40-60 on heparin gtt, no bolus and rpt CTH when therapeutic for stability, ok to give Plavix 300 mg per ACS     #Hypertension  -Continue with Losartan 50 mg QD    Endocrine:  #SIADH/ Acute Hyponatremia   -Resolved, SIADH in setting of malignancy, decreased PO intake, and pain 2/2 to headache  -Serum/urine studies consistent w/ hypo-osmolar hyponatremia/SIADH  Continue with salt tabs 3g TID  -Continue fluid restriction to 2L/day  -Monitor BMP    Heme/Onc  #Stage IV NSCLC  -Pt follows with Dr. Rao at [facility]. She was initially diagnosed with Stage IV poorly differentiated PD-L1 TPS 20%, diagnosed December 2023. She was treated with chemoimmunotherapy with pembrolizumab, pemetrexed and carboplatin AUC 5 since January 2024 - April 2024). C5 was held due to AMS which prompted her admission to the hospital. She recently had GK SRS to two brain lesions given with steroids. Repeat CT Heads inpatient have shown stable hemorrhagic brain lesions.   -As per Oncology: Continue steroids and supportive care  -Follow up MRI  -GOC discussions ongoing with oncology, palliative care, and family    #Thrombocytopenia.   -Current Platelets 89. 156k on admission, has been downtrending. Previously attributed Keppra/Zosyn  -Currently w/o evidence of gum bleeding, petechiae, bruising/hematomas  -Received 300mg Plavix x 1 for ACS   -Continue to monitor platelets and assess for bleeding    :  #Acute urinary retention  -Napier Cath in place  -No fevers, leukocytosis, or other findings c/f infection  -Strict I/Os: 2L Fluid Restriction for SIADH  -Continue to monitor BUN and CRT: 15/0.32    GI  #GERD  -Continue with famotidine 20mg QD  -Diet: DASH/TLC  -Bowel regimen: Dulcolax, Senna, Miralax while on Opiods    DVT PPx:  -Continue with Hep gtt       Code Status  -Full code (see GOC and Palliative note 5/16)      Dispo  -Pending clinical improvement, eventually [facility]     59 year old Female with a past medical history NSCLC w/ mets to brain, on immunotx s/p lung radiation therapy 3/1/24, s/p GK brain radiation therapy completed 4/23/24, T3-T4 compression fractures presenting for progressively worsening headache for 1 week. Per primary team on arrival patient AAOx3, since admission pt. with waxing and waning mental status  Pt accompanied by daughter who notes pt has had headaches that are constant but waxing and waning with increasing more confusion. CTH shows Left frontal lesion seen with associated areas of hemorrhage, with no acute intracranial hemorrhage or mass effect. Pt. was s/p RRT on 5/16 for 9/10 chest pain with Troponin 144, CK 78, CKMB 12.6, CPK 16.2% and TRINA in leads V1-V6 with. As per Cardiac Cath Attending, Dr. Bassett, pt. not a candidate for acute intervention due to history of mets to brain with left frontal hemorrhagic lesion as seen on CT. Repeat CT Head performed during RRT showed grossly stable left frontal lesion with associated areas of hemorrhage. Pt. was accepted to CCU for medical management of STEMI as per Fellow Leonel Burgess. Neuro Surgery was consulted regarding the risks/benefits of treatment with anticoagulation and antiplatelet therapy and deemed appropriate to treat with anticoagulation and antiplatelet therapy.    Neuro:  #NSCLC metastatic to brain  -Hx of NSCLC s/p RT (3/2024) w/ b/l frontal brain mets s/p GKRS  -Pt. alert and oriented x 0  - CT Head (5/10): stable findings compared to prior CT head, no acute intracranial hemorrhage or mass effect  - Repeat CT Head (5/14): stable findings  -CT Head 5/16: Grossly stable follow-up CT examination when compared with 5/14/2024 given the extensive motion artifact  -Per Neurology: Decadron 2mg IVP Q12  -MR Brain ordered    #Seizures  -Per Neurology: Continue with patient home AED: Vimpat 150 mg bid, Lorazepam 2mg IV PRN for seizure activity lasting >3-5mins, >2x/hr, >3x/day, or if GTC , repeat after 1 minute (max dose 0.1 mg/kg)  -f/u vEEG to evaluate for subclinical seizures    #Pain: Cancer Related  -Palliative Following  -Continue Morphine ER Tablet 15 milliGRAM(s) Oral two times a day  -Continue HYDROmorphone   Tablet 4 milliGRAM(s) Oral every 4 hours PRN Moderate Pain (4 - 6)  -Continue HYDROmorphone  Injectable 1 milliGRAM(s) IV Push every 4 hours PRN Severe Pain (7 - 10)  -GOC discussions ongoing with oncology, palliative care, and family    Respiratory  -NAD, Normal RR O2 Sat % on Room Air    Cardiac:  #STEMI   -TRINA in leads V1-V6   -Cardiac Enzymes Troponin 144, CK 78, CKMB 12.6, CPK 16.2%   -As per Cardiac Cath Attending, Dr. Bassett not a candidate for emergent cardiac cath due to mets to brain w/ hx of left frontal hemorrhagic lesion.   -Continue to trend cardiac enzymes  -Neurosurgery consulted regarding risk vs. benefit of AC and Antiplatelets  -As per NSG: Recommend strict PTT goal of 40-60 on heparin gtt, no bolus and rpt CTH when therapeutic for stability, ok to give Plavix 300 mg per ACS     #Hypertension  -Continue with Losartan 50 mg QD    Endocrine:  #SIADH/ Acute Hyponatremia   -Resolved, SIADH in setting of malignancy, decreased PO intake, and pain 2/2 to headache  -Serum/urine studies consistent w/ hypo-osmolar hyponatremia/SIADH  Continue with salt tabs 3g TID  -Continue fluid restriction to 2L/day  -Monitor BMP    Heme/Onc  #Stage IV NSCLC  -Pt follows with Dr. Rao at [facility]. She was initially diagnosed with Stage IV poorly differentiated PD-L1 TPS 20%, diagnosed December 2023. She was treated with chemoimmunotherapy with pembrolizumab, pemetrexed and carboplatin AUC 5 since January 2024 - April 2024). C5 was held due to AMS which prompted her admission to the hospital. She recently had GK SRS to two brain lesions given with steroids. Repeat CT Heads inpatient have shown stable hemorrhagic brain lesions.   -As per Oncology: Continue steroids and supportive care  -Follow up MRI  -GOC discussions ongoing with oncology, palliative care, and family    #Thrombocytopenia.   -Current Platelets 89. 156k on admission, has been downtrending. Previously attributed Keppra/Zosyn  -Currently w/o evidence of gum bleeding, petechiae, bruising/hematomas  -Received 300mg Plavix x 1 for ACS   -Continue to monitor platelets and assess for bleeding    :  #Acute urinary retention  -Napier Cath in place  -No fevers, leukocytosis, or other findings c/f infection  -Strict I/Os: 2L Fluid Restriction for SIADH  -Continue to monitor BUN and CRT: 15/0.32    GI  #GERD  -Continue with famotidine 20mg QD  -Diet: DASH/TLC  -Bowel regimen: Dulcolax, Senna, Miralax while on Opiods    DVT PPx:  -Continue with Hep gtt       Code Status  -Full code (see GOC and Palliative note 5/16)      Dispo  -Pending clinical improvement, eventually [facility]     59 year old Female with a past medical history NSCLC w/ mets to brain, on immunotx s/p lung radiation therapy 3/1/24, s/p GK brain radiation therapy completed 4/23/24, T3-T4 compression fractures presenting for progressively worsening headache for 1 week. Per primary team on arrival patient AAOx3, since admission pt. with waxing and waning mental status  Pt accompanied by daughter who notes pt has had headaches that are constant but waxing and waning with increasing more confusion. CTH shows Left frontal lesion seen with associated areas of hemorrhage, with no acute intracranial hemorrhage or mass effect. Pt. was s/p RRT on 5/16 for 9/10 chest pain with Troponin 144, CK 78, CKMB 12.6, CPK 16.2% and TRINA in leads V1-V6 with. As per Cardiac Cath Attending, Dr. Bassett, pt. not a candidate for acute intervention due to history of mets to brain with left frontal hemorrhagic lesion as seen on CT. Repeat CT Head performed during RRT showed grossly stable left frontal lesion with associated areas of hemorrhage. Pt. was accepted to CCU for medical management of STEMI as per Fellow Leonel Burgess. Neuro Surgery was consulted regarding the risks/benefits of treatment with anticoagulation and antiplatelet therapy and deemed appropriate to treat with anticoagulation and antiplatelet therapy.    Neuro:  #NSCLC metastatic to brain  -Hx of NSCLC s/p RT (3/2024) w/ b/l frontal brain mets s/p GKRS  -Pt. alert and oriented x 0  - CT Head (5/10): stable findings compared to prior CT head, no acute intracranial hemorrhage or mass effect  - Repeat CT Head (5/14): stable findings  -CT Head 5/16: Grossly stable follow-up CT examination when compared with 5/14/2024 given the extensive motion artifact  -Per Neurology: Decadron 2mg IVP Q12  -MR Brain ordered    #Seizures  -Per Neurology: Continue with patient home AED: Vimpat 150 mg bid, Lorazepam 2mg IV PRN for seizure activity lasting >3-5mins, >2x/hr, >3x/day, or if GTC , repeat after 1 minute (max dose 0.1 mg/kg)  -f/u vEEG to evaluate for subclinical seizures    #Pain: Cancer Related  -Palliative Following  -Continue Morphine ER Tablet 15 milliGRAM(s) Oral two times a day  -Continue HYDROmorphone   Tablet 4 milliGRAM(s) Oral every 4 hours PRN Moderate Pain (4 - 6)  -Continue HYDROmorphone  Injectable 1 milliGRAM(s) IV Push every 4 hours PRN Severe Pain (7 - 10)  -GOC discussions ongoing with oncology, palliative care, and family    Respiratory  -NAD, Normal RR O2 Sat % on Room Air    Cardiac:  #STEMI   -TRINA in leads V1-V6   -Cardiac Enzymes Troponin 144, CK 78, CKMB 12.6, CPK 16.2%   -As per Cardiac Cath Attending, Dr. Bassett not a candidate for emergent cardiac cath due to mets to brain w/ hx of left frontal hemorrhagic lesion.   -Continue to trend cardiac enzymes  -Neurosurgery consulted regarding risk vs. benefit of AC and Antiplatelets  -As per NSG: Recommend strict PTT goal of 40-60 on heparin gtt, no bolus and rpt CTH when therapeutic for stability, ok to give Plavix 300 mg per ACS     #Hypertension  -Continue with Losartan 50 mg QD    Endocrine:  #SIADH/ Acute Hyponatremia   -Resolved, SIADH in setting of malignancy, decreased PO intake, and pain 2/2 to headache  -Serum/urine studies consistent w/ hypo-osmolar hyponatremia/SIADH  Continue with salt tabs 3g TID  -Continue fluid restriction to 2L/day  -Monitor BMP    Heme/Onc  #Stage IV NSCLC  -Pt follows with Dr. Rao at [facility]. She was initially diagnosed with Stage IV poorly differentiated PD-L1 TPS 20%, diagnosed December 2023. She was treated with chemoimmunotherapy with pembrolizumab, pemetrexed and carboplatin AUC 5 since January 2024 - April 2024). C5 was held due to AMS which prompted her admission to the hospital. She recently had GK SRS to two brain lesions given with steroids. Repeat CT Heads inpatient have shown stable hemorrhagic brain lesions.   -As per Oncology: Continue steroids and supportive care  -Follow up MRI  -GOC discussions ongoing with oncology, palliative care, and family    #Thrombocytopenia.   -Current Platelets 89. 156k on admission, has been downtrending. Previously attributed Keppra/Zosyn  -Currently w/o evidence of gum bleeding, petechiae, bruising/hematomas  -Received 300mg Plavix x 1 for ACS   -Continue to monitor platelets and assess for bleeding    :  #Acute urinary retention  -Nap[place] Cath in place  -No fevers, leukocytosis, or other findings c/f infection  -Strict I/Os: 2L Fluid Restriction for SIADH  -Continue to monitor BUN and CRT: 15/0.32    GI  #GERD  -Continue with famotidine 20mg QD  -Diet: DASH/TLC  -Bowel regimen: Dulcolax, Senna, Miralax while on Opiods    DVT PPx:  -Continue with Hep gtt       Code Status  -Full code (see GOC and Palliative note 5/15)      Dispo  -Pending clinical improvement, eventually [facility]     59 year old Female with a past medical history NSCLC w/ mets to brain, on immunotx s/p lung radiation therapy 3/1/24, s/p GK brain radiation therapy completed 4/23/24, T3-T4 compression fractures presenting for progressively worsening headache for 1 week. Per primary team on arrival patient AAOx3, since admission pt. with waxing and waning mental status  Pt accompanied by daughter who notes pt has had headaches that are constant but waxing and waning with increasing more confusion. CTH shows Left frontal lesion seen with associated areas of hemorrhage, with no acute intracranial hemorrhage or mass effect. Pt. was s/p RRT on 5/16 for 9/10 chest pain with Troponin 144, CK 78, CKMB 12.6, CPK 16.2% and TRINA in leads V1-V6 with. As per Cardiac Cath Attending, Dr. Bassett, pt. not a candidate for acute intervention due to history of mets to brain with left frontal hemorrhagic lesion as seen on CT. Repeat CT Head performed during RRT showed grossly stable left frontal lesion with associated areas of hemorrhage. Pt. was accepted to CCU for medical management of STEMI as per Fellow Leonel Burgess. Neuro Surgery was consulted regarding the risks/benefits of treatment with anticoagulation and antiplatelet therapy and deemed appropriate to treat with low-dose anticoagulation and low-dose antiplatelet therapy.    Neuro:  #NSCLC metastatic to brain  -Hx of NSCLC s/p RT (3/2024) w/ b/l frontal brain mets s/p GKRS  -Pt. alert and oriented x 0  - CT Head (5/10): stable findings compared to prior CT head, no acute intracranial hemorrhage or mass effect  - Repeat CT Head (5/14): stable findings  -CT Head 5/16: Grossly stable follow-up CT examination when compared with 5/14/2024 given the extensive motion artifact  -Per Neurology: Decadron 2mg IVP Q12  -MR Brain ordered    #Seizures  -Per Neurology: Continue with patient home AED: Vimpat 150 mg bid, Lorazepam 2mg IV PRN for seizure activity lasting >3-5mins, >2x/hr, >3x/day, or if GTC , repeat after 1 minute (max dose 0.1 mg/kg)  -f/u vEEG to evaluate for subclinical seizures    #Pain: Cancer Related  -Palliative Following  -Continue Morphine ER Tablet 15 milliGRAM(s) Oral two times a day  -Continue HYDROmorphone   Tablet 4 milliGRAM(s) Oral every 4 hours PRN Moderate Pain (4 - 6)  -Continue HYDROmorphone  Injectable 1 milliGRAM(s) IV Push every 4 hours PRN Severe Pain (7 - 10)  -GOC discussions ongoing with oncology, palliative care, and family    Respiratory  -NAD, Normal RR O2 Sat % on Room Air    Cardiac:  #STEMI   -TRINA in leads V1-V6   -Cardiac Enzymes Troponin 144, CK 78, CKMB 12.6, CPK 16.2%   -As per Cardiac Cath Attending, Dr. Bassett not a candidate for emergent cardiac cath due to mets to brain w/ hx of left frontal hemorrhagic lesion.   -Continue to trend cardiac enzymes  -Neurosurgery consulted regarding risk vs. benefit of AC and Antiplatelets  -As per NSG: Recommend strict PTT goal of 40-60 on heparin gtt, no bolus and rpt CTH when therapeutic for stability, ok to give Plavix 300 mg per ACS     #Hypertension  -Continue with Losartan 50 mg QD    Endocrine:  #SIADH/ Acute Hyponatremia   -Resolved, SIADH in setting of malignancy, decreased PO intake, and pain 2/2 to headache  -Serum/urine studies consistent w/ hypo-osmolar hyponatremia/SIADH  Continue with salt tabs 3g TID  -Continue fluid restriction to 2L/day  -Monitor BMP    Heme/Onc  #Stage IV NSCLC  -Pt follows with Dr. Rao at [facility]. She was initially diagnosed with Stage IV poorly differentiated PD-L1 TPS 20%, diagnosed December 2023. She was treated with chemoimmunotherapy with pembrolizumab, pemetrexed and carboplatin AUC 5 since January 2024 - April 2024). C5 was held due to AMS which prompted her admission to the hospital. She recently had GK SRS to two brain lesions given with steroids. Repeat CT Heads inpatient have shown stable hemorrhagic brain lesions.   -As per Oncology: Continue steroids and supportive care  -Follow up MRI  -GOC discussions ongoing with oncology, palliative care, and family    #Thrombocytopenia.   -Current Platelets 89. 156k on admission, has been downtrending. Previously attributed Keppra/Zosyn  -Currently w/o evidence of gum bleeding, petechiae, bruising/hematomas  -Received 300mg Plavix x 1 for ACS   -Continue to monitor platelets and assess for bleeding    :  #Acute urinary retention  -[place] Cath in place  -No fevers, leukocytosis, or other findings c/f infection  -Strict I/Os: 2L Fluid Restriction for SIADH  -Continue to monitor BUN and CRT: 15/0.32    GI  #GERD  -Continue with famotidine 20mg QD  -Diet: DASH/TLC  -Bowel regimen: Dulcolax, Senna, Miralax while on Opiods    DVT PPx:  -Continue with Hep gtt       Code Status  -Full code (see GOC and Palliative note 5/15)      Dispo  -Pending clinical improvement, eventually CLARY

## 2024-05-21 LAB
CULTURE RESULTS: SIGNIFICANT CHANGE UP
CULTURE RESULTS: SIGNIFICANT CHANGE UP
HOMOCYSTEINE LEVEL: 6.2 UMOL/L — SIGNIFICANT CHANGE UP (ref 0–14.5)
METHYLMALONATE SERPL-SCNC: 95 NMOL/L — SIGNIFICANT CHANGE UP (ref 0–378)
SPECIMEN SOURCE: SIGNIFICANT CHANGE UP
SPECIMEN SOURCE: SIGNIFICANT CHANGE UP

## 2024-05-23 LAB
A-TOCOPHEROL VIT E SERPL-MCNC: 16.2 MG/L — SIGNIFICANT CHANGE UP (ref 7–25.1)
BETA+GAMMA TOCOPHEROL SERPL-MCNC: 1 MG/L — SIGNIFICANT CHANGE UP (ref 0.5–5.5)

## 2024-05-23 NOTE — REASON FOR VISIT
[Routine Follow-Up] : routine follow-up visit for [Other: ___] : [unfilled] [Home] : at home, [unfilled] , at the time of the visit. [Medical Office: ([facility])___] : at the medical office located in  [Patient] : the patient [Self] : self [FreeTextEntry4] : Lucero Yoo RN

## 2024-05-27 LAB
AMPHIPHYSIN AB TITR SER: NEGATIVE — SIGNIFICANT CHANGE UP
CRMP-5-IGG WESTERN BLOT: NEGATIVE — SIGNIFICANT CHANGE UP
GLIAL NUC TYPE 1 AB TITR SER: NEGATIVE — SIGNIFICANT CHANGE UP
HU1 AB TITR SER: NEGATIVE — SIGNIFICANT CHANGE UP
HU2 AB TITR SER IF: NEGATIVE — SIGNIFICANT CHANGE UP
HU3 AB TITR SER: NEGATIVE — SIGNIFICANT CHANGE UP
IFA NOTES: SIGNIFICANT CHANGE UP
P/Q TYPE ANTIBODY: 0 NMOL/L — SIGNIFICANT CHANGE UP
PARANEOPLASTIC AB SER-IMP: SIGNIFICANT CHANGE UP
PCA-1 AB TITR SER: NEGATIVE — SIGNIFICANT CHANGE UP
PCA-2 AB TITR SER: NEGATIVE — SIGNIFICANT CHANGE UP
PCA-TR AB TITR SER: NEGATIVE — SIGNIFICANT CHANGE UP
VGKC AB SER-SCNC: 0 NMOL/L — SIGNIFICANT CHANGE UP

## 2024-06-19 ENCOUNTER — APPOINTMENT (OUTPATIENT)
Dept: FAMILY MEDICINE | Facility: CLINIC | Age: 59
End: 2024-06-19

## 2024-07-28 NOTE — PROCEDURE NOTE - NSTIMEOUT_GEN_A_CORE
Patient's first and last name, , procedure, and correct site confirmed prior to the start of procedure. Opt out

## 2024-09-10 NOTE — ED ADULT NURSE NOTE - DOES PATIENT HAVE ADVANCE DIRECTIVE
No Render Post-Care Instructions In Note?: no Show Applicator Variable?: Yes Consent: The patient's consent was obtained including but not limited to risks of crusting, scabbing, blistering, scarring, darker or lighter pigmentary change, recurrence, incomplete removal and infection. Detail Level: Detailed Application Tool (Optional): Liquid Nitrogen Sprayer Duration Of Freeze Thaw-Cycle (Seconds): 0 Post-Care Instructions: I reviewed with the patient in detail post-care instructions. Patient is to wear sunprotection, and avoid picking at any of the treated lesions. Pt may apply Vaseline to crusted or scabbing areas.

## 2024-11-14 NOTE — ASSESSMENT
My chart message sent to patient    [FreeTextEntry1] : Ms. KAELYN VALENTE, 58 year old female, former smoker (quit in 11/2023), w/ hx of COPD, Chronic pain syndrome, HLD, Melanoma in situ, who presented to  ED on 11/28/2023 for right upper back pain, CTA showed 7cm RUL mass and lysis of the right posterior third rib. Referred by Dr. Mathieu Hodge (PCP)  Patient is s/p CT guided biopsy of RUL nodule on 12/13/2023. Path revealed positive for malignant cell. Poorly differentiated adenocarcinoma. Cytology slides reveal crowded 3-dimensional groups and single lying malignant cells with enlarged nuclei and prominent nucleoli, in a necrotic background. Core biopsies contains fragments of adenocarcinoma and necrosis. P40 immunostudy is negative.   TTF1 immunostain is inconclusive (rare cells, weak reactivity). Molecular studies in progress; addendum to follow. PD-L1 20%.   Patient f/u with Dr. Seymour on 01/05/2024, pending MRI brain and PET/CT on 01/16/2024.   I have reviewed the patient's medical records and diagnostic images at time of this office consultation and have made the following recommendation: 1.   I, JJ Reaves, personally performed the evaluation and management (E/M) services for this established patient who follow up today with an existing condition.  That E/M includes conducting the examination, assessing all new/exacerbated/existing conditions, and establishing a plan of care.  Today, my ACP, JOANA Spencer, was here to observe my evaluation and management services for this existing condition to be followed going forward.

## 2025-08-02 NOTE — REVIEW OF SYSTEMS
MONITOR SUMMARY:     Rhythm: NSR  Rate: 80s - 90s  Ectopy: O(PVCs)  Measurements: .17/.08/.41             [Fatigue] : fatigue [Cough] : cough [Recent Change In Weight] : ~T recent weight change [Diarrhea: Grade 0] : Diarrhea: Grade 0 [SOB on Exertion] : shortness of breath during exertion [Negative] : Psychiatric

## (undated) DEVICE — GLV 7.5 PROTEXIS (WHITE)

## (undated) DEVICE — DRAPE TOWEL BLUE 17" X 24"

## (undated) DEVICE — DRAPE 3/4 SHEET 52X76"

## (undated) DEVICE — Device

## (undated) DEVICE — SYR CONTROL LUER LOK 10CC

## (undated) DEVICE — TRAY EPIDURAL CONT 17G PERFIX

## (undated) DEVICE — SYR LUER LOK 5CC

## (undated) DEVICE — DRAPE C-ARM 41X84"

## (undated) DEVICE — SOL IRR POUR NS 0.9% 500ML

## (undated) DEVICE — DRSG CURITY GAUZE SPONGE 4 X 4" 12-PLY

## (undated) DEVICE — SOL IRR POUR H2O 500ML

## (undated) DEVICE — PREP CHLORAPREP HI-LITE ORANGE 26ML

## (undated) DEVICE — GLV 8 PROTEXIS (WHITE)

## (undated) DEVICE — POSITIONER FOAM EGG CRATE ULNAR 2PCS (PINK)

## (undated) DEVICE — CONTAINER SPECIMEN 4OZ

## (undated) DEVICE — NDL HYPO SAFE 18G X 1.5" (PINK)

## (undated) DEVICE — DRAPE BACK TABLE COVER 44X90"

## (undated) DEVICE — DRSG BANDAID 0.75X3"

## (undated) DEVICE — NDL SPINAL 22G X 3.5" (BLACK)